# Patient Record
Sex: FEMALE | Race: WHITE
[De-identification: names, ages, dates, MRNs, and addresses within clinical notes are randomized per-mention and may not be internally consistent; named-entity substitution may affect disease eponyms.]

---

## 2017-04-13 ENCOUNTER — OTHER (OUTPATIENT)
Age: 82
End: 2017-04-13

## 2017-04-18 ENCOUNTER — APPOINTMENT (OUTPATIENT)
Dept: HEMATOLOGY ONCOLOGY | Facility: CLINIC | Age: 82
End: 2017-04-18

## 2017-04-18 ENCOUNTER — LABORATORY RESULT (OUTPATIENT)
Age: 82
End: 2017-04-18

## 2017-04-18 VITALS
WEIGHT: 107 LBS | TEMPERATURE: 98 F | HEART RATE: 70 BPM | BODY MASS INDEX: 18.96 KG/M2 | SYSTOLIC BLOOD PRESSURE: 99 MMHG | DIASTOLIC BLOOD PRESSURE: 61 MMHG | HEIGHT: 63 IN

## 2017-04-18 LAB
ERYTHROCYTE [SEDIMENTATION RATE] IN BLOOD BY WESTERGREN METHOD: 26 MM/HR
FERRITIN SERPL-MCNC: 73.2 NG/ML
HCT VFR BLD CALC: 34.7 %
HGB BLD-MCNC: 11.4 G/DL
IRON SATN MFR SERPL: 21 %
IRON SERPL-MCNC: 49 UG/DL
MCHC RBC-ENTMCNC: 32.8 GM/DL
MCHC RBC-ENTMCNC: 32.8 PG
MCV RBC AUTO: 100 FL
PLATELET # BLD AUTO: 210 K/UL
RBC # BLD: 3.47 M/UL
RBC # FLD: 13.3 %
TIBC SERPL-MCNC: 235 UG/DL
UIBC SERPL-MCNC: 186 UG/DL
WBC # FLD AUTO: 5.9 K/UL

## 2017-04-20 ENCOUNTER — TRANSCRIPTION ENCOUNTER (OUTPATIENT)
Age: 82
End: 2017-04-20

## 2017-04-20 LAB
DEPRECATED KAPPA LC FREE/LAMBDA SER: 0.4 RATIO
IGA SER QL IEP: 695 MG/DL
IGG SER QL IEP: 699 MG/DL
IGM SER QL IEP: 10 MG/DL
KAPPA LC CSF-MCNC: 7.3 MG/DL
KAPPA LC SERPL-MCNC: 2.92 MG/DL

## 2017-05-06 ENCOUNTER — INPATIENT (INPATIENT)
Facility: HOSPITAL | Age: 82
LOS: 2 days | Discharge: HOME | End: 2017-05-09
Attending: FAMILY MEDICINE | Admitting: FAMILY MEDICINE
Payer: MEDICARE

## 2017-05-06 VITALS — WEIGHT: 104.94 LBS | HEIGHT: 63 IN

## 2017-05-06 DIAGNOSIS — E03.9 HYPOTHYROIDISM, UNSPECIFIED: ICD-10-CM

## 2017-05-06 DIAGNOSIS — Z95.0 PRESENCE OF CARDIAC PACEMAKER: Chronic | ICD-10-CM

## 2017-05-06 DIAGNOSIS — I10 ESSENTIAL (PRIMARY) HYPERTENSION: ICD-10-CM

## 2017-05-06 DIAGNOSIS — I48.2 CHRONIC ATRIAL FIBRILLATION: ICD-10-CM

## 2017-05-06 DIAGNOSIS — I50.40 UNSPECIFIED COMBINED SYSTOLIC (CONGESTIVE) AND DIASTOLIC (CONGESTIVE) HEART FAILURE: ICD-10-CM

## 2017-05-06 LAB
ALBUMIN SERPL ELPH-MCNC: 3.3 G/DL — SIGNIFICANT CHANGE UP (ref 3.3–5)
ALP SERPL-CCNC: 83 U/L — SIGNIFICANT CHANGE UP (ref 40–120)
ALT FLD-CCNC: 40 U/L — SIGNIFICANT CHANGE UP (ref 12–78)
ANION GAP SERPL CALC-SCNC: 12 MMOL/L — SIGNIFICANT CHANGE UP (ref 5–17)
APTT BLD: 39.8 SEC — HIGH (ref 27.5–37.4)
APTT BLD: 42.4 SEC — HIGH (ref 27.5–37.4)
AST SERPL-CCNC: 34 U/L — SIGNIFICANT CHANGE UP (ref 15–37)
BASOPHILS # BLD AUTO: 0.1 K/UL — SIGNIFICANT CHANGE UP (ref 0–0.2)
BASOPHILS NFR BLD AUTO: 1.4 % — SIGNIFICANT CHANGE UP (ref 0–2)
BILIRUB SERPL-MCNC: 0.6 MG/DL — SIGNIFICANT CHANGE UP (ref 0.2–1.2)
BUN SERPL-MCNC: 12 MG/DL — SIGNIFICANT CHANGE UP (ref 7–23)
CALCIUM SERPL-MCNC: 9.4 MG/DL — SIGNIFICANT CHANGE UP (ref 8.5–10.1)
CHLORIDE SERPL-SCNC: 100 MMOL/L — SIGNIFICANT CHANGE UP (ref 96–108)
CO2 SERPL-SCNC: 26 MMOL/L — SIGNIFICANT CHANGE UP (ref 22–31)
CREAT SERPL-MCNC: 1.14 MG/DL — SIGNIFICANT CHANGE UP (ref 0.5–1.3)
DIGOXIN SERPL-MCNC: 1.65 NG/ML — SIGNIFICANT CHANGE UP (ref 0.8–2)
EOSINOPHIL # BLD AUTO: 0.2 K/UL — SIGNIFICANT CHANGE UP (ref 0–0.5)
EOSINOPHIL NFR BLD AUTO: 1.9 % — SIGNIFICANT CHANGE UP (ref 0–6)
GLUCOSE SERPL-MCNC: 140 MG/DL — HIGH (ref 70–99)
HCT VFR BLD CALC: 37.8 % — SIGNIFICANT CHANGE UP (ref 34.5–45)
HGB BLD-MCNC: 12.2 G/DL — SIGNIFICANT CHANGE UP (ref 11.5–15.5)
INR BLD: 3.31 RATIO — HIGH (ref 0.88–1.16)
INR BLD: 3.32 RATIO — HIGH (ref 0.88–1.16)
LYMPHOCYTES # BLD AUTO: 1.2 K/UL — SIGNIFICANT CHANGE UP (ref 1–3.3)
LYMPHOCYTES # BLD AUTO: 12.1 % — LOW (ref 13–44)
MCHC RBC-ENTMCNC: 31.9 PG — SIGNIFICANT CHANGE UP (ref 27–34)
MCHC RBC-ENTMCNC: 32.4 GM/DL — SIGNIFICANT CHANGE UP (ref 32–36)
MCV RBC AUTO: 98.6 FL — SIGNIFICANT CHANGE UP (ref 80–100)
MONOCYTES # BLD AUTO: 0.7 K/UL — SIGNIFICANT CHANGE UP (ref 0–0.9)
MONOCYTES NFR BLD AUTO: 7.6 % — SIGNIFICANT CHANGE UP (ref 2–14)
NEUTROPHILS # BLD AUTO: 7.4 K/UL — SIGNIFICANT CHANGE UP (ref 1.8–7.4)
NEUTROPHILS NFR BLD AUTO: 77 % — SIGNIFICANT CHANGE UP (ref 43–77)
NT-PROBNP SERPL-SCNC: 6049 PG/ML — HIGH (ref 0–450)
PLATELET # BLD AUTO: 350 K/UL — SIGNIFICANT CHANGE UP (ref 150–400)
POTASSIUM SERPL-MCNC: 4.2 MMOL/L — SIGNIFICANT CHANGE UP (ref 3.5–5.3)
POTASSIUM SERPL-SCNC: 4.2 MMOL/L — SIGNIFICANT CHANGE UP (ref 3.5–5.3)
PROT SERPL-MCNC: 7.7 GM/DL — SIGNIFICANT CHANGE UP (ref 6–8.3)
PROTHROM AB SERPL-ACNC: 36.6 SEC — HIGH (ref 9.8–12.7)
PROTHROM AB SERPL-ACNC: 36.8 SEC — HIGH (ref 9.8–12.7)
RBC # BLD: 3.83 M/UL — SIGNIFICANT CHANGE UP (ref 3.8–5.2)
RBC # FLD: 13.5 % — SIGNIFICANT CHANGE UP (ref 10.3–14.5)
SODIUM SERPL-SCNC: 138 MMOL/L — SIGNIFICANT CHANGE UP (ref 135–145)
TROPONIN I SERPL-MCNC: <0.015 NG/ML — SIGNIFICANT CHANGE UP (ref 0.01–0.04)
TROPONIN I SERPL-MCNC: <0.015 NG/ML — SIGNIFICANT CHANGE UP (ref 0.01–0.04)
WBC # BLD: 9.6 K/UL — SIGNIFICANT CHANGE UP (ref 3.8–10.5)
WBC # FLD AUTO: 9.6 K/UL — SIGNIFICANT CHANGE UP (ref 3.8–10.5)

## 2017-05-06 PROCEDURE — 71020: CPT | Mod: 26

## 2017-05-06 PROCEDURE — 93010 ELECTROCARDIOGRAM REPORT: CPT

## 2017-05-06 PROCEDURE — 93306 TTE W/DOPPLER COMPLETE: CPT | Mod: 26

## 2017-05-06 PROCEDURE — 99285 EMERGENCY DEPT VISIT HI MDM: CPT

## 2017-05-06 RX ORDER — AMIODARONE HYDROCHLORIDE 400 MG/1
200 TABLET ORAL DAILY
Qty: 0 | Refills: 0 | Status: DISCONTINUED | OUTPATIENT
Start: 2017-05-06 | End: 2017-05-07

## 2017-05-06 RX ORDER — LEVOTHYROXINE SODIUM 125 MCG
75 TABLET ORAL DAILY
Qty: 0 | Refills: 0 | Status: DISCONTINUED | OUTPATIENT
Start: 2017-05-06 | End: 2017-05-09

## 2017-05-06 RX ORDER — FUROSEMIDE 40 MG
40 TABLET ORAL ONCE
Qty: 0 | Refills: 0 | Status: COMPLETED | OUTPATIENT
Start: 2017-05-06 | End: 2017-05-06

## 2017-05-06 RX ORDER — CARVEDILOL PHOSPHATE 80 MG/1
6.25 CAPSULE, EXTENDED RELEASE ORAL EVERY 12 HOURS
Qty: 0 | Refills: 0 | Status: DISCONTINUED | OUTPATIENT
Start: 2017-05-06 | End: 2017-05-07

## 2017-05-06 RX ORDER — ASPIRIN/CALCIUM CARB/MAGNESIUM 324 MG
81 TABLET ORAL DAILY
Qty: 0 | Refills: 0 | Status: DISCONTINUED | OUTPATIENT
Start: 2017-05-06 | End: 2017-05-09

## 2017-05-06 RX ORDER — FUROSEMIDE 40 MG
40 TABLET ORAL EVERY 24 HOURS
Qty: 0 | Refills: 0 | Status: DISCONTINUED | OUTPATIENT
Start: 2017-05-06 | End: 2017-05-07

## 2017-05-06 RX ORDER — ASPIRIN/CALCIUM CARB/MAGNESIUM 324 MG
1 TABLET ORAL
Qty: 0 | Refills: 0 | COMMUNITY

## 2017-05-06 RX ORDER — ATORVASTATIN CALCIUM 80 MG/1
10 TABLET, FILM COATED ORAL AT BEDTIME
Qty: 0 | Refills: 0 | Status: DISCONTINUED | OUTPATIENT
Start: 2017-05-06 | End: 2017-05-09

## 2017-05-06 RX ORDER — WARFARIN SODIUM 2.5 MG/1
2 TABLET ORAL DAILY
Qty: 0 | Refills: 0 | Status: DISCONTINUED | OUTPATIENT
Start: 2017-05-06 | End: 2017-05-07

## 2017-05-06 RX ORDER — SODIUM CHLORIDE 9 MG/ML
3 INJECTION INTRAMUSCULAR; INTRAVENOUS; SUBCUTANEOUS ONCE
Qty: 0 | Refills: 0 | Status: COMPLETED | OUTPATIENT
Start: 2017-05-06 | End: 2017-05-06

## 2017-05-06 RX ORDER — DIGOXIN 250 MCG
0.12 TABLET ORAL DAILY
Qty: 0 | Refills: 0 | Status: DISCONTINUED | OUTPATIENT
Start: 2017-05-06 | End: 2017-05-08

## 2017-05-06 RX ORDER — ATORVASTATIN CALCIUM 80 MG/1
1 TABLET, FILM COATED ORAL
Qty: 0 | Refills: 0 | COMMUNITY

## 2017-05-06 RX ADMIN — ATORVASTATIN CALCIUM 10 MILLIGRAM(S): 80 TABLET, FILM COATED ORAL at 21:26

## 2017-05-06 RX ADMIN — Medication 40 MILLIGRAM(S): at 09:33

## 2017-05-06 RX ADMIN — CARVEDILOL PHOSPHATE 6.25 MILLIGRAM(S): 80 CAPSULE, EXTENDED RELEASE ORAL at 18:03

## 2017-05-06 RX ADMIN — Medication 2.5 MILLIGRAM(S): at 16:23

## 2017-05-06 RX ADMIN — AMIODARONE HYDROCHLORIDE 200 MILLIGRAM(S): 400 TABLET ORAL at 14:12

## 2017-05-06 RX ADMIN — Medication 0.12 MILLIGRAM(S): at 14:12

## 2017-05-06 RX ADMIN — SODIUM CHLORIDE 3 MILLILITER(S): 9 INJECTION INTRAMUSCULAR; INTRAVENOUS; SUBCUTANEOUS at 08:12

## 2017-05-06 RX ADMIN — Medication 81 MILLIGRAM(S): at 16:24

## 2017-05-06 RX ADMIN — Medication 1 TABLET(S): at 14:12

## 2017-05-06 RX ADMIN — Medication 40 MILLIGRAM(S): at 12:20

## 2017-05-06 NOTE — ED PROVIDER NOTE - MEDICAL DECISION MAKING DETAILS
pt with archibald and sob, will get labs, ekg, chest xray re eval could be chf vs copd as pt has ho smoking quit for 20 years

## 2017-05-06 NOTE — ED PROVIDER NOTE - PROGRESS NOTE DETAILS
pt with chf, will give lasix, pt aao x 3, hr reg, + murmur, s1 s2, lungs with crackles 1/2 up bilaterally with slight exp wheezing pt with chf, will give lasix, pt aao x 3, hr reg, + murmur, s1 s2, lungs with crackles 1/2 up bilaterally with slight exp wheezing karla do

## 2017-05-06 NOTE — ED PROVIDER NOTE - ATTENDING CONTRIBUTION TO CARE
I, Julia Chaudhry, performed the initial face to face bedside interview with this patient regarding history of present illness, review of symptoms and relevant past medical, social and family history.  I completed an independent physical examination.  I was the initial provider who evaluated this patient. I have signed out the follow up of any pending tests (i.e. labs, radiological studies) to the ACP with instructions to review any pertinent findings to me prior to determining a final disposition.  I have communicated the patient’s plan of care and disposition with the ACP.

## 2017-05-06 NOTE — H&P ADULT - NSHPPHYSICALEXAM_GEN_ALL_CORE
Physical Exam: Vital Signs Last 24 Hrs  T(C): 37.2, Max: 37.2 (05-06 @ 11:03)  T(F): 98.9, Max: 98.9 (05-06 @ 11:03)  HR: 73 (70 - 73)  BP: 132/69 (132/69 - 153/79)  BP(mean): --  RR: 19 (16 - 24)  SpO2: 100% (95% - 100%)        HEENT: PRRL EOMI    MOUTH/TEETH/GUMS: Clear    NECK: no JVD    LUNGS: mild basilar crackles    HEART: S1,S2 RR, s/p AICD R subclavicular fossa    ABDOMEN: soft nontender    EXTREMITIES: edema Yes: No:    MUSCULOSKELETAL:no joint pain or swelling    NEURO: no tremor, no focal signs.    SKIN: no rash

## 2017-05-06 NOTE — ED PROVIDER NOTE - OBJECTIVE STATEMENT
83 yo f pw 2 weeks of sob and dyspnea today worse  from early this morning. No c/o fever, chills, chest pain. pt s/i slight dry non productive cough with archibald.

## 2017-05-06 NOTE — H&P ADULT - NSHPREVIEWOFSYSTEMS_GEN_ALL_CORE
Review of Systems: as in HPI    Eyes: no changes in vision    ENT/Mouth: no changes    Cardiovascular: no chest pain    Respiratory: exertional dysnpnea, no dyspnea at rest on O2    Gastrointestinal: no diarrhea, no nausea, no vomiting    Genitourinary: no dysuria    Breast: no pain    Musculoskeletal: no pain    Integumentary: no itching    Neurological: No Headache, no tremor,    Psychiatric: no suicidal ideations    Endocrine: no excessive thirst, polyuria, hot flashes    Hematologic/Lymphatic: no swollen glands    Allergic/Immunologic: no congestion, rash

## 2017-05-06 NOTE — H&P ADULT - PROBLEM SELECTOR PROBLEM 1
Combined systolic and diastolic congestive heart failure, unspecified congestive heart failure chronicity

## 2017-05-06 NOTE — ED ADULT NURSE NOTE - OBJECTIVE STATEMENT
Pt states she has been SOB in the mornings over 1 week. Last night pt had difficulty breathing and did not sleep. Pt color pale. Pulse ox 92%, O2 applied at 2L/min, pulse Ox increased to 97%.

## 2017-05-06 NOTE — H&P ADULT - HISTORY OF PRESENT ILLNESS
The patient is an 83 yo female with Hx. of CAD, CHF, Atrial fib. S/P AICD,Hypothyroid who developed gradually worsening paroxysmal nocturnal dyspnea and she presented to the ER with worsening SOB. She denies CP. She saw Dr. Marrero in the office 2 weeks ago and she was ok.

## 2017-05-06 NOTE — H&P ADULT - NSHPLABSRESULTS_GEN_ALL_CORE
12.2   9.6   )-----------( 350      ( 06 May 2017 07:48 )             37.8       05-06    138  |  100  |  12  ----------------------------<  140<H>  4.2   |  26  |  1.14    Ca    9.4      06 May 2017 07:48    TPro  7.7  /  Alb  3.3  /  TBili  0.6  /  DBili  x   /  AST  34  /  ALT  40  /  AlkPhos  83  05-06    CXRay  mild PVC

## 2017-05-06 NOTE — H&P ADULT - ASSESSMENT
81 yo female with Hx. of CAD, CHF, Atrial fib. S/P AICD,Hypothyroid, chronic CHF who presents with acute on chronic CHF exacerbation

## 2017-05-07 DIAGNOSIS — Z95.0 PRESENCE OF CARDIAC PACEMAKER: ICD-10-CM

## 2017-05-07 DIAGNOSIS — I50.33 ACUTE ON CHRONIC DIASTOLIC (CONGESTIVE) HEART FAILURE: ICD-10-CM

## 2017-05-07 LAB
ANION GAP SERPL CALC-SCNC: 9 MMOL/L — SIGNIFICANT CHANGE UP (ref 5–17)
BUN SERPL-MCNC: 14 MG/DL — SIGNIFICANT CHANGE UP (ref 7–23)
CALCIUM SERPL-MCNC: 9 MG/DL — SIGNIFICANT CHANGE UP (ref 8.5–10.1)
CHLORIDE SERPL-SCNC: 97 MMOL/L — SIGNIFICANT CHANGE UP (ref 96–108)
CO2 SERPL-SCNC: 32 MMOL/L — HIGH (ref 22–31)
CREAT SERPL-MCNC: 1.08 MG/DL — SIGNIFICANT CHANGE UP (ref 0.5–1.3)
GLUCOSE SERPL-MCNC: 86 MG/DL — SIGNIFICANT CHANGE UP (ref 70–99)
HCT VFR BLD CALC: 39.3 % — SIGNIFICANT CHANGE UP (ref 34.5–45)
HGB BLD-MCNC: 12.4 G/DL — SIGNIFICANT CHANGE UP (ref 11.5–15.5)
INR BLD: 2.82 RATIO — HIGH (ref 0.88–1.16)
MCHC RBC-ENTMCNC: 31.4 PG — SIGNIFICANT CHANGE UP (ref 27–34)
MCHC RBC-ENTMCNC: 31.6 GM/DL — LOW (ref 32–36)
MCV RBC AUTO: 99.5 FL — SIGNIFICANT CHANGE UP (ref 80–100)
NT-PROBNP SERPL-SCNC: 3490 PG/ML — HIGH (ref 0–450)
NT-PROBNP SERPL-SCNC: 4540 PG/ML — HIGH (ref 0–450)
PLATELET # BLD AUTO: 347 K/UL — SIGNIFICANT CHANGE UP (ref 150–400)
POTASSIUM SERPL-MCNC: 4.1 MMOL/L — SIGNIFICANT CHANGE UP (ref 3.5–5.3)
POTASSIUM SERPL-SCNC: 4.1 MMOL/L — SIGNIFICANT CHANGE UP (ref 3.5–5.3)
PROTHROM AB SERPL-ACNC: 31.1 SEC — HIGH (ref 9.8–12.7)
RBC # BLD: 3.95 M/UL — SIGNIFICANT CHANGE UP (ref 3.8–5.2)
RBC # FLD: 13.4 % — SIGNIFICANT CHANGE UP (ref 10.3–14.5)
SODIUM SERPL-SCNC: 138 MMOL/L — SIGNIFICANT CHANGE UP (ref 135–145)
WBC # BLD: 6.1 K/UL — SIGNIFICANT CHANGE UP (ref 3.8–10.5)
WBC # FLD AUTO: 6.1 K/UL — SIGNIFICANT CHANGE UP (ref 3.8–10.5)

## 2017-05-07 PROCEDURE — 93284 PRGRMG EVAL IMPLANTABLE DFB: CPT | Mod: 26

## 2017-05-07 RX ORDER — AMIODARONE HYDROCHLORIDE 400 MG/1
200 TABLET ORAL DAILY
Qty: 0 | Refills: 0 | Status: DISCONTINUED | OUTPATIENT
Start: 2017-05-08 | End: 2017-05-09

## 2017-05-07 RX ORDER — FUROSEMIDE 40 MG
40 TABLET ORAL EVERY 24 HOURS
Qty: 0 | Refills: 0 | Status: CANCELLED | OUTPATIENT
Start: 2018-04-05 | End: 2017-05-09

## 2017-05-07 RX ORDER — WARFARIN SODIUM 2.5 MG/1
1 TABLET ORAL DAILY
Qty: 0 | Refills: 0 | Status: DISCONTINUED | OUTPATIENT
Start: 2017-05-07 | End: 2017-05-09

## 2017-05-07 RX ADMIN — Medication 75 MICROGRAM(S): at 05:51

## 2017-05-07 RX ADMIN — ATORVASTATIN CALCIUM 10 MILLIGRAM(S): 80 TABLET, FILM COATED ORAL at 21:16

## 2017-05-07 RX ADMIN — Medication 2.5 MILLIGRAM(S): at 05:50

## 2017-05-07 RX ADMIN — Medication 1 TABLET(S): at 11:24

## 2017-05-07 RX ADMIN — CARVEDILOL PHOSPHATE 6.25 MILLIGRAM(S): 80 CAPSULE, EXTENDED RELEASE ORAL at 05:50

## 2017-05-07 RX ADMIN — Medication 81 MILLIGRAM(S): at 11:24

## 2017-05-07 RX ADMIN — WARFARIN SODIUM 1 MILLIGRAM(S): 2.5 TABLET ORAL at 21:16

## 2017-05-07 RX ADMIN — AMIODARONE HYDROCHLORIDE 200 MILLIGRAM(S): 400 TABLET ORAL at 05:50

## 2017-05-07 RX ADMIN — Medication 0.12 MILLIGRAM(S): at 05:50

## 2017-05-08 DIAGNOSIS — I50.9 HEART FAILURE, UNSPECIFIED: ICD-10-CM

## 2017-05-08 DIAGNOSIS — Z45.02 ENCOUNTER FOR ADJUSTMENT AND MANAGEMENT OF AUTOMATIC IMPLANTABLE CARDIAC DEFIBRILLATOR: ICD-10-CM

## 2017-05-08 DIAGNOSIS — Z51.81 ENCOUNTER FOR THERAPEUTIC DRUG LEVEL MONITORING: ICD-10-CM

## 2017-05-08 DIAGNOSIS — I48.0 PAROXYSMAL ATRIAL FIBRILLATION: ICD-10-CM

## 2017-05-08 LAB
ANION GAP SERPL CALC-SCNC: 7 MMOL/L — SIGNIFICANT CHANGE UP (ref 5–17)
BUN SERPL-MCNC: 13 MG/DL — SIGNIFICANT CHANGE UP (ref 7–23)
CALCIUM SERPL-MCNC: 8.5 MG/DL — SIGNIFICANT CHANGE UP (ref 8.5–10.1)
CHLORIDE SERPL-SCNC: 100 MMOL/L — SIGNIFICANT CHANGE UP (ref 96–108)
CO2 SERPL-SCNC: 31 MMOL/L — SIGNIFICANT CHANGE UP (ref 22–31)
CREAT SERPL-MCNC: 0.88 MG/DL — SIGNIFICANT CHANGE UP (ref 0.5–1.3)
DIGOXIN SERPL-MCNC: 1.75 NG/ML — SIGNIFICANT CHANGE UP (ref 0.8–2)
GLUCOSE SERPL-MCNC: 85 MG/DL — SIGNIFICANT CHANGE UP (ref 70–99)
HCT VFR BLD CALC: 32.6 % — LOW (ref 34.5–45)
HGB BLD-MCNC: 11 G/DL — LOW (ref 11.5–15.5)
INR BLD: 2.95 RATIO — HIGH (ref 0.88–1.16)
MCHC RBC-ENTMCNC: 33.6 PG — SIGNIFICANT CHANGE UP (ref 27–34)
MCHC RBC-ENTMCNC: 33.7 GM/DL — SIGNIFICANT CHANGE UP (ref 32–36)
MCV RBC AUTO: 99.7 FL — SIGNIFICANT CHANGE UP (ref 80–100)
NT-PROBNP SERPL-SCNC: 2518 PG/ML — HIGH (ref 0–450)
PLATELET # BLD AUTO: 280 K/UL — SIGNIFICANT CHANGE UP (ref 150–400)
POTASSIUM SERPL-MCNC: 3.7 MMOL/L — SIGNIFICANT CHANGE UP (ref 3.5–5.3)
POTASSIUM SERPL-SCNC: 3.7 MMOL/L — SIGNIFICANT CHANGE UP (ref 3.5–5.3)
PROTHROM AB SERPL-ACNC: 32.6 SEC — HIGH (ref 9.8–12.7)
RBC # BLD: 3.27 M/UL — LOW (ref 3.8–5.2)
RBC # FLD: 12.8 % — SIGNIFICANT CHANGE UP (ref 10.3–14.5)
SODIUM SERPL-SCNC: 138 MMOL/L — SIGNIFICANT CHANGE UP (ref 135–145)
TSH SERPL-MCNC: 4.61 UIU/ML — HIGH (ref 0.36–3.74)
WBC # BLD: 6.5 K/UL — SIGNIFICANT CHANGE UP (ref 3.8–10.5)
WBC # FLD AUTO: 6.5 K/UL — SIGNIFICANT CHANGE UP (ref 3.8–10.5)

## 2017-05-08 PROCEDURE — 71010: CPT | Mod: 26

## 2017-05-08 PROCEDURE — 99222 1ST HOSP IP/OBS MODERATE 55: CPT

## 2017-05-08 RX ORDER — DIGOXIN 250 MCG
0.06 TABLET ORAL DAILY
Qty: 0 | Refills: 0 | Status: DISCONTINUED | OUTPATIENT
Start: 2017-05-08 | End: 2017-05-08

## 2017-05-08 RX ORDER — CARVEDILOL PHOSPHATE 80 MG/1
3.12 CAPSULE, EXTENDED RELEASE ORAL
Qty: 0 | Refills: 0 | Status: DISCONTINUED | OUTPATIENT
Start: 2017-05-08 | End: 2017-05-09

## 2017-05-08 RX ORDER — DIGOXIN 250 MCG
0.06 TABLET ORAL DAILY
Qty: 0 | Refills: 0 | Status: DISCONTINUED | OUTPATIENT
Start: 2017-05-09 | End: 2017-05-09

## 2017-05-08 RX ORDER — METOPROLOL TARTRATE 50 MG
25 TABLET ORAL
Qty: 0 | Refills: 0 | Status: DISCONTINUED | OUTPATIENT
Start: 2017-05-08 | End: 2017-05-08

## 2017-05-08 RX ADMIN — Medication 75 MICROGRAM(S): at 05:38

## 2017-05-08 RX ADMIN — ATORVASTATIN CALCIUM 10 MILLIGRAM(S): 80 TABLET, FILM COATED ORAL at 22:25

## 2017-05-08 RX ADMIN — CARVEDILOL PHOSPHATE 3.12 MILLIGRAM(S): 80 CAPSULE, EXTENDED RELEASE ORAL at 17:54

## 2017-05-08 RX ADMIN — Medication 0.12 MILLIGRAM(S): at 05:38

## 2017-05-08 RX ADMIN — AMIODARONE HYDROCHLORIDE 200 MILLIGRAM(S): 400 TABLET ORAL at 05:38

## 2017-05-08 RX ADMIN — Medication 1 TABLET(S): at 12:17

## 2017-05-08 RX ADMIN — Medication 81 MILLIGRAM(S): at 12:17

## 2017-05-08 RX ADMIN — WARFARIN SODIUM 1 MILLIGRAM(S): 2.5 TABLET ORAL at 22:25

## 2017-05-08 NOTE — PROGRESS NOTE ADULT - PROBLEM SELECTOR PLAN 2
Check INR keep between 2-3  INR >2 <3  c/w 1 mg warfarin, daily INR  noted digoxin level 1.6  will check level in am, will d/w cardiology ? lower dose
Check INR keep between 2-3  INR >2 <3  c/w 1 mg warfarin, daily INR  noted digoxin level 1.6--> 1.75  will check level in am, will d/w cardiology ? lower dose or d/c

## 2017-05-08 NOTE — CONSULT NOTE ADULT - PROBLEM SELECTOR PROBLEM 1
Dyspnea due to congestive heart failure
Combined systolic and diastolic congestive heart failure, unspecified congestive heart failure chronicity

## 2017-05-08 NOTE — CONSULT NOTE ADULT - PROBLEM SELECTOR RECOMMENDATION 2
- agree with IV diuresis  - will need po lasix on discharge  - d/c coreg due to hypotension  - start metoprolol 25mg po bid for HF

## 2017-05-08 NOTE — CONSULT NOTE ADULT - PROBLEM SELECTOR RECOMMENDATION 6
- currently in NSR  - no recent AF  - c/w amiodarone (originally started due to VT but will help maintain NSR as well)

## 2017-05-08 NOTE — CONSULT NOTE ADULT - SUBJECTIVE AND OBJECTIVE BOX
HPI:  The patient is an 83 yo female with Hx. of CAD, CHF, Atrial fib. S/P AICD,Hypothyroid who developed gradually worsening paroxysmal nocturnal dyspnea and she presented to the ER with worsening SOB. She denies CP. She saw Dr. Marrero in the office 2 weeks ago and she was ok. CXR showed pulmonary congestion. Patient much improved with diuresis. She is on amiodarone.      PAST MEDICAL & SURGICAL HISTORY:  HTN (hypertension)  Hypothyroid  Chronic atrial fibrillation  Pacemaker  Artificial pacemaker      MEDICATIONS  (STANDING):  aspirin enteric coated 81milliGRAM(s) Oral daily  atorvastatin 10milliGRAM(s) Oral at bedtime  levothyroxine 75MICROGram(s) Oral daily  calcium carbonate  625 mG + Vitamin D (OsCal 250 + D) 1Tablet(s) Oral daily  warfarin 1milliGRAM(s) Oral daily  amiodarone    Tablet 200milliGRAM(s) Oral daily  carvedilol 3.125milliGRAM(s) Oral two times a day  digoxin     Tablet 0.0625milliGRAM(s) Oral daily    MEDICATIONS  (PRN):      Allergies    adhesives (Rash)  Ancef (Unknown)    Intolerances        SOCIAL HISTORY: Denies tobacco, etoh abuse or illicit drug use    FAMILY HISTORY:  No pertinent family history in first degree relatives      Vital Signs Last 24 Hrs  T(C): 36.8, Max: 36.8 (05-08 @ 16:15)  T(F): 98.3, Max: 98.3 (05-08 @ 16:15)  HR: 73 (70 - 76)  BP: 129/66 (102/46 - 137/62)  BP(mean): --  RR: 17 (16 - 20)  SpO2: 98% (96% - 98%)    REVIEW OF SYSTEMS:    CONSTITUTIONAL:  As per HPI.  SKIN: no rashes  HEENT:  Eyes:  No diplopia or blurred vision. ENT:  No earache, sore throat or runny nose.  CARDIOVASCULAR:  No pressure, squeezing, tightness, heaviness or aching about the chest, neck, axilla or epigastrium.  RESPIRATORY:  No cough, shortness of breath, PND or orthopnea.  GASTROINTESTINAL:  No nausea, vomiting or diarrhea.  GENITOURINARY:  No dysuria, frequency or urgency.  MUSCULOSKELETAL:  As per HPI.  SKIN:  No change in skin, hair or nails.  NEUROLOGIC:  No paresthesias, fasciculations, seizures or weakness.  PSYCHIATRIC:  No disorder of thought or mood.  ENDOCRINE:  No heat or cold intolerance, polyuria or polydipsia.  HEMATOLOGICAL:  No easy bruising or bleedings:  .     PHYSICAL EXAMINATION:    GENERAL APPEARANCE:  Pt. is not currently dyspneic, in no distress. Pt. is alert, oriented, and pleasant.  HEENT:  Pupils are normal and react normally. No icterus. Mucous membranes well colored.  NECK:  Supple. No lymphadenopathy. Jugular venous pressure not elevated. Carotids equal.   HEART:   The cardiac impulse has a normal quality. Regular. Normal S1 and S2. 2/6 systolic murmur  CHEST: scattered ronchi  ABDOMEN:  Soft and nontender.   EXTREMITIES:  There is no cyanosis, clubbing or edema.   SKIN:  No rash or significant lesions are noted.  CNS; AAO x 3    LABS:                        11.0   6.5   )-----------( 280      ( 08 May 2017 05:50 )             32.6     05-08    138  |  100  |  13  ----------------------------<  85  3.7   |  31  |  0.88    Ca    8.5      08 May 2017 05:50        PT/INR - ( 08 May 2017 05:50 )   PT: 32.6 sec;   INR: 2.95 ratio         PTT - ( 06 May 2017 19:39 )  PTT:39.8 sec            RADIOLOGY & ADDITIONAL STUDIES:
CARDIOLOGY AND ELECTROPHYSIOLOGY ASSESSMENT    HPI:  The patient is an 83 yo female with Hx. of CAD, CHF, Atrial fib. S/P AICD,Hypothyroid who developed gradually worsening paroxysmal nocturnal dyspnea and she presented to the ER with worsening SOB. She denies CP. She saw Dr. Marrero in the office 2 weeks ago and she was ok. (06 May 2017 11:54)    5/8/17:  Pt. seen and examined.  Reports improved symptoms since admission with IV diuresis.       PAST MEDICAL & SURGICAL HISTORY:  HTN (hypertension)  Hypothyroid  Chronic atrial fibrillation  Pacemaker  Artificial pacemaker      MEDICATIONS  (STANDING):  aspirin enteric coated 81milliGRAM(s) Oral daily  digoxin     Tablet 0.125milliGRAM(s) Oral daily  atorvastatin 10milliGRAM(s) Oral at bedtime  levothyroxine 75MICROGram(s) Oral daily  calcium carbonate  625 mG + Vitamin D (OsCal 250 + D) 1Tablet(s) Oral daily  warfarin 1milliGRAM(s) Oral daily  amiodarone    Tablet 200milliGRAM(s) Oral daily    MEDICATIONS  (PRN):      Allergies    adhesives (Rash)  Ancef (Unknown)    Intolerances        SOCIAL HISTORY: Denies tobacco, etoh abuse or illicit drug use    FAMILY HISTORY:  No pertinent family history in first degree relatives      Vital Signs Last 24 Hrs  T(C): 36.4, Max: 36.8 (05-07 @ 10:00)  T(F): 97.5, Max: 98.2 (05-07 @ 10:00)  HR: 76 (70 - 76)  BP: 114/60 (94/52 - 120/59)  BP(mean): --  RR: 19 (16 - 20)  SpO2: 98% (93% - 100%)    REVIEW OF SYSTEMS:    CONSTITUTIONAL:  As per HPI.  HEENT:  Eyes:  No diplopia or blurred vision. ENT:  No earache, sore throat or runny nose.  CARDIOVASCULAR:  No pressure, squeezing, strangling, tightness, heaviness or aching about the chest, neck, axilla or epigastrium.  RESPIRATORY:  No cough, shortness of breath, PND or orthopnea.  GASTROINTESTINAL:  No nausea, vomiting or diarrhea.  GENITOURINARY:  No dysuria, frequency or urgency.  MUSCULOSKELETAL:  As per HPI.  SKIN:  No change in skin, hair or nails.  NEUROLOGIC:  No paresthesias, fasciculations, seizures or weakness.  PSYCHIATRIC:  No disorder of thought or mood.  ENDOCRINE:  No heat or cold intolerance, polyuria or polydipsia.  HEMATOLOGICAL:  No easy bruising or bleedings:  .     PHYSICAL EXAMINATION:    GENERAL APPEARANCE:  Pt. is not currently dyspneic, in no distress. Pt. is alert, oriented, and pleasant.  HEENT:  Pupils are normal and react normally. No icterus. Mucous membranes well colored.  NECK:  Supple. No lymphadenopathy. Jugular venous pressure not elevated. Carotids equal.   HEART:   The cardiac impulse has a normal quality. There are no murmurs, rubs or gallops noted  CHEST:  Chest is clear to auscultation. Normal respiratory effort.  ABDOMEN:  Soft and nontender.   EXTREMITIES:  There is no edema.   SKIN:  No rash or significant lesions are noted.    I&O's Summary    I & Os for current day (as of 08 May 2017 08:40)  =============================================  IN: 240 ml / OUT: 150 ml / NET: 90 ml      LABS:                        11.0   6.5   )-----------( 280      ( 08 May 2017 05:50 )             32.6   05-08    138  |  100  |  13  ----------------------------<  85  3.7   |  31  |  0.88    Ca    8.5      08 May 2017 05:50      PT/INR - ( 08 May 2017 05:50 )   PT: 32.6 sec;   INR: 2.95 ratio         PTT - ( 06 May 2017 19:39 )  PTT:39.8 secCARDIAC MARKERS ( 06 May 2017 13:32 )  <0.015 ng/mL / x     / x     / x     / x      CARDIAC MARKERS ( 06 May 2017 10:55 )  0.020 ng/mL / x     / x     / x     / x                EKG:    Ventricular Rate 71 BPM    Atrial Rate 71 BPM    P-R Interval 328 ms    QRS Duration 158 ms    Q-T Interval 458 ms    QTC Calculation(Bezet) 497 ms    P Axis 12 degrees    R Axis 211 degrees    T Axis 36 degrees    Diagnosis Line Atrial-sensed ventricular-paced rhythm with prolonged AV conduction  Biventricular pacemaker detected  Abnormal ECG  When compared with ECG of 06-MAY-2017 07:11,  Vent. rate has decreased BY   6 BPM  Confirmed by JASE SAVAGE MD ([id number]) on 5/6/2017 8:24:34 PM      TELEMETRY:    AS-BiV paced rhythm    CARDIAC TESTS:     Summary     The left ventricle cavity is mildly dilated. Basal septal akinesis seen   in   4 chamber view. Endocardium not always well seen. EF still likely 50-55%   assuming no other regional wall motion abnormality.   The left atrium is moderately dilated.   The right atrium appears mildly dilated.   A device wire is seen in the RV and RA.   Normal right ventricle function.   Fibrocalcific changes noted to the aortic valve leaflets with preserved   excursion. No significant gradient demonstrated.   Mild (1+) aortic regurgitation is present.   Fibrocalcific changes noted to the mitral valve leaflets with preserved   leaflet excursion.   Moderate (2+) mitral regurgitation is present.   Mild (1+) tricuspid valve regurgitation is present. Moderate pulmonary   hypertension.   Shortened pulmonary artery acceleration time c/w PHTN   No evidence of pericardial effusion.   No pleural comment made.   IVC appears normal.     Signature     ----------------------------------------------------------------   Electronically signed by Chaitanya Castano MD(Interpreting   physician) on 05/07/2017 09:49 AM   ----------------------------------------------------------------      RADIOLOGY & ADDITIONAL STUDIES:

## 2017-05-08 NOTE — PROGRESS NOTE ADULT - PROBLEM SELECTOR PLAN 4
overcontrolled, c/w Enalapril, amiodarone with parameters  will hold BB for now
overcontrolled, c/w Enalapril, amiodarone with parameters  restart BB

## 2017-05-08 NOTE — CONSULT NOTE ADULT - PROBLEM SELECTOR RECOMMENDATION 9
- Pt. with history of severe MR and severe LV dysfunction prior to Bi-V ICD, now with EF 50-55% and improvement to moderate MR.  This suggests that current decompensation likely secondary to diastolic dysfunction

## 2017-05-08 NOTE — PROGRESS NOTE ADULT - PROBLEM SELECTOR PLAN 1
tele, serial troponins x3 neg, BNP trending down  1500 cc fluids restriction, 2 d echo - EF 55, 2+ MR, ICD interogated - no events  cardiology consult pending  c/w IV diuresis, ASA, hold BB due to low BP, c/w ACEi  daily PT  CXR - mild congestion, repeat CXR in am
tele, serial troponin x3 neg, BNP trending down  1500 cc fluids restriction, 2 d echo - EF 55, 2+ MR, ICD interrogated - no events  cardiology consult pending  c/w IV diuresis, ASA, hold BB due to low BP, now restart metoprolol 25 bid, c/w ACEi  daily PT  CXR - mild congestion, repeat CXR in am

## 2017-05-08 NOTE — CONSULT NOTE ADULT - ASSESSMENT
81 yo female with Hx. of CAD, CHF, Atrial fib. S/P bi-V AICD,Hypothyroid who presents in acute on chronic decompensated diastolic heart failure.
cont O2 prn  diuesis  CXR noted

## 2017-05-08 NOTE — PROGRESS NOTE ADULT - PROBLEM SELECTOR PLAN 5
ICD interrogation -  Bi V pacing-dependent No R wave measurable  No episodes or events detected
ICD interrogation -  Bi V pacing-dependent No R wave measurable  No episodes or events detected

## 2017-05-09 ENCOUNTER — TRANSCRIPTION ENCOUNTER (OUTPATIENT)
Age: 82
End: 2017-05-09

## 2017-05-09 VITALS — WEIGHT: 101.85 LBS

## 2017-05-09 LAB
ANION GAP SERPL CALC-SCNC: 7 MMOL/L — SIGNIFICANT CHANGE UP (ref 5–17)
BUN SERPL-MCNC: 10 MG/DL — SIGNIFICANT CHANGE UP (ref 7–23)
CALCIUM SERPL-MCNC: 8.5 MG/DL — SIGNIFICANT CHANGE UP (ref 8.5–10.1)
CHLORIDE SERPL-SCNC: 102 MMOL/L — SIGNIFICANT CHANGE UP (ref 96–108)
CO2 SERPL-SCNC: 30 MMOL/L — SIGNIFICANT CHANGE UP (ref 22–31)
CREAT SERPL-MCNC: 0.79 MG/DL — SIGNIFICANT CHANGE UP (ref 0.5–1.3)
GLUCOSE SERPL-MCNC: 86 MG/DL — SIGNIFICANT CHANGE UP (ref 70–99)
HCT VFR BLD CALC: 34.7 % — SIGNIFICANT CHANGE UP (ref 34.5–45)
HGB BLD-MCNC: 11.3 G/DL — LOW (ref 11.5–15.5)
INR BLD: 3.23 RATIO — HIGH (ref 0.88–1.16)
MCHC RBC-ENTMCNC: 31.9 PG — SIGNIFICANT CHANGE UP (ref 27–34)
MCHC RBC-ENTMCNC: 32.6 GM/DL — SIGNIFICANT CHANGE UP (ref 32–36)
MCV RBC AUTO: 97.8 FL — SIGNIFICANT CHANGE UP (ref 80–100)
PLATELET # BLD AUTO: 307 K/UL — SIGNIFICANT CHANGE UP (ref 150–400)
POTASSIUM SERPL-MCNC: 3.7 MMOL/L — SIGNIFICANT CHANGE UP (ref 3.5–5.3)
POTASSIUM SERPL-SCNC: 3.7 MMOL/L — SIGNIFICANT CHANGE UP (ref 3.5–5.3)
PROTHROM AB SERPL-ACNC: 35.7 SEC — HIGH (ref 9.8–12.7)
RBC # BLD: 3.55 M/UL — LOW (ref 3.8–5.2)
RBC # FLD: 12.8 % — SIGNIFICANT CHANGE UP (ref 10.3–14.5)
SODIUM SERPL-SCNC: 139 MMOL/L — SIGNIFICANT CHANGE UP (ref 135–145)
WBC # BLD: 6.7 K/UL — SIGNIFICANT CHANGE UP (ref 3.8–10.5)
WBC # FLD AUTO: 6.7 K/UL — SIGNIFICANT CHANGE UP (ref 3.8–10.5)

## 2017-05-09 RX ORDER — CARVEDILOL PHOSPHATE 80 MG/1
1 CAPSULE, EXTENDED RELEASE ORAL
Qty: 60 | Refills: 0 | OUTPATIENT
Start: 2017-05-09 | End: 2017-06-08

## 2017-05-09 RX ORDER — DIGOXIN 250 MCG
1 TABLET ORAL
Qty: 0 | Refills: 0 | COMMUNITY

## 2017-05-09 RX ORDER — WARFARIN SODIUM 2.5 MG/1
1 TABLET ORAL
Qty: 0 | Refills: 0 | COMMUNITY

## 2017-05-09 RX ORDER — DIGOXIN 250 MCG
1 TABLET ORAL
Qty: 30 | Refills: 0 | OUTPATIENT
Start: 2017-05-09 | End: 2017-06-08

## 2017-05-09 RX ORDER — CARVEDILOL PHOSPHATE 80 MG/1
1 CAPSULE, EXTENDED RELEASE ORAL
Qty: 0 | Refills: 0 | COMMUNITY

## 2017-05-09 RX ORDER — WARFARIN SODIUM 2.5 MG/1
1 TABLET ORAL
Qty: 0 | Refills: 0 | DISCHARGE
Start: 2017-05-09

## 2017-05-09 RX ORDER — FUROSEMIDE 40 MG
1 TABLET ORAL
Qty: 30 | Refills: 0
Start: 2017-05-09 | End: 2017-06-08

## 2017-05-09 RX ORDER — WARFARIN SODIUM 2.5 MG/1
1 TABLET ORAL
Qty: 0 | Refills: 0 | COMMUNITY
Start: 2017-05-09

## 2017-05-09 RX ADMIN — Medication 0.06 MILLIGRAM(S): at 06:50

## 2017-05-09 RX ADMIN — CARVEDILOL PHOSPHATE 3.12 MILLIGRAM(S): 80 CAPSULE, EXTENDED RELEASE ORAL at 06:50

## 2017-05-09 RX ADMIN — Medication 2.5 MILLIGRAM(S): at 06:50

## 2017-05-09 RX ADMIN — Medication 81 MILLIGRAM(S): at 11:57

## 2017-05-09 RX ADMIN — Medication 75 MICROGRAM(S): at 06:50

## 2017-05-09 RX ADMIN — Medication 1 TABLET(S): at 11:57

## 2017-05-09 RX ADMIN — AMIODARONE HYDROCHLORIDE 200 MILLIGRAM(S): 400 TABLET ORAL at 06:50

## 2017-05-09 NOTE — DISCHARGE NOTE ADULT - MEDICATION SUMMARY - MEDICATIONS TO CHANGE
I will SWITCH the dose or number of times a day I take the medications listed below when I get home from the hospital:    Coumadin 2 mg oral tablet  -- 1 tab(s) by mouth once a day    carvedilol 6.25 mg oral tablet  -- 1 tab(s) by mouth 2 times a day    digoxin 125 mcg (0.125 mg) oral tablet  -- 1 tab(s) by mouth once a day

## 2017-05-09 NOTE — DISCHARGE NOTE ADULT - PLAN OF CARE
resolve, prevent exacebrations take lasix 20 daily, check weight daily, drink 1500 cc a day, follow up with cardiology within 1 week INR 3.2, hold coumadin/warfarin for 1 day, restart 1 mg tomorrow 5/10, check INR in 2-3 days coreg lowered to 3.25 twice daily due to low BP continue with home medications lowered digoxin to 0.065, repeat level in 1 week  lowered digoxin to 0.065, repeat level in 1 week

## 2017-05-09 NOTE — PROGRESS NOTE ADULT - SUBJECTIVE AND OBJECTIVE BOX
Subjective:  Patient is a 82y old  Female who presents with a chief complaint of SOB, PND     HPI:  The patient is an 81 yo female with Hx. of CAD, CHF, Atrial fib. S/P AICD, Hypothyroid who admitted on 17 with gradually worsening paroxysmal nocturnal dyspnea and worsening SOB. She denies CP. She saw Dr. Marrero in the office 2 weeks ago and she was ok.      tele - paced rhythm, reports mild improvement of dyspnea , denies CP, cough, urinary sx, reports increased urine output   tele - no events, dyspnea improved, denies CP, palpitations    Patient seen and examined at bedside,    Review of system- Rest of the review of system are normal except mentioned in HPI    Vital Signs Last 24 Hrs  T(C): 36.4, Max: 36.7 ( @ 16:45)  T(F): 97.6, Max: 98.1 ( @ 16:45)  HR: 71 (70 - 76)  BP: 137/62 (102/46 - 137/62)  BP(mean): --  RR: 16 (16 - 20)  SpO2: 98% (95% - 98%)    Daily Weight in k.5 (07 May 2017 09:48)    LABS:             ( 08 May 2017 05:50 )               11.0  6.5   )-----------( 280                           32.6   138  |  100  |  13 ----------------------------<  85 3.7   |  31  |  0.88  Ca    8.5      08 May 2017 05:50    Serum Pro-Brain Natriuretic Peptide: 2518 pg/mL         CARDIAC MARKERS ( 06 May 2017 13:32 ) <0.015 ng/mL / x     / x     / x     / x      PT/INR - ( 08 May 2017 05:50 )   PT: 32.6 sec;   INR: 2.95 ratio      PTT - ( 06 May 2017 19:39 )  PTT:39.8 sec  	( 07 May 2017 12:16 )             12.4  6.1   )-----------( 347                    39.3    138  |  97  |  14 ----------------------------<  86 4.1   |  32<H>  |  1.08  Ca    9.0      07 May 2017 12:16     Serum Pro-Brain Natriuretic Peptide: 3490 pg/mL    TPro  7.7  /  Alb  3.3  /  TBili  0.6  /  DBili  x   /  AST  34  /  ALT  40  /  AlkPhos  83    PT/INR - ( 07 May 2017 12:16 )   PT: 31.1 sec;   INR: 2.82 ratio      PTT - ( 06 May 2017 19:39 )  PTT:39.8 sec CARDIAC MARKERS ( 06 May 2017 13:32 ) <0.015 ng/mL / x     / x     / x     / x     CARDIAC MARKERS ( 06 May 2017 10:55 ) 0.020 ng/mL / x     / x     / x     / x     CARDIAC MARKERS ( 06 May 2017 07:48 ) <0.015 ng/mL / x     / x     / x     / x     	            CAPILLARY BLOOD GLUCOSE        RECENT CULTURES:  Culture - Blood (17 @ 07:48)    Specimen Source: .Blood None    Culture Results:   No growth to date.      RADIOLOGY & ADDITIONAL TESTS:    CHEST P.A. LATERAL                        2017  Mild vascular congestion and interstitial edema.           2D ECHOCARDIOGRAM AD  2017    The left ventricle cavity is mildly dilated. Basal septal akinesis seen   in  4 chamber view. Endocardium not always well seen. EF still likely 50-55%   assuming no other regional wall motion abnormality.   The left atrium is moderately dilated.   The right atrium appears mildly dilated.   A device wire is seen in the RV and RA.   Normal right ventricle function.   Fibrocalcific changes noted to the aortic valve leaflets with preserved   excursion. No significant gradient demonstrated.   Mild (1+) aortic regurgitation is present.   Fibrocalcific changes noted to the mitral valve leaflets with preserved   leaflet excursion.   Moderate (2+) mitral regurgitation is present.   Mild (1+) tricuspid valve regurgitation is present. Moderate pulmonary   hypertension.   Shortened pulmonary artery acceleration time c/w PHTN   No evidence of pericardial effusion.   No pleural comment made.   IVC appears normal.    PHYSICAL EXAM:  GENERAL: NAD  NERVOUS SYSTEM:  Alert & Oriented X3, non- focal exam, Motor Strength 5/5 B/L upper and lower extremities; DTRs 2+ intact and symmetric  HEAD:  Atraumatic, Normocephalic  EYES: EOMI, PERRLA, conjunctiva and sclera clear  HEENT: Moist mucous membranes  NECK: Supple, No JVD  CHEST/LUNG: BS decreased at bases, + rales,  no rhonchi, no wheezing, or rubs  HEART: Regular rate and rhythm; No murmurs, rubs, or gallops  ABDOMEN: Soft, Nontender, Nondistended; Bowel sounds present  GENITOURINARY- Voiding, no suprapubic tenderness  EXTREMITIES:  2+ Peripheral Pulses, No clubbing, cyanosis, or edema  MUSCULOSKELETAL:- No muscle tenderness, Muscle tone normal, No joint tenderness, no Joint swelling, Joint range of motion-normal  SKIN-no rash, no lesion      MEDICATIONS  (STANDING):  aspirin enteric coated 81milliGRAM(s) Oral daily  digoxin     Tablet 0.125milliGRAM(s) Oral daily  atorvastatin 10milliGRAM(s) Oral at bedtime  levothyroxine 75MICROGram(s) Oral daily  calcium carbonate  625 mG + Vitamin D (OsCal 250 + D) 1Tablet(s) Oral daily  warfarin 1milliGRAM(s) Oral daily  amiodarone    Tablet 200milliGRAM(s) Oral daily  metoprolol 25milliGRAM(s) Oral two times a day    MEDICATIONS  (PRN):
Cardiology Progress Note:    HPI: The patient is an 81 yo female with Hx. of CAD, CHF, Atrial fib. S/P AICD,Hypothyroid who developed gradually worsening paroxysmal nocturnal dyspnea and she presented to the ER with worsening SOB. She denies CP. She saw Dr. Marrero in the office 2 weeks ago and she was ok. (06 May 2017 11:54)    5/9- Feels better today. SOB improved. No CP. Vpaced on tele.     PAST MEDICAL & SURGICAL HISTORY:  HTN (hypertension)  Hypothyroid  Chronic atrial fibrillation  Pacemaker  Artificial pacemaker    Allergies  adhesives (Rash)  Ancef (Unknown)    SOCIAL HISTORY: Denies tobacco, etoh abuse or illicit drug use    FAMILY HISTORY: No pertinent family history in first degree relatives    MEDICATIONS  (STANDING):  aspirin enteric coated 81milliGRAM(s) Oral daily  atorvastatin 10milliGRAM(s) Oral at bedtime  levothyroxine 75MICROGram(s) Oral daily  calcium carbonate  625 mG + Vitamin D (OsCal 250 + D) 1Tablet(s) Oral daily  warfarin 1milliGRAM(s) Oral daily  amiodarone    Tablet 200milliGRAM(s) Oral daily  carvedilol 3.125milliGRAM(s) Oral two times a day  digoxin     Tablet 0.0625milliGRAM(s) Oral daily    Vital Signs Last 24 Hrs  T(C): 36.4, Max: 37 (05-08 @ 20:43)  T(F): 97.5, Max: 98.6 (05-08 @ 20:43)  HR: 73 (71 - 73)  BP: 136/71 (126/64 - 137/62)  BP(mean): --  RR: 18 (16 - 18)  SpO2: 98% (98% - 98%)    REVIEW OF SYSTEMS:    CONSTITUTIONAL:  As per HPI.  HEENT:  Eyes:  No diplopia or blurred vision. ENT:  No earache, sore throat or runny nose.  CARDIOVASCULAR:  No pressure, squeezing, strangling, tightness, heaviness or aching about the chest, neck, axilla or epigastrium.  RESPIRATORY:  No cough, shortness of breath, PND or orthopnea.  GASTROINTESTINAL:  No nausea, vomiting or diarrhea.  GENITOURINARY:  No dysuria, frequency or urgency.  MUSCULOSKELETAL:  As per HPI.    PHYSICAL EXAMINATION:    GENERAL APPEARANCE:  Pt. is not currently dyspneic, in no distress. Pt. is alert, oriented, and pleasant.  HEENT:  Pupils are normal and react normally. No icterus. Mucous membranes well colored.  NECK:  Supple. No lymphadenopathy. Jugular venous pressure not elevated. Carotids equal.   HEART:   The cardiac impulse has a normal quality. There are no murmurs, rubs or gallops noted  CHEST:  Chest is clear to auscultation. Normal respiratory effort.  ABDOMEN:  Soft and nontender.   EXTREMITIES:  There is no edema.     LABS:                        11.3   6.7   )-----------( 307      ( 09 May 2017 06:37 )             34.7     05-09    139  |  102  |  10  ----------------------------<  86  3.7   |  30  |  0.79    Ca    8.5      09 May 2017 06:37    PT/INR - ( 09 May 2017 06:37 )   PT: 35.7 sec;   INR: 3.23 ratio      EKG:    TELEMETRY: Vpaced in 70s, underlying afib    CARDIAC TESTS: 2Decho- The left ventricle cavity is mildly dilated. Basal septal akinesis seen   in   4 chamber view. Endocardium not always well seen. EF still likely 50-55%   assuming no other regional wall motion abnormality.   The left atrium is moderately dilated.   The right atrium appears mildly dilated.   A device wire is seen in the RV and RA.   Normal right ventricle function.   Fibrocalcific changes noted to the aortic valve leaflets with preserved   excursion. No significant gradient demonstrated.   Mild (1+) aortic regurgitation is present.   Fibrocalcific changes noted to the mitral valve leaflets with preserved   leaflet excursion.   Moderate (2+) mitral regurgitation is present.   Mild (1+) tricuspid valve regurgitation is present. Moderate pulmonary   hypertension.   Shortened pulmonary artery acceleration time c/w PHTN   No evidence of pericardial effusion.   No pleural comment made.   IVC appears normal.    RADIOLOGY & ADDITIONAL STUDIES: CXR- The lungs are clear.  No pleural abnormality is seen.    Right chest wall pacemaker in place.  Cardiomegaly present.    ASSESSMENT & PLAN:
Subjective:  Patient is a 82y old  Female who presents with a chief complaint of SOB, PND     HPI:  The patient is an 83 yo female with Hx. of CAD, CHF, Atrial fib. S/P AICD,Hypothyroid who admitted on 17 with gradually worsening paroxysmal nocturnal dyspnea and worsening SOB. She denies CP. She saw Dr. Marrero in the office 2 weeks ago and she was ok.      tele - paced rhythm, reports mild improvement of dyspnea , denies CP, cough, urinary sx, reports increased urine output    Patient seen and examined at bedside,    Review of system- Rest of the review of system are normal except mentioned in HPI    OBJECTIVE:   T(C): 36.8, Max: 36.8 ( @ 10:00)  HR: 75 (70 - 75)  BP: 94/52 (92/53 - 116/68)  RR: 16 (16 - 18)  SpO2: 100% (93% - 100%)  Wt(kg): --  Daily     Daily Weight in k.5 (07 May 2017 09:48)    LABS:                        12.4   6.1   )-----------( 347      ( 07 May 2017 12:16 )             39.3         138  |  97  |  14  ----------------------------<  86  4.1   |  32<H>  |  1.08    Ca    9.0      07 May 2017 12:16    TPro  7.7  /  Alb  3.3  /  TBili  0.6  /  DBili  x   /  AST  34  /  ALT  40  /  AlkPhos  83      PT/INR - ( 07 May 2017 12:16 )   PT: 31.1 sec;   INR: 2.82 ratio         PTT - ( 06 May 2017 19:39 )  PTT:39.8 sec  CARDIAC MARKERS ( 06 May 2017 13:32 )  <0.015 ng/mL / x     / x     / x     / x      CARDIAC MARKERS ( 06 May 2017 10:55 )  0.020 ng/mL / x     / x     / x     / x      CARDIAC MARKERS ( 06 May 2017 07:48 )  <0.015 ng/mL / x     / x     / x     / x              CAPILLARY BLOOD GLUCOSE        RECENT CULTURES:  Culture - Blood (17 @ 07:48)    Specimen Source: .Blood None    Culture Results:   No growth to date.      RADIOLOGY & ADDITIONAL TESTS:  CHEST P.A. LATERAL                        2017  Mild vascular congestion and interstitial edema.           2D ECHOCARDIOGRAM AD  2017    The left ventricle cavity is mildly dilated. Basal septal akinesis seen   in  4 chamber view. Endocardium not always well seen. EF still likely 50-55%   assuming no other regional wall motion abnormality.   The left atrium is moderately dilated.   The right atrium appears mildly dilated.   A device wire is seen in the RV and RA.   Normal right ventricle function.   Fibrocalcific changes noted to the aortic valve leaflets with preserved   excursion. No significant gradient demonstrated.   Mild (1+) aortic regurgitation is present.   Fibrocalcific changes noted to the mitral valve leaflets with preserved   leaflet excursion.   Moderate (2+) mitral regurgitation is present.   Mild (1+) tricuspid valve regurgitation is present. Moderate pulmonary   hypertension.   Shortened pulmonary artery acceleration time c/w PHTN   No evidence of pericardial effusion.   No pleural comment made.   IVC appears normal.    PHYSICAL EXAM:  GENERAL: NAD  NERVOUS SYSTEM:  Alert & Oriented X3, non- focal exam, Motor Strength 5/5 B/L upper and lower extremities; DTRs 2+ intact and symmetric  HEAD:  Atraumatic, Normocephalic  EYES: EOMI, PERRLA, conjunctiva and sclera clear  HEENT: Moist mucous membranes  NECK: Supple, No JVD  CHEST/LUNG: BS decreased at bases, + rales,  no rhonchi, no wheezing, or rubs  HEART: Regular rate and rhythm; No murmurs, rubs, or gallops  ABDOMEN: Soft, Nontender, Nondistended; Bowel sounds present  GENITOURINARY- Voiding, no suprapubic tenderness  EXTREMITIES:  2+ Peripheral Pulses, No clubbing, cyanosis, or edema  MUSCULOSKELETAL:- No muscle tenderness, Muscle tone normal, No joint tenderness, no Joint swelling, Joint range of motion-normal  SKIN-no rash, no lesion      Current medications:  aspirin enteric coated 81milliGRAM(s) Oral daily  enalapril 2.5milliGRAM(s) Oral daily  amiodarone    Tablet 200milliGRAM(s) Oral daily  digoxin     Tablet 0.125milliGRAM(s) Oral daily  warfarin 2milliGRAM(s) Oral daily  atorvastatin 10milliGRAM(s) Oral at bedtime  carvedilol 6.25milliGRAM(s) Oral every 12 hours  levothyroxine 75MICROGram(s) Oral daily  calcium carbonate  625 mG + Vitamin D (OsCal 250 + D) 1Tablet(s) Oral daily  furosemide   Injectable 40milliGRAM(s) IV Push every 24 hours

## 2017-05-09 NOTE — DISCHARGE NOTE ADULT - PATIENT PORTAL LINK FT
“You can access the [employer] Patient Portal, offered by [facility], by registering with the following website: [email or web address]”

## 2017-05-09 NOTE — DISCHARGE NOTE ADULT - HOSPITAL COURSE
The patient is an 81 yo female with Hx. of CAD, CHF, Atrial fib. S/P AICD, Hypothyroid who admitted on 5/6/17 with gradually worsening paroxysmal nocturnal dyspnea and worsening SOB. She denies CP. She saw Dr. Marrero in the office 2 weeks ago and she was ok.     5/7 tele - paced rhythm, reports mild improvement of dyspnea , denies CP, cough, urinary sx, reports increased urine output  5/8 tele - no events, dyspnea improved, denies CP, palpitations  5/9 - reports no dyspnea , no palpitations, no events on monitor    Vital Signs Last 24 Hrs  T(C): 37, Max: 37 (05-08 @ 20:43)  T(F): 98.6, Max: 98.6 (05-08 @ 20:43)  HR: 71 (71 - 73)  BP: 116/67 (116/67 - 136/71)  BP(mean): --  RR: 19 (17 - 19)  SpO2: 97% (97% - 98%)     CHEST SINGLE VIEW FRONTAL   05/08/2017  The lungs are clear.  No pleural abnormality is seen.    Right chest wall pacemaker in place.  Cardiomegaly present.    PHYSICAL EXAM:  GENERAL: NAD  NERVOUS SYSTEM:  Alert & Oriented X3, non- focal exam, Motor Strength 5/5 B/L upper and lower extremities; DTRs 2+ intact and symmetric  HEAD:  Atraumatic, Normocephalic  EYES: EOMI, PERRLA, conjunctiva and sclera clear  HEENT: Moist mucous membranes  NECK: Supple, No JVD  CHEST/LUNG: BS decreased at bases, + rales,  no rhonchi, no wheezing, or rubs  HEART: Regular rate and rhythm; No murmurs, rubs, or gallops  ABDOMEN: Soft, Nontender, Nondistended; Bowel sounds present  GENITOURINARY- Voiding, no suprapubic tenderness  EXTREMITIES:  2+ Peripheral Pulses, No clubbing, cyanosis, or edema  MUSCULOSKELETAL:- No muscle tenderness, Muscle tone normal, No joint tenderness, no Joint swelling, Joint range of motion-normal  SKIN-no rash, no lesion    81 yo female with Hx. of CAD, CHF, Atrial fib. S/P AICD, Hypothyroid, chronic CHF who presents with acute on chronic CHF exacerbation    Problem/Plan - 1:  ·  Problem: Heart failure, diastolic, acute on chronic.  Plan: tele, serial troponin x3 neg, BNP trending down  1500 cc fluids restriction, 2 d echo - EF 55, 2+ MR, ICD interrogated - no events  cardiology consult input appretiated  c/w IV diuresis, ASA, coreg 3.25 BID, c/w ACEi, started on lasix 20  daily PT  CXR - mild congestion, repeat CXR 5/8 - clear lungs    Problem/Plan - 2:  ·  Problem: Chronic atrial fibrillation.  Plan: Check INR keep between 2-3  INR >2 <3  hold warfarin tonight, restart 1 mg of warfarin tomorow, check INR in 2 days  noted digoxin level 1.6--> 1.75  lowered digoxin to 0.065, repeat level in 1 week    Problem/Plan - 3:  ·  Problem: Hypothyroidism, unspecified type.  Plan: Continue Synthroid and check TSH  4.6.     Problem/Plan - 4:  ·  Problem: Essential hypertension.  Plan: overcontrolled, c/w Enalapril, amiodarone with parameters  restart BB.     Problem/Plan - 5:  ·  Problem: Pacemaker.  Plan: ICD interrogation -  Bi V pacing-dependent No R wave measurable  No episodes or events detected.     Attending Attestation:   Disposition - medically optimized to be discharged home with close follow up with PCP, cardiology within 1 week  return to ED if fever, abdominal pain, nausea, vomiting, chest pain, dyspnea, other concerns go to ED  Discharge plan discussed with patient, RN  Patient advised to follow up with PCP within 3-7 days  time spend 40 min  Discharge note faxed PCP

## 2017-05-09 NOTE — DISCHARGE NOTE ADULT - CARE PLAN
Principal Discharge DX:	Heart failure, diastolic, acute on chronic  Goal:	resolve, prevent exacebrations  Instructions for follow-up, activity and diet:	take lasix 20 daily, check weight daily, drink 1500 cc a day, follow up with cardiology within 1 week  Secondary Diagnosis:	Anticoagulation management encounter  Instructions for follow-up, activity and diet:	INR 3.2, hold coumadin/warfarin for 1 day, restart 1 mg tomorrow 5/10, check INR in 2-3 days  Secondary Diagnosis:	HTN (hypertension)  Instructions for follow-up, activity and diet:	coreg lowered to 3.25 twice daily due to low BP  Secondary Diagnosis:	Hypothyroidism, unspecified type  Instructions for follow-up, activity and diet:	continue with home medications  Secondary Diagnosis:	Chronic atrial fibrillation  Instructions for follow-up, activity and diet:	lowered digoxin to 0.065, repeat level in 1 week  lowered digoxin to 0.065, repeat level in 1 week

## 2017-05-09 NOTE — DISCHARGE NOTE ADULT - OTHER SIGNIFICANT FINDINGS
Complete Blood Count in AM (05.09.17 @ 06:37)    WBC Count: 6.7 K/uL    RBC Count: 3.55 M/uL    Hemoglobin: 11.3 g/dL    Hematocrit: 34.7 %    Mean Cell Volume: 97.8 fl    Mean Cell Hemoglobin: 31.9 pg    Mean Cell Hemoglobin Conc: 32.6 gm/dL    Red Cell Distrib Width: 12.8 %    Platelet Count - Automated: 307 K/uL    Basic Metabolic Panel in AM (05.09.17 @ 06:37)    Sodium, Serum: 139 mmol/L    Potassium, Serum: 3.7 mmol/L    Chloride, Serum: 102 mmol/L    Carbon Dioxide, Serum: 30 mmol/L    Anion Gap, Serum: 7 mmol/L    Blood Urea Nitrogen, Serum: 10 mg/dL    Creatinine, Serum: 0.79 mg/dL    Glucose, Serum: 86 mg/dL    Calcium, Total Serum: 8.5 mg/dL     Prothrombin Time and INR, Plasma (05.09.17 @ 06:37)    Prothrombin Time, Plasma: 35.7: Effective March 21st, the reference range for PT has changed. sec    INR: 3.23 ratio    Digoxin Level, Serum (05.08.17 @ 05:50)    Digoxin Level, Serum: 1.75 ng/mL    Thyroid Stimulating Hormone, Serum (05.08.17 @ 05:50)    Thyroid Stimulating Hormone, Serum: 4.610 uIU/mL    Serum Pro-Brain Natriuretic Peptide (05.08.17 @ 05:50)    Serum Pro-Brain Natriuretic Peptide: 2518 pg/mL    Serum Pro-Brain Natriuretic Peptide (05.06.17 @ 07:48)    Serum Pro-Brain Natriuretic Peptide: 6049 pg/mL    Troponin I, Serum (05.06.17 @ 13:32)    Troponin I, Serum: <0.015: High Sensitivity Troponin and new reference  range effective 7/6/2016 ng/mL    CHEST SINGLE VIEW FRONTAL             05/08/2017  The lungs are clear.  No pleural abnormality is seen.    Right chest wall pacemaker in place.  Cardiomegaly present.    CHEST P.A. LATERAL       5/06/2017    Mild vascular congestion and interstitial edema.         2D ECHOCARDIOGRAM AD  05/06/2017  The left ventricle cavity is mildly dilated. Basal septal akinesis seen   in   4 chamber view. Endocardium not always well seen. EF still likely 50-55%   assuming no other regional wall motion abnormality.   The left atrium is moderately dilated.   The right atrium appears mildly dilated.   A device wire is seen in the RV and RA.   Normal right ventricle function.   Fibrocalcific changes noted to the aortic valve leaflets with preserved   excursion. No significant gradient demonstrated.   Mild (1+) aortic regurgitation is present.   Fibrocalcific changes noted to the mitral valve leaflets with preserved   leaflet excursion.   Moderate (2+) mitral regurgitation is present.   Mild (1+) tricuspid valve regurgitation is present. Moderate pulmonary   hypertension.   Shortened pulmonary artery acceleration time c/w PHTN   No evidence of pericardial effusion.   No pleural comment made.   IVC appears normal.

## 2017-05-09 NOTE — DISCHARGE NOTE ADULT - CARE PROVIDERS DIRECT ADDRESSES
,DirectAddress_Unknown,[email or web address]

## 2017-05-09 NOTE — PROGRESS NOTE ADULT - ASSESSMENT
81 yo female with Hx. of CAD, CHF, Atrial fib. S/P AICD,Hypothyroid, chronic CHF who presents with acute on chronic CHF exacerbation
81 yo female with Hx. of CAD, CHF, Atrial fib. S/P bi-V AICD,Hypothyroid who presents in acute on chronic decompensated diastolic heart failure.     1. Combined systolic and diastolic congestive heart failure- Pt. with history of severe MR and severe LV dysfunction prior to Bi-V ICD, now with EF 50-55% and improvement to moderate MR.  This suggests that current decompensation likely secondary to diastolic dysfunction.    2. Diastolic HF- currently on IV diuresis- can change to PO lasix today. Cont Bbl as BP allows.    3. Problem: Implantable defibrillator reprogramming/check- Normal functioning ICD with appropriate Bi-V pacing.     4. Essential hypertension- BP stable now- cont Bbl.       5. Paroxysmal atrial fibrillation- currently in NSR. No recent AF. C/w amiodarone (originally started due to VT but will help maintain NSR as well).    6. Anticoagulation- continue coumadin for INR 2-3.    7. OOB as tolerated, DVT proph.
81 yo female with Hx. of CAD, CHF, Atrial fib. S/P AICD,Hypothyroid, chronic CHF who presents with acute on chronic CHF exacerbation

## 2017-05-09 NOTE — DISCHARGE NOTE ADULT - MEDICATION SUMMARY - MEDICATIONS TO TAKE
I will START or STAY ON the medications listed below when I get home from the hospital:    Aspirin Enteric Coated 81 mg oral delayed release tablet  -- 1 tab(s) by mouth once a day  -- Indication: For Combined systolic and diastolic congestive heart failure, unspecified congestive heart failure chronicity    enalapril 2.5 mg oral tablet  -- 1 tab(s) by mouth once a day (at bedtime)  -- Indication: For CONGESTIVE HEART FAILURE    digoxin 62.5 mcg (0.0625 mg) oral tablet  -- 1 tab(s) by mouth once a day  -- Indication: For Chronic atrial fibrillation    amiodarone 200 mg oral tablet  -- 1 tab(s) by mouth once a day  -- Indication: For Chronic atrial fibrillation    warfarin 1 mg oral tablet  -- 1 tab(s) by mouth once a day  do not take warfarin tonight, restart tommorow 5/10  -- Indication: For Chronic atrial fibrillation    atorvastatin 10 mg oral tablet  -- 1 tab(s) by mouth once a day (at bedtime)  -- Indication: For Chronic atrial fibrillation    carvedilol 3.125 mg oral tablet  -- 1 tab(s) by mouth 2 times a day  -- Indication: For HTN (hypertension)    furosemide 20 mg oral tablet  -- 1 tab(s) by mouth once a day  -- Indication: For Heart failure, diastolic, acute on chronic    levothyroxine 75 mcg (0.075 mg) oral tablet  -- 1 tab(s) by mouth once a day  -- Indication: For Hypothyroidism, unspecified type    Calcium 600+D  --  by mouth   -- Indication: For supplements

## 2017-05-09 NOTE — DISCHARGE NOTE ADULT - CARE PROVIDER_API CALL
Lenny Dickey), Internal Medicine; Pulmonary Disease  [address] [place] [address]  Phone: [phone]  Fax: [phone]    Maritza Marrero (RILEY), Cardiovascular Disease; Critical Care Medicine; Internal Medicine; Interventional Cardiology  [address] [place] [address]  Phone: [phone]  Fax: [phone]    Zion Garcia), Cardiovascular Disease; Internal Medicine  [address] [place] [address]  Phone: [phone]  Fax: [phone]

## 2017-05-09 NOTE — DISCHARGE NOTE ADULT - SECONDARY DIAGNOSIS.
Anticoagulation management encounter HTN (hypertension) Hypothyroidism, unspecified type Chronic atrial fibrillation

## 2017-05-11 LAB
CULTURE RESULTS: SIGNIFICANT CHANGE UP
SPECIMEN SOURCE: SIGNIFICANT CHANGE UP

## 2017-05-12 DIAGNOSIS — I11.0 HYPERTENSIVE HEART DISEASE WITH HEART FAILURE: ICD-10-CM

## 2017-05-12 DIAGNOSIS — E03.9 HYPOTHYROIDISM, UNSPECIFIED: ICD-10-CM

## 2017-05-12 DIAGNOSIS — Z88.8 ALLERGY STATUS TO OTHER DRUGS, MEDICAMENTS AND BIOLOGICAL SUBSTANCES STATUS: ICD-10-CM

## 2017-05-12 DIAGNOSIS — Z79.899 OTHER LONG TERM (CURRENT) DRUG THERAPY: ICD-10-CM

## 2017-05-12 DIAGNOSIS — Z79.82 LONG TERM (CURRENT) USE OF ASPIRIN: ICD-10-CM

## 2017-05-12 DIAGNOSIS — Z95.810 PRESENCE OF AUTOMATIC (IMPLANTABLE) CARDIAC DEFIBRILLATOR: ICD-10-CM

## 2017-05-12 DIAGNOSIS — I50.33 ACUTE ON CHRONIC DIASTOLIC (CONGESTIVE) HEART FAILURE: ICD-10-CM

## 2017-05-12 DIAGNOSIS — Z79.01 LONG TERM (CURRENT) USE OF ANTICOAGULANTS: ICD-10-CM

## 2017-05-12 DIAGNOSIS — I25.10 ATHEROSCLEROTIC HEART DISEASE OF NATIVE CORONARY ARTERY WITHOUT ANGINA PECTORIS: ICD-10-CM

## 2017-05-12 DIAGNOSIS — I48.0 PAROXYSMAL ATRIAL FIBRILLATION: ICD-10-CM

## 2017-05-12 DIAGNOSIS — R06.02 SHORTNESS OF BREATH: ICD-10-CM

## 2017-05-12 DIAGNOSIS — I50.22 CHRONIC SYSTOLIC (CONGESTIVE) HEART FAILURE: ICD-10-CM

## 2017-05-15 DIAGNOSIS — I47.2 VENTRICULAR TACHYCARDIA: ICD-10-CM

## 2017-05-17 PROBLEM — E03.9 HYPOTHYROIDISM, UNSPECIFIED: Chronic | Status: ACTIVE | Noted: 2017-05-06

## 2017-05-17 PROBLEM — I10 ESSENTIAL (PRIMARY) HYPERTENSION: Chronic | Status: ACTIVE | Noted: 2017-05-06

## 2017-05-17 PROBLEM — I48.2 CHRONIC ATRIAL FIBRILLATION: Chronic | Status: ACTIVE | Noted: 2017-05-06

## 2017-05-19 ENCOUNTER — APPOINTMENT (OUTPATIENT)
Dept: INTERNAL MEDICINE | Facility: CLINIC | Age: 82
End: 2017-05-19

## 2017-05-19 ENCOUNTER — RECORD ABSTRACTING (OUTPATIENT)
Age: 82
End: 2017-05-19

## 2017-05-19 VITALS
DIASTOLIC BLOOD PRESSURE: 62 MMHG | HEIGHT: 63 IN | WEIGHT: 102 LBS | TEMPERATURE: 97.6 F | OXYGEN SATURATION: 98 % | HEART RATE: 82 BPM | RESPIRATION RATE: 16 BRPM | BODY MASS INDEX: 18.07 KG/M2 | SYSTOLIC BLOOD PRESSURE: 110 MMHG

## 2017-05-19 DIAGNOSIS — E55.9 VITAMIN D DEFICIENCY, UNSPECIFIED: ICD-10-CM

## 2017-05-19 DIAGNOSIS — E23.7 DISORDER OF PITUITARY GLAND, UNSPECIFIED: ICD-10-CM

## 2017-05-19 DIAGNOSIS — Z87.891 PERSONAL HISTORY OF NICOTINE DEPENDENCE: ICD-10-CM

## 2017-05-19 DIAGNOSIS — H81.10 BENIGN PAROXYSMAL VERTIGO, UNSPECIFIED EAR: ICD-10-CM

## 2017-05-19 DIAGNOSIS — E06.3 AUTOIMMUNE THYROIDITIS: ICD-10-CM

## 2017-05-19 DIAGNOSIS — M81.0 AGE-RELATED OSTEOPOROSIS W/OUT CURRENT PATHOLOGICAL FRACTURE: ICD-10-CM

## 2017-05-19 DIAGNOSIS — I65.23 OCCLUSION AND STENOSIS OF BILATERAL CAROTID ARTERIES: ICD-10-CM

## 2017-06-05 ENCOUNTER — APPOINTMENT (OUTPATIENT)
Dept: NEUROLOGY | Facility: CLINIC | Age: 82
End: 2017-06-05

## 2017-06-05 RX ORDER — MEXILETINE HYDROCHLORIDE 150 MG/1
150 CAPSULE ORAL
Qty: 180 | Refills: 0 | Status: DISCONTINUED | COMMUNITY
Start: 2016-12-05

## 2017-06-05 RX ORDER — CARVEDILOL 3.12 MG/1
3.12 TABLET, FILM COATED ORAL
Qty: 60 | Refills: 0 | Status: DISCONTINUED | COMMUNITY
Start: 2017-05-09

## 2017-07-06 ENCOUNTER — INPATIENT (INPATIENT)
Facility: HOSPITAL | Age: 82
LOS: 4 days | Discharge: HOME | DRG: 227 | End: 2017-07-11
Attending: HOSPITALIST | Admitting: INTERNAL MEDICINE
Payer: MEDICARE

## 2017-07-06 DIAGNOSIS — I48.91 UNSPECIFIED ATRIAL FIBRILLATION: ICD-10-CM

## 2017-07-06 DIAGNOSIS — Z95.0 PRESENCE OF CARDIAC PACEMAKER: Chronic | ICD-10-CM

## 2017-07-06 PROCEDURE — 99285 EMERGENCY DEPT VISIT HI MDM: CPT

## 2017-07-06 PROCEDURE — 99223 1ST HOSP IP/OBS HIGH 75: CPT | Mod: AI

## 2017-07-06 RX ORDER — ASPIRIN/CALCIUM CARB/MAGNESIUM 324 MG
81 TABLET ORAL DAILY
Qty: 0 | Refills: 0 | Status: DISCONTINUED | OUTPATIENT
Start: 2017-07-06 | End: 2017-07-11

## 2017-07-06 RX ORDER — LEVOTHYROXINE SODIUM 125 MCG
75 TABLET ORAL DAILY
Qty: 0 | Refills: 0 | Status: DISCONTINUED | OUTPATIENT
Start: 2017-07-06 | End: 2017-07-07

## 2017-07-06 RX ORDER — FUROSEMIDE 40 MG
20 TABLET ORAL DAILY
Qty: 0 | Refills: 0 | Status: DISCONTINUED | OUTPATIENT
Start: 2017-07-06 | End: 2017-07-11

## 2017-07-06 RX ORDER — DIGOXIN 250 MCG
0.12 TABLET ORAL DAILY
Qty: 0 | Refills: 0 | Status: DISCONTINUED | OUTPATIENT
Start: 2017-07-06 | End: 2017-07-06

## 2017-07-06 RX ORDER — AMIODARONE HYDROCHLORIDE 400 MG/1
200 TABLET ORAL DAILY
Qty: 0 | Refills: 0 | Status: DISCONTINUED | OUTPATIENT
Start: 2017-07-06 | End: 2017-07-11

## 2017-07-06 RX ORDER — DIGOXIN 250 MCG
0.06 TABLET ORAL DAILY
Qty: 0 | Refills: 0 | Status: DISCONTINUED | OUTPATIENT
Start: 2017-07-06 | End: 2017-07-11

## 2017-07-06 NOTE — ED PROVIDER NOTE - CARE PLAN
Principal Discharge DX:	ICD (implantable cardioverter-defibrillator) lead failure, initial encounter

## 2017-07-06 NOTE — ED PROVIDER NOTE - OBJECTIVE STATEMENT
83 yo female hx of AICD and pacemaker, on coumadin for afib; presents with incidental finding on remote monitoring of AICD of "noise" consistent with lead failure, putting patient at risk for defibrilator firing; was sent in by EP for revision of lead; during interrogation on outpatient office, pacing function still active, but defib function turned off. Patient is otherwise without any symptoms

## 2017-07-07 VITALS
HEART RATE: 73 BPM | OXYGEN SATURATION: 97 % | RESPIRATION RATE: 16 BRPM | TEMPERATURE: 98 F | DIASTOLIC BLOOD PRESSURE: 69 MMHG | SYSTOLIC BLOOD PRESSURE: 133 MMHG

## 2017-07-07 DIAGNOSIS — E03.9 HYPOTHYROIDISM, UNSPECIFIED: ICD-10-CM

## 2017-07-07 DIAGNOSIS — T82.111A BREAKDOWN (MECHANICAL) OF CARDIAC PULSE GENERATOR (BATTERY), INITIAL ENCOUNTER: ICD-10-CM

## 2017-07-07 DIAGNOSIS — I10 ESSENTIAL (PRIMARY) HYPERTENSION: ICD-10-CM

## 2017-07-07 DIAGNOSIS — I48.2 CHRONIC ATRIAL FIBRILLATION: ICD-10-CM

## 2017-07-07 LAB
ANION GAP SERPL CALC-SCNC: 12 MMOL/L — SIGNIFICANT CHANGE UP (ref 5–17)
BUN SERPL-MCNC: 20 MG/DL — SIGNIFICANT CHANGE UP (ref 8–20)
CALCIUM SERPL-MCNC: 9.1 MG/DL — SIGNIFICANT CHANGE UP (ref 8.6–10.2)
CHLORIDE SERPL-SCNC: 99 MMOL/L — SIGNIFICANT CHANGE UP (ref 98–107)
CO2 SERPL-SCNC: 29 MMOL/L — SIGNIFICANT CHANGE UP (ref 22–29)
CREAT SERPL-MCNC: 1.2 MG/DL — SIGNIFICANT CHANGE UP (ref 0.5–1.3)
GLUCOSE SERPL-MCNC: 90 MG/DL — SIGNIFICANT CHANGE UP (ref 70–115)
HCT VFR BLD CALC: 33.4 % — LOW (ref 37–47)
HGB BLD-MCNC: 11.3 G/DL — LOW (ref 12–16)
INR BLD: 2.27 RATIO — HIGH (ref 0.88–1.16)
MCHC RBC-ENTMCNC: 32.6 PG — HIGH (ref 27–31)
MCHC RBC-ENTMCNC: 33.8 G/DL — SIGNIFICANT CHANGE UP (ref 32–36)
MCV RBC AUTO: 96.3 FL — SIGNIFICANT CHANGE UP (ref 81–99)
PLATELET # BLD AUTO: 259 K/UL — SIGNIFICANT CHANGE UP (ref 150–400)
POTASSIUM SERPL-MCNC: 4.2 MMOL/L — SIGNIFICANT CHANGE UP (ref 3.5–5.3)
POTASSIUM SERPL-SCNC: 4.2 MMOL/L — SIGNIFICANT CHANGE UP (ref 3.5–5.3)
PROTHROM AB SERPL-ACNC: 25.4 SEC — HIGH (ref 9.8–12.7)
RBC # BLD: 3.47 M/UL — LOW (ref 4.4–5.2)
RBC # FLD: 15 % — SIGNIFICANT CHANGE UP (ref 11–15.6)
SODIUM SERPL-SCNC: 140 MMOL/L — SIGNIFICANT CHANGE UP (ref 135–145)
WBC # BLD: 4.7 K/UL — LOW (ref 4.8–10.8)
WBC # FLD AUTO: 4.7 K/UL — LOW (ref 4.8–10.8)

## 2017-07-07 PROCEDURE — 99233 SBSQ HOSP IP/OBS HIGH 50: CPT

## 2017-07-07 RX ORDER — SODIUM CHLORIDE 9 MG/ML
1000 INJECTION INTRAMUSCULAR; INTRAVENOUS; SUBCUTANEOUS
Qty: 0 | Refills: 0 | Status: DISCONTINUED | OUTPATIENT
Start: 2017-07-07 | End: 2017-07-11

## 2017-07-07 RX ORDER — LEVOTHYROXINE SODIUM 125 MCG
100 TABLET ORAL DAILY
Qty: 0 | Refills: 0 | Status: DISCONTINUED | OUTPATIENT
Start: 2017-07-07 | End: 2017-07-11

## 2017-07-07 RX ADMIN — Medication 81 MILLIGRAM(S): at 13:21

## 2017-07-07 RX ADMIN — AMIODARONE HYDROCHLORIDE 200 MILLIGRAM(S): 400 TABLET ORAL at 05:14

## 2017-07-07 RX ADMIN — Medication 20 MILLIGRAM(S): at 05:13

## 2017-07-07 RX ADMIN — Medication 75 MICROGRAM(S): at 05:13

## 2017-07-07 RX ADMIN — Medication 0.06 MILLIGRAM(S): at 05:13

## 2017-07-07 RX ADMIN — SODIUM CHLORIDE 50 MILLILITER(S): 9 INJECTION INTRAMUSCULAR; INTRAVENOUS; SUBCUTANEOUS at 13:21

## 2017-07-07 NOTE — PROGRESS NOTE ADULT - SUBJECTIVE AND OBJECTIVE BOX
Subjective:  82y  Female with a [employer] biventricular ICD which was found to have a fractured RV/ICD lead. She had a RUE venogram which showed a patent right cephalic vein with a right sided device, RV/ICD lead, and 2 LV/CS leads.          PAST MEDICAL & SURGICAL HISTORY:  HTN (hypertension)  Hypothyroid  Chronic atrial fibrillation  Pacemaker  Artificial pacemaker    FAMILY HISTORY:  No pertinent family history in first degree relatives    MEDICATIONS  (STANDING):  levothyroxine 75 MICROGram(s) Oral daily  aspirin enteric coated 81 milliGRAM(s) Oral daily  amiodarone    Tablet 200 milliGRAM(s) Oral daily  furosemide    Tablet 20 milliGRAM(s) Oral daily  digoxin     Tablet 0.0625 milliGRAM(s) Oral daily      HPI: 83 y/o F with AF, HTN, Hypothyroidism, HFrEF with recovered EF, h/o Bi-V ICD  RT sided ( RV dual coil Durata lead implanted 2010 LV lead implanted 11/2015) patient  noted to have noise on RV lead via remote ICD monitoring.    General: No fatigue, no fevers/chills  Respiratory: No dyspnea, no cough, no wheeze  CV: No chest pain, no palpitations  Abd: No nausea  Neuro: No headache, no dizziness    Allergies:  ·	adhesives (Rash)  ·	Ancef (Unknown)  ·	lidocaine (Rash; Angioedema)      Objective:  Vital Signs Last 24 Hrs  T(C): 36.6 (07 Jul 2017 11:00), Max: 36.6 (07 Jul 2017 11:00)  T(F): 97.9 (07 Jul 2017 11:00), Max: 97.9 (07 Jul 2017 11:00)  HR: 15 (07 Jul 2017 11:00) (15 - 73)  BP: 141/67 (07 Jul 2017 11:00) (129/67 - 141/67)  RR: 14 (07 Jul 2017 11:00) (14 - 24)  SpO2: 98% (07 Jul 2017 11:00) (97% - 98%)    CM: SR  Neuro: A&OX3, CN 2-12 intact  HEENT: NC, AT  Lungs: CTA B/L  CV: S1, S2, no murmur, RRR  Abd: Soft  Right Groin: Soft, no bleeding, no hematoma  Extremity: + distal pulses                          11.3   4.7   )-----------( 259      ( 07 Jul 2017 06:12 )             33.4     07-07    140  |  99    |  20.0  ----------------------------<  90  4.2   |  29.0  |  1.20    Ca    9.1      07 Jul 2017 06:12    TPro  7.9  /  Alb  4.3  /  TBili  0.4  /  DBili  x   /  AST  36  /  ALT  34  /  AlkPhos  63  07-06    PT/INR - ( 07 Jul 2017 06:12 )   PT: 25.4 sec;   INR: 2.27 ratio         Echo: 5/6/2017  ·	The left ventricle cavity is mildly dilated. Basal septal akinesis seen in  ·	4 chamber view. Endocardium not always well seen. EF still likely 50-55% assuming no other regional wall motion abnormality.  ·	The left atrium is moderately dilated.  ·	The right atrium appears mildly dilated.  ·	A device wire is seen in the RV and RA.  ·	Normal right ventricle function.  ·	Fibrocalcific changes noted to the aortic valve leaflets with preserved excursion. No significant gradient demonstrated.  ·	Mild (1+) aortic regurgitation is present.  ·	Fibrocalcific changes noted to the mitral valve leaflets with preserved leaflet excursion.  ·	Moderate (2+) mitral regurgitation is present.  ·	Mild (1+) tricuspid valve regurgitation is present. Moderate pulmonary hypertension.  ·	Shortened pulmonary artery acceleration time c/w PHTN  ·	No evidence of pericardial effusion.  ·	No pleural comment made.  ·	IVC appears normal.

## 2017-07-07 NOTE — CHART NOTE - NSCHARTNOTEFT_GEN_A_CORE
venogram with patency  [facility]-tachy therapies remain disabled  device reprogrammed to VOO 70bpm per Dr Garcia    plan for RV lead revision 7/11/17  zoll pads to remain on pt until lead revision, staff aware that tachy therapies are disabled.  npo after midnight 7/10/17  am labs 7/11/17    Dw Dr Bain

## 2017-07-07 NOTE — PROGRESS NOTE ADULT - SUBJECTIVE AND OBJECTIVE BOX
NPO>4 hours  ICD right lateral upper axillary area  Right radial pulse +  appropriate pacing noted on tele/ECG  Dr. Bain present for lab review

## 2017-07-07 NOTE — PROGRESS NOTE ADULT - ASSESSMENT
Procedure: RUE venogram    Plan:   1. IV NS at 50ml/hr  2. Return for RV lead revision  3. Continue current medications

## 2017-07-07 NOTE — CONSULT NOTE ADULT - SUBJECTIVE AND OBJECTIVE BOX
Patient is a 82y old  Female who presents with a chief complaint of ICD lead notice recorded on remote monitoring    HPI:  81 y/o F with AF, HTN, Hypothyroidism, HFrEF, h/o Bi-V ICD  RT sided ( RV dual coil Durata lead implanted 2010 LV lead implanted 11/2015) patient  noted to have noise on RV lead via remote ICD monitoring. Patient advised to come ot ER for further eval  ROS: patient denies syncope , orthopnea, PND, LE edema or chest pain.  All systems reviewed otherwise negative.    PAST MEDICAL & SURGICAL HISTORY:  HTN (hypertension)  Hypothyroid  Chronic atrial fibrillation  Pacemaker  Artificial pacemaker      PREVIOUS DIAGNOSTIC TESTING:      ECHO  FINDINGS:    STRESS  FINDINGS:    CATHETERIZATION  FINDINGS:      Allergies    adhesives (Rash)  Ancef (Unknown)    Intolerances        MEDICATIONS  (STANDING):  levothyroxine 75 MICROGram(s) Oral daily  aspirin enteric coated 81 milliGRAM(s) Oral daily  amiodarone    Tablet 200 milliGRAM(s) Oral daily  furosemide    Tablet 20 milliGRAM(s) Oral daily  digoxin     Tablet 0.0625 milliGRAM(s) Oral daily    MEDICATIONS  (PRN):      FAMILY HISTORY:  No pertinent family history in first degree relatives      SOCIAL HISTORY: Lives with family    CIGARETTES:None    ALCOHOL:None    REVIEW OF SYSTEMS:  CONSTITUTIONAL: No fever, weight loss, or fatigue  EYES: No eye pain, visual disturbances, or discharge  ENMT:  No difficulty hearing, tinnitus, vertigo; No sinus or throat pain  NECK: No pain or stiffness  RESPIRATORY: No cough, wheezing, chills or hemoptysis; No Shortness of Breath  CARDIOVASCULAR: No chest pain, palpitations, passing out, dizziness, or leg swelling  GASTROINTESTINAL: No abdominal or epigastric pain. No nausea, vomiting, or hematemesis; No diarrhea or constipation. No melena or hematochezia.  GENITOURINARY: No dysuria, frequency, hematuria, or incontinence  NEUROLOGICAL: No headaches, memory loss, loss of strength, numbness, or tremors  SKIN: No itching, burning, rashes, or lesions   LYMPH Nodes: No enlarged glands  ENDOCRINE: No heat or cold intolerance; No hair loss  MUSCULOSKELETAL: No joint pain or swelling; No muscle, back, or extremity pain  PSYCHIATRIC: No depression, anxiety, mood swings, or difficulty sleeping  HEME/LYMPH: No easy bruising, or bleeding gums  ALLERY AND IMMUNOLOGIC: No hives or eczema	    Vital Signs Last 24 Hrs  T(C): 36.2 (07 Jul 2017 10:40), Max: 36.4 (07 Jul 2017 02:15)  T(F): 97.1 (07 Jul 2017 10:40), Max: 97.6 (07 Jul 2017 02:15)  HR: 73 (07 Jul 2017 10:40) (72 - 73)  BP: 132/94 (07 Jul 2017 10:40) (129/67 - 133/69)  BP(mean): --  RR: 24 (07 Jul 2017 10:40) (16 - 24)  SpO2: 97% (07 Jul 2017 10:40) (97% - 97%)    Daily     Daily     I&O's Detail      PHYSICAL EXAM:  Appearance: Normal, well nourished	  HEENT:   Normal oral mucosa, PERRL, EOMI, sclera non-icteric	  Lymphatic: No cervical lymphadenopathy  Cardiovascular: Normal S1 S2, No JVD, No cardiac murmurs, No carotid bruits, No peripheral edema  Respiratory: Lungs clear to auscultation	  Psychiatry: A & O x 3, Mood & affect appropriate  Gastrointestinal:  Soft, Non-tender, + BS, no bruits	  Skin: No rashes, No ecchymoses, No cyanosis  Neurologic: Grossly non-focal with full strength in all four extremities  Extremities: Normal range of motion, No clubbing, cyanosis or edema  Vascular: Peripheral pulses palpable 2+ bilaterally      INTERPRETATION OF TELEMETRY:    ECG: AF  paced    LABS:                        11.3   4.7   )-----------( 259      ( 07 Jul 2017 06:12 )             33.4     07-07    140  |  99  |  20.0  ----------------------------<  90  4.2   |  29.0  |  1.20    Ca    9.1      07 Jul 2017 06:12    TPro  7.9  /  Alb  4.3  /  TBili  0.4  /  DBili  x   /  AST  36<H>  /  ALT  34<H>  /  AlkPhos  63  07-06    CARDIAC MARKERS ( 06 Jul 2017 16:38 )  x     / <0.01 ng/mL / x     / x     / x          PT/INR - ( 07 Jul 2017 06:12 )   PT: 25.4 sec;   INR: 2.27 ratio             I&O's Summary    BNP  RADIOLOGY & ADDITIONAL STUDIES:

## 2017-07-07 NOTE — PROGRESS NOTE ADULT - PROBLEM SELECTOR PLAN 1
For Pacemaker procedure on Monday as INR is greater than 2. Repeat INR in am and if it is less than 2 then start iv heparin until procedure on Monday. For Pacemaker procedure on Monday as INR is greater than 2. Repeat INR in am and if it is less than 2 then start iv heparin until procedure on Monday.  Echo Pending

## 2017-07-07 NOTE — CONSULT NOTE ADULT - ASSESSMENT
83 y/o F with AF, HTN, Hypothyroidism, HFrEF, h/o Bi-V ICD  RT sided ( RV dual coil Durata lead implanted 2010 LV lead implanted 11/2015) patient  noted to have noise on RV lead via remote ICD monitoring. Patient advised to come ot ER for further eval  ROS: patient denies syncope , orthopnea, PND, LE edema or chest pain.  All systems reviewed otherwise negative.    recc;    NPO past MN on Sunday for Lead revision on Monday  Hold Coumadin  start UFH drip no bolus when INR < 2.0  Zoll Pads on  ICD SHOCK THERAPY currently turned OFF to avoid spurious shocks   Continue standing cardiac medications   Check 2-D echocardiogram

## 2017-07-08 LAB
ANION GAP SERPL CALC-SCNC: 15 MMOL/L — SIGNIFICANT CHANGE UP (ref 5–17)
BUN SERPL-MCNC: 16 MG/DL — SIGNIFICANT CHANGE UP (ref 8–20)
CALCIUM SERPL-MCNC: 9.2 MG/DL — SIGNIFICANT CHANGE UP (ref 8.6–10.2)
CHLORIDE SERPL-SCNC: 97 MMOL/L — LOW (ref 98–107)
CO2 SERPL-SCNC: 27 MMOL/L — SIGNIFICANT CHANGE UP (ref 22–29)
CREAT SERPL-MCNC: 1.01 MG/DL — SIGNIFICANT CHANGE UP (ref 0.5–1.3)
GLUCOSE SERPL-MCNC: 84 MG/DL — SIGNIFICANT CHANGE UP (ref 70–115)
HCT VFR BLD CALC: 37.9 % — SIGNIFICANT CHANGE UP (ref 37–47)
HGB BLD-MCNC: 12.3 G/DL — SIGNIFICANT CHANGE UP (ref 12–16)
INR BLD: 1.72 RATIO — HIGH (ref 0.88–1.16)
MCHC RBC-ENTMCNC: 31.9 PG — HIGH (ref 27–31)
MCHC RBC-ENTMCNC: 32.5 G/DL — SIGNIFICANT CHANGE UP (ref 32–36)
MCV RBC AUTO: 98.4 FL — SIGNIFICANT CHANGE UP (ref 81–99)
PLATELET # BLD AUTO: 248 K/UL — SIGNIFICANT CHANGE UP (ref 150–400)
POTASSIUM SERPL-MCNC: 3.9 MMOL/L — SIGNIFICANT CHANGE UP (ref 3.5–5.3)
POTASSIUM SERPL-SCNC: 3.9 MMOL/L — SIGNIFICANT CHANGE UP (ref 3.5–5.3)
PROTHROM AB SERPL-ACNC: 19.1 SEC — HIGH (ref 9.8–12.7)
RBC # BLD: 3.85 M/UL — LOW (ref 4.4–5.2)
RBC # FLD: 14.9 % — SIGNIFICANT CHANGE UP (ref 11–15.6)
SODIUM SERPL-SCNC: 139 MMOL/L — SIGNIFICANT CHANGE UP (ref 135–145)
WBC # BLD: 6 K/UL — SIGNIFICANT CHANGE UP (ref 4.8–10.8)
WBC # FLD AUTO: 6 K/UL — SIGNIFICANT CHANGE UP (ref 4.8–10.8)

## 2017-07-08 PROCEDURE — 99233 SBSQ HOSP IP/OBS HIGH 50: CPT

## 2017-07-08 RX ADMIN — Medication 20 MILLIGRAM(S): at 05:14

## 2017-07-08 RX ADMIN — Medication 100 MICROGRAM(S): at 05:15

## 2017-07-08 RX ADMIN — AMIODARONE HYDROCHLORIDE 200 MILLIGRAM(S): 400 TABLET ORAL at 05:14

## 2017-07-08 RX ADMIN — Medication 0.06 MILLIGRAM(S): at 05:14

## 2017-07-08 RX ADMIN — Medication 81 MILLIGRAM(S): at 11:05

## 2017-07-08 NOTE — PROGRESS NOTE ADULT - SUBJECTIVE AND OBJECTIVE BOX
ANABEL GOFF Patient is a 82y old  Female who presents with a chief complaint of    HPI:    The patient was seen and evaluated lead fracture and non functioning PM, plan for lead repair on Monday , couldn't be done Friday with INR over 2 , also with ARF on gentle hydration   The patient is in no acute distress.  Denied any fever chest pain, palpitations, shortness of breath, abdominal pain, fever, dysuria, cough, edema       I&O's Summary    07 Jul 2017 07:01  -  08 Jul 2017 07:00  --------------------------------------------------------  IN: 600 mL / OUT: 550 mL / NET: 50 mL      Allergies    adhesives (Rash)  Ancef (Unknown)  lidocaine (Rash; Angioedema)    Intolerances      HEALTH ISSUES - PROBLEM Dx:  Chronic atrial fibrillation: Chronic atrial fibrillation  Acquired hypothyroidism: Acquired hypothyroidism  Essential hypertension: Essential hypertension  Malfunction of cardiac pacemaker, initial encounter: Malfunction of cardiac pacemaker, initial encounter        PAST MEDICAL & SURGICAL HISTORY:  HTN (hypertension)  Hypothyroid  Chronic atrial fibrillation  Pacemaker  Artificial pacemaker          Vital Signs Last 24 Hrs  T(C): 36.7 (08 Jul 2017 10:42), Max: 36.9 (08 Jul 2017 09:37)  T(F): 98 (08 Jul 2017 10:42), Max: 98.5 (08 Jul 2017 09:37)  HR: 70 (08 Jul 2017 10:42) (70 - 80)  BP: 120/72 (08 Jul 2017 10:42) (108/66 - 121/74)  BP(mean): --  RR: 17 (08 Jul 2017 10:42) (16 - 17)  SpO2: 98% (08 Jul 2017 10:42) (94% - 98%)T(C): 36.7 (08 Jul 2017 10:42), Max: 36.9 (08 Jul 2017 09:37)  HR: 70 (08 Jul 2017 10:42) (70 - 80)  BP: 120/72 (08 Jul 2017 10:42) (108/66 - 121/74)  RR: 17 (08 Jul 2017 10:42) (16 - 17)  SpO2: 98% (08 Jul 2017 10:42) (94% - 98%)  Wt(kg): --    PHYSICAL EXAM:    GENERAL: NAD,elderly female   HEAD:  Atraumatic, Normocephalic  EYES: EOMI, PERRL, conjunctiva and sclera clear  ENMT:  Moist mucous membranes,  No lesions  NECK: Supple,   NERVOUS SYSTEM:  Alert & Oriented X3,  Moves upper and lower extremities; CNS-II-XII  CHEST/LUNG: Clear to auscultation bilaterally; No rales, rhonchi, wheezing,   HEART: Regular rate and rhythm; No murmurs,   ABDOMEN: Soft, Nontender, Nondistended; Bowel sounds present  EXTREMITIES:  Peripheral Pulses, No  cyanosis, or edema  SKIN: No rashes or lesions  psychiatry- mood and affect appropriate     aspirin enteric coated 81 milliGRAM(s) Oral daily  amiodarone    Tablet 200 milliGRAM(s) Oral daily  furosemide    Tablet 20 milliGRAM(s) Oral daily  digoxin     Tablet 0.0625 milliGRAM(s) Oral daily  sodium chloride 0.9%. 1000 milliLiter(s) IV Continuous <Continuous>  levothyroxine 100 MICROGram(s) Oral daily      LABS:                          12.3   6.0   )-----------( 248      ( 08 Jul 2017 12:38 )             37.9     07-08    139  |  97<L>  |  16.0  ----------------------------<  84  3.9   |  27.0  |  1.01    Ca    9.2      08 Jul 2017 12:38    TPro  7.9  /  Alb  4.3  /  TBili  0.4  /  DBili  x   /  AST  36<H>  /  ALT  34<H>  /  AlkPhos  63  07-06    LIVER FUNCTIONS - ( 06 Jul 2017 16:38 )  Alb: 4.3 g/dL / Pro: 7.9 g/dL / ALK PHOS: 63 U/L / ALT: 34 U/L / AST: 36 U/L / GGT: x           PT/INR - ( 08 Jul 2017 12:38 )   PT: 19.1 sec;   INR: 1.72 ratio           CARDIAC MARKERS ( 06 Jul 2017 16:38 )  x     / <0.01 ng/mL / x     / x     / x            CAPILLARY BLOOD GLUCOSE          RADIOLOGY & ADDITIONAL TESTS:      Consultant notes reviewed    Case discussed with consultant/provider/ family /patient

## 2017-07-08 NOTE — PROGRESS NOTE ADULT - ASSESSMENT
82 year old Female with lead fracture and non functioning PM, plan for lead repair on Monday , couldn't be done Friday with INR over 2 , also with ARF on gentle hydration   Patient  with a [employer] biventricular ICD which was found to have a fractured RV/ICD lead. She had a RUE venogram which showed a patent right cephalic vein with a right sided device, RV/ICD lead, and 2 LV/CS leads.  7/7/2017 She is scheduled for EPS procedure to correct her pacemaker malfunction. I spoke with Dr Bain.

## 2017-07-08 NOTE — PROGRESS NOTE ADULT - PROBLEM SELECTOR PLAN 1
For Pacemaker procedure on Monday as INR is greater than 2. Repeat INR in am and if it is less than 2 then start iv heparin until procedure on Monday.  Echo Pending

## 2017-07-09 LAB
ANION GAP SERPL CALC-SCNC: 16 MMOL/L — SIGNIFICANT CHANGE UP (ref 5–17)
BUN SERPL-MCNC: 20 MG/DL — SIGNIFICANT CHANGE UP (ref 8–20)
CALCIUM SERPL-MCNC: 9.2 MG/DL — SIGNIFICANT CHANGE UP (ref 8.6–10.2)
CHLORIDE SERPL-SCNC: 96 MMOL/L — LOW (ref 98–107)
CO2 SERPL-SCNC: 27 MMOL/L — SIGNIFICANT CHANGE UP (ref 22–29)
CREAT SERPL-MCNC: 1.08 MG/DL — SIGNIFICANT CHANGE UP (ref 0.5–1.3)
GLUCOSE SERPL-MCNC: 86 MG/DL — SIGNIFICANT CHANGE UP (ref 70–115)
HCT VFR BLD CALC: 36.9 % — LOW (ref 37–47)
HGB BLD-MCNC: 12.2 G/DL — SIGNIFICANT CHANGE UP (ref 12–16)
INR BLD: 1.59 RATIO — HIGH (ref 0.88–1.16)
MCHC RBC-ENTMCNC: 32.7 PG — HIGH (ref 27–31)
MCHC RBC-ENTMCNC: 33.1 G/DL — SIGNIFICANT CHANGE UP (ref 32–36)
MCV RBC AUTO: 98.9 FL — SIGNIFICANT CHANGE UP (ref 81–99)
PLATELET # BLD AUTO: 233 K/UL — SIGNIFICANT CHANGE UP (ref 150–400)
POTASSIUM SERPL-MCNC: 3.8 MMOL/L — SIGNIFICANT CHANGE UP (ref 3.5–5.3)
POTASSIUM SERPL-SCNC: 3.8 MMOL/L — SIGNIFICANT CHANGE UP (ref 3.5–5.3)
PROTHROM AB SERPL-ACNC: 17.6 SEC — HIGH (ref 9.8–12.7)
RBC # BLD: 3.73 M/UL — LOW (ref 4.4–5.2)
RBC # FLD: 14.7 % — SIGNIFICANT CHANGE UP (ref 11–15.6)
SODIUM SERPL-SCNC: 139 MMOL/L — SIGNIFICANT CHANGE UP (ref 135–145)
WBC # BLD: 6.4 K/UL — SIGNIFICANT CHANGE UP (ref 4.8–10.8)
WBC # FLD AUTO: 6.4 K/UL — SIGNIFICANT CHANGE UP (ref 4.8–10.8)

## 2017-07-09 PROCEDURE — 99233 SBSQ HOSP IP/OBS HIGH 50: CPT

## 2017-07-09 RX ORDER — ENOXAPARIN SODIUM 100 MG/ML
50 INJECTION SUBCUTANEOUS EVERY 24 HOURS
Qty: 0 | Refills: 0 | Status: DISCONTINUED | OUTPATIENT
Start: 2017-07-09 | End: 2017-07-10

## 2017-07-09 RX ORDER — ENOXAPARIN SODIUM 100 MG/ML
50 INJECTION SUBCUTANEOUS EVERY 12 HOURS
Qty: 0 | Refills: 0 | Status: DISCONTINUED | OUTPATIENT
Start: 2017-07-09 | End: 2017-07-09

## 2017-07-09 RX ADMIN — Medication 20 MILLIGRAM(S): at 05:13

## 2017-07-09 RX ADMIN — Medication 81 MILLIGRAM(S): at 11:08

## 2017-07-09 RX ADMIN — AMIODARONE HYDROCHLORIDE 200 MILLIGRAM(S): 400 TABLET ORAL at 05:13

## 2017-07-09 RX ADMIN — ENOXAPARIN SODIUM 50 MILLIGRAM(S): 100 INJECTION SUBCUTANEOUS at 14:53

## 2017-07-09 RX ADMIN — Medication 100 MICROGRAM(S): at 05:13

## 2017-07-09 RX ADMIN — Medication 0.06 MILLIGRAM(S): at 05:13

## 2017-07-09 NOTE — PROGRESS NOTE ADULT - SUBJECTIVE AND OBJECTIVE BOX
ANABEL GOFF Patient is a 82y old  Female who presents with a chief complaint of    HPI:    The patient was seen and evaluated   The patient is in no acute distress.  Denied any fever chest pain, palpitations, shortness of breath, abdominal pain, fever, dysuria, cough, edema   Complains of     I&O's Summary    08 Jul 2017 07:01  -  09 Jul 2017 07:00  --------------------------------------------------------  IN: 0 mL / OUT: 200 mL / NET: -200 mL    09 Jul 2017 07:01  -  09 Jul 2017 13:53  --------------------------------------------------------  IN: 200 mL / OUT: 450 mL / NET: -250 mL      Allergies    adhesives (Rash)  Ancef (Unknown)  lidocaine (Rash; Angioedema)    Intolerances      HEALTH ISSUES - PROBLEM Dx:  Chronic atrial fibrillation: Chronic atrial fibrillation  Acquired hypothyroidism: Acquired hypothyroidism  Essential hypertension: Essential hypertension  Malfunction of cardiac pacemaker, initial encounter: Malfunction of cardiac pacemaker, initial encounter        PAST MEDICAL & SURGICAL HISTORY:  HTN (hypertension)  Hypothyroid  Chronic atrial fibrillation  Pacemaker  Artificial pacemaker          Vital Signs Last 24 Hrs  T(C): 36.5 (09 Jul 2017 05:08), Max: 36.5 (09 Jul 2017 05:08)  T(F): 97.7 (09 Jul 2017 05:08), Max: 97.7 (09 Jul 2017 05:08)  HR: 70 (09 Jul 2017 11:07) (70 - 70)  BP: 109/64 (09 Jul 2017 11:07) (109/64 - 127/72)  BP(mean): 74 (09 Jul 2017 11:07) (74 - 82)  RR: 17 (09 Jul 2017 11:07) (16 - 17)  SpO2: 98% (09 Jul 2017 05:08) (98% - 98%)T(C): 36.5 (09 Jul 2017 05:08), Max: 36.5 (09 Jul 2017 05:08)  HR: 70 (09 Jul 2017 11:07) (70 - 70)  BP: 109/64 (09 Jul 2017 11:07) (109/64 - 127/72)  RR: 17 (09 Jul 2017 11:07) (16 - 17)  SpO2: 98% (09 Jul 2017 05:08) (98% - 98%)  Wt(kg): --    PHYSICAL EXAM:    GENERAL: NAD, well-groomed, well-developed  HEAD:  Atraumatic, Normocephalic  EYES: EOMI, PERRL, conjunctiva and sclera clear  ENMT:  Moist mucous membranes,  No lesions  NECK: Supple, No JVD, Normal thyroid  NERVOUS SYSTEM:  Alert & Oriented X3,  Moves upper and lower extremities; CNS-II-XII  CHEST/LUNG: Clear to auscultation bilaterally; No rales, rhonchi, wheezing,   HEART: Regular rate and rhythm; No murmurs,   ABDOMEN: Soft, Nontender, Nondistended; Bowel sounds present  EXTREMITIES:  Peripheral Pulses, No  cyanosis, or edema  SKIN: No rashes or lesions  psychiatry- mood and affect approprite, Insight and judgement intact     aspirin enteric coated 81 milliGRAM(s) Oral daily  amiodarone    Tablet 200 milliGRAM(s) Oral daily  furosemide    Tablet 20 milliGRAM(s) Oral daily  digoxin     Tablet 0.0625 milliGRAM(s) Oral daily  sodium chloride 0.9%. 1000 milliLiter(s) IV Continuous <Continuous>  levothyroxine 100 MICROGram(s) Oral daily      LABS:                          12.2   6.4   )-----------( 233      ( 09 Jul 2017 05:41 )             36.9     07-09    139  |  96<L>  |  20.0  ----------------------------<  86  3.8   |  27.0  |  1.08    Ca    9.2      09 Jul 2017 05:41        PT/INR - ( 09 Jul 2017 05:49 )   PT: 17.6 sec;   INR: 1.59 ratio                 CAPILLARY BLOOD GLUCOSE          RADIOLOGY & ADDITIONAL TESTS:      Consultant notes reviewed    Case discussed with consultant/provider/ family /patient

## 2017-07-10 ENCOUNTER — TRANSCRIPTION ENCOUNTER (OUTPATIENT)
Age: 82
End: 2017-07-10

## 2017-07-10 PROCEDURE — 99233 SBSQ HOSP IP/OBS HIGH 50: CPT

## 2017-07-10 PROCEDURE — 93010 ELECTROCARDIOGRAM REPORT: CPT

## 2017-07-10 PROCEDURE — 71010: CPT | Mod: 26

## 2017-07-10 RX ORDER — CHLOROPROCAINE HCL/PF 20 MG/ML
26 VIAL (ML) INJECTION ONCE
Qty: 0 | Refills: 0 | Status: DISCONTINUED | OUTPATIENT
Start: 2017-07-10 | End: 2017-07-11

## 2017-07-10 RX ORDER — VANCOMYCIN HCL 1 G
1000 VIAL (EA) INTRAVENOUS ONCE
Qty: 0 | Refills: 0 | Status: COMPLETED | OUTPATIENT
Start: 2017-07-11 | End: 2017-07-11

## 2017-07-10 RX ORDER — BACITRACIN ZINC 500 UNIT/G
1 OINTMENT IN PACKET (EA) TOPICAL
Qty: 0 | Refills: 0 | Status: DISCONTINUED | OUTPATIENT
Start: 2017-07-10 | End: 2017-07-11

## 2017-07-10 RX ORDER — CHLOROPROCAINE HCL/PF 20 MG/ML
40 VIAL (ML) INJECTION ONCE
Qty: 0 | Refills: 0 | Status: DISCONTINUED | OUTPATIENT
Start: 2017-07-10 | End: 2017-07-10

## 2017-07-10 RX ADMIN — AMIODARONE HYDROCHLORIDE 200 MILLIGRAM(S): 400 TABLET ORAL at 05:45

## 2017-07-10 RX ADMIN — Medication 81 MILLIGRAM(S): at 11:16

## 2017-07-10 RX ADMIN — Medication 20 MILLIGRAM(S): at 05:45

## 2017-07-10 RX ADMIN — Medication 0.06 MILLIGRAM(S): at 05:45

## 2017-07-10 RX ADMIN — Medication 100 MICROGRAM(S): at 05:45

## 2017-07-10 RX ADMIN — Medication 1 APPLICATION(S): at 15:52

## 2017-07-10 NOTE — DISCHARGE NOTE ADULT - CARE PLAN
Instructions for follow-up, activity and diet:	Keep affected arm below shoulder, with no heavy lifting for 6 weeks.  Keep the site dry (no showers) for 1-2 weeks until follow up visit with the physician.  Leave the steri strips in place.  Please notify the physician if there is any bleeding, drainage or swelling of the site.  Please notify the physician of any fever greater than 100.4. Instructions for follow-up, activity and diet:	Keep affected arm below shoulder, with no heavy lifting for 6 weeks.  Keep the site dry (no showers) for 1-2 weeks until follow up visit with the physician.  Leave the steri strips in place.  Please notify the physician if there is any bleeding, drainage or swelling of the site.  Please notify the physician of any fever greater than 100.4. Keep site dry x 1 week. Instructions for follow-up, activity and diet:	Post Operative Device Instructions  -please call to schedule follow up with Dr Garcia in 1-2 weeks, sooner if needed  - Bruising around the implant site or over the chest, side or arm near the incision is normal, and will take a few weeks to resolve.  -Do not lift the affected arm higher than 90 degrees (shoulder height) in any direction for 6 weeks.   - Do not push, pull or lift anything heavier than 10 lbs (about a gallon of milk) with the affected arm for 4 weeks.     -keep site dry for 24 hours  - Do not touch the incision until it is completely healed.   - There is dermabond on the site (surgical skin glue), please do not pick it off, it will fall of on its own  - Do not apply soaps, creams, lotions, ointments or powders to the incision until it is completely healed.  You should call the doctor if:   - you notice redness, drainage, swelling, increased tenderness, hot sensation around the  incision, bleeding or incision edges pulling apart.  - your temperature is greater than 100 degress F for more than 24 hours.  - you notice swelling or bulging at the incision or around the device that was not there when you left the hospital or is increasing in size.  -you experience increased difficulty breathing.  - you notice new/worsening swelling in your legs and ankles.  - you faint or have dizzy spells.  -you have any questions or concerns regarding your device or the procedure. Principal Discharge DX:	Malfunction of cardiac pacemaker, initial encounter  Goal:	POD#1 s/p SJM RV lead revision and generator change, ECG: AF BIV paced  Instructions for follow-up, activity and diet:	Post Operative Device Instructions  -please call to schedule follow up with Dr Garcia in 1-2 weeks, sooner if needed  - Bruising around the implant site or over the chest, side or arm near the incision is normal, and will take a few weeks to resolve.  -Do not lift the affected arm higher than 90 degrees (shoulder height) in any direction for 6 weeks.   - Do not push, pull or lift anything heavier than 10 lbs (about a gallon of milk) with the affected arm for 4 weeks.     -keep site dry for 24 hours  - Do not touch the incision until it is completely healed.   - There is dermabond on the site (surgical skin glue), please do not pick it off, it will fall of on its own  - Do not apply soaps, creams, lotions, ointments or powders to the incision until it is completely healed.  You should call the doctor if:   - you notice redness, drainage, swelling, increased tenderness, hot sensation around the  incision, bleeding or incision edges pulling apart.  - your temperature is greater than 100 degress F for more than 24 hours.  - you notice swelling or bulging at the incision or around the device that was not there when you left the hospital or is increasing in size.  -you experience increased difficulty breathing.  - you notice new/worsening swelling in your legs and ankles.  - you faint or have dizzy spells.  -you have any questions or concerns regarding your device or the procedure.  Secondary Diagnosis:	ICD (implantable cardioverter-defibrillator) lead failure, initial encounter  Secondary Diagnosis:	Chronic atrial fibrillation  Secondary Diagnosis:	Acquired hypothyroidism  Secondary Diagnosis:	Essential hypertension

## 2017-07-10 NOTE — DISCHARGE NOTE ADULT - MEDICATION SUMMARY - MEDICATIONS TO TAKE
I will START or STAY ON the medications listed below when I get home from the hospital:    aspirin 81 mg oral delayed release tablet  -- 1 tab(s) by mouth once a day  -- Indication: For CAD    digoxin 62.5 mcg (0.0625 mg) oral tablet  -- 1 tab(s) by mouth once a day  -- Indication: For Atrial fibrillation    amiodarone 200 mg oral tablet  -- 1 tab(s) by mouth once a day  -- Indication: For Atrial fibrillation    warfarin 1 mg oral tablet  -- 1 tab(s) by mouth once a day  take warfarin tonight restart   -- Indication: For Atrial fibrillation    atorvastatin 10 mg oral tablet  -- 1 tab(s) by mouth once a day (at bedtime)  -- Indication: For CAD    bacitracin 500 units/g topical ointment  -- 1 application on skin 2 times a day  -- Indication: For post pacemaker     furosemide 20 mg oral tablet  -- 1 tab(s) by mouth once a day  -- Indication: For htn    levothyroxine 100 mcg (0.1 mg) oral tablet  -- 1 tab(s) by mouth once a day  -- Indication: For Acquired hypothyroidism    Calcium 600+D  -- 1 cap(s) by mouth once a day  -- Indication: For supplement

## 2017-07-10 NOTE — DISCHARGE NOTE ADULT - PATIENT PORTAL LINK FT
“You can access the [facility] Patient Portal, offered by [facility], by registering with the following website: [email or web address]”

## 2017-07-10 NOTE — DISCHARGE NOTE ADULT - MEDICATION SUMMARY - MEDICATIONS TO STOP TAKING
I will STOP taking the medications listed below when I get home from the hospital:    carvedilol 3.125 mg oral tablet  -- 1 tab(s) by mouth 2 times a day    enalapril 2.5 mg oral tablet  -- 1 tab(s) by mouth once a day (at bedtime)

## 2017-07-10 NOTE — DISCHARGE NOTE ADULT - PLAN OF CARE
Keep affected arm below shoulder, with no heavy lifting for 6 weeks.  Keep the site dry (no showers) for 1-2 weeks until follow up visit with the physician.  Leave the steri strips in place.  Please notify the physician if there is any bleeding, drainage or swelling of the site.  Please notify the physician of any fever greater than 100.4. Keep affected arm below shoulder, with no heavy lifting for 6 weeks.  Keep the site dry (no showers) for 1-2 weeks until follow up visit with the physician.  Leave the steri strips in place.  Please notify the physician if there is any bleeding, drainage or swelling of the site.  Please notify the physician of any fever greater than 100.4. Keep site dry x 1 week. Post Operative Device Instructions  -please call to schedule follow up with Dr Garcia in 1-2 weeks, sooner if needed  - Bruising around the implant site or over the chest, side or arm near the incision is normal, and will take a few weeks to resolve.  -Do not lift the affected arm higher than 90 degrees (shoulder height) in any direction for 6 weeks.   - Do not push, pull or lift anything heavier than 10 lbs (about a gallon of milk) with the affected arm for 4 weeks.     -keep site dry for 24 hours  - Do not touch the incision until it is completely healed.   - There is dermabond on the site (surgical skin glue), please do not pick it off, it will fall of on its own  - Do not apply soaps, creams, lotions, ointments or powders to the incision until it is completely healed.  You should call the doctor if:   - you notice redness, drainage, swelling, increased tenderness, hot sensation around the  incision, bleeding or incision edges pulling apart.  - your temperature is greater than 100 degress F for more than 24 hours.  - you notice swelling or bulging at the incision or around the device that was not there when you left the hospital or is increasing in size.  -you experience increased difficulty breathing.  - you notice new/worsening swelling in your legs and ankles.  - you faint or have dizzy spells.  -you have any questions or concerns regarding your device or the procedure. POD#1 s/p SJM RV lead revision and generator change, ECG: AF BIV paced

## 2017-07-10 NOTE — DISCHARGE NOTE ADULT - CARE PROVIDER_API CALL
Zion Garcia), Cardiovascular Disease; Internal Medicine  [address] [place] [address]  Phone: [phone]  Fax: [phone]

## 2017-07-10 NOTE — PROGRESS NOTE ADULT - ASSESSMENT
82 year old Female with lead fracture and non functioning PM, plan for lead repair today, couldn't be done Friday with INR over 2 , also with ARF on gentle hydration   Patient  with a [place] biventricular ICD which was found to have a fractured RV/ICD lead. She had a RUE venogram which showed a patent right cephalic vein with a right sided device, RV/ICD lead, and 2 LV/CS leads.  7/7/2017 She is scheduled for EPS procedure to correct her pacemaker malfunction.

## 2017-07-10 NOTE — DISCHARGE NOTE ADULT - HOSPITAL COURSE
POD#1 s/p SJM RV lead revision and generator change, ECG: AF BIV paced 83 y/o F with AF, HTN, Hypothyroidism, HFrEF, h/o Bi-V ICD  RT sided ( RV dual coil Durata lead implanted 2010 LV lead implanted 11/2015) patient  noted to have noise on RV lead via remote ICD monitoring. Patient was advised to come ot ER for further eval, patient denied syncope , orthopnea, PND, LE edema or chest pain.  patient found to be in renal failure and to be bradycardiac ACEI and Coreg held, initially procedure couldn't be done cause INR was over 2.  NOW patient is POD#1 s/p SJM RV lead revision and generator change, ECG: AF BIV paced  patient to follow up with PMD and cardio

## 2017-07-10 NOTE — PROGRESS NOTE ADULT - SUBJECTIVE AND OBJECTIVE BOX
Subjective:  82y Female S/P Bi-V ICD, right chest wall site clean dry intact, open to air. Patient awake and alert without complaints. Denies chest pain, sob, palps.      PAST MEDICAL & SURGICAL HISTORY:  HTN (hypertension)  Hypothyroid  Chronic atrial fibrillation  Pacemaker  Artificial pacemaker    aspirin enteric coated 81 milliGRAM(s) Oral daily  amiodarone    Tablet 200 milliGRAM(s) Oral daily  furosemide    Tablet 20 milliGRAM(s) Oral daily  digoxin     Tablet 0.0625 milliGRAM(s) Oral daily  sodium chloride 0.9%. 1000 milliLiter(s) IV Continuous <Continuous>  levothyroxine 100 MICROGram(s) Oral daily  BACItracin   Ointment 1 Application(s) Topical two times a day  chloroprocaine 3% Injectable 26 milliLiter(s) Local Injection once    adhesives (Rash)  Ancef (Unknown)  lidocaine (Rash; Angioedema)    General: No fatigue, no fevers/chills  Respiratory: No dyspnea, no cough, no wheeze  CV: No chest pain, no palpitations  Abd: No nausea  Neuro: No headache, no dizziness    Objective:  T(C): 36.7 (07-10-17 @ 18:20), Max: 36.7 (07-10-17 @ 13:00)  HR: 71 (07-10-17 @ 21:35) (69 - 71)  BP: 122/59 (07-10-17 @ 21:35) (118/66 - 142/67)  RR: 20 (07-10-17 @ 21:35) (16 - 20)  SpO2: 96% (07-10-17 @ 21:35) (96% - 98%)  Wt(kg): --  CM:   Gen: Awake, alert, note acute distress  Neuro: A&OX3  Chest: CTA, S1, S2, no murmur, RRR, right chest site dry, no edema, mild errythema  Abd: Soft  Extremity: No edema  EKG:   CXR: pending  Labs:                        12.2   6.4   )-----------( 233      ( 09 Jul 2017 05:41 )             36.9     07-09    139  |  96<L>  |  20.0  ----------------------------<  86  3.8   |  27.0  |  1.08    Ca    9.2      09 Jul 2017 05:41      PT/INR - ( 09 Jul 2017 05:49 )   PT: 17.6 sec;   INR: 1.59 ratio             PLAN:  S/P Bi-V ICD  -No lifting arm above left shoulder x 6 weeks  -Signs and symptoms of infection discussed with patient  -Bedrest x 4 hours  -Vancomycin 1gm IVPB x 1 in 12 hours  -No home antibiotics needed  -Keep site dry x 1 week  -Follow up with Dr. Garcia in 2 weeks or sooner if needed  -May discharge from EP standpoint in am  -Would check in am  -EKG/CXR now Subjective:  82y Female S/P Bi-V ICD, right chest wall site clean dry intact, open to air. Patient awake and alert without complaints. Denies chest pain, sob, palps.      PAST MEDICAL & SURGICAL HISTORY:  HTN (hypertension)  Hypothyroid  Chronic atrial fibrillation  Pacemaker  Artificial pacemaker    aspirin enteric coated 81 milliGRAM(s) Oral daily  amiodarone    Tablet 200 milliGRAM(s) Oral daily  furosemide    Tablet 20 milliGRAM(s) Oral daily  digoxin     Tablet 0.0625 milliGRAM(s) Oral daily  sodium chloride 0.9%. 1000 milliLiter(s) IV Continuous <Continuous>  levothyroxine 100 MICROGram(s) Oral daily  BACItracin   Ointment 1 Application(s) Topical two times a day  chloroprocaine 3% Injectable 26 milliLiter(s) Local Injection once    adhesives (Rash)  Ancef (Unknown)  lidocaine (Rash; Angioedema)    General: No fatigue, no fevers/chills  Respiratory: No dyspnea, no cough, no wheeze  CV: No chest pain, no palpitations  Abd: No nausea  Neuro: No headache, no dizziness    Objective:  T(C): 36.7 (07-10-17 @ 18:20), Max: 36.7 (07-10-17 @ 13:00)  HR: 71 (07-10-17 @ 21:35) (69 - 71)  BP: 122/59 (07-10-17 @ 21:35) (118/66 - 142/67)  RR: 20 (07-10-17 @ 21:35) (16 - 20)  SpO2: 96% (07-10-17 @ 21:35) (96% - 98%)  Wt(kg): --  CM:   Gen: Awake, alert, note acute distress  Neuro: A&OX3  Chest: CTA, S1, S2, no murmur, RRR, right chest site dry, no edema, mild errythema  Abd: Soft  Extremity: No edema  EKG: Vpaced @70 bpm   CXR: pending  Labs:                        12.2   6.4   )-----------( 233      ( 09 Jul 2017 05:41 )             36.9     07-09    139  |  96<L>  |  20.0  ----------------------------<  86  3.8   |  27.0  |  1.08    Ca    9.2      09 Jul 2017 05:41      PT/INR - ( 09 Jul 2017 05:49 )   PT: 17.6 sec;   INR: 1.59 ratio             PLAN:  S/P Bi-V ICD  -No lifting arm above left shoulder x 6 weeks  -Signs and symptoms of infection discussed with patient  -Bedrest x 4 hours  -Vancomycin 1gm IVPB x 1 in 12 hours  -No home antibiotics needed  -Keep site dry x 1 week  -Follow up with Dr. Garcia in 2 weeks or sooner if needed  -May discharge from EP standpoint in am  -Would check in am  -EKG/CXR now

## 2017-07-10 NOTE — DISCHARGE NOTE ADULT - SECONDARY DIAGNOSIS.
ICD (implantable cardioverter-defibrillator) lead failure, initial encounter Chronic atrial fibrillation Acquired hypothyroidism Essential hypertension

## 2017-07-10 NOTE — DISCHARGE NOTE ADULT - ADDITIONAL INSTRUCTIONS
Follow up with Dr. Garcia in 1 week for wound check Follow up with Dr. Garcia in 2 week for wound check Follow up with Dr. Garcia in 1-2 week for wound check, sooner if needed  Check your INR next week with Dr Marrero

## 2017-07-10 NOTE — PROGRESS NOTE ADULT - SUBJECTIVE AND OBJECTIVE BOX
ANABEL GOFF Patient is a 82y old  Female who presents with a chief complaint of    HPI:    The patient was seen and evaluated   The patient is in no acute distress.  Denied any fever chest pain, palpitations, shortness of breath, abdominal pain, fever, dysuria, cough, edema       I&O's Summary    09 Jul 2017 07:01  -  10 Jul 2017 07:00  --------------------------------------------------------  IN: 400 mL / OUT: 450 mL / NET: -50 mL      Allergies    adhesives (Rash)  Ancef (Unknown)  lidocaine (Rash; Angioedema)    Intolerances      HEALTH ISSUES - PROBLEM Dx:  Chronic atrial fibrillation: Chronic atrial fibrillation  Acquired hypothyroidism: Acquired hypothyroidism  Essential hypertension: Essential hypertension  Malfunction of cardiac pacemaker, initial encounter: Malfunction of cardiac pacemaker, initial encounter        PAST MEDICAL & SURGICAL HISTORY:  HTN (hypertension)  Hypothyroid  Chronic atrial fibrillation  Pacemaker  Artificial pacemaker          Vital Signs Last 24 Hrs  T(C): 36.4 (10 Jul 2017 08:19), Max: 36.7 (09 Jul 2017 21:10)  T(F): 97.6 (10 Jul 2017 08:19), Max: 98.1 (09 Jul 2017 21:10)  HR: 70 (10 Jul 2017 08:19) (70 - 70)  BP: 123/63 (10 Jul 2017 08:19) (111/63 - 123/63)  BP(mean): --  RR: 16 (10 Jul 2017 08:19) (16 - 16)  SpO2: 97% (10 Jul 2017 05:42) (97% - 97%)T(C): 36.4 (10 Jul 2017 08:19), Max: 36.7 (09 Jul 2017 21:10)  HR: 70 (10 Jul 2017 08:19) (70 - 70)  BP: 123/63 (10 Jul 2017 08:19) (111/63 - 123/63)  RR: 16 (10 Jul 2017 08:19) (16 - 16)  SpO2: 97% (10 Jul 2017 05:42) (97% - 97%)  Wt(kg): --    PHYSICAL EXAM:    GENERAL: NAD, well-groomed, well-developed  HEAD:  Atraumatic, Normocephalic  EYES: EOMI,  conjunctiva and sclera clear  ENMT:  Moist mucous membranes,  No lesions  NECK: Supple,   NERVOUS SYSTEM:  Alert & Oriented X3,  Moves upper and lower extremities; CNS-II-XII  CHEST/LUNG: Clear to auscultation bilaterally; No rales, rhonchi, wheezing,   HEART: Regular rate and rhythm; No murmurs,   ABDOMEN: Soft, Nontender, Nondistended; Bowel sounds present  EXTREMITIES:  Peripheral Pulses, No  cyanosis, or edema  SKIN: No rashes or lesions  psychiatry- mood and affect approprite, Insight and judgement intact     aspirin enteric coated 81 milliGRAM(s) Oral daily  amiodarone    Tablet 200 milliGRAM(s) Oral daily  furosemide    Tablet 20 milliGRAM(s) Oral daily  digoxin     Tablet 0.0625 milliGRAM(s) Oral daily  sodium chloride 0.9%. 1000 milliLiter(s) IV Continuous <Continuous>  levothyroxine 100 MICROGram(s) Oral daily  enoxaparin Injectable 50 milliGRAM(s) SubCutaneous every 24 hours  BACItracin   Ointment 1 Application(s) Topical two times a day      LABS:                          12.2   6.4   )-----------( 233      ( 09 Jul 2017 05:41 )             36.9     07-09    139  |  96<L>  |  20.0  ----------------------------<  86  3.8   |  27.0  |  1.08    Ca    9.2      09 Jul 2017 05:41        PT/INR - ( 09 Jul 2017 05:49 )   PT: 17.6 sec;   INR: 1.59 ratio                 CAPILLARY BLOOD GLUCOSE          RADIOLOGY & ADDITIONAL TESTS:      Consultant notes reviewed    Case discussed with consultant/provider/ family /patient

## 2017-07-11 VITALS
TEMPERATURE: 98 F | OXYGEN SATURATION: 98 % | RESPIRATION RATE: 16 BRPM | DIASTOLIC BLOOD PRESSURE: 56 MMHG | HEART RATE: 70 BPM | SYSTOLIC BLOOD PRESSURE: 104 MMHG

## 2017-07-11 LAB
ANION GAP SERPL CALC-SCNC: 14 MMOL/L — SIGNIFICANT CHANGE UP (ref 5–17)
BASOPHILS # BLD AUTO: 0 K/UL — SIGNIFICANT CHANGE UP (ref 0–0.2)
BASOPHILS NFR BLD AUTO: 0.2 % — SIGNIFICANT CHANGE UP (ref 0–2)
BUN SERPL-MCNC: 22 MG/DL — HIGH (ref 8–20)
CALCIUM SERPL-MCNC: 9 MG/DL — SIGNIFICANT CHANGE UP (ref 8.6–10.2)
CHLORIDE SERPL-SCNC: 96 MMOL/L — LOW (ref 98–107)
CO2 SERPL-SCNC: 27 MMOL/L — SIGNIFICANT CHANGE UP (ref 22–29)
CREAT SERPL-MCNC: 1.18 MG/DL — SIGNIFICANT CHANGE UP (ref 0.5–1.3)
EOSINOPHIL # BLD AUTO: 0 K/UL — SIGNIFICANT CHANGE UP (ref 0–0.5)
EOSINOPHIL NFR BLD AUTO: 0.2 % — SIGNIFICANT CHANGE UP (ref 0–6)
GLUCOSE SERPL-MCNC: 106 MG/DL — SIGNIFICANT CHANGE UP (ref 70–115)
HCT VFR BLD CALC: 35.6 % — LOW (ref 37–47)
HGB BLD-MCNC: 11.6 G/DL — LOW (ref 12–16)
LYMPHOCYTES # BLD AUTO: 1.2 K/UL — SIGNIFICANT CHANGE UP (ref 1–4.8)
LYMPHOCYTES # BLD AUTO: 10.8 % — LOW (ref 20–55)
MCHC RBC-ENTMCNC: 32.6 G/DL — SIGNIFICANT CHANGE UP (ref 32–36)
MCHC RBC-ENTMCNC: 32.6 PG — HIGH (ref 27–31)
MCV RBC AUTO: 100 FL — HIGH (ref 81–99)
MONOCYTES # BLD AUTO: 1.4 K/UL — HIGH (ref 0–0.8)
MONOCYTES NFR BLD AUTO: 12.1 % — HIGH (ref 3–10)
NEUTROPHILS # BLD AUTO: 8.9 K/UL — HIGH (ref 1.8–8)
NEUTROPHILS NFR BLD AUTO: 76.6 % — HIGH (ref 37–73)
PLATELET # BLD AUTO: 239 K/UL — SIGNIFICANT CHANGE UP (ref 150–400)
POTASSIUM SERPL-MCNC: 4 MMOL/L — SIGNIFICANT CHANGE UP (ref 3.5–5.3)
POTASSIUM SERPL-SCNC: 4 MMOL/L — SIGNIFICANT CHANGE UP (ref 3.5–5.3)
RBC # BLD: 3.56 M/UL — LOW (ref 4.4–5.2)
RBC # FLD: 14.7 % — SIGNIFICANT CHANGE UP (ref 11–15.6)
SODIUM SERPL-SCNC: 137 MMOL/L — SIGNIFICANT CHANGE UP (ref 135–145)
WBC # BLD: 11.6 K/UL — HIGH (ref 4.8–10.8)
WBC # FLD AUTO: 11.6 K/UL — HIGH (ref 4.8–10.8)

## 2017-07-11 PROCEDURE — 93010 ELECTROCARDIOGRAM REPORT: CPT

## 2017-07-11 PROCEDURE — 99239 HOSP IP/OBS DSCHRG MGMT >30: CPT

## 2017-07-11 RX ORDER — AMIODARONE HYDROCHLORIDE 400 MG/1
1 TABLET ORAL
Qty: 0 | Refills: 0 | COMMUNITY
Start: 2017-07-11

## 2017-07-11 RX ORDER — BACITRACIN ZINC 500 UNIT/G
1 OINTMENT IN PACKET (EA) TOPICAL
Qty: 1 | Refills: 0 | OUTPATIENT
Start: 2017-07-11 | End: 2017-07-21

## 2017-07-11 RX ORDER — ASPIRIN/CALCIUM CARB/MAGNESIUM 324 MG
1 TABLET ORAL
Qty: 0 | Refills: 0 | COMMUNITY

## 2017-07-11 RX ORDER — DIGOXIN 250 MCG
0 TABLET ORAL
Qty: 0 | Refills: 0 | DISCHARGE
Start: 2017-07-11

## 2017-07-11 RX ORDER — AMIODARONE HYDROCHLORIDE 400 MG/1
1 TABLET ORAL
Qty: 0 | Refills: 0 | COMMUNITY

## 2017-07-11 RX ORDER — DIGOXIN 250 MCG
1 TABLET ORAL
Qty: 0 | Refills: 0 | COMMUNITY
Start: 2017-07-11

## 2017-07-11 RX ORDER — ASPIRIN/CALCIUM CARB/MAGNESIUM 324 MG
1 TABLET ORAL
Qty: 0 | Refills: 0 | DISCHARGE
Start: 2017-07-11

## 2017-07-11 RX ORDER — DIGOXIN 250 MCG
0.5 TABLET ORAL
Qty: 0 | Refills: 0 | DISCHARGE
Start: 2017-07-11

## 2017-07-11 RX ORDER — LEVOTHYROXINE SODIUM 125 MCG
1 TABLET ORAL
Qty: 0 | Refills: 0 | COMMUNITY

## 2017-07-11 RX ORDER — LEVOTHYROXINE SODIUM 125 MCG
1 TABLET ORAL
Qty: 30 | Refills: 0 | OUTPATIENT
Start: 2017-07-11 | End: 2017-08-10

## 2017-07-11 RX ADMIN — AMIODARONE HYDROCHLORIDE 200 MILLIGRAM(S): 400 TABLET ORAL at 05:10

## 2017-07-11 RX ADMIN — Medication 20 MILLIGRAM(S): at 05:11

## 2017-07-11 RX ADMIN — Medication 0.06 MILLIGRAM(S): at 05:10

## 2017-07-11 RX ADMIN — Medication 250 MILLIGRAM(S): at 06:18

## 2017-07-11 RX ADMIN — Medication 100 MICROGRAM(S): at 05:10

## 2017-07-11 RX ADMIN — Medication 81 MILLIGRAM(S): at 12:19

## 2017-07-11 NOTE — PROGRESS NOTE ADULT - PROBLEM SELECTOR PROBLEM 3
Acquired hypothyroidism

## 2017-07-11 NOTE — PROGRESS NOTE ADULT - PROBLEM SELECTOR PROBLEM 4
Chronic atrial fibrillation

## 2017-07-11 NOTE — PROGRESS NOTE ADULT - SUBJECTIVE AND OBJECTIVE BOX
ANABEL GOFF Patient is a 82y old  Female who presents with a chief complaint of    HPI:    The patient was seen and evaluated POD#1 s/p SJM RV lead revision and generator change,ECG: AF BIV paced  TELE: AF Bivpaced  CXR prelim: +RV leads x 2, LV leads x 2, no gross PTX, SJM interrogation: device battery, sensing, impedance, and threshold stable and wnl,  The patient is in no acute distress.  Denied any fever chest pain, palpitations, shortness of breath, abdominal pain, fever, dysuria, cough, edema       I&O's Summary    10 Jul 2017 07:01  -  11 Jul 2017 07:00  --------------------------------------------------------  IN: 15 mL / OUT: 220 mL / NET: -205 mL      Allergies    adhesives (Rash)  Ancef (Unknown)  lidocaine (Rash; Angioedema)    Intolerances      HEALTH ISSUES - PROBLEM Dx:  Chronic atrial fibrillation: Chronic atrial fibrillation  Acquired hypothyroidism: Acquired hypothyroidism  Essential hypertension: Essential hypertension  Malfunction of cardiac pacemaker, initial encounter: Malfunction of cardiac pacemaker, initial encounter        PAST MEDICAL & SURGICAL HISTORY:  HTN (hypertension)  Hypothyroid  Chronic atrial fibrillation  Pacemaker  Artificial pacemaker          Vital Signs Last 24 Hrs  T(C): 36.4 (11 Jul 2017 08:02), Max: 36.7 (10 Jul 2017 13:00)  T(F): 97.5 (11 Jul 2017 08:02), Max: 98.1 (10 Jul 2017 18:20)  HR: 70 (11 Jul 2017 08:02) (68 - 71)  BP: 100/58 (11 Jul 2017 08:02) (100/58 - 142/67)  BP(mean): --  RR: 16 (11 Jul 2017 08:02) (16 - 20)  SpO2: 97% (11 Jul 2017 08:02) (96% - 99%)T(C): 36.4 (11 Jul 2017 08:02), Max: 36.7 (10 Jul 2017 13:00)  HR: 70 (11 Jul 2017 08:02) (68 - 71)  BP: 100/58 (11 Jul 2017 08:02) (100/58 - 142/67)  RR: 16 (11 Jul 2017 08:02) (16 - 20)  SpO2: 97% (11 Jul 2017 08:02) (96% - 99%)  Wt(kg): --    PHYSICAL EXAM:    GENERAL: NAD,elderly female   HEAD:  Atraumatic, Normocephalic  EYES: EOMI, PERRL, conjunctiva and sclera clear  ENMT:  Moist mucous membranes,  No lesions  NECK: Supple,   NERVOUS SYSTEM:  Alert & Oriented X3,  Moves upper and lower extremities; CNS-II-XII  CHEST/LUNG: Clear to auscultation bilaterally; No rales, rhonchi, wheezing, chest wall wound clean dry some scabbing sure site clean no hematoma   HEART: Regular rate and rhythm; No murmurs,   ABDOMEN: Soft, Nontender, Nondistended; Bowel sounds present  EXTREMITIES:  Peripheral Pulses, No  cyanosis, or edema  SKIN: No rashes or lesions  psychiatry- mood and affect appropriate Insight and judgement intact     aspirin enteric coated 81 milliGRAM(s) Oral daily  amiodarone    Tablet 200 milliGRAM(s) Oral daily  furosemide    Tablet 20 milliGRAM(s) Oral daily  digoxin     Tablet 0.0625 milliGRAM(s) Oral daily  sodium chloride 0.9%. 1000 milliLiter(s) IV Continuous <Continuous>  levothyroxine 100 MICROGram(s) Oral daily  BACItracin   Ointment 1 Application(s) Topical two times a day  chloroprocaine 3% Injectable 26 milliLiter(s) Local Injection once      LABS:                          11.6   11.6  )-----------( 239      ( 11 Jul 2017 06:41 )             35.6     07-11    137  |  96<L>  |  22.0<H>  ----------------------------<  106  4.0   |  27.0  |  1.18    Ca    9.0      11 Jul 2017 06:41                CAPILLARY BLOOD GLUCOSE          RADIOLOGY & ADDITIONAL TESTS:      Consultant notes reviewed    Case discussed with consultant/provider/ family /patient

## 2017-07-11 NOTE — PROGRESS NOTE ADULT - ASSESSMENT
POD#1 s/p SJM RV lead revision and generator change,ECG: AF BIV paced  TELE: AF Bivpaced  CXR prelim: +RV leads x 2, LV leads x 2, no gross PTX, SJM interrogation: device battery, sensing, impedance, and threshold stable and wnl,

## 2017-07-11 NOTE — PROGRESS NOTE ADULT - SUBJECTIVE AND OBJECTIVE BOX
Pt seen and examined on 4T, no complaints, no overnight events.  POD#1 s/p SJM RV lead revision and generator change.      ECG: AF BIV paced  TELE: AF Bivpaced  CXR prelim: +RV leads x 2, LV leads x 2, no gross PTX, official read pending, radiology called  SJM interrogation: device battery, sensing, impedance, and threshold stable and wnl, full report in chart. VVIR 70bpm    MEDICATIONS  (STANDING):  aspirin enteric coated 81 milliGRAM(s) Oral daily  amiodarone    Tablet 200 milliGRAM(s) Oral daily  furosemide    Tablet 20 milliGRAM(s) Oral daily  digoxin     Tablet 0.0625 milliGRAM(s) Oral daily  sodium chloride 0.9%. 1000 milliLiter(s) (50 mL/Hr) IV Continuous <Continuous>  levothyroxine 100 MICROGram(s) Oral daily  BACItracin   Ointment 1 Application(s) Topical two times a day  chloroprocaine 3% Injectable 26 milliLiter(s) Local Injection once      Allergies  adhesives (Rash)  Ancef (Unknown)  lidocaine (Rash; Angioedema)    PAST MEDICAL & SURGICAL HISTORY:  HTN (hypertension)  Hypothyroid  Chronic atrial fibrillation  Pacemaker  Artificial pacemaker      Vital Signs Last 24 Hrs  T(C): 36.4 (11 Jul 2017 08:02), Max: 36.7 (10 Jul 2017 13:00)  T(F): 97.5 (11 Jul 2017 08:02), Max: 98.1 (10 Jul 2017 18:20)  HR: 70 (11 Jul 2017 08:02) (68 - 71)  BP: 100/58 (11 Jul 2017 08:02) (100/58 - 142/67)  RR: 16 (11 Jul 2017 08:02) (16 - 20)  SpO2: 97% (11 Jul 2017 08:02) (96% - 99%)    Physical Exam:  Constitutional: NAD, AAOx3  Cardiovascular: +S1S2 RRR  RACW: incision with dermabond, intact without evidence of tension, bleeding, or hematoma over device. there is a small hematoma under right axilla related to where previous generator was sitting. no crepitus, no bleeding, no warmth. +2 radial pulse.  Pulmonary: CTA b/l, unlabored  GI: soft NTND +BS  Extremities: no pedal edema, +distal pulses b/l  Neuro: non focal, GARCAI x4    LABS:                        11.6   11.6  )-----------( 239      ( 11 Jul 2017 06:41 )             35.6     07-11    137  |  96<L>  |  22.0<H>  ----------------------------<  106  4.0   |  27.0  |  1.18    Ca    9.0      11 Jul 2017 06:41      A/P: 83 y/o female with pmhx chronic AF on coumadin, HTN, Hypothyroidism, HFrEF, h/o Bi-V ICD  RT sided ( RV dual coil Durata lead implanted 2010 LV lead implanted 11/2015). Patient admitted 7/6/17 secondary to noise on RV lead via remote ICD monitoring. She is POD#1 s/p RV lead revision and SJM BIVICD generator change (op report still pending), Pt seen and examined on 4T, no complaints, no overnight events.  POD#1 s/p SJM RV lead revision and generator change.      ECG: AF BIV paced  TELE: AF Bivpaced  CXR prelim: +RV leads x 2, LV leads x 2, no gross PTX, official read pending, radiology called  SJM interrogation: device battery, sensing, impedance, and threshold stable and wnl, full report in chart. VVIR 70bpm    MEDICATIONS  (STANDING):  aspirin enteric coated 81 milliGRAM(s) Oral daily  amiodarone    Tablet 200 milliGRAM(s) Oral daily  furosemide    Tablet 20 milliGRAM(s) Oral daily  digoxin     Tablet 0.0625 milliGRAM(s) Oral daily  sodium chloride 0.9%. 1000 milliLiter(s) (50 mL/Hr) IV Continuous <Continuous>  levothyroxine 100 MICROGram(s) Oral daily  BACItracin   Ointment 1 Application(s) Topical two times a day  chloroprocaine 3% Injectable 26 milliLiter(s) Local Injection once      Allergies  adhesives (Rash)  Ancef (Unknown)  lidocaine (Rash; Angioedema)    PAST MEDICAL & SURGICAL HISTORY:  HTN (hypertension)  Hypothyroid  Chronic atrial fibrillation  Pacemaker  Artificial pacemaker      Vital Signs Last 24 Hrs  T(C): 36.4 (11 Jul 2017 08:02), Max: 36.7 (10 Jul 2017 13:00)  T(F): 97.5 (11 Jul 2017 08:02), Max: 98.1 (10 Jul 2017 18:20)  HR: 70 (11 Jul 2017 08:02) (68 - 71)  BP: 100/58 (11 Jul 2017 08:02) (100/58 - 142/67)  RR: 16 (11 Jul 2017 08:02) (16 - 20)  SpO2: 97% (11 Jul 2017 08:02) (96% - 99%)    Physical Exam:  Constitutional: NAD, AAOx3  Cardiovascular: +S1S2 RRR  RACW: incision with dermabond, intact without evidence of tension, bleeding, or hematoma over device. there is a small hematoma under right axilla related to where previous generator was sitting. no crepitus, no bleeding, no warmth. +2 radial pulse.  Pulmonary: CTA b/l, unlabored  GI: soft NTND +BS  Extremities: no pedal edema, +distal pulses b/l  Neuro: non focal, GARCIA x4    LABS:                        11.6   11.6  )-----------( 239      ( 11 Jul 2017 06:41 )             35.6     07-11    137  |  96<L>  |  22.0<H>  ----------------------------<  106  4.0   |  27.0  |  1.18    Ca    9.0      11 Jul 2017 06:41      A/P: 81 y/o female with pmhx chronic AF on coumadin, HTN, Hypothyroidism, HFrEF, h/o Bi-V ICD  RT sided ( RV dual coil Durata lead implanted 2010 LV lead implanted 11/2015). Patient admitted 7/6/17 secondary to noise on RV lead via remote ICD monitoring. She is POD#1 s/p RV lead revision and SJM BIVICD generator change (op report still pending).    -am ECG, tele, labs, interrogation wnl  -small hematoma to right axilla and old generator site, reviewed with Dr Garcia h/h stable, ok to dc, f/u 1-2 weeks, sooner if needed   -no gross ptx on cxr, radiology called for official read  -continue outpt meds, restart coumadin tonight, no bridge, check INR early next week with Dr Marrero office  -outpt meds reviewed, no beta blocker or ACEI, unclear why but BP marginal at 100/58, creat 1.18, pt to follow up with outpt cardiologist per Dr Garcia  cleared by EPS for dc home, plan per primary medical team  DW Dr Garcia, agrees

## 2017-07-11 NOTE — PROGRESS NOTE ADULT - PROBLEM SELECTOR PLAN 4
Allow INR to drift down. Her coumadin is on hold until after the procedure.
Allow INR to drift down. Her coumadin is on hold until after the procedure.
Allow INR to drift down. lovenox
coumadin to resume tonight------ NO bridge
Allow INR to drift down. Her coumadin is on hold until after the procedure.

## 2017-07-11 NOTE — PROGRESS NOTE ADULT - PROBLEM SELECTOR PROBLEM 1
Malfunction of cardiac pacemaker, initial encounter

## 2017-08-11 ENCOUNTER — OTHER (OUTPATIENT)
Age: 82
End: 2017-08-11

## 2017-08-15 ENCOUNTER — CLINICAL ADVICE (OUTPATIENT)
Age: 82
End: 2017-08-15

## 2017-08-18 ENCOUNTER — APPOINTMENT (OUTPATIENT)
Dept: NEUROLOGY | Facility: CLINIC | Age: 82
End: 2017-08-18

## 2017-09-05 ENCOUNTER — APPOINTMENT (OUTPATIENT)
Dept: NEUROLOGY | Facility: CLINIC | Age: 82
End: 2017-09-05
Payer: MEDICARE

## 2017-09-05 VITALS
BODY MASS INDEX: 17.89 KG/M2 | SYSTOLIC BLOOD PRESSURE: 78 MMHG | HEART RATE: 62 BPM | DIASTOLIC BLOOD PRESSURE: 48 MMHG | HEIGHT: 63 IN | WEIGHT: 101 LBS

## 2017-09-05 VITALS — DIASTOLIC BLOOD PRESSURE: 60 MMHG | SYSTOLIC BLOOD PRESSURE: 90 MMHG

## 2017-09-05 DIAGNOSIS — M47.12 OTHER SPONDYLOSIS WITH MYELOPATHY, CERVICAL REGION: ICD-10-CM

## 2017-09-05 DIAGNOSIS — R25.1 TREMOR, UNSPECIFIED: ICD-10-CM

## 2017-09-05 PROCEDURE — 99214 OFFICE O/P EST MOD 30 MIN: CPT

## 2017-09-13 ENCOUNTER — APPOINTMENT (OUTPATIENT)
Dept: NEUROSURGERY | Facility: CLINIC | Age: 82
End: 2017-09-13
Payer: MEDICARE

## 2017-09-13 VITALS
WEIGHT: 102 LBS | DIASTOLIC BLOOD PRESSURE: 70 MMHG | RESPIRATION RATE: 16 BRPM | HEART RATE: 66 BPM | BODY MASS INDEX: 23.61 KG/M2 | SYSTOLIC BLOOD PRESSURE: 106 MMHG | HEIGHT: 55 IN | OXYGEN SATURATION: 95 %

## 2017-09-13 PROCEDURE — 99204 OFFICE O/P NEW MOD 45 MIN: CPT

## 2017-09-15 ENCOUNTER — RECORD ABSTRACTING (OUTPATIENT)
Age: 82
End: 2017-09-15

## 2017-09-15 DIAGNOSIS — I25.2 OLD MYOCARDIAL INFARCTION: ICD-10-CM

## 2017-09-15 DIAGNOSIS — Z86.79 PERSONAL HISTORY OF OTHER DISEASES OF THE CIRCULATORY SYSTEM: ICD-10-CM

## 2017-09-19 ENCOUNTER — APPOINTMENT (OUTPATIENT)
Dept: INTERNAL MEDICINE | Facility: CLINIC | Age: 82
End: 2017-09-19
Payer: MEDICARE

## 2017-09-19 VITALS
TEMPERATURE: 98.3 F | WEIGHT: 95 LBS | BODY MASS INDEX: 16.83 KG/M2 | RESPIRATION RATE: 16 BRPM | SYSTOLIC BLOOD PRESSURE: 110 MMHG | HEIGHT: 63 IN | OXYGEN SATURATION: 96 % | HEART RATE: 84 BPM | DIASTOLIC BLOOD PRESSURE: 74 MMHG

## 2017-09-19 DIAGNOSIS — M50.90 CERVICAL DISC DISORDER, UNSPECIFIED, UNSPECIFIED CERVICAL REGION: ICD-10-CM

## 2017-09-19 PROCEDURE — G0008: CPT

## 2017-09-19 PROCEDURE — 94729 DIFFUSING CAPACITY: CPT

## 2017-09-19 PROCEDURE — 99214 OFFICE O/P EST MOD 30 MIN: CPT | Mod: 25

## 2017-09-19 PROCEDURE — 94060 EVALUATION OF WHEEZING: CPT

## 2017-09-19 PROCEDURE — 90662 IIV NO PRSV INCREASED AG IM: CPT

## 2017-09-22 ENCOUNTER — MED ADMIN CHARGE (OUTPATIENT)
Age: 82
End: 2017-09-22

## 2017-10-11 PROCEDURE — C1892: CPT

## 2017-10-11 PROCEDURE — C1894: CPT

## 2017-10-11 PROCEDURE — C1769: CPT

## 2017-10-11 PROCEDURE — 85610 PROTHROMBIN TIME: CPT

## 2017-10-11 PROCEDURE — C1777: CPT

## 2017-10-11 PROCEDURE — 36415 COLL VENOUS BLD VENIPUNCTURE: CPT

## 2017-10-11 PROCEDURE — 71045 X-RAY EXAM CHEST 1 VIEW: CPT

## 2017-10-11 PROCEDURE — 80053 COMPREHEN METABOLIC PANEL: CPT

## 2017-10-11 PROCEDURE — 84484 ASSAY OF TROPONIN QUANT: CPT

## 2017-10-11 PROCEDURE — 80162 ASSAY OF DIGOXIN TOTAL: CPT

## 2017-10-11 PROCEDURE — 93005 ELECTROCARDIOGRAM TRACING: CPT

## 2017-10-11 PROCEDURE — 85027 COMPLETE CBC AUTOMATED: CPT

## 2017-10-11 PROCEDURE — 33216 INSERT 1 ELECTRODE PM-DEFIB: CPT

## 2017-10-11 PROCEDURE — C8929: CPT

## 2017-10-11 PROCEDURE — C1882: CPT

## 2017-10-11 PROCEDURE — 33264 RMVL & RPLCMT DFB GEN MLT LD: CPT

## 2017-10-11 PROCEDURE — 80048 BASIC METABOLIC PNL TOTAL CA: CPT

## 2017-10-11 PROCEDURE — 99285 EMERGENCY DEPT VISIT HI MDM: CPT | Mod: 25

## 2017-10-23 ENCOUNTER — APPOINTMENT (OUTPATIENT)
Dept: INTERNAL MEDICINE | Facility: CLINIC | Age: 82
End: 2017-10-23
Payer: MEDICARE

## 2017-10-23 ENCOUNTER — OTHER (OUTPATIENT)
Age: 82
End: 2017-10-23

## 2017-10-23 PROCEDURE — 94060 EVALUATION OF WHEEZING: CPT

## 2017-10-23 PROCEDURE — 99214 OFFICE O/P EST MOD 30 MIN: CPT | Mod: 25

## 2017-10-25 ENCOUNTER — INPATIENT (INPATIENT)
Facility: HOSPITAL | Age: 82
LOS: 1 days | Discharge: HOME | End: 2017-10-27
Attending: STUDENT IN AN ORGANIZED HEALTH CARE EDUCATION/TRAINING PROGRAM | Admitting: STUDENT IN AN ORGANIZED HEALTH CARE EDUCATION/TRAINING PROGRAM
Payer: MEDICARE

## 2017-10-25 VITALS — WEIGHT: 100.09 LBS

## 2017-10-25 DIAGNOSIS — Z95.0 PRESENCE OF CARDIAC PACEMAKER: Chronic | ICD-10-CM

## 2017-10-25 LAB
ADD ON TEST-SPECIMEN IN LAB: SIGNIFICANT CHANGE UP
ALBUMIN SERPL ELPH-MCNC: 2.6 G/DL — LOW (ref 3.3–5)
ALP SERPL-CCNC: 74 U/L — SIGNIFICANT CHANGE UP (ref 40–120)
ALT FLD-CCNC: 313 U/L — HIGH (ref 12–78)
ANION GAP SERPL CALC-SCNC: 9 MMOL/L — SIGNIFICANT CHANGE UP (ref 5–17)
ANISOCYTOSIS BLD QL: SLIGHT — SIGNIFICANT CHANGE UP
APPEARANCE UR: CLEAR — SIGNIFICANT CHANGE UP
AST SERPL-CCNC: 313 U/L — HIGH (ref 15–37)
BACTERIA # UR AUTO: (no result)
BASOPHILS # BLD AUTO: 0 K/UL — SIGNIFICANT CHANGE UP (ref 0–0.2)
BILIRUB SERPL-MCNC: 0.7 MG/DL — SIGNIFICANT CHANGE UP (ref 0.2–1.2)
BILIRUB UR-MCNC: NEGATIVE — SIGNIFICANT CHANGE UP
BUN SERPL-MCNC: 23 MG/DL — SIGNIFICANT CHANGE UP (ref 7–23)
CALCIUM SERPL-MCNC: 8.5 MG/DL — SIGNIFICANT CHANGE UP (ref 8.5–10.1)
CHLORIDE SERPL-SCNC: 98 MMOL/L — SIGNIFICANT CHANGE UP (ref 96–108)
CO2 SERPL-SCNC: 30 MMOL/L — SIGNIFICANT CHANGE UP (ref 22–31)
COLOR SPEC: YELLOW — SIGNIFICANT CHANGE UP
CREAT SERPL-MCNC: 1.45 MG/DL — HIGH (ref 0.5–1.3)
DIFF PNL FLD: (no result)
DIGOXIN SERPL-MCNC: 1.19 NG/ML — SIGNIFICANT CHANGE UP (ref 0.8–2)
EOSINOPHIL # BLD AUTO: 0 K/UL — SIGNIFICANT CHANGE UP (ref 0–0.5)
EPI CELLS # UR: SIGNIFICANT CHANGE UP
GLUCOSE SERPL-MCNC: 90 MG/DL — SIGNIFICANT CHANGE UP (ref 70–99)
GLUCOSE UR QL: NEGATIVE MG/DL — SIGNIFICANT CHANGE UP
HCT VFR BLD CALC: 32 % — LOW (ref 34.5–45)
HGB BLD-MCNC: 10.7 G/DL — LOW (ref 11.5–15.5)
HYPOCHROMIA BLD QL: SLIGHT — SIGNIFICANT CHANGE UP
INR BLD: 1.73 RATIO — HIGH (ref 0.88–1.16)
KETONES UR-MCNC: NEGATIVE — SIGNIFICANT CHANGE UP
LEUKOCYTE ESTERASE UR-ACNC: NEGATIVE — SIGNIFICANT CHANGE UP
LYMPHOCYTES # BLD AUTO: 1.1 K/UL — SIGNIFICANT CHANGE UP (ref 1–3.3)
LYMPHOCYTES # BLD AUTO: 9 % — LOW (ref 13–44)
MAGNESIUM SERPL-MCNC: 1.9 MG/DL — SIGNIFICANT CHANGE UP (ref 1.6–2.6)
MANUAL DIF COMMENT BLD-IMP: SIGNIFICANT CHANGE UP
MCHC RBC-ENTMCNC: 32.4 PG — SIGNIFICANT CHANGE UP (ref 27–34)
MCHC RBC-ENTMCNC: 33.6 GM/DL — SIGNIFICANT CHANGE UP (ref 32–36)
MCV RBC AUTO: 96.5 FL — SIGNIFICANT CHANGE UP (ref 80–100)
MONOCYTES # BLD AUTO: 0.9 K/UL — SIGNIFICANT CHANGE UP (ref 0–0.9)
MONOCYTES NFR BLD AUTO: 8 % — SIGNIFICANT CHANGE UP (ref 2–14)
NEUTROPHILS # BLD AUTO: 5.9 K/UL — SIGNIFICANT CHANGE UP (ref 1.8–7.4)
NEUTROPHILS NFR BLD AUTO: 76 % — SIGNIFICANT CHANGE UP (ref 43–77)
NEUTS BAND # BLD: 7 % — SIGNIFICANT CHANGE UP (ref 0–8)
NITRITE UR-MCNC: NEGATIVE — SIGNIFICANT CHANGE UP
NT-PROBNP SERPL-SCNC: 5961 PG/ML — HIGH (ref 0–450)
OVALOCYTES BLD QL SMEAR: SLIGHT — SIGNIFICANT CHANGE UP
PH UR: 7 — SIGNIFICANT CHANGE UP (ref 5–8)
PLAT MORPH BLD: NORMAL — SIGNIFICANT CHANGE UP
PLATELET # BLD AUTO: 179 K/UL — SIGNIFICANT CHANGE UP (ref 150–400)
POIKILOCYTOSIS BLD QL AUTO: SLIGHT — SIGNIFICANT CHANGE UP
POTASSIUM SERPL-MCNC: 3 MMOL/L — LOW (ref 3.5–5.3)
POTASSIUM SERPL-MCNC: 3.3 MMOL/L — LOW (ref 3.5–5.3)
POTASSIUM SERPL-SCNC: 3 MMOL/L — LOW (ref 3.5–5.3)
POTASSIUM SERPL-SCNC: 3.3 MMOL/L — LOW (ref 3.5–5.3)
PROT SERPL-MCNC: 6.5 GM/DL — SIGNIFICANT CHANGE UP (ref 6–8.3)
PROT UR-MCNC: NEGATIVE MG/DL — SIGNIFICANT CHANGE UP
PROTHROM AB SERPL-ACNC: 18.9 SEC — HIGH (ref 9.8–12.7)
RBC # BLD: 3.32 M/UL — LOW (ref 3.8–5.2)
RBC # FLD: 13.2 % — SIGNIFICANT CHANGE UP (ref 10.3–14.5)
RBC BLD AUTO: (no result)
RBC CASTS # UR COMP ASSIST: (no result) /HPF (ref 0–4)
SODIUM SERPL-SCNC: 137 MMOL/L — SIGNIFICANT CHANGE UP (ref 135–145)
SP GR SPEC: 1 — LOW (ref 1.01–1.02)
TROPONIN I SERPL-MCNC: 0.03 NG/ML — SIGNIFICANT CHANGE UP (ref 0.01–0.04)
TROPONIN I SERPL-MCNC: 0.03 NG/ML — SIGNIFICANT CHANGE UP (ref 0.01–0.04)
UROBILINOGEN FLD QL: NEGATIVE MG/DL — SIGNIFICANT CHANGE UP
WBC # BLD: 7.9 K/UL — SIGNIFICANT CHANGE UP (ref 3.8–10.5)
WBC # FLD AUTO: 7.9 K/UL — SIGNIFICANT CHANGE UP (ref 3.8–10.5)
WBC UR QL: SIGNIFICANT CHANGE UP

## 2017-10-25 PROCEDURE — 93010 ELECTROCARDIOGRAM REPORT: CPT

## 2017-10-25 PROCEDURE — 99285 EMERGENCY DEPT VISIT HI MDM: CPT

## 2017-10-25 PROCEDURE — 71010: CPT | Mod: 26

## 2017-10-25 RX ORDER — FUROSEMIDE 40 MG
40 TABLET ORAL ONCE
Qty: 0 | Refills: 0 | Status: COMPLETED | OUTPATIENT
Start: 2017-10-25 | End: 2017-10-25

## 2017-10-25 RX ORDER — ATORVASTATIN CALCIUM 80 MG/1
10 TABLET, FILM COATED ORAL AT BEDTIME
Qty: 0 | Refills: 0 | Status: DISCONTINUED | OUTPATIENT
Start: 2017-10-25 | End: 2017-10-27

## 2017-10-25 RX ORDER — WARFARIN SODIUM 2.5 MG/1
1 TABLET ORAL DAILY
Qty: 0 | Refills: 0 | Status: DISCONTINUED | OUTPATIENT
Start: 2017-10-26 | End: 2017-10-27

## 2017-10-25 RX ORDER — ALBUTEROL 90 UG/1
2.5 AEROSOL, METERED ORAL EVERY 4 HOURS
Qty: 0 | Refills: 0 | Status: DISCONTINUED | OUTPATIENT
Start: 2017-10-25 | End: 2017-10-27

## 2017-10-25 RX ORDER — IPRATROPIUM/ALBUTEROL SULFATE 18-103MCG
3 AEROSOL WITH ADAPTER (GRAM) INHALATION EVERY 4 HOURS
Qty: 0 | Refills: 0 | Status: DISCONTINUED | OUTPATIENT
Start: 2017-10-25 | End: 2017-10-25

## 2017-10-25 RX ORDER — CARVEDILOL PHOSPHATE 80 MG/1
6.25 CAPSULE, EXTENDED RELEASE ORAL EVERY 12 HOURS
Qty: 0 | Refills: 0 | Status: DISCONTINUED | OUTPATIENT
Start: 2017-10-25 | End: 2017-10-27

## 2017-10-25 RX ORDER — IPRATROPIUM/ALBUTEROL SULFATE 18-103MCG
3 AEROSOL WITH ADAPTER (GRAM) INHALATION EVERY 6 HOURS
Qty: 0 | Refills: 0 | Status: DISCONTINUED | OUTPATIENT
Start: 2017-10-25 | End: 2017-10-25

## 2017-10-25 RX ORDER — FUROSEMIDE 40 MG
40 TABLET ORAL EVERY 12 HOURS
Qty: 0 | Refills: 0 | Status: DISCONTINUED | OUTPATIENT
Start: 2017-10-25 | End: 2017-10-27

## 2017-10-25 RX ORDER — ALBUTEROL 90 UG/1
2.5 AEROSOL, METERED ORAL EVERY 4 HOURS
Qty: 0 | Refills: 0 | Status: DISCONTINUED | OUTPATIENT
Start: 2017-10-25 | End: 2017-10-25

## 2017-10-25 RX ORDER — LEVOTHYROXINE SODIUM 125 MCG
88 TABLET ORAL DAILY
Qty: 0 | Refills: 0 | Status: DISCONTINUED | OUTPATIENT
Start: 2017-10-25 | End: 2017-10-27

## 2017-10-25 RX ORDER — AMIODARONE HYDROCHLORIDE 400 MG/1
200 TABLET ORAL DAILY
Qty: 0 | Refills: 0 | Status: DISCONTINUED | OUTPATIENT
Start: 2017-10-25 | End: 2017-10-27

## 2017-10-25 RX ORDER — ALBUTEROL 90 UG/1
2.5 AEROSOL, METERED ORAL EVERY 6 HOURS
Qty: 0 | Refills: 0 | Status: DISCONTINUED | OUTPATIENT
Start: 2017-10-25 | End: 2017-10-27

## 2017-10-25 RX ORDER — POTASSIUM CHLORIDE 20 MEQ
40 PACKET (EA) ORAL ONCE
Qty: 0 | Refills: 0 | Status: COMPLETED | OUTPATIENT
Start: 2017-10-25 | End: 2017-10-25

## 2017-10-25 RX ORDER — ALBUTEROL 90 UG/1
2.5 AEROSOL, METERED ORAL EVERY 6 HOURS
Qty: 0 | Refills: 0 | Status: DISCONTINUED | OUTPATIENT
Start: 2017-10-25 | End: 2017-10-25

## 2017-10-25 RX ORDER — ASPIRIN/CALCIUM CARB/MAGNESIUM 324 MG
81 TABLET ORAL DAILY
Qty: 0 | Refills: 0 | Status: DISCONTINUED | OUTPATIENT
Start: 2017-10-25 | End: 2017-10-27

## 2017-10-25 RX ORDER — WARFARIN SODIUM 2.5 MG/1
2 TABLET ORAL ONCE
Qty: 0 | Refills: 0 | Status: COMPLETED | OUTPATIENT
Start: 2017-10-25 | End: 2017-10-25

## 2017-10-25 RX ORDER — DIGOXIN 250 MCG
0.06 TABLET ORAL DAILY
Qty: 0 | Refills: 0 | Status: DISCONTINUED | OUTPATIENT
Start: 2017-10-25 | End: 2017-10-27

## 2017-10-25 RX ORDER — POTASSIUM CHLORIDE 20 MEQ
20 PACKET (EA) ORAL ONCE
Qty: 0 | Refills: 0 | Status: COMPLETED | OUTPATIENT
Start: 2017-10-25 | End: 2017-10-25

## 2017-10-25 RX ADMIN — CARVEDILOL PHOSPHATE 6.25 MILLIGRAM(S): 80 CAPSULE, EXTENDED RELEASE ORAL at 17:13

## 2017-10-25 RX ADMIN — WARFARIN SODIUM 2 MILLIGRAM(S): 2.5 TABLET ORAL at 22:00

## 2017-10-25 RX ADMIN — Medication 40 MILLIGRAM(S): at 11:15

## 2017-10-25 RX ADMIN — Medication 40 MILLIEQUIVALENT(S): at 13:44

## 2017-10-25 RX ADMIN — Medication 100 MILLIGRAM(S): at 17:13

## 2017-10-25 RX ADMIN — Medication 81 MILLIGRAM(S): at 17:16

## 2017-10-25 RX ADMIN — Medication 20 MILLIEQUIVALENT(S): at 17:32

## 2017-10-25 RX ADMIN — Medication 0.06 MILLIGRAM(S): at 17:12

## 2017-10-25 RX ADMIN — Medication 40 MILLIGRAM(S): at 17:12

## 2017-10-25 RX ADMIN — Medication 1 TABLET(S): at 17:13

## 2017-10-25 RX ADMIN — ATORVASTATIN CALCIUM 10 MILLIGRAM(S): 80 TABLET, FILM COATED ORAL at 21:55

## 2017-10-25 NOTE — H&P ADULT - NSHPPHYSICALEXAM_GEN_ALL_CORE
VITALS:  T(F): 98.6 (10-25-17 @ 15:17), Max: 98.6 (10-25-17 @ 15:17)  HR: 71 (10-25-17 @ 15:17) (70 - 77)  BP: 117/65 (10-25-17 @ 15:17) (100/54 - 126/63)  RR: 16 (10-25-17 @ 15:17) (12 - 98)  SpO2: 98% (10-25-17 @ 15:17) (96% - 98%)      PHYSICAL EXAM:    HEENT:  pupils equal and reactive, EOMI, no oropharyngeal lesions, erythema, exudates, oral thrush    NECK:   supple, no carotid bruits, no palpable lymph nodes, no thyromegaly    CV:  +S1, +S2, regular, no murmurs or rubs    RESP:   lungs with bilateral rales and rhonchi right > left, no wheezing     BREAST:  not examined    GI:  abdomen soft, non-tender, non-distended, normal BS, no bruits, no abdominal masses, no palpable masses    RECTAL:  not examined    :  not examined    MSK:   normal muscle tone, no atrophy, no rigidity, no contractions    EXT:  1-2+ LE edema bilaterally, no calf pain or calf swelling or palpable cords,     VASCULAR:  pulses equal and symmetric in the upper and lower extremities    NEURO:  AAOX3, no focal neurological deficits, follows all commands, able to move extremities spontaneously    SKIN:  no ulcers, lesions or rashes

## 2017-10-25 NOTE — ED PROVIDER NOTE - CARDIAC, MLM
Normal rate, regular rhythm.  Heart sounds S1, S2.  No murmurs, rubs or gallops.  2+ pitting edema biilateral lower extremities.

## 2017-10-25 NOTE — ED ADULT NURSE NOTE - OBJECTIVE STATEMENT
pt c/o sob and coughing  b/l leg pitting edema, increases sob while walking. pt returned from a cruise on Sunday. no c.o fever . pt c/o sob and coughing  b/l leg pitting edema, increases sob while walking. pt returned from a cruise on Sunday. no c.o fever .pt was seen by her primary doctor po abx started.

## 2017-10-25 NOTE — H&P ADULT - HISTORY OF PRESENT ILLNESS
82 F with Hx of chronic diastolic and systolic CHF (EF 30-30%), HTN, CAD, Atrial Fibrillation on A/C with Coumadin who presents to the ED today with increasing shortness of breath.  The patient [place] and returned home on Saturday.  While on the cruise she was complaint with taking her medications but did start to notice that her weight was going up, her legs started to swell and she developed shortness of breath.  She describes shortness of breath at rest and with exertion.  Since she came back home, she has needed to use up to 4 pillows to sleep since she could not breathe laying flat.  Her usual baseline weight as per her is 101 lbs.  She denies any chest pain or pleuritic chest pain.  No fevers or chills.  She did start to notice a productive cough with green phlegm since arrival home.  She was evaluated in Dr. Dickey's office and was prescribed Doxycycline which she has been taking.  She was then seen in Dr. Marrero's office on Tuesday.  She was advised by the PA that she should go to the hospital but she did not want to.  She was told to start taking 2 tabs of Lasix twice a day because of her increased weight gain and leg swelling.  She describes her  has also been sick with a post-nasal drip.  She reports that she got the flu shot this year.    PAST MEDICAL HISTORY:  HTN  Hashimoto's Hypothyroidism  Atrial Fibrillation  Chronic Systolic and Diastolic CHF (EF 30-35% based on ECHO from 7/2017)  Celiac Disease  CAD    PAST SURGICAL HISTORY:  s/p Bi-V PPM / AICD    FAMILY HISTORY:   non-contributory to the patient's current presentation

## 2017-10-25 NOTE — H&P ADULT - NSHPLABSRESULTS_GEN_ALL_CORE
10.7   7.9   )-----------( 179      ( 25 Oct 2017 10:19 )             32.0     10-25    x   |  x   |  x   ----------------------------<  x   3.0<L>   |  x   |  x     Ca    8.5      25 Oct 2017 10:19  Mg     1.9     10-25    TPro  6.5  /  Alb  2.6<L>  /  TBili  0.7  /  DBili  x   /  AST  313<H>  /  ALT  313<H>  /  AlkPhos  74  10-25    Digoxin level 1.19    CARDIAC MARKERS ( 25 Oct 2017 12:42 )  0.031 ng/mL / x     / x     / x     / x      CARDIAC MARKERS ( 25 Oct 2017 10:19 )  0.028 ng/mL / x     / x     / x     / x        LIVER FUNCTIONS - ( 25 Oct 2017 10:19 )  Alb: 2.6 g/dL / Pro: 6.5 gm/dL / ALK PHOS: 74 U/L / ALT: 313 U/L / AST: 313 U/L / GGT: x           PT/INR - ( 25 Oct 2017 10:19 )   PT: 18.9 sec;   INR: 1.73 ratio      EKG - Vpaced, LBBB, no acute ischemic changes    CXR:  + pacemaker, mild PVC, small right pleural effusion, no significant change compared to CXR from 5/2017

## 2017-10-25 NOTE — ED PROVIDER NOTE - OBJECTIVE STATEMENT
82 F hx of CAD, CHF, VF s/p AICD, atrial fibrillation on Coumadin, presents with CC SOB.  C/o chronic SOB, but worse in the last few days.  C/o SOB, cough with phlegm (with some blood), leg swelling.  SOB worse with lying down and exertion.  Just came from cruise Saturday.  Denies fever, CP, or any other symptoms.  States compliants with medications.

## 2017-10-25 NOTE — H&P ADULT - ASSESSMENT
ACUTE ON CHRONIC SYSTOLIC AND DIASTOLIC CHF EXACERBATION    ELEVATED LIVER TRANSAMINASES POSSIBLY DUE TO PASSIVE CONGESTION FROM CHF    UPPER RESPIRATORY TRACT INFECTION, NO EVIDENCE OF PNA ON CXR    CHRONIC ATRIAL FIBRILLATION, SUBTHERAPEUTIC INR    ACUTE KIDNEY INJURY, BASELINE CREATININE FROM JULY 2017 IS 1.18    HYPOTHYROIDISM    HYPOKALEMIA      PLAN:  -  admit to inpatient, tele, hospitalist  -  d/c PO Lasix, start IV Lasix  -  monitor renal function and electrolytes  -  check cardiac enzymes, r/o MI  -  daily weights, strict Is/Os  -  cardio consult - Dr. Marrero  -  pulm consult - Dr. Dickey  -  check O2 sats qshift  -  will give extra dose of 2mg of Coumadin today, resume 1mg daily in am  -  continue Doxycycline  -  check labs in am  -  check sputum culture  -  Duonebs ATC and PRN  -  hold ACE-I with elevated creatinine  -  DVT prophylaxis - venodynes/Coumadin  -  replete potassium  -  Full Code    d/w patient, ED RN, Dr. Dickey, and Dr. Marshall

## 2017-10-25 NOTE — ED PROVIDER NOTE - MEDICAL DECISION MAKING DETAILS
Pt with signs of fluid overload, from CHF exacerbation.  EKG nonischemic.  CXR without signs of PNA.  Admit to medicine service.  IV Lasix given.

## 2017-10-26 DIAGNOSIS — E03.9 HYPOTHYROIDISM, UNSPECIFIED: ICD-10-CM

## 2017-10-26 DIAGNOSIS — I50.9 HEART FAILURE, UNSPECIFIED: ICD-10-CM

## 2017-10-26 DIAGNOSIS — J20.9 ACUTE BRONCHITIS, UNSPECIFIED: ICD-10-CM

## 2017-10-26 DIAGNOSIS — J44.9 CHRONIC OBSTRUCTIVE PULMONARY DISEASE, UNSPECIFIED: ICD-10-CM

## 2017-10-26 DIAGNOSIS — Z95.0 PRESENCE OF CARDIAC PACEMAKER: ICD-10-CM

## 2017-10-26 DIAGNOSIS — I48.2 CHRONIC ATRIAL FIBRILLATION: ICD-10-CM

## 2017-10-26 DIAGNOSIS — M40.209 UNSPECIFIED KYPHOSIS, SITE UNSPECIFIED: ICD-10-CM

## 2017-10-26 DIAGNOSIS — J06.9 ACUTE UPPER RESPIRATORY INFECTION, UNSPECIFIED: ICD-10-CM

## 2017-10-26 DIAGNOSIS — I10 ESSENTIAL (PRIMARY) HYPERTENSION: ICD-10-CM

## 2017-10-26 LAB
ALBUMIN SERPL ELPH-MCNC: 2.4 G/DL — LOW (ref 3.3–5)
ALP SERPL-CCNC: 66 U/L — SIGNIFICANT CHANGE UP (ref 40–120)
ALT FLD-CCNC: 243 U/L — HIGH (ref 12–78)
ANION GAP SERPL CALC-SCNC: 6 MMOL/L — SIGNIFICANT CHANGE UP (ref 5–17)
APTT BLD: 33 SEC — SIGNIFICANT CHANGE UP (ref 27.5–37.4)
AST SERPL-CCNC: 214 U/L — HIGH (ref 15–37)
BILIRUB DIRECT SERPL-MCNC: 0.3 MG/DL — HIGH (ref 0–0.2)
BILIRUB INDIRECT FLD-MCNC: 0.4 MG/DL — SIGNIFICANT CHANGE UP (ref 0.2–1)
BILIRUB SERPL-MCNC: 0.7 MG/DL — SIGNIFICANT CHANGE UP (ref 0.2–1.2)
BUN SERPL-MCNC: 15 MG/DL — SIGNIFICANT CHANGE UP (ref 7–23)
CALCIUM SERPL-MCNC: 8.3 MG/DL — LOW (ref 8.5–10.1)
CHLORIDE SERPL-SCNC: 97 MMOL/L — SIGNIFICANT CHANGE UP (ref 96–108)
CO2 SERPL-SCNC: 35 MMOL/L — HIGH (ref 22–31)
CREAT SERPL-MCNC: 1.07 MG/DL — SIGNIFICANT CHANGE UP (ref 0.5–1.3)
GLUCOSE SERPL-MCNC: 83 MG/DL — SIGNIFICANT CHANGE UP (ref 70–99)
HCT VFR BLD CALC: 33 % — LOW (ref 34.5–45)
HGB BLD-MCNC: 10.9 G/DL — LOW (ref 11.5–15.5)
INR BLD: 1.95 RATIO — HIGH (ref 0.88–1.16)
MAGNESIUM SERPL-MCNC: 1.8 MG/DL — SIGNIFICANT CHANGE UP (ref 1.6–2.6)
MCHC RBC-ENTMCNC: 31.8 PG — SIGNIFICANT CHANGE UP (ref 27–34)
MCHC RBC-ENTMCNC: 33.1 GM/DL — SIGNIFICANT CHANGE UP (ref 32–36)
MCV RBC AUTO: 96 FL — SIGNIFICANT CHANGE UP (ref 80–100)
PLATELET # BLD AUTO: 201 K/UL — SIGNIFICANT CHANGE UP (ref 150–400)
POTASSIUM SERPL-MCNC: 3.5 MMOL/L — SIGNIFICANT CHANGE UP (ref 3.5–5.3)
POTASSIUM SERPL-SCNC: 3.5 MMOL/L — SIGNIFICANT CHANGE UP (ref 3.5–5.3)
PROT SERPL-MCNC: 6.2 GM/DL — SIGNIFICANT CHANGE UP (ref 6–8.3)
PROTHROM AB SERPL-ACNC: 21.4 SEC — HIGH (ref 9.8–12.7)
RBC # BLD: 3.44 M/UL — LOW (ref 3.8–5.2)
RBC # FLD: 12.7 % — SIGNIFICANT CHANGE UP (ref 10.3–14.5)
SODIUM SERPL-SCNC: 138 MMOL/L — SIGNIFICANT CHANGE UP (ref 135–145)
WBC # BLD: 7.6 K/UL — SIGNIFICANT CHANGE UP (ref 3.8–10.5)
WBC # FLD AUTO: 7.6 K/UL — SIGNIFICANT CHANGE UP (ref 3.8–10.5)

## 2017-10-26 PROCEDURE — 99223 1ST HOSP IP/OBS HIGH 75: CPT

## 2017-10-26 RX ORDER — MAGNESIUM OXIDE 400 MG ORAL TABLET 241.3 MG
400 TABLET ORAL ONCE
Qty: 0 | Refills: 0 | Status: COMPLETED | OUTPATIENT
Start: 2017-10-26 | End: 2017-10-26

## 2017-10-26 RX ORDER — POTASSIUM CHLORIDE 20 MEQ
40 PACKET (EA) ORAL EVERY 4 HOURS
Qty: 0 | Refills: 0 | Status: COMPLETED | OUTPATIENT
Start: 2017-10-26 | End: 2017-10-26

## 2017-10-26 RX ADMIN — Medication 81 MILLIGRAM(S): at 12:10

## 2017-10-26 RX ADMIN — Medication 100 MILLIGRAM(S): at 17:48

## 2017-10-26 RX ADMIN — ALBUTEROL 2.5 MILLIGRAM(S): 90 AEROSOL, METERED ORAL at 13:18

## 2017-10-26 RX ADMIN — ALBUTEROL 2.5 MILLIGRAM(S): 90 AEROSOL, METERED ORAL at 02:30

## 2017-10-26 RX ADMIN — CARVEDILOL PHOSPHATE 6.25 MILLIGRAM(S): 80 CAPSULE, EXTENDED RELEASE ORAL at 05:31

## 2017-10-26 RX ADMIN — AMIODARONE HYDROCHLORIDE 200 MILLIGRAM(S): 400 TABLET ORAL at 05:31

## 2017-10-26 RX ADMIN — Medication 88 MICROGRAM(S): at 05:32

## 2017-10-26 RX ADMIN — MAGNESIUM OXIDE 400 MG ORAL TABLET 400 MILLIGRAM(S): 241.3 TABLET ORAL at 23:26

## 2017-10-26 RX ADMIN — Medication 1 TABLET(S): at 17:45

## 2017-10-26 RX ADMIN — WARFARIN SODIUM 1 MILLIGRAM(S): 2.5 TABLET ORAL at 23:26

## 2017-10-26 RX ADMIN — Medication 40 MILLIGRAM(S): at 17:45

## 2017-10-26 RX ADMIN — Medication 40 MILLIEQUIVALENT(S): at 16:18

## 2017-10-26 RX ADMIN — ALBUTEROL 2.5 MILLIGRAM(S): 90 AEROSOL, METERED ORAL at 19:55

## 2017-10-26 RX ADMIN — Medication 1 TABLET(S): at 05:31

## 2017-10-26 RX ADMIN — ATORVASTATIN CALCIUM 10 MILLIGRAM(S): 80 TABLET, FILM COATED ORAL at 23:26

## 2017-10-26 RX ADMIN — Medication 0.06 MILLIGRAM(S): at 05:30

## 2017-10-26 RX ADMIN — Medication 100 MILLIGRAM(S): at 05:30

## 2017-10-26 RX ADMIN — Medication 600 MILLIGRAM(S): at 17:49

## 2017-10-26 RX ADMIN — CARVEDILOL PHOSPHATE 6.25 MILLIGRAM(S): 80 CAPSULE, EXTENDED RELEASE ORAL at 17:45

## 2017-10-26 RX ADMIN — ALBUTEROL 2.5 MILLIGRAM(S): 90 AEROSOL, METERED ORAL at 07:44

## 2017-10-26 RX ADMIN — Medication 40 MILLIGRAM(S): at 05:32

## 2017-10-26 NOTE — PROVIDER CONTACT NOTE (OTHER) - SITUATION
Dyspnea, URI, pulm congestion  Service aware
Called 's office for consult. spoke to Abigail, office aware.
acute on chronic CHF  Service aware

## 2017-10-26 NOTE — CONSULT NOTE ADULT - ASSESSMENT
SOB and pedal edema-  Acute on chronic decompensated HFpEF and HFrEF-   hypervolemic still  Will continue diuresis with lasix iv BID for atleast 2 more doses.  Potassium and magnesium supplementation recommmended.  Diuresis with close monitoring of the renal function and electrolytes.  Goal potassium of 4 and magnesium of 2.   Strict I/O and daily wt checks. Low sodium diet. Nutrition education.     HTN- continue current meds.    Hyperlipidemia- continue statin.  Other medical issues- Management per primary team.   Thank you for allowing me to participate in the care of this patient. Please feel free to contact me with any questions.
Acute/Chronic Sys and Diast CHF exac.  Improving on IV lasix diuresis.  For cardiology consult.  URI/Ac bronchitis. Doxycycline p.o. x 4 more days.  continue nebs, O2.  (no evidence of PNA: pt is afebrile, no elev WBC, and no infiltrate on CXR).  Mild COPD.  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  Chronic AF. Coumadin.  Hypothyroidism.

## 2017-10-26 NOTE — CONSULT NOTE ADULT - SUBJECTIVE AND OBJECTIVE BOX
Patient is a 82y old  Female who presents with a chief complaint of shortness of breath.  HPI:  82 F with Hx of chronic diastolic and systolic CHF (EF 30-30%), HTN, CAD, Atrial Fibrillation on A/C with Coumadin who presents to the ED today with increasing shortness of breath.  The patient [place] and returned home on Saturday.  While on the cruise she was complaint with taking her medications but did start to notice that her weight was going up, her legs started to swell and she developed shortness of breath.  She describes shortness of breath at rest and with exertion.  Since she came back home, she has needed to use up to 4 pillows to sleep since she could not breathe laying flat.  Her usual baseline weight as per her is 101 lbs.  She denies any chest pain or pleuritic chest pain.  No fevers or chills.  She did start to notice a productive cough with green phlegm since arrival home.  She was evaluated in Dr. Dickey's office and was prescribed Doxycycline which she has been taking.  She was then seen in Dr. Marrero's office on Tuesday.  She was advised by the PA that she should go to the hospital but she did not want to.  She was told to start taking 2 tabs of Lasix twice a day because of her increased weight gain and leg swelling.  She describes her  has also been sick with a post-nasal drip.  She reports that she got the flu shot this year.  She notes improvement in her SOB and pedal edema since presentation.   PAST MEDICAL HISTORY:  HTN  Hashimoto's Hypothyroidism  Atrial Fibrillation  Chronic Systolic and Diastolic CHF (EF 30-35% based on ECHO from 2017)  Celiac Disease  CAD    PAST SURGICAL HISTORY:  s/p Bi-V PPM / AICD    FAMILY HISTORY:   non-contributory to the patient's current presentation (25 Oct 2017 16:16)      PAST MEDICAL & SURGICAL HISTORY:  HTN (hypertension)  Hypothyroid  Chronic atrial fibrillation  Pacemaker  Artificial pacemaker      MEDICATIONS  (STANDING):  ALBUTerol    0.083% 2.5 milliGRAM(s) Nebulizer every 6 hours  amiodarone    Tablet 200 milliGRAM(s) Oral daily  aspirin enteric coated 81 milliGRAM(s) Oral daily  atorvastatin 10 milliGRAM(s) Oral at bedtime  calcium carbonate  625 mG + Vitamin D (OsCal 250 + D) 1 Tablet(s) Oral two times a day  carvedilol 6.25 milliGRAM(s) Oral every 12 hours  digoxin     Tablet 0.0625 milliGRAM(s) Oral daily  doxycycline hyclate Capsule 100 milliGRAM(s) Oral every 12 hours  furosemide   Injectable 40 milliGRAM(s) IV Push every 12 hours  levothyroxine 88 MICROGram(s) Oral daily  magnesium oxide 400 milliGRAM(s) Oral once  potassium chloride   Powder 40 milliEquivalent(s) Oral every 4 hours  warfarin 1 milliGRAM(s) Oral daily    MEDICATIONS  (PRN):  ALBUTerol    0.083% 2.5 milliGRAM(s) Nebulizer every 4 hours PRN Respiratory Distress  guaiFENesin  milliGRAM(s) Oral every 12 hours PRN Cough      FAMILY HISTORY:  No pertinent family history in first degree relatives      SOCIAL HISTORY:  non smoker, no alcohol use     REVIEW OF SYSTEMS:  CONSTITUTIONAL:  No night sweats.  No fatigue, malaise, lethargy.  No fever or chills.  HEENT:  Eyes:  No visual changes.  No eye pain.      ENT:  No runny nose.  No epistaxis.  No sinus pain.  No sore throat.  No odynophagia.  No ear pain.  No congestion.  RESPIRATORY:  No cough.  No wheeze.  No hemoptysis.  c/o shortness of breath.  CARDIOVASCULAR:  No chest pains.  No palpitations. c/o shortness of breath, No orthopnea or PND. c/o pedal edema b/l   GASTROINTESTINAL:  No abdominal pain.  No nausea or vomiting.  No diarrhea or constipation.  No hematemesis.  No hematochezia.  No melena.  GENITOURINARY:  No urgency.  No frequency.  No dysuria.  No hematuria.  No obstructive symptoms.  No discharge.  No pain.  No significant abnormal bleeding.  MUSCULOSKELETAL:  No musculoskeletal pain.  No joint swelling.  No arthritis.  NEUROLOGICAL:  No tingling or numbness or weakness.  PSYCHIATRIC:  No confusion  SKIN:  No rashes.  No lesions.  No wounds.  ENDOCRINE:  No unexplained weight loss.  No polydipsia.  No polyuria.  No polyphagia.  HEMATOLOGIC:  No anemia.  No purpura.  No petechiae.  No prolonged or excessive bleeding.   ALLERGIC AND IMMUNOLOGIC:  No pruritus.  No swelling.         Vital Signs Last 24 Hrs  T(C): 36.8 (26 Oct 2017 10:00), Max: 36.9 (25 Oct 2017 17:16)  T(F): 98.2 (26 Oct 2017 10:00), Max: 98.5 (25 Oct 2017 17:16)  HR: 70 (26 Oct 2017 13:18) (70 - 82)  BP: 92/44 (26 Oct 2017 10:00) (92/44 - 118/64)  BP(mean): 54 (26 Oct 2017 10:00) (54 - 54)  RR: 18 (26 Oct 2017 10:00) (16 - 18)  SpO2: 99% (26 Oct 2017 10:00) (98% - 99%)    PHYSICAL EXAM-    Constitutional: The patient appears to be normal, well developed, well nourished and alert and oriented to time, place and person. The patient does not appear acutely ill.     Head: Head is normocephalic and atraumatic.      Neck: The patient's neck is supple without enlargement, has no palpable thyromegaly nor thyroid nodules and has no jugular venous distention. No audible carotid bruits. There are strong carotid pulses bilaterally. No JVD.     Cardiovascular: Regular rate and rhythm without S3, S4. No murmurs or rubs are appreciated.      Respiratory: crackles b/l     Abdomen: Soft, nontender, nondistended with positive bowel sounds.      Extremity: No tenderness. trace  pitting edema b/l,  No skin discoloration No clubbing No cyanosis.     Neurologic: The patient is alert and oriented.      Skin: No rash, no obvious lesions noted.      Psychiatric: The patient appears to be emotionally stable.      INTERPRETATION OF TELEMETRY: V paced rythm    ECG:V paced [facility]    I&O's Detail      LABS:                        10.9   7.6   )-----------( 201      ( 26 Oct 2017 05:53 )             33.0     10-26    138  |  97  |  15  ----------------------------<  83  3.5   |  35<H>  |  1.07    Ca    8.3<L>      26 Oct 2017 05:53  Mg     1.8     10-26    TPro  6.2  /  Alb  2.4<L>  /  TBili  0.7  /  DBili  0.3<H>  /  AST  214<H>  /  ALT  243<H>  /  AlkPhos  66  10-26    CARDIAC MARKERS ( 25 Oct 2017 12:42 )  0.031 ng/mL / x     / x     / x     / x      CARDIAC MARKERS ( 25 Oct 2017 10:19 )  0.028 ng/mL / x     / x     / x     / x          PT/INR - ( 26 Oct 2017 05:53 )   PT: 21.4 sec;   INR: 1.95 ratio         PTT - ( 26 Oct 2017 05:53 )  PTT:33.0 sec  Urinalysis Basic - ( 25 Oct 2017 22:30 )    Color: Yellow / Appearance: Clear / S.005 / pH: x  Gluc: x / Ketone: Negative  / Bili: Negative / Urobili: Negative mg/dL   Blood: x / Protein: Negative mg/dL / Nitrite: Negative   Leuk Esterase: Negative / RBC: 3-5 /HPF / WBC 0-2   Sq Epi: x / Non Sq Epi: Occasional / Bacteria: Occasional      I&O's Summary    BNP  RADIOLOGY & ADDITIONAL STUDIES:  < from: Xray Chest 1 View AP/PA. (10.25. @ 11:18) >    EXAM:  CHEST SINGLE VIEW FRONTAL                            PROCEDURE DATE:  10/25/2017          INTERPRETATION:  Exam Date: 10/25/2017 11:18 AM    Chest radiograph (one view)    CLINICAL INFORMATION: SOB, hx of CHF    TECHNIQUE:  Single frontal view of the chest was obtained.    COMPARISON: May 8, 2017    FINDINGS/  IMPRESSION:      The lungs are clear.  No pleural abnormality is seen.    Right chest wall pacemaker in place.  Cardiomegaly present.                UMER ALTMAN M.D., ATTENDING RADIOLOGIST  This document has been electronically signed. Oct 25 2017  1:39PM        < end of copied text >
HPI:  82 F with Hx of chronic diastolic and systolic CHF (EF 30-30%), HTN, CAD, Atrial Fibrillation on A/C with Coumadin who presents to the ED today with increasing shortness of breath.  The patient [facility] [place]  [facility] and returned home on Saturday.  While on the cruise she was complaint with taking her medications but did start to notice that her weight was going up, her legs started to swell and she developed shortness of breath.  She describes shortness of breath at rest and with exertion.  Since she came back home, she has needed to use up to 4 pillows to sleep since she could not breathe laying flat.  Her usual baseline weight as per her is 101 lbs.  She denies any chest pain or pleuritic chest pain.  No fevers or chills.  She did start to notice a productive cough with green phlegm since arrival home.  She was evaluated in Dr. Dickey's office and was prescribed Doxycycline which she has been taking.  She was then seen in Dr. Marrero's office on Tuesday.  She was advised by the PA that she should go to the hospital but she did not want to.  She was told to start taking 2 tabs of Lasix twice a day because of her increased weight gain and leg swelling.  She describes her  has also been sick with a post-nasal drip.  She reports that she got the flu shot this year.    10/26:  SOB and orthopnea are improving on diuretic rx.  Pt is ex cig smoker x 5 pk-yrs, quit in .  Has kyphosis.  Mild obstructive lung disease and mild restriction on PFT's.  No h.o. asthma.  Not on inhaled meds at home.  Has had cough for 5 days, notes especially at h.s. now, sputum has resolved.  no wheezing, is afebrile.  has been on p.o. doxycycline x 3 days for URI/ac bronchitis.   CXR no infiltrate, cardiomegaly, increased vasculature.     PAST MEDICAL HISTORY:  HTN  Hashimoto's Hypothyroidism  Atrial Fibrillation  Chronic Systolic and Diastolic CHF (EF 30-35% based on ECHO from 2017)  Celiac Disease  CAD    PAST SURGICAL HISTORY:  s/p Bi-V PPM / AICD    FAMILY HISTORY:   non-contributory to the patient's current presentation (25 Oct 2017 16:16)      PAST MEDICAL & SURGICAL HISTORY:  HTN (hypertension)  Hypothyroid  Chronic atrial fibrillation  Pacemaker  Artificial pacemaker      MEDICATIONS  (STANDING):  ALBUTerol    0.083% 2.5 milliGRAM(s) Nebulizer every 6 hours  amiodarone    Tablet 200 milliGRAM(s) Oral daily  aspirin enteric coated 81 milliGRAM(s) Oral daily  atorvastatin 10 milliGRAM(s) Oral at bedtime  calcium carbonate  625 mG + Vitamin D (OsCal 250 + D) 1 Tablet(s) Oral two times a day  carvedilol 6.25 milliGRAM(s) Oral every 12 hours  digoxin     Tablet 0.0625 milliGRAM(s) Oral daily  doxycycline hyclate Capsule 100 milliGRAM(s) Oral every 12 hours  furosemide   Injectable 40 milliGRAM(s) IV Push every 12 hours  levothyroxine 88 MICROGram(s) Oral daily  warfarin 1 milliGRAM(s) Oral daily    MEDICATIONS  (PRN):  ALBUTerol    0.083% 2.5 milliGRAM(s) Nebulizer every 4 hours PRN Respiratory Distress      Allergies    adhesives (Rash)  Ancef (Unknown)  lidocaine (Rash; Angioedema)    Intolerances        SOCIAL HISTORY: Denies tobacco, etoh abuse or illicit drug use    FAMILY HISTORY:  No pertinent family history in first degree relatives      Vital Signs Last 24 Hrs  T(C): 36.8 (26 Oct 2017 05:22), Max: 37 (25 Oct 2017 15:17)  T(F): 98.2 (26 Oct 2017 05:22), Max: 98.6 (25 Oct 2017 15:17)  HR: 71 (26 Oct 2017 07:45) (70 - 80)  BP: 111/61 (26 Oct 2017 05:22) (100/54 - 126/63)  BP(mean): --  RR: 18 (26 Oct 2017 05:22) (12 - 98)  SpO2: 99% (26 Oct 2017 05:22) (96% - 99%)    REVIEW OF SYSTEMS:    CONSTITUTIONAL:  As per HPI.  HEENT:  Eyes:  No diplopia or blurred vision. ENT:  No earache, sore throat or runny nose.  CARDIOVASCULAR:  see above  RESPIRATORY:  see above  GASTROINTESTINAL:  No nausea, vomiting or diarrhea.  GENITOURINARY:  No dysuria, frequency or urgency.  MUSCULOSKELETAL:  As per HPI.  SKIN:  No change in skin, hair or nails.  NEUROLOGIC:  No paresthesias, fasciculations, seizures or weakness.  PSYCHIATRIC:  No disorder of thought or mood.  ENDOCRINE:  No heat or cold intolerance, polyuria or polydipsia.  HEMATOLOGICAL:  No easy bruising or bleedings:  .     PHYSICAL EXAMINATION:    GENERAL APPEARANCE:  Pt. is not currently dyspneic, in no distress. Pt. is alert, oriented, and pleasant.  HEENT:  Pupils are normal and react normally. No icterus. Mucous membranes well colored.  NECK:  Supple. No lymphadenopathy. Jugular venous pressure not elevated. Carotids equal.   HEART:   HR 84.  CHEST:  bilat rhonchi, no wheezes, rare bibasilar crackles  ABDOMEN:  Soft and nontender.   EXTREMITIES:  Trace LE edema  SKIN:  No rash or significant lesions are noted.  Neuro: Alert, awake, and O x 3.      LABS:                        10.9   7.6   )-----------( 201      ( 26 Oct 2017 05:53 )             33.0     10-26    138  |  97  |  15  ----------------------------<  83  3.5   |  35<H>  |  1.07    Ca    8.3<L>      26 Oct 2017 05:53  Mg     1.8     10-26    TPro  6.2  /  Alb  2.4<L>  /  TBili  0.7  /  DBili  0.3<H>  /  AST  214<H>  /  ALT  243<H>  /  AlkPhos  66  10-26    LIVER FUNCTIONS - ( 26 Oct 2017 05:53 )  Alb: 2.4 g/dL / Pro: 6.2 gm/dL / ALK PHOS: 66 U/L / ALT: 243 U/L / AST: 214 U/L / GGT: x           PT/INR - ( 26 Oct 2017 05:53 )   PT: 21.4 sec;   INR: 1.95 ratio         PTT - ( 26 Oct 2017 05:53 )  PTT:33.0 sec  CARDIAC MARKERS ( 25 Oct 2017 12:42 )  0.031 ng/mL / x     / x     / x     / x      CARDIAC MARKERS ( 25 Oct 2017 10:19 )  0.028 ng/mL / x     / x     / x     / x          Urinalysis Basic - ( 25 Oct 2017 22:30 )    Color: Yellow / Appearance: Clear / S.005 / pH: x  Gluc: x / Ketone: Negative  / Bili: Negative / Urobili: Negative mg/dL   Blood: x / Protein: Negative mg/dL / Nitrite: Negative   Leuk Esterase: Negative / RBC: 3-5 /HPF / WBC 0-2   Sq Epi: x / Non Sq Epi: Occasional / Bacteria: Occasional          RADIOLOGY & ADDITIONAL STUDIES:

## 2017-10-27 ENCOUNTER — TRANSCRIPTION ENCOUNTER (OUTPATIENT)
Age: 82
End: 2017-10-27

## 2017-10-27 VITALS — WEIGHT: 258.6 LBS

## 2017-10-27 LAB
ANION GAP SERPL CALC-SCNC: 9 MMOL/L — SIGNIFICANT CHANGE UP (ref 5–17)
BUN SERPL-MCNC: 14 MG/DL — SIGNIFICANT CHANGE UP (ref 7–23)
CALCIUM SERPL-MCNC: 8.5 MG/DL — SIGNIFICANT CHANGE UP (ref 8.5–10.1)
CHLORIDE SERPL-SCNC: 93 MMOL/L — LOW (ref 96–108)
CO2 SERPL-SCNC: 33 MMOL/L — HIGH (ref 22–31)
CREAT SERPL-MCNC: 0.98 MG/DL — SIGNIFICANT CHANGE UP (ref 0.5–1.3)
GLUCOSE SERPL-MCNC: 106 MG/DL — HIGH (ref 70–99)
HCT VFR BLD CALC: 35.4 % — SIGNIFICANT CHANGE UP (ref 34.5–45)
HGB BLD-MCNC: 11.6 G/DL — SIGNIFICANT CHANGE UP (ref 11.5–15.5)
INR BLD: 2.77 RATIO — HIGH (ref 0.88–1.16)
MCHC RBC-ENTMCNC: 31.9 PG — SIGNIFICANT CHANGE UP (ref 27–34)
MCHC RBC-ENTMCNC: 32.7 GM/DL — SIGNIFICANT CHANGE UP (ref 32–36)
MCV RBC AUTO: 97.5 FL — SIGNIFICANT CHANGE UP (ref 80–100)
PLATELET # BLD AUTO: 185 K/UL — SIGNIFICANT CHANGE UP (ref 150–400)
POTASSIUM SERPL-MCNC: 2.9 MMOL/L — CRITICAL LOW (ref 3.5–5.3)
POTASSIUM SERPL-SCNC: 2.9 MMOL/L — CRITICAL LOW (ref 3.5–5.3)
PROTHROM AB SERPL-ACNC: 30.6 SEC — HIGH (ref 9.8–12.7)
RBC # BLD: 3.64 M/UL — LOW (ref 3.8–5.2)
RBC # FLD: 13.4 % — SIGNIFICANT CHANGE UP (ref 10.3–14.5)
SODIUM SERPL-SCNC: 135 MMOL/L — SIGNIFICANT CHANGE UP (ref 135–145)
WBC # BLD: 8.7 K/UL — SIGNIFICANT CHANGE UP (ref 3.8–10.5)
WBC # FLD AUTO: 8.7 K/UL — SIGNIFICANT CHANGE UP (ref 3.8–10.5)

## 2017-10-27 PROCEDURE — 99233 SBSQ HOSP IP/OBS HIGH 50: CPT

## 2017-10-27 RX ORDER — POTASSIUM CHLORIDE 20 MEQ
40 PACKET (EA) ORAL
Qty: 0 | Refills: 0 | Status: COMPLETED | OUTPATIENT
Start: 2017-10-27 | End: 2017-10-27

## 2017-10-27 RX ORDER — POTASSIUM CHLORIDE 20 MEQ
10 PACKET (EA) ORAL
Qty: 0 | Refills: 0 | Status: COMPLETED | OUTPATIENT
Start: 2017-10-27 | End: 2017-10-27

## 2017-10-27 RX ADMIN — ALBUTEROL 2.5 MILLIGRAM(S): 90 AEROSOL, METERED ORAL at 08:53

## 2017-10-27 RX ADMIN — Medication 600 MILLIGRAM(S): at 05:57

## 2017-10-27 RX ADMIN — Medication 100 MILLIGRAM(S): at 05:57

## 2017-10-27 RX ADMIN — Medication 100 MILLIEQUIVALENT(S): at 09:32

## 2017-10-27 RX ADMIN — Medication 40 MILLIGRAM(S): at 05:57

## 2017-10-27 RX ADMIN — Medication 0.06 MILLIGRAM(S): at 05:57

## 2017-10-27 RX ADMIN — Medication 81 MILLIGRAM(S): at 12:02

## 2017-10-27 RX ADMIN — CARVEDILOL PHOSPHATE 6.25 MILLIGRAM(S): 80 CAPSULE, EXTENDED RELEASE ORAL at 05:58

## 2017-10-27 RX ADMIN — Medication 100 MILLIEQUIVALENT(S): at 12:00

## 2017-10-27 RX ADMIN — Medication 40 MILLIEQUIVALENT(S): at 10:42

## 2017-10-27 RX ADMIN — Medication 1 TABLET(S): at 06:05

## 2017-10-27 RX ADMIN — Medication 100 MILLIEQUIVALENT(S): at 11:32

## 2017-10-27 RX ADMIN — AMIODARONE HYDROCHLORIDE 200 MILLIGRAM(S): 400 TABLET ORAL at 05:58

## 2017-10-27 RX ADMIN — Medication 88 MICROGRAM(S): at 05:57

## 2017-10-27 RX ADMIN — ALBUTEROL 2.5 MILLIGRAM(S): 90 AEROSOL, METERED ORAL at 13:43

## 2017-10-27 RX ADMIN — Medication 40 MILLIEQUIVALENT(S): at 07:59

## 2017-10-27 NOTE — PHYSICAL THERAPY INITIAL EVALUATION ADULT - PERTINENT HX OF CURRENT PROBLEM, REHAB EVAL
Pt presented to ED with shortness of breath, Dyspnea on exertion, LE edema, and cough after being on 1 week cruise.

## 2017-10-27 NOTE — DISCHARGE NOTE ADULT - CARE PLAN
Principal Discharge DX:	CHF exacerbation  Goal:	To continue home medication regimen  Instructions for follow-up, activity and diet:	- Please follow up with Dr. Marrero in 1-2 weeks  - Please follow up with Dr. Dickey in 1-2 weeks, please have liver enzyme tests repeated (last AST/ALT = 214/243)  - Please follow a low salt diet at home with strict fluid intake  - Please take Doxycycline tablets as directed  - Please follow up with your INR check. Principal Discharge DX:	CHF exacerbation  Goal:	To continue home medication regimen  Instructions for follow-up, activity and diet:	- Please follow up with Dr. Marrero in 1-2 weeks  - Please follow up with Dr. Dickey in 1-2 weeks, please have liver enzyme tests repeated (last AST/ALT = 214/243)  - Please follow a low salt diet at home with strict fluid intake  - Please take Doxycycline tablets as directed until Thursday 11/2.  - Please follow up with your INR check.

## 2017-10-27 NOTE — DISCHARGE NOTE ADULT - PATIENT PORTAL LINK FT
“You can access the Follow[facility] Patient Portal, offered by [facility], by registering with the following website: [email or web address]”

## 2017-10-27 NOTE — DISCHARGE NOTE ADULT - CARE PROVIDER_API CALL
Maritza Marrero (RILEY), Cardiovascular Disease; Critical Care Medicine; Internal Medicine; Interventional Cardiology  [address] [place] [address]  Phone: [phone]  Fax: [phone]    Lenny Dickey), Internal Medicine; Pulmonary Disease  [address] [place] [address]  Phone: [phone]  Fax: [phone]

## 2017-10-27 NOTE — PHYSICAL THERAPY INITIAL EVALUATION ADULT - GENERAL OBSERVATIONS, REHAB EVAL
Pt received sitting in bedside chair, AAOx4, pleasant, denies pain, states she is less shortness of breath and coughing less.

## 2017-10-27 NOTE — DISCHARGE NOTE ADULT - MEDICATION SUMMARY - MEDICATIONS TO TAKE
I will START or STAY ON the medications listed below when I get home from the hospital:    aspirin 81 mg oral delayed release tablet  -- 1 tab(s) by mouth once a day  -- Indication: For for your heart    Vasotec 2.5 mg oral tablet  -- 1 tab(s) by mouth once a day  -- Indication: For for your heart    digoxin 125 mcg (0.125 mg) oral tablet  -- 0.5 tab(s) by mouth once a day  -- Indication: For for your heart    amiodarone 200 mg oral tablet  -- 1 tab(s) by mouth once a day  -- Indication: For for your heart    warfarin 1 mg oral tablet  -- 1 tab(s) by mouth once a day  -- Indication: For for a-fib    atorvastatin 10 mg oral tablet  -- 1 tab(s) by mouth once a day (at bedtime)  -- Indication: For for your cholesterol    doxycycline hyclate 100 mg oral capsule  -- 1 cap(s) by mouth 2 times a day  -- Indication: For Antibiotic    carvedilol 6.25 mg oral tablet  -- 1 tab(s) by mouth 2 times a day  -- Indication: For for your heart    furosemide 20 mg oral tablet  -- 1 tab(s) by mouth once a day  -- Indication: For diuretic    levothyroxine 88 mcg (0.088 mg) oral tablet  -- 1 tab(s) by mouth once a day  -- Indication: For for your thyroid    Calcium 600+D  -- 1 cap(s) by mouth 2 times a day  -- Indication: For Calcium I will START or STAY ON the medications listed below when I get home from the hospital:    aspirin 81 mg oral delayed release tablet  -- 1 tab(s) by mouth once a day  -- Indication: For for your heart    Vasotec 2.5 mg oral tablet  -- 1 tab(s) by mouth once a day  -- Indication: For for your heart    digoxin 125 mcg (0.125 mg) oral tablet  -- 0.5 tab(s) by mouth once a day  -- Indication: For for your heart    amiodarone 200 mg oral tablet  -- 1 tab(s) by mouth once a day  -- Indication: For for your heart    warfarin 1 mg oral tablet  -- 1 tab(s) by mouth once a day  -- Indication: For for afib    atorvastatin 10 mg oral tablet  -- 1 tab(s) by mouth once a day (at bedtime)  -- Indication: For for your cholesterol    doxycycline hyclate 100 mg oral capsule  -- 1 cap(s) by mouth 2 times a day  -- Indication: For Complete dose as directed - continue intul 11/2    carvedilol 6.25 mg oral tablet  -- 1 tab(s) by mouth 2 times a day  -- Indication: For for your heart    furosemide 20 mg oral tablet  -- 1 tab(s) by mouth once a day  -- Indication: For diuretic    levothyroxine 88 mcg (0.088 mg) oral tablet  -- 1 tab(s) by mouth once a day  -- Indication: For for your thyroid    Calcium 600+D  -- 1 cap(s) by mouth 2 times a day  -- Indication: For Calcium

## 2017-10-27 NOTE — PROGRESS NOTE ADULT - ASSESSMENT
Acute/Chronic Sys and Diast CHF exac. On IV lasix.  For cardiology consult.  URI/Ac bronchitis. Improved.  Doxycycline p.o. x 3 more days.  continue nebs, O2.    Mild COPD.  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  Chronic AF. On coumadin.  Hypothyroidism.

## 2017-10-27 NOTE — PROGRESS NOTE ADULT - SUBJECTIVE AND OBJECTIVE BOX
HPI:  82 F with Hx of chronic diastolic and systolic CHF (EF 30-30%), HTN, CAD, Atrial Fibrillation on A/C with Coumadin who presents to the ED today with increasing shortness of breath.  [place]  [facility] [place] [facility] and returned home on Saturday.  While on the cruise she was complaint with taking her medications but did start to notice that her weight was going up, her legs started to swell and she developed shortness of breath.  She describes shortness of breath at rest and with exertion.  Since she came back home, she has needed to use up to 4 pillows to sleep since she could not breathe laying flat.  Her usual baseline weight as per her is 101 lbs.  She denies any chest pain or pleuritic chest pain.  No fevers or chills.  She did start to notice a productive cough with green phlegm since arrival home.  She was evaluated in Dr. Dickey's office and was prescribed Doxycycline which she has been taking.  She was then seen in Dr. Marrero's office on Tuesday.  She was advised by the PA that she should go to the hospital but she did not want to.  She was told to start taking 2 tabs of Lasix twice a day because of her increased weight gain and leg swelling.  She describes her  has also been sick with a post-nasal drip.  She reports that she got the flu shot this year.    10/26:  SOB and orthopnea are improving on diuretic rx.  Pt is ex cig smoker x 5 pk-yrs, quit in .  Has kyphosis.  Mild obstructive lung disease and mild restriction on PFT's.  No h.o. asthma.  Not on inhaled meds at home.  Has had cough for 5 days, notes especially at h.s. now, sputum has resolved.  no wheezing, is afebrile.  has been on p.o. doxycycline x 3 days for URI/ac bronchitis.   CXR no infiltrate, cardiomegaly, increased vasculature.     10/27: afebrile, comfortable no distress.  cough and sputum resovled.     PAST MEDICAL HISTORY:  HTN  Hashimoto's Hypothyroidism  Atrial Fibrillation  Chronic Systolic and Diastolic CHF (EF 30-35% based on ECHO from 2017)  Celiac Disease  CAD    PAST SURGICAL HISTORY:  s/p Bi-V PPM / AICD    FAMILY HISTORY:   non-contributory to the patient's current presentation (25 Oct 2017 16:16)      PAST MEDICAL & SURGICAL HISTORY:  HTN (hypertension)  Hypothyroid  Chronic atrial fibrillation  Pacemaker  Artificial pacemaker      MEDICATIONS  (STANDING):  ALBUTerol    0.083% 2.5 milliGRAM(s) Nebulizer every 6 hours  amiodarone    Tablet 200 milliGRAM(s) Oral daily  aspirin enteric coated 81 milliGRAM(s) Oral daily  atorvastatin 10 milliGRAM(s) Oral at bedtime  calcium carbonate  625 mG + Vitamin D (OsCal 250 + D) 1 Tablet(s) Oral two times a day  carvedilol 6.25 milliGRAM(s) Oral every 12 hours  digoxin     Tablet 0.0625 milliGRAM(s) Oral daily  doxycycline hyclate Capsule 100 milliGRAM(s) Oral every 12 hours  furosemide   Injectable 40 milliGRAM(s) IV Push every 12 hours  levothyroxine 88 MICROGram(s) Oral daily  warfarin 1 milliGRAM(s) Oral daily    MEDICATIONS  (PRN):  ALBUTerol    0.083% 2.5 milliGRAM(s) Nebulizer every 4 hours PRN Respiratory Distress      Allergies    adhesives (Rash)  Ancef (Unknown)  lidocaine (Rash; Angioedema)    Intolerances        SOCIAL HISTORY: Denies tobacco, etoh abuse or illicit drug use    FAMILY HISTORY:  No pertinent family history in first degree relatives      Vital Signs Last 24 Hrs  T(C): 36.8 (26 Oct 2017 05:22), Max: 37 (25 Oct 2017 15:17)  T(F): 98.2 (26 Oct 2017 05:22), Max: 98.6 (25 Oct 2017 15:17)  HR: 71 (26 Oct 2017 07:45) (70 - 80)  BP: 111/61 (26 Oct 2017 05:22) (100/54 - 126/63)  BP(mean): --  RR: 18 (26 Oct 2017 05:22) (12 - 98)  SpO2: 99% (26 Oct 2017 05:22) (96% - 99%)    REVIEW OF SYSTEMS:    CONSTITUTIONAL:  As per HPI.  HEENT:  Eyes:  No diplopia or blurred vision. ENT:  No earache, sore throat or runny nose.  CARDIOVASCULAR:  see above  RESPIRATORY:  see above  GASTROINTESTINAL:  No nausea, vomiting or diarrhea.  GENITOURINARY:  No dysuria, frequency or urgency.  MUSCULOSKELETAL:  As per HPI.  SKIN:  No change in skin, hair or nails.  NEUROLOGIC:  No paresthesias, fasciculations, seizures or weakness.  PSYCHIATRIC:  No disorder of thought or mood.  ENDOCRINE:  No heat or cold intolerance, polyuria or polydipsia.  HEMATOLOGICAL:  No easy bruising or bleedings:  .     PHYSICAL EXAMINATION:    GENERAL APPEARANCE:  Pt. is not currently dyspneic, in no distress. Pt. is alert, oriented, and pleasant.  HEENT:  Pupils are normal and react normally. No icterus. Mucous membranes well colored.  NECK:  Supple. No lymphadenopathy. Jugular venous pressure not elevated. Carotids equal.   HEART:   HR 76.  CHEST:  bilat crackles about 1/2 up posteriorly.   ABDOMEN:  Soft and nontender.   EXTREMITIES:  0-tr LE edema  SKIN:  No rash or significant lesions are noted.  Neuro: Alert, awake, and O x 3.      LABS:                        10.9   7.6   )-----------( 201      ( 26 Oct 2017 05:53 )             33.0     10-26    138  |  97  |  15  ----------------------------<  83  3.5   |  35<H>  |  1.07    Ca    8.3<L>      26 Oct 2017 05:53  Mg     1.8     10-26    TPro  6.2  /  Alb  2.4<L>  /  TBili  0.7  /  DBili  0.3<H>  /  AST  214<H>  /  ALT  243<H>  /  AlkPhos  66  10    LIVER FUNCTIONS - ( 26 Oct 2017 05:53 )  Alb: 2.4 g/dL / Pro: 6.2 gm/dL / ALK PHOS: 66 U/L / ALT: 243 U/L / AST: 214 U/L / GGT: x           PT/INR - ( 26 Oct 2017 05:53 )   PT: 21.4 sec;   INR: 1.95 ratio         PTT - ( 26 Oct 2017 05:53 )  PTT:33.0 sec  CARDIAC MARKERS ( 25 Oct 2017 12:42 )  0.031 ng/mL / x     / x     / x     / x      CARDIAC MARKERS ( 25 Oct 2017 10:19 )  0.028 ng/mL / x     / x     / x     / x          Urinalysis Basic - ( 25 Oct 2017 22:30 )    Color: Yellow / Appearance: Clear / S.005 / pH: x  Gluc: x / Ketone: Negative  / Bili: Negative / Urobili: Negative mg/dL   Blood: x / Protein: Negative mg/dL / Nitrite: Negative   Leuk Esterase: Negative / RBC: 3-5 /HPF / WBC 0-2   Sq Epi: x / Non Sq Epi: Occasional / Bacteria: Occasional          RADIOLOGY & ADDITIONAL STUDIES:

## 2017-10-27 NOTE — DISCHARGE NOTE ADULT - PLAN OF CARE
To continue home medication regimen - Please follow up with Dr. Marrero in 1-2 weeks  - Please follow up with Dr. Dickey in 1-2 weeks, please have liver enzyme tests repeated (last AST/ALT = 214/243)  - Please follow a low salt diet at home with strict fluid intake  - Please take Doxycycline tablets as directed  - Please follow up with your INR check. - Please follow up with Dr. Marrero in 1-2 weeks  - Please follow up with Dr. Dickey in 1-2 weeks, please have liver enzyme tests repeated (last AST/ALT = 214/243)  - Please follow a low salt diet at home with strict fluid intake  - Please take Doxycycline tablets as directed until Thursday 11/2.  - Please follow up with your INR check.

## 2017-10-27 NOTE — DISCHARGE NOTE ADULT - HOSPITAL COURSE
83 y/o F with Hx of chronic diastolic and systolic CHF (EF 30-30%), HTN, CAD, Atrial Fibrillation on A/C with Coumadin who presents to the ED today with increasing shortness of breath.  The [place] home on Saturday.  While on the cruise she was complaint with taking her medications but did start to notice that her weight was going up, her legs started to swell and she developed shortness of breath.  She describes shortness of breath at rest and with exertion.  Since she came back home, she has needed to use up to 4 pillows to sleep since she could not breathe laying flat.  Her usual baseline weight as per her is 101 lbs.  She denies any chest pain or pleuritic chest pain.  No fevers or chills.  She did start to notice a productive cough with green phlegm since arrival home.  She was evaluated in Dr. Dickey's office and was prescribed Doxycycline which she has been taking.  She was then seen in Dr. Marrero's office on Tuesday.  She was advised by the PA that she should go to the hospital but she did not want to.  She was told to start taking 2 tabs of Lasix twice a day because of her increased weight gain and leg swelling.  She describes her  has also been sick with a post-nasal drip.  She reports that she got the flu shot this year.    Pt. admitted with acute on chronic CHF exac. likely due to dietary indiscretion. CE negative X 3. Daily wts stable. Cardiology consult appreciated - okay to discharge patient on oral Lasix and have her f/u with cardio in 1 week.     Elevated liver transaminases possibly due to passive congestion from CHF, pt. to have follow up blood work with Dr. Dickey.     Hypokalemia - repleted in hospital, will have outpt blood work (2/2 IV Lasix BID)    Vital Signs Last 24 Hrs  T(C): 37 (27 Oct 2017 10:12), Max: 37.4 (26 Oct 2017 20:19)  T(F): 98.6 (27 Oct 2017 10:12), Max: 99.4 (26 Oct 2017 20:19)  HR: 79 (27 Oct 2017 10:12) (70 - 79)  BP: 100/51 (27 Oct 2017 10:12) (100/51 - 115/54)  BP(mean): --  RR: 17 (27 Oct 2017 10:12) (17 - 18)  SpO2: 93% (27 Oct 2017 10:12) (87% - 93%)    PHYSICAL EXAM:    Constitutional: NAD, comfortable  Neck: Soft and supple  Respiratory: Breath sounds are clear bilaterally, No wheezing, rales or rhonchi  Cardiovascular: S1 and S2, regular rate and rhythm  Gastrointestinal: Bowel Sounds present, soft, nontender, nondistended  Extremities: No peripheral edema  Neurological: A/O x 3, no focal deficits  Skin: No rashes 83 y/o F with Hx of chronic diastolic and systolic CHF (EF 30-30%), HTN, CAD, Atrial Fibrillation on A/C with Coumadin who presents to the ED today with increasing shortness of breath.  The patient [place] home on Saturday.  While on the cruise she was complaint with taking her medications but did start to notice that her weight was going up, her legs started to swell and she developed shortness of breath.  She describes shortness of breath at rest and with exertion.  Since she came back home, she has needed to use up to 4 pillows to sleep since she could not breathe laying flat.  Her usual baseline weight as per her is 101 lbs.  She denies any chest pain or pleuritic chest pain.  No fevers or chills.  She did start to notice a productive cough with green phlegm since arrival home.  She was evaluated in Dr. Dickey's office and was prescribed Doxycycline which she has been taking.  She was then seen in Dr. Marrero's office on Tuesday.  She was advised by the PA that she should go to the hospital but she did not want to.  She was told to start taking 2 tabs of Lasix twice a day because of her increased weight gain and leg swelling.  She describes her  has also been sick with a post-nasal drip.  She reports that she got the flu shot this year.    Pt. admitted with acute on chronic CHF exac. likely due to dietary indiscretion. CE negative X 3. Daily wts stable. Cardiology consult appreciated - okay to discharge patient on oral Lasix and have her f/u with cardio in 1 week.     Elevated liver transaminases possibly due to passive congestion from CHF, pt. to have follow up blood work with Dr. Dickey.     Hypokalemia - repleted in hospital, will have outpt blood work (2/2 IV Lasix BID).    Pt. to c/w Doxycycline PO as prescribed as an outpt. As per patient's pharmacy patient picked up RX on 10/23 and was prescribed for 10 days. Pt. to continue until Thursday 11/2.    Vital Signs Last 24 Hrs  T(C): 37 (27 Oct 2017 10:12), Max: 37.4 (26 Oct 2017 20:19)  T(F): 98.6 (27 Oct 2017 10:12), Max: 99.4 (26 Oct 2017 20:19)  HR: 79 (27 Oct 2017 10:12) (70 - 79)  BP: 100/51 (27 Oct 2017 10:12) (100/51 - 115/54)  BP(mean): --  RR: 17 (27 Oct 2017 10:12) (17 - 18)  SpO2: 93% (27 Oct 2017 10:12) (87% - 93%)    PHYSICAL EXAM:    Constitutional: NAD, comfortable  Neck: Soft and supple  Respiratory: Breath sounds are clear bilaterally, No wheezing, rales or rhonchi  Cardiovascular: S1 and S2, regular rate and rhythm  Gastrointestinal: Bowel Sounds present, soft, nontender, nondistended  Extremities: No peripheral edema  Neurological: A/O x 3, no focal deficits  Skin: No rashes

## 2017-11-01 DIAGNOSIS — I11.0 HYPERTENSIVE HEART DISEASE WITH HEART FAILURE: ICD-10-CM

## 2017-11-01 DIAGNOSIS — K90.0 CELIAC DISEASE: ICD-10-CM

## 2017-11-01 DIAGNOSIS — E06.3 AUTOIMMUNE THYROIDITIS: ICD-10-CM

## 2017-11-01 DIAGNOSIS — J44.0 CHRONIC OBSTRUCTIVE PULMONARY DISEASE WITH (ACUTE) LOWER RESPIRATORY INFECTION: ICD-10-CM

## 2017-11-01 DIAGNOSIS — N17.9 ACUTE KIDNEY FAILURE, UNSPECIFIED: ICD-10-CM

## 2017-11-01 DIAGNOSIS — I48.91 UNSPECIFIED ATRIAL FIBRILLATION: ICD-10-CM

## 2017-11-01 DIAGNOSIS — Z95.0 PRESENCE OF CARDIAC PACEMAKER: ICD-10-CM

## 2017-11-01 DIAGNOSIS — Z87.891 PERSONAL HISTORY OF NICOTINE DEPENDENCE: ICD-10-CM

## 2017-11-01 DIAGNOSIS — E87.6 HYPOKALEMIA: ICD-10-CM

## 2017-11-01 DIAGNOSIS — I25.10 ATHEROSCLEROTIC HEART DISEASE OF NATIVE CORONARY ARTERY WITHOUT ANGINA PECTORIS: ICD-10-CM

## 2017-11-01 DIAGNOSIS — J20.9 ACUTE BRONCHITIS, UNSPECIFIED: ICD-10-CM

## 2017-11-01 DIAGNOSIS — M40.209 UNSPECIFIED KYPHOSIS, SITE UNSPECIFIED: ICD-10-CM

## 2017-11-01 DIAGNOSIS — I50.43 ACUTE ON CHRONIC COMBINED SYSTOLIC (CONGESTIVE) AND DIASTOLIC (CONGESTIVE) HEART FAILURE: ICD-10-CM

## 2017-11-02 ENCOUNTER — APPOINTMENT (OUTPATIENT)
Dept: INTERNAL MEDICINE | Facility: CLINIC | Age: 82
End: 2017-11-02
Payer: MEDICARE

## 2017-11-02 VITALS
HEIGHT: 63 IN | RESPIRATION RATE: 16 BRPM | DIASTOLIC BLOOD PRESSURE: 64 MMHG | OXYGEN SATURATION: 96 % | TEMPERATURE: 97.9 F | WEIGHT: 90 LBS | BODY MASS INDEX: 15.95 KG/M2 | HEART RATE: 74 BPM | SYSTOLIC BLOOD PRESSURE: 100 MMHG

## 2017-11-02 PROCEDURE — 99214 OFFICE O/P EST MOD 30 MIN: CPT

## 2017-11-09 ENCOUNTER — EMERGENCY (EMERGENCY)
Facility: HOSPITAL | Age: 82
LOS: 1 days | Discharge: HOME | End: 2017-11-09
Attending: EMERGENCY MEDICINE | Admitting: EMERGENCY MEDICINE
Payer: MEDICARE

## 2017-11-09 VITALS
HEART RATE: 70 BPM | HEIGHT: 63 IN | RESPIRATION RATE: 17 BRPM | SYSTOLIC BLOOD PRESSURE: 127 MMHG | OXYGEN SATURATION: 96 % | WEIGHT: 91.05 LBS | TEMPERATURE: 98 F | DIASTOLIC BLOOD PRESSURE: 70 MMHG

## 2017-11-09 DIAGNOSIS — Z79.01 LONG TERM (CURRENT) USE OF ANTICOAGULANTS: ICD-10-CM

## 2017-11-09 DIAGNOSIS — Z95.0 PRESENCE OF CARDIAC PACEMAKER: Chronic | ICD-10-CM

## 2017-11-09 DIAGNOSIS — I48.2 CHRONIC ATRIAL FIBRILLATION: ICD-10-CM

## 2017-11-09 DIAGNOSIS — R07.9 CHEST PAIN, UNSPECIFIED: ICD-10-CM

## 2017-11-09 DIAGNOSIS — Z79.82 LONG TERM (CURRENT) USE OF ASPIRIN: ICD-10-CM

## 2017-11-09 DIAGNOSIS — Z95.0 PRESENCE OF CARDIAC PACEMAKER: ICD-10-CM

## 2017-11-09 DIAGNOSIS — R94.31 ABNORMAL ELECTROCARDIOGRAM [ECG] [EKG]: ICD-10-CM

## 2017-11-09 DIAGNOSIS — R06.02 SHORTNESS OF BREATH: ICD-10-CM

## 2017-11-09 DIAGNOSIS — E03.9 HYPOTHYROIDISM, UNSPECIFIED: ICD-10-CM

## 2017-11-09 LAB
ALBUMIN SERPL ELPH-MCNC: 2.9 G/DL — LOW (ref 3.3–5)
ALP SERPL-CCNC: 79 U/L — SIGNIFICANT CHANGE UP (ref 40–120)
ALT FLD-CCNC: 50 U/L — SIGNIFICANT CHANGE UP (ref 12–78)
ANION GAP SERPL CALC-SCNC: 8 MMOL/L — SIGNIFICANT CHANGE UP (ref 5–17)
APTT BLD: 47.1 SEC — HIGH (ref 27.5–37.4)
AST SERPL-CCNC: 47 U/L — HIGH (ref 15–37)
BASOPHILS # BLD AUTO: 0.1 K/UL — SIGNIFICANT CHANGE UP (ref 0–0.2)
BASOPHILS NFR BLD AUTO: 0.8 % — SIGNIFICANT CHANGE UP (ref 0–2)
BILIRUB SERPL-MCNC: 0.5 MG/DL — SIGNIFICANT CHANGE UP (ref 0.2–1.2)
BUN SERPL-MCNC: 30 MG/DL — HIGH (ref 7–23)
CALCIUM SERPL-MCNC: 8.8 MG/DL — SIGNIFICANT CHANGE UP (ref 8.5–10.1)
CHLORIDE SERPL-SCNC: 99 MMOL/L — SIGNIFICANT CHANGE UP (ref 96–108)
CO2 SERPL-SCNC: 28 MMOL/L — SIGNIFICANT CHANGE UP (ref 22–31)
CREAT SERPL-MCNC: 1.64 MG/DL — HIGH (ref 0.5–1.3)
EOSINOPHIL # BLD AUTO: 0 K/UL — SIGNIFICANT CHANGE UP (ref 0–0.5)
EOSINOPHIL NFR BLD AUTO: 0.2 % — SIGNIFICANT CHANGE UP (ref 0–6)
GLUCOSE SERPL-MCNC: 116 MG/DL — HIGH (ref 70–99)
HCT VFR BLD CALC: 35.2 % — SIGNIFICANT CHANGE UP (ref 34.5–45)
HGB BLD-MCNC: 11.5 G/DL — SIGNIFICANT CHANGE UP (ref 11.5–15.5)
INR BLD: 3.35 RATIO — HIGH (ref 0.88–1.16)
LYMPHOCYTES # BLD AUTO: 1.5 K/UL — SIGNIFICANT CHANGE UP (ref 1–3.3)
LYMPHOCYTES # BLD AUTO: 22.6 % — SIGNIFICANT CHANGE UP (ref 13–44)
MAGNESIUM SERPL-MCNC: 2 MG/DL — SIGNIFICANT CHANGE UP (ref 1.6–2.6)
MCHC RBC-ENTMCNC: 31.8 PG — SIGNIFICANT CHANGE UP (ref 27–34)
MCHC RBC-ENTMCNC: 32.6 GM/DL — SIGNIFICANT CHANGE UP (ref 32–36)
MCV RBC AUTO: 97.4 FL — SIGNIFICANT CHANGE UP (ref 80–100)
MONOCYTES # BLD AUTO: 0.8 K/UL — SIGNIFICANT CHANGE UP (ref 0–0.9)
MONOCYTES NFR BLD AUTO: 12.2 % — SIGNIFICANT CHANGE UP (ref 2–14)
NEUTROPHILS # BLD AUTO: 4.3 K/UL — SIGNIFICANT CHANGE UP (ref 1.8–7.4)
NEUTROPHILS NFR BLD AUTO: 64.2 % — SIGNIFICANT CHANGE UP (ref 43–77)
NT-PROBNP SERPL-SCNC: 5424 PG/ML — HIGH (ref 0–450)
PLATELET # BLD AUTO: 369 K/UL — SIGNIFICANT CHANGE UP (ref 150–400)
POTASSIUM SERPL-MCNC: 4.4 MMOL/L — SIGNIFICANT CHANGE UP (ref 3.5–5.3)
POTASSIUM SERPL-SCNC: 4.4 MMOL/L — SIGNIFICANT CHANGE UP (ref 3.5–5.3)
PROT SERPL-MCNC: 7.1 GM/DL — SIGNIFICANT CHANGE UP (ref 6–8.3)
PROTHROM AB SERPL-ACNC: 37.1 SEC — HIGH (ref 9.8–12.7)
RBC # BLD: 3.61 M/UL — LOW (ref 3.8–5.2)
RBC # FLD: 14.6 % — HIGH (ref 10.3–14.5)
SODIUM SERPL-SCNC: 135 MMOL/L — SIGNIFICANT CHANGE UP (ref 135–145)
TROPONIN I SERPL-MCNC: 0.03 NG/ML — SIGNIFICANT CHANGE UP (ref 0.01–0.04)
WBC # BLD: 6.7 K/UL — SIGNIFICANT CHANGE UP (ref 3.8–10.5)
WBC # FLD AUTO: 6.7 K/UL — SIGNIFICANT CHANGE UP (ref 3.8–10.5)

## 2017-11-09 PROCEDURE — 72125 CT NECK SPINE W/O DYE: CPT | Mod: 26

## 2017-11-09 PROCEDURE — 71010: CPT | Mod: 26

## 2017-11-09 PROCEDURE — 99284 EMERGENCY DEPT VISIT MOD MDM: CPT | Mod: 25

## 2017-11-09 PROCEDURE — 70450 CT HEAD/BRAIN W/O DYE: CPT | Mod: 26

## 2017-11-09 PROCEDURE — 93010 ELECTROCARDIOGRAM REPORT: CPT

## 2017-11-09 PROCEDURE — 86077 PHYS BLOOD BANK SERV XMATCH: CPT

## 2017-11-09 RX ORDER — TETANUS TOXOID, REDUCED DIPHTHERIA TOXOID AND ACELLULAR PERTUSSIS VACCINE, ADSORBED 5; 2.5; 8; 8; 2.5 [IU]/.5ML; [IU]/.5ML; UG/.5ML; UG/.5ML; UG/.5ML
0.5 SUSPENSION INTRAMUSCULAR ONCE
Qty: 0 | Refills: 0 | Status: COMPLETED | OUTPATIENT
Start: 2017-11-09 | End: 2017-11-09

## 2017-11-09 RX ORDER — ACETAMINOPHEN 500 MG
650 TABLET ORAL ONCE
Qty: 0 | Refills: 0 | Status: COMPLETED | OUTPATIENT
Start: 2017-11-09 | End: 2017-11-09

## 2017-11-09 RX ADMIN — TETANUS TOXOID, REDUCED DIPHTHERIA TOXOID AND ACELLULAR PERTUSSIS VACCINE, ADSORBED 0.5 MILLILITER(S): 5; 2.5; 8; 8; 2.5 SUSPENSION INTRAMUSCULAR at 22:13

## 2017-11-09 NOTE — ED PROVIDER NOTE - OBJECTIVE STATEMENT
83 y/o female with PMHx of AFib, HTN, hypothyroidism s/p pacemaker presents to the ED c/o allergic reaction possibly to avocado. Pt states suddenly her face felt flushed, she felt very warm, her cheeks became red, and felt as though she could not breath for 30 minutes. Pt went to Urgent Care and was given Decadron with relief. No prescription sent. Urgent Care advised pt to visit ED due to her heart hx. No CP, diaphoresis. No abd pain, rash, itch. Daughter denies hoarse voice, pt is at baseline. Pt notes she has not eaten avocados in a long time. Non smoker. No drug or alcohol use. While pt was in intake, walked to bathroom with her daughter. While getting up to wipe, pt tripped over her pants and hit her right side of head on bowl with bleeding. Denies dizziness. PMD Dr. Dickey. Cardio Dr. Marrero.

## 2017-11-09 NOTE — ED ADULT TRIAGE NOTE - CHIEF COMPLAINT QUOTE
Patient states that she ate avocado and thinks she is allergic. Stated she felt SOB and a warm feeling throughout body. Sent in by urgent care

## 2017-11-09 NOTE — ED PROVIDER NOTE - PROGRESS NOTE DETAILS
ED attending Dr. Aleman discussed with pt and family about abrasion to right scalp. Given not actively bleeding, will not place any staples. 2nd troponin stable. pt remains well. dc home. MD Augusto

## 2017-11-09 NOTE — ED STATDOCS - PROGRESS NOTE DETAILS
Wali Dumont (scribe) for Dr. Castañeda:  Pt presents with SOB, after vitals were taken she was assisted to the bathroom where she had a mechanical fall (witnessed by daughter) + head injury against the toilet.  Pt is anticoagulated.  Will triage patient to Main for further evaluation of SOB and fall. Wali Dumont ([facility]) for Dr. Castañeda: EKG with paced rhythm @ 70 bpm Wali Dumont ([facility]) for Dr. Castañeda:  Pt with PMH AFib, PPM, presents with SOB and flushing after eating avocado, thought she was having allergic reaction so she called her daughter to bring her in.  After vitals were taken she was assisted to the bathroom where she had a mechanical fall (witnessed by daughter) + head injury against the toilet.  Pt is anticoagulated on coumadin.  Will triage patient to Main for further evaluation of SOB and fall. Wali Dumont ([facility]) for Dr. Castañeda:  Pt with PMH AFib, PPM, presents with SOB and flushing after eating avocado, thought she was having allergic reaction so she called her daughter to bring her in.  After vitals were taken she was assisted to the bathroom by daughter where she had a mechanical fall (witnessed by daughter) + head injury against handrail next to the toilet.  Pt is anticoagulated on coumadin.  Will triage patient to Main for further evaluation of SOB and fall.

## 2017-11-10 LAB
ALLERGY+IMMUNOLOGY DIAG STUDY NOTE: SIGNIFICANT CHANGE UP
BLD GP AB SCN SERPL QL: (no result)
DIR ANTIGLOB POLYSPECIFIC INTERPRETATION: SIGNIFICANT CHANGE UP
TROPONIN I SERPL-MCNC: 0.02 NG/ML — SIGNIFICANT CHANGE UP (ref 0.01–0.04)
TYPE + AB SCN PNL BLD: SIGNIFICANT CHANGE UP
TYPE + AB SCN PNL BLD: SIGNIFICANT CHANGE UP

## 2017-11-10 NOTE — ED ADULT NURSE REASSESSMENT NOTE - NS ED NURSE REASSESS COMMENT FT1
Pt in intake room awaiting MD rosales, pt daughter walked pt to bathroom. While in bathroom with daughter was standing up from wiping, tripped on pants and hit her head on the pole. Laceration noted to Rt side of head. Pt on coumadin, trauma alert called at 2109. Pt alert and oriented x4. Pt daughter states "I could not grab her in time".  Blane JENKINS made aware, Dr. Castañeda at bedside evaluating patient. Pt denies pain or discomfort.
iv taken out. patient discharged home, in room waiting for daughter to arrive around 630am. written and verbal discharge and followup instructions given to patient , patient verbalized back understanding. denies pain or discomfort. additional verbal instructions given. patient resting in bed comfortably waiting for ride home.
patient discharged home with daughter. left ED at 06:25.
patient discharged home. alert and oriented x 4. ambulatory with steady gait. denies pain or discomfort. written and verbal discharge and followup instructions given to patient, patient verbalized back understanding. discharged from ED at 0511.
patient resting comfortably in bed. no complaints at this time. discharged, awaiting daughter to  patient at around 0500. call bell within reach. will continue to monitor.
patient resting in bed comfortably. alert and oriented x 4, perrl, denies dizziness or nausea. denies pain or discomfort. placed in bed 14 on monitor. iv inserted, labs collected with IV insertion awaiting results. patient refused tylenol. denies headache. currently at CT scan. daughter at bedside. will continue to monitor.
written and verbal discharge instructions given to patient, patient verbalized back understanding. copy of labs given to patient. additional verbal instructions given. patient denies pain or discomfort at time of discharge. iv taken out. patient dressed. awaiting arrival of daughter.

## 2017-12-07 ENCOUNTER — OTHER (OUTPATIENT)
Age: 82
End: 2017-12-07

## 2017-12-12 ENCOUNTER — APPOINTMENT (OUTPATIENT)
Dept: INTERNAL MEDICINE | Facility: CLINIC | Age: 82
End: 2017-12-12
Payer: MEDICARE

## 2017-12-12 VITALS
HEIGHT: 63 IN | TEMPERATURE: 97.6 F | HEART RATE: 70 BPM | OXYGEN SATURATION: 97 % | DIASTOLIC BLOOD PRESSURE: 64 MMHG | BODY MASS INDEX: 16.83 KG/M2 | WEIGHT: 95 LBS | SYSTOLIC BLOOD PRESSURE: 90 MMHG | RESPIRATION RATE: 16 BRPM

## 2017-12-12 DIAGNOSIS — J43.9 EMPHYSEMA, UNSPECIFIED: ICD-10-CM

## 2017-12-12 DIAGNOSIS — B00.9 HERPESVIRAL INFECTION, UNSPECIFIED: ICD-10-CM

## 2017-12-12 DIAGNOSIS — R79.89 OTHER SPECIFIED ABNORMAL FINDINGS OF BLOOD CHEMISTRY: ICD-10-CM

## 2017-12-12 DIAGNOSIS — J45.909 UNSPECIFIED ASTHMA, UNCOMPLICATED: ICD-10-CM

## 2017-12-12 PROCEDURE — 94060 EVALUATION OF WHEEZING: CPT

## 2017-12-12 PROCEDURE — 99214 OFFICE O/P EST MOD 30 MIN: CPT | Mod: 25

## 2017-12-12 PROCEDURE — 94729 DIFFUSING CAPACITY: CPT

## 2017-12-12 RX ORDER — DOXYCYCLINE HYCLATE 100 MG/1
100 CAPSULE ORAL TWICE DAILY
Qty: 20 | Refills: 0 | Status: COMPLETED | COMMUNITY
Start: 2017-10-23 | End: 2017-12-12

## 2017-12-12 RX ORDER — AMIODARONE HYDROCHLORIDE 400 MG/1
TABLET ORAL
Refills: 0 | Status: COMPLETED | COMMUNITY
End: 2017-12-12

## 2017-12-12 RX ORDER — ENALAPRIL MALEATE 2.5 MG/1
2.5 TABLET ORAL
Refills: 0 | Status: COMPLETED | COMMUNITY
End: 2017-12-12

## 2017-12-12 RX ORDER — CHLORHEXIDINE GLUCONATE 4 %
325 (65 FE) LIQUID (ML) TOPICAL DAILY
Refills: 0 | Status: COMPLETED | COMMUNITY
End: 2017-12-12

## 2017-12-12 RX ORDER — FUROSEMIDE 20 MG/1
20 TABLET ORAL
Refills: 0 | Status: COMPLETED | COMMUNITY
End: 2017-12-12

## 2017-12-12 RX ORDER — DOXYCYCLINE HYCLATE 100 MG/1
100 TABLET ORAL
Qty: 14 | Refills: 0 | Status: COMPLETED | COMMUNITY
Start: 2017-09-19 | End: 2017-12-12

## 2017-12-12 RX ORDER — LISINOPRIL 2.5 MG/1
2.5 TABLET ORAL
Qty: 30 | Refills: 0 | Status: COMPLETED | COMMUNITY
Start: 2017-10-30 | End: 2017-12-12

## 2017-12-12 RX ORDER — DOXYCYCLINE HYCLATE 100 MG/1
100 CAPSULE ORAL
Qty: 10 | Refills: 0 | Status: COMPLETED | COMMUNITY
Start: 2017-11-02 | End: 2017-12-12

## 2017-12-12 RX ORDER — BECLOMETHASONE DIPROPIONATE 80 UG/1
80 AEROSOL, METERED RESPIRATORY (INHALATION) TWICE DAILY
Qty: 1 | Refills: 0 | Status: COMPLETED | COMMUNITY
Start: 2017-11-02 | End: 2017-12-12

## 2017-12-12 RX ORDER — SPIRONOLACTONE 25 MG/1
25 TABLET ORAL
Qty: 15 | Refills: 0 | Status: COMPLETED | COMMUNITY
Start: 2017-10-30 | End: 2017-12-12

## 2018-02-26 ENCOUNTER — EMERGENCY (EMERGENCY)
Facility: HOSPITAL | Age: 83
LOS: 0 days | Discharge: HOME | End: 2018-02-26
Attending: EMERGENCY MEDICINE | Admitting: EMERGENCY MEDICINE
Payer: MEDICARE

## 2018-02-26 VITALS
SYSTOLIC BLOOD PRESSURE: 128 MMHG | OXYGEN SATURATION: 100 % | HEART RATE: 70 BPM | DIASTOLIC BLOOD PRESSURE: 78 MMHG | RESPIRATION RATE: 16 BRPM

## 2018-02-26 VITALS — HEIGHT: 64 IN

## 2018-02-26 DIAGNOSIS — Z95.0 PRESENCE OF CARDIAC PACEMAKER: ICD-10-CM

## 2018-02-26 DIAGNOSIS — I50.9 HEART FAILURE, UNSPECIFIED: ICD-10-CM

## 2018-02-26 DIAGNOSIS — Z88.1 ALLERGY STATUS TO OTHER ANTIBIOTIC AGENTS STATUS: ICD-10-CM

## 2018-02-26 DIAGNOSIS — Z95.0 PRESENCE OF CARDIAC PACEMAKER: Chronic | ICD-10-CM

## 2018-02-26 DIAGNOSIS — R06.02 SHORTNESS OF BREATH: ICD-10-CM

## 2018-02-26 DIAGNOSIS — I48.2 CHRONIC ATRIAL FIBRILLATION: ICD-10-CM

## 2018-02-26 DIAGNOSIS — I10 ESSENTIAL (PRIMARY) HYPERTENSION: ICD-10-CM

## 2018-02-26 DIAGNOSIS — E03.9 HYPOTHYROIDISM, UNSPECIFIED: ICD-10-CM

## 2018-02-26 DIAGNOSIS — Z87.891 PERSONAL HISTORY OF NICOTINE DEPENDENCE: ICD-10-CM

## 2018-02-26 LAB
ALBUMIN SERPL ELPH-MCNC: 3 G/DL — LOW (ref 3.3–5)
ALP SERPL-CCNC: 82 U/L — SIGNIFICANT CHANGE UP (ref 40–120)
ALT FLD-CCNC: 38 U/L — SIGNIFICANT CHANGE UP (ref 12–78)
ANION GAP SERPL CALC-SCNC: 9 MMOL/L — SIGNIFICANT CHANGE UP (ref 5–17)
APTT BLD: 38.7 SEC — HIGH (ref 27.5–37.4)
AST SERPL-CCNC: 44 U/L — HIGH (ref 15–37)
BASOPHILS # BLD AUTO: 0.1 K/UL — SIGNIFICANT CHANGE UP (ref 0–0.2)
BASOPHILS NFR BLD AUTO: 1.3 % — SIGNIFICANT CHANGE UP (ref 0–2)
BILIRUB SERPL-MCNC: 0.4 MG/DL — SIGNIFICANT CHANGE UP (ref 0.2–1.2)
BUN SERPL-MCNC: 23 MG/DL — SIGNIFICANT CHANGE UP (ref 7–23)
CALCIUM SERPL-MCNC: 8.5 MG/DL — SIGNIFICANT CHANGE UP (ref 8.5–10.1)
CHLORIDE SERPL-SCNC: 100 MMOL/L — SIGNIFICANT CHANGE UP (ref 96–108)
CO2 SERPL-SCNC: 28 MMOL/L — SIGNIFICANT CHANGE UP (ref 22–31)
CREAT SERPL-MCNC: 1.26 MG/DL — SIGNIFICANT CHANGE UP (ref 0.5–1.3)
EOSINOPHIL # BLD AUTO: 0.1 K/UL — SIGNIFICANT CHANGE UP (ref 0–0.5)
EOSINOPHIL NFR BLD AUTO: 0.9 % — SIGNIFICANT CHANGE UP (ref 0–6)
GLUCOSE SERPL-MCNC: 121 MG/DL — HIGH (ref 70–99)
HCT VFR BLD CALC: 29.3 % — LOW (ref 34.5–45)
HGB BLD-MCNC: 9.5 G/DL — LOW (ref 11.5–15.5)
INR BLD: 2.86 RATIO — HIGH (ref 0.88–1.16)
LYMPHOCYTES # BLD AUTO: 1.1 K/UL — SIGNIFICANT CHANGE UP (ref 1–3.3)
LYMPHOCYTES # BLD AUTO: 20.6 % — SIGNIFICANT CHANGE UP (ref 13–44)
MCHC RBC-ENTMCNC: 30.3 PG — SIGNIFICANT CHANGE UP (ref 27–34)
MCHC RBC-ENTMCNC: 32.3 GM/DL — SIGNIFICANT CHANGE UP (ref 32–36)
MCV RBC AUTO: 93.8 FL — SIGNIFICANT CHANGE UP (ref 80–100)
MONOCYTES # BLD AUTO: 0.6 K/UL — SIGNIFICANT CHANGE UP (ref 0–0.9)
MONOCYTES NFR BLD AUTO: 10.5 % — SIGNIFICANT CHANGE UP (ref 2–14)
NEUTROPHILS # BLD AUTO: 3.6 K/UL — SIGNIFICANT CHANGE UP (ref 1.8–7.4)
NEUTROPHILS NFR BLD AUTO: 66.7 % — SIGNIFICANT CHANGE UP (ref 43–77)
NT-PROBNP SERPL-SCNC: 4091 PG/ML — HIGH (ref 0–450)
PLATELET # BLD AUTO: 246 K/UL — SIGNIFICANT CHANGE UP (ref 150–400)
POTASSIUM SERPL-MCNC: 4 MMOL/L — SIGNIFICANT CHANGE UP (ref 3.5–5.3)
POTASSIUM SERPL-SCNC: 4 MMOL/L — SIGNIFICANT CHANGE UP (ref 3.5–5.3)
PROT SERPL-MCNC: 7 GM/DL — SIGNIFICANT CHANGE UP (ref 6–8.3)
PROTHROM AB SERPL-ACNC: 31.6 SEC — HIGH (ref 9.8–12.7)
RBC # BLD: 3.12 M/UL — LOW (ref 3.8–5.2)
RBC # FLD: 14.1 % — SIGNIFICANT CHANGE UP (ref 10.3–14.5)
SODIUM SERPL-SCNC: 137 MMOL/L — SIGNIFICANT CHANGE UP (ref 135–145)
TROPONIN I SERPL-MCNC: 0.02 NG/ML — SIGNIFICANT CHANGE UP (ref 0.01–0.04)
WBC # BLD: 5.4 K/UL — SIGNIFICANT CHANGE UP (ref 3.8–10.5)
WBC # FLD AUTO: 5.4 K/UL — SIGNIFICANT CHANGE UP (ref 3.8–10.5)

## 2018-02-26 PROCEDURE — 99285 EMERGENCY DEPT VISIT HI MDM: CPT | Mod: 25

## 2018-02-26 PROCEDURE — 93010 ELECTROCARDIOGRAM REPORT: CPT

## 2018-02-26 PROCEDURE — 71046 X-RAY EXAM CHEST 2 VIEWS: CPT | Mod: 26

## 2018-02-26 RX ORDER — FUROSEMIDE 40 MG
40 TABLET ORAL ONCE
Qty: 0 | Refills: 0 | Status: COMPLETED | OUTPATIENT
Start: 2018-02-26 | End: 2018-02-26

## 2018-02-26 RX ADMIN — Medication 40 MILLIGRAM(S): at 20:41

## 2018-02-26 NOTE — ED PROVIDER NOTE - MEDICAL DECISION MAKING DETAILS
sob with decreased exercise tolerane / increased orthopnea, labs, lasix, cxr, reassess, likely admit to tele for further w/u and management sob with decreased exercise tolerane / increased orthopnea, labs, lasix, cxr, reassess,

## 2018-02-26 NOTE — ED PROVIDER NOTE - NS_ ATTENDINGSCRIBEDETAILS _ED_A_ED_FT
I, Manny Cisneros MD,  performed the initial face to face bedside interview with this patient regarding history of present illness, review of symptoms and relevant past medical, social and family history.  I completed an independent physical examination.    The history, relevant review of systems, past medical and surgical history, medical decision making, and physical examination was documented by the scribe in my presence and I attest to the accuracy of the documentation.

## 2018-02-26 NOTE — ED ADULT NURSE NOTE - OBJECTIVE STATEMENT
pt presents c/o SOB x 2 wks. pmh CHF, AICD. reports weight gain and sleeping on extra pillows at night for breathing. pt a&ox3. denies chest pain. no other complaints. no resp distress noted.

## 2018-02-26 NOTE — ED ADULT TRIAGE NOTE - CHIEF COMPLAINT QUOTE
CHF exacerbation x 1 week , pt sleeping with extra pillows at night , reports 0.5 kg weight gain daily , pedal edema , pt is requesting lasix

## 2018-02-26 NOTE — ED PROVIDER NOTE - ATTENDING CONTRIBUTION TO CARE
I, Manny Cisneros MD,  performed the initial face to face bedside interview with this patient regarding history of present illness, review of symptoms and relevant past medical, social and family history.  I completed an independent physical examination.  I was the initial provider who evaluated this patient. I have signed out the follow up of any pending tests (i.e. labs, radiological studies) to the resident.  I have communicated the patient’s plan of care and disposition with the resident.

## 2018-02-26 NOTE — ED PROVIDER NOTE - OBJECTIVE STATEMENT
82 y/o F with PMH of CHF (last EF 30%), HTN, PPM, hypothyroidism p/w SOB x 1.5 weeks. Pt reports worsening SOB, associated with peripheral edema, 4 pillow orthopnea, decreased exercise tolerance to the point she has a hard time getting around the house. Pt denies fevers, chills, chest pain, cough, abd pain, nausea, vomiting.

## 2018-03-01 ENCOUNTER — OTHER (OUTPATIENT)
Age: 83
End: 2018-03-01

## 2018-03-07 PROBLEM — I47.2 VENTRICULAR TACHYCARDIA (PAROXYSMAL): Status: RESOLVED | Noted: 2017-05-19 | Resolved: 2018-03-07

## 2018-03-07 PROBLEM — R09.02 HYPOXEMIA: Status: RESOLVED | Noted: 2017-05-19 | Resolved: 2018-03-07

## 2018-03-07 PROBLEM — R31.29 MICROSCOPIC HEMATURIA: Status: RESOLVED | Noted: 2017-05-19 | Resolved: 2018-03-07

## 2018-03-08 ENCOUNTER — APPOINTMENT (OUTPATIENT)
Dept: HEMATOLOGY ONCOLOGY | Facility: CLINIC | Age: 83
End: 2018-03-08
Payer: MEDICARE

## 2018-03-08 ENCOUNTER — LABORATORY RESULT (OUTPATIENT)
Age: 83
End: 2018-03-08

## 2018-03-08 VITALS — DIASTOLIC BLOOD PRESSURE: 53 MMHG | HEART RATE: 96 BPM | SYSTOLIC BLOOD PRESSURE: 91 MMHG | TEMPERATURE: 98.3 F

## 2018-03-08 LAB
HCT VFR BLD CALC: 34.8 %
HGB BLD-MCNC: 10.7 G/DL
MCHC RBC-ENTMCNC: 29.5 PG
MCHC RBC-ENTMCNC: 30.7 GM/DL
MCV RBC AUTO: 95.9 FL
PLATELET # BLD AUTO: 288 K/UL
RBC # BLD: 3.62 M/UL
RBC # FLD: 14.1 %
WBC # FLD AUTO: 4.7 K/UL

## 2018-03-08 PROCEDURE — 36415 COLL VENOUS BLD VENIPUNCTURE: CPT

## 2018-03-08 PROCEDURE — 85025 COMPLETE CBC W/AUTO DIFF WBC: CPT

## 2018-03-09 LAB
DEPRECATED KAPPA LC FREE/LAMBDA SER: 0.29 RATIO
ERYTHROCYTE [SEDIMENTATION RATE] IN BLOOD BY WESTERGREN METHOD: 67 MM/HR
FERRITIN SERPL-MCNC: 26 NG/ML
FOLATE SERPL-MCNC: 9.9 NG/ML
IRON SATN MFR SERPL: 10 %
IRON SERPL-MCNC: 32 UG/DL
KAPPA LC CSF-MCNC: 10.9 MG/DL
KAPPA LC SERPL-MCNC: 3.14 MG/DL
TIBC SERPL-MCNC: 332 UG/DL
UIBC SERPL-MCNC: 300 UG/DL
VIT B12 SERPL-MCNC: 567 PG/ML

## 2018-03-12 ENCOUNTER — RX RENEWAL (OUTPATIENT)
Age: 83
End: 2018-03-12

## 2018-03-12 LAB
ALBUMIN MFR SERPL ELPH: 50.4 %
ALBUMIN SERPL-MCNC: 3.5 G/DL
ALBUMIN/GLOB SERPL: 1 RATIO
ALPHA1 GLOB MFR SERPL ELPH: 4.8 %
ALPHA1 GLOB SERPL ELPH-MCNC: 0.3 G/DL
ALPHA2 GLOB MFR SERPL ELPH: 12 %
ALPHA2 GLOB SERPL ELPH-MCNC: 0.8 G/DL
B-GLOBULIN MFR SERPL ELPH: 21.4 %
B-GLOBULIN SERPL ELPH-MCNC: 1.5 G/DL
DEPRECATED KAPPA LC FREE/LAMBDA SER: 0.29 RATIO
GAMMA GLOB FLD ELPH-MCNC: 0.8 G/DL
GAMMA GLOB MFR SERPL ELPH: 11.4 %
IGA SER QL IEP: 888 MG/DL
IGG SER QL IEP: 840 MG/DL
IGM SER QL IEP: 15 MG/DL
INTERPRETATION SERPL IEP-IMP: NORMAL
KAPPA LC CSF-MCNC: 10.9 MG/DL
KAPPA LC SERPL-MCNC: 3.14 MG/DL
M PROTEIN MFR SERPL ELPH: 14.9 %
M PROTEIN SPEC IFE-MCNC: NORMAL
MONOCLON BAND OBS SERPL: 1 G/DL
PROT SERPL-MCNC: 6.9 G/DL
PROT SERPL-MCNC: 6.9 G/DL

## 2018-03-13 ENCOUNTER — APPOINTMENT (OUTPATIENT)
Dept: HEMATOLOGY ONCOLOGY | Facility: CLINIC | Age: 83
End: 2018-03-13
Payer: MEDICARE

## 2018-03-13 DIAGNOSIS — K64.9 UNSPECIFIED HEMORRHOIDS: ICD-10-CM

## 2018-03-13 DIAGNOSIS — I47.2 VENTRICULAR TACHYCARDIA: ICD-10-CM

## 2018-03-13 DIAGNOSIS — R09.02 HYPOXEMIA: ICD-10-CM

## 2018-03-13 DIAGNOSIS — R31.29 OTHER MICROSCOPIC HEMATURIA: ICD-10-CM

## 2018-03-19 ENCOUNTER — APPOINTMENT (OUTPATIENT)
Dept: INTERNAL MEDICINE | Facility: CLINIC | Age: 83
End: 2018-03-19

## 2018-03-20 ENCOUNTER — APPOINTMENT (OUTPATIENT)
Dept: INFUSION THERAPY | Facility: CLINIC | Age: 83
End: 2018-03-20

## 2018-03-20 ENCOUNTER — APPOINTMENT (OUTPATIENT)
Dept: HEMATOLOGY ONCOLOGY | Facility: CLINIC | Age: 83
End: 2018-03-20
Payer: MEDICARE

## 2018-03-20 VITALS — BODY MASS INDEX: 17.89 KG/M2 | WEIGHT: 101 LBS | HEIGHT: 63 IN | TEMPERATURE: 97.61 F | HEART RATE: 98 BPM

## 2018-03-20 DIAGNOSIS — Z23 ENCOUNTER FOR IMMUNIZATION: ICD-10-CM

## 2018-03-20 PROCEDURE — 99214 OFFICE O/P EST MOD 30 MIN: CPT | Mod: 25

## 2018-03-20 PROCEDURE — 96365 THER/PROPH/DIAG IV INF INIT: CPT

## 2018-03-20 RX ORDER — AMIODARONE HYDROCHLORIDE 100 MG/1
100 TABLET ORAL
Qty: 90 | Refills: 0 | Status: DISCONTINUED | COMMUNITY
Start: 2017-11-15 | End: 2018-03-20

## 2018-03-20 RX ORDER — MAGNESIUM CHLORIDE 71.5 G/G
TABLET ORAL
Refills: 0 | Status: DISCONTINUED | COMMUNITY
End: 2018-03-20

## 2018-03-20 RX ORDER — VALACYCLOVIR 1 G/1
1 TABLET, FILM COATED ORAL
Qty: 21 | Refills: 0 | Status: DISCONTINUED | COMMUNITY
Start: 2017-10-12 | End: 2018-03-20

## 2018-03-27 ENCOUNTER — APPOINTMENT (OUTPATIENT)
Dept: INFUSION THERAPY | Facility: CLINIC | Age: 83
End: 2018-03-27
Payer: MEDICARE

## 2018-03-27 ENCOUNTER — LABORATORY RESULT (OUTPATIENT)
Age: 83
End: 2018-03-27

## 2018-03-27 VITALS
DIASTOLIC BLOOD PRESSURE: 59 MMHG | HEIGHT: 63 IN | HEART RATE: 102 BPM | SYSTOLIC BLOOD PRESSURE: 93 MMHG | BODY MASS INDEX: 17.89 KG/M2 | TEMPERATURE: 98.2 F | WEIGHT: 101 LBS

## 2018-03-27 LAB
HCT VFR BLD CALC: 29.4 %
HGB BLD-MCNC: 9.29 G/DL
MCHC RBC-ENTMCNC: 30.2 PG
MCHC RBC-ENTMCNC: 31.6 GM/DL
MCV RBC AUTO: 95.4 FL
PLATELET # BLD AUTO: 231 K/UL
RBC # BLD: 3.08 M/UL
RBC # FLD: 16.1 %
WBC # FLD AUTO: 4.1 K/UL

## 2018-03-27 PROCEDURE — 85025 COMPLETE CBC W/AUTO DIFF WBC: CPT

## 2018-03-27 PROCEDURE — 96365 THER/PROPH/DIAG IV INF INIT: CPT

## 2018-03-27 PROCEDURE — 36415 COLL VENOUS BLD VENIPUNCTURE: CPT

## 2018-04-17 ENCOUNTER — LABORATORY RESULT (OUTPATIENT)
Age: 83
End: 2018-04-17

## 2018-04-17 ENCOUNTER — APPOINTMENT (OUTPATIENT)
Dept: HEMATOLOGY ONCOLOGY | Facility: CLINIC | Age: 83
End: 2018-04-17
Payer: MEDICARE

## 2018-04-17 VITALS
HEART RATE: 96 BPM | HEIGHT: 63 IN | DIASTOLIC BLOOD PRESSURE: 63 MMHG | WEIGHT: 101.38 LBS | TEMPERATURE: 98.8 F | BODY MASS INDEX: 17.96 KG/M2 | SYSTOLIC BLOOD PRESSURE: 99 MMHG

## 2018-04-17 LAB
HCT VFR BLD CALC: 35.7 %
HGB BLD-MCNC: 11.3 G/DL
MCHC RBC-ENTMCNC: 31.6 PG
MCHC RBC-ENTMCNC: 31.8 GM/DL
MCV RBC AUTO: 99.4 FL
PLATELET # BLD AUTO: 194 K/UL
RBC # BLD: 3.59 M/UL
RBC # FLD: 18.6 %
WBC # FLD AUTO: 4 K/UL

## 2018-04-17 PROCEDURE — 85025 COMPLETE CBC W/AUTO DIFF WBC: CPT | Mod: Q5

## 2018-04-17 PROCEDURE — 36415 COLL VENOUS BLD VENIPUNCTURE: CPT | Mod: Q5

## 2018-04-18 LAB
ERYTHROCYTE [SEDIMENTATION RATE] IN BLOOD BY WESTERGREN METHOD: 47 MM/HR
FERRITIN SERPL-MCNC: 438 NG/ML
IRON SATN MFR SERPL: 36 %
IRON SERPL-MCNC: 85 UG/DL
TIBC SERPL-MCNC: 237 UG/DL
UIBC SERPL-MCNC: 152 UG/DL

## 2018-04-19 ENCOUNTER — APPOINTMENT (OUTPATIENT)
Dept: INTERNAL MEDICINE | Facility: CLINIC | Age: 83
End: 2018-04-19
Payer: MEDICARE

## 2018-04-19 VITALS
TEMPERATURE: 97.5 F | HEIGHT: 63 IN | DIASTOLIC BLOOD PRESSURE: 56 MMHG | BODY MASS INDEX: 17.54 KG/M2 | OXYGEN SATURATION: 98 % | HEART RATE: 84 BPM | RESPIRATION RATE: 16 BRPM | WEIGHT: 99 LBS | SYSTOLIC BLOOD PRESSURE: 100 MMHG

## 2018-04-19 PROCEDURE — G0444 DEPRESSION SCREEN ANNUAL: CPT

## 2018-04-19 PROCEDURE — G0439: CPT

## 2018-05-14 ENCOUNTER — FORM ENCOUNTER (OUTPATIENT)
Age: 83
End: 2018-05-14

## 2018-05-15 ENCOUNTER — OUTPATIENT (OUTPATIENT)
Dept: OUTPATIENT SERVICES | Facility: HOSPITAL | Age: 83
LOS: 1 days | End: 2018-05-15
Payer: MEDICARE

## 2018-05-15 ENCOUNTER — APPOINTMENT (OUTPATIENT)
Dept: RADIOLOGY | Facility: CLINIC | Age: 83
End: 2018-05-15
Payer: MEDICARE

## 2018-05-15 DIAGNOSIS — Z00.8 ENCOUNTER FOR OTHER GENERAL EXAMINATION: ICD-10-CM

## 2018-05-15 DIAGNOSIS — Z95.0 PRESENCE OF CARDIAC PACEMAKER: Chronic | ICD-10-CM

## 2018-05-15 PROCEDURE — 77080 DXA BONE DENSITY AXIAL: CPT | Mod: 26

## 2018-05-15 PROCEDURE — 77080 DXA BONE DENSITY AXIAL: CPT

## 2018-05-29 ENCOUNTER — LABORATORY RESULT (OUTPATIENT)
Age: 83
End: 2018-05-29

## 2018-05-29 ENCOUNTER — APPOINTMENT (OUTPATIENT)
Dept: HEMATOLOGY ONCOLOGY | Facility: CLINIC | Age: 83
End: 2018-05-29
Payer: MEDICARE

## 2018-05-29 VITALS
WEIGHT: 101 LBS | TEMPERATURE: 98.6 F | DIASTOLIC BLOOD PRESSURE: 53 MMHG | HEIGHT: 63 IN | HEART RATE: 86 BPM | SYSTOLIC BLOOD PRESSURE: 89 MMHG | BODY MASS INDEX: 17.89 KG/M2

## 2018-05-29 LAB
HCT VFR BLD CALC: 35.2 %
HGB BLD-MCNC: 11.9 G/DL
MCHC RBC-ENTMCNC: 33.4 PG
MCHC RBC-ENTMCNC: 34 GM/DL
MCV RBC AUTO: 98.4 FL
PLATELET # BLD AUTO: 189 K/UL
RBC # BLD: 3.57 M/UL
RBC # FLD: 16 %
WBC # FLD AUTO: 4.6 K/UL

## 2018-05-29 PROCEDURE — 36415 COLL VENOUS BLD VENIPUNCTURE: CPT

## 2018-05-29 PROCEDURE — 99215 OFFICE O/P EST HI 40 MIN: CPT | Mod: 25

## 2018-05-29 PROCEDURE — 85025 COMPLETE CBC W/AUTO DIFF WBC: CPT

## 2018-05-29 RX ORDER — FUROSEMIDE 40 MG/1
40 TABLET ORAL
Qty: 30 | Refills: 0 | Status: DISCONTINUED | COMMUNITY
Start: 2018-02-05

## 2018-05-29 RX ORDER — CARVEDILOL 6.25 MG/1
6.25 TABLET, FILM COATED ORAL
Qty: 180 | Refills: 0 | Status: DISCONTINUED | COMMUNITY
Start: 2017-08-15

## 2018-05-31 LAB
ALBUMIN MFR SERPL ELPH: 53 %
ALBUMIN SERPL ELPH-MCNC: 3.9 G/DL
ALBUMIN SERPL-MCNC: 3.6 G/DL
ALBUMIN/GLOB SERPL: 1.2 RATIO
ALP BLD-CCNC: 71 U/L
ALPHA1 GLOB MFR SERPL ELPH: 4.8 %
ALPHA1 GLOB SERPL ELPH-MCNC: 0.3 G/DL
ALPHA2 GLOB MFR SERPL ELPH: 11.9 %
ALPHA2 GLOB SERPL ELPH-MCNC: 0.8 G/DL
ALT SERPL-CCNC: 15 U/L
ANION GAP SERPL CALC-SCNC: 15 MMOL/L
AST SERPL-CCNC: 25 U/L
B-GLOBULIN MFR SERPL ELPH: 19.5 %
B-GLOBULIN SERPL ELPH-MCNC: 1.3 G/DL
B2 MICROGLOB SERPL-MCNC: 6.1 MG/L
BILIRUB SERPL-MCNC: 0.4 MG/DL
BUN SERPL-MCNC: 34 MG/DL
CALCIUM SERPL-MCNC: 9.2 MG/DL
CHLORIDE SERPL-SCNC: 100 MMOL/L
CO2 SERPL-SCNC: 27 MMOL/L
CREAT SERPL-MCNC: 2.04 MG/DL
GAMMA GLOB FLD ELPH-MCNC: 0.7 G/DL
GAMMA GLOB MFR SERPL ELPH: 10.8 %
GLUCOSE SERPL-MCNC: 119 MG/DL
INTERPRETATION SERPL IEP-IMP: NORMAL
M PROTEIN MFR SERPL ELPH: 12.2 %
M PROTEIN SPEC IFE-MCNC: NORMAL
MONOCLON BAND OBS SERPL: 0.8 G/DL
POTASSIUM SERPL-SCNC: 4.7 MMOL/L
PROT SERPL-MCNC: 6.7 G/DL
SODIUM SERPL-SCNC: 142 MMOL/L

## 2018-06-04 LAB
ALBUPE: 27.8 %
ALPHA1UPE: 4.6 %
ALPHA2UPE: 11.6 %
BETAUPE: 20 %
CREAT 24H UR-MCNC: NORMAL G/24 H
CREATININE UR (MAYO): 320 MG/DL
DEPRECATED KAPPA LC FREE/LAMBDA SER: 0.32 RATIO
DEPRECATED KAPPA LC FREE/LAMBDA SER: 0.32 RATIO
GAMMAUPE: 36 %
IGA 24H UR QL IFE: NORMAL
IGA SER QL IEP: 720 MG/DL
IGG SER QL IEP: 758 MG/DL
IGM SER QL IEP: 22 MG/DL
KAPPA LC 24H UR QL: NORMAL
KAPPA LC CSF-MCNC: 8.6 MG/DL
KAPPA LC CSF-MCNC: 8.6 MG/DL
KAPPA LC SERPL-MCNC: 2.77 MG/DL
KAPPA LC SERPL-MCNC: 2.77 MG/DL
PROT PATTERN 24H UR ELPH-IMP: NORMAL
PROT UR-MCNC: 40 MG/DL
PROT UR-MCNC: 40 MG/DL
SPECIMEN VOL 24H UR: NORMAL

## 2018-06-13 ENCOUNTER — RX RENEWAL (OUTPATIENT)
Age: 83
End: 2018-06-13

## 2018-07-09 ENCOUNTER — RX RENEWAL (OUTPATIENT)
Age: 83
End: 2018-07-09

## 2018-09-06 ENCOUNTER — LABORATORY RESULT (OUTPATIENT)
Age: 83
End: 2018-09-06

## 2018-09-06 ENCOUNTER — APPOINTMENT (OUTPATIENT)
Dept: HEMATOLOGY ONCOLOGY | Facility: CLINIC | Age: 83
End: 2018-09-06
Payer: MEDICARE

## 2018-09-06 VITALS
SYSTOLIC BLOOD PRESSURE: 91 MMHG | WEIGHT: 101 LBS | HEIGHT: 63 IN | HEART RATE: 82 BPM | BODY MASS INDEX: 17.89 KG/M2 | TEMPERATURE: 98.2 F | DIASTOLIC BLOOD PRESSURE: 53 MMHG

## 2018-09-06 LAB
HCT VFR BLD CALC: 36.5 %
HGB BLD-MCNC: 12.2 G/DL
MCHC RBC-ENTMCNC: 33.3 GM/DL
MCHC RBC-ENTMCNC: 33.3 PG
MCV RBC AUTO: 100 FL
PLATELET # BLD AUTO: 238 K/UL
RBC # BLD: 3.65 M/UL
RBC # FLD: 12.2 %
WBC # FLD AUTO: 5.6 K/UL

## 2018-09-06 PROCEDURE — 85025 COMPLETE CBC W/AUTO DIFF WBC: CPT

## 2018-09-06 PROCEDURE — 99213 OFFICE O/P EST LOW 20 MIN: CPT | Mod: 25

## 2018-09-06 PROCEDURE — 36415 COLL VENOUS BLD VENIPUNCTURE: CPT

## 2018-09-07 LAB
DEPRECATED KAPPA LC FREE/LAMBDA SER: 0.39 RATIO
ERYTHROCYTE [SEDIMENTATION RATE] IN BLOOD BY WESTERGREN METHOD: 45 MM/HR
FERRITIN SERPL-MCNC: 211 NG/ML
IRON SATN MFR SERPL: 23 %
IRON SERPL-MCNC: 56 UG/DL
KAPPA LC CSF-MCNC: 10.34 MG/DL
KAPPA LC SERPL-MCNC: 4.01 MG/DL
TIBC SERPL-MCNC: 247 UG/DL
UIBC SERPL-MCNC: 191 UG/DL

## 2018-09-10 LAB
ALBUMIN MFR SERPL ELPH: 55.6 %
ALBUMIN SERPL-MCNC: 4.1 G/DL
ALBUMIN/GLOB SERPL: 1.3 RATIO
ALPHA1 GLOB MFR SERPL ELPH: 4.8 %
ALPHA1 GLOB SERPL ELPH-MCNC: 0.4 G/DL
ALPHA2 GLOB MFR SERPL ELPH: 11.2 %
ALPHA2 GLOB SERPL ELPH-MCNC: 0.8 G/DL
B-GLOBULIN MFR SERPL ELPH: 19.3 %
B-GLOBULIN SERPL ELPH-MCNC: 1.4 G/DL
DEPRECATED KAPPA LC FREE/LAMBDA SER: 0.39 RATIO
GAMMA GLOB FLD ELPH-MCNC: 0.7 G/DL
GAMMA GLOB MFR SERPL ELPH: 9.1 %
IGA SER QL IEP: 815 MG/DL
IGG SER QL IEP: 788 MG/DL
IGM SER QL IEP: 20 MG/DL
INTERPRETATION SERPL IEP-IMP: NORMAL
KAPPA LC CSF-MCNC: 10.34 MG/DL
KAPPA LC SERPL-MCNC: 4.01 MG/DL
M PROTEIN MFR SERPL ELPH: 11.4 %
M PROTEIN SPEC IFE-MCNC: NORMAL
MONOCLON BAND OBS SERPL: 0.8 G/DL
PROT SERPL-MCNC: 7.3 G/DL
PROT SERPL-MCNC: 7.3 G/DL

## 2018-09-13 LAB
CHOLEST SERPL-MCNC: 140
CHOLEST SERPL-MCNC: 163
GLUCOSE BS SERPL-MCNC: 99
HDLC SERPL-MCNC: 57
HDLC SERPL-MCNC: 59
HDLC SERPL: 2.8
LDLC SERPL CALC-MCNC: 65
LDLC SERPL CALC-MCNC: 90
TRIGL SERPL-MCNC: 68

## 2018-10-03 ENCOUNTER — RX RENEWAL (OUTPATIENT)
Age: 83
End: 2018-10-03

## 2018-10-16 ENCOUNTER — APPOINTMENT (OUTPATIENT)
Dept: INTERNAL MEDICINE | Facility: CLINIC | Age: 83
End: 2018-10-16
Payer: MEDICARE

## 2018-10-16 VITALS
SYSTOLIC BLOOD PRESSURE: 92 MMHG | BODY MASS INDEX: 17.19 KG/M2 | WEIGHT: 97 LBS | HEIGHT: 63 IN | RESPIRATION RATE: 16 BRPM | TEMPERATURE: 98 F | OXYGEN SATURATION: 99 % | HEART RATE: 70 BPM | DIASTOLIC BLOOD PRESSURE: 58 MMHG

## 2018-10-16 PROCEDURE — 94729 DIFFUSING CAPACITY: CPT

## 2018-10-16 PROCEDURE — ZZZZZ: CPT

## 2018-10-16 PROCEDURE — G0008: CPT

## 2018-10-16 PROCEDURE — 99214 OFFICE O/P EST MOD 30 MIN: CPT | Mod: 25

## 2018-10-16 PROCEDURE — 94060 EVALUATION OF WHEEZING: CPT

## 2018-10-16 PROCEDURE — 90662 IIV NO PRSV INCREASED AG IM: CPT

## 2018-10-16 PROCEDURE — 94727 GAS DIL/WSHOT DETER LNG VOL: CPT

## 2018-10-16 NOTE — DATA REVIEWED
[FreeTextEntry1] : A pulmonary function test is performed. Lung volumes are mildly reduced in a symmetric fashion. Lung mechanics are within normal limits. Slight frontal dilator reactivity is demonstrated. The DLCO and saturation maintained. This represents a mild degree of restrictive disease.

## 2018-10-16 NOTE — PLAN
[FreeTextEntry1] : 1. Continue with medication as outlined above.\par \par 2. Flu shot\par \par 3. The patient refuses a new shingles vaccine\par \par 4. Await the results of the [place]-guard stool analysis.\par \par 5. Follow up with myself in 6 months with a wellness evaluation and we'll yearly routine fasting blood work.

## 2018-10-16 NOTE — PHYSICAL EXAM
[General Appearance - Alert] : alert [General Appearance - In No Acute Distress] : in no acute distress [Sclera] : the sclera and conjunctiva were normal [PERRL With Normal Accommodation] : pupils were equal in size, round, and reactive to light [Extraocular Movements] : extraocular movements were intact [Outer Ear] : the ears and nose were normal in appearance [Oropharynx] : the oropharynx was normal [Neck Appearance] : the appearance of the neck was normal [Neck Cervical Mass (___cm)] : no neck mass was observed [Jugular Venous Distention Increased] : there was no jugular-venous distention [Thyroid Diffuse Enlargement] : the thyroid was not enlarged [Thyroid Nodule] : there were no palpable thyroid nodules [Apical Impulse] : the apical impulse was normal [Heart Rate And Rhythm] : heart rate was normal and rhythm regular [Heart Sounds] : normal S1 and S2 [FreeTextEntry1] : Carotids are 1+ bilaterally. There is trace to 1+-2+  edema bilaterally [Bowel Sounds] : normal bowel sounds [Abdomen Soft] : soft [Abdomen Tenderness] : non-tender [] : no hepato-splenomegaly [Abdomen Mass (___ Cm)] : no abdominal mass palpated [Cervical Lymph Nodes Enlarged Posterior Bilaterally] : posterior cervical [Cervical Lymph Nodes Enlarged Anterior Bilaterally] : anterior cervical [Supraclavicular Lymph Nodes Enlarged Bilaterally] : supraclavicular [Nail Clubbing] : no clubbing  or cyanosis of the fingernails

## 2018-10-16 NOTE — HISTORY OF PRESENT ILLNESS
[de-identified] : The patient comes in today for a followup evaluation and reassessment. There are several issues to discuss.\par \par Overall, at this time, the patient states that she is fairly stable. She continues to be followed by hematology, for her chronic anemia which is felt to be multifactorial in nature. She did see her gastroenterologist, Dr. Ryan, who wanted to perform another colonoscopy. The patient spoke with her cardiologist, Dr. Marrero who did not want her to undergo the prep due to the fragility of her fluid status. She subsequently went to another gastroenterologist, and she has recently performed a new [place]-guard test on 10/15/18. The results are not yet back. She has not received any iron infusions as of late. She does not take iron orally to to lack of absorption.\par \par The patient states that she is otherwise compliant with her medications. She has not had any chest pains. She denies any fevers chills or night sweats. She also takes her Synthroid as prescribed. Her appetite is good. She now comes in for this assessment.

## 2018-12-11 ENCOUNTER — MEDICATION RENEWAL (OUTPATIENT)
Age: 83
End: 2018-12-11

## 2018-12-11 ENCOUNTER — RX RENEWAL (OUTPATIENT)
Age: 83
End: 2018-12-11

## 2019-01-01 NOTE — ED ADULT NURSE NOTE - NEURO WDL
Agueda Gonzalez  (RN)  2019 00:26:21 Alert and oriented to person, place and time, memory intact, behavior appropriate to situation, PERRL.

## 2019-01-02 ENCOUNTER — MEDICATION RENEWAL (OUTPATIENT)
Age: 84
End: 2019-01-02

## 2019-01-09 ENCOUNTER — OUTPATIENT (OUTPATIENT)
Dept: OUTPATIENT SERVICES | Facility: HOSPITAL | Age: 84
LOS: 1 days | Discharge: HOME | End: 2019-01-09
Payer: MEDICARE

## 2019-01-09 VITALS
OXYGEN SATURATION: 100 % | TEMPERATURE: 98 F | SYSTOLIC BLOOD PRESSURE: 98 MMHG | WEIGHT: 100.09 LBS | RESPIRATION RATE: 18 BRPM | HEART RATE: 70 BPM | DIASTOLIC BLOOD PRESSURE: 60 MMHG | HEIGHT: 60.5 IN

## 2019-01-09 DIAGNOSIS — Z98.49 CATARACT EXTRACTION STATUS, UNSPECIFIED EYE: Chronic | ICD-10-CM

## 2019-01-09 DIAGNOSIS — Z95.810 PRESENCE OF AUTOMATIC (IMPLANTABLE) CARDIAC DEFIBRILLATOR: Chronic | ICD-10-CM

## 2019-01-09 DIAGNOSIS — Z98.890 OTHER SPECIFIED POSTPROCEDURAL STATES: Chronic | ICD-10-CM

## 2019-01-09 DIAGNOSIS — D64.9 ANEMIA, UNSPECIFIED: ICD-10-CM

## 2019-01-09 DIAGNOSIS — Z12.11 ENCOUNTER FOR SCREENING FOR MALIGNANT NEOPLASM OF COLON: ICD-10-CM

## 2019-01-09 DIAGNOSIS — Z95.0 PRESENCE OF CARDIAC PACEMAKER: Chronic | ICD-10-CM

## 2019-01-09 LAB
ANION GAP SERPL CALC-SCNC: 9 MMOL/L — SIGNIFICANT CHANGE UP (ref 5–17)
BASOPHILS # BLD AUTO: 0.05 K/UL — SIGNIFICANT CHANGE UP (ref 0–0.2)
BASOPHILS NFR BLD AUTO: 1.1 % — SIGNIFICANT CHANGE UP (ref 0–2)
BUN SERPL-MCNC: 35 MG/DL — HIGH (ref 7–23)
CALCIUM SERPL-MCNC: 8.8 MG/DL — SIGNIFICANT CHANGE UP (ref 8.5–10.1)
CHLORIDE SERPL-SCNC: 101 MMOL/L — SIGNIFICANT CHANGE UP (ref 96–108)
CO2 SERPL-SCNC: 31 MMOL/L — SIGNIFICANT CHANGE UP (ref 22–31)
CREAT SERPL-MCNC: 2.04 MG/DL — HIGH (ref 0.5–1.3)
EOSINOPHIL # BLD AUTO: 0.07 K/UL — SIGNIFICANT CHANGE UP (ref 0–0.5)
EOSINOPHIL NFR BLD AUTO: 1.5 % — SIGNIFICANT CHANGE UP (ref 0–6)
GLUCOSE SERPL-MCNC: 85 MG/DL — SIGNIFICANT CHANGE UP (ref 70–99)
HCT VFR BLD CALC: 32.1 % — LOW (ref 34.5–45)
HGB BLD-MCNC: 10.6 G/DL — LOW (ref 11.5–15.5)
IMM GRANULOCYTES NFR BLD AUTO: 0.2 % — SIGNIFICANT CHANGE UP (ref 0–1.5)
LYMPHOCYTES # BLD AUTO: 1.24 K/UL — SIGNIFICANT CHANGE UP (ref 1–3.3)
LYMPHOCYTES # BLD AUTO: 26.4 % — SIGNIFICANT CHANGE UP (ref 13–44)
MCHC RBC-ENTMCNC: 33 GM/DL — SIGNIFICANT CHANGE UP (ref 32–36)
MCHC RBC-ENTMCNC: 33.3 PG — SIGNIFICANT CHANGE UP (ref 27–34)
MCV RBC AUTO: 100.9 FL — HIGH (ref 80–100)
MONOCYTES # BLD AUTO: 0.6 K/UL — SIGNIFICANT CHANGE UP (ref 0–0.9)
MONOCYTES NFR BLD AUTO: 12.8 % — SIGNIFICANT CHANGE UP (ref 2–14)
NEUTROPHILS # BLD AUTO: 2.72 K/UL — SIGNIFICANT CHANGE UP (ref 1.8–7.4)
NEUTROPHILS NFR BLD AUTO: 58 % — SIGNIFICANT CHANGE UP (ref 43–77)
NRBC # BLD: 0 /100 WBCS — SIGNIFICANT CHANGE UP (ref 0–0)
PLATELET # BLD AUTO: 200 K/UL — SIGNIFICANT CHANGE UP (ref 150–400)
POTASSIUM SERPL-MCNC: 4.1 MMOL/L — SIGNIFICANT CHANGE UP (ref 3.5–5.3)
POTASSIUM SERPL-SCNC: 4.1 MMOL/L — SIGNIFICANT CHANGE UP (ref 3.5–5.3)
RBC # BLD: 3.18 M/UL — LOW (ref 3.8–5.2)
RBC # FLD: 13.2 % — SIGNIFICANT CHANGE UP (ref 10.3–14.5)
SODIUM SERPL-SCNC: 141 MMOL/L — SIGNIFICANT CHANGE UP (ref 135–145)
WBC # BLD: 4.69 K/UL — SIGNIFICANT CHANGE UP (ref 3.8–10.5)
WBC # FLD AUTO: 4.69 K/UL — SIGNIFICANT CHANGE UP (ref 3.8–10.5)

## 2019-01-09 PROCEDURE — 93010 ELECTROCARDIOGRAM REPORT: CPT

## 2019-01-09 RX ORDER — CARVEDILOL PHOSPHATE 80 MG/1
1 CAPSULE, EXTENDED RELEASE ORAL
Qty: 0 | Refills: 0 | COMMUNITY

## 2019-01-09 NOTE — H&P PST ADULT - PSH
AICD (automatic cardioverter/defibrillator) present  with PPM. Replaced x 2.  H/O colonoscopy  last done 2009  H/O right inguinal hernia repair    History of cataract extraction  Bilateral

## 2019-01-09 NOTE — H&P PST ADULT - PMH
AICD (automatic cardioverter/defibrillator) present  x 3 . Replaced x 2. Presently right subclavian area  Arthritis    Celiac disease    Chronic atrial fibrillation    Hashimoto's thyroiditis    History of cataract    History of CHF (congestive heart failure)    History of colon polyps    HTN (hypertension)    Hyperlipidemia, unspecified hyperlipidemia type    Hypothyroid    Iron deficiency anemia due to chronic blood loss    Left inguinal hernia    Mitral valve prolapse    Myocardial infarction  1990  Osteoporosis    Peripheral edema    Varicose veins  BLE

## 2019-01-09 NOTE — H&P PST ADULT - ASSESSMENT
84 years old female present to PST prior to Colonoscopy with Dr. Ryan.    Plan  1. Expect a call from Endoscopy the day before your procedure between 11am and 3pm.  2. Follow the GI doctor's instructions for preparation  and day before procedure activities.  3. Follow the GI doctor's  instructions for medications.

## 2019-01-09 NOTE — H&P PST ADULT - FAMILY HISTORY
Mother  Still living? No  Family history of diabetes mellitus, Age at diagnosis: Age Unknown  Family history of heart disease, Age at diagnosis: Age Unknown     Sibling  Still living? No  Family history of diabetes mellitus, Age at diagnosis: Age Unknown     Father  Still living? No  Family history of dementia, Age at diagnosis: Age Unknown

## 2019-01-09 NOTE — H&P PST ADULT - HISTORY OF PRESENT ILLNESS
84 years old female present to PST prior to colonoscopy. Patient did a Cologard test that was abnormal. She has a history of colon polyps. She also has a history of Anemia.

## 2019-01-10 PROBLEM — I83.90 ASYMPTOMATIC VARICOSE VEINS OF UNSPECIFIED LOWER EXTREMITY: Chronic | Status: ACTIVE | Noted: 2019-01-09

## 2019-01-10 PROBLEM — Z95.0 PRESENCE OF CARDIAC PACEMAKER: Chronic | Status: INACTIVE | Noted: 2017-05-06 | Resolved: 2019-01-09

## 2019-01-17 ENCOUNTER — OUTPATIENT (OUTPATIENT)
Dept: OUTPATIENT SERVICES | Facility: HOSPITAL | Age: 84
LOS: 1 days | Discharge: HOME | End: 2019-01-17

## 2019-01-17 VITALS
SYSTOLIC BLOOD PRESSURE: 138 MMHG | RESPIRATION RATE: 18 BRPM | DIASTOLIC BLOOD PRESSURE: 68 MMHG | WEIGHT: 100.09 LBS | TEMPERATURE: 98 F | HEART RATE: 72 BPM | OXYGEN SATURATION: 100 %

## 2019-01-17 DIAGNOSIS — Z98.890 OTHER SPECIFIED POSTPROCEDURAL STATES: Chronic | ICD-10-CM

## 2019-01-17 DIAGNOSIS — Z98.49 CATARACT EXTRACTION STATUS, UNSPECIFIED EYE: Chronic | ICD-10-CM

## 2019-01-17 DIAGNOSIS — Z95.810 PRESENCE OF AUTOMATIC (IMPLANTABLE) CARDIAC DEFIBRILLATOR: Chronic | ICD-10-CM

## 2019-01-17 LAB
INR BLD: 1.62 RATIO — HIGH (ref 0.88–1.16)
PROTHROM AB SERPL-ACNC: 18.2 SEC — HIGH (ref 10–12.9)

## 2019-01-22 ENCOUNTER — LABORATORY RESULT (OUTPATIENT)
Age: 84
End: 2019-01-22

## 2019-01-22 ENCOUNTER — APPOINTMENT (OUTPATIENT)
Dept: HEMATOLOGY ONCOLOGY | Facility: CLINIC | Age: 84
End: 2019-01-22
Payer: MEDICARE

## 2019-01-22 VITALS
TEMPERATURE: 98.6 F | BODY MASS INDEX: 17.45 KG/M2 | SYSTOLIC BLOOD PRESSURE: 95 MMHG | WEIGHT: 98.5 LBS | HEART RATE: 93 BPM | HEIGHT: 63 IN | DIASTOLIC BLOOD PRESSURE: 59 MMHG

## 2019-01-22 LAB
HCT VFR BLD CALC: 31.7 %
HGB BLD-MCNC: 10.3 G/DL
MCHC RBC-ENTMCNC: 32.5 GM/DL
MCHC RBC-ENTMCNC: 33.6 PG
MCV RBC AUTO: 103 FL
PLATELET # BLD AUTO: 178 K/UL
RBC # BLD: 3.07 M/UL
RBC # FLD: 12.5 %
WBC # FLD AUTO: 4.3 K/UL

## 2019-01-22 PROCEDURE — 85025 COMPLETE CBC W/AUTO DIFF WBC: CPT

## 2019-01-22 PROCEDURE — 99213 OFFICE O/P EST LOW 20 MIN: CPT | Mod: 25

## 2019-01-22 PROCEDURE — 36415 COLL VENOUS BLD VENIPUNCTURE: CPT

## 2019-01-23 DIAGNOSIS — Z87.891 PERSONAL HISTORY OF NICOTINE DEPENDENCE: ICD-10-CM

## 2019-01-23 DIAGNOSIS — Z95.810 PRESENCE OF AUTOMATIC (IMPLANTABLE) CARDIAC DEFIBRILLATOR: ICD-10-CM

## 2019-01-23 DIAGNOSIS — I83.93 ASYMPTOMATIC VARICOSE VEINS OF BILATERAL LOWER EXTREMITIES: ICD-10-CM

## 2019-01-23 DIAGNOSIS — Z87.19 PERSONAL HISTORY OF OTHER DISEASES OF THE DIGESTIVE SYSTEM: ICD-10-CM

## 2019-01-23 DIAGNOSIS — I11.0 HYPERTENSIVE HEART DISEASE WITH HEART FAILURE: ICD-10-CM

## 2019-01-23 DIAGNOSIS — Z83.3 FAMILY HISTORY OF DIABETES MELLITUS: ICD-10-CM

## 2019-01-23 DIAGNOSIS — I25.2 OLD MYOCARDIAL INFARCTION: ICD-10-CM

## 2019-01-23 DIAGNOSIS — Z91.040 LATEX ALLERGY STATUS: ICD-10-CM

## 2019-01-23 DIAGNOSIS — Z86.010 PERSONAL HISTORY OF COLONIC POLYPS: ICD-10-CM

## 2019-01-23 DIAGNOSIS — Z82.49 FAMILY HISTORY OF ISCHEMIC HEART DISEASE AND OTHER DISEASES OF THE CIRCULATORY SYSTEM: ICD-10-CM

## 2019-01-23 DIAGNOSIS — I34.1 NONRHEUMATIC MITRAL (VALVE) PROLAPSE: ICD-10-CM

## 2019-01-23 DIAGNOSIS — E06.3 AUTOIMMUNE THYROIDITIS: ICD-10-CM

## 2019-01-23 DIAGNOSIS — E78.5 HYPERLIPIDEMIA, UNSPECIFIED: ICD-10-CM

## 2019-01-23 DIAGNOSIS — M81.0 AGE-RELATED OSTEOPOROSIS WITHOUT CURRENT PATHOLOGICAL FRACTURE: ICD-10-CM

## 2019-01-23 DIAGNOSIS — Z88.1 ALLERGY STATUS TO OTHER ANTIBIOTIC AGENTS STATUS: ICD-10-CM

## 2019-01-23 DIAGNOSIS — I50.9 HEART FAILURE, UNSPECIFIED: ICD-10-CM

## 2019-01-23 DIAGNOSIS — I48.2 CHRONIC ATRIAL FIBRILLATION: ICD-10-CM

## 2019-01-23 DIAGNOSIS — I25.10 ATHEROSCLEROTIC HEART DISEASE OF NATIVE CORONARY ARTERY WITHOUT ANGINA PECTORIS: ICD-10-CM

## 2019-01-23 DIAGNOSIS — Z98.41 CATARACT EXTRACTION STATUS, RIGHT EYE: ICD-10-CM

## 2019-01-23 DIAGNOSIS — Z79.82 LONG TERM (CURRENT) USE OF ASPIRIN: ICD-10-CM

## 2019-01-23 DIAGNOSIS — M19.90 UNSPECIFIED OSTEOARTHRITIS, UNSPECIFIED SITE: ICD-10-CM

## 2019-01-23 DIAGNOSIS — D50.9 IRON DEFICIENCY ANEMIA, UNSPECIFIED: ICD-10-CM

## 2019-01-23 DIAGNOSIS — Z98.42 CATARACT EXTRACTION STATUS, LEFT EYE: ICD-10-CM

## 2019-01-24 LAB
ALBUMIN MFR SERPL ELPH: 54.7 %
ALBUMIN SERPL ELPH-MCNC: 3.6 G/DL
ALBUMIN SERPL-MCNC: 3.4 G/DL
ALBUMIN/GLOB SERPL: 1.2 RATIO
ALP BLD-CCNC: 63 U/L
ALPHA1 GLOB MFR SERPL ELPH: 4.8 %
ALPHA1 GLOB SERPL ELPH-MCNC: 0.3 G/DL
ALPHA2 GLOB MFR SERPL ELPH: 11.9 %
ALPHA2 GLOB SERPL ELPH-MCNC: 0.7 G/DL
ALT SERPL-CCNC: 20 U/L
ANION GAP SERPL CALC-SCNC: 7 MMOL/L
AST SERPL-CCNC: 27 U/L
B-GLOBULIN MFR SERPL ELPH: 18.9 %
B-GLOBULIN SERPL ELPH-MCNC: 1.2 G/DL
BILIRUB SERPL-MCNC: 0.3 MG/DL
BUN SERPL-MCNC: 22 MG/DL
CALCIUM SERPL-MCNC: 9 MG/DL
CHLORIDE SERPL-SCNC: 101 MMOL/L
CO2 SERPL-SCNC: 32 MMOL/L
CREAT SERPL-MCNC: 1.88 MG/DL
DEPRECATED KAPPA LC FREE/LAMBDA SER: 0.33 RATIO
DEPRECATED KAPPA LC FREE/LAMBDA SER: 0.33 RATIO
ERYTHROCYTE [SEDIMENTATION RATE] IN BLOOD BY WESTERGREN METHOD: 27 MM/HR
FERRITIN SERPL-MCNC: 132 NG/ML
FOLATE SERPL-MCNC: 12.3 NG/ML
GAMMA GLOB FLD ELPH-MCNC: 0.6 G/DL
GAMMA GLOB MFR SERPL ELPH: 9.7 %
GLUCOSE SERPL-MCNC: 100 MG/DL
IGA SER QL IEP: 794 MG/DL
IGG SER QL IEP: 703 MG/DL
IGM SER QL IEP: 17 MG/DL
INTERPRETATION SERPL IEP-IMP: NORMAL
KAPPA LC CSF-MCNC: 8.53 MG/DL
KAPPA LC CSF-MCNC: 8.53 MG/DL
KAPPA LC SERPL-MCNC: 2.8 MG/DL
KAPPA LC SERPL-MCNC: 2.8 MG/DL
M PROTEIN MFR SERPL ELPH: 13.7 %
M PROTEIN SPEC IFE-MCNC: NORMAL
MONOCLON BAND OBS SERPL: 0.9 G/DL
POTASSIUM SERPL-SCNC: 4.3 MMOL/L
PROT SERPL-MCNC: 6.3 G/DL
SODIUM SERPL-SCNC: 140 MMOL/L
VIT B12 SERPL-MCNC: 607 PG/ML

## 2019-01-28 PROBLEM — Z86.69 PERSONAL HISTORY OF OTHER DISEASES OF THE NERVOUS SYSTEM AND SENSE ORGANS: Chronic | Status: ACTIVE | Noted: 2019-01-09

## 2019-01-28 PROBLEM — K90.0 CELIAC DISEASE: Chronic | Status: ACTIVE | Noted: 2019-01-09

## 2019-01-28 PROBLEM — Z86.79 PERSONAL HISTORY OF OTHER DISEASES OF THE CIRCULATORY SYSTEM: Chronic | Status: ACTIVE | Noted: 2019-01-09

## 2019-01-28 PROBLEM — Z86.010 PERSONAL HISTORY OF COLONIC POLYPS: Chronic | Status: ACTIVE | Noted: 2019-01-09

## 2019-01-28 PROBLEM — Z95.810 PRESENCE OF AUTOMATIC (IMPLANTABLE) CARDIAC DEFIBRILLATOR: Chronic | Status: ACTIVE | Noted: 2019-01-09

## 2019-01-28 PROBLEM — I21.9 ACUTE MYOCARDIAL INFARCTION, UNSPECIFIED: Chronic | Status: ACTIVE | Noted: 2019-01-09

## 2019-01-28 PROBLEM — E06.3 AUTOIMMUNE THYROIDITIS: Chronic | Status: ACTIVE | Noted: 2019-01-09

## 2019-01-28 PROBLEM — M19.90 UNSPECIFIED OSTEOARTHRITIS, UNSPECIFIED SITE: Chronic | Status: ACTIVE | Noted: 2019-01-09

## 2019-01-28 PROBLEM — I34.1 NONRHEUMATIC MITRAL (VALVE) PROLAPSE: Chronic | Status: ACTIVE | Noted: 2019-01-09

## 2019-01-28 PROBLEM — R60.9 EDEMA, UNSPECIFIED: Chronic | Status: ACTIVE | Noted: 2019-01-09

## 2019-01-28 PROBLEM — E78.5 HYPERLIPIDEMIA, UNSPECIFIED: Chronic | Status: ACTIVE | Noted: 2019-01-09

## 2019-01-28 PROBLEM — D50.0 IRON DEFICIENCY ANEMIA SECONDARY TO BLOOD LOSS (CHRONIC): Chronic | Status: ACTIVE | Noted: 2019-01-09

## 2019-01-28 PROBLEM — M81.0 AGE-RELATED OSTEOPOROSIS WITHOUT CURRENT PATHOLOGICAL FRACTURE: Chronic | Status: ACTIVE | Noted: 2019-01-09

## 2019-01-28 LAB
IRON SATN MFR SERPL: 24 %
IRON SERPL-MCNC: 59 UG/DL
TIBC SERPL-MCNC: 241 UG/DL
UIBC SERPL-MCNC: 182 UG/DL

## 2019-03-20 NOTE — ED ADULT NURSE REASSESSMENT NOTE - NS ED NURSE REASSESS COMMENT FT1
To  - last Rx sent for 3 months in 7/2018;  pls review pended Rx if this is maintenance med pt voided multiple times using bedpan . pt has celiac apple souse was given

## 2019-04-02 NOTE — PHYSICAL EXAM
[Thin] : thin [Normal] : full range of motion and no deformities appreciated [de-identified] : anicteric [de-identified] : no thrush or mucositis [de-identified] : no lymphadenopathy [de-identified] : S1S2 RRR, no obvious murmurs [de-identified] : no c/c/e

## 2019-04-02 NOTE — RESULTS/DATA
[FreeTextEntry1] : WBC: 4.3  Hgb: 10.3   Hct: 31.7   MCV: 103   Plts: 178\par Ferritin, TSAT, ESR, SPEP, SIFE, QIG's, B12, Folate: pending

## 2019-04-02 NOTE — REVIEW OF SYSTEMS
[Patient Intake Form Reviewed] : Patient intake form was reviewed [Negative] : Allergic/Immunologic [Easy Bruising] : a tendency for easy bruising

## 2019-04-02 NOTE — ASSESSMENT
[FreeTextEntry1] : The patient is an 84 y.o. with a hx of IBS/celiac sprue, COPD, CAD and arrhythmias, who we are following for  a multifactorial anemia and MGUS.  \par 1. Anemia - Her anemia is multifactorial with a component of iron deficiency likely due to occult blood loss  (IBS, ASA/Coumadin), CRI  (creat clearance = 41), and malabsorption (IBS/celiac).  In addition she has an elevated ESR and inflammation blunts the erythropoietic response.    \par Her Hgb improved on PO iron and she stopped at the end of August 2016.  Her iron deficiency recurred  3/2018  - s/p Feraheme x 2. \par Colonoscopy 1/17/19 was unremarkable per patient.  \par Hgb variable but generally in the 11's.  Today 10.3.  MCV higher than usual at 103.  Patient denies drinking.  Will recheck B12 and folate levels.  Given higher MCV doubt irons low but await results.  \par Can consider Procrit should Hgb be consistently <10gm/dl.  \par May need to consider an early myelodysplastic syndrome as well  - often associated with increased MCV's, however this is more unlikely as WBC and platelets are normal and there are other explanations for her anemia. \par PE/labs (CBC, iron studies) in 4 months. \par 2. MGUS - Labs have been stable.  Can recheck every other visit. \par \par Lenny Dickey - PMD\par Jose Marrero (Cardiology)\par Pam (Neuorology)\par Jose (electrophysiology)\par Augustine Ryan (GI)

## 2019-04-02 NOTE — HISTORY OF PRESENT ILLNESS
[de-identified] : ANABEL GOFF is an 84 y.o. with a hx of IBS/celiac sprue, COPD, CAD and arrhythmias, who we are following for a multifactorial anemia and an IgA MGUS.  \par 9/3/15 - Labs with Hgb 10, Hct 31.2, MCV 95, TSAT 15%.  She also had an IgA of 1029. \par 11/2015 - seen in our office.  Labs showed a M-spike of 0.4 and a monoclonal IgA.  IgA level down to 778.  Ferritin was 12.8 and her TSAT was 9%.   She was started on PO iron and folic acid.   \par 10/2016 - On follow-up in our office her Ferritin was up to 103 with a TSAT of 31%.   She stated she stopped PO iron in August on advice from her cardiologist (??).  \par She was reluctant to get a colonoscopy, but she did say she would reconsider if the irons did not hold.   \par 4/18/17 - Hgb was 11.4, Ferritin slightly lower but OK at 73, TSAT 21%.  \par 3/8/18 - Hgb 10.7, Ferritin was now 26, TSAT 10% with ESR of 67.  Patient had Feraheme x 2 and because of need for iron again need for GI evaluation was more firmly pushed. Her anemia is felt to be multifactorial with a component of CRI (creat cl ~ 41), iron deficiency due to occult blood loss (ASA/Coumadin), and malabsorption (celiac disease).   [de-identified] : Patient ultimately agreed to GI eval, however there were issues with cardiology giving her clearance.   \par On 10/15/18 she had a Colgard and this was positive.  Went back to Dr. Ryan and had a colonoscopy done 1/17/19 - there were no polyps and it was unremarkable per patient.  \par She denies any URI's - no recent steroid use. \par She otherwise denies any other changes in her family, medical, or social history since her last visit of 9/6/18.

## 2019-04-11 ENCOUNTER — MEDICATION RENEWAL (OUTPATIENT)
Age: 84
End: 2019-04-11

## 2019-04-13 ENCOUNTER — TRANSCRIPTION ENCOUNTER (OUTPATIENT)
Age: 84
End: 2019-04-13

## 2019-04-16 ENCOUNTER — APPOINTMENT (OUTPATIENT)
Dept: INTERNAL MEDICINE | Facility: CLINIC | Age: 84
End: 2019-04-16
Payer: MEDICARE

## 2019-04-16 VITALS
RESPIRATION RATE: 16 BRPM | OXYGEN SATURATION: 99 % | HEIGHT: 63 IN | HEART RATE: 70 BPM | SYSTOLIC BLOOD PRESSURE: 100 MMHG | TEMPERATURE: 97.9 F | BODY MASS INDEX: 18.25 KG/M2 | WEIGHT: 103 LBS | DIASTOLIC BLOOD PRESSURE: 56 MMHG

## 2019-04-16 DIAGNOSIS — R31.29 OTHER MICROSCOPIC HEMATURIA: ICD-10-CM

## 2019-04-16 PROCEDURE — 90715 TDAP VACCINE 7 YRS/> IM: CPT | Mod: GY

## 2019-04-16 PROCEDURE — 90471 IMMUNIZATION ADMIN: CPT | Mod: GY

## 2019-04-16 PROCEDURE — G0438: CPT

## 2019-04-16 RX ORDER — ATORVASTATIN CALCIUM 10 MG/1
10 TABLET, FILM COATED ORAL
Qty: 90 | Refills: 1 | Status: DISCONTINUED | COMMUNITY
Start: 2018-03-12 | End: 2019-04-16

## 2019-04-16 NOTE — PHYSICAL EXAM
[Coordination Grossly Intact] : coordination grossly intact [Normal Insight/Judgement] : insight and judgment were intact [General Appearance - Alert] : alert [General Appearance - In No Acute Distress] : in no acute distress [Sclera] : the sclera and conjunctiva were normal [Extraocular Movements] : extraocular movements were intact [PERRL With Normal Accommodation] : pupils were equal in size, round, and reactive to light [Outer Ear] : the ears and nose were normal in appearance [Oropharynx] : the oropharynx was normal [Neck Appearance] : the appearance of the neck was normal [Neck Cervical Mass (___cm)] : no neck mass was observed [Jugular Venous Distention Increased] : there was no jugular-venous distention [Thyroid Diffuse Enlargement] : the thyroid was not enlarged [Thyroid Nodule] : there were no palpable thyroid nodules [Apical Impulse] : the apical impulse was normal [Heart Rate And Rhythm] : heart rate was normal and rhythm regular [Heart Sounds] : normal S1 and S2 [FreeTextEntry1] : Carotids are 1+ bilaterally. There is trace to 1+-2+  edema bilaterally [Bowel Sounds] : normal bowel sounds [Abdomen Soft] : soft [Abdomen Tenderness] : non-tender [] : no hepato-splenomegaly [Cervical Lymph Nodes Enlarged Posterior Bilaterally] : posterior cervical [Abdomen Mass (___ Cm)] : no abdominal mass palpated [Cervical Lymph Nodes Enlarged Anterior Bilaterally] : anterior cervical [Supraclavicular Lymph Nodes Enlarged Bilaterally] : supraclavicular [Nail Clubbing] : no clubbing  or cyanosis of the fingernails [No Acute Distress] : no acute distress [Well Developed] : well developed [Well Nourished] : well nourished [Ill-Appearing] : ill-appearing [Normal Sclera/Conjunctiva] : normal sclera/conjunctiva [PERRL] : pupils equal round and reactive to light [EOMI] : extraocular movements intact [Normal Outer Ear/Nose] : the outer ears and nose were normal in appearance [Normal Oropharynx] : the oropharynx was normal [Normal TMs] : both tympanic membranes were normal [No JVD] : no jugular venous distention [Supple] : supple [No Lymphadenopathy] : no lymphadenopathy [Thyroid Normal, No Nodules] : the thyroid was normal and there were no nodules present [No Respiratory Distress] : no respiratory distress  [No Accessory Muscle Use] : no accessory muscle use [Normal Rate] : normal rate  [Normal S1, S2] : normal S1 and S2 [No Carotid Bruits] : no carotid bruits [Pedal Pulses Present] : the pedal pulses are present [No Edema] : there was no peripheral edema [No Extremity Clubbing/Cyanosis] : no extremity clubbing/cyanosis [Soft] : abdomen soft [Normal Supraclavicular Nodes] : no supraclavicular lymphadenopathy [Non Tender] : non-tender [Normal Anterior Cervical Nodes] : no anterior cervical lymphadenopathy [No CVA Tenderness] : no CVA  tenderness [No Spinal Tenderness] : no spinal tenderness [No Joint Swelling] : no joint swelling [Kyphosis] : kyphosis [Grossly Normal Strength/Tone] : grossly normal strength/tone [No Rash] : no rash [Normal Gait] : normal gait [No Focal Deficits] : no focal deficits [Speech Grossly Normal] : speech grossly normal [Memory Grossly Normal] : memory grossly normal [Normal Affect] : the affect was normal [Alert and Oriented x3] : oriented to person, place, and time [Normal Mood] : the mood was normal [de-identified] : thin [de-identified] : bilateral IOL [de-identified] : diminished at bases bilat. [de-identified] : occasional premature heart sounds. 1-2/6 KATERYNA

## 2019-04-16 NOTE — HEALTH RISK ASSESSMENT
[Smoke Detector] : smoke detector [Carbon Monoxide Detector] : carbon monoxide detector [Seat Belt] :  uses seat belt [Patient reported colonoscopy was normal] : Patient reported colonoscopy was normal [] : No [de-identified] : formal smoker  1 pack a day [de-identified] : occasional  [Guns at Home] : no guns at home [Sunscreen] : does not use sunscreen [ColonoscopyDate] : 02/2019

## 2019-04-16 NOTE — HEALTH RISK ASSESSMENT
[Smoke Detector] : smoke detector [Carbon Monoxide Detector] : carbon monoxide detector [Seat Belt] :  uses seat belt [Patient reported colonoscopy was normal] : Patient reported colonoscopy was normal [] : No [de-identified] : formal smoker  1 pack a day [de-identified] : occasional  [Guns at Home] : no guns at home [Sunscreen] : does not use sunscreen [ColonoscopyDate] : 02/2019

## 2019-04-16 NOTE — PLAN
[FreeTextEntry1] : 1.Continue medications as outlined above. \par \par 2. urinalysis today \par \par 3. The patient refuses the shingles vaccine\par \par 4. TDaP\par \par 5 followup in 6 months with pulmonary function test and flu shot

## 2019-04-16 NOTE — PHYSICAL EXAM
[Coordination Grossly Intact] : coordination grossly intact [Normal Insight/Judgement] : insight and judgment were intact [General Appearance - Alert] : alert [General Appearance - In No Acute Distress] : in no acute distress [Sclera] : the sclera and conjunctiva were normal [Extraocular Movements] : extraocular movements were intact [PERRL With Normal Accommodation] : pupils were equal in size, round, and reactive to light [Outer Ear] : the ears and nose were normal in appearance [Oropharynx] : the oropharynx was normal [Neck Appearance] : the appearance of the neck was normal [Neck Cervical Mass (___cm)] : no neck mass was observed [Jugular Venous Distention Increased] : there was no jugular-venous distention [Thyroid Diffuse Enlargement] : the thyroid was not enlarged [Thyroid Nodule] : there were no palpable thyroid nodules [Apical Impulse] : the apical impulse was normal [Heart Rate And Rhythm] : heart rate was normal and rhythm regular [Heart Sounds] : normal S1 and S2 [FreeTextEntry1] : Carotids are 1+ bilaterally. There is trace to 1+-2+  edema bilaterally [Bowel Sounds] : normal bowel sounds [Abdomen Soft] : soft [Abdomen Tenderness] : non-tender [] : no hepato-splenomegaly [Cervical Lymph Nodes Enlarged Posterior Bilaterally] : posterior cervical [Abdomen Mass (___ Cm)] : no abdominal mass palpated [Cervical Lymph Nodes Enlarged Anterior Bilaterally] : anterior cervical [Supraclavicular Lymph Nodes Enlarged Bilaterally] : supraclavicular [Nail Clubbing] : no clubbing  or cyanosis of the fingernails [No Acute Distress] : no acute distress [Well Developed] : well developed [Well Nourished] : well nourished [Ill-Appearing] : ill-appearing [Normal Sclera/Conjunctiva] : normal sclera/conjunctiva [PERRL] : pupils equal round and reactive to light [EOMI] : extraocular movements intact [Normal Outer Ear/Nose] : the outer ears and nose were normal in appearance [Normal Oropharynx] : the oropharynx was normal [Normal TMs] : both tympanic membranes were normal [No JVD] : no jugular venous distention [Supple] : supple [Thyroid Normal, No Nodules] : the thyroid was normal and there were no nodules present [No Lymphadenopathy] : no lymphadenopathy [No Respiratory Distress] : no respiratory distress  [No Accessory Muscle Use] : no accessory muscle use [Normal Rate] : normal rate  [Normal S1, S2] : normal S1 and S2 [No Carotid Bruits] : no carotid bruits [Pedal Pulses Present] : the pedal pulses are present [No Edema] : there was no peripheral edema [No Extremity Clubbing/Cyanosis] : no extremity clubbing/cyanosis [Soft] : abdomen soft [Normal Supraclavicular Nodes] : no supraclavicular lymphadenopathy [Non Tender] : non-tender [Normal Anterior Cervical Nodes] : no anterior cervical lymphadenopathy [No CVA Tenderness] : no CVA  tenderness [No Spinal Tenderness] : no spinal tenderness [Kyphosis] : kyphosis [No Joint Swelling] : no joint swelling [Grossly Normal Strength/Tone] : grossly normal strength/tone [No Rash] : no rash [Normal Gait] : normal gait [No Focal Deficits] : no focal deficits [Speech Grossly Normal] : speech grossly normal [Memory Grossly Normal] : memory grossly normal [Normal Affect] : the affect was normal [Alert and Oriented x3] : oriented to person, place, and time [Normal Mood] : the mood was normal [de-identified] : thin [de-identified] : bilateral IOL [de-identified] : diminished at bases bilat. [de-identified] : occasional premature heart sounds. 1-2/6 KATERYNA

## 2019-04-16 NOTE — HISTORY OF PRESENT ILLNESS
[de-identified] : Patient comes in today for a followup for a wellness evaluation.\par \par Overall, she states that she is doing relatively well. She denied having any swelling her legs. She is watching her salt intake. Her appetite is good. She did undergo a stress test in November which was unremarkable. She is compliant with her medical regimen as outlined. There is no change in bowel habits. She remains active.\par \par The patient denies any constitutional symptoms at this time such as fevers chills night sweats cough or sputum production. She now comes in for this assessment

## 2019-04-16 NOTE — HISTORY OF PRESENT ILLNESS
[de-identified] : Patient comes in today for a followup for a wellness evaluation.\par \par Overall, she states that she is doing relatively well. She denied having any swelling her legs. She is watching her salt intake. Her appetite is good. She did undergo a stress test in November which was unremarkable. She is compliant with her medical regimen as outlined. There is no change in bowel habits. She remains active.\par \par The patient denies any constitutional symptoms at this time such as fevers chills night sweats cough or sputum production. She now comes in for this assessment

## 2019-04-18 LAB
APPEARANCE: ABNORMAL
BACTERIA: NEGATIVE
BILIRUBIN URINE: NEGATIVE
BLOOD URINE: ABNORMAL
COLOR: YELLOW
GLUCOSE QUALITATIVE U: NEGATIVE
HYALINE CASTS: 0 /LPF
KETONES URINE: NEGATIVE
LEUKOCYTE ESTERASE URINE: ABNORMAL
MICROSCOPIC-UA: NORMAL
NITRITE URINE: NEGATIVE
PH URINE: 5.5
PROTEIN URINE: NORMAL
RED BLOOD CELLS URINE: 9 /HPF
SPECIFIC GRAVITY URINE: 1.02
SQUAMOUS EPITHELIAL CELLS: 1 /HPF
UROBILINOGEN URINE: NORMAL
WHITE BLOOD CELLS URINE: 42 /HPF

## 2019-06-04 ENCOUNTER — APPOINTMENT (OUTPATIENT)
Age: 84
End: 2019-06-04
Payer: MEDICARE

## 2019-06-04 VITALS
HEART RATE: 90 BPM | RESPIRATION RATE: 16 BRPM | DIASTOLIC BLOOD PRESSURE: 57 MMHG | SYSTOLIC BLOOD PRESSURE: 91 MMHG | HEIGHT: 63 IN | TEMPERATURE: 98 F | BODY MASS INDEX: 18.07 KG/M2 | WEIGHT: 102 LBS

## 2019-06-04 PROCEDURE — 99213 OFFICE O/P EST LOW 20 MIN: CPT

## 2019-06-04 NOTE — RESULTS/DATA
[FreeTextEntry1] : April 10, 2019:\par WBC 4.6 K, hemoglobin 11.4 g/dL, hematocrit 34.6%, platelet 202,000\par BUN 31, creatinine 1.57, GFR 30.\par

## 2019-06-04 NOTE — ASSESSMENT
[FreeTextEntry1] : Ms. GOFF 's questions were answered to her satisfaction. She expressed her understanding and willingness to comply with the above recommendations, and  will return to the office to review the results of the blood tests in days / weeks.\par

## 2019-06-04 NOTE — HISTORY OF PRESENT ILLNESS
[de-identified] : 84 F with PMHx IBS/celiac sprue, COPD, CAD, followed up hematologically for  IgA MGUS, and multifactorial anemia.\par \par CASE SYNOPSIS:\par 9/3/15 – found with anemia ( hemoglobin 10 g/dL) and elevated IgA (1,029).\par \par 11/2015 – M-spike 0.4, monoclonal IgA; multifactorial anemia ( iron deficiency/ ACD); receives oral iron and folic acid.\par \par 3/20, 2018 – receives Feraheme in office x 2 doses \par \par 1/17/19- colonoscopy ( Dr. Ryan) – unremarkable study.\par  [FreeTextEntry1] : expectant surveillance\par \par  [de-identified] : Returning for annual evaluation. Since last visit, she followed up with GI ( Dr. Ryan), and had a (negative) colonoscopy in January 2019. Remains mildly anemic, with hemoglobin around 11.4 g/dL. Did not require transfusions. Recently evaluated for UTI by Dr. Dickey. Appetite remains unchanged.Denies falls or syncope.Lives with her .

## 2019-09-06 ENCOUNTER — LABORATORY RESULT (OUTPATIENT)
Age: 84
End: 2019-09-06

## 2019-09-06 ENCOUNTER — MEDICATION RENEWAL (OUTPATIENT)
Age: 84
End: 2019-09-06

## 2019-09-13 ENCOUNTER — APPOINTMENT (OUTPATIENT)
Dept: OTOLARYNGOLOGY | Facility: CLINIC | Age: 84
End: 2019-09-13

## 2019-09-27 NOTE — PROGRESS NOTE ADULT - ASSESSMENT
82 year old Female with a [place] biventricular ICD which was found to have a fractured RV/ICD lead. She had a RUE venogram which showed a patent right cephalic vein with a right sided device, RV/ICD lead, and 2 LV/CS leads.  7/7/2017 She is scheduled for EPS procedure to correct her pacemaker malfunction. I spoke with Dr Bain. negative...

## 2019-10-01 ENCOUNTER — APPOINTMENT (OUTPATIENT)
Age: 84
End: 2019-10-01

## 2019-10-03 ENCOUNTER — APPOINTMENT (OUTPATIENT)
Dept: OTOLARYNGOLOGY | Facility: CLINIC | Age: 84
End: 2019-10-03

## 2019-10-07 ENCOUNTER — EMERGENCY (EMERGENCY)
Facility: HOSPITAL | Age: 84
LOS: 0 days | Discharge: HOME | End: 2019-10-07
Attending: EMERGENCY MEDICINE
Payer: MEDICARE

## 2019-10-07 VITALS
WEIGHT: 98.11 LBS | RESPIRATION RATE: 18 BRPM | HEIGHT: 63 IN | TEMPERATURE: 98 F | OXYGEN SATURATION: 96 % | HEART RATE: 80 BPM | SYSTOLIC BLOOD PRESSURE: 147 MMHG | DIASTOLIC BLOOD PRESSURE: 66 MMHG

## 2019-10-07 DIAGNOSIS — R07.9 CHEST PAIN, UNSPECIFIED: ICD-10-CM

## 2019-10-07 DIAGNOSIS — Z98.890 OTHER SPECIFIED POSTPROCEDURAL STATES: Chronic | ICD-10-CM

## 2019-10-07 DIAGNOSIS — Z79.82 LONG TERM (CURRENT) USE OF ASPIRIN: ICD-10-CM

## 2019-10-07 DIAGNOSIS — Z98.49 CATARACT EXTRACTION STATUS, UNSPECIFIED EYE: Chronic | ICD-10-CM

## 2019-10-07 DIAGNOSIS — I34.1 NONRHEUMATIC MITRAL (VALVE) PROLAPSE: ICD-10-CM

## 2019-10-07 DIAGNOSIS — Z95.810 PRESENCE OF AUTOMATIC (IMPLANTABLE) CARDIAC DEFIBRILLATOR: Chronic | ICD-10-CM

## 2019-10-07 DIAGNOSIS — I48.91 UNSPECIFIED ATRIAL FIBRILLATION: ICD-10-CM

## 2019-10-07 DIAGNOSIS — E78.5 HYPERLIPIDEMIA, UNSPECIFIED: ICD-10-CM

## 2019-10-07 DIAGNOSIS — Z95.0 PRESENCE OF CARDIAC PACEMAKER: ICD-10-CM

## 2019-10-07 DIAGNOSIS — Z79.01 LONG TERM (CURRENT) USE OF ANTICOAGULANTS: ICD-10-CM

## 2019-10-07 DIAGNOSIS — I11.0 HYPERTENSIVE HEART DISEASE WITH HEART FAILURE: ICD-10-CM

## 2019-10-07 DIAGNOSIS — I50.9 HEART FAILURE, UNSPECIFIED: ICD-10-CM

## 2019-10-07 DIAGNOSIS — I25.2 OLD MYOCARDIAL INFARCTION: ICD-10-CM

## 2019-10-07 DIAGNOSIS — Z95.0 PRESENCE OF CARDIAC PACEMAKER: Chronic | ICD-10-CM

## 2019-10-07 LAB
ALBUMIN SERPL ELPH-MCNC: 3.6 G/DL — SIGNIFICANT CHANGE UP (ref 3.3–5)
ALP SERPL-CCNC: 63 U/L — SIGNIFICANT CHANGE UP (ref 40–120)
ALT FLD-CCNC: 22 U/L — SIGNIFICANT CHANGE UP (ref 12–78)
ANION GAP SERPL CALC-SCNC: 6 MMOL/L — SIGNIFICANT CHANGE UP (ref 5–17)
APTT BLD: 41.3 SEC — HIGH (ref 27.5–36.3)
AST SERPL-CCNC: 23 U/L — SIGNIFICANT CHANGE UP (ref 15–37)
BILIRUB SERPL-MCNC: 0.4 MG/DL — SIGNIFICANT CHANGE UP (ref 0.2–1.2)
BUN SERPL-MCNC: 27 MG/DL — HIGH (ref 7–23)
CALCIUM SERPL-MCNC: 9.1 MG/DL — SIGNIFICANT CHANGE UP (ref 8.5–10.1)
CHLORIDE SERPL-SCNC: 102 MMOL/L — SIGNIFICANT CHANGE UP (ref 96–108)
CO2 SERPL-SCNC: 30 MMOL/L — SIGNIFICANT CHANGE UP (ref 22–31)
CREAT SERPL-MCNC: 1.89 MG/DL — HIGH (ref 0.5–1.3)
GLUCOSE SERPL-MCNC: 126 MG/DL — HIGH (ref 70–99)
HCT VFR BLD CALC: 32.6 % — LOW (ref 34.5–45)
HGB BLD-MCNC: 10.6 G/DL — LOW (ref 11.5–15.5)
INR BLD: 3.16 RATIO — HIGH (ref 0.88–1.16)
MAGNESIUM SERPL-MCNC: 2.3 MG/DL — SIGNIFICANT CHANGE UP (ref 1.6–2.6)
MCHC RBC-ENTMCNC: 32.5 GM/DL — SIGNIFICANT CHANGE UP (ref 32–36)
MCHC RBC-ENTMCNC: 33.3 PG — SIGNIFICANT CHANGE UP (ref 27–34)
MCV RBC AUTO: 102.5 FL — HIGH (ref 80–100)
NT-PROBNP SERPL-SCNC: 3979 PG/ML — HIGH (ref 0–450)
PLATELET # BLD AUTO: 180 K/UL — SIGNIFICANT CHANGE UP (ref 150–400)
POTASSIUM SERPL-MCNC: 4.2 MMOL/L — SIGNIFICANT CHANGE UP (ref 3.5–5.3)
POTASSIUM SERPL-SCNC: 4.2 MMOL/L — SIGNIFICANT CHANGE UP (ref 3.5–5.3)
PROT SERPL-MCNC: 6.9 GM/DL — SIGNIFICANT CHANGE UP (ref 6–8.3)
PROTHROM AB SERPL-ACNC: 36.3 SEC — HIGH (ref 10–12.9)
RBC # BLD: 3.18 M/UL — LOW (ref 3.8–5.2)
RBC # FLD: 13.9 % — SIGNIFICANT CHANGE UP (ref 10.3–14.5)
SODIUM SERPL-SCNC: 138 MMOL/L — SIGNIFICANT CHANGE UP (ref 135–145)
TROPONIN I SERPL-MCNC: <0.015 NG/ML — SIGNIFICANT CHANGE UP (ref 0.01–0.04)
WBC # BLD: 4.05 K/UL — SIGNIFICANT CHANGE UP (ref 3.8–10.5)
WBC # FLD AUTO: 4.05 K/UL — SIGNIFICANT CHANGE UP (ref 3.8–10.5)

## 2019-10-07 PROCEDURE — 85730 THROMBOPLASTIN TIME PARTIAL: CPT

## 2019-10-07 PROCEDURE — 85027 COMPLETE CBC AUTOMATED: CPT

## 2019-10-07 PROCEDURE — 84484 ASSAY OF TROPONIN QUANT: CPT

## 2019-10-07 PROCEDURE — 99284 EMERGENCY DEPT VISIT MOD MDM: CPT | Mod: 25

## 2019-10-07 PROCEDURE — 83735 ASSAY OF MAGNESIUM: CPT

## 2019-10-07 PROCEDURE — 85610 PROTHROMBIN TIME: CPT

## 2019-10-07 PROCEDURE — 93005 ELECTROCARDIOGRAM TRACING: CPT

## 2019-10-07 PROCEDURE — 99285 EMERGENCY DEPT VISIT HI MDM: CPT

## 2019-10-07 PROCEDURE — 83880 ASSAY OF NATRIURETIC PEPTIDE: CPT

## 2019-10-07 PROCEDURE — 71045 X-RAY EXAM CHEST 1 VIEW: CPT

## 2019-10-07 PROCEDURE — 80053 COMPREHEN METABOLIC PANEL: CPT

## 2019-10-07 PROCEDURE — 71045 X-RAY EXAM CHEST 1 VIEW: CPT | Mod: 26

## 2019-10-07 PROCEDURE — 93010 ELECTROCARDIOGRAM REPORT: CPT

## 2019-10-07 PROCEDURE — 36415 COLL VENOUS BLD VENIPUNCTURE: CPT

## 2019-10-07 RX ORDER — ASPIRIN/CALCIUM CARB/MAGNESIUM 324 MG
324 TABLET ORAL ONCE
Refills: 0 | Status: COMPLETED | OUTPATIENT
Start: 2019-10-07 | End: 2019-10-07

## 2019-10-07 RX ADMIN — Medication 324 MILLIGRAM(S): at 21:49

## 2019-10-07 NOTE — ED STATDOCS - PATIENT PORTAL LINK FT
You can access the FollowMyHealth Patient Portal offered by [facility] by registering at the following website: [email or web address]. By joining [facility]’s FollowMyHealth portal, you will also be able to view your health information using other applications (apps) compatible with our system.

## 2019-10-07 NOTE — ED STATDOCS - OBJECTIVE STATEMENT
85 y/o female with PMHx of MI, mitral valve prolapse, left inguinal hernia, AICD, iron deficiency anemia, osteoporosis, Hashimoto's thyroiditis, Celiac disease, arthritis, peripheral edema, CHF, HLD, HTN, hypothyroid, Afib s/p pacemaker presents to the ED c/o intermittent left sided chest pain x3 days. Three days ago, pt felt the chest pain which subsided on its own. Tonight, pt was watching TV when she felt the chest pain again and also felt her face get flushed. States she has been under a lot of stress lately because her  has been ill. Denies abdominal pain.

## 2019-10-07 NOTE — ED ADULT TRIAGE NOTE - CHIEF COMPLAINT QUOTE
Patient states that she has been under a lot of stress and has head intermittent chest pain x few days, got worse tonight. She states she also felt that she felt flushed in the face and she might have felt left arm pain.

## 2019-10-07 NOTE — ED STATDOCS - CLINICAL SUMMARY MEDICAL DECISION MAKING FREE TEXT BOX
Pt presents with episode of chest pain this evening. Plan: chest pain x24 hours, will do 1 set of cardiac enzymes, if normal DC home with family member.

## 2019-10-07 NOTE — ED STATDOCS - PSH
AICD (automatic cardioverter/defibrillator) present  with PPM. Replaced x 2.  Artificial pacemaker    H/O colonoscopy  last done 2009  H/O right inguinal hernia repair    History of cataract extraction  Bilateral

## 2019-10-07 NOTE — ED ADULT NURSE NOTE - NS ED NURSE LEVEL OF CONSCIOUSNESS SPEECH
Patient: Ethan Watt    Procedure(s):  LAPAROSCOPIC CHOLECYSTECTOMY    Diagnosis: Cholelithiasis  Diagnosis Additional Information: No value filed.    Anesthesia Type:   General, ETT, RSI     Note:  Airway :Nasal Cannula  Patient transferred to:PACU  Handoff Report: Identifed the Patient, Identified the Reponsible Provider, Reviewed the pertinent medical history, Discussed the surgical course, Reviewed Intra-OP anesthesia mangement and issues during anesthesia, Set expectations for post-procedure period and Allowed opportunity for questions and acknowledgement of understanding      Vitals: (Last set prior to Anesthesia Care Transfer)    CRNA VITALS  1/16/2019 1149 - 1/16/2019 1220      1/16/2019             SpO2:  99 %                Electronically Signed By: VALERIA Doyle CRNA  January 16, 2019  12:20 PM   Speaking Coherently

## 2019-10-07 NOTE — ED STATDOCS - PROGRESS NOTE DETAILS
83 y/o F presents with CP for the past 3 days. Pt reports intermittent chest pain for the past few days, pain started again this evening while watching tv, felt her face go flush. Denies fever.chils, n/v/d, SOB, abd pain, or other complaints at this time. -Phillip Burgess PA-C Results reviewed and discussed with pt. Discussed importance of close FU with PMD. Pt asked to return to ED immediately for any new or concerning sx or worsening. Pt acknowledges and understands plan -Phillip Burgess PA-C

## 2019-10-07 NOTE — ED ADULT NURSE NOTE - OBJECTIVE STATEMENT
Pt experiencing intermittent chest pain x 1 week. Worsening tonight and pt felt flushed. Pt has agreed to 1 troponin and if negative discharge.

## 2019-10-07 NOTE — ED STATDOCS - ATTENDING CONTRIBUTION TO CARE
Attending Contribution to Care: I, Julia Navarro, performed the initial face to face bedside interview with this patient regarding history of present illness, review of symptoms and relevant past medical, social and family history.  I completed an independent physical examination.  I was the initial provider who evaluated this patient and the history, physical, and MDM reflect this intial assessment. I have signed out the follow up of any pending tests after the original (i.e. labs, radiological studies) to the ACP with instructions to review any with instructions to review any concerning findings to me prior to discharge.  I have communicated the patient’s plan of care and disposition with the ACP.

## 2019-10-07 NOTE — ED STATDOCS - NSFOLLOWUPINSTRUCTIONS_ED_ALL_ED_FT
normal... Well appearing, well nourished, awake, alert, oriented to person, place, time/situation and in no apparent distress. Well appearing, well nourished, awake, alert, oriented to person, place, time/situation and in no apparent distress. Nontoxic appearing. Please Follow up with Primary MD in 1-2 days and your cardiologist as soon as possible. Return to ED immediately for any new or concerning symptoms or worsening symptoms.   Chest Pain    WHAT YOU NEED TO KNOW:    Chest pain can be caused by a range of conditions, from not serious to life-threatening. Chest pain can be a symptom of a digestive problem, such as acid reflux or a stomach ulcer. An anxiety attack or a strong emotion, such as anger, can also cause chest pain. Infection, inflammation, or a fracture in the bones or cartilage in your chest can cause pain or discomfort. Sometimes chest pain or pressure is caused by poor blood flow to your heart (angina). Chest pain may also be caused by life-threatening conditions such as a heart attack or blood clot in your lungs.     DISCHARGE INSTRUCTIONS:    Call 911 if:     You have any of the following signs of a heart attack:   Squeezing, pressure, or pain in your chest       and any of the following:   Discomfort or pain in your back, neck, jaw, stomach, or arm       Shortness of breath      Nausea or vomiting      Lightheadedness or a sudden cold sweat        Return to the emergency department if:     You have chest discomfort that gets worse, even with medicine.      You cough or vomit blood.       Your bowel movements are black or bloody.       You cannot stop vomiting, or it hurts to swallow.     Contact your healthcare provider if:     You have questions or concerns about your condition or care.        Medicines:     Medicines may be given to treat the cause of your chest pain. Examples include pain medicine, anxiety medicine, or medicines to increase blood flow to your heart.       Do not take certain medicines without asking your healthcare provider first. These include NSAIDs, herbal or vitamin supplements, or hormones (estrogen or progestin).       Take your medicine as directed. Contact your healthcare provider if you think your medicine is not helping or if you have side effects. Tell him or her if you are allergic to any medicine. Keep a list of the medicines, vitamins, and herbs you take. Include the amounts, and when and why you take them. Bring the list or the pill bottles to follow-up visits. Carry your medicine list with you in case of an emergency.    Follow up with your healthcare provider within 72 hours, or as directed: You may need to return for more tests to find the cause of your chest pain. You may be referred to a specialist, such as a cardiologist or gastroenterologist. Write down your questions so you remember to ask them during your visits.     Healthy living tips: The following are general healthy guidelines. If your chest pain is caused by a heart problem, your healthcare provider will give you specific guidelines to follow.    Do not smoke. Nicotine and other chemicals in cigarettes and cigars can cause lung and heart damage. Ask your healthcare provider for information if you currently smoke and need help to quit. E-cigarettes or smokeless tobacco still contain nicotine. Talk to your healthcare provider before you use these products.       Eat a variety of healthy, low-fat, low-salt foods. Healthy foods include fruits, vegetables, whole-grain breads, low-fat dairy products, beans, lean meats, and fish. Ask for more information about a heart healthy diet.      Drink plenty of water every day. Your body is made of mostly water. Water helps your body to control your temperature and blood pressure. Ask your healthcare provider how much water you should drink every day.      Ask about activity. Your healthcare provider will tell you which activities to limit or avoid. Ask when you can drive, return to work, and have sex. Ask about the best exercise plan for you.      Maintain a healthy weight. Ask your healthcare provider how much you should weigh. Ask him or her to help you create a weight loss plan if you are overweight.       Get the flu and pneumonia vaccines. All adults should get the influenza (flu) vaccine. Get it every year as soon as it becomes available. The pneumococcal vaccine is given to adults aged 65 years or older. The vaccine is given every 5 years to prevent pneumococcal disease, such as pneumonia.    If you have a stent:     Carry your stent card with you at all times.       Let all healthcare providers know that you have a stent.

## 2019-10-14 ENCOUNTER — MEDICATION RENEWAL (OUTPATIENT)
Age: 84
End: 2019-10-14

## 2019-11-19 ENCOUNTER — APPOINTMENT (OUTPATIENT)
Dept: INTERNAL MEDICINE | Facility: CLINIC | Age: 84
End: 2019-11-19
Payer: MEDICARE

## 2019-11-19 ENCOUNTER — MED ADMIN CHARGE (OUTPATIENT)
Age: 84
End: 2019-11-19

## 2019-11-19 PROCEDURE — 90662 IIV NO PRSV INCREASED AG IM: CPT

## 2019-11-19 PROCEDURE — G0008: CPT

## 2019-11-21 ENCOUNTER — APPOINTMENT (OUTPATIENT)
Age: 84
End: 2019-11-21
Payer: MEDICARE

## 2019-11-21 VITALS
HEART RATE: 85 BPM | HEIGHT: 60 IN | WEIGHT: 102 LBS | DIASTOLIC BLOOD PRESSURE: 65 MMHG | RESPIRATION RATE: 16 BRPM | BODY MASS INDEX: 20.03 KG/M2 | SYSTOLIC BLOOD PRESSURE: 108 MMHG | TEMPERATURE: 98.2 F

## 2019-11-21 PROCEDURE — 99213 OFFICE O/P EST LOW 20 MIN: CPT

## 2019-11-21 NOTE — RESULTS/DATA
[FreeTextEntry1] : I reviewed recent blood work results with patient.\par \par \par April 10, 2019:\par WBC 4.6 K, hemoglobin 11.4 g/dL, hematocrit 34.6%, platelet 202,000\par BUN 31, creatinine 1.57, GFR 30.\par

## 2019-11-21 NOTE — REASON FOR VISIT
[Follow-Up Visit] : a follow-up visit for [Family Member] : family member [FreeTextEntry2] : Anemia; IgA MGUS

## 2019-11-21 NOTE — PHYSICAL EXAM
[Normal] : normal appearance, no rash, nodules, vesicles, ulcers, erythema [Ambulatory and capable of all self care but unable to carry out any work activities] : Status 2- Ambulatory and capable of all self care but unable to carry out any work activities. Up and about more than 50% of waking hours

## 2019-11-21 NOTE — HISTORY OF PRESENT ILLNESS
[de-identified] : 84 F with PMHx IBS/celiac sprue, COPD, CAD, followed up hematologically for  IgA MGUS, and multifactorial anemia.\par \par CASE SYNOPSIS:\par 9/3/15 – found with anemia ( hemoglobin 10 g/dL) and elevated IgA (1,029).\par \par 11/2015 – M-spike 0.4, monoclonal IgA; multifactorial anemia ( iron deficiency/ ACD); receives oral iron and folic acid.\par \par 3/20, 2018 – receives Feraheme in office x 2 doses \par \par 1/17/19- colonoscopy ( Dr. Ryan) – unremarkable study.\par  [FreeTextEntry1] : expectant surveillance\par \par  [de-identified] : Short term follow up visit to assess response to and need for more parenteral iron. Last colonoscopy done in January 2019. Patient has received Feraheme in March 2018. Most recent hemoglobin (1/22/19) was 10.3 g/dL, and ferritin 132 ng/mL. Renal insufficiency unchanged; GFR remains at 22-30. Reviewed medication list: using Furosemide intermittently, and discontinued Amiodarone. No recent hospitalizations or need for transfusions. Appetite and weight stable. Accompanied by her daughter.

## 2020-01-01 NOTE — ED ADULT TRIAGE NOTE - WEIGHT IN LBS
Interventional Cardiology  Progress Note    Reason for Visit: Venous Insufficiency CEAP 3  Last visit: 11/4/2019  Referring physician: William S Hulesch, MD  Primary Cardiologist: Larry Tim MD  Previously saw Nazario Hill MD    The patient was seen and examined and the chart was reviewed this date.  Thank you for your referral.  My impressions and recommendations are as follows:    IMPRESSIONS:  1. Venous Insufficiency CEAP 2  - no prior DVT/Ulcers  - 7/16/2018 s/p Left Short saphenous vein Endovenous Laser ablation (EVLA) and Ultrasound-guided foam sclerotherapy (UGFS)  - 8/14/2018 s/p Left leg Ultrasound-guided foam sclerotherapy (UGFS)  - 11/26/2019 vein mapping with severe reflux of the Bilateral Great Saphenous Vein and right Short saphenous vein   * no issues at this time  2. Hypertension on Losartan  3. Dyslipidemia on Zocor  4. Nonsmoker  5. Seasonal allergies      RECOMMENDATIONS:  1. Regarding his CV status  - Venous mapping, to assess degree of reflux annually if needed  - Over the counter or custom compression knee high stocking 20-30 mmHg daily or ACE wrap bandages during the day as tolerated     - Venous insufficiency and valvular reflux causing a functional deficit/impairment is evident, affecting activities of daily living despite medical/conservative therapy. Patient has worn compression stockings for a minimum of 3 months with some relief, but no correction of impairment.   - Duplex ultrasound with color flow doppler demonstrated severe reflux    - Risks/benefits/alternatives discussed in detail with the patient including potential complications such as failure for inability to successfully ablate veins if unable to gain access or deliver catheter, infection, bleeding, DVT/PE, paraesthesias and recurrence, burn injury or possible multiple staging procedures as well as stab phlebectomies and/or sessions with sclerotherapy with continued compression. All questions answered to their  satisfaction in patient centered, shared decision making.. Will apply for predetermination  - Ultimately there is expected benefit with relief of discomfort from venous reflux if successful. Patient education brochure provided with vein mapping findings.    Awaiting approval for VenaSeal and possible foam sclerotherapy   - Right Great Saphenous Vein (lower leg)  - Left Great Saphenous Vein (lower and upper leg)    * 1/3/2020 however would like hold off for now given paucity of symptoms  * continue/resume compression therapy    If syncope, chest pain with exertion, or worsening symptoms occur advised to notify office or go to nearest ER  2.  Medications and labs reviewed  - Continue all other medications   - Antiplatelets/anticoagulation; NA  - Antihypertensives; Losartan 25 mg  - Dyslipidemia; Simvastatin 10 mg  - Renew Fasting lipid panel q6 months or as scheduled  - Last known vaccines; Influenza 2018 and Pneumovax 2018 Vaccines  3.   Exercise at least 150 minutes aerobic activity per week as tolerated.   Emphasized the need for low salt, low fat, low cholesterol diet.   4. Return to clinic in 12 months [facility] site upon completion of aforementioned studies or sooner for concerns.  All questions were answered to the patient's satisfaction and to the best of my abilities.      SUBJECTIVE:   Ray Billings is a 67 year old male who presents on 1/3/2020 for a revisit.  He was originally seen 11/4/2019 in clinic at [facility] for evaluation and consultation of Venous Insufficiency at the request William S Hulesch, MD.  Previously seen by Nazario Hill MD.  He has a history of Venous Insufficiency CEAP 2 with prior left Short saphenous vein intervention 2018, Hypertension, Dyslipidemia, nonsmoker.    He has been doing well since his previous vein ablation and prior ultrasound guided foam sclerotherapy.  He has had no wounds, ulcers, claudication.  He remains active using a  treadmill 2030 minutes 3 days a week as well as weight lifting without any limitations.   No recent issues of DVT, thrombophlebitis, wounds, ulcers, edema.    1/3/2020 we discussed his vein mapping and he does have areas of the bilateral greater saphenous veins which would be amenable to possible venous intervention.  He denies any overt symptoms at this time and would like to think about and possibly hold off on further invasive therapies.  He does report occasional pruritus but no wounds or ulcers.  He will resume compression therapy at this time.  If his symptoms do progress however he would be an amenable candidate for bilateral greater saphenous vein intervention as outlined.  He also would be a candidate for ultrasound-guided foam sclerotherapy.      Denies any chest pain or shortness of breath at rest or with ambulation. Denies any dizziness or lightheadedness. Denies any palpitations, skipped beats or fluttering sensation. Denies any PND or orthopnea. Denies being awoken in the middle of the night with chest pain, shortness of breath or chest pain. Appetite is good.  Energy level good.      Risk Factors for Venous Insufficiency:   Prior DVT no  Standing Occupation yes  Trauma no  Leg surgery yes  Ulcerations no  Hypercoagulable state no  Pregnancy NA  Obesity no  Smoking no  Diabetes no  Hyperlipidemia yes, Hypertension yes  CKD no  History of PVD with intervention or intermittent claudication no    Review of symptoms:  Pain in the legs: no  Legs ache: no  Legs feel heavy: no  Tiredness/fatigue: no  Itching/burning: no  Swollen ankles: no  Leg cramps: no  Restless legs: no  Throbbing: no  Other symptoms: no    Veins have become worse in the past 3 months: no  Patient elevates legs to relieve discomfort: no  Patient uses compression hose: yes   Patient takes pain meds for symptoms: no  History of SVT, phlebitis or varicose vein blood clot: no  History of DVT: no    Prior treatments for veins: no  Compression:  yes  Injections: yes  Surgery: yes    Medications: no  Pregnancy affecting veins: no    Body mass index is 28.09 kg/m².    Past Medical History:   Diagnosis Date   • Essential (primary) hypertension    • Hyperlipidemia    • Nonsmoker    • Venous insufficiency     7/16/2018 s/p Left Short saphenous vein Endovenous Laser ablation (EVLA) and Ultrasound-guided foam sclerotherapy (UGFS)          Past Surgical History:   Procedure Laterality Date   • Anes endovenous laser, 1st vein Left 07/16/2018 7/16/2018 s/p Left Short saphenous vein Endovenous Laser ablation (EVLA) and Ultrasound-guided foam sclerotherapy (UGFS)   • Colonoscopy  2014   • Us guidance sclerotherapy multiple veins Left 08/14/2018 8/14/2018 s/p Left leg Ultrasound-guided foam sclerotherapy (UGFS)       Allergies:   ALLERGIES:   Allergen Reactions   • Lisinopril Cough     oral       Medications:   Current Outpatient Medications   Medication Sig Dispense Refill   • simvastatin (ZOCOR) 10 MG tablet Take 1 tablet daily 90 tablet 2   • losartan (COZAAR) 25 MG tablet Take 1 tablet by mouth daily. 1 TABLET DAILY 90 tablet 2   • diclofenac (VOLTAREN) 50 MG EC tablet Take 1 tablet by mouth as needed (joint pain). 30 tablet 3   • ALPRAZolam (XANAX) 0.25 MG tablet Take 1 tablet by mouth 3 times daily as needed (anxiety). 30 tablet 2     No current facility-administered medications for this visit.        Social History:   Social History     Socioeconomic History   • Marital status: /Civil Union     Spouse name: Not on file   • Number of children: 4   • Years of education: Not on file   • Highest education level: Not on file   Occupational History   • Occupation: retired [employer]    Social Needs   • Financial resource strain: Not on file   • Food insecurity:     Worry: Not on file     Inability: Not on file   • Transportation needs:     Medical: Not on file     Non-medical: Not on file   Tobacco Use   • Smoking status: Never Smoker   •  Smokeless tobacco: Never Used   Substance and Sexual Activity   • Alcohol use: Yes   • Drug use: Not on file   • Sexual activity: Not on file   Lifestyle   • Physical activity:     Days per week: 3 days     Minutes per session: 60 min   • Stress: Not on file   Relationships   • Social connections:     Talks on phone: Not on file     Gets together: Not on file     Attends Rastafarian service: Not on file     Active member of club or organization: Not on file     Attends meetings of clubs or organizations: Not on file     Relationship status: Not on file   • Intimate partner violence:     Fear of current or ex partner: Not on file     Emotionally abused: Not on file     Physically abused: Not on file     Forced sexual activity: Not on file   Other Topics Concern   • Not on file   Social History Narrative   • Not on file       Family History:   Family History   Problem Relation Age of Onset   • Other Mother         vein stripping   • Stroke/TIA Mother         Dec 79, cardiopulmonary arrest/CAD, CVA, Parkinson's hypothryoid   • Pneumonia Father         Dec 80       ROS  Review of Systems   Constitutional: Negative.    HENT: Negative.    Eyes: Negative.    Respiratory: Negative.    Cardiovascular: Negative.    Gastrointestinal: Negative.    Endocrine: Negative.    Genitourinary: Negative.    Musculoskeletal: Negative.    Skin: Negative.    Allergic/Immunologic: Negative.    Neurological: Negative.    Hematological: Negative.    Psychiatric/Behavioral: Negative.        OBJECTIVE:  Visit Vitals  /88 (BP Location: LUE - Left upper extremity, Patient Position: Sitting, Cuff Size: Regular)   Pulse 59   Ht 5' 10\" (1.778 m)   Wt 88.8 kg (195 lb 12.8 oz)   SpO2 99%   BMI 28.09 kg/m²       Physical Exam  Physical Exam  Vitals signs and nursing note reviewed.   Constitutional:       Appearance: He is well-developed.   HENT:      Head: Normocephalic and atraumatic.   Eyes:      Conjunctiva/sclera: Conjunctivae normal.       Pupils: Pupils are equal, round, and reactive to light.   Neck:      Musculoskeletal: Normal range of motion and neck supple.   Cardiovascular:      Rate and Rhythm: Normal rate and regular rhythm.      Pulses: Normal pulses.           Carotid pulses are 2+ on the right side and 2+ on the left side.       Radial pulses are 2+ on the right side and 2+ on the left side.        Dorsalis pedis pulses are 2+ on the right side and 2+ on the left side.        Posterior tibial pulses are 2+ on the right side and 2+ on the left side.      Heart sounds: Normal heart sounds.      Comments: Radial Jean-Pierre's test  Venous Insufficiency CEAP 2 left leg  No wounds, ulcers noted varicosities posterior left popliteal fossa  Varicosities over the left lower shin.  Noted few reticular veins right lower leg  Pulmonary:      Effort: Pulmonary effort is normal.      Breath sounds: Normal breath sounds.   Abdominal:      General: Bowel sounds are normal.      Palpations: Abdomen is soft.   Musculoskeletal: Normal range of motion.   Skin:     General: Skin is warm and dry.   Neurological:      Mental Status: He is alert and oriented to person, place, and time.         Lab / Testing:  The following labs and diagnostic studies and EKG's were reviewed by me independently interpreted and discussed with the patient. Refer to individual report(s) for complete details.    Orders Only on 05/31/2019   Component Date Value Ref Range Status   • CHOLESTEROL, TOTAL 05/31/2019 181  <200 mg/dL Final   • HDL CHOLESTEROL 05/31/2019 67  >40 mg/dL Final   • TRIGLYCERIDES 05/31/2019 67  <150 mg/dL Final   • LDL-CHOLESTEROL 05/31/2019 99  mg/dL (calc) Final    Comment: Reference range: <100     Desirable range <100 mg/dL for primary prevention;    <70 mg/dL for patients with CHD or diabetic patients   with > or = 2 CHD risk factors.     LDL-C is now calculated using the Earnest-Ramos   calculation, which is a validated novel method providing   better accuracy  than the Friedewald equation in the   estimation of LDL-C.   Earnest SS et al. RAMIN. 2013;310(10): 8053-4124   (http://[employer]/faq/BFB479)     • CHOL/HDLC RATIO 2019 2.7  <5.0 (calc) Final   • NON HDL CHOLESTEROL 2019 114  <130 mg/dL (calc) Final    Comment: For patients with diabetes plus 1 major ASCVD risk   factor, treating to a non-HDL-C goal of <100 mg/dL   (LDL-C of <70 mg/dL) is considered a therapeutic   option.     • AST 2019 17  10 - 35 U/L Final   • ALT 2019 17  9 - 46 U/L Final    Comment:            Your request to have a duplicate copy faxed has been acknowledged.                              Queued to:  [id number]          Exercise Nuclear Stress test 2015 at [facility]  Total Test Time: 12:33        Reason For Stopping Test: General Fatigue  Exercise Capacity: High       METS: 11.0         Peak BP: 188/80  Target HR: 133                Peak HR: 169       % Of Maximal HR: 107  Additional Medications Administered: None     Stress EC.5mm ST Depression, inferolaterally, more evident at 11:50 during exercise  Symptoms: No Chest Pain  Significant Arrhythmias: Isolated PVCs  Blood Pressure Response: Normal    Venous mapping with reflux 2019 (independently interpreted and reviewed personally)  1. There is no evidence of acute deep or superficial venous thrombosis     noted in the right lower extremity.  2. There is no evidence of acute deep or superficial venous thrombosis     noted in the left lower extremity.  3. Severe reflux bilateral deep venous system, GSV, associated tributaries     and right SSV.  4. Left SSV closed proximal to mid segment, no reflux distally    Venous mapping with reflux 10/16/2018 (independently interpreted and reviewed personally)  1.  There is no evidence of deep system thrombosis or reflux in the left leg.  2.  The left great saphenous vein has normal antegrade flow.  3.  The proximal half of the left small saphenous  vein shows closure from recent vein treatment, at the distal half has normal antegrade flow.    Venous mapping with reflux 8/14/2018 (independently interpreted and reviewed personally)  1.  There is no evidence of deep system thrombosis or reflux in the left leg.  2.  The left great saphenous vein has normal antegrade flow but it does have refluxing tributary branches below the knee.  3.  The proximal half of the small saphenous vein shows closure from recent vein treatment but the distal half has antegrade flow which empties into a mid calf  vein.    Venous mapping with reflux 7/23/2018 (independently interpreted and reviewed personally)  1.  There is no evidence of deep system thrombosis or reflux in the left leg.  2.  The left great saphenous vein has normal antegrade flow.  3.  The proximal half of the left small saphenous vein now shows closure from recent vein treatment, but the distal half has reflux which spreads into several tributary branches.    Venous mapping with reflux 9/12/2017 (independently interpreted and reviewed personally)  1.   There is no evidence of deep system thrombosis or reflux in the left leg.  2.   The left great saphenous vein has normal antegrade flow through its length, yet it has refluxing tributary branches below the knee.  3.   The small saphenous vein has reflux in its initial portion, sending reflux into several large tributary varicosities, yet the distal half of the small saphenous vein has antegrade flow.     Venous mapping with reflux 6/21/2016  1. No bilateral deep vein thrombosis or deep venous reflux.  2. Incompetent left saphenopopliteal junction with severe reflux in the small saphenous vein and branch tributaries.   3. Abnormal left great saphenous vein with reflux beginning in mid thigh to the ankle and into tributaries.        Electronically Signed by:    Albino Leong,  1/3/2020       98.1

## 2020-02-11 ENCOUNTER — LABORATORY RESULT (OUTPATIENT)
Age: 85
End: 2020-02-11

## 2020-02-11 ENCOUNTER — APPOINTMENT (OUTPATIENT)
Dept: INTERNAL MEDICINE | Facility: CLINIC | Age: 85
End: 2020-02-11
Payer: MEDICARE

## 2020-02-11 VITALS
SYSTOLIC BLOOD PRESSURE: 104 MMHG | DIASTOLIC BLOOD PRESSURE: 62 MMHG | HEIGHT: 60 IN | WEIGHT: 97 LBS | RESPIRATION RATE: 18 BRPM | HEART RATE: 70 BPM | BODY MASS INDEX: 19.04 KG/M2 | TEMPERATURE: 98.1 F | OXYGEN SATURATION: 97 %

## 2020-02-11 DIAGNOSIS — E78.00 PURE HYPERCHOLESTEROLEMIA, UNSPECIFIED: ICD-10-CM

## 2020-02-11 PROCEDURE — 99214 OFFICE O/P EST MOD 30 MIN: CPT

## 2020-02-11 NOTE — PLAN
[FreeTextEntry1] : 1. Continued medication as outlined above.\par \par 2. Of note is the fact that the patient is still on amiodarone. She has refused a pulmonary function study today. I explained to her the importance of obtaining a diffusion capacity due to the fact that she is on this medication.\par \par 3. The patient will undergo blood work today to include a CBC and basic metabolic panel\par \par 4. Followup in 6 months with a wellness evaluation and routine fasting blood work.

## 2020-02-11 NOTE — HISTORY OF PRESENT ILLNESS
[de-identified] : The patient comes in today for a followup evaluation. She has not been seen by myself, since April of 2019.\par \par At this time, the patient states she is doing relatively well. She was seen by a hematologist. She is noted to have anemia of chronic disease. She also has an MGUS which has been stable.\par \par The patient notes that her appetite has been good. She denies any constitutional symptoms such as fevers, chills, night sweats, or chest pains. There has been no change in bowel habits. She does not perform any formal exercise, but she does remain active. She now comes in for this assessment.

## 2020-02-11 NOTE — PHYSICAL EXAM
[General Appearance - Alert] : alert [General Appearance - In No Acute Distress] : in no acute distress [Sclera] : the sclera and conjunctiva were normal [PERRL With Normal Accommodation] : pupils were equal in size, round, and reactive to light [Extraocular Movements] : extraocular movements were intact [Outer Ear] : the ears and nose were normal in appearance [Oropharynx] : the oropharynx was normal [Neck Appearance] : the appearance of the neck was normal [Neck Cervical Mass (___cm)] : no neck mass was observed [Jugular Venous Distention Increased] : there was no jugular-venous distention [Thyroid Diffuse Enlargement] : the thyroid was not enlarged [Thyroid Nodule] : there were no palpable thyroid nodules [Auscultation Breath Sounds / Voice Sounds] : lungs were clear to auscultation bilaterally [Apical Impulse] : the apical impulse was normal [Heart Rate And Rhythm] : heart rate was normal and rhythm regular [Heart Sounds] : normal S1 and S2 [FreeTextEntry1] : Carotids are 1+ bilaterally. There is trace to 1+-2+  edema bilaterally [Bowel Sounds] : normal bowel sounds [Abdomen Soft] : soft [Abdomen Tenderness] : non-tender [] : no hepato-splenomegaly [Abdomen Mass (___ Cm)] : no abdominal mass palpated [Cervical Lymph Nodes Enlarged Posterior Bilaterally] : posterior cervical [Cervical Lymph Nodes Enlarged Anterior Bilaterally] : anterior cervical [Supraclavicular Lymph Nodes Enlarged Bilaterally] : supraclavicular [Nail Clubbing] : no clubbing  or cyanosis of the fingernails

## 2020-02-12 LAB
ANION GAP SERPL CALC-SCNC: 11 MMOL/L
BASOPHILS # BLD AUTO: 0.08 K/UL
BASOPHILS NFR BLD AUTO: 1.7 %
BUN SERPL-MCNC: 21 MG/DL
CALCIUM SERPL-MCNC: 9.2 MG/DL
CHLORIDE SERPL-SCNC: 102 MMOL/L
CO2 SERPL-SCNC: 25 MMOL/L
CREAT SERPL-MCNC: 1.64 MG/DL
EOSINOPHIL # BLD AUTO: 0.09 K/UL
EOSINOPHIL NFR BLD AUTO: 2 %
GLUCOSE SERPL-MCNC: 96 MG/DL
HCT VFR BLD CALC: 34.7 %
HGB BLD-MCNC: 10.4 G/DL
IMM GRANULOCYTES NFR BLD AUTO: 0.2 %
LYMPHOCYTES # BLD AUTO: 0.95 K/UL
LYMPHOCYTES NFR BLD AUTO: 20.7 %
MAN DIFF?: NORMAL
MCHC RBC-ENTMCNC: 30 GM/DL
MCHC RBC-ENTMCNC: 31.7 PG
MCV RBC AUTO: 105.8 FL
MONOCYTES # BLD AUTO: 0.67 K/UL
MONOCYTES NFR BLD AUTO: 14.6 %
NEUTROPHILS # BLD AUTO: 2.79 K/UL
NEUTROPHILS NFR BLD AUTO: 60.8 %
PLATELET # BLD AUTO: 227 K/UL
POTASSIUM SERPL-SCNC: 5.1 MMOL/L
RBC # BLD: 3.28 M/UL
RBC # FLD: 14.5 %
SODIUM SERPL-SCNC: 138 MMOL/L
WBC # FLD AUTO: 4.59 K/UL

## 2020-02-17 LAB
25(OH)D3 SERPL-MCNC: 51.5 NG/ML
ALBUMIN SERPL ELPH-MCNC: 3.9 G/DL
ALP BLD-CCNC: 72 U/L
ALT SERPL-CCNC: 15 U/L
ANION GAP SERPL CALC-SCNC: 13 MMOL/L
AST SERPL-CCNC: 23 U/L
BILIRUB SERPL-MCNC: 0.3 MG/DL
BUN SERPL-MCNC: 21 MG/DL
CALCIUM SERPL-MCNC: 9.1 MG/DL
CHLORIDE SERPL-SCNC: 103 MMOL/L
CHOLEST SERPL-MCNC: 117 MG/DL
CHOLEST/HDLC SERPL: 2 RATIO
CO2 SERPL-SCNC: 24 MMOL/L
CREAT SERPL-MCNC: 1.64 MG/DL
FERRITIN SERPL-MCNC: 37 NG/ML
GLUCOSE SERPL-MCNC: 91 MG/DL
HDLC SERPL-MCNC: 59 MG/DL
IRON SATN MFR SERPL: 13 %
IRON SERPL-MCNC: 36 UG/DL
LDLC SERPL CALC-MCNC: 47 MG/DL
POTASSIUM SERPL-SCNC: 5.2 MMOL/L
PROT SERPL-MCNC: 6.7 G/DL
SODIUM SERPL-SCNC: 140 MMOL/L
T3FREE SERPL-MCNC: 1.27 PG/ML
T3RU NFR SERPL: 0.9 TBI
T4 FREE SERPL-MCNC: 1.9 NG/DL
T4 SERPL-MCNC: 10.7 UG/DL
TIBC SERPL-MCNC: 279 UG/DL
TRANSFERRIN SERPL-MCNC: 231 MG/DL
TRIGL SERPL-MCNC: 56 MG/DL
TSH SERPL-ACNC: 3.05 UIU/ML
UIBC SERPL-MCNC: 244 UG/DL

## 2020-05-14 ENCOUNTER — APPOINTMENT (OUTPATIENT)
Dept: OTOLARYNGOLOGY | Facility: CLINIC | Age: 85
End: 2020-05-14

## 2020-05-14 ENCOUNTER — APPOINTMENT (OUTPATIENT)
Dept: OTOLARYNGOLOGY | Facility: CLINIC | Age: 85
End: 2020-05-14
Payer: MEDICARE

## 2020-05-14 VITALS
HEIGHT: 63 IN | WEIGHT: 95 LBS | TEMPERATURE: 98 F | SYSTOLIC BLOOD PRESSURE: 99 MMHG | BODY MASS INDEX: 16.83 KG/M2 | HEART RATE: 74 BPM | DIASTOLIC BLOOD PRESSURE: 63 MMHG

## 2020-05-14 PROCEDURE — 99213 OFFICE O/P EST LOW 20 MIN: CPT

## 2020-05-14 NOTE — ASSESSMENT
[FreeTextEntry1] : Large amount cerumen cleared each ear canal.\par Ear canals and tympanic membranes  unremarkable.\par symptoms relieved. F/u\par \par 1 y

## 2020-05-14 NOTE — REASON FOR VISIT
[Initial Consultation] : an initial consultation for [Hearing Loss] : hearing loss [FreeTextEntry2] : left ear

## 2020-05-14 NOTE — PHYSICAL EXAM
[de-identified] : large amount cerumen cleared au [Midline] : trachea located in midline position [Normal] : no rashes

## 2020-05-14 NOTE — HISTORY OF PRESENT ILLNESS
[de-identified] : co loss hearing as x 6 weeks \par no vertigo\par hx vertigo 2 y ago and hx au moderate sn loss

## 2020-07-20 ENCOUNTER — APPOINTMENT (OUTPATIENT)
Dept: DISASTER EMERGENCY | Facility: CLINIC | Age: 85
End: 2020-07-20

## 2020-07-21 LAB — SARS-COV-2 N GENE NPH QL NAA+PROBE: NOT DETECTED

## 2020-07-23 ENCOUNTER — OUTPATIENT (OUTPATIENT)
Dept: OUTPATIENT SERVICES | Facility: HOSPITAL | Age: 85
LOS: 1 days | Discharge: HOME | End: 2020-07-23
Payer: MEDICARE

## 2020-07-23 VITALS
HEART RATE: 71 BPM | OXYGEN SATURATION: 100 % | TEMPERATURE: 98 F | DIASTOLIC BLOOD PRESSURE: 64 MMHG | HEIGHT: 63 IN | SYSTOLIC BLOOD PRESSURE: 128 MMHG | RESPIRATION RATE: 20 BRPM | WEIGHT: 97 LBS

## 2020-07-23 VITALS
OXYGEN SATURATION: 100 % | DIASTOLIC BLOOD PRESSURE: 57 MMHG | HEART RATE: 70 BPM | RESPIRATION RATE: 16 BRPM | SYSTOLIC BLOOD PRESSURE: 106 MMHG

## 2020-07-23 DIAGNOSIS — Z95.810 PRESENCE OF AUTOMATIC (IMPLANTABLE) CARDIAC DEFIBRILLATOR: Chronic | ICD-10-CM

## 2020-07-23 DIAGNOSIS — Z95.0 PRESENCE OF CARDIAC PACEMAKER: Chronic | ICD-10-CM

## 2020-07-23 DIAGNOSIS — I48.91 UNSPECIFIED ATRIAL FIBRILLATION: ICD-10-CM

## 2020-07-23 DIAGNOSIS — Z98.49 CATARACT EXTRACTION STATUS, UNSPECIFIED EYE: Chronic | ICD-10-CM

## 2020-07-23 DIAGNOSIS — Z98.890 OTHER SPECIFIED POSTPROCEDURAL STATES: Chronic | ICD-10-CM

## 2020-07-23 PROCEDURE — 93312 ECHO TRANSESOPHAGEAL: CPT

## 2020-07-23 PROCEDURE — 93325 DOPPLER ECHO COLOR FLOW MAPG: CPT

## 2020-07-23 PROCEDURE — 93320 DOPPLER ECHO COMPLETE: CPT

## 2020-07-23 NOTE — PROCEDURAL SAFETY CHECKLIST WITH OR WITHOUT SEDATION - NSPOSTCOMMENTFT_GEN_ALL_CORE
ROSIE started at 07:41 with probe passed by Dr. Villa ROSIE started at 07:41 with probe passed by Dr. Villa; probe removed at 0804; pt tolerated procedure well.

## 2020-07-23 NOTE — PACU DISCHARGE NOTE - COMMENTS
Verbal instructions given to pt with follow up appointment given to pt. Pt escorted to exit via wheelchair by transporter.
Statement Selected

## 2020-07-28 DIAGNOSIS — R06.09 OTHER FORMS OF DYSPNEA: ICD-10-CM

## 2020-07-28 DIAGNOSIS — Z95.810 PRESENCE OF AUTOMATIC (IMPLANTABLE) CARDIAC DEFIBRILLATOR: ICD-10-CM

## 2020-07-28 DIAGNOSIS — I25.5 ISCHEMIC CARDIOMYOPATHY: ICD-10-CM

## 2020-07-28 DIAGNOSIS — I08.0 RHEUMATIC DISORDERS OF BOTH MITRAL AND AORTIC VALVES: ICD-10-CM

## 2020-07-28 DIAGNOSIS — Z87.891 PERSONAL HISTORY OF NICOTINE DEPENDENCE: ICD-10-CM

## 2020-07-28 DIAGNOSIS — I48.0 PAROXYSMAL ATRIAL FIBRILLATION: ICD-10-CM

## 2020-07-28 DIAGNOSIS — I70.0 ATHEROSCLEROSIS OF AORTA: ICD-10-CM

## 2020-07-28 DIAGNOSIS — E78.00 PURE HYPERCHOLESTEROLEMIA, UNSPECIFIED: ICD-10-CM

## 2020-07-28 DIAGNOSIS — Z79.82 LONG TERM (CURRENT) USE OF ASPIRIN: ICD-10-CM

## 2020-07-28 DIAGNOSIS — I11.0 HYPERTENSIVE HEART DISEASE WITH HEART FAILURE: ICD-10-CM

## 2020-07-28 DIAGNOSIS — I50.22 CHRONIC SYSTOLIC (CONGESTIVE) HEART FAILURE: ICD-10-CM

## 2020-07-28 DIAGNOSIS — Z79.01 LONG TERM (CURRENT) USE OF ANTICOAGULANTS: ICD-10-CM

## 2020-08-25 ENCOUNTER — APPOINTMENT (OUTPATIENT)
Dept: CARDIOTHORACIC SURGERY | Facility: CLINIC | Age: 85
End: 2020-08-25
Payer: MEDICARE

## 2020-08-25 VITALS
DIASTOLIC BLOOD PRESSURE: 69 MMHG | HEART RATE: 78 BPM | BODY MASS INDEX: 16.83 KG/M2 | HEIGHT: 63 IN | RESPIRATION RATE: 16 BRPM | OXYGEN SATURATION: 98 % | SYSTOLIC BLOOD PRESSURE: 112 MMHG | WEIGHT: 95 LBS

## 2020-08-25 DIAGNOSIS — I05.9 RHEUMATIC MITRAL VALVE DISEASE, UNSPECIFIED: ICD-10-CM

## 2020-08-25 DIAGNOSIS — I34.0 NONRHEUMATIC MITRAL (VALVE) INSUFFICIENCY: ICD-10-CM

## 2020-08-25 PROCEDURE — 99205 OFFICE O/P NEW HI 60 MIN: CPT

## 2020-08-26 PROBLEM — I34.0 MITRAL REGURGITATION: Status: ACTIVE | Noted: 2017-11-02

## 2020-08-26 PROBLEM — I05.9 MITRAL VALVE DISORDER: Status: ACTIVE | Noted: 2018-04-19

## 2020-08-26 NOTE — PHYSICAL EXAM
[General Appearance - Well Developed] : well developed [General Appearance - In No Acute Distress] : in no acute distress [Sclera] : the sclera and conjunctiva were normal [Outer Ear] : the ears and nose were normal in appearance [Neck Appearance] : the appearance of the neck was normal [Jugular Venous Distention Increased] : there was no jugular-venous distention [Respiration, Rhythm And Depth] : normal respiratory rhythm and effort [Auscultation Breath Sounds / Voice Sounds] : lungs were clear to auscultation bilaterally [Examination Of The Chest] : the chest was normal in appearance [Heart Rate And Rhythm] : heart rate was normal and rhythm regular [Chest Visual Inspection Thoracic Asymmetry] : no chest asymmetry [2+] : right 2+ [Nail Clubbing] : no clubbing  or cyanosis of the fingernails [Motor Tone] : muscle strength and tone were normal [Skin Color & Pigmentation] : normal skin color and pigmentation [Sensation] : the sensory exam was normal to light touch and pinprick [Skin Lesions] : no skin lesions [Oriented To Time, Place, And Person] : oriented to person, place, and time [Impaired Insight] : insight and judgment were intact [Motor Exam] : the motor exam was normal [Affect] : the affect was normal [Fingers] :  capillary refill of the fingers was normal [FreeTextEntry1] : unsteady gait, has life alert button

## 2020-08-26 NOTE — ASSESSMENT
[FreeTextEntry1] : Ms Miranda presents for evaluation of candidacy for mitral clip. She is currently reporting shortness of breath on Entresto and furosemide for HFrEF cardiomyopathy. She appears severe shortness of breath with AICD, lower extremity edema on furosemide. Prior to mitral intervention she will require Cardiac Catheterization prior to her mitral clip.  The plan was discussed with Dr Shepherd as well as Dr Marrero. Ms Miranda is agreeable to the plan and would like to proceed.\par All risks, benefits, and alternatives discussed at length with patient.  All questions addressed.  Patient would like to proceed with surgical intervention as discussed.\par \par PLAN:\par - Stop Coumadin 3 days\par - Remain inpatient and have mitral clip the following day after cardiac cath\par - Admit Tuesday for Cardiac Catheterization (Dr Shepherd) remain in house for Wednesday Mitral Clip (as per Dr Marrero)\par \par \par Written by Aubrey Cheung NP acting as a scribe for Dr. Johnson. \par “The documentation recorded by the scribe accurately reflects the service I personally performed and the decisions made by me.” \par Cam Johnson MD. \par \par

## 2020-08-26 NOTE — REVIEW OF SYSTEMS
[Feeling Tired] : feeling tired [Feeling Poorly] : feeling poorly [Palpitations] : palpitations [Shortness Of Breath] : shortness of breath [SOB on Exertion] : shortness of breath during exertion [Negative] : Psychiatric [Chest Pain] : no chest pain [Lower Ext Edema] : no lower extremity edema [FreeTextEntry9] : unsteady gait

## 2020-08-26 NOTE — CONSULT LETTER
[Dear  ___] : Dear  [unfilled], [Consult Letter:] : I had the pleasure of evaluating your patient, [unfilled]. [Consult Closing:] : Thank you very much for allowing me to participate in the care of this patient.  If you have any questions, please do not hesitate to contact me. [Sincerely,] : Sincerely, [Please see my note below.] : Please see my note below. [FreeTextEntry2] : Maritza Marrero MD [FreeTextEntry3] : Cam Johnson MD\par  of Cardiothoracic Surgery\par [facility]\par [address] [place] [address] \[place] [address]\par [phone]\par

## 2020-08-26 NOTE — HISTORY OF PRESENT ILLNESS
[FreeTextEntry1] : Ms. GOFF is a 85 year old female referred by Dr. Marrero who presents for consultation. Her past medical history includes atrial fibrillation (Warfarin), anemia, celiac disease, cardiomyopathy (PPM and Entresto), emphysema, CAD, Hashimoto Thyroiditis, HTN, HLD, mitral valve regurgitation, ventricular tachycardia, MI and pericarditis. \par \par \par

## 2020-08-26 NOTE — DATA REVIEWED
[FreeTextEntry1] : Transesophageal Echocardiogram from 07/23/20 at [facility]\par - LVEF 40-45%\par - There appears to be mostly functional central mitral regurgitation present with only slight posterior leaflet prolapse\par - There is at least moderate central mitral insufficiency present with a calculate regurgitant fraction of 30%\par - There is systolic blunting of pulmonary vein on PW doppler

## 2020-09-08 ENCOUNTER — APPOINTMENT (OUTPATIENT)
Dept: INTERNAL MEDICINE | Facility: CLINIC | Age: 85
End: 2020-09-08

## 2020-09-17 ENCOUNTER — APPOINTMENT (OUTPATIENT)
Dept: INTERNAL MEDICINE | Facility: CLINIC | Age: 85
End: 2020-09-17

## 2020-09-30 ENCOUNTER — APPOINTMENT (OUTPATIENT)
Dept: INTERNAL MEDICINE | Facility: CLINIC | Age: 85
End: 2020-09-30
Payer: MEDICARE

## 2020-09-30 DIAGNOSIS — Z95.810 PRESENCE OF AUTOMATIC (IMPLANTABLE) CARDIAC DEFIBRILLATOR: ICD-10-CM

## 2020-09-30 PROCEDURE — 90662 IIV NO PRSV INCREASED AG IM: CPT

## 2020-09-30 PROCEDURE — G0008: CPT

## 2020-10-06 ENCOUNTER — OUTPATIENT (OUTPATIENT)
Dept: OUTPATIENT SERVICES | Facility: HOSPITAL | Age: 85
LOS: 1 days | End: 2020-10-06
Payer: MEDICARE

## 2020-10-06 ENCOUNTER — RESULT REVIEW (OUTPATIENT)
Age: 85
End: 2020-10-06

## 2020-10-06 VITALS
RESPIRATION RATE: 18 BRPM | DIASTOLIC BLOOD PRESSURE: 66 MMHG | HEART RATE: 68 BPM | WEIGHT: 100.31 LBS | SYSTOLIC BLOOD PRESSURE: 138 MMHG | TEMPERATURE: 98 F | HEIGHT: 61 IN

## 2020-10-06 DIAGNOSIS — Z98.890 OTHER SPECIFIED POSTPROCEDURAL STATES: Chronic | ICD-10-CM

## 2020-10-06 DIAGNOSIS — I34.0 NONRHEUMATIC MITRAL (VALVE) INSUFFICIENCY: ICD-10-CM

## 2020-10-06 DIAGNOSIS — Z95.0 PRESENCE OF CARDIAC PACEMAKER: Chronic | ICD-10-CM

## 2020-10-06 DIAGNOSIS — Z98.49 CATARACT EXTRACTION STATUS, UNSPECIFIED EYE: Chronic | ICD-10-CM

## 2020-10-06 DIAGNOSIS — I48.91 UNSPECIFIED ATRIAL FIBRILLATION: ICD-10-CM

## 2020-10-06 DIAGNOSIS — Z95.810 PRESENCE OF AUTOMATIC (IMPLANTABLE) CARDIAC DEFIBRILLATOR: Chronic | ICD-10-CM

## 2020-10-06 DIAGNOSIS — Z29.9 ENCOUNTER FOR PROPHYLACTIC MEASURES, UNSPECIFIED: ICD-10-CM

## 2020-10-06 DIAGNOSIS — Z01.818 ENCOUNTER FOR OTHER PREPROCEDURAL EXAMINATION: ICD-10-CM

## 2020-10-06 LAB
A1C WITH ESTIMATED AVERAGE GLUCOSE RESULT: 5.9 % — HIGH (ref 4–5.6)
ALBUMIN SERPL ELPH-MCNC: 4 G/DL — SIGNIFICANT CHANGE UP (ref 3.3–5.2)
ALLERGY+IMMUNOLOGY DIAG STUDY NOTE: SIGNIFICANT CHANGE UP
ALP SERPL-CCNC: 65 U/L — SIGNIFICANT CHANGE UP (ref 40–120)
ALT FLD-CCNC: 13 U/L — SIGNIFICANT CHANGE UP
ANION GAP SERPL CALC-SCNC: 12 MMOL/L — SIGNIFICANT CHANGE UP (ref 5–17)
APPEARANCE UR: CLEAR — SIGNIFICANT CHANGE UP
APTT BLD: 40.2 SEC — HIGH (ref 27.5–35.5)
AST SERPL-CCNC: 23 U/L — SIGNIFICANT CHANGE UP
BACTERIA # UR AUTO: ABNORMAL
BASOPHILS # BLD AUTO: 0.07 K/UL — SIGNIFICANT CHANGE UP (ref 0–0.2)
BASOPHILS NFR BLD AUTO: 1.8 % — SIGNIFICANT CHANGE UP (ref 0–2)
BILIRUB DIRECT SERPL-MCNC: 0.2 MG/DL — SIGNIFICANT CHANGE UP (ref 0–0.3)
BILIRUB INDIRECT FLD-MCNC: 0.4 MG/DL — SIGNIFICANT CHANGE UP (ref 0.2–1)
BILIRUB SERPL-MCNC: 0.6 MG/DL — SIGNIFICANT CHANGE UP (ref 0.4–2)
BILIRUB UR-MCNC: NEGATIVE — SIGNIFICANT CHANGE UP
BLD GP AB SCN SERPL QL: SIGNIFICANT CHANGE UP
BUN SERPL-MCNC: 25 MG/DL — HIGH (ref 8–20)
CALCIUM SERPL-MCNC: 9.3 MG/DL — SIGNIFICANT CHANGE UP (ref 8.6–10.2)
CHLORIDE SERPL-SCNC: 101 MMOL/L — SIGNIFICANT CHANGE UP (ref 98–107)
CO2 SERPL-SCNC: 27 MMOL/L — SIGNIFICANT CHANGE UP (ref 22–29)
COLOR SPEC: YELLOW — SIGNIFICANT CHANGE UP
CREAT SERPL-MCNC: 1.27 MG/DL — SIGNIFICANT CHANGE UP (ref 0.5–1.3)
DIFF PNL FLD: ABNORMAL
DIR ANTIGLOB POLYSPECIFIC INTERPRETATION: SIGNIFICANT CHANGE UP
EOSINOPHIL # BLD AUTO: 0.03 K/UL — SIGNIFICANT CHANGE UP (ref 0–0.5)
EOSINOPHIL NFR BLD AUTO: 0.8 % — SIGNIFICANT CHANGE UP (ref 0–6)
EPI CELLS # UR: SIGNIFICANT CHANGE UP
ESTIMATED AVERAGE GLUCOSE: 123 MG/DL — HIGH (ref 68–114)
GLUCOSE SERPL-MCNC: 90 MG/DL — SIGNIFICANT CHANGE UP (ref 70–99)
GLUCOSE UR QL: NEGATIVE MG/DL — SIGNIFICANT CHANGE UP
HCT VFR BLD CALC: 33.7 % — LOW (ref 34.5–45)
HGB BLD-MCNC: 10.5 G/DL — LOW (ref 11.5–15.5)
IMM GRANULOCYTES NFR BLD AUTO: 0.3 % — SIGNIFICANT CHANGE UP (ref 0–1.5)
INR BLD: 1.91 RATIO — HIGH (ref 0.88–1.16)
KETONES UR-MCNC: NEGATIVE — SIGNIFICANT CHANGE UP
LEUKOCYTE ESTERASE UR-ACNC: ABNORMAL
LYMPHOCYTES # BLD AUTO: 1.06 K/UL — SIGNIFICANT CHANGE UP (ref 1–3.3)
LYMPHOCYTES # BLD AUTO: 27.8 % — SIGNIFICANT CHANGE UP (ref 13–44)
MAGNESIUM SERPL-MCNC: 2.2 MG/DL — SIGNIFICANT CHANGE UP (ref 1.6–2.6)
MCHC RBC-ENTMCNC: 31.2 GM/DL — LOW (ref 32–36)
MCHC RBC-ENTMCNC: 32.3 PG — SIGNIFICANT CHANGE UP (ref 27–34)
MCV RBC AUTO: 103.7 FL — HIGH (ref 80–100)
MONOCYTES # BLD AUTO: 0.41 K/UL — SIGNIFICANT CHANGE UP (ref 0–0.9)
MONOCYTES NFR BLD AUTO: 10.8 % — SIGNIFICANT CHANGE UP (ref 2–14)
MRSA PCR RESULT.: SIGNIFICANT CHANGE UP
NEUTROPHILS # BLD AUTO: 2.23 K/UL — SIGNIFICANT CHANGE UP (ref 1.8–7.4)
NEUTROPHILS NFR BLD AUTO: 58.5 % — SIGNIFICANT CHANGE UP (ref 43–77)
NITRITE UR-MCNC: NEGATIVE — SIGNIFICANT CHANGE UP
NT-PROBNP SERPL-SCNC: 2072 PG/ML — HIGH (ref 0–300)
PH UR: 6 — SIGNIFICANT CHANGE UP (ref 5–8)
PHOSPHATE SERPL-MCNC: 3.2 MG/DL — SIGNIFICANT CHANGE UP (ref 2.4–4.7)
PLATELET # BLD AUTO: 219 K/UL — SIGNIFICANT CHANGE UP (ref 150–400)
POTASSIUM SERPL-MCNC: 4.5 MMOL/L — SIGNIFICANT CHANGE UP (ref 3.5–5.3)
POTASSIUM SERPL-SCNC: 4.5 MMOL/L — SIGNIFICANT CHANGE UP (ref 3.5–5.3)
PREALB SERPL-MCNC: 18 MG/DL — SIGNIFICANT CHANGE UP (ref 18–38)
PROT SERPL-MCNC: 7.3 G/DL — SIGNIFICANT CHANGE UP (ref 6.6–8.7)
PROT UR-MCNC: 30 MG/DL
PROTHROM AB SERPL-ACNC: 21.5 SEC — HIGH (ref 10.6–13.6)
RBC # BLD: 3.25 M/UL — LOW (ref 3.8–5.2)
RBC # FLD: 14.6 % — HIGH (ref 10.3–14.5)
RBC CASTS # UR COMP ASSIST: SIGNIFICANT CHANGE UP /HPF (ref 0–4)
S AUREUS DNA NOSE QL NAA+PROBE: SIGNIFICANT CHANGE UP
SODIUM SERPL-SCNC: 140 MMOL/L — SIGNIFICANT CHANGE UP (ref 135–145)
SP GR SPEC: 1.01 — SIGNIFICANT CHANGE UP (ref 1.01–1.02)
T3 SERPL-MCNC: 44 NG/DL — LOW (ref 80–200)
T4 AB SER-ACNC: 12 UG/DL — SIGNIFICANT CHANGE UP (ref 4.5–12)
TSH SERPL-MCNC: 0.72 UIU/ML — SIGNIFICANT CHANGE UP (ref 0.27–4.2)
UROBILINOGEN FLD QL: NEGATIVE MG/DL — SIGNIFICANT CHANGE UP
WBC # BLD: 3.81 K/UL — SIGNIFICANT CHANGE UP (ref 3.8–10.5)
WBC # FLD AUTO: 3.81 K/UL — SIGNIFICANT CHANGE UP (ref 3.8–10.5)
WBC UR QL: SIGNIFICANT CHANGE UP

## 2020-10-06 PROCEDURE — 93010 ELECTROCARDIOGRAM REPORT: CPT

## 2020-10-06 PROCEDURE — 71046 X-RAY EXAM CHEST 2 VIEWS: CPT

## 2020-10-06 PROCEDURE — G0463: CPT

## 2020-10-06 PROCEDURE — 86077 PHYS BLOOD BANK SERV XMATCH: CPT

## 2020-10-06 PROCEDURE — 93005 ELECTROCARDIOGRAM TRACING: CPT

## 2020-10-06 PROCEDURE — 71046 X-RAY EXAM CHEST 2 VIEWS: CPT | Mod: 26

## 2020-10-06 NOTE — H&P PST ADULT - NSICDXPROBLEM_GEN_ALL_CORE_FT
PROBLEM DIAGNOSES  Problem: MR (mitral regurgitation)  Assessment and Plan: Mitral clip    Problem: Afib  Assessment and Plan: routine labs and ekg  continue medication as directed  hold coumadin as directed by cardiology    Problem: Need for prophylactic measure  Assessment and Plan: high risk, the surgical team will order appropriate VTE prophylaxis

## 2020-10-06 NOTE — H&P PST ADULT - HEMATOLOGY/LYMPHATICS
History  Chief Complaint   Patient presents with    Fever - 9 weeks to 76 years     Mother reports fever of 103 today, vomited once, no other symptoms  Mother diagnosed with flu recently  Patient had flu shot  Fever - 9 weeks to 74 years   Max temp prior to arrival:  103  Temp source:  Oral  Onset quality:  Sudden  Timing:  Intermittent  Progression:  Resolved  Chronicity:  New  Relieved by:  Ibuprofen  Worsened by:  Nothing  Associated symptoms: vomiting    Associated symptoms: no congestion, no cough, no diarrhea, no fussiness, no rash, no rhinorrhea and no tugging at ears    Behavior:     Behavior:  Normal    Intake amount:  Eating less than usual    Urine output:  Normal  Risk factors: sick contacts        Prior to Admission Medications   Prescriptions Last Dose Informant Patient Reported? Taking? Pediatric Multiple Vit-C-FA (PEDIATRIC MULTIVITAMIN) chewable tablet   Yes No   Sig: Chew 1 tablet daily      Facility-Administered Medications: None       Past Medical History:   Diagnosis Date    Premature baby        History reviewed  No pertinent surgical history  Family History   Problem Relation Age of Onset    Mental illness Mother         Copied from mother's history at birth     I have reviewed and agree with the history as documented  Social History     Tobacco Use    Smoking status: Never Smoker    Smokeless tobacco: Never Used    Tobacco comment: no exposure reported   Substance Use Topics    Alcohol use: Not on file    Drug use: Not on file        Review of Systems   Constitutional: Positive for fever  Negative for crying  HENT: Negative for congestion and rhinorrhea  Respiratory: Negative for cough  Gastrointestinal: Positive for vomiting  Negative for diarrhea  Skin: Negative for rash  Physical Exam  Physical Exam   Constitutional: She appears well-developed  She is active  HENT:   Head: Atraumatic     Right Ear: Tympanic membrane normal    Left Ear: Tympanic membrane normal    Nose: Nose normal    Mouth/Throat: Mucous membranes are moist  Dentition is normal  Oropharynx is clear  Eyes: Pupils are equal, round, and reactive to light  Conjunctivae and EOM are normal    Neck: Normal range of motion  Neck supple  Cardiovascular: Normal rate, regular rhythm, S1 normal and S2 normal    Pulmonary/Chest: Effort normal and breath sounds normal  Tachypnea noted  Abdominal: Full and soft  Bowel sounds are normal    Musculoskeletal: Normal range of motion  Neurological: She is alert  She has normal strength  Skin: Skin is warm and dry  Capillary refill takes less than 2 seconds  Nursing note and vitals reviewed  Vital Signs  ED Triage Vitals [03/13/19 1848]   Temperature Pulse Resp BP SpO2   98 1 °F (36 7 °C) 124 -- -- 98 %      Temp src Heart Rate Source Patient Position - Orthostatic VS BP Location FiO2 (%)   Temporal Monitor -- -- --      Pain Score       --           Vitals:    03/13/19 1848   Pulse: 124           Visual Acuity      ED Medications  Medications - No data to display    Diagnostic Studies  Results Reviewed     None                 No orders to display              Procedures  Procedures       Phone Contacts  ED Phone Contact    ED Course  ED Course as of Mar 13 1946   Wed Mar 13, 2019   1945 Child afebrile, no distress, completely normal physical exam                                   MDM    Disposition  Final diagnoses:   Viral illness     Time reflects when diagnosis was documented in both MDM as applicable and the Disposition within this note     Time User Action Codes Description Comment    3/13/2019  7:44 PM Maco Haines [B34 9] Viral illness       ED Disposition     ED Disposition Condition Date/Time Comment    Discharge Stable Wed Mar 13, 2019  7:44 PM Zo Sierra discharge to home/self care              Follow-up Information     Follow up With Specialties Details Why Contact Info    Shobha Be MD Pediatrics Call Call tomorrow if other symptoms occur  [facility]  [phone]            Patient's Medications   Discharge Prescriptions    No medications on file     No discharge procedures on file      ED Provider  Electronically Signed by           Tyrone Wilson PA-C  03/13/19 6659 details…

## 2020-10-06 NOTE — PATIENT PROFILE ADULT - NSPROGENOTHERPROVIDER_GEN_A_NUR
durable medical equipment provider/home care/community agency/outpatient care/rehabilitation therapy/complementary therapies/medical specialist/nutritionist/

## 2020-10-06 NOTE — PATIENT PROFILE ADULT - NSPROEDALEARNPREFOTH_GEN_A_NUR
verbal instruction/video/skill demonstration/audio/individual instruction/written material/pictorial

## 2020-10-06 NOTE — PATIENT PROFILE ADULT - NSPROEDALEARNPREF_GEN_A_NUR
video/pictorial/individual instruction/written material/audio/verbal instruction/skill demonstration

## 2020-10-06 NOTE — H&P PST ADULT - HISTORY OF PRESENT ILLNESS
85 year old female present for PST for Mitral clip.  Patient state her cardiologist (Dr. Marrero) has been monitoring her heart valves closely.  She reports on recent test and imagining it showed a worsening of mitral valve disease. Patient endorse she has had some DUMONT denies chest pain or palpitations. She is schedule for Cardiac catheterization prior to mitral clip. She is schedule  for mitral clip on 10/14/20 with Dr. Johnson       Echo 7/4/20 Summary   The left atrium is mildly dilated.   Estimated left ventricular ejection fraction is 40-45% %.   Mildly reduced left ventricular systolic function.   There appears to be mostly functional central mitral regurgitation present   with only slight posterior leaflet prolapse.   There is mild thickening to both leaflets   There is at least moderate central mitral insufficiency present with a   calculate regurgitant fraction of 30%   There is systolic blunting of pulmonary vein on PW doppler   Mild (1+) aortic regurgitation is present.   Mild aortic sclerosis is present with normal valvular opening.

## 2020-10-06 NOTE — H&P PST ADULT - ASSESSMENT
CAPRINI SCORE [CLOT]    AGE RELATED RISK FACTORS                                                       MOBILITY RELATED FACTORS  [ ] Age 41-60 years                                            (1 Point)                  [ ] Bed rest                                                        (1 Point)  [ ] Age: 61-74 years                                           (2 Points)                 [ ] Plaster cast                                                   (2 Points)  [x ] Age= 75 years                                              (3 Points)                 [ ] Bed bound for more than 72 hours                 (2 Points)    DISEASE RELATED RISK FACTORS                                               GENDER SPECIFIC FACTORS  [ x] Edema in the lower extremities                       (1 Point)                  [ ] Pregnancy                                                     (1 Point)  [ x] Varicose veins                                               (1 Point)                  [ ] Post-partum < 6 weeks                                   (1 Point)             [ ] BMI > 25 Kg/m2                                            (1 Point)                  [ ] Hormonal therapy  or oral contraception          (1 Point)                 [ ] Sepsis (in the previous month)                        (1 Point)                  [ ] History of pregnancy complications                 (1 point)  [ ] Pneumonia or serious lung disease                                               [ ] Unexplained or recurrent                     (1 Point)           (in the previous month)                               (1 Point)  [ ] Abnormal pulmonary function test                     (1 Point)                 SURGERY RELATED RISK FACTORS  [ ] Acute myocardial infarction                              (1 Point)                 [ ]  Section                                             (1 Point)  [ ] Congestive heart failure (in the previous month)  (1 Point)               [ ] Minor surgery                                                  (1 Point)   [ ] Inflammatory bowel disease                             (1 Point)                 [ ] Arthroscopic surgery                                        (2 Points)  [ ] Central venous access                                      (2 Points)                [x ] General surgery lasting more than 45 minutes   (2 Points)       [ ] Stroke (in the previous month)                          (5 Points)               [ ] Elective arthroplasty                                         (5 Points)                                                                                                                                               HEMATOLOGY RELATED FACTORS                                                 TRAUMA RELATED RISK FACTORS  [ ] Prior episodes of VTE                                     (3 Points)                [ ] Fracture of the hip, pelvis, or leg                       (5 Points)  [ ] Positive family history for VTE                         (3 Points)                 [ ] Acute spinal cord injury (in the previous month)  (5 Points)  [ ] Prothrombin 31869 A                                     (3 Points)                 [ ] Paralysis  (less than 1 month)                             (5 Points)  [ ] Factor V Leiden                                             (3 Points)                  [ ] Multiple Trauma within 1 month                        (5 Points)  [ ] Lupus anticoagulants                                     (3 Points)                                                           [ ] Anticardiolipin antibodies                               (3 Points)                                                       [ ] High homocysteine in the blood                      (3 Points)                                             [ ] Other congenital or acquired thrombophilia      (3 Points)                                                [ ] Heparin induced thrombocytopenia                  (3 Points)                                          Total Score [    7    ]    Caprini Score 0 - 2:  Low Risk, No VTE Prophylaxis required for most patients, encourage ambulation  Caprini Score 3 - 6:  At Risk, pharmacologic VTE prophylaxis is indicated for most patients (in the absence of a contraindication)  Caprini Score Greater than or = 7:  High Risk, pharmacologic VTE prophylaxis is indicated for most patients (in the absence of a contraindication)        85 year old female with ICD, Chronic atrial fibrillation, Hashimoto's thyroiditis chf, HTN, HLD, anemia, MI, present with Mitral valve prolapse now schedule for Mitral Clip  Patient educated on pre op written and verbal instructions.

## 2020-10-06 NOTE — H&P PST ADULT - NSICDXPASTMEDICALHX_GEN_ALL_CORE_FT
PAST MEDICAL HISTORY:  AICD (automatic cardioverter/defibrillator) present x 3 . Replaced x 2. Presently right subclavian area    Arthritis     Celiac disease     Chronic atrial fibrillation     Hashimoto's thyroiditis     History of cataract     History of CHF (congestive heart failure)     History of colon polyps     HTN (hypertension)     Hyperlipidemia, unspecified hyperlipidemia type     Hypothyroid     Iron deficiency anemia due to chronic blood loss     Left inguinal hernia     Mitral valve prolapse     Myocardial infarction 1990    Osteoporosis     Peripheral edema     Varicose veins BLE

## 2020-10-06 NOTE — H&P PST ADULT - NSICDXFAMILYHX_GEN_ALL_CORE_FT
FAMILY HISTORY:  Father  Still living? No  Family history of dementia, Age at diagnosis: Age Unknown    Mother  Still living? No  Family history of diabetes mellitus, Age at diagnosis: Age Unknown  Family history of heart disease, Age at diagnosis: Age Unknown    Sibling  Still living? No  Family history of diabetes mellitus, Age at diagnosis: Age Unknown

## 2020-10-08 LAB
CULTURE RESULTS: SIGNIFICANT CHANGE UP
SPECIMEN SOURCE: SIGNIFICANT CHANGE UP

## 2020-10-09 NOTE — CHART NOTE - NSCHARTNOTEFT_GEN_A_CORE
Antibody Interpretation: Anti-D    Patient is an 85 year old female presents for PST for Mitral clip.  Blood sample received on 10/06/20 demonstrates that the patient is blood group A Rh(D) negative. The antibody screen is positive and anti-D is identified in patient's plasma. Anti-D is a clinically significant antibody that can cause acute and delayed hemolytic transfusion reactions. Crossmatch compatible red blood cells through the AHG phase of testing, negative for the D antigen, must be selected for transfusion. Please allow sufficient time for pre-transfusion blood bank workup.

## 2020-10-12 VITALS — HEIGHT: 63 IN | WEIGHT: 95.02 LBS

## 2020-10-12 NOTE — H&P PST ADULT - HISTORY OF PRESENT ILLNESS
Narrative: 85 year old female PMH:  Atrial fibrillation ( Warfarin ), anemia, CKD, celiac disease, cardiomyopathy HFrEF  with ICD placement, HTN HL  Hashimoto's thyroiditis, former smoker with emphysema with significant  Mitral  valve disease. She has been followed by her cardiologist Dr Marrero   with  mitral valve surveillance. She has a had progressive  SOB with associated bilateral leg edema despite Entresto and diuretics. She was referred to Dr Bey and is scheduled for a Mitral - clip on 10/14/20. Prior to her surgery she is scheduled for Cardiac catheterization  with Dr Shepherd on 10 /13 and will be admitted to hospital  for her Mitral- Clip  with Dr Johnson         Symptoms: SOB   Heart Failure: HFrEF   Echo 7/4/20 Summary   The left atrium is mildly dilated.   Estimated left ventricular ejection fraction is 40-45% %.   Mildly reduced left ventricular systolic function.   There appears to be mostly functional central mitral regurgitation present with only slight posterior leaflet prolapse.   There is mild thickening to both leaflets   There is at least moderate central mitral insufficiency present with a calculate regurgitant fraction of 30%   There is systolic blunting of pulmonary vein on PW doppler   Mild (1+) aortic regurgitation is present.   Mild aortic sclerosis is present with normal valvular opening.         Noninvasive Testing:   Stress Test: Date:        Protocol:        Duration of Exercise:        Symptoms:        EKG Changes:        DTS:        Myocardial Imaging:        Risk Assessment (Low, Medium, High):     Echo (Date, Findings):     Antianginal Therapies:        Beta Blockers:         Calcium Channel Blockers:        Long Acting Nitrates:        Ranexa:     Associated Risk Factors:        Cerebrovascular Disease: N/A       Chronic Lung Disease: N/A       Peripheral Arterial Disease: N/A       Chronic Kidney Disease (if yes, what is GFR): N/A       Uncontrolled Diabetes (if yes, what is HgbA1C or FBS): N/A       Poorly Controlled Hypertension (if yes, what is SBP): N/A       Morbid Obesity (if yes, what is BMI): N/A       History of Recent Ventricular Arrhythmia: N/A       Inability to Ambulate Safely: N/A       Need for Therapeutic Anticoagulation: N/A       Antiplatelet or Contrast Allergy: N/A Narrative: 84 y/o F, former smoker, with PMHx afib (on coumadin), iron deficiency anemia (s/p iron infusions; Feraheme), CKD, celiac disease, cardiomyopathy HFrEF subsequent BiV implant ([employer]), VT, MI, pericarditis, HTN HLD  Hashimoto's thyroiditis, emphysema with significant  Mitral  valve disease. She has been followed by her cardiologist Dr Marrero with mitral valve surveillance. She now has a had progressive  SOB with associated bilateral leg edema despite Entresto and diuretics.   She presents today in anticipation of a left heart cardiac catheterization with Dr. Shepherd prior to her MitralClip with Dr. Johnson.    Device Info:  [employer]  ICD model # XQ8749-65C Serial # [id number]   LV model #1258T/92 serial # [id number]  RVA model # 7122Q/58 serial # [id number]      ROSIE 7/23/20: mildly dilated LA, EF 40-45%, functional central MR with slight prolapse, mild thickening to both leaflets moderate central mitral insufficiency EF 30%, systolic blunting of PV on PW doppler mild AR, mild AS normal   Mitral Valve  Mean Gradient: 1 mmHg  MR Velocity: 374 cm/s  Alias Velocity: 61.6 cm/s                  MR VTI: 176 cm    Carotid US 1/14/20 mild atherosclerotic disease in right bulb <50% stenosis, moderate atherosclerosis in left prox to mid ICA <50%, all velocities are normal, flow in bilat. vertebral arteries are antegrade    ASA 3  MALL 2  BRA 13.2%   Narrative: 84 y/o F, former smoker (1PPD x5 years; quit 1900), with PMHx afib (on coumadin), iron deficiency anemia (s/p iron infusions; Feraheme), CKD, celiac disease, cardiomyopathy HFrEF subsequent BiV implant ([employer]), VT, MI (1990; no stents placed), pericarditis, HTN HLD  Hashimoto's thyroiditis, emphysema with significant  Mitral valve disease. She has been followed by her Cardiologist Dr Marrero with mitral valve surveillance. She now has a had progressive  SOB, increasing fatigue with associated bilateral leg edema despite Entresto and diuretics.   She presents today in anticipation of a left heart cardiac catheterization with Dr. Shepherd prior to her MitralClip with Dr. Johnson.    Device Info:  [employer]  ICD model # UD3544-01H Serial # [id number]   LV model #1258T/92 serial # [id number]  RVA model # 7122Q/58 serial # [id number]      ROSIE 7/23/20: mildly dilated LA, EF 40-45%, functional central MR with slight prolapse, mild thickening to both leaflets moderate central mitral insufficiency EF 30%, systolic blunting of PV on PW doppler mild AR, mild AS normal   Mitral Valve  Mean Gradient: 1 mmHg  MR Velocity: 374 cm/s  Alias Velocity: 61.6 cm/s                  MR VTI: 176 cm    Carotid US 1/14/20 mild atherosclerotic disease in right bulb <50% stenosis, moderate atherosclerosis in left prox to mid ICA <50%, all velocities are normal, flow in bilat. vertebral arteries are antegrade    ASA 3  MALL 2  BRA 13.2%

## 2020-10-12 NOTE — H&P PST ADULT - OTHER CARE PROVIDERS
Dr. Janes Shepherd Cardiology, Dr Johnson- cardiac surgery Dr. Marrero (Cardiologist), Dr. Shepherd (Interventional Cardiologist),  Dr. Garcia (Electrophysiologist), Dr. Ryan (Gastroenterologist)

## 2020-10-12 NOTE — H&P PST ADULT - NSICDXPASTSURGICALHX_GEN_ALL_CORE_FT
PAST SURGICAL HISTORY:  AICD (automatic cardioverter/defibrillator) present with PPM. Replaced x 2.    Artificial pacemaker     H/O colonoscopy last done 2009    H/O right inguinal hernia repair     History of cataract extraction Bilateral

## 2020-10-12 NOTE — H&P PST ADULT - NEUROLOGICAL DETAILS
responds to verbal commands/alert and oriented x 3/responds to pain/no spontaneous movement/deep reflexes intact/cranial nerves intact/normal strength

## 2020-10-12 NOTE — H&P PST ADULT - NSICDXPROBLEM_GEN_ALL_CORE_FT
PROBLEM DIAGNOSES  Problem: Mitral regurgitation  Assessment and Plan: -consent to be obtained  -Procedure d/w patient, risks and benefits explained, questions answered  -admit patient to Dr. Johnson post procedure; pt to remain in pt for MitralClip Surgery  -labs, EKG, office notes, and surveillance echo reviewed

## 2020-10-12 NOTE — H&P PST ADULT - ASSESSMENT
86 y/o F with progressive MR now here for a left heart cardiac with Dr. Cora rojo prior to MitralClip Surgery with Dr. Johnson

## 2020-10-13 ENCOUNTER — TRANSCRIPTION ENCOUNTER (OUTPATIENT)
Age: 85
End: 2020-10-13

## 2020-10-13 ENCOUNTER — INPATIENT (INPATIENT)
Facility: HOSPITAL | Age: 85
LOS: 1 days | Discharge: HOME | DRG: 266 | End: 2020-10-15
Attending: THORACIC SURGERY (CARDIOTHORACIC VASCULAR SURGERY) | Admitting: INTERNAL MEDICINE
Payer: MEDICARE

## 2020-10-13 DIAGNOSIS — I34.0 NONRHEUMATIC MITRAL (VALVE) INSUFFICIENCY: ICD-10-CM

## 2020-10-13 DIAGNOSIS — Z98.49 CATARACT EXTRACTION STATUS, UNSPECIFIED EYE: Chronic | ICD-10-CM

## 2020-10-13 DIAGNOSIS — Z98.890 OTHER SPECIFIED POSTPROCEDURAL STATES: Chronic | ICD-10-CM

## 2020-10-13 DIAGNOSIS — Z95.0 PRESENCE OF CARDIAC PACEMAKER: Chronic | ICD-10-CM

## 2020-10-13 DIAGNOSIS — Z95.810 PRESENCE OF AUTOMATIC (IMPLANTABLE) CARDIAC DEFIBRILLATOR: Chronic | ICD-10-CM

## 2020-10-13 LAB
APTT BLD: 42 SEC — HIGH (ref 27.5–35.5)
INR BLD: 1.51 RATIO — HIGH (ref 0.88–1.16)
PROTHROM AB SERPL-ACNC: 17.2 SEC — HIGH (ref 10.6–13.6)
SARS-COV-2 RNA SPEC QL NAA+PROBE: SIGNIFICANT CHANGE UP

## 2020-10-13 PROCEDURE — 93010 ELECTROCARDIOGRAM REPORT: CPT

## 2020-10-13 PROCEDURE — 99221 1ST HOSP IP/OBS SF/LOW 40: CPT | Mod: 57

## 2020-10-13 PROCEDURE — 93880 EXTRACRANIAL BILAT STUDY: CPT | Mod: 26

## 2020-10-13 RX ORDER — ASPIRIN/CALCIUM CARB/MAGNESIUM 324 MG
81 TABLET ORAL DAILY
Refills: 0 | Status: DISCONTINUED | OUTPATIENT
Start: 2020-10-13 | End: 2020-10-14

## 2020-10-13 RX ORDER — LEVOTHYROXINE SODIUM 125 MCG
88 TABLET ORAL DAILY
Refills: 0 | Status: DISCONTINUED | OUTPATIENT
Start: 2020-10-13 | End: 2020-10-14

## 2020-10-13 RX ORDER — CHLORHEXIDINE GLUCONATE 213 G/1000ML
1 SOLUTION TOPICAL ONCE
Refills: 0 | Status: COMPLETED | OUTPATIENT
Start: 2020-10-13 | End: 2020-10-13

## 2020-10-13 RX ORDER — CHLORHEXIDINE GLUCONATE 213 G/1000ML
5 SOLUTION TOPICAL ONCE
Refills: 0 | Status: DISCONTINUED | OUTPATIENT
Start: 2020-10-13 | End: 2020-10-14

## 2020-10-13 RX ORDER — VANCOMYCIN HCL 1 G
1000 VIAL (EA) INTRAVENOUS ONCE
Refills: 0 | Status: DISCONTINUED | OUTPATIENT
Start: 2020-10-13 | End: 2020-10-14

## 2020-10-13 RX ORDER — ATORVASTATIN CALCIUM 80 MG/1
10 TABLET, FILM COATED ORAL AT BEDTIME
Refills: 0 | Status: DISCONTINUED | OUTPATIENT
Start: 2020-10-13 | End: 2020-10-14

## 2020-10-13 RX ORDER — SACUBITRIL AND VALSARTAN 24; 26 MG/1; MG/1
1 TABLET, FILM COATED ORAL
Refills: 0 | Status: DISCONTINUED | OUTPATIENT
Start: 2020-10-13 | End: 2020-10-14

## 2020-10-13 RX ORDER — SODIUM CHLORIDE 9 MG/ML
3 INJECTION INTRAMUSCULAR; INTRAVENOUS; SUBCUTANEOUS EVERY 8 HOURS
Refills: 0 | Status: DISCONTINUED | OUTPATIENT
Start: 2020-10-13 | End: 2020-10-14

## 2020-10-13 RX ORDER — DIGOXIN 250 MCG
0.12 TABLET ORAL DAILY
Refills: 0 | Status: DISCONTINUED | OUTPATIENT
Start: 2020-10-13 | End: 2020-10-14

## 2020-10-13 RX ORDER — HEPARIN SODIUM 5000 [USP'U]/ML
INJECTION INTRAVENOUS; SUBCUTANEOUS
Qty: 25000 | Refills: 0 | Status: DISCONTINUED | OUTPATIENT
Start: 2020-10-13 | End: 2020-10-13

## 2020-10-13 RX ORDER — AMIODARONE HYDROCHLORIDE 400 MG/1
100 TABLET ORAL DAILY
Refills: 0 | Status: DISCONTINUED | OUTPATIENT
Start: 2020-10-13 | End: 2020-10-14

## 2020-10-13 RX ADMIN — SACUBITRIL AND VALSARTAN 1 TABLET(S): 24; 26 TABLET, FILM COATED ORAL at 18:19

## 2020-10-13 RX ADMIN — SODIUM CHLORIDE 3 MILLILITER(S): 9 INJECTION INTRAMUSCULAR; INTRAVENOUS; SUBCUTANEOUS at 22:53

## 2020-10-13 RX ADMIN — ATORVASTATIN CALCIUM 10 MILLIGRAM(S): 80 TABLET, FILM COATED ORAL at 23:02

## 2020-10-13 RX ADMIN — CHLORHEXIDINE GLUCONATE 1 APPLICATION(S): 213 SOLUTION TOPICAL at 22:38

## 2020-10-13 NOTE — DISCHARGE NOTE PROVIDER - HOSPITAL COURSE
86 y/o F, former smoker (1PPD x5 years; quit 1900), with PMHx afib (on coumadin), iron deficiency anemia (s/p iron infusions; Feraheme), CKD, celiac disease, cardiomyopathy HFrEF subsequent BiV implant ([employer]), [place], MI (1990; no stents placed), pericarditis, HTN HLD  Hashimoto's thyroiditis, emphysema with significant  Mitral valve disease. She has been followed by her Cardiologist Dr Marrero with mitral valve surveillance. She now has a had progressive  SOB, increasing fatigue with associated bilateral leg edema despite Entresto and diuretics.   She presented on 10/13/20 electively for left heart cardiac catheterization with Dr. Shepherd prior to her MitralClip with Dr. Johnson. LHC revealed mild nonobstructive coronary artery disease to mid RCA. Patient underwent MV clip x 2 on 10/14/20 with mild post-deployment residual MR and mG 3 mmHg. Patient was extubated in OR and did well postoperatively without any clinical issues. Cleared for discharge to home on 10/15/20.    Vital Signs Last 24 Hrs  T(C): 37.3 (15 Oct 2020 05:00), Max: 37.7 (15 Oct 2020 00:00)  T(F): 99.1 (15 Oct 2020 05:00), Max: 99.9 (15 Oct 2020 00:00)  HR: 76 (15 Oct 2020 08:00) (69 - 82)  BP: 118/57 (15 Oct 2020 08:00) (91/50 - 140/60)  BP(mean): 80 (15 Oct 2020 08:00) (67 - 91)  RR: 15 (15 Oct 2020 07:00) (9 - 35)  SpO2: 96% (15 Oct 2020 08:00) (93% - 99%)    General: WN/WD NAD  Neurology: A&Ox3, nonfocal, GARCIA x 4  Respiratory: CTA B/L  CV: RRR, S1S2, no murmurs, rubs or gallops  Abdominal: Soft, NT, ND +BS, Last BM  Extremities: No edema, + peripheral pulses                          10.2   6.55  )-----------( 162      ( 15 Oct 2020 03:10 )             31.2       10-15    135  |  98  |  24.0<H>  ----------------------------<  94  3.9   |  24.0  |  0.90    Ca    8.7      15 Oct 2020 03:10  Mg     2.2     10-15    TPro  6.4<L>  /  Alb  3.4  /  TBili  0.6  /  DBili  x   /  AST  25  /  ALT  14  /  AlkPhos  69  10-14   86 y/o F, former smoker (1PPD x5 years; quit 1900), with PMHx afib (on coumadin), iron deficiency anemia (s/p iron infusions; Feraheme), CKD, celiac disease, cardiomyopathy HFrEF subsequent BiV implant ([employer]), [place], MI (1990; no stents placed), pericarditis, HTN HLD  Hashimoto's thyroiditis, emphysema with significant  Mitral valve disease. She has been followed by her Cardiologist Dr Marrero with mitral valve surveillance. She now has a had progressive  SOB, increasing fatigue with associated bilateral leg edema despite Entresto and diuretics.   She presented on 10/13/20 electively for left heart cardiac catheterization with Dr. Shepherd prior to her MitralClip with Dr. Johnson. LHC revealed mild nonobstructive coronary artery disease to mid RCA. Patient underwent MV clip x 2 on 10/14/20 with mild post-deployment residual MR and mG 3 mmHg. Patient was extubated in OR and did well postoperatively without any clinical issues. Cleared for discharge to home on 10/15/20.    Vital Signs Last 24 Hrs  T(C): 37.3 (15 Oct 2020 05:00), Max: 37.7 (15 Oct 2020 00:00)  T(F): 99.1 (15 Oct 2020 05:00), Max: 99.9 (15 Oct 2020 00:00)  HR: 76 (15 Oct 2020 08:00) (69 - 82)  BP: 118/57 (15 Oct 2020 08:00) (91/50 - 140/60)  BP(mean): 80 (15 Oct 2020 08:00) (67 - 91)  RR: 15 (15 Oct 2020 07:00) (9 - 35)  SpO2: 96% (15 Oct 2020 08:00) (93% - 99%)    General: WN/WD NAD  Neurology: A&Ox3, nonfocal, GARCIA x 4  Respiratory: CTA B/L  CV: RRR, S1S2,   Abdominal: Soft, NT, ND +BS,   Extremities: No edema, + peripheral pulses                          10.2   6.55  )-----------( 162      ( 15 Oct 2020 03:10 )             31.2       10-15    135  |  98  |  24.0<H>  ----------------------------<  94  3.9   |  24.0  |  0.90    Ca    8.7      15 Oct 2020 03:10  Mg     2.2     10-15    TPro  6.4<L>  /  Alb  3.4  /  TBili  0.6  /  DBili  x   /  AST  25  /  ALT  14  /  AlkPhos  69  10-14

## 2020-10-13 NOTE — PROGRESS NOTE ADULT - SUBJECTIVE AND OBJECTIVE BOX
Department of Cardiology                                                                  [facility]/[place] [address] [address] [facility]                                                            Telephone: [phone]. Fax:[phone]                                                    Post- Procedure Note: Left Heart Cardiac Catheterization       Narrative:  85y  Female is now s/p left heart catheterization via right groin approach (RFA #4Fr, no site complications) with Dr. Shepherd  Right femoral artery access precautions  No cardiac intervention  Mild disease to RCA; non obsturctive   EDP 12  Contrast used: 28ml  Heparin used: none  IVF used: NS 50ml  Post Op Dx: Mitral Regurgitation; proceed with MitraClip         PAST MEDICAL & SURGICAL HISTORY:  Mitral valve prolapse    Myocardial infarction  1990    Left inguinal hernia    AICD (automatic cardioverter/defibrillator) present  x 3 . Replaced x 2. Presently right subclavian area    Iron deficiency anemia due to chronic blood loss    Osteoporosis    Hashimoto&#x27;s thyroiditis    Celiac disease    Arthritis    Varicose veins  BLE    History of colon polyps    Peripheral edema    History of CHF (congestive heart failure)    Hyperlipidemia, unspecified hyperlipidemia type    History of cataract    HTN (hypertension)    Hypothyroid    Chronic atrial fibrillation    Artificial pacemaker    History of cataract extraction  Bilateral    H/O colonoscopy  last done 2009    H/O right inguinal hernia repair    AICD (automatic cardioverter/defibrillator) present  with PPM. Replaced x 2.      FAMILY HISTORY:  Family history of dementia (Father)    Family history of heart disease (Mother)    Family history of diabetes mellitus (Mother, Sibling)      Home Medications:  amiodarone 100 mg oral tablet: 1 tab(s) orally once a day (13 Oct 2020 07:55)  aspirin 81 mg oral delayed release tablet: 1 tab(s) orally once a day (13 Oct 2020 07:55)  atorvastatin 10 mg oral tablet: 1 tab(s) orally once a day (at bedtime) (13 Oct 2020 07:55)  Calcium 500+D oral tablet, chewable: 1 tab(s) orally 2 times a day (13 Oct 2020 07:55)  digoxin 125 mcg (0.125 mg) oral tablet: tab(s) orally once a day (13 Oct 2020 07:55)  Entresto 24 mg-26 mg oral tablet: 1 tab(s) orally 2 times a day (13 Oct 2020 07:55)  levothyroxine 88 mcg (0.088 mg) oral tablet: 1 tab(s) orally once a day (13 Oct 2020 07:55)  Vitamin D3:  (13 Oct 2020 07:55)  warfarin 1 mg oral tablet: 1 tab(s) orally once a day (13 Oct 2020 07:55)    Patient is a 85y old  Female who presents with a chief complaint of Cardiac Catheterization prior to MitralClip due to progressive, symptomatic MR (13 Oct 2020 10:00)    HEALTH ISSUES - PROBLEM Dx:  Mitral regurgitation          HPI:  Narrative: 84 y/o F, former smoker (1PPD x5 years; quit 1900), with PMHx afib (on coumadin), iron deficiency anemia (s/p iron infusions; Feraheme), CKD, celiac disease, cardiomyopathy HFrEF subsequent BiV implant ([employer] [employer]), [place], MI (1990; no stents placed), pericarditis, HTN HLD  Hashimoto's thyroiditis, emphysema with significant  Mitral valve disease. She has been followed by her Cardiologist Dr Marrero with mitral valve surveillance. She now has a had progressive  SOB, increasing fatigue with associated bilateral leg edema despite Entresto and diuretics.   She presents today in anticipation of a left heart cardiac catheterization with Dr. Shepherd prior to her MitralClip with Dr. Johnson.    Device Info:  [employer]  ICD model # KX1496-14I Serial # [id number]   LV model #1258T/92 serial # [id number]  RVA model # 7122Q/58 serial # [id number]      ROSIE 7/23/20: mildly dilated LA, EF 40-45%, functional central MR with slight prolapse, mild thickening to both leaflets moderate central mitral insufficiency EF 30%, systolic blunting of PV on PW doppler mild AR, mild AS normal   Mitral Valve  Mean Gradient: 1 mmHg  MR Velocity: 374 cm/s  Alias Velocity: 61.6 cm/s                  MR VTI: 176 cm    Carotid US 1/14/20 mild atherosclerotic disease in right bulb <50% stenosis, moderate atherosclerosis in left prox to mid ICA <50%, all velocities are normal, flow in bilat. vertebral arteries are antegrade    ASA 3  MALL 2  BRA 13.2%   (12 Oct 2020 14:59)    General: No fatigue, no fevers/chills  Respiratory: No dyspnea, no cough, no wheeze  CV: No chest pain, no palpitations  Abd: No nausea  Neuro: No headache, no dizziness  adhesives (Rash)  Ancef (Unknown)  lidocaine (Rash; Angioedema)      Objective:  Vital Signs Last 24 Hrs  T(C): 36.7 (13 Oct 2020 07:56), Max: 36.7 (13 Oct 2020 07:56)  T(F): 98.1 (13 Oct 2020 07:56), Max: 98.1 (13 Oct 2020 07:56)  HR: 70 (13 Oct 2020 10:30) (66 - 70)  BP: 124/62 (13 Oct 2020 10:15) (113/64 - 133/66)  BP(mean): --  RR: 16 (13 Oct 2020 10:30) (14 - 16)  SpO2: 98% (13 Oct 2020 10:30) (97% - 98%)      Neuro: A&OX3, CN 2-12 intact  HEENT: NC, AT  Lungs: Clear anteriorly; bilateral rhonchi posteriorly mid and bases  CV: v-paced rhythm  Abd: Soft  Right Groin: Soft, no bleeding, no hematoma  Extremity: + distal pulses  RACW- BiV Device      PT/INR - ( 13 Oct 2020 07:50 )   PT: 17.2 sec;   INR: 1.51 ratio    PTT - ( 13 Oct 2020 07:50 )  PTT:42.0 sec      85y  Female is now s/p left heart catheterization via right groin approach (RFA #4Fr, no site complications) with Dr. Shepherd    -ADMIT due to: pre-op MitraClip with Dr. Johnson  -post cardiac cath orders  -radial or groin precautions  -remove right groin femoral sheath in holding room; no heparin used  -bedrest x 2 hours post sheath removal  -labs and EKG in am  -Single anti platelet therapy with aspirin  -digoxin 0.125mg po daily  -amiodarone 100mg po daily  -entresto low dose 24mg/36mg po daily with parameters  -statin therapy; atorvastatin 10mg daily  -resume furosemide 10mg daily PRN; pt EDP 12 during cardiac cath; would hold for now and implement when needed  -NPO after midnight  -follow up outpt in 2 weeks with Cardiologist Dr. Marrero post procedure  -Management per CTS     Department of Cardiology                                                                  [facility]/[place] [address] [address] [facility]                                                            Telephone: [phone]. Fax:[phone]                                                    Post- Procedure Note: Left Heart Cardiac Catheterization       Narrative:  85y  Female is now s/p left heart catheterization via right groin approach (RFA #4Fr, no site complications) with Dr. Shepherd  Right femoral artery access precautions  No cardiac intervention  Mild disease to RCA; non obstructive   EDP 12  Contrast used: 28ml  Heparin used: none  IVF used: NS 50ml  Post Op Dx: Mitral Regurgitation; proceed with MitraClip         PAST MEDICAL & SURGICAL HISTORY:  Mitral valve prolapse    Myocardial infarction  1990    Left inguinal hernia    AICD (automatic cardioverter/defibrillator) present  x 3 . Replaced x 2. Presently right subclavian area    Iron deficiency anemia due to chronic blood loss    Osteoporosis    Hashimoto&#x27;s thyroiditis    Celiac disease    Arthritis    Varicose veins  BLE    History of colon polyps    Peripheral edema    History of CHF (congestive heart failure)    Hyperlipidemia, unspecified hyperlipidemia type    History of cataract    HTN (hypertension)    Hypothyroid    Chronic atrial fibrillation    Artificial pacemaker    History of cataract extraction  Bilateral    H/O colonoscopy  last done 2009    H/O right inguinal hernia repair    AICD (automatic cardioverter/defibrillator) present  with PPM. Replaced x 2.      FAMILY HISTORY:  Family history of dementia (Father)    Family history of heart disease (Mother)    Family history of diabetes mellitus (Mother, Sibling)      Home Medications:  amiodarone 100 mg oral tablet: 1 tab(s) orally once a day (13 Oct 2020 07:55)  aspirin 81 mg oral delayed release tablet: 1 tab(s) orally once a day (13 Oct 2020 07:55)  atorvastatin 10 mg oral tablet: 1 tab(s) orally once a day (at bedtime) (13 Oct 2020 07:55)  Calcium 500+D oral tablet, chewable: 1 tab(s) orally 2 times a day (13 Oct 2020 07:55)  digoxin 125 mcg (0.125 mg) oral tablet: tab(s) orally once a day (13 Oct 2020 07:55)  Entresto 24 mg-26 mg oral tablet: 1 tab(s) orally 2 times a day (13 Oct 2020 07:55)  levothyroxine 88 mcg (0.088 mg) oral tablet: 1 tab(s) orally once a day (13 Oct 2020 07:55)  Vitamin D3:  (13 Oct 2020 07:55)  warfarin 1 mg oral tablet: 1 tab(s) orally once a day (13 Oct 2020 07:55)    Patient is a 85y old  Female who presents with a chief complaint of Cardiac Catheterization prior to MitralClip due to progressive, symptomatic MR (13 Oct 2020 10:00)    HEALTH ISSUES - PROBLEM Dx:  Mitral regurgitation          HPI:  Narrative: 86 y/o F, former smoker (1PPD x5 years; quit 1900), with PMHx afib (on coumadin), iron deficiency anemia (s/p iron infusions; Feraheme), CKD, celiac disease, cardiomyopathy HFrEF subsequent BiV implant ([employer]), [place], MI (1990; no stents placed), pericarditis, HTN HLD  Hashimoto's thyroiditis, emphysema with significant  Mitral valve disease. She has been followed by her Cardiologist Dr Marrero with mitral valve surveillance. She now has a had progressive  SOB, increasing fatigue with associated bilateral leg edema despite Entresto and diuretics.   She presents today in anticipation of a left heart cardiac catheterization with Dr. Shepherd prior to her MitralClip with Dr. Johnson.    Device Info:  [employer]  ICD model # JD1907-05O Serial # [id number]   LV model #1258T/92 serial # [id number]  RVA model # 7122Q/58 serial # [id number]      ROSIE 7/23/20: mildly dilated LA, EF 40-45%, functional central MR with slight prolapse, mild thickening to both leaflets moderate central mitral insufficiency EF 30%, systolic blunting of PV on PW doppler mild AR, mild AS normal   Mitral Valve  Mean Gradient: 1 mmHg  MR Velocity: 374 cm/s  Alias Velocity: 61.6 cm/s                  MR VTI: 176 cm    Carotid US 1/14/20 mild atherosclerotic disease in right bulb <50% stenosis, moderate atherosclerosis in left prox to mid ICA <50%, all velocities are normal, flow in bilat. vertebral arteries are antegrade    ASA 3  MALL 2  BRA 13.2%   (12 Oct 2020 14:59)    General: No fatigue, no fevers/chills  Respiratory: No dyspnea, no cough, no wheeze  CV: No chest pain, no palpitations  Abd: No nausea  Neuro: No headache, no dizziness  adhesives (Rash)  Ancef (Unknown)  lidocaine (Rash; Angioedema)      Objective:  Vital Signs Last 24 Hrs  T(C): 36.7 (13 Oct 2020 07:56), Max: 36.7 (13 Oct 2020 07:56)  T(F): 98.1 (13 Oct 2020 07:56), Max: 98.1 (13 Oct 2020 07:56)  HR: 70 (13 Oct 2020 10:30) (66 - 70)  BP: 124/62 (13 Oct 2020 10:15) (113/64 - 133/66)  BP(mean): --  RR: 16 (13 Oct 2020 10:30) (14 - 16)  SpO2: 98% (13 Oct 2020 10:30) (97% - 98%)      Neuro: A&OX3, CN 2-12 intact  HEENT: NC, AT  Lungs: Clear anteriorly; bilateral rhonchi posteriorly mid and bases  CV: v-paced rhythm  Abd: Soft  Right Groin: Soft, no bleeding, no hematoma  Extremity: + distal pulses  RACW- BiV Device      PT/INR - ( 13 Oct 2020 07:50 )   PT: 17.2 sec;   INR: 1.51 ratio    PTT - ( 13 Oct 2020 07:50 )  PTT:42.0 sec      85y  Female is now s/p left heart catheterization via right groin approach (RFA #4Fr, no site complications) with Dr. Shepherd    -ADMIT due to: pre-op MitraClip with Dr. Johnson  -post cardiac cath orders  -radial or groin precautions  -remove right groin femoral sheath in holding room; no heparin used  -bedrest x 2 hours post sheath removal  -labs and EKG in am  -Single anti platelet therapy with aspirin  -digoxin 0.125mg po daily  -amiodarone 100mg po daily  -entresto low dose 24mg/36mg po daily with parameters  -statin therapy; atorvastatin 10mg daily  -resume furosemide 10mg daily PRN; pt EDP 12 during cardiac cath; would hold for now and implement when needed  -resume home dose coumadin when able post op  -NPO after midnight  -follow up outpt in 2 weeks with Cardiologist Dr. Marrero post procedure  -Management per CTS     Department of Cardiology                                                                  [facility]/[place] [address] [address] [facility]                                                            Telephone: [phone]. Fax:[phone]                                                    Post- Procedure Note: Left Heart Cardiac Catheterization       Narrative:  85y  Female is now s/p left heart catheterization via right groin approach (RFA #4Fr, no site complications) with Dr. Shepherd  Right femoral artery access precautions  No cardiac intervention  Mild disease to RCA; non obstructive   EDP 12  Contrast used: 28ml  Heparin used: none  IVF used: NS 50ml  Post Op Dx: Mitral Regurgitation; proceed with MitraClip         PAST MEDICAL & SURGICAL HISTORY:  Mitral valve prolapse    Myocardial infarction  1990    Left inguinal hernia    AICD (automatic cardioverter/defibrillator) present  x 3 . Replaced x 2. Presently right subclavian area    Iron deficiency anemia due to chronic blood loss    Osteoporosis    Hashimoto&#x27;s thyroiditis    Celiac disease    Arthritis    Varicose veins  BLE    History of colon polyps    Peripheral edema    History of CHF (congestive heart failure)    Hyperlipidemia, unspecified hyperlipidemia type    History of cataract    HTN (hypertension)    Hypothyroid    Chronic atrial fibrillation    Artificial pacemaker    History of cataract extraction  Bilateral    H/O colonoscopy  last done 2009    H/O right inguinal hernia repair    AICD (automatic cardioverter/defibrillator) present  with PPM. Replaced x 2.      FAMILY HISTORY:  Family history of dementia (Father)    Family history of heart disease (Mother)    Family history of diabetes mellitus (Mother, Sibling)      Home Medications:  amiodarone 100 mg oral tablet: 1 tab(s) orally once a day (13 Oct 2020 07:55)  aspirin 81 mg oral delayed release tablet: 1 tab(s) orally once a day (13 Oct 2020 07:55)  atorvastatin 10 mg oral tablet: 1 tab(s) orally once a day (at bedtime) (13 Oct 2020 07:55)  Calcium 500+D oral tablet, chewable: 1 tab(s) orally 2 times a day (13 Oct 2020 07:55)  digoxin 125 mcg (0.125 mg) oral tablet: tab(s) orally once a day (13 Oct 2020 07:55)  Entresto 24 mg-26 mg oral tablet: 1 tab(s) orally 2 times a day (13 Oct 2020 07:55)  levothyroxine 88 mcg (0.088 mg) oral tablet: 1 tab(s) orally once a day (13 Oct 2020 07:55)  Vitamin D3:  (13 Oct 2020 07:55)  warfarin 1 mg oral tablet: 1 tab(s) orally once a day (13 Oct 2020 07:55)    Patient is a 85y old  Female who presents with a chief complaint of Cardiac Catheterization prior to MitralClip due to progressive, symptomatic MR (13 Oct 2020 10:00)    HEALTH ISSUES - PROBLEM Dx:  Mitral regurgitation          HPI:  Narrative: 84 y/o F, former smoker (1PPD x5 years; quit 1900), with PMHx afib (on coumadin), iron deficiency anemia (s/p iron infusions; Feraheme), CKD, celiac disease, cardiomyopathy HFrEF subsequent BiV implant ([employer]), [place], MI (1990; no stents placed), pericarditis, HTN HLD  Hashimoto's thyroiditis, emphysema with significant  Mitral valve disease. She has been followed by her Cardiologist Dr Marrero with mitral valve surveillance. She now has a had progressive  SOB, increasing fatigue with associated bilateral leg edema despite Entresto and diuretics.   She presents today in anticipation of a left heart cardiac catheterization with Dr. Shepherd prior to her MitralClip with Dr. Johnson.    Device Info:  [employer]  ICD model # [id number] Serial # [id number]   LV model #1258T/92 serial # [id number]  RVA model # 7122Q/58 serial # [id number]      ROSIE 7/23/20: mildly dilated LA, EF 40-45%, functional central MR with slight prolapse, mild thickening to both leaflets moderate central mitral insufficiency EF 30%, systolic blunting of PV on PW doppler mild AR, mild AS normal   Mitral Valve  Mean Gradient: 1 mmHg  MR Velocity: 374 cm/s  Alias Velocity: 61.6 cm/s                  MR VTI: 176 cm    Carotid US 1/14/20 mild atherosclerotic disease in right bulb <50% stenosis, moderate atherosclerosis in left prox to mid ICA <50%, all velocities are normal, flow in bilat. vertebral arteries are antegrade    ASA 3  MALL 2  BRA 13.2%   (12 Oct 2020 14:59)    General: No fatigue, no fevers/chills  Respiratory: No dyspnea, no cough, no wheeze  CV: No chest pain, no palpitations  Abd: No nausea  Neuro: No headache, no dizziness  adhesives (Rash)  Ancef (Unknown)  lidocaine (Rash; Angioedema)      Objective:  Vital Signs Last 24 Hrs  T(C): 36.7 (13 Oct 2020 07:56), Max: 36.7 (13 Oct 2020 07:56)  T(F): 98.1 (13 Oct 2020 07:56), Max: 98.1 (13 Oct 2020 07:56)  HR: 70 (13 Oct 2020 10:30) (66 - 70)  BP: 124/62 (13 Oct 2020 10:15) (113/64 - 133/66)  BP(mean): --  RR: 16 (13 Oct 2020 10:30) (14 - 16)  SpO2: 98% (13 Oct 2020 10:30) (97% - 98%)      Neuro: A&OX3, CN 2-12 intact  HEENT: NC, AT  Lungs: Clear anteriorly; bilateral rhonchi posteriorly mid and bases  CV: v-paced rhythm  Abd: Soft  Right Groin: Soft, no bleeding, no hematoma  Extremity: + distal pulses  RACW- BiV Device      PT/INR - ( 13 Oct 2020 07:50 )   PT: 17.2 sec;   INR: 1.51 ratio    PTT - ( 13 Oct 2020 07:50 )  PTT:42.0 sec      85y  Female is now s/p left heart catheterization via right groin approach (RFA #4Fr, no site complications) with Dr. Shepherd    -ADMIT due to: pre-op MitraClip with Dr. Johnson  -post cardiac cath orders  -right groin precautions  -remove right groin femoral sheath in holding room; no heparin used  -bedrest x 2 hours post sheath removal  -labs and EKG in am  -Single anti platelet therapy with aspirin  -digoxin 0.125mg po daily  -amiodarone 100mg po daily  -entresto low dose 24mg/36mg po daily with parameters  -statin therapy; atorvastatin 10mg daily  -resume furosemide 10mg daily PRN; pt EDP 12 during cardiac cath; would hold for now and implement when needed  -resume home dose coumadin when able post op  -NPO after midnight  -follow up outpt in 2 weeks with Cardiologist Dr. Marrero post procedure  -Management per CTS

## 2020-10-13 NOTE — CONSULT NOTE ADULT - ASSESSMENT
Surgeon: Dr. Johnson     Consult requesting by: Dr. Shepherd     HISTORY OF PRESENT ILLNESS:  84 y/o F, former smoker (1PPD x5 years; quit 1900), with PMHx afib (on coumadin), iron deficiency anemia (s/p iron infusions; Feraheme), CKD, celiac disease, cardiomyopathy HFrEF subsequent BiV implant ([facility]), VT, MI (1990; no stents placed), pericarditis, HTN HLD  Hashimoto's thyroiditis, emphysema with significant  Mitral valve disease. She has been followed by her Cardiologist Dr Marrero with mitral valve surveillance. She now has a had progressive  SOB, increasing fatigue with associated bilateral leg edema despite Entresto and diuretics.  She presents today in anticipation of a left heart cardiac catheterization with Dr. Shepherd prior to her MitralClip with Dr. Johnson.    PAST MEDICAL & SURGICAL HISTORY:  Mitral valve prolapse    Myocardial infarction  1990    Left inguinal hernia    AICD (automatic cardioverter/defibrillator) present  x 3 . Replaced x 2. Presently right subclavian area    Iron deficiency anemia due to chronic blood loss    Osteoporosis    Hashimoto&#x27;s thyroiditis    Celiac disease    Arthritis    Varicose veins  BLE    History of colon polyps    Peripheral edema    History of CHF (congestive heart failure)    Hyperlipidemia, unspecified hyperlipidemia type    History of cataract    HTN (hypertension)    Hypothyroid    Chronic atrial fibrillation    Artificial pacemaker    History of cataract extraction  Bilateral    H/O colonoscopy  last done 2009    H/O right inguinal hernia repair    AICD (automatic cardioverter/defibrillator) present  with PPM. Replaced x 2.        MEDICATIONS  (STANDING):  aMIOdarone    Tablet 100 milliGRAM(s) Oral daily  aspirin  chewable 81 milliGRAM(s) Oral daily  atorvastatin 10 milliGRAM(s) Oral at bedtime  digoxin     Tablet 0.125 milliGRAM(s) Oral daily  heparin  Infusion.  Unit(s)/Hr (5 mL/Hr) IV Continuous <Continuous>  levothyroxine 88 MICROGram(s) Oral daily  sacubitril 24 mG/valsartan 26 mG 1 Tablet(s) Oral two times a day  sodium chloride 0.9% lock flush 3 milliLiter(s) IV Push every 8 hours      adhesives (Rash)  Ancef (Unknown)  lidocaine (Rash; Angioedema)    Intolerances    Gluten (Diarrhea)    Device Info:  [employer]  ICD model # MT0518-13T Serial # [id number]   LV model #1258T/92 serial # [id number]  RVA model # 7122Q/58 serial # [id number]      ROSIE 7/23/20: mildly dilated LA, EF 40-45%, functional central MR with slight prolapse, mild thickening to both leaflets moderate central mitral insufficiency EF 30%, systolic blunting of PV on PW doppler mild AR, mild AS normal   Mitral Valve  Mean Gradient: 1 mmHg  MR Velocity: 374 cm/s  Alias Velocity: 61.6 cm/s                  MR VTI: 176 cm    Carotid US 1/14/20 mild atherosclerotic disease in right bulb <50% stenosis, moderate atherosclerosis in left prox to mid ICA <50%, all velocities are normal, flow in bilat. vertebral arteries are antegrade    ASA 3  MALL 2  BRA 13.2%        FAMILY HISTORY:  Family history of dementia (Father)    Family history of heart disease (Mother)    Family history of diabetes mellitus (Mother, Sibling)        Review of Systems  Denies any complaints   PHYSICAL EXAM  Vital Signs Last 24 Hrs  T(C): 36.7 (13 Oct 2020 07:56), Max: 36.7 (13 Oct 2020 07:56)  T(F): 98.1 (13 Oct 2020 07:56), Max: 98.1 (13 Oct 2020 07:56)  HR: 69 (13 Oct 2020 17:41) (66 - 76)  BP: 133/65 (13 Oct 2020 17:41) (102/95 - 133/66)  BP(mean): --  RR: 15 (13 Oct 2020 17:41) (14 - 16)  SpO2: 98% (13 Oct 2020 17:41) (95% - 98%)      Constitutional: NAD, well developed, well nourished  Neuro: A+O x 3, non-focal, speech clear and intact  HEENT: NC/AT, PERRL, EOMI, anicteric sclerae, oral mucosa pink and moist  Neck: supple, no JVD  CV: regular rate, regular rhythm, +S1S2, no murmurs or rub  Pulm/chest: lung sounds CTA and equal bilaterally, no accessory muscle use noted  Abd: soft, NT, ND, +BS  Ext: GARCIA x 4, no C/C/E, right groin which was accessed is soft   Skin: warm, well perfused  Psych: calm, appropriate affect                                                           LABS:          PT/INR - ( 13 Oct 2020 07:50 )   PT: 17.2 sec;   INR: 1.51 ratio         PTT - ( 13 Oct 2020 07:50 )  PTT:42.0 sec                            
No

## 2020-10-13 NOTE — DISCHARGE NOTE PROVIDER - PROVIDER TOKENS
PROVIDER:[TOKEN:[[id number]]],PROVIDER:[TOKEN:[[id number]]] PROVIDER:[TOKEN:[[id number]]],PROVIDER:[TOKEN:[[id number]],SCHEDULEDAPPT:[10/27/2020],SCHEDULEDAPPTTIME:[02:00 PM]]

## 2020-10-13 NOTE — DISCHARGE NOTE PROVIDER - NSDCFUADDAPPT_GEN_ALL_CORE_FT
Follow up with Dr. Marrero after your MitraClip  Follow up with Dr. Johnson Follow up with Dr. Marrero   Follow up with Dr. Johnson

## 2020-10-13 NOTE — DISCHARGE NOTE PROVIDER - CARE PROVIDERS DIRECT ADDRESSES
[email or web address]

## 2020-10-13 NOTE — CONSULT NOTE ADULT - CONSULT REASON
What Type Of Note Output Would You Prefer (Optional)?: Standard Output How Severe Are Your Spot(S)?: mild Have Your Spot(S) Been Treated In The Past?: has not been treated Mitral Clip Hpi Title: Evaluation of Skin Lesions Additional History: N/A

## 2020-10-13 NOTE — DISCHARGE NOTE PROVIDER - CARE PROVIDER_API CALL
Maritza Marrero  CARDIOVASCULAR DISEASE  [address] [place] [address]  Phone: [phone]  Fax: [phone]  Follow Up Time:     Cam Johnson  SURGERY  [address] [place] [address]  Phone: [phone]  Fax: [phone]  Follow Up Time:    Maritza Marrero  CARDIOVASCULAR DISEASE  [address] [place] [address]  Phone: [phone]  Fax: [phone]  Follow Up Time:     Cam Johnson  SURGERY  [address] [place] [address]  Phone: [phone]  Fax: [phone]  Scheduled Appointment: 10/27/2020 02:00 PM

## 2020-10-13 NOTE — DISCHARGE NOTE PROVIDER - NSDCFUSCHEDAPPT_GEN_ALL_CORE_FT
ANABEL GOFF ; 10/13/2020 ; [facility]  ANABEL GOFF ; 10/14/2020 ; [facility] S[facility] [address]

## 2020-10-13 NOTE — DISCHARGE NOTE PROVIDER - NSDCCPCAREPLAN_GEN_ALL_CORE_FT
PRINCIPAL DISCHARGE DIAGNOSIS  Diagnosis: Mitral valve regurgitation  Assessment and Plan of Treatment:        PRINCIPAL DISCHARGE DIAGNOSIS  Diagnosis: Mitral valve regurgitation  Assessment and Plan of Treatment: - Keep the groin site clean and dry.  You may shower and pat the site dry.  - Watch the site for signs of redness or drainage, these should be reported to your doctor immediately.  - You will be given an appointment to follow up with your doctor in approximately 4 weeks.  It is important to keep this appointment so that your new valve can be assessed.  - You may resume all your normal activities.        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 To prevent exacerbation of congestive heart failure (CHF) it is important to follow a heart-healthy, LOW SODIUM diet. You should read all food labels and keep your sodium intake less than 1500 mg per day. Examples of high sodium foods include fast food or processed food (ie frozen TV dinners), chinese food, soup and regular cold cuts. You should also restrict your fluid intake to less than 1-1.5 liters per day. Please weigh yourself every day at the same time in the morning and document your weight in a weight log. Please call your doctor right away if you gain over 2-3 pounds in one day, or you notice an increase in leg swelling, abdominal swelling or shortness of breath. Making sure you take all of your medications as prescribed and maintaining a normal blood pressure are also important for preventing a CHF exacerbation.    Diagnosis: Atrial fibrillation  Assessment and Plan of Treatment: Take all medications as prescribed. Follow up with your primary care doctor within two weeks.

## 2020-10-13 NOTE — DISCHARGE NOTE PROVIDER - NSDCCPTREATMENT_GEN_ALL_CORE_FT
PRINCIPAL PROCEDURE  Procedure: Left heart cardiac catheterization  Findings and Treatment: No heavy lifting, driving, sex, tub baths, swimming, or any activity that submerges the lower half of the body in water for 48 hours.  Limited walking and stairs for 48 hours.    Change the bandaid after 24 hours and every 24 hours after that.  Keep the puncture site dry and covered with a bandaid until a scab forms.    Observe the site frequently.  If bleeding or a large lump (the size of a golf ball or bigger) occurs lie flat, apply continuous direct pressure just above the puncture site for at least 10 minutes, and notify your physician immediately.  If the bleeding cannot be controlled, call 911 immediately for assistance.  Notify your physician of pain, swelling or any drainage.    Notify your physician immediately if coldness, numbness, discoloration or pain in your foot occurs.        SECONDARY PROCEDURE  Procedure: Repair of mitral valve with MitraClip  Findings and Treatment:      PRINCIPAL PROCEDURE  Procedure: Left heart cardiac catheterization  Findings and Treatment: No heavy lifting, driving, sex, tub baths, swimming, or any activity that submerges the lower half of the body in water for 48 hours.  Limited walking and stairs for 48 hours.    Change the bandage after 24 hours and every 24 hours after that.  Keep the puncture site dry and covered with a bandaid until a scab forms.    Observe the site frequently.  If bleeding or a large lump (the size of a golf ball or bigger) occurs lie flat, apply continuous direct pressure just above the puncture site for at least 10 minutes, and notify your physician immediately.  If the bleeding cannot be controlled, call 911 immediately for assistance.  Notify your physician of pain, swelling or any drainage.    Notify your physician immediately if coldness, numbness, discoloration or pain in your foot occurs.        SECONDARY PROCEDURE  Procedure: Repair of mitral valve with MitraClip  Findings and Treatment:

## 2020-10-13 NOTE — DISCHARGE NOTE PROVIDER - NSDCMRMEDTOKEN_GEN_ALL_CORE_FT
amiodarone 100 mg oral tablet: 1 tab(s) orally once a day  aspirin 81 mg oral delayed release tablet: 1 tab(s) orally once a day  atorvastatin 10 mg oral tablet: 1 tab(s) orally once a day (at bedtime)  Calcium 500+D oral tablet, chewable: 1 tab(s) orally 2 times a day  digoxin 125 mcg (0.125 mg) oral tablet: tab(s) orally once a day  Entresto 24 mg-26 mg oral tablet: 1 tab(s) orally 2 times a day  furosemide 20 mg oral tablet: 1 tab(s) orally once a day, As Needed  levothyroxine 88 mcg (0.088 mg) oral tablet: 1 tab(s) orally once a day  Vitamin D3:   warfarin 1 mg oral tablet: 1 tab(s) orally once a day   acetaminophen 325 mg oral tablet: 2 tab(s) orally every 4 hours, As Needed mild pain  amiodarone 100 mg oral tablet: 1 tab(s) orally once a day  aspirin 81 mg oral delayed release tablet: 1 tab(s) orally once a day  atorvastatin 10 mg oral tablet: 1 tab(s) orally once a day (at bedtime)  Calcium 500+D oral tablet, chewable: 1 tab(s) orally 2 times a day  digoxin 125 mcg (0.125 mg) oral tablet: tab(s) orally once a day  Entresto 24 mg-26 mg oral tablet: 1 tab(s) orally 2 times a day  furosemide 20 mg oral tablet: 1 tab(s) orally once a day, As Needed  levothyroxine 88 mcg (0.088 mg) oral tablet: 1 tab(s) orally once a day  Vitamin D3:   warfarin 1 mg oral tablet: 1 tab(s) orally once a day

## 2020-10-13 NOTE — DISCHARGE NOTE PROVIDER - NSDCFUADDINST_GEN_ALL_CORE_FT
Please call the Cardiothoracic Surgery office at [phone] if you are experiencing any shortness of breath, chest pain, fevers or chills, drainage from the incisions, persistent nausea, vomiting or if you have any questions about your medications. If the symptoms are severe, call 911 and go to the nearest hospital. You can also call [phone] for an emergency [facility] ambulance, which will take you to the closest [facility].    If you need any assistance for making any appointments for a new consult or referral in any specialty, please call our [facility] Clinical Coordination Center at [phone].

## 2020-10-13 NOTE — DISCHARGE NOTE PROVIDER - NSDCACTIVITY_GEN_ALL_CORE
Choose lean meats and poultry without skin and prepare them without added saturated and trans fat.  Eat fish at least twice a week. Recent research shows that eating oily fish containing omega-3 fatty acids (for example, salmon, trout and herring) may help lower your risk of death from coronary artery disease.  Select fat-free, 1 percent fat and low-fat dairy products.  Cut back on foods containing partially hydrogenated vegetable oils to reduce trans fat in your diet.   To lower cholesterol, reduce saturated fat to no more than 5 to 6 percent of total calories. For someone eating 2,000 calories a day, that’s about 13 grams of saturated fat.  Cut back on beverages and foods with added sugars.  Choose and prepare foods with little or no salt. To lower blood pressure, aim to eat no more than 2,400 milligrams of sodium per day. Reducing daily intake to 1,500 mg is desirable because it can lower blood pressure even further.  If you drink alcohol, drink in moderation. That means one drink per day if you’re a woman and two drinks  per day if you’re a man.  Follow the American Heart Association recommendations when you eat out, and keep an eye on your portion sizes. Showering allowed/Choose lean meats and poultry without skin and prepare them without added saturated and trans fat.  Eat fish at least twice a week. Recent research shows that eating oily fish containing omega-3 fatty acids (for example, salmon, trout and herring) may help lower your risk of death from coronary artery disease.  Select fat-free, 1 percent fat and low-fat dairy products.  Cut back on foods containing partially hydrogenated vegetable oils to reduce trans fat in your diet.   To lower cholesterol, reduce saturated fat to no more than 5 to 6 percent of total calories. For someone eating 2,000 calories a day, that’s about 13 grams of saturated fat.  Cut back on beverages and foods with added sugars.  Choose and prepare foods with little or no salt. To lower blood pressure, aim to eat no more than 2,400 milligrams of sodium per day. Reducing daily intake to 1,500 mg is desirable because it can lower blood pressure even further.  If you drink alcohol, drink in moderation. That means one drink per day if you’re a woman and two drinks  per day if you’re a man.  Follow the American Heart Association recommendations when you eat out, and keep an eye on your portion sizes./Walking - Indoors allowed/No heavy lifting/straining/Stairs allowed/Walking - Outdoors allowed/Do not drive or operate machinery

## 2020-10-14 ENCOUNTER — APPOINTMENT (OUTPATIENT)
Dept: CARDIOTHORACIC SURGERY | Facility: HOSPITAL | Age: 85
End: 2020-10-14

## 2020-10-14 LAB
ALBUMIN SERPL ELPH-MCNC: 3.4 G/DL — SIGNIFICANT CHANGE UP (ref 3.3–5.2)
ALP SERPL-CCNC: 69 U/L — SIGNIFICANT CHANGE UP (ref 40–120)
ALT FLD-CCNC: 14 U/L — SIGNIFICANT CHANGE UP
ANION GAP SERPL CALC-SCNC: 10 MMOL/L — SIGNIFICANT CHANGE UP (ref 5–17)
ANION GAP SERPL CALC-SCNC: 11 MMOL/L — SIGNIFICANT CHANGE UP (ref 5–17)
APTT BLD: 30.9 SEC — SIGNIFICANT CHANGE UP (ref 27.5–35.5)
APTT BLD: 34.1 SEC — SIGNIFICANT CHANGE UP (ref 27.5–35.5)
AST SERPL-CCNC: 25 U/L — SIGNIFICANT CHANGE UP
BASOPHILS # BLD AUTO: 0.04 K/UL — SIGNIFICANT CHANGE UP (ref 0–0.2)
BASOPHILS NFR BLD AUTO: 0.5 % — SIGNIFICANT CHANGE UP (ref 0–2)
BILIRUB SERPL-MCNC: 0.6 MG/DL — SIGNIFICANT CHANGE UP (ref 0.4–2)
BUN SERPL-MCNC: 25 MG/DL — HIGH (ref 8–20)
BUN SERPL-MCNC: 29 MG/DL — HIGH (ref 8–20)
CALCIUM SERPL-MCNC: 8.7 MG/DL — SIGNIFICANT CHANGE UP (ref 8.6–10.2)
CALCIUM SERPL-MCNC: 8.7 MG/DL — SIGNIFICANT CHANGE UP (ref 8.6–10.2)
CHLORIDE SERPL-SCNC: 102 MMOL/L — SIGNIFICANT CHANGE UP (ref 98–107)
CHLORIDE SERPL-SCNC: 103 MMOL/L — SIGNIFICANT CHANGE UP (ref 98–107)
CO2 SERPL-SCNC: 24 MMOL/L — SIGNIFICANT CHANGE UP (ref 22–29)
CO2 SERPL-SCNC: 27 MMOL/L — SIGNIFICANT CHANGE UP (ref 22–29)
CREAT SERPL-MCNC: 0.96 MG/DL — SIGNIFICANT CHANGE UP (ref 0.5–1.3)
CREAT SERPL-MCNC: 1.19 MG/DL — SIGNIFICANT CHANGE UP (ref 0.5–1.3)
EOSINOPHIL # BLD AUTO: 0.08 K/UL — SIGNIFICANT CHANGE UP (ref 0–0.5)
EOSINOPHIL NFR BLD AUTO: 1 % — SIGNIFICANT CHANGE UP (ref 0–6)
GAS PNL BLDA: SIGNIFICANT CHANGE UP
GLUCOSE BLDC GLUCOMTR-MCNC: 93 MG/DL — SIGNIFICANT CHANGE UP (ref 70–99)
GLUCOSE SERPL-MCNC: 148 MG/DL — HIGH (ref 70–99)
GLUCOSE SERPL-MCNC: 78 MG/DL — SIGNIFICANT CHANGE UP (ref 70–99)
HCT VFR BLD CALC: 31.4 % — LOW (ref 34.5–45)
HCT VFR BLD CALC: 31.7 % — LOW (ref 34.5–45)
HGB BLD-MCNC: 10.3 G/DL — LOW (ref 11.5–15.5)
HGB BLD-MCNC: 10.3 G/DL — LOW (ref 11.5–15.5)
IMM GRANULOCYTES NFR BLD AUTO: 0.6 % — SIGNIFICANT CHANGE UP (ref 0–1.5)
INR BLD: 1.41 RATIO — HIGH (ref 0.88–1.16)
INR BLD: 1.44 RATIO — HIGH (ref 0.88–1.16)
LYMPHOCYTES # BLD AUTO: 0.87 K/UL — LOW (ref 1–3.3)
LYMPHOCYTES # BLD AUTO: 10.8 % — LOW (ref 13–44)
MAGNESIUM SERPL-MCNC: 1.8 MG/DL — SIGNIFICANT CHANGE UP (ref 1.6–2.6)
MAGNESIUM SERPL-MCNC: 2.1 MG/DL — SIGNIFICANT CHANGE UP (ref 1.6–2.6)
MCHC RBC-ENTMCNC: 32.5 GM/DL — SIGNIFICANT CHANGE UP (ref 32–36)
MCHC RBC-ENTMCNC: 32.8 GM/DL — SIGNIFICANT CHANGE UP (ref 32–36)
MCHC RBC-ENTMCNC: 32.9 PG — SIGNIFICANT CHANGE UP (ref 27–34)
MCHC RBC-ENTMCNC: 33.6 PG — SIGNIFICANT CHANGE UP (ref 27–34)
MCV RBC AUTO: 101.3 FL — HIGH (ref 80–100)
MCV RBC AUTO: 102.3 FL — HIGH (ref 80–100)
MONOCYTES # BLD AUTO: 0.4 K/UL — SIGNIFICANT CHANGE UP (ref 0–0.9)
MONOCYTES NFR BLD AUTO: 5 % — SIGNIFICANT CHANGE UP (ref 2–14)
NEUTROPHILS # BLD AUTO: 6.63 K/UL — SIGNIFICANT CHANGE UP (ref 1.8–7.4)
NEUTROPHILS NFR BLD AUTO: 82.1 % — HIGH (ref 43–77)
PLATELET # BLD AUTO: 166 K/UL — SIGNIFICANT CHANGE UP (ref 150–400)
PLATELET # BLD AUTO: 185 K/UL — SIGNIFICANT CHANGE UP (ref 150–400)
POTASSIUM SERPL-MCNC: 4.1 MMOL/L — SIGNIFICANT CHANGE UP (ref 3.5–5.3)
POTASSIUM SERPL-MCNC: 4.3 MMOL/L — SIGNIFICANT CHANGE UP (ref 3.5–5.3)
POTASSIUM SERPL-SCNC: 4.1 MMOL/L — SIGNIFICANT CHANGE UP (ref 3.5–5.3)
POTASSIUM SERPL-SCNC: 4.3 MMOL/L — SIGNIFICANT CHANGE UP (ref 3.5–5.3)
PROT SERPL-MCNC: 6.4 G/DL — LOW (ref 6.6–8.7)
PROTHROM AB SERPL-ACNC: 16.1 SEC — HIGH (ref 10.6–13.6)
PROTHROM AB SERPL-ACNC: 16.4 SEC — HIGH (ref 10.6–13.6)
RBC # BLD: 3.07 M/UL — LOW (ref 3.8–5.2)
RBC # BLD: 3.13 M/UL — LOW (ref 3.8–5.2)
RBC # FLD: 14.2 % — SIGNIFICANT CHANGE UP (ref 10.3–14.5)
RBC # FLD: 14.4 % — SIGNIFICANT CHANGE UP (ref 10.3–14.5)
SODIUM SERPL-SCNC: 137 MMOL/L — SIGNIFICANT CHANGE UP (ref 135–145)
SODIUM SERPL-SCNC: 140 MMOL/L — SIGNIFICANT CHANGE UP (ref 135–145)
WBC # BLD: 4.79 K/UL — SIGNIFICANT CHANGE UP (ref 3.8–10.5)
WBC # BLD: 8.07 K/UL — SIGNIFICANT CHANGE UP (ref 3.8–10.5)
WBC # FLD AUTO: 4.79 K/UL — SIGNIFICANT CHANGE UP (ref 3.8–10.5)
WBC # FLD AUTO: 8.07 K/UL — SIGNIFICANT CHANGE UP (ref 3.8–10.5)

## 2020-10-14 PROCEDURE — 33418 REPAIR TCAT MITRAL VALVE: CPT | Mod: 62,Q0

## 2020-10-14 PROCEDURE — 93010 ELECTROCARDIOGRAM REPORT: CPT

## 2020-10-14 PROCEDURE — 33419 REPAIR TCAT MITRAL VALVE: CPT | Mod: 62,Q0

## 2020-10-14 PROCEDURE — 93306 TTE W/DOPPLER COMPLETE: CPT | Mod: 26

## 2020-10-14 PROCEDURE — 93355 ECHO TRANSESOPHAGEAL (TEE): CPT

## 2020-10-14 PROCEDURE — 71045 X-RAY EXAM CHEST 1 VIEW: CPT | Mod: 26

## 2020-10-14 RX ORDER — INSULIN LISPRO 100/ML
VIAL (ML) SUBCUTANEOUS EVERY 6 HOURS
Refills: 0 | Status: DISCONTINUED | OUTPATIENT
Start: 2020-10-14 | End: 2020-10-14

## 2020-10-14 RX ORDER — CHLORHEXIDINE GLUCONATE 213 G/1000ML
5 SOLUTION TOPICAL
Refills: 0 | Status: DISCONTINUED | OUTPATIENT
Start: 2020-10-14 | End: 2020-10-14

## 2020-10-14 RX ORDER — DEXTROSE 50 % IN WATER 50 %
15 SYRINGE (ML) INTRAVENOUS ONCE
Refills: 0 | Status: DISCONTINUED | OUTPATIENT
Start: 2020-10-14 | End: 2020-10-14

## 2020-10-14 RX ORDER — AMIODARONE HYDROCHLORIDE 400 MG/1
200 TABLET ORAL DAILY
Refills: 0 | Status: DISCONTINUED | OUTPATIENT
Start: 2020-10-14 | End: 2020-10-15

## 2020-10-14 RX ORDER — CEFUROXIME AXETIL 250 MG
1500 TABLET ORAL EVERY 8 HOURS
Refills: 0 | Status: COMPLETED | OUTPATIENT
Start: 2020-10-14 | End: 2020-10-15

## 2020-10-14 RX ORDER — PANTOPRAZOLE SODIUM 20 MG/1
40 TABLET, DELAYED RELEASE ORAL DAILY
Refills: 0 | Status: DISCONTINUED | OUTPATIENT
Start: 2020-10-15 | End: 2020-10-15

## 2020-10-14 RX ORDER — DEXTROSE 50 % IN WATER 50 %
12.5 SYRINGE (ML) INTRAVENOUS ONCE
Refills: 0 | Status: DISCONTINUED | OUTPATIENT
Start: 2020-10-14 | End: 2020-10-14

## 2020-10-14 RX ORDER — SODIUM CHLORIDE 9 MG/ML
1000 INJECTION, SOLUTION INTRAVENOUS
Refills: 0 | Status: DISCONTINUED | OUTPATIENT
Start: 2020-10-14 | End: 2020-10-14

## 2020-10-14 RX ORDER — MEPERIDINE HYDROCHLORIDE 50 MG/ML
25 INJECTION INTRAMUSCULAR; INTRAVENOUS; SUBCUTANEOUS ONCE
Refills: 0 | Status: DISCONTINUED | OUTPATIENT
Start: 2020-10-14 | End: 2020-10-14

## 2020-10-14 RX ORDER — PANTOPRAZOLE SODIUM 20 MG/1
40 TABLET, DELAYED RELEASE ORAL ONCE
Refills: 0 | Status: COMPLETED | OUTPATIENT
Start: 2020-10-14 | End: 2020-10-14

## 2020-10-14 RX ORDER — SODIUM CHLORIDE 9 MG/ML
1000 INJECTION INTRAMUSCULAR; INTRAVENOUS; SUBCUTANEOUS
Refills: 0 | Status: DISCONTINUED | OUTPATIENT
Start: 2020-10-14 | End: 2020-10-14

## 2020-10-14 RX ORDER — CHOLECALCIFEROL (VITAMIN D3) 125 MCG
400 CAPSULE ORAL DAILY
Refills: 0 | Status: DISCONTINUED | OUTPATIENT
Start: 2020-10-15 | End: 2020-10-15

## 2020-10-14 RX ORDER — MAGNESIUM SULFATE 500 MG/ML
1 VIAL (ML) INJECTION ONCE
Refills: 0 | Status: COMPLETED | OUTPATIENT
Start: 2020-10-14 | End: 2020-10-14

## 2020-10-14 RX ORDER — DEXTROSE 50 % IN WATER 50 %
25 SYRINGE (ML) INTRAVENOUS ONCE
Refills: 0 | Status: DISCONTINUED | OUTPATIENT
Start: 2020-10-14 | End: 2020-10-14

## 2020-10-14 RX ORDER — GLUCAGON INJECTION, SOLUTION 0.5 MG/.1ML
1 INJECTION, SOLUTION SUBCUTANEOUS ONCE
Refills: 0 | Status: DISCONTINUED | OUTPATIENT
Start: 2020-10-14 | End: 2020-10-14

## 2020-10-14 RX ORDER — ASPIRIN/CALCIUM CARB/MAGNESIUM 324 MG
81 TABLET ORAL DAILY
Refills: 0 | Status: DISCONTINUED | OUTPATIENT
Start: 2020-10-15 | End: 2020-10-15

## 2020-10-14 RX ORDER — LEVOTHYROXINE SODIUM 125 MCG
88 TABLET ORAL DAILY
Refills: 0 | Status: DISCONTINUED | OUTPATIENT
Start: 2020-10-14 | End: 2020-10-15

## 2020-10-14 RX ORDER — FUROSEMIDE 40 MG
20 TABLET ORAL DAILY
Refills: 0 | Status: DISCONTINUED | OUTPATIENT
Start: 2020-10-14 | End: 2020-10-15

## 2020-10-14 RX ORDER — ATORVASTATIN CALCIUM 80 MG/1
10 TABLET, FILM COATED ORAL AT BEDTIME
Refills: 0 | Status: DISCONTINUED | OUTPATIENT
Start: 2020-10-14 | End: 2020-10-15

## 2020-10-14 RX ORDER — WARFARIN SODIUM 2.5 MG/1
1 TABLET ORAL ONCE
Refills: 0 | Status: DISCONTINUED | OUTPATIENT
Start: 2020-10-15 | End: 2020-10-15

## 2020-10-14 RX ORDER — DIGOXIN 250 MCG
0.12 TABLET ORAL DAILY
Refills: 0 | Status: DISCONTINUED | OUTPATIENT
Start: 2020-10-14 | End: 2020-10-15

## 2020-10-14 RX ORDER — SACUBITRIL AND VALSARTAN 24; 26 MG/1; MG/1
1 TABLET, FILM COATED ORAL
Refills: 0 | Status: DISCONTINUED | OUTPATIENT
Start: 2020-10-15 | End: 2020-10-15

## 2020-10-14 RX ADMIN — AMIODARONE HYDROCHLORIDE 100 MILLIGRAM(S): 400 TABLET ORAL at 05:47

## 2020-10-14 RX ADMIN — Medication 100 MILLIGRAM(S): at 16:10

## 2020-10-14 RX ADMIN — ATORVASTATIN CALCIUM 10 MILLIGRAM(S): 80 TABLET, FILM COATED ORAL at 21:46

## 2020-10-14 RX ADMIN — SACUBITRIL AND VALSARTAN 1 TABLET(S): 24; 26 TABLET, FILM COATED ORAL at 05:47

## 2020-10-14 RX ADMIN — Medication 100 GRAM(S): at 12:14

## 2020-10-14 RX ADMIN — Medication 88 MICROGRAM(S): at 05:47

## 2020-10-14 RX ADMIN — Medication 100 MILLIGRAM(S): at 23:31

## 2020-10-14 RX ADMIN — Medication 0.12 MILLIGRAM(S): at 05:47

## 2020-10-14 RX ADMIN — SODIUM CHLORIDE 3 MILLILITER(S): 9 INJECTION INTRAMUSCULAR; INTRAVENOUS; SUBCUTANEOUS at 05:47

## 2020-10-14 RX ADMIN — Medication 20 MILLIGRAM(S): at 21:46

## 2020-10-14 RX ADMIN — PANTOPRAZOLE SODIUM 40 MILLIGRAM(S): 20 TABLET, DELAYED RELEASE ORAL at 12:15

## 2020-10-14 NOTE — BRIEF OPERATIVE NOTE - OPERATION/FINDINGS
Severe MR, Post Deployment ? Residual MR with a mG ?mmHg @ HR ? & SBP ?, Extubated in the OR? Severe MR, Post Deployment Mild Residual MR with a mG 3mmHg @ HR 88 & , Extubated in the OR

## 2020-10-14 NOTE — PROGRESS NOTE ADULT - SUBJECTIVE AND OBJECTIVE BOX
Significant recent/past 24 hr events:  Patient has no issues overnight     Subjective:    Review of Systems  No issues overnight     Patient is a 85y old  Female who presents with a chief complaint of [facility] pre MitraClip (13 Oct 2020 10:43)    HPI:  Narrative: 86 y/o F, former smoker (1PPD x5 years; quit 1900), with PMHx afib (on coumadin), iron deficiency anemia (s/p iron infusions; Feraheme), CKD, celiac disease, cardiomyopathy HFrEF subsequent BiV implant ([employer] [employer] [facility]), VT, MI (1990; no stents placed), pericarditis, HTN HLD  Hashimoto's thyroiditis, emphysema with significant  Mitral valve disease. She has been followed by her Cardiologist Dr Marrero with mitral valve surveillance. She now has a had progressive  SOB, increasing fatigue with associated bilateral leg edema despite Entresto and diuretics.   She presents today in anticipation of a left heart cardiac catheterization with Dr. Shepherd prior to her MitralClip with Dr. Johnson.    Device Info:  [employer]  ICD model # EL0647-00J Serial # [id number]   LV model #1258T/92 serial # [id number]  RVA model # 7122Q/58 serial # [id number]      ROSIE 7/23/20: mildly dilated LA, EF 40-45%, functional central MR with slight prolapse, mild thickening to both leaflets moderate central mitral insufficiency EF 30%, systolic blunting of PV on PW doppler mild AR, mild AS normal   Mitral Valve  Mean Gradient: 1 mmHg  MR Velocity: 374 cm/s  Alias Velocity: 61.6 cm/s                  MR VTI: 176 cm    Carotid US 1/14/20 mild atherosclerotic disease in right bulb <50% stenosis, moderate atherosclerosis in left prox to mid ICA <50%, all velocities are normal, flow in bilat. vertebral arteries are antegrade    ASA 3  MALL 2  BRA 13.2%   (12 Oct 2020 14:59)    PAST MEDICAL & SURGICAL HISTORY:  Mitral valve prolapse    Myocardial infarction  1990    Left inguinal hernia    AICD (automatic cardioverter/defibrillator) present  x 3 . Replaced x 2. Presently right subclavian area    Iron deficiency anemia due to chronic blood loss    Osteoporosis    Hashimoto&#x27;s thyroiditis    Celiac disease    Arthritis    Varicose veins  BLE    History of colon polyps    Peripheral edema    History of CHF (congestive heart failure)    Hyperlipidemia, unspecified hyperlipidemia type    History of cataract    HTN (hypertension)    Hypothyroid    Chronic atrial fibrillation    Artificial pacemaker    History of cataract extraction  Bilateral    H/O colonoscopy  last done 2009    H/O right inguinal hernia repair    AICD (automatic cardioverter/defibrillator) present  with PPM. Replaced x 2.      FAMILY HISTORY:  Family history of dementia (Father)    Family history of heart disease (Mother)    Family history of diabetes mellitus (Mother, Sibling)        Vitals   ICU Vital Signs Last 24 Hrs  T(C): 36.7 (14 Oct 2020 00:00), Max: 36.7 (13 Oct 2020 07:56)  T(F): 98 (14 Oct 2020 00:00), Max: 98.1 (13 Oct 2020 07:56)  HR: 76 (14 Oct 2020 00:00) (66 - 77)  BP: 118/60 (14 Oct 2020 00:00) (102/95 - 137/71)  BP(mean): 83 (14 Oct 2020 00:00) (83 - 98)  ABP: --  ABP(mean): --  RR: 16 (14 Oct 2020 00:00) (14 - 22)  SpO2: 97% (14 Oct 2020 00:00) (95% - 98%)      VENT SETTINGS         I&O's Detail    13 Oct 2020 07:01  -  14 Oct 2020 01:33  --------------------------------------------------------  IN:    Oral Fluid: 120 mL  Total IN: 120 mL    OUT:  Total OUT: 0 mL    Total NET: 120 mL          LABS          PT/INR - ( 13 Oct 2020 07:50 )   PT: 17.2 sec;   INR: 1.51 ratio         PTT - ( 13 Oct 2020 07:50 )  PTT:42.0 sec                MEDICATIONS  (STANDING):  aMIOdarone    Tablet 100 milliGRAM(s) Oral daily  aspirin  chewable 81 milliGRAM(s) Oral daily  atorvastatin 10 milliGRAM(s) Oral at bedtime  chlorhexidine 0.12% Liquid 5 milliLiter(s) Swish and Spit once  digoxin     Tablet 0.125 milliGRAM(s) Oral daily  levothyroxine 88 MICROGram(s) Oral daily  sacubitril 24 mG/valsartan 26 mG 1 Tablet(s) Oral two times a day  sodium chloride 0.9% lock flush 3 milliLiter(s) IV Push every 8 hours  vancomycin  IVPB 1000 milliGRAM(s) IV Intermittent once    MEDICATIONS  (PRN):    Allergies:  adhesives (Rash)  Ancef (Unknown)  Gluten (Diarrhea)  lidocaine (Rash; Angioedema)      Physical Exam:   Constitutional: NAD, well-groomed, well-developed  HEENT: PERRLA, EOMI, no drainage or redness  Neck: supple,  No JVD  Respiratory: Breath Sounds equal & clear bilaterally to auscultation, no rales/rhonchi/wheezing, no accessory muscle use noted  Cardiovascular: Regular rate, regular rhythm, normal S1, S2; no murmurs or rub  Gastrointestinal: Soft, non-tender, non distended, + bowel sounds  Extremities: GARCIA x 4, no peripheral edema, no cyanosis, no clubbing, right groin is soft   Neurological: A+O x 3; speech clear and intact; no sensory, motor  deficits, normal reflexes  Skin: warm, dry, well perfused

## 2020-10-14 NOTE — CHART NOTE - NSCHARTNOTEFT_GEN_A_CORE
[employer] Percutaneous Mitral Valve Repair Utilizing the Mitral Clip via Right Common Femoral Vein.  NCT# [id number], STS/ACC TVT Registry Patient ID# [id number].

## 2020-10-14 NOTE — BRIEF OPERATIVE NOTE - NSICDXBRIEFPROCEDURE_GEN_ALL_CORE_FT
PROCEDURES:  Percutaneous transcatheter repair of mitral valve using multiple leaflet clips 14-Oct-2020 09:53:11 Percutaneous Mitral Valve Repair Utilizing the MitraClip via Right Common Femoral Vein (2 clips deployed) (NCT# [id number]) (STS/ACC TVT Registry Patient ID# [id number]) Gagan Cordero

## 2020-10-14 NOTE — BRIEF OPERATIVE NOTE - NSICDXBRIEFPREOP_GEN_ALL_CORE_FT
PRE-OP DIAGNOSIS:  Chronic systolic CHF (congestive heart failure), NYHA class 3 14-Oct-2020 08:53:40  Gagan Cordero  Severe mitral regurgitation 14-Oct-2020 08:53:20  Gagan Cordero

## 2020-10-14 NOTE — BRIEF OPERATIVE NOTE - NSICDXBRIEFPOSTOP_GEN_ALL_CORE_FT
POST-OP DIAGNOSIS:  Chronic systolic CHF (congestive heart failure), NYHA class 3 14-Oct-2020 08:54:10  Gagan Cordero  Severe mitral regurgitation 14-Oct-2020 08:53:52  Gagan Cordero

## 2020-10-14 NOTE — PROGRESS NOTE ADULT - ASSESSMENT
Surgeon: Dr. Johnson     Consult requesting by: Dr. Shepherd     HISTORY OF PRESENT ILLNESS:  84 y/o F, former smoker (1PPD x5 years; quit 1900), with PMHx afib (on coumadin), iron deficiency anemia (s/p iron infusions; Feraheme), CKD, celiac disease, cardiomyopathy HFrEF subsequent BiV implant ([employer]), VT, MI (1990; no stents placed), pericarditis, HTN HLD  Hashimoto's thyroiditis, emphysema with significant  Mitral valve disease. She has been followed by her Cardiologist Dr Marrero with mitral valve surveillance. She now has a had progressive  SOB, increasing fatigue with associated bilateral leg edema despite Entresto and diuretics.  She presents today in anticipation of a left heart cardiac catheterization with Dr. Shepherd prior to her MitralClip with Dr. Johnson.    PAST MEDICAL & SURGICAL HISTORY:  Mitral valve prolapse    Myocardial infarction  1990    Left inguinal hernia    AICD (automatic cardioverter/defibrillator) present  x 3 . Replaced x 2. Presently right subclavian area    Iron deficiency anemia due to chronic blood loss    Osteoporosis    Hashimoto&#x27;s thyroiditis    Celiac disease    Arthritis    Varicose veins  BLE    History of colon polyps    Peripheral edema    History of CHF (congestive heart failure)    Hyperlipidemia, unspecified hyperlipidemia type    History of cataract    HTN (hypertension)    Hypothyroid    Chronic atrial fibrillation    Artificial pacemaker    History of cataract extraction  Bilateral    H/O colonoscopy  last done 2009    H/O right inguinal hernia repair    AICD (automatic cardioverter/defibrillator) present  with PPM. Replaced x 2.        MEDICATIONS  (STANDING):  aMIOdarone    Tablet 100 milliGRAM(s) Oral daily  aspirin  chewable 81 milliGRAM(s) Oral daily  atorvastatin 10 milliGRAM(s) Oral at bedtime  digoxin     Tablet 0.125 milliGRAM(s) Oral daily  heparin  Infusion.  Unit(s)/Hr (5 mL/Hr) IV Continuous <Continuous>  levothyroxine 88 MICROGram(s) Oral daily  sacubitril 24 mG/valsartan 26 mG 1 Tablet(s) Oral two times a day  sodium chloride 0.9% lock flush 3 milliLiter(s) IV Push every 8 hours      adhesives (Rash)  Ancef (Unknown)  lidocaine (Rash; Angioedema)    Intolerances    Gluten (Diarrhea)    Device Info:  [employer]  ICD model # ZH3504-99N Serial # [id number]   LV model #1258T/92 serial # [id number]  RVA model # 7122Q/58 serial # [id number]      ROSIE 7/23/20: mildly dilated LA, EF 40-45%, functional central MR with slight prolapse, mild thickening to both leaflets moderate central mitral insufficiency EF 30%, systolic blunting of PV on PW doppler mild AR, mild AS normal   Mitral Valve  Mean Gradient: 1 mmHg  MR Velocity: 374 cm/s  Alias Velocity: 61.6 cm/s                  MR VTI: 176 cm    Carotid US 1/14/20 mild atherosclerotic disease in right bulb <50% stenosis, moderate atherosclerosis in left prox to mid ICA <50%, all velocities are normal, flow in bilat. vertebral arteries are antegrade    ASA 3  MALL 2  BRA 13.2%        FAMILY HISTORY:  Family history of dementia (Father)    Family history of heart disease (Mother)    Family history of diabetes mellitus (Mother, Sibling)        Review of Systems  Denies any complaints   PHYSICAL EXAM  Vital Signs Last 24 Hrs  T(C): 36.7 (13 Oct 2020 07:56), Max: 36.7 (13 Oct 2020 07:56)  T(F): 98.1 (13 Oct 2020 07:56), Max: 98.1 (13 Oct 2020 07:56)  HR: 69 (13 Oct 2020 17:41) (66 - 76)  BP: 133/65 (13 Oct 2020 17:41) (102/95 - 133/66)  BP(mean): --  RR: 15 (13 Oct 2020 17:41) (14 - 16)  SpO2: 98% (13 Oct 2020 17:41) (95% - 98%)      Constitutional: NAD, well developed, well nourished  Neuro: A+O x 3, non-focal, speech clear and intact  HEENT: NC/AT, PERRL, EOMI, anicteric sclerae, oral mucosa pink and moist  Neck: supple, no JVD  CV: regular rate, regular rhythm, +S1S2, no murmurs or rub  Pulm/chest: lung sounds CTA and equal bilaterally, no accessory muscle use noted  Abd: soft, NT, ND, +BS  Ext: GARCIA x 4, no C/C/E, right groin which was accessed is soft   Skin: warm, well perfused  Psych: calm, appropriate affect                                                           LABS:          PT/INR - ( 13 Oct 2020 07:50 )   PT: 17.2 sec;   INR: 1.51 ratio         PTT - ( 13 Oct 2020 07:50 )  PTT:42.0 sec

## 2020-10-14 NOTE — BRIEF OPERATIVE NOTE - COMMENTS
[employer] Representatives: Nicolás Shipman & Rachell Stone (clinical support & teaching valve prep)  Invasive Lines: left Radial Arterial Line  IV Medication Infusions: ? [employer] Representatives: Nicolás Shipman & Rachell Stone (clinical support & teaching valve prep)  Invasive Lines: left Radial Arterial Line  IV Medication Infusions: None [employer] Representatives: Nicolás Shipman & Rachell Stone (clinical support)  Invasive Lines: left Radial Arterial Line  IV Medication Infusions: None

## 2020-10-15 ENCOUNTER — TRANSCRIPTION ENCOUNTER (OUTPATIENT)
Age: 85
End: 2020-10-15

## 2020-10-15 VITALS
OXYGEN SATURATION: 96 % | DIASTOLIC BLOOD PRESSURE: 59 MMHG | HEART RATE: 77 BPM | RESPIRATION RATE: 20 BRPM | SYSTOLIC BLOOD PRESSURE: 109 MMHG

## 2020-10-15 DIAGNOSIS — I50.42 CHRONIC COMBINED SYSTOLIC (CONGESTIVE) AND DIASTOLIC (CONGESTIVE) HEART FAILURE: ICD-10-CM

## 2020-10-15 LAB
ANION GAP SERPL CALC-SCNC: 13 MMOL/L — SIGNIFICANT CHANGE UP (ref 5–17)
BUN SERPL-MCNC: 24 MG/DL — HIGH (ref 8–20)
CALCIUM SERPL-MCNC: 8.7 MG/DL — SIGNIFICANT CHANGE UP (ref 8.6–10.2)
CHLORIDE SERPL-SCNC: 98 MMOL/L — SIGNIFICANT CHANGE UP (ref 98–107)
CO2 SERPL-SCNC: 24 MMOL/L — SIGNIFICANT CHANGE UP (ref 22–29)
CREAT SERPL-MCNC: 0.9 MG/DL — SIGNIFICANT CHANGE UP (ref 0.5–1.3)
GLUCOSE SERPL-MCNC: 94 MG/DL — SIGNIFICANT CHANGE UP (ref 70–99)
HCT VFR BLD CALC: 31.2 % — LOW (ref 34.5–45)
HGB BLD-MCNC: 10.2 G/DL — LOW (ref 11.5–15.5)
INR BLD: 1.26 RATIO — HIGH (ref 0.88–1.16)
MAGNESIUM SERPL-MCNC: 2.2 MG/DL — SIGNIFICANT CHANGE UP (ref 1.6–2.6)
MCHC RBC-ENTMCNC: 32.7 GM/DL — SIGNIFICANT CHANGE UP (ref 32–36)
MCHC RBC-ENTMCNC: 32.7 PG — SIGNIFICANT CHANGE UP (ref 27–34)
MCV RBC AUTO: 100 FL — SIGNIFICANT CHANGE UP (ref 80–100)
PLATELET # BLD AUTO: 162 K/UL — SIGNIFICANT CHANGE UP (ref 150–400)
POTASSIUM SERPL-MCNC: 3.9 MMOL/L — SIGNIFICANT CHANGE UP (ref 3.5–5.3)
POTASSIUM SERPL-SCNC: 3.9 MMOL/L — SIGNIFICANT CHANGE UP (ref 3.5–5.3)
PROTHROM AB SERPL-ACNC: 14.5 SEC — HIGH (ref 10.6–13.6)
RBC # BLD: 3.12 M/UL — LOW (ref 3.8–5.2)
RBC # FLD: 14.5 % — SIGNIFICANT CHANGE UP (ref 10.3–14.5)
SODIUM SERPL-SCNC: 135 MMOL/L — SIGNIFICANT CHANGE UP (ref 135–145)
WBC # BLD: 6.55 K/UL — SIGNIFICANT CHANGE UP (ref 3.8–10.5)
WBC # FLD AUTO: 6.55 K/UL — SIGNIFICANT CHANGE UP (ref 3.8–10.5)

## 2020-10-15 PROCEDURE — 71045 X-RAY EXAM CHEST 1 VIEW: CPT | Mod: 26

## 2020-10-15 PROCEDURE — 93010 ELECTROCARDIOGRAM REPORT: CPT

## 2020-10-15 RX ORDER — POTASSIUM CHLORIDE 20 MEQ
40 PACKET (EA) ORAL ONCE
Refills: 0 | Status: COMPLETED | OUTPATIENT
Start: 2020-10-15 | End: 2020-10-15

## 2020-10-15 RX ADMIN — AMIODARONE HYDROCHLORIDE 200 MILLIGRAM(S): 400 TABLET ORAL at 05:24

## 2020-10-15 RX ADMIN — Medication 88 MICROGRAM(S): at 05:24

## 2020-10-15 RX ADMIN — Medication 0.12 MILLIGRAM(S): at 05:24

## 2020-10-15 RX ADMIN — SACUBITRIL AND VALSARTAN 1 TABLET(S): 24; 26 TABLET, FILM COATED ORAL at 05:24

## 2020-10-15 RX ADMIN — Medication 20 MILLIGRAM(S): at 05:27

## 2020-10-15 RX ADMIN — Medication 100 MILLIGRAM(S): at 06:58

## 2020-10-15 RX ADMIN — Medication 40 MILLIEQUIVALENT(S): at 05:25

## 2020-10-15 NOTE — DIETITIAN INITIAL EVALUATION ADULT. - ETIOLOGY
related to inability to consume sufficient protein energy intake with consumption of small portions in setting of advanced age and recent SOB/fatigue with MVR regurgitation- s/p repair

## 2020-10-15 NOTE — PHYSICAL THERAPY INITIAL EVALUATION ADULT - ADDITIONAL COMMENTS
per patient she lives with her , does not have step to enter however has a full flight to the bedroom/second floor. pt has a chair lift that she uses on occasions, however takes pride in being independent. At baseline she can negotiate stairs if she wants to, and ambulates without an AD. Pt reports that she does however own a RW if needed, plans on having her daughter stay with her and has aides for her  and for herself. Her  is in his 90s and requires assistance with ADLs.

## 2020-10-15 NOTE — DIETITIAN INITIAL EVALUATION ADULT. - OTHER INFO
Pt with h/o former smoker (1PPD x5 years; quit 1900), with PMHx afib (on coumadin), iron deficiency anemia (s/p iron infusions; Feraheme), CKD, celiac disease, cardiomyopathy HFrEF subsequent BiV implant ([employer]), VT, MI (1990; no stents placed), pericarditis, HTN, HLD  Hashimoto's thyroiditis, emphysema with significant  Mitral valve disease- s/p repair.

## 2020-10-15 NOTE — DIETITIAN INITIAL EVALUATION ADULT. - PERTINENT LABORATORY DATA
10-15 Na135 mmol/L Glu 94 mg/dL K+ 3.9 mmol/L Cr  0.90 mg/dL BUN 24.0 mg/dL<H> Phos n/a   Alb n/a   PAB n/a

## 2020-10-15 NOTE — PHYSICAL THERAPY INITIAL EVALUATION ADULT - GENERAL OBSERVATIONS, REHAB EVAL
Pt rec'd in-room upright in the reclining chair at bedside, mobile tele box in place. Pt breathing RA in NAD.

## 2020-10-15 NOTE — DISCHARGE NOTE NURSING/CASE MANAGEMENT/SOCIAL WORK - PATIENT PORTAL LINK FT
You can access the FollowMyHealth Patient Portal offered by [facility] by registering at the following website: [email or web address]. By joining [facility]’s FollowMyHealth portal, you will also be able to view your health information using other applications (apps) compatible with our system.

## 2020-10-15 NOTE — CHART NOTE - TREATMENT: THE FOLLOWING DIET HAS BEEN RECOMMENDED
Diet, Regular:   Consistent Carbohydrate {Evening Snack} (CSTCHOSN)  DASH/TLC {Sodium & Cholesterol Restricted} (DASH) (10-15-20 @ 08:27) [Active]    RX: Ensure BID

## 2020-10-15 NOTE — DIETITIAN INITIAL EVALUATION ADULT. - SIGNS/SYMPTOMS
as evidenced by pt with severe muscle wasting/fat loss, likely meeting <75% est energy intake > 1 mo

## 2020-10-15 NOTE — DIETITIAN INITIAL EVALUATION ADULT. - ORAL INTAKE PTA/DIET HISTORY
Pt reports tolerating clears at this time- diet advanced this morning and pt awaiting breakfast.  Pt reports good po intake prior to admission- normally consumes small portions at meals and denies unintentional wt loss.

## 2020-10-21 NOTE — CDI QUERY NOTE - NSCDIOTHERTXTBX_GEN_ALL_CORE_HH
The patient received a nutrition assessment by a Registered Dietician on 10/15/20.  The documentation of this assessment states: Severe protein-calorie malnutrition     Please specify the clinical significance of these findings based on your clinical judgement such as:    -	Severe protein-calorie malnutrition  -	Other (please specify)  -	Clinically unable to be determined      Please document your response in your progress notes and/or discharge summary as appropriate.        Chart Documentation:      10/15 RD Chart Note:  · Note Type  Malnutrition Notification  · Upon Nutritional Assessment by the Registered Dietitian your patient was determined to meet criteria/has evidence of the following diagnosis/diagnoses:  Severe protein-calorie malnutrition  · Findings as based on:  Comprehensive nutrition assessment and consultation  · Treatment: The following diet has been recommended  Diet, Regular:   Consistent Carbohydrate {Evening Snack} (CSTCHOSN)  DASH/TLC {Sodium & Cholesterol Restricted} (DASH) (10-15-20 @ 08:27) [Active]    RX: Ensure BID        10/15 Dietician Initial Evaluation:  Nutrition Diagnostic Terminology #1 Malnutrition...     Malnutrition severe (chronic).     Etiology related to inability to consume sufficient protein energy intake with consumption of small portions in setting of advanced age and recent SOB/fatigue with MVR regurgitation- s/p repair.   Signs/Symptoms as evidenced by pt with severe muscle wasting/fat loss, likely meeting <75% est energy intake > 1 mo.   Goal/Expected Outcome Pt will meet >75% estimated nutrition needs at meals.

## 2020-10-27 ENCOUNTER — APPOINTMENT (OUTPATIENT)
Dept: CARDIOTHORACIC SURGERY | Facility: CLINIC | Age: 85
End: 2020-10-27

## 2020-11-09 NOTE — H&P PST ADULT - WEIGHT IN KG
[phone] 45.5

## 2021-01-07 ENCOUNTER — OUTPATIENT (OUTPATIENT)
Dept: OUTPATIENT SERVICES | Facility: HOSPITAL | Age: 86
LOS: 1 days | End: 2021-01-07
Payer: MEDICARE

## 2021-01-07 DIAGNOSIS — Z98.890 OTHER SPECIFIED POSTPROCEDURAL STATES: Chronic | ICD-10-CM

## 2021-01-07 DIAGNOSIS — K40.90 UNILATERAL INGUINAL HERNIA, WITHOUT OBSTRUCTION OR GANGRENE, NOT SPECIFIED AS RECURRENT: ICD-10-CM

## 2021-01-07 DIAGNOSIS — Z98.49 CATARACT EXTRACTION STATUS, UNSPECIFIED EYE: Chronic | ICD-10-CM

## 2021-01-07 DIAGNOSIS — Z95.810 PRESENCE OF AUTOMATIC (IMPLANTABLE) CARDIAC DEFIBRILLATOR: Chronic | ICD-10-CM

## 2021-01-07 DIAGNOSIS — Z01.818 ENCOUNTER FOR OTHER PREPROCEDURAL EXAMINATION: ICD-10-CM

## 2021-01-07 DIAGNOSIS — Z95.0 PRESENCE OF CARDIAC PACEMAKER: Chronic | ICD-10-CM

## 2021-01-07 LAB
ANION GAP SERPL CALC-SCNC: 3 MMOL/L — LOW (ref 5–17)
BASOPHILS # BLD AUTO: 0.06 K/UL — SIGNIFICANT CHANGE UP (ref 0–0.2)
BASOPHILS NFR BLD AUTO: 1.2 % — SIGNIFICANT CHANGE UP (ref 0–2)
BUN SERPL-MCNC: 19 MG/DL — SIGNIFICANT CHANGE UP (ref 7–23)
CALCIUM SERPL-MCNC: 8.4 MG/DL — LOW (ref 8.5–10.1)
CHLORIDE SERPL-SCNC: 108 MMOL/L — SIGNIFICANT CHANGE UP (ref 96–108)
CO2 SERPL-SCNC: 29 MMOL/L — SIGNIFICANT CHANGE UP (ref 22–31)
CREAT SERPL-MCNC: 1.53 MG/DL — HIGH (ref 0.5–1.3)
EOSINOPHIL # BLD AUTO: 0.04 K/UL — SIGNIFICANT CHANGE UP (ref 0–0.5)
EOSINOPHIL NFR BLD AUTO: 0.8 % — SIGNIFICANT CHANGE UP (ref 0–6)
GLUCOSE SERPL-MCNC: 93 MG/DL — SIGNIFICANT CHANGE UP (ref 70–99)
HCT VFR BLD CALC: 30.1 % — LOW (ref 34.5–45)
HGB BLD-MCNC: 9.6 G/DL — LOW (ref 11.5–15.5)
IMM GRANULOCYTES NFR BLD AUTO: 0.4 % — SIGNIFICANT CHANGE UP (ref 0–1.5)
LYMPHOCYTES # BLD AUTO: 0.98 K/UL — LOW (ref 1–3.3)
LYMPHOCYTES # BLD AUTO: 20.2 % — SIGNIFICANT CHANGE UP (ref 13–44)
MCHC RBC-ENTMCNC: 31.9 GM/DL — LOW (ref 32–36)
MCHC RBC-ENTMCNC: 32.7 PG — SIGNIFICANT CHANGE UP (ref 27–34)
MCV RBC AUTO: 102.4 FL — HIGH (ref 80–100)
MONOCYTES # BLD AUTO: 0.51 K/UL — SIGNIFICANT CHANGE UP (ref 0–0.9)
MONOCYTES NFR BLD AUTO: 10.5 % — SIGNIFICANT CHANGE UP (ref 2–14)
NEUTROPHILS # BLD AUTO: 3.24 K/UL — SIGNIFICANT CHANGE UP (ref 1.8–7.4)
NEUTROPHILS NFR BLD AUTO: 66.9 % — SIGNIFICANT CHANGE UP (ref 43–77)
PLATELET # BLD AUTO: 230 K/UL — SIGNIFICANT CHANGE UP (ref 150–400)
POTASSIUM SERPL-MCNC: 4.3 MMOL/L — SIGNIFICANT CHANGE UP (ref 3.5–5.3)
POTASSIUM SERPL-SCNC: 4.3 MMOL/L — SIGNIFICANT CHANGE UP (ref 3.5–5.3)
RBC # BLD: 2.94 M/UL — LOW (ref 3.8–5.2)
RBC # FLD: 14.6 % — HIGH (ref 10.3–14.5)
SODIUM SERPL-SCNC: 140 MMOL/L — SIGNIFICANT CHANGE UP (ref 135–145)
WBC # BLD: 4.85 K/UL — SIGNIFICANT CHANGE UP (ref 3.8–10.5)
WBC # FLD AUTO: 4.85 K/UL — SIGNIFICANT CHANGE UP (ref 3.8–10.5)

## 2021-01-07 PROCEDURE — 80048 BASIC METABOLIC PNL TOTAL CA: CPT

## 2021-01-07 PROCEDURE — 93010 ELECTROCARDIOGRAM REPORT: CPT

## 2021-01-07 PROCEDURE — 85025 COMPLETE CBC W/AUTO DIFF WBC: CPT

## 2021-01-07 PROCEDURE — 36415 COLL VENOUS BLD VENIPUNCTURE: CPT

## 2021-01-07 PROCEDURE — 93005 ELECTROCARDIOGRAM TRACING: CPT

## 2021-01-07 RX ORDER — ACETAMINOPHEN 500 MG
2 TABLET ORAL
Qty: 0 | Refills: 0 | DISCHARGE

## 2021-01-07 RX ORDER — CHOLECALCIFEROL (VITAMIN D3) 125 MCG
0 CAPSULE ORAL
Qty: 0 | Refills: 0 | DISCHARGE

## 2021-01-07 NOTE — CHART NOTE - NSCHARTNOTEFT_GEN_A_CORE
Vital signs  Pulse 74  Temperature 98.4  Respirations 16  SpO2 100%  B/P 101/60      Plan   1. NPO after midnight  2. Take the following medications with sips of water on the day of procedure: Digoxin, Levothyroxine, Amiodarone, Entresto  3. Use E-Z sponge as directed  4. Use Mupirocin as directed.  5. Drink a quart of extra  fluids the day before your surgery.  6 Medical and Cardiac optimization for surgery with Dr. Jose Marrero  7. CBC, BMP sent to lab  8. EKG done  9. PT / INR on the day of surgery  10. Covid swab 1/11 followed by self quarantine      Patient with a history of CHF, Atrial fibrillation , MVP and presently with PPM/Defibrillator. Mitral valve repair 9/2020. Presently on Coumadin. Followed by Dr. Marrero. Coumadin to be placed on hold as per Dr. Marrero. To see Dr. Marrero for follow up and INR.

## 2021-01-07 NOTE — ASU PATIENT PROFILE, ADULT - PMH
AICD (automatic cardioverter/defibrillator) present  x 3 . Replaced x 2. Presently right subclavian area  Arthritis    Celiac disease    Chronic atrial fibrillation    Hashimoto's thyroiditis    Hearing loss    History of cataract    History of CHF (congestive heart failure)    History of colon polyps    HTN (hypertension)    Hyperlipidemia, unspecified hyperlipidemia type    Hypothyroid    Iron deficiency anemia due to chronic blood loss    Left inguinal hernia    Mitral valve prolapse    Myocardial infarction  1990  Osteoporosis    Peripheral edema    Varicose veins  BLE

## 2021-01-07 NOTE — ASU PATIENT PROFILE, ADULT - PSH
AICD (automatic cardioverter/defibrillator) present  with PPM. Replaced x 2.  Artificial pacemaker    H/O colonoscopy  last done 2009  H/O right inguinal hernia repair    History of cataract extraction  Bilateral  History of heart surgery  Mitral valve surgery for leaky valve 9/2020

## 2021-01-08 DIAGNOSIS — Z01.818 ENCOUNTER FOR OTHER PREPROCEDURAL EXAMINATION: ICD-10-CM

## 2021-01-08 DIAGNOSIS — K40.90 UNILATERAL INGUINAL HERNIA, WITHOUT OBSTRUCTION OR GANGRENE, NOT SPECIFIED AS RECURRENT: ICD-10-CM

## 2021-01-11 ENCOUNTER — APPOINTMENT (OUTPATIENT)
Dept: DISASTER EMERGENCY | Facility: CLINIC | Age: 86
End: 2021-01-11

## 2021-01-12 LAB — SARS-COV-2 N GENE NPH QL NAA+PROBE: NOT DETECTED

## 2021-01-13 RX ORDER — OXYCODONE HYDROCHLORIDE 5 MG/1
5 TABLET ORAL ONCE
Refills: 0 | Status: DISCONTINUED | OUTPATIENT
Start: 2021-01-14 | End: 2021-01-14

## 2021-01-13 RX ORDER — FENTANYL CITRATE 50 UG/ML
25 INJECTION INTRAVENOUS
Refills: 0 | Status: DISCONTINUED | OUTPATIENT
Start: 2021-01-14 | End: 2021-01-14

## 2021-01-13 RX ORDER — SODIUM CHLORIDE 9 MG/ML
3 INJECTION INTRAMUSCULAR; INTRAVENOUS; SUBCUTANEOUS EVERY 8 HOURS
Refills: 0 | Status: DISCONTINUED | OUTPATIENT
Start: 2021-01-14 | End: 2021-01-14

## 2021-01-13 RX ORDER — MEPERIDINE HYDROCHLORIDE 50 MG/ML
12.5 INJECTION INTRAMUSCULAR; INTRAVENOUS; SUBCUTANEOUS
Refills: 0 | Status: DISCONTINUED | OUTPATIENT
Start: 2021-01-14 | End: 2021-01-14

## 2021-01-13 RX ORDER — SODIUM CHLORIDE 9 MG/ML
1000 INJECTION, SOLUTION INTRAVENOUS
Refills: 0 | Status: DISCONTINUED | OUTPATIENT
Start: 2021-01-14 | End: 2021-01-14

## 2021-01-13 RX ORDER — FENTANYL CITRATE 50 UG/ML
50 INJECTION INTRAVENOUS
Refills: 0 | Status: DISCONTINUED | OUTPATIENT
Start: 2021-01-14 | End: 2021-01-14

## 2021-01-13 RX ORDER — ONDANSETRON 8 MG/1
4 TABLET, FILM COATED ORAL ONCE
Refills: 0 | Status: DISCONTINUED | OUTPATIENT
Start: 2021-01-14 | End: 2021-01-14

## 2021-01-14 ENCOUNTER — OUTPATIENT (OUTPATIENT)
Dept: INPATIENT UNIT | Facility: HOSPITAL | Age: 86
LOS: 1 days | Discharge: HOME | End: 2021-01-14
Payer: MEDICARE

## 2021-01-14 VITALS
HEART RATE: 97 BPM | RESPIRATION RATE: 15 BRPM | OXYGEN SATURATION: 95 % | SYSTOLIC BLOOD PRESSURE: 130 MMHG | TEMPERATURE: 97 F | DIASTOLIC BLOOD PRESSURE: 79 MMHG

## 2021-01-14 VITALS
RESPIRATION RATE: 16 BRPM | TEMPERATURE: 97 F | DIASTOLIC BLOOD PRESSURE: 71 MMHG | SYSTOLIC BLOOD PRESSURE: 134 MMHG | OXYGEN SATURATION: 100 % | WEIGHT: 96.78 LBS | HEIGHT: 63 IN | HEART RATE: 69 BPM

## 2021-01-14 DIAGNOSIS — Z95.0 PRESENCE OF CARDIAC PACEMAKER: Chronic | ICD-10-CM

## 2021-01-14 DIAGNOSIS — K40.90 UNILATERAL INGUINAL HERNIA, WITHOUT OBSTRUCTION OR GANGRENE, NOT SPECIFIED AS RECURRENT: ICD-10-CM

## 2021-01-14 DIAGNOSIS — Z95.810 PRESENCE OF AUTOMATIC (IMPLANTABLE) CARDIAC DEFIBRILLATOR: Chronic | ICD-10-CM

## 2021-01-14 DIAGNOSIS — Z98.49 CATARACT EXTRACTION STATUS, UNSPECIFIED EYE: Chronic | ICD-10-CM

## 2021-01-14 DIAGNOSIS — Z98.890 OTHER SPECIFIED POSTPROCEDURAL STATES: Chronic | ICD-10-CM

## 2021-01-14 LAB
APTT BLD: 35.4 SEC — SIGNIFICANT CHANGE UP (ref 27.5–35.5)
INR BLD: 1.34 RATIO — HIGH (ref 0.88–1.16)
PROTHROM AB SERPL-ACNC: 15.3 SEC — HIGH (ref 10.6–13.6)

## 2021-01-14 PROCEDURE — 85730 THROMBOPLASTIN TIME PARTIAL: CPT

## 2021-01-14 PROCEDURE — 85610 PROTHROMBIN TIME: CPT

## 2021-01-14 PROCEDURE — 36415 COLL VENOUS BLD VENIPUNCTURE: CPT

## 2021-01-14 RX ORDER — OXYCODONE HYDROCHLORIDE 5 MG/1
5 TABLET ORAL EVERY 6 HOURS
Refills: 0 | Status: DISCONTINUED | OUTPATIENT
Start: 2021-01-14 | End: 2021-01-14

## 2021-01-14 RX ORDER — ACETAMINOPHEN 500 MG
975 TABLET ORAL ONCE
Refills: 0 | Status: COMPLETED | OUTPATIENT
Start: 2021-01-14 | End: 2021-01-14

## 2021-01-14 RX ORDER — ENOXAPARIN SODIUM 100 MG/ML
0 INJECTION SUBCUTANEOUS
Qty: 0 | Refills: 0 | DISCHARGE

## 2021-01-14 RX ORDER — ACETAMINOPHEN 500 MG
650 TABLET ORAL EVERY 6 HOURS
Refills: 0 | Status: DISCONTINUED | OUTPATIENT
Start: 2021-01-14 | End: 2021-01-14

## 2021-01-14 RX ORDER — FAMOTIDINE 10 MG/ML
20 INJECTION INTRAVENOUS ONCE
Refills: 0 | Status: COMPLETED | OUTPATIENT
Start: 2021-01-14 | End: 2021-01-14

## 2021-01-14 RX ADMIN — Medication 975 MILLIGRAM(S): at 08:04

## 2021-01-14 RX ADMIN — FAMOTIDINE 20 MILLIGRAM(S): 10 INJECTION INTRAVENOUS at 08:04

## 2021-01-14 NOTE — ASU DISCHARGE PLAN (ADULT/PEDIATRIC) - ASU DC SPECIAL INSTRUCTIONSFT
PAIN: You may continue to take Acetaminophen (Tylenol) and Ibuprofen (Advil, Motrin) over the counter for pain.   WOUND CARE:  Removed tegarderm and gauze on POD 2 and keep stripstrips in place. You should allow warm soapy water to run down the wound in the shower. You do not need to scrub the area. You do not have any stitches that need to be removed.  BATHING: Please do not soak or submerge the wound in water (bath, swimming) for 14 days after your surgery.  ACTIVITY: No heavy lifting, straining, or vigorous activity until your follow-up appointment in 1-2 weeks.   FOLLOW-UP: Please call the office and make an appointment to follow up with Dr. Mancia in 1-2 weeks.      Restart lovenox tomorrow am.

## 2021-01-14 NOTE — BRIEF OPERATIVE NOTE - NSICDXBRIEFPROCEDURE_GEN_ALL_CORE_FT
PROCEDURES:  Open repair of recurrent inguinal hernia using suture 14-Jan-2021 11:00:59  VILLA LAZAR

## 2021-01-14 NOTE — ASU DISCHARGE PLAN (ADULT/PEDIATRIC) - CARE PROVIDER_API CALL
Cam Mancia)  Surgery; Surgical Critical Care  [address] [place] [address]  Phone: [phone]  Fax: [phone]  Follow Up Time:    15

## 2021-01-21 DIAGNOSIS — E03.9 HYPOTHYROIDISM, UNSPECIFIED: ICD-10-CM

## 2021-01-21 DIAGNOSIS — Z79.01 LONG TERM (CURRENT) USE OF ANTICOAGULANTS: ICD-10-CM

## 2021-01-21 DIAGNOSIS — Z79.82 LONG TERM (CURRENT) USE OF ASPIRIN: ICD-10-CM

## 2021-01-21 DIAGNOSIS — E78.00 PURE HYPERCHOLESTEROLEMIA, UNSPECIFIED: ICD-10-CM

## 2021-01-21 DIAGNOSIS — K40.90 UNILATERAL INGUINAL HERNIA, WITHOUT OBSTRUCTION OR GANGRENE, NOT SPECIFIED AS RECURRENT: ICD-10-CM

## 2021-01-21 DIAGNOSIS — I48.0 PAROXYSMAL ATRIAL FIBRILLATION: ICD-10-CM

## 2021-01-21 DIAGNOSIS — I11.0 HYPERTENSIVE HEART DISEASE WITH HEART FAILURE: ICD-10-CM

## 2021-01-21 DIAGNOSIS — Z88.1 ALLERGY STATUS TO OTHER ANTIBIOTIC AGENTS STATUS: ICD-10-CM

## 2021-01-21 DIAGNOSIS — I50.20 UNSPECIFIED SYSTOLIC (CONGESTIVE) HEART FAILURE: ICD-10-CM

## 2021-01-21 DIAGNOSIS — I05.9 RHEUMATIC MITRAL VALVE DISEASE, UNSPECIFIED: ICD-10-CM

## 2021-01-21 DIAGNOSIS — Z95.810 PRESENCE OF AUTOMATIC (IMPLANTABLE) CARDIAC DEFIBRILLATOR: ICD-10-CM

## 2021-01-21 DIAGNOSIS — I42.9 CARDIOMYOPATHY, UNSPECIFIED: ICD-10-CM

## 2021-02-17 NOTE — ED ADULT NURSE NOTE - AGENT'S NAME
DISPLAY PLAN FREE TEXT DISPLAY PLAN FREE TEXT DISPLAY PLAN FREE TEXT DISPLAY PLAN FREE TEXT DISPLAY PLAN FREE TEXT DISPLAY PLAN FREE TEXT Gary Perez

## 2021-03-03 PROBLEM — K40.90 UNILATERAL INGUINAL HERNIA, WITHOUT OBSTRUCTION OR GANGRENE, NOT SPECIFIED AS RECURRENT: Chronic | Status: ACTIVE | Noted: 2021-01-07

## 2021-03-03 PROBLEM — H91.90 UNSPECIFIED HEARING LOSS, UNSPECIFIED EAR: Chronic | Status: ACTIVE | Noted: 2021-01-07

## 2021-03-12 ENCOUNTER — APPOINTMENT (OUTPATIENT)
Dept: OTOLARYNGOLOGY | Facility: CLINIC | Age: 86
End: 2021-03-12
Payer: MEDICARE

## 2021-03-12 VITALS
DIASTOLIC BLOOD PRESSURE: 69 MMHG | BODY MASS INDEX: 16.83 KG/M2 | HEIGHT: 63 IN | WEIGHT: 95 LBS | HEART RATE: 78 BPM | TEMPERATURE: 98 F | SYSTOLIC BLOOD PRESSURE: 112 MMHG

## 2021-03-12 DIAGNOSIS — J98.4 OTHER DISORDERS OF LUNG: ICD-10-CM

## 2021-03-12 PROCEDURE — 69210 REMOVE IMPACTED EAR WAX UNI: CPT | Mod: LT

## 2021-03-12 PROCEDURE — 99213 OFFICE O/P EST LOW 20 MIN: CPT | Mod: 25

## 2021-03-12 NOTE — PROCEDURE
[Cerumen Impaction] : Cerumen Impaction [FreeTextEntry6] : Large amount dried heme cleared left ear instrumentation and suction.\par Ear canals and tympanic membranes  unremarkable.\par

## 2021-03-12 NOTE — PHYSICAL EXAM
[de-identified] : large amount dried heme plugging as canal cleared [Midline] : trachea located in midline position [Normal] : no rashes

## 2021-03-16 PROCEDURE — 83735 ASSAY OF MAGNESIUM: CPT

## 2021-03-16 PROCEDURE — 82947 ASSAY GLUCOSE BLOOD QUANT: CPT

## 2021-03-16 PROCEDURE — 80048 BASIC METABOLIC PNL TOTAL CA: CPT

## 2021-03-16 PROCEDURE — 84132 ASSAY OF SERUM POTASSIUM: CPT

## 2021-03-16 PROCEDURE — 82330 ASSAY OF CALCIUM: CPT

## 2021-03-16 PROCEDURE — 93320 DOPPLER ECHO COMPLETE: CPT

## 2021-03-16 PROCEDURE — 83605 ASSAY OF LACTIC ACID: CPT

## 2021-03-16 PROCEDURE — 99152 MOD SED SAME PHYS/QHP 5/>YRS: CPT

## 2021-03-16 PROCEDURE — 82435 ASSAY OF BLOOD CHLORIDE: CPT

## 2021-03-16 PROCEDURE — 85730 THROMBOPLASTIN TIME PARTIAL: CPT

## 2021-03-16 PROCEDURE — 93458 L HRT ARTERY/VENTRICLE ANGIO: CPT

## 2021-03-16 PROCEDURE — 93880 EXTRACRANIAL BILAT STUDY: CPT

## 2021-03-16 PROCEDURE — C1894: CPT

## 2021-03-16 PROCEDURE — 85027 COMPLETE CBC AUTOMATED: CPT

## 2021-03-16 PROCEDURE — 82962 GLUCOSE BLOOD TEST: CPT

## 2021-03-16 PROCEDURE — 71045 X-RAY EXAM CHEST 1 VIEW: CPT

## 2021-03-16 PROCEDURE — 93005 ELECTROCARDIOGRAM TRACING: CPT

## 2021-03-16 PROCEDURE — C1887: CPT

## 2021-03-16 PROCEDURE — 84295 ASSAY OF SERUM SODIUM: CPT

## 2021-03-16 PROCEDURE — 85014 HEMATOCRIT: CPT

## 2021-03-16 PROCEDURE — C1893: CPT

## 2021-03-16 PROCEDURE — C1889: CPT

## 2021-03-16 PROCEDURE — 80053 COMPREHEN METABOLIC PANEL: CPT

## 2021-03-16 PROCEDURE — 93306 TTE W/DOPPLER COMPLETE: CPT

## 2021-03-16 PROCEDURE — 82803 BLOOD GASES ANY COMBINATION: CPT

## 2021-03-16 PROCEDURE — 93325 DOPPLER ECHO COLOR FLOW MAPG: CPT

## 2021-03-16 PROCEDURE — 87635 SARS-COV-2 COVID-19 AMP PRB: CPT

## 2021-03-16 PROCEDURE — 93312 ECHO TRANSESOPHAGEAL: CPT

## 2021-03-16 PROCEDURE — 36415 COLL VENOUS BLD VENIPUNCTURE: CPT

## 2021-03-16 PROCEDURE — 76000 FLUOROSCOPY <1 HR PHYS/QHP: CPT

## 2021-03-16 PROCEDURE — 85025 COMPLETE CBC W/AUTO DIFF WBC: CPT

## 2021-03-16 PROCEDURE — 85018 HEMOGLOBIN: CPT

## 2021-03-16 PROCEDURE — 85610 PROTHROMBIN TIME: CPT

## 2021-04-21 ENCOUNTER — APPOINTMENT (OUTPATIENT)
Dept: INTERNAL MEDICINE | Facility: CLINIC | Age: 86
End: 2021-04-21
Payer: MEDICARE

## 2021-04-21 VITALS
DIASTOLIC BLOOD PRESSURE: 64 MMHG | BODY MASS INDEX: 17.01 KG/M2 | HEART RATE: 82 BPM | OXYGEN SATURATION: 98 % | WEIGHT: 96 LBS | TEMPERATURE: 97.2 F | HEIGHT: 63 IN | SYSTOLIC BLOOD PRESSURE: 110 MMHG

## 2021-04-21 PROCEDURE — G0439: CPT

## 2021-04-21 NOTE — PHYSICAL EXAM
[No Acute Distress] : no acute distress [Well Nourished] : well nourished [Well Developed] : well developed [Ill-Appearing] : ill-appearing [Normal Sclera/Conjunctiva] : normal sclera/conjunctiva [PERRL] : pupils equal round and reactive to light [EOMI] : extraocular movements intact [Normal Outer Ear/Nose] : the outer ears and nose were normal in appearance [Normal Oropharynx] : the oropharynx was normal [Normal TMs] : both tympanic membranes were normal [No JVD] : no jugular venous distention [Supple] : supple [No Lymphadenopathy] : no lymphadenopathy [Thyroid Normal, No Nodules] : the thyroid was normal and there were no nodules present [No Respiratory Distress] : no respiratory distress  [Clear to Auscultation] : lungs were clear to auscultation bilaterally [No Accessory Muscle Use] : no accessory muscle use [Normal Rate] : normal rate  [Normal S1, S2] : normal S1 and S2 [No Carotid Bruits] : no carotid bruits [Pedal Pulses Present] : the pedal pulses are present [No Extremity Clubbing/Cyanosis] : no extremity clubbing/cyanosis [Soft] : abdomen soft [Non Tender] : non-tender [Normal Supraclavicular Nodes] : no supraclavicular lymphadenopathy [Normal Anterior Cervical Nodes] : no anterior cervical lymphadenopathy [No CVA Tenderness] : no CVA  tenderness [Kyphosis] : kyphosis [No Joint Swelling] : no joint swelling [No Rash] : no rash [Normal Gait] : normal gait [No Focal Deficits] : no focal deficits [Speech Grossly Normal] : speech grossly normal [Memory Grossly Normal] : memory grossly normal [Normal Affect] : the affect was normal [Alert and Oriented x3] : oriented to person, place, and time [Normal Mood] : the mood was normal [de-identified] : thin [de-identified] : bilateral IOL [de-identified] : there is a 1/6 KATERYNA  [de-identified] : there is trace edema bilaterally.

## 2021-04-21 NOTE — PLAN
[FreeTextEntry1] : 1. Continued medications as outlined above.\par \par 2. Routine cardiology followup, with Dr. Marrero. \par \par 3. The patient will undergo yearly routine fasting blood work with a home draw in the next several weeks\par \par 4. Follow up with myself in 6 months with full pulmonary function testing. Of note is the fact that the patient is on amiodarone.

## 2021-04-21 NOTE — HISTORY OF PRESENT ILLNESS
[FreeTextEntry1] : The patient comes in for a yearly wellness evaluation\par  [de-identified] : The patient states, that this time, she is doing relatively well. Unfortunately, she recently lost her  approximately one month ago. She is attempting to stay active to keep her spirits up from a psychological standpoint. She is doing fairly well.\par \par The patient did undergo a clipping of her mitral valve back in October. She was noted to have a significant leak. The workup included an echocardiogram, a ROSIE, and the cardiac catheterization. These were all performed prior to the mitral clipping which was done in October. Since then, she has remained stable. She continues to follow carefully with cardiology.\par \par She remains on anticoagulation as well as amiodarone for treatment of her atrial fibrillation. She is on medication for heart failure with reduced ejection fraction. She takes Lasix on an as-needed basis. She does have edema of the lower extremities.\par \par The patient's appetite is good. She denies change in bowel habits. She has received the corona virus vaccination. She has no acute constitutional symptoms at this time. She now comes in for this assessment.

## 2021-04-21 NOTE — HEALTH RISK ASSESSMENT
[Never (0 pts)] : Never (0 points) [No] : In the past 12 months have you used drugs other than those required for medical reasons? No [0] : 2) Feeling down, depressed, or hopeless: Not at all (0) [] : No

## 2021-05-06 ENCOUNTER — NON-APPOINTMENT (OUTPATIENT)
Age: 86
End: 2021-05-06

## 2021-05-06 DIAGNOSIS — G47.00 INSOMNIA, UNSPECIFIED: ICD-10-CM

## 2021-05-14 DIAGNOSIS — Z01.818 ENCOUNTER FOR OTHER PREPROCEDURAL EXAMINATION: ICD-10-CM

## 2021-05-26 ENCOUNTER — RX RENEWAL (OUTPATIENT)
Age: 86
End: 2021-05-26

## 2021-06-14 ENCOUNTER — OUTPATIENT (OUTPATIENT)
Dept: OUTPATIENT SERVICES | Facility: HOSPITAL | Age: 86
LOS: 1 days | Discharge: HOME | End: 2021-06-14

## 2021-06-14 DIAGNOSIS — Z98.890 OTHER SPECIFIED POSTPROCEDURAL STATES: Chronic | ICD-10-CM

## 2021-06-14 DIAGNOSIS — D47.2 MONOCLONAL GAMMOPATHY: ICD-10-CM

## 2021-06-14 DIAGNOSIS — Z95.810 PRESENCE OF AUTOMATIC (IMPLANTABLE) CARDIAC DEFIBRILLATOR: Chronic | ICD-10-CM

## 2021-06-14 DIAGNOSIS — Z95.0 PRESENCE OF CARDIAC PACEMAKER: Chronic | ICD-10-CM

## 2021-06-14 DIAGNOSIS — Z98.49 CATARACT EXTRACTION STATUS, UNSPECIFIED EYE: Chronic | ICD-10-CM

## 2021-06-15 ENCOUNTER — RESULT REVIEW (OUTPATIENT)
Age: 86
End: 2021-06-15

## 2021-06-15 ENCOUNTER — APPOINTMENT (OUTPATIENT)
Dept: HEMATOLOGY ONCOLOGY | Facility: CLINIC | Age: 86
End: 2021-06-15

## 2021-06-15 LAB
ALBUMIN SERPL ELPH-MCNC: 4.1 G/DL — SIGNIFICANT CHANGE UP (ref 3.3–5)
ALP SERPL-CCNC: 82 U/L — SIGNIFICANT CHANGE UP (ref 40–120)
ALT FLD-CCNC: 18 U/L — SIGNIFICANT CHANGE UP (ref 10–45)
ANION GAP SERPL CALC-SCNC: 11 MMOL/L — SIGNIFICANT CHANGE UP (ref 5–17)
AST SERPL-CCNC: 24 U/L — SIGNIFICANT CHANGE UP (ref 10–40)
BASOPHILS # BLD AUTO: 0.08 K/UL — SIGNIFICANT CHANGE UP (ref 0–0.2)
BASOPHILS NFR BLD AUTO: 1.5 % — SIGNIFICANT CHANGE UP (ref 0–2)
BILIRUB SERPL-MCNC: 0.4 MG/DL — SIGNIFICANT CHANGE UP (ref 0.2–1.2)
BUN SERPL-MCNC: 24 MG/DL — HIGH (ref 7–23)
CALCIUM SERPL-MCNC: 9.3 MG/DL — SIGNIFICANT CHANGE UP (ref 8.4–10.5)
CHLORIDE SERPL-SCNC: 100 MMOL/L — SIGNIFICANT CHANGE UP (ref 96–108)
CO2 SERPL-SCNC: 26 MMOL/L — SIGNIFICANT CHANGE UP (ref 22–31)
CREAT SERPL-MCNC: 1.51 MG/DL — HIGH (ref 0.5–1.3)
EOSINOPHIL # BLD AUTO: 0.1 K/UL — SIGNIFICANT CHANGE UP (ref 0–0.5)
EOSINOPHIL NFR BLD AUTO: 1.9 % — SIGNIFICANT CHANGE UP (ref 0–6)
FERRITIN SERPL-MCNC: 18 NG/ML — SIGNIFICANT CHANGE UP (ref 15–150)
FOLATE SERPL-MCNC: 11.1 NG/ML — SIGNIFICANT CHANGE UP
GLUCOSE SERPL-MCNC: 69 MG/DL — LOW (ref 70–99)
HCT VFR BLD CALC: 32 % — LOW (ref 34.5–45)
HGB BLD-MCNC: 10.1 G/DL — LOW (ref 11.5–15.5)
IMM GRANULOCYTES NFR BLD AUTO: 0.2 % — SIGNIFICANT CHANGE UP (ref 0–1.5)
IRON SATN MFR SERPL: 10 % — LOW (ref 14–50)
IRON SATN MFR SERPL: 36 UG/DL — SIGNIFICANT CHANGE UP (ref 30–160)
LYMPHOCYTES # BLD AUTO: 1.23 K/UL — SIGNIFICANT CHANGE UP (ref 1–3.3)
LYMPHOCYTES # BLD AUTO: 22.9 % — SIGNIFICANT CHANGE UP (ref 13–44)
MCHC RBC-ENTMCNC: 31.4 PG — SIGNIFICANT CHANGE UP (ref 27–34)
MCHC RBC-ENTMCNC: 31.6 GM/DL — LOW (ref 32–36)
MCV RBC AUTO: 99.4 FL — SIGNIFICANT CHANGE UP (ref 80–100)
MONOCYTES # BLD AUTO: 0.73 K/UL — SIGNIFICANT CHANGE UP (ref 0–0.9)
MONOCYTES NFR BLD AUTO: 13.6 % — SIGNIFICANT CHANGE UP (ref 2–14)
NEUTROPHILS # BLD AUTO: 3.21 K/UL — SIGNIFICANT CHANGE UP (ref 1.8–7.4)
NEUTROPHILS NFR BLD AUTO: 59.9 % — SIGNIFICANT CHANGE UP (ref 43–77)
NRBC # BLD: 0 /100 WBCS — SIGNIFICANT CHANGE UP (ref 0–0)
PLATELET # BLD AUTO: 279 K/UL — SIGNIFICANT CHANGE UP (ref 150–400)
POTASSIUM SERPL-MCNC: 5.2 MMOL/L — SIGNIFICANT CHANGE UP (ref 3.5–5.3)
POTASSIUM SERPL-SCNC: 5.2 MMOL/L — SIGNIFICANT CHANGE UP (ref 3.5–5.3)
PROT SERPL-MCNC: 7.2 G/DL — SIGNIFICANT CHANGE UP (ref 6–8.3)
RBC # BLD: 3.22 M/UL — LOW (ref 3.8–5.2)
RBC # FLD: 15.6 % — HIGH (ref 10.3–14.5)
SODIUM SERPL-SCNC: 137 MMOL/L — SIGNIFICANT CHANGE UP (ref 135–145)
TIBC SERPL-MCNC: 346 UG/DL — SIGNIFICANT CHANGE UP (ref 220–430)
UIBC SERPL-MCNC: 310 UG/DL — SIGNIFICANT CHANGE UP (ref 110–370)
VIT B12 SERPL-MCNC: 545 PG/ML — SIGNIFICANT CHANGE UP (ref 232–1245)
WBC # BLD: 5.36 K/UL — SIGNIFICANT CHANGE UP (ref 3.8–10.5)
WBC # FLD AUTO: 5.36 K/UL — SIGNIFICANT CHANGE UP (ref 3.8–10.5)

## 2021-06-16 LAB
ERYTHROCYTE [SEDIMENTATION RATE] IN BLOOD: 80 MM/HR — HIGH (ref 0–20)
IGA FLD-MCNC: 759 MG/DL — HIGH (ref 84–499)
IGG FLD-MCNC: 1339 MG/DL — SIGNIFICANT CHANGE UP (ref 610–1660)
IGM SERPL-MCNC: 13 MG/DL — LOW (ref 35–242)
KAPPA LC SER QL IFE: 4.9 MG/DL — HIGH (ref 0.33–1.94)
KAPPA LC SER QL IFE: 4.9 MG/DL — HIGH (ref 0.33–1.94)
KAPPA/LAMBDA FREE LIGHT CHAIN RATIO, SERUM: 0.7 RATIO — SIGNIFICANT CHANGE UP (ref 0.26–1.65)
KAPPA/LAMBDA FREE LIGHT CHAIN RATIO, SERUM: 0.7 RATIO — SIGNIFICANT CHANGE UP (ref 0.26–1.65)
LAMBDA LC SER QL IFE: 6.98 MG/DL — HIGH (ref 0.57–2.63)
LAMBDA LC SER QL IFE: 6.98 MG/DL — HIGH (ref 0.57–2.63)

## 2021-06-20 DIAGNOSIS — Z23 ENCOUNTER FOR IMMUNIZATION: ICD-10-CM

## 2021-06-22 ENCOUNTER — APPOINTMENT (OUTPATIENT)
Dept: HEMATOLOGY ONCOLOGY | Facility: CLINIC | Age: 86
End: 2021-06-22
Payer: MEDICARE

## 2021-06-22 ENCOUNTER — APPOINTMENT (OUTPATIENT)
Dept: HEMATOLOGY ONCOLOGY | Facility: CLINIC | Age: 86
End: 2021-06-22

## 2021-06-22 ENCOUNTER — RESULT REVIEW (OUTPATIENT)
Age: 86
End: 2021-06-22

## 2021-06-22 VITALS
TEMPERATURE: 97.2 F | SYSTOLIC BLOOD PRESSURE: 111 MMHG | WEIGHT: 100 LBS | DIASTOLIC BLOOD PRESSURE: 66 MMHG | HEART RATE: 88 BPM | BODY MASS INDEX: 17.71 KG/M2

## 2021-06-22 DIAGNOSIS — K90.0 CELIAC DISEASE: ICD-10-CM

## 2021-06-22 LAB
% ALBUMIN: 49.3 % — SIGNIFICANT CHANGE UP
% ALPHA 1: 4.8 % — SIGNIFICANT CHANGE UP
% ALPHA 2: 11.5 % — SIGNIFICANT CHANGE UP
% BETA: 18.8 % — SIGNIFICANT CHANGE UP
% GAMMA: 15.6 % — SIGNIFICANT CHANGE UP
% M SPIKE: 5.7 % — SIGNIFICANT CHANGE UP
ALBUMIN SERPL ELPH-MCNC: 3.5 G/DL — LOW (ref 3.6–5.5)
ALBUMIN/GLOB SERPL ELPH: 0.9 RATIO — SIGNIFICANT CHANGE UP
ALPHA1 GLOB SERPL ELPH-MCNC: 0.3 G/DL — SIGNIFICANT CHANGE UP (ref 0.1–0.4)
ALPHA2 GLOB SERPL ELPH-MCNC: 0.8 G/DL — SIGNIFICANT CHANGE UP (ref 0.5–1)
B-GLOBULIN SERPL ELPH-MCNC: 1.4 G/DL — HIGH (ref 0.5–1)
GAMMA GLOBULIN: 1.1 G/DL — SIGNIFICANT CHANGE UP (ref 0.6–1.6)
INTERPRETATION SERPL IFE-IMP: SIGNIFICANT CHANGE UP
M-SPIKE: 0.4 G/DL — HIGH (ref 0–0)
PROT PATTERN SERPL ELPH-IMP: SIGNIFICANT CHANGE UP

## 2021-06-22 PROCEDURE — 99213 OFFICE O/P EST LOW 20 MIN: CPT

## 2021-06-22 NOTE — ASSESSMENT
[FreeTextEntry1] : 86-year-old woman with adult celiac disease, now demonstrating recurrent iron deficiency anemia.\par Symptomatic with increasing fatigue and sleeping more often at home.

## 2021-06-22 NOTE — HISTORY OF PRESENT ILLNESS
[de-identified] : This 86-year-old woman has been seen by hematology in the past for iron deficiency anemia requiring intravenous Feraheme back in 2018 and again in March 2020.  She has a history of adult celiac disease.  In addition she has been followed for an IgA monoclonal gammopathy of undetermined significance.\par Recently tired and sleeping more often, with a hemoglobin of 10.1, hematocrit 32, MCV 99.  White count 5.3 and platelets 279,000.\par Serum ferritin is now down to 18.\par IgA 759, with a normal kappa lambda free light chain ratio.

## 2021-06-22 NOTE — REVIEW OF SYSTEMS
[Fatigue] : fatigue [SOB on Exertion] : shortness of breath during exertion [Negative] : Gastrointestinal

## 2021-06-22 NOTE — PHYSICAL EXAM
[Thin] : thin [Normal] : normoactive bowel sounds, soft and nontender, no hepatosplenomegaly or masses appreciated [de-identified] : Elderly, slight pallor

## 2021-06-23 DIAGNOSIS — D50.9 IRON DEFICIENCY ANEMIA, UNSPECIFIED: ICD-10-CM

## 2021-06-23 DIAGNOSIS — K90.0 CELIAC DISEASE: ICD-10-CM

## 2021-06-25 DIAGNOSIS — J06.9 ACUTE UPPER RESPIRATORY INFECTION, UNSPECIFIED: ICD-10-CM

## 2021-06-25 DIAGNOSIS — H61.22 IMPACTED CERUMEN, LEFT EAR: ICD-10-CM

## 2021-07-01 ENCOUNTER — APPOINTMENT (OUTPATIENT)
Dept: INFUSION THERAPY | Facility: CLINIC | Age: 86
End: 2021-07-01

## 2021-07-01 ENCOUNTER — RX RENEWAL (OUTPATIENT)
Age: 86
End: 2021-07-01

## 2021-07-01 VITALS
TEMPERATURE: 97.6 F | BODY MASS INDEX: 17.71 KG/M2 | SYSTOLIC BLOOD PRESSURE: 111 MMHG | DIASTOLIC BLOOD PRESSURE: 67 MMHG | WEIGHT: 100 LBS | HEART RATE: 99 BPM

## 2021-07-08 ENCOUNTER — APPOINTMENT (OUTPATIENT)
Dept: INFUSION THERAPY | Facility: CLINIC | Age: 86
End: 2021-07-08

## 2021-07-08 VITALS
WEIGHT: 100 LBS | TEMPERATURE: 97.5 F | HEART RATE: 105 BPM | DIASTOLIC BLOOD PRESSURE: 66 MMHG | SYSTOLIC BLOOD PRESSURE: 107 MMHG | BODY MASS INDEX: 17.71 KG/M2

## 2021-08-17 ENCOUNTER — OUTPATIENT (OUTPATIENT)
Dept: OUTPATIENT SERVICES | Facility: HOSPITAL | Age: 86
LOS: 1 days | Discharge: HOME | End: 2021-08-17

## 2021-08-17 DIAGNOSIS — Z95.810 PRESENCE OF AUTOMATIC (IMPLANTABLE) CARDIAC DEFIBRILLATOR: Chronic | ICD-10-CM

## 2021-08-17 DIAGNOSIS — Z95.0 PRESENCE OF CARDIAC PACEMAKER: Chronic | ICD-10-CM

## 2021-08-17 DIAGNOSIS — Z98.890 OTHER SPECIFIED POSTPROCEDURAL STATES: Chronic | ICD-10-CM

## 2021-08-17 DIAGNOSIS — D47.2 MONOCLONAL GAMMOPATHY: ICD-10-CM

## 2021-08-17 DIAGNOSIS — Z98.49 CATARACT EXTRACTION STATUS, UNSPECIFIED EYE: Chronic | ICD-10-CM

## 2021-08-18 ENCOUNTER — APPOINTMENT (OUTPATIENT)
Dept: HEMATOLOGY ONCOLOGY | Facility: CLINIC | Age: 86
End: 2021-08-18

## 2021-08-26 ENCOUNTER — RESULT REVIEW (OUTPATIENT)
Age: 86
End: 2021-08-26

## 2021-08-26 ENCOUNTER — APPOINTMENT (OUTPATIENT)
Dept: HEMATOLOGY ONCOLOGY | Facility: CLINIC | Age: 86
End: 2021-08-26

## 2021-08-26 VITALS
TEMPERATURE: 97.3 F | WEIGHT: 100 LBS | DIASTOLIC BLOOD PRESSURE: 70 MMHG | SYSTOLIC BLOOD PRESSURE: 107 MMHG | HEART RATE: 88 BPM | BODY MASS INDEX: 17.71 KG/M2

## 2021-08-26 LAB
BASOPHILS # BLD AUTO: 0.08 K/UL — SIGNIFICANT CHANGE UP (ref 0–0.2)
BASOPHILS NFR BLD AUTO: 1.4 % — SIGNIFICANT CHANGE UP (ref 0–2)
EOSINOPHIL # BLD AUTO: 0.16 K/UL — SIGNIFICANT CHANGE UP (ref 0–0.5)
EOSINOPHIL NFR BLD AUTO: 2.8 % — SIGNIFICANT CHANGE UP (ref 0–6)
HCT VFR BLD CALC: 34.6 % — SIGNIFICANT CHANGE UP (ref 34.5–45)
HGB BLD-MCNC: 11.4 G/DL — LOW (ref 11.5–15.5)
IMM GRANULOCYTES NFR BLD AUTO: 0.2 % — SIGNIFICANT CHANGE UP (ref 0–1.5)
LYMPHOCYTES # BLD AUTO: 1.01 K/UL — SIGNIFICANT CHANGE UP (ref 1–3.3)
LYMPHOCYTES # BLD AUTO: 17.8 % — SIGNIFICANT CHANGE UP (ref 13–44)
MCHC RBC-ENTMCNC: 32.9 GM/DL — SIGNIFICANT CHANGE UP (ref 32–36)
MCHC RBC-ENTMCNC: 33.9 PG — SIGNIFICANT CHANGE UP (ref 27–34)
MCV RBC AUTO: 103 FL — HIGH (ref 80–100)
MONOCYTES # BLD AUTO: 0.68 K/UL — SIGNIFICANT CHANGE UP (ref 0–0.9)
MONOCYTES NFR BLD AUTO: 12 % — SIGNIFICANT CHANGE UP (ref 2–14)
NEUTROPHILS # BLD AUTO: 3.75 K/UL — SIGNIFICANT CHANGE UP (ref 1.8–7.4)
NEUTROPHILS NFR BLD AUTO: 65.8 % — SIGNIFICANT CHANGE UP (ref 43–77)
NRBC # BLD: 0 /100 WBCS — SIGNIFICANT CHANGE UP (ref 0–0)
PLATELET # BLD AUTO: 217 K/UL — SIGNIFICANT CHANGE UP (ref 150–400)
RBC # BLD: 3.36 M/UL — LOW (ref 3.8–5.2)
RBC # FLD: 16.3 % — HIGH (ref 10.3–14.5)
WBC # BLD: 5.69 K/UL — SIGNIFICANT CHANGE UP (ref 3.8–10.5)
WBC # FLD AUTO: 5.69 K/UL — SIGNIFICANT CHANGE UP (ref 3.8–10.5)

## 2021-08-27 LAB
ERYTHROCYTE [SEDIMENTATION RATE] IN BLOOD: 70 MM/HR — HIGH (ref 0–20)
FERRITIN SERPL-MCNC: 281 NG/ML — HIGH (ref 15–150)
IRON SATN MFR SERPL: 23 % — SIGNIFICANT CHANGE UP (ref 14–50)
IRON SATN MFR SERPL: 56 UG/DL — SIGNIFICANT CHANGE UP (ref 30–160)
TIBC SERPL-MCNC: 240 UG/DL — SIGNIFICANT CHANGE UP (ref 220–430)
UIBC SERPL-MCNC: 184 UG/DL — SIGNIFICANT CHANGE UP (ref 110–370)

## 2021-09-01 NOTE — ED PROVIDER NOTE - ALLERGIC/IMMUNOLOGIC NEGATIVE STATEMENT, MLM
[Obese] : obese [Normal S1, S2] : normal S1, S2 [No Cyanosis] : no cyanosis [No Clubbing] : no clubbing [Normal DP B/L] : normal DP B/L [Edema ___] : edema [unfilled] [Venous varicosities] : venous varicosities [Normal] : alert and oriented, normal memory [de-identified] : clouded cornea on right, lense on left [de-identified] : No JVD or bruits  [de-identified] : 1/6 SPENCER [de-identified] : loves to talk no dermatitis, no environmental allergies, no food allergies, no immunosuppressive disorder, and no pruritus.

## 2021-10-05 ENCOUNTER — RX RENEWAL (OUTPATIENT)
Age: 86
End: 2021-10-05

## 2021-10-29 ENCOUNTER — APPOINTMENT (OUTPATIENT)
Dept: INTERNAL MEDICINE | Facility: CLINIC | Age: 86
End: 2021-10-29
Payer: MEDICARE

## 2021-10-29 VITALS
HEART RATE: 72 BPM | BODY MASS INDEX: 17.89 KG/M2 | SYSTOLIC BLOOD PRESSURE: 100 MMHG | HEIGHT: 63 IN | OXYGEN SATURATION: 97 % | WEIGHT: 101 LBS | DIASTOLIC BLOOD PRESSURE: 50 MMHG | RESPIRATION RATE: 16 BRPM | TEMPERATURE: 97.8 F

## 2021-10-29 DIAGNOSIS — N18.9 CHRONIC KIDNEY DISEASE, UNSPECIFIED: ICD-10-CM

## 2021-10-29 DIAGNOSIS — Z79.899 OTHER LONG TERM (CURRENT) DRUG THERAPY: ICD-10-CM

## 2021-10-29 DIAGNOSIS — D63.1 CHRONIC KIDNEY DISEASE, UNSPECIFIED: ICD-10-CM

## 2021-10-29 DIAGNOSIS — I10 ESSENTIAL (PRIMARY) HYPERTENSION: ICD-10-CM

## 2021-10-29 PROCEDURE — 99214 OFFICE O/P EST MOD 30 MIN: CPT

## 2021-10-29 RX ORDER — POTASSIUM GLUCONATE 595(99)MG
TABLET ORAL
Refills: 0 | Status: DISCONTINUED | COMMUNITY
End: 2021-10-29

## 2021-10-29 RX ORDER — ZOLPIDEM TARTRATE 5 MG/1
5 TABLET ORAL
Qty: 30 | Refills: 5 | Status: DISCONTINUED | COMMUNITY
Start: 2021-05-06 | End: 2021-10-29

## 2021-10-29 NOTE — PHYSICAL EXAM
[General Appearance - Alert] : alert [General Appearance - In No Acute Distress] : in no acute distress [Sclera] : the sclera and conjunctiva were normal [PERRL With Normal Accommodation] : pupils were equal in size, round, and reactive to light [Extraocular Movements] : extraocular movements were intact [Outer Ear] : the ears and nose were normal in appearance [Oropharynx] : the oropharynx was normal [Neck Appearance] : the appearance of the neck was normal [Neck Cervical Mass (___cm)] : no neck mass was observed [Jugular Venous Distention Increased] : there was no jugular-venous distention [Thyroid Diffuse Enlargement] : the thyroid was not enlarged [Thyroid Nodule] : there were no palpable thyroid nodules [Auscultation Breath Sounds / Voice Sounds] : lungs were clear to auscultation bilaterally [Apical Impulse] : the apical impulse was normal [Heart Rate And Rhythm] : heart rate was normal and rhythm regular [Heart Sounds] : normal S1 and S2 [FreeTextEntry1] : Carotids are 1+ bilaterally. There is trace edema bilaterally [Bowel Sounds] : normal bowel sounds [Abdomen Soft] : soft [Abdomen Tenderness] : non-tender [] : no hepato-splenomegaly [Abdomen Mass (___ Cm)] : no abdominal mass palpated [Cervical Lymph Nodes Enlarged Posterior Bilaterally] : posterior cervical [Cervical Lymph Nodes Enlarged Anterior Bilaterally] : anterior cervical [Supraclavicular Lymph Nodes Enlarged Bilaterally] : supraclavicular [Nail Clubbing] : no clubbing  or cyanosis of the fingernails

## 2021-10-29 NOTE — PLAN
[FreeTextEntry1] : 1. Continue with medication as outlined above.\par \par 2. Continue with close cardiology followup.\par \par 3. The patient will return within the next several weeks for full pulmonary function testing. Of note is the fact that the patient is on amiodarone, so we will need to monitor for toxicity.\par \par 4. I have offered the patient a flu shot today, but she is refusing. She would like to wait one to 2 more weeks since she recently obtained of the vaccination for the corona virus.\par \par 5. Follow up with myself in 6 months with a wellness evaluation. She'll undergo yearly routine blood work via a home draw, prior to that visit.

## 2021-10-29 NOTE — HISTORY OF PRESENT ILLNESS
[FreeTextEntry1] : The patient comes in today for a routine followup evaluation.\par  [de-identified] : The patient states, that she has been doing well at this time. She notes that her breathing has been stable she denies shortness of breath with exertion. She does not have any edema. She was recently seen by her cardiologist, Dr. Marrero, and he told her that everything was "fine."\par \par The patient has gotten the booster for the corona virus. She takes Lasix only on an as-needed basis. There has not been any lower extremity edema recently. She denies any chest pains. No cough or sputum production. There has been no issues with her bowels. She now comes in for this assessment.

## 2021-11-06 ENCOUNTER — TRANSCRIPTION ENCOUNTER (OUTPATIENT)
Age: 86
End: 2021-11-06

## 2021-11-10 ENCOUNTER — APPOINTMENT (OUTPATIENT)
Dept: INTERNAL MEDICINE | Facility: CLINIC | Age: 86
End: 2021-11-10
Payer: MEDICARE

## 2021-11-10 VITALS
OXYGEN SATURATION: 97 % | HEIGHT: 60 IN | TEMPERATURE: 98.8 F | WEIGHT: 100 LBS | DIASTOLIC BLOOD PRESSURE: 60 MMHG | RESPIRATION RATE: 18 BRPM | SYSTOLIC BLOOD PRESSURE: 118 MMHG | HEART RATE: 99 BPM | BODY MASS INDEX: 19.63 KG/M2

## 2021-11-10 PROCEDURE — 94010 BREATHING CAPACITY TEST: CPT

## 2021-11-10 PROCEDURE — 94729 DIFFUSING CAPACITY: CPT

## 2021-11-10 PROCEDURE — ZZZZZ: CPT

## 2021-11-10 PROCEDURE — 94727 GAS DIL/WSHOT DETER LNG VOL: CPT

## 2021-11-12 ENCOUNTER — NON-APPOINTMENT (OUTPATIENT)
Age: 86
End: 2021-11-12

## 2021-12-03 NOTE — PROGRESS NOTE ADULT - PROBLEM/PLAN-4
[facility] H&P/Consult:  Chief Complaint: Back pain  Referring Physician: Dr. Caraballo  PCP: Alfonso Quintero MD    IMPRESSION:  -- C3-T5 fusion (Rashmi 12/3/2021)  -- Acute post op pain   -- Laryngeal carcinoma    -- Diffuse metastatic disease with pulmonary nodules, L3 lytic lesion, T1 met, and left psoas muscle met  -- Palliative radiation and chemo  -- S/p total laryngectomy (05/2020)  -- Hypothyroidism   -- COPD      ASSESSMENT/PLAN BY SYSTEM:  Neuro: S/p C3-T5 fusion due to T1 pathologic fx with concern for cord compression. Neurovascular intact post procedure. Acute post op pain.   -- Cervical collar at all times  -- Neuro vascular checks  -- SBP <160  -- Decadron 4mg Q6H  -- Pain management   -- Norco, morphine   -- PT/OT in AM  -- Palliative care following, appreciate input      Pulmonary: Hx of total laryngectomy.   -- Extubated upon arrival.   -- No acute issues.    CV/Renal: SR, normotensive. BMP WNL.   -- SBP <160  -- IVF post op  -- Gates     GI: No acute issues. Last BM 12/2  -- AAT    Endocrine: WNL    Heme: CBC WNL.  -- CBC daily  -- Transfuse to keep hgb > 7  -- Oncology and radiation oncology following. Undergoing palliative radiation     ID: No concern for infection.     Disposition: Maintain in ICU    BEST PRACTICES:  - VTE prophylaxis: SCDs  - SUP: protonix  - Glycemic control: NA  - LDA: PIV , folety   - Nutrition: AAT  - Therapy/mobilization: PT/OT/Speech    ================================================================  HPI: Kurt Valdes is a 69 year old male with metastatic SCC s/p total laryngectomy who presented to [facility] on 11/29 with arm weakness and severe back pain that started 3 days prior. CT c-spine showed T1 destructive changes with cord compression. The pt was transferred to [facility] for evaluation. Pt underwent C3-T5 fusion for stabilization.          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     • COPD (chronic obstructive pulmonary disease) (CMS/HCC)    • Dysphagia    • Hypothyroid    • Larynx cancer (CMS/HCC)    • Mixed hyperlipidemia    • Nonverbal    • Stroke (CMS/HCC)    • Tobacco abuse    • Tracheostomy present (CMS/HCC)    • Unable to use verbal communication     pt has a tracheostomy   • Urinary retention       Past Surgical History:   Procedure Laterality Date   • Cystoscopy  12/07/2016   • Dental surgery  06/04/2020    FULL EDENTULATION WITH AVEOLOPLASTY AND ANDREI REDUCTION   • Egd  06/05/2018   • Egd  05/26/2016    EGD and COLONOSCOPY   • Modified radical neck dissection Right 05/29/2020   • Thyroidectomy  05/29/2020    thyroidectomy with central neck dissection   • Total laryngectomy  05/29/2020   • Tracheostomy tube placement  05/20/2020     Medications Prior to Admission   Medication Sig Dispense Refill   • HYDROcodone-acetaminophen (NORCO) 5-325 MG per tablet Take 1 tablet by mouth every 6 hours as needed for Pain. 56 tablet 0   • diclofenac (VOLTAREN) 50 MG EC tablet Take 1 tablet by mouth 2 times daily. 60 tablet 0   • levothyroxine 175 MCG tablet Take 1 tablet by mouth daily (before breakfast). Do not start before November 13, 2021. 30 tablet 0   • lidocaine (LIDOCARE) 4 % patch Place 1 patch onto the skin daily. 14 patch 1   • acetaminophen (TYLENOL) 325 MG tablet Take 2 tablets by mouth 3 times daily as needed for Pain. 60 tablet 0   • albuterol 108 (90 Base) MCG/ACT inhaler Inhale 2 puffs into the lungs every 4 hours as needed for Shortness of Breath or Wheezing. Administer through stoma 18 g 0   • aspirin 81 MG EC tablet Take 1 tablet by mouth daily. 90 tablet 0   • pantoprazole (PROTONIX) 40 MG tablet TAKE 1 TABLET BY MOUTH DAILY 90 tablet 0     ALLERGIES:  No Known Allergies   Social History     Tobacco Use   • Smoking status: Former Smoker     Packs/day: 0.25   •  Smokeless tobacco: Never Used   • Tobacco comment: Patient quit smoking 5/28/2020   Substance Use Topics   • Alcohol use: Yes     Comment: 3 drinks every day       Family History   Problem Relation Age of Onset   • Patient is unaware of any medical problems Mother    • Patient is unaware of any medical problems Father         ROS: 12 point ROS was completed and is negative except for those noted in the HPI.  ================================================================    I/O last 3 completed shifts:  In: -   Out: 300 [Urine:300]  I/O this shift:  In: 4000 [I.V.:4000]  Out: 250 [Urine:250]    Vital Last Value 24 Hour Range   Temperature 98.1 °F (36.7 °C) (12/03/21 0443) Temp  Min: 98.1 °F (36.7 °C)  Max: 98.4 °F (36.9 °C)   Pulse 76 (12/03/21 0830) Pulse  Min: 71  Max: 78   Respiratory 16 (12/03/21 0830) Resp  Min: 16  Max: 18   Non-Invasive  Blood Pressure (!) 144/72 (12/03/21 0443) BP  Min: 144/72  Max: 148/78   Pulse Oximetry 96 % (12/03/21 0443) SpO2  Min: 96 %  Max: 100 %   Arterial   Blood Pressure   No data recorded      Telemetry:SR    Physical Exam:  General: cooperative, no distress, appears stated age   Neuro: Awake, alert and oriented x3- responds with nodding. Usually communicates with writing. CROWDER strong and equal. No facial droop. Pupils equal, reactive.   Head: Normocephalic, without obvious abnormality, atraumatic   ENT: Trachea midline. Mucous membranes dry.   Lungs: Clear to auscultation bilaterally, unlabored. Total laryngectomy- stoma  Chest wall: No tenderness or deformity   Heart: Regular rate and rhythm, S1 S2 normal, no MRG  Abdomen: Soft, non-tender, bowel sounds hypoactive  Extremities: normal, atraumatic, no cyanosis or edema    Pulses: 2+ and symmetric all extremities    Skin: color, texture, turgor normal, no rashes or lesions. Did not visualize back dressing, Reji x 2 in place.       Pertinent Reviewed: Allergies, Medications, Labs, Imaging and Physician and Nursing Notes    Critical  care first hour, 40min    [facility]   DISPLAY PLAN FREE TEXT

## 2021-12-15 ENCOUNTER — APPOINTMENT (OUTPATIENT)
Dept: INTERNAL MEDICINE | Facility: CLINIC | Age: 86
End: 2021-12-15
Payer: MEDICARE

## 2021-12-15 VITALS
OXYGEN SATURATION: 99 % | HEIGHT: 60 IN | BODY MASS INDEX: 20.03 KG/M2 | HEART RATE: 80 BPM | SYSTOLIC BLOOD PRESSURE: 120 MMHG | TEMPERATURE: 96.7 F | WEIGHT: 102 LBS | RESPIRATION RATE: 16 BRPM | DIASTOLIC BLOOD PRESSURE: 62 MMHG

## 2021-12-15 DIAGNOSIS — R22.2 LOCALIZED SWELLING, MASS AND LUMP, TRUNK: ICD-10-CM

## 2021-12-15 PROCEDURE — 99213 OFFICE O/P EST LOW 20 MIN: CPT

## 2021-12-15 NOTE — PHYSICAL EXAM
[Normal] : no acute distress, well nourished, well developed and well-appearing [Clear to Auscultation] : lungs were clear to auscultation bilaterally [Normal Rate] : normal rate  [Normal S1, S2] : normal S1 and S2 [Soft] : abdomen soft [Non Tender] : non-tender [Normal Bowel Sounds] : normal bowel sounds [de-identified] : pea sized mobile non tender nodule above navel.

## 2021-12-15 NOTE — HISTORY OF PRESENT ILLNESS
[FreeTextEntry1] : bump on abdomen [de-identified] : NOticed small lump near naval few weeks ago.  Was applying cream to area and felt it.  No pain or tenderness.   none

## 2022-01-05 ENCOUNTER — APPOINTMENT (OUTPATIENT)
Dept: HEMATOLOGY ONCOLOGY | Facility: CLINIC | Age: 87
End: 2022-01-05

## 2022-01-07 LAB
BASOPHILS # BLD AUTO: 0.09 K/UL
BASOPHILS NFR BLD AUTO: 1.4 %
EOSINOPHIL # BLD AUTO: 0.08 K/UL
EOSINOPHIL NFR BLD AUTO: 1.2 %
ERYTHROCYTE [SEDIMENTATION RATE] IN BLOOD BY WESTERGREN METHOD: 80 MM/HR
FERRITIN SERPL-MCNC: 181 NG/ML
HCT VFR BLD CALC: 38.6 %
HGB BLD-MCNC: 12.1 G/DL
IMM GRANULOCYTES NFR BLD AUTO: 0.3 %
IRON SATN MFR SERPL: 18 %
IRON SERPL-MCNC: 46 UG/DL
LYMPHOCYTES # BLD AUTO: 1.46 K/UL
LYMPHOCYTES NFR BLD AUTO: 22.6 %
MAN DIFF?: NORMAL
MCHC RBC-ENTMCNC: 31.3 GM/DL
MCHC RBC-ENTMCNC: 34.4 PG
MCV RBC AUTO: 109.7 FL
MONOCYTES # BLD AUTO: 0.78 K/UL
MONOCYTES NFR BLD AUTO: 12.1 %
NEUTROPHILS # BLD AUTO: 4.02 K/UL
NEUTROPHILS NFR BLD AUTO: 62.4 %
PLATELET # BLD AUTO: 231 K/UL
RBC # BLD: 3.52 M/UL
RBC # FLD: 13.9 %
TIBC SERPL-MCNC: 256 UG/DL
UIBC SERPL-MCNC: 210 UG/DL
WBC # FLD AUTO: 6.45 K/UL

## 2022-01-10 ENCOUNTER — RX RENEWAL (OUTPATIENT)
Age: 87
End: 2022-01-10

## 2022-03-04 ENCOUNTER — RX RENEWAL (OUTPATIENT)
Age: 87
End: 2022-03-04

## 2022-05-05 ENCOUNTER — APPOINTMENT (OUTPATIENT)
Dept: INTERNAL MEDICINE | Facility: CLINIC | Age: 87
End: 2022-05-05
Payer: MEDICARE

## 2022-05-05 VITALS
WEIGHT: 94.3 LBS | TEMPERATURE: 97.8 F | SYSTOLIC BLOOD PRESSURE: 90 MMHG | RESPIRATION RATE: 16 BRPM | DIASTOLIC BLOOD PRESSURE: 52 MMHG | HEIGHT: 60 IN | BODY MASS INDEX: 18.51 KG/M2 | OXYGEN SATURATION: 97 % | HEART RATE: 74 BPM

## 2022-05-05 PROCEDURE — G0439: CPT

## 2022-05-05 RX ORDER — FUROSEMIDE 20 MG/1
20 TABLET ORAL
Refills: 0 | Status: DISCONTINUED | COMMUNITY
Start: 2017-10-30 | End: 2022-05-05

## 2022-05-05 NOTE — REVIEW OF SYSTEMS
Called patient and scheduled consult with Dr Whit Canales. Patient states she was recently admitted for Pneumonia and she seen Jaime Reynolds on Friday and he told her everything was ok. So she was under impression that she did need to see him. Advised her I spoke with Jaime Reynolds and he still would like for patient to have consult with Derick Garcia.  Patient voiced understanding and states it will have to be middle of May so scheduled for May 22nd at 3:30 pm
[Negative] : Heme/Lymph

## 2022-05-05 NOTE — PLAN
[FreeTextEntry1] : 1.  Continue with medical regimen as outlined above.\par \par 2.  I will speak with her cardiologist, Dr. Marrero regarding the recent increase in the dosage of the Entresto which is now at 49/51 mg twice daily.  Of note is her relative hypotension.  She is asymptomatic however.\par \par 3.  Will maintain the same dosage of levothyroxine at present.\par \par 4.  Follow-up with myself in 6 months with full pulmonary function testing.\par \par Addendum: I have discussed the above noted issue with Dr. Marrero.  He states that he will contact the patient and make a decision as to whether to maintain the same dose of Entresto at 49/51 mg twice daily, or to cut back to the previous dose of 24/26 mg twice daily.

## 2022-05-05 NOTE — HISTORY OF PRESENT ILLNESS
[FreeTextEntry1] : The patient comes in for a yearly wellness evaluation\par  [de-identified] : The patient states, that she is doing well at this time.  She remains compliant with her medical regimen as prescribed.  Her appetite has been good and her weight has been stable.  She denies having any shortness of breath or significant shortness of breath with exertion.\par \par The patient was recently seen by her cardiologist earlier this week.  Her Entresto was increased from 24-26 mg twice daily, up to 49/51 mg twice daily.  She states that she is not having any issues with the increased dosage of medication although it only has been 2 to 3 days.  She denies any lightheadedness or dizziness.  She no longer takes furosemide.\par \par The patient remains fairly active.  She still lives independently.  There are no acute constitutional symptoms at present.  She now comes in for this assessment.

## 2022-05-05 NOTE — PHYSICAL EXAM
[No Acute Distress] : no acute distress [Well Nourished] : well nourished [Well Developed] : well developed [Ill-Appearing] : ill-appearing [Normal Sclera/Conjunctiva] : normal sclera/conjunctiva [PERRL] : pupils equal round and reactive to light [EOMI] : extraocular movements intact [Normal Outer Ear/Nose] : the outer ears and nose were normal in appearance [Normal Oropharynx] : the oropharynx was normal [Normal TMs] : both tympanic membranes were normal [No JVD] : no jugular venous distention [Supple] : supple [No Lymphadenopathy] : no lymphadenopathy [Thyroid Normal, No Nodules] : the thyroid was normal and there were no nodules present [No Respiratory Distress] : no respiratory distress  [Clear to Auscultation] : lungs were clear to auscultation bilaterally [No Accessory Muscle Use] : no accessory muscle use [Normal Rate] : normal rate  [Normal S1, S2] : normal S1 and S2 [No Carotid Bruits] : no carotid bruits [Pedal Pulses Present] : the pedal pulses are present [No Edema] : there was no peripheral edema [No Extremity Clubbing/Cyanosis] : no extremity clubbing/cyanosis [Soft] : abdomen soft [Non Tender] : non-tender [Normal Supraclavicular Nodes] : no supraclavicular lymphadenopathy [Normal Anterior Cervical Nodes] : no anterior cervical lymphadenopathy [No CVA Tenderness] : no CVA  tenderness [Kyphosis] : kyphosis [No Joint Swelling] : no joint swelling [No Rash] : no rash [Normal Gait] : normal gait [No Focal Deficits] : no focal deficits [Speech Grossly Normal] : speech grossly normal [Memory Grossly Normal] : memory grossly normal [Normal Affect] : the affect was normal [Alert and Oriented x3] : oriented to person, place, and time [Normal Mood] : the mood was normal [de-identified] : thin [de-identified] : bilateral IOL [de-identified] : there is a 1/6 KATERYNA

## 2022-05-06 ENCOUNTER — OUTPATIENT (OUTPATIENT)
Dept: OUTPATIENT SERVICES | Facility: HOSPITAL | Age: 87
LOS: 1 days | Discharge: HOME | End: 2022-05-06

## 2022-05-06 DIAGNOSIS — D47.2 MONOCLONAL GAMMOPATHY: ICD-10-CM

## 2022-05-06 DIAGNOSIS — Z98.890 OTHER SPECIFIED POSTPROCEDURAL STATES: Chronic | ICD-10-CM

## 2022-05-06 DIAGNOSIS — Z98.49 CATARACT EXTRACTION STATUS, UNSPECIFIED EYE: Chronic | ICD-10-CM

## 2022-05-06 DIAGNOSIS — Z95.810 PRESENCE OF AUTOMATIC (IMPLANTABLE) CARDIAC DEFIBRILLATOR: Chronic | ICD-10-CM

## 2022-05-06 DIAGNOSIS — Z95.0 PRESENCE OF CARDIAC PACEMAKER: Chronic | ICD-10-CM

## 2022-05-06 LAB
ALBUMIN SERPL ELPH-MCNC: 4 G/DL
ALP BLD-CCNC: 61 U/L
ALT SERPL-CCNC: 11 U/L
ANION GAP SERPL CALC-SCNC: 14 MMOL/L
AST SERPL-CCNC: 22 U/L
BILIRUB SERPL-MCNC: 0.4 MG/DL
BUN SERPL-MCNC: 30 MG/DL
CALCIUM SERPL-MCNC: 9.4 MG/DL
CHLORIDE SERPL-SCNC: 100 MMOL/L
CO2 SERPL-SCNC: 24 MMOL/L
CREAT SERPL-MCNC: 2.12 MG/DL
EGFR: 22 ML/MIN/1.73M2
GLUCOSE SERPL-MCNC: 110 MG/DL
POTASSIUM SERPL-SCNC: 4 MMOL/L
PROT SERPL-MCNC: 7.4 G/DL
SODIUM SERPL-SCNC: 138 MMOL/L

## 2022-05-09 ENCOUNTER — APPOINTMENT (OUTPATIENT)
Dept: HEMATOLOGY ONCOLOGY | Facility: CLINIC | Age: 87
End: 2022-05-09

## 2022-05-09 RX ORDER — SACUBITRIL AND VALSARTAN 24; 26 MG/1; MG/1
24-26 TABLET, FILM COATED ORAL TWICE DAILY
Qty: 180 | Refills: 0 | Status: ACTIVE | COMMUNITY

## 2022-05-13 LAB
PROTHROMBIN TIME AND INR, PLASMA: 3.44
PT BLD: 40.4

## 2022-06-08 NOTE — PROGRESS NOTE ADULT - PROBLEM/PLAN-1
Dose of sertraline increased on 6/1/22 from 25mg to 50mg. Pharmacy did not receive the 50mg increase. Resent to pharmacy.   
Patient called and is wondering why her medication was refused and would and like a call back. Thanks  
DISPLAY PLAN FREE TEXT
DISPLAY PLAN FREE TEXT

## 2022-06-13 NOTE — H&P ADULT - NO PERTINENT FAMILY HISTORY
Last Script 05/14/2022  #30, NR    Next Appt  With Family Medicine (Nicolette Campoverde MD)  08/05/2022 at 3:00 PM    Last OV 05/02/2022  
<<----- Click to add NO pertinent Family History

## 2022-06-14 ENCOUNTER — OUTPATIENT (OUTPATIENT)
Dept: OUTPATIENT SERVICES | Facility: HOSPITAL | Age: 87
LOS: 1 days | Discharge: HOME | End: 2022-06-14

## 2022-06-14 DIAGNOSIS — Z95.0 PRESENCE OF CARDIAC PACEMAKER: Chronic | ICD-10-CM

## 2022-06-14 DIAGNOSIS — D47.2 MONOCLONAL GAMMOPATHY: ICD-10-CM

## 2022-06-14 DIAGNOSIS — Z98.890 OTHER SPECIFIED POSTPROCEDURAL STATES: Chronic | ICD-10-CM

## 2022-06-14 DIAGNOSIS — Z98.49 CATARACT EXTRACTION STATUS, UNSPECIFIED EYE: Chronic | ICD-10-CM

## 2022-06-14 DIAGNOSIS — Z95.810 PRESENCE OF AUTOMATIC (IMPLANTABLE) CARDIAC DEFIBRILLATOR: Chronic | ICD-10-CM

## 2022-06-15 ENCOUNTER — APPOINTMENT (OUTPATIENT)
Dept: HEMATOLOGY ONCOLOGY | Facility: CLINIC | Age: 87
End: 2022-06-15

## 2022-06-15 ENCOUNTER — RESULT REVIEW (OUTPATIENT)
Age: 87
End: 2022-06-15

## 2022-06-15 LAB
BASOPHILS # BLD AUTO: 0.09 K/UL — SIGNIFICANT CHANGE UP (ref 0–0.2)
BASOPHILS NFR BLD AUTO: 1.9 % — SIGNIFICANT CHANGE UP (ref 0–2)
EOSINOPHIL # BLD AUTO: 0.12 K/UL — SIGNIFICANT CHANGE UP (ref 0–0.5)
EOSINOPHIL NFR BLD AUTO: 2.6 % — SIGNIFICANT CHANGE UP (ref 0–6)
FERRITIN SERPL-MCNC: 151 NG/ML — HIGH (ref 15–150)
HCT VFR BLD CALC: 37.7 % — SIGNIFICANT CHANGE UP (ref 34.5–45)
HGB BLD-MCNC: 12.2 G/DL — SIGNIFICANT CHANGE UP (ref 11.5–15.5)
IMM GRANULOCYTES NFR BLD AUTO: 0.2 % — SIGNIFICANT CHANGE UP (ref 0–1.5)
IRON SATN MFR SERPL: 19 % — SIGNIFICANT CHANGE UP (ref 14–50)
IRON SATN MFR SERPL: 48 UG/DL — SIGNIFICANT CHANGE UP (ref 30–160)
LYMPHOCYTES # BLD AUTO: 0.9 K/UL — LOW (ref 1–3.3)
LYMPHOCYTES # BLD AUTO: 19.2 % — SIGNIFICANT CHANGE UP (ref 13–44)
MCHC RBC-ENTMCNC: 32.4 GM/DL — SIGNIFICANT CHANGE UP (ref 32–36)
MCHC RBC-ENTMCNC: 33.5 PG — SIGNIFICANT CHANGE UP (ref 27–34)
MCV RBC AUTO: 103.6 FL — HIGH (ref 80–100)
MONOCYTES # BLD AUTO: 0.58 K/UL — SIGNIFICANT CHANGE UP (ref 0–0.9)
MONOCYTES NFR BLD AUTO: 12.4 % — SIGNIFICANT CHANGE UP (ref 2–14)
NEUTROPHILS # BLD AUTO: 2.99 K/UL — SIGNIFICANT CHANGE UP (ref 1.8–7.4)
NEUTROPHILS NFR BLD AUTO: 63.7 % — SIGNIFICANT CHANGE UP (ref 43–77)
NRBC # BLD: 0 /100 WBCS — SIGNIFICANT CHANGE UP (ref 0–0)
PLATELET # BLD AUTO: 216 K/UL — SIGNIFICANT CHANGE UP (ref 150–400)
PROT SERPL-MCNC: 7.3 G/DL — SIGNIFICANT CHANGE UP (ref 6–8.3)
PROT SERPL-MCNC: 7.3 G/DL — SIGNIFICANT CHANGE UP (ref 6–8.3)
RBC # BLD: 3.64 M/UL — LOW (ref 3.8–5.2)
RBC # FLD: 14.2 % — SIGNIFICANT CHANGE UP (ref 10.3–14.5)
TIBC SERPL-MCNC: 257 UG/DL — SIGNIFICANT CHANGE UP (ref 220–430)
UIBC SERPL-MCNC: 209 UG/DL — SIGNIFICANT CHANGE UP (ref 110–370)
WBC # BLD: 4.69 K/UL — SIGNIFICANT CHANGE UP (ref 3.8–10.5)
WBC # FLD AUTO: 4.69 K/UL — SIGNIFICANT CHANGE UP (ref 3.8–10.5)

## 2022-06-16 LAB
ERYTHROCYTE [SEDIMENTATION RATE] IN BLOOD: 93 MM/HR — HIGH (ref 0–20)
IGA FLD-MCNC: 683 MG/DL — HIGH (ref 84–499)
IGG FLD-MCNC: 1528 MG/DL — SIGNIFICANT CHANGE UP (ref 610–1660)
IGM SERPL-MCNC: 14 MG/DL — LOW (ref 35–242)
KAPPA LC SER QL IFE: 5.49 MG/DL — HIGH (ref 0.33–1.94)
KAPPA LC SER QL IFE: 5.49 MG/DL — HIGH (ref 0.33–1.94)
KAPPA/LAMBDA FREE LIGHT CHAIN RATIO, SERUM: 0.82 RATIO — SIGNIFICANT CHANGE UP (ref 0.26–1.65)
KAPPA/LAMBDA FREE LIGHT CHAIN RATIO, SERUM: 0.82 RATIO — SIGNIFICANT CHANGE UP (ref 0.26–1.65)
LAMBDA LC SER QL IFE: 6.69 MG/DL — HIGH (ref 0.57–2.63)
LAMBDA LC SER QL IFE: 6.69 MG/DL — HIGH (ref 0.57–2.63)

## 2022-06-17 LAB
% ALBUMIN: 46.8 % — SIGNIFICANT CHANGE UP
% ALPHA 1: 5.2 % — SIGNIFICANT CHANGE UP
% ALPHA 2: 11.3 % — SIGNIFICANT CHANGE UP
% BETA: 17.4 % — SIGNIFICANT CHANGE UP
% GAMMA: 19.3 % — SIGNIFICANT CHANGE UP
% M SPIKE: 6.2 % — SIGNIFICANT CHANGE UP
ALBUMIN SERPL ELPH-MCNC: 3.4 G/DL — LOW (ref 3.6–5.5)
ALBUMIN/GLOB SERPL ELPH: 0.9 RATIO — SIGNIFICANT CHANGE UP
ALPHA1 GLOB SERPL ELPH-MCNC: 0.4 G/DL — SIGNIFICANT CHANGE UP (ref 0.1–0.4)
ALPHA2 GLOB SERPL ELPH-MCNC: 0.8 G/DL — SIGNIFICANT CHANGE UP (ref 0.5–1)
B-GLOBULIN SERPL ELPH-MCNC: 1.3 G/DL — HIGH (ref 0.5–1)
GAMMA GLOBULIN: 1.4 G/DL — SIGNIFICANT CHANGE UP (ref 0.6–1.6)
INTERPRETATION SERPL IFE-IMP: SIGNIFICANT CHANGE UP
M-SPIKE: 0.5 G/DL — HIGH (ref 0–0)
PROT PATTERN SERPL ELPH-IMP: SIGNIFICANT CHANGE UP

## 2022-06-22 ENCOUNTER — APPOINTMENT (OUTPATIENT)
Dept: HEMATOLOGY ONCOLOGY | Facility: CLINIC | Age: 87
End: 2022-06-22
Payer: MEDICARE

## 2022-06-22 VITALS
DIASTOLIC BLOOD PRESSURE: 67 MMHG | TEMPERATURE: 98.9 F | RESPIRATION RATE: 17 BRPM | HEART RATE: 88 BPM | OXYGEN SATURATION: 99 % | WEIGHT: 95 LBS | BODY MASS INDEX: 18.55 KG/M2 | SYSTOLIC BLOOD PRESSURE: 107 MMHG

## 2022-06-22 DIAGNOSIS — Z79.01 LONG TERM (CURRENT) USE OF ANTICOAGULANTS: ICD-10-CM

## 2022-06-22 DIAGNOSIS — Z92.29 PERSONAL HISTORY OF OTHER DRUG THERAPY: ICD-10-CM

## 2022-06-22 DIAGNOSIS — D64.89 OTHER SPECIFIED ANEMIAS: ICD-10-CM

## 2022-06-22 DIAGNOSIS — D47.2 MONOCLONAL GAMMOPATHY: ICD-10-CM

## 2022-06-22 DIAGNOSIS — D50.9 IRON DEFICIENCY ANEMIA, UNSPECIFIED: ICD-10-CM

## 2022-06-22 PROCEDURE — 99213 OFFICE O/P EST LOW 20 MIN: CPT

## 2022-06-24 DIAGNOSIS — D50.9 IRON DEFICIENCY ANEMIA, UNSPECIFIED: ICD-10-CM

## 2022-06-24 DIAGNOSIS — N18.30 CHRONIC KIDNEY DISEASE, STAGE 3 UNSPECIFIED: ICD-10-CM

## 2022-06-24 DIAGNOSIS — Z79.01 LONG TERM (CURRENT) USE OF ANTICOAGULANTS: ICD-10-CM

## 2022-06-24 DIAGNOSIS — D64.89 OTHER SPECIFIED ANEMIAS: ICD-10-CM

## 2022-06-24 PROBLEM — Z92.29 COVID-19 VACCINE SERIES COMPLETED: Status: RESOLVED | Noted: 2022-06-24 | Resolved: 2022-06-24

## 2022-06-24 NOTE — REVIEW OF SYSTEMS
[Patient Intake Form Reviewed] : Patient intake form was reviewed [Easy Bruising] : a tendency for easy bruising [Negative] : Allergic/Immunologic [Loss of Hearing] : loss of hearing [FreeTextEntry5] : occasional LE edema [FreeTextEntry6] : occasional SOB on exertion [FreeTextEntry7] : o

## 2022-06-24 NOTE — PHYSICAL EXAM
[Thin] : thin [Normal] : affect appropriate [de-identified] : anicteric [de-identified] : no thrush or mucositis [de-identified] : no lymphadenopathy [de-identified] : S1S2 RRR, no obvious murmurs [de-identified] : no rashes

## 2022-06-24 NOTE — ASSESSMENT
[FreeTextEntry1] : Patient is an 87 y.o. with a hx of IBS/celiac sprue, COPD, CAD and arrhythmias, who we are following for  a multifactorial anemia and MGUS.  \par 1. Anemia - Her anemia is multifactorial with a component of iron deficiency likely due to occult blood loss  (IBS, ASA/Coumadin), CRI, and malabsorption (IBS/celiac).  In addition she has an elevated ESR and inflammation blunts the erythropoietic response.    \par Colonoscopy 1/17/19 was unremarkable per patient.  \par Seems to need IV iron ~ Q3 years.  \par Will monitor. \par  \par 2. MGUS - Labs have been stable.  Abnormalities now of no clinical significance. Can recheck ~ yearly.  \par \par Lenny Dickey - PMD\par Jose Marrero (Cardiology)\par Pam (Neuorology)\par Jose (electrophysiology)\par Augustine Ryan (GI)

## 2022-06-24 NOTE — RESULTS/DATA
[FreeTextEntry1] : Labs 5/15/22:\par WBC: 4.69   Hgb: 12.2,  Hct: 37.7,  MCV: 103.6,  Plts: 216\par Ferritin: 151,  TSAT: 19%,  ESR: 93\par SPEP: M-spike 0.5,  IgA: 683, Ig,  IgM: 14,  K/L FLC Ratio: Normal\par SIFE: IgA Lambda band detected\par \par \par 5/10/22:\par Creat: 1.82

## 2022-06-24 NOTE — HISTORY OF PRESENT ILLNESS
[de-identified] : ANABEL GOFF is an 89 y.o. F with a PMH significant for IBS/celiac sprue, COPD, CAD and arrhythmias, who we are following for a multifactorial anemia and an IgA MGUS.  \par \par 9/3/15 - Labs with Hgb 10, Hct 31.2, MCV 95, TSAT 15%.  She also had an IgA of 1029. \par 11/2015 - seen in our office.  Labs showed a M-spike of 0.4 and a monoclonal IgA.  IgA level down to 778.  Ferritin was 12.8 and her TSAT was 9%.   She was started on PO iron and folic acid.   \par 10/2016 - On follow-up in our office her Ferritin was up to 103 with a TSAT of 31%.   She stated she stopped PO iron in August on advice from her cardiologist (??).  \par She was reluctant to get a colonoscopy, but she did say she would reconsider if the irons did not hold.   \par 4/18/17 - Hgb was 11.4, Ferritin slightly lower but OK at 73, TSAT 21%.  \par 3/8/18 - Hgb 10.7, Ferritin was now 26, TSAT 10% with ESR of 67.  \par Feraheme x 2 - and because of need for iron again need for GI evaluation was more firmly pushed. Her anemia is felt to be multifactorial with a component of CRI (creat cl ~ 41), iron deficiency due to occult blood loss (ASA/Coumadin), and malabsorption (celiac disease).\par Patient ultimately agreed to GI eval, however there were issues with cardiology giving her clearance.   \par On 10/15/18 she had a Cologuard and this was positive.  \par 1/17/19 - Colonoscopy (Dr. Ryan) - there were no polyps and it was unremarkable per patient.    \par \par 7/2021 - Feraheme x 2 [de-identified] : \par She denies any URI's - no recent steroid use. \par She otherwise denies any other changes in her family, medical, or social history since her last visit of 6/22/21.

## 2022-07-05 NOTE — PATIENT PROFILE ADULT. - FUNCTIONAL SCREEN CURRENT LEVEL: DRESSING, MLM
Regarding: wi - fever = 101.7, taken rectally. no other symptoms  ----- Message from Elizabeth Cohen sent at 7/5/2022  5:25 PM CDT -----  Patient Name: Alexei Terrazas    Full Name of Provider seen for current symptoms:vee ly    Symptoms: wi - fever = 101.7, taken rectally. no other symptoms    Pregnant (If Yes, how long?):n/a    Call Back #:[phone]    Call Center Account # for provider seen for current symptoms:[id number]    Which State are you currently located in? (enter State name in Summary field): [place]    Patients needing callback from the RN are informed of the following:   Please be aware the return phone call may come from an unidentified phone number and also keep in mind that call back times vary based on call volumes.  If your condition becomes life threatening while you wait for a callback, you should seek immediate medical assistance by calling 911 or going to the Emergency Department for evaluation.       (2) assistive person

## 2022-07-07 NOTE — ED ADULT NURSE NOTE - EENT ASSESSMENT, MLM
You are seen today for knee pain due to osteoarthritis  We injected both knees with cortisone, hopefully it helps  Will recommend physical therapy  For the left trigger finger, always come back for a cortisone injection if it is not improved
WDL

## 2022-09-03 ENCOUNTER — INPATIENT (INPATIENT)
Facility: HOSPITAL | Age: 87
LOS: 2 days | Discharge: HOME HEALTH | DRG: 177 | End: 2022-09-06
Attending: HOSPITALIST | Admitting: FAMILY MEDICINE
Payer: MEDICARE

## 2022-09-03 VITALS
HEIGHT: 63 IN | TEMPERATURE: 99 F | DIASTOLIC BLOOD PRESSURE: 45 MMHG | OXYGEN SATURATION: 95 % | HEART RATE: 76 BPM | SYSTOLIC BLOOD PRESSURE: 109 MMHG | RESPIRATION RATE: 18 BRPM | WEIGHT: 97 LBS

## 2022-09-03 DIAGNOSIS — Z98.890 OTHER SPECIFIED POSTPROCEDURAL STATES: Chronic | ICD-10-CM

## 2022-09-03 DIAGNOSIS — Z98.49 CATARACT EXTRACTION STATUS, UNSPECIFIED EYE: Chronic | ICD-10-CM

## 2022-09-03 DIAGNOSIS — Z95.0 PRESENCE OF CARDIAC PACEMAKER: Chronic | ICD-10-CM

## 2022-09-03 DIAGNOSIS — Z95.810 PRESENCE OF AUTOMATIC (IMPLANTABLE) CARDIAC DEFIBRILLATOR: Chronic | ICD-10-CM

## 2022-09-03 DIAGNOSIS — J18.9 PNEUMONIA, UNSPECIFIED ORGANISM: ICD-10-CM

## 2022-09-03 LAB
ALBUMIN SERPL ELPH-MCNC: 3 G/DL — LOW (ref 3.3–5)
ALP SERPL-CCNC: 68 U/L — SIGNIFICANT CHANGE UP (ref 40–120)
ALT FLD-CCNC: 21 U/L — SIGNIFICANT CHANGE UP (ref 12–78)
ANION GAP SERPL CALC-SCNC: 8 MMOL/L — SIGNIFICANT CHANGE UP (ref 5–17)
APPEARANCE UR: CLEAR — SIGNIFICANT CHANGE UP
APTT BLD: 34.2 SEC — SIGNIFICANT CHANGE UP (ref 27.5–35.5)
AST SERPL-CCNC: 31 U/L — SIGNIFICANT CHANGE UP (ref 15–37)
BASOPHILS # BLD AUTO: 0 K/UL — SIGNIFICANT CHANGE UP (ref 0–0.2)
BASOPHILS NFR BLD AUTO: 0 % — SIGNIFICANT CHANGE UP (ref 0–2)
BILIRUB SERPL-MCNC: 0.4 MG/DL — SIGNIFICANT CHANGE UP (ref 0.2–1.2)
BILIRUB UR-MCNC: NEGATIVE — SIGNIFICANT CHANGE UP
BUN SERPL-MCNC: 30 MG/DL — HIGH (ref 7–23)
CALCIUM SERPL-MCNC: 8.7 MG/DL — SIGNIFICANT CHANGE UP (ref 8.5–10.1)
CHLORIDE SERPL-SCNC: 102 MMOL/L — SIGNIFICANT CHANGE UP (ref 96–108)
CO2 SERPL-SCNC: 27 MMOL/L — SIGNIFICANT CHANGE UP (ref 22–31)
COLOR SPEC: YELLOW — SIGNIFICANT CHANGE UP
CREAT SERPL-MCNC: 1.72 MG/DL — HIGH (ref 0.5–1.3)
DIFF PNL FLD: ABNORMAL
EGFR: 28 ML/MIN/1.73M2 — LOW
EOSINOPHIL # BLD AUTO: 0 K/UL — SIGNIFICANT CHANGE UP (ref 0–0.5)
EOSINOPHIL NFR BLD AUTO: 0 % — SIGNIFICANT CHANGE UP (ref 0–6)
GLUCOSE SERPL-MCNC: 202 MG/DL — HIGH (ref 70–99)
GLUCOSE UR QL: 1000 MG/DL
HCT VFR BLD CALC: 33 % — LOW (ref 34.5–45)
HGB BLD-MCNC: 11.1 G/DL — LOW (ref 11.5–15.5)
INR BLD: 1.98 RATIO — HIGH (ref 0.88–1.16)
KETONES UR-MCNC: NEGATIVE — SIGNIFICANT CHANGE UP
LACTATE SERPL-SCNC: 2.4 MMOL/L — HIGH (ref 0.7–2)
LEUKOCYTE ESTERASE UR-ACNC: NEGATIVE — SIGNIFICANT CHANGE UP
LYMPHOCYTES # BLD AUTO: 0.37 K/UL — LOW (ref 1–3.3)
LYMPHOCYTES # BLD AUTO: 6 % — LOW (ref 13–44)
MCHC RBC-ENTMCNC: 33.6 GM/DL — SIGNIFICANT CHANGE UP (ref 32–36)
MCHC RBC-ENTMCNC: 34.4 PG — HIGH (ref 27–34)
MCV RBC AUTO: 102.2 FL — HIGH (ref 80–100)
MONOCYTES # BLD AUTO: 0.68 K/UL — SIGNIFICANT CHANGE UP (ref 0–0.9)
MONOCYTES NFR BLD AUTO: 11 % — SIGNIFICANT CHANGE UP (ref 2–14)
NEUTROPHILS # BLD AUTO: 5.08 K/UL — SIGNIFICANT CHANGE UP (ref 1.8–7.4)
NEUTROPHILS NFR BLD AUTO: 81 % — HIGH (ref 43–77)
NITRITE UR-MCNC: NEGATIVE — SIGNIFICANT CHANGE UP
NRBC # BLD: SIGNIFICANT CHANGE UP /100 WBCS (ref 0–0)
PH UR: 5 — SIGNIFICANT CHANGE UP (ref 5–8)
PLATELET # BLD AUTO: 162 K/UL — SIGNIFICANT CHANGE UP (ref 150–400)
POTASSIUM SERPL-MCNC: 3.6 MMOL/L — SIGNIFICANT CHANGE UP (ref 3.5–5.3)
POTASSIUM SERPL-SCNC: 3.6 MMOL/L — SIGNIFICANT CHANGE UP (ref 3.5–5.3)
PROT SERPL-MCNC: 7.6 GM/DL — SIGNIFICANT CHANGE UP (ref 6–8.3)
PROT UR-MCNC: 30 MG/DL
PROTHROM AB SERPL-ACNC: 23.1 SEC — HIGH (ref 10.5–13.4)
RAPID RVP RESULT: DETECTED
RBC # BLD: 3.23 M/UL — LOW (ref 3.8–5.2)
RBC # FLD: 14.1 % — SIGNIFICANT CHANGE UP (ref 10.3–14.5)
SARS-COV-2 RNA SPEC QL NAA+PROBE: DETECTED
SODIUM SERPL-SCNC: 137 MMOL/L — SIGNIFICANT CHANGE UP (ref 135–145)
SP GR SPEC: 1.02 — SIGNIFICANT CHANGE UP (ref 1.01–1.02)
TROPONIN I, HIGH SENSITIVITY RESULT: 57.81 NG/L — HIGH
TROPONIN I, HIGH SENSITIVITY RESULT: 83.52 NG/L — HIGH
UROBILINOGEN FLD QL: 1
WBC # BLD: 6.19 K/UL — SIGNIFICANT CHANGE UP (ref 3.8–10.5)
WBC # FLD AUTO: 6.19 K/UL — SIGNIFICANT CHANGE UP (ref 3.8–10.5)

## 2022-09-03 PROCEDURE — 83880 ASSAY OF NATRIURETIC PEPTIDE: CPT

## 2022-09-03 PROCEDURE — 83735 ASSAY OF MAGNESIUM: CPT

## 2022-09-03 PROCEDURE — 85730 THROMBOPLASTIN TIME PARTIAL: CPT

## 2022-09-03 PROCEDURE — 93010 ELECTROCARDIOGRAM REPORT: CPT

## 2022-09-03 PROCEDURE — 94760 N-INVAS EAR/PLS OXIMETRY 1: CPT

## 2022-09-03 PROCEDURE — 84100 ASSAY OF PHOSPHORUS: CPT

## 2022-09-03 PROCEDURE — 85025 COMPLETE CBC W/AUTO DIFF WBC: CPT

## 2022-09-03 PROCEDURE — 84484 ASSAY OF TROPONIN QUANT: CPT

## 2022-09-03 PROCEDURE — 85027 COMPLETE CBC AUTOMATED: CPT

## 2022-09-03 PROCEDURE — 70450 CT HEAD/BRAIN W/O DYE: CPT | Mod: 26,MA

## 2022-09-03 PROCEDURE — 82550 ASSAY OF CK (CPK): CPT

## 2022-09-03 PROCEDURE — 83605 ASSAY OF LACTIC ACID: CPT

## 2022-09-03 PROCEDURE — 97116 GAIT TRAINING THERAPY: CPT | Mod: GP

## 2022-09-03 PROCEDURE — 99285 EMERGENCY DEPT VISIT HI MDM: CPT

## 2022-09-03 PROCEDURE — 99223 1ST HOSP IP/OBS HIGH 75: CPT

## 2022-09-03 PROCEDURE — 97162 PT EVAL MOD COMPLEX 30 MIN: CPT | Mod: GP

## 2022-09-03 PROCEDURE — 71045 X-RAY EXAM CHEST 1 VIEW: CPT | Mod: 26

## 2022-09-03 PROCEDURE — 85610 PROTHROMBIN TIME: CPT

## 2022-09-03 PROCEDURE — 36415 COLL VENOUS BLD VENIPUNCTURE: CPT

## 2022-09-03 PROCEDURE — 80048 BASIC METABOLIC PNL TOTAL CA: CPT

## 2022-09-03 RX ORDER — ACETAMINOPHEN 500 MG
650 TABLET ORAL EVERY 6 HOURS
Refills: 0 | Status: DISCONTINUED | OUTPATIENT
Start: 2022-09-03 | End: 2022-09-06

## 2022-09-03 RX ORDER — ACETAMINOPHEN 500 MG
650 TABLET ORAL ONCE
Refills: 0 | Status: DISCONTINUED | OUTPATIENT
Start: 2022-09-03 | End: 2022-09-03

## 2022-09-03 RX ORDER — SACUBITRIL AND VALSARTAN 24; 26 MG/1; MG/1
1 TABLET, FILM COATED ORAL
Refills: 0 | Status: DISCONTINUED | OUTPATIENT
Start: 2022-09-03 | End: 2022-09-06

## 2022-09-03 RX ORDER — ONDANSETRON 8 MG/1
4 TABLET, FILM COATED ORAL EVERY 8 HOURS
Refills: 0 | Status: DISCONTINUED | OUTPATIENT
Start: 2022-09-03 | End: 2022-09-06

## 2022-09-03 RX ORDER — ASPIRIN/CALCIUM CARB/MAGNESIUM 324 MG
81 TABLET ORAL DAILY
Refills: 0 | Status: DISCONTINUED | OUTPATIENT
Start: 2022-09-03 | End: 2022-09-06

## 2022-09-03 RX ORDER — ASPIRIN/CALCIUM CARB/MAGNESIUM 324 MG
81 TABLET ORAL DAILY
Refills: 0 | Status: DISCONTINUED | OUTPATIENT
Start: 2022-09-03 | End: 2022-09-05

## 2022-09-03 RX ORDER — CEFTRIAXONE 500 MG/1
1000 INJECTION, POWDER, FOR SOLUTION INTRAMUSCULAR; INTRAVENOUS EVERY 24 HOURS
Refills: 0 | Status: DISCONTINUED | OUTPATIENT
Start: 2022-09-03 | End: 2022-09-06

## 2022-09-03 RX ORDER — ACETAMINOPHEN 500 MG
1000 TABLET ORAL ONCE
Refills: 0 | Status: COMPLETED | OUTPATIENT
Start: 2022-09-03 | End: 2022-09-03

## 2022-09-03 RX ORDER — AZITHROMYCIN 500 MG/1
500 TABLET, FILM COATED ORAL ONCE
Refills: 0 | Status: COMPLETED | OUTPATIENT
Start: 2022-09-03 | End: 2022-09-03

## 2022-09-03 RX ORDER — CEFTRIAXONE 500 MG/1
1000 INJECTION, POWDER, FOR SOLUTION INTRAMUSCULAR; INTRAVENOUS ONCE
Refills: 0 | Status: COMPLETED | OUTPATIENT
Start: 2022-09-03 | End: 2022-09-03

## 2022-09-03 RX ORDER — CHOLECALCIFEROL (VITAMIN D3) 125 MCG
1000 CAPSULE ORAL DAILY
Refills: 0 | Status: DISCONTINUED | OUTPATIENT
Start: 2022-09-03 | End: 2022-09-06

## 2022-09-03 RX ORDER — HEPARIN SODIUM 5000 [USP'U]/ML
5000 INJECTION INTRAVENOUS; SUBCUTANEOUS EVERY 8 HOURS
Refills: 0 | Status: DISCONTINUED | OUTPATIENT
Start: 2022-09-03 | End: 2022-09-03

## 2022-09-03 RX ORDER — WARFARIN SODIUM 2.5 MG/1
1 TABLET ORAL AT BEDTIME
Refills: 0 | Status: DISCONTINUED | OUTPATIENT
Start: 2022-09-03 | End: 2022-09-05

## 2022-09-03 RX ORDER — DIGOXIN 250 MCG
125 TABLET ORAL DAILY
Refills: 0 | Status: DISCONTINUED | OUTPATIENT
Start: 2022-09-03 | End: 2022-09-06

## 2022-09-03 RX ORDER — SODIUM CHLORIDE 9 MG/ML
1000 INJECTION INTRAMUSCULAR; INTRAVENOUS; SUBCUTANEOUS ONCE
Refills: 0 | Status: COMPLETED | OUTPATIENT
Start: 2022-09-03 | End: 2022-09-03

## 2022-09-03 RX ORDER — AMIODARONE HYDROCHLORIDE 400 MG/1
100 TABLET ORAL DAILY
Refills: 0 | Status: DISCONTINUED | OUTPATIENT
Start: 2022-09-03 | End: 2022-09-06

## 2022-09-03 RX ORDER — LANOLIN ALCOHOL/MO/W.PET/CERES
3 CREAM (GRAM) TOPICAL AT BEDTIME
Refills: 0 | Status: DISCONTINUED | OUTPATIENT
Start: 2022-09-03 | End: 2022-09-06

## 2022-09-03 RX ORDER — AZITHROMYCIN 500 MG/1
500 TABLET, FILM COATED ORAL EVERY 24 HOURS
Refills: 0 | Status: DISCONTINUED | OUTPATIENT
Start: 2022-09-03 | End: 2022-09-04

## 2022-09-03 RX ORDER — FUROSEMIDE 40 MG
20 TABLET ORAL DAILY
Refills: 0 | Status: DISCONTINUED | OUTPATIENT
Start: 2022-09-03 | End: 2022-09-03

## 2022-09-03 RX ORDER — LEVOTHYROXINE SODIUM 125 MCG
88 TABLET ORAL DAILY
Refills: 0 | Status: DISCONTINUED | OUTPATIENT
Start: 2022-09-03 | End: 2022-09-06

## 2022-09-03 RX ORDER — ATORVASTATIN CALCIUM 80 MG/1
10 TABLET, FILM COATED ORAL AT BEDTIME
Refills: 0 | Status: DISCONTINUED | OUTPATIENT
Start: 2022-09-03 | End: 2022-09-06

## 2022-09-03 RX ORDER — WARFARIN SODIUM 2.5 MG/1
1 TABLET ORAL AT BEDTIME
Refills: 0 | Status: DISCONTINUED | OUTPATIENT
Start: 2022-09-03 | End: 2022-09-03

## 2022-09-03 RX ADMIN — CEFTRIAXONE 100 MILLIGRAM(S): 500 INJECTION, POWDER, FOR SOLUTION INTRAMUSCULAR; INTRAVENOUS at 18:31

## 2022-09-03 RX ADMIN — SODIUM CHLORIDE 1000 MILLILITER(S): 9 INJECTION INTRAMUSCULAR; INTRAVENOUS; SUBCUTANEOUS at 17:56

## 2022-09-03 RX ADMIN — Medication 1000 MILLIGRAM(S): at 17:57

## 2022-09-03 RX ADMIN — SODIUM CHLORIDE 1000 MILLILITER(S): 9 INJECTION INTRAMUSCULAR; INTRAVENOUS; SUBCUTANEOUS at 19:20

## 2022-09-03 RX ADMIN — SODIUM CHLORIDE 1000 MILLILITER(S): 9 INJECTION INTRAMUSCULAR; INTRAVENOUS; SUBCUTANEOUS at 18:32

## 2022-09-03 RX ADMIN — AZITHROMYCIN 255 MILLIGRAM(S): 500 TABLET, FILM COATED ORAL at 19:18

## 2022-09-03 NOTE — ED ADULT TRIAGE NOTE - CHIEF COMPLAINT QUOTE
Pt BIBEMS from home, c/o falling from the chair after trying to  a crumble on the floor. +head strike. On Warfarin and aspirin. Denies LOC. A&OX4. Denies any other complaints. Neuro alert called. Brought directly to CT scan.

## 2022-09-03 NOTE — ED PROVIDER NOTE - NS ED ROS FT
Constitutional: No fever or chills  Eyes: No visual changes  HEENT: No throat pain  CV: No chest pain  Resp: +Cough  GI: No abd pain, nausea or vomiting  : No dysuria  MSK: No musculoskeletal pain, +shaking  Skin: No rash  Neuro: No headache

## 2022-09-03 NOTE — H&P ADULT - NSHPREVIEWOFSYSTEMS_GEN_ALL_CORE
ROS:   Eyes: no changes in vision  ENT/Mouth: no changes    Cardiovascular: no chest pain    Respiratory: Mild dry cough, congestion    Gastrointestinal: no diarrhea, no nausea, no vomiting    Genitourinary: no dysuria    Breast: no pain    Musculoskeletal:Generalized weakness    Integumentary: no itching    Neurological: No Headache, no tremor,    Endocrine: no excessive thirst,     Allergic/Immunologic: no itching

## 2022-09-03 NOTE — ED PROVIDER NOTE - PROGRESS NOTE DETAILS
family requesting MAB, however pt with sxs for more than 5 days. MD KEVIN family requesting MAB, however pt with sxs for 5-7 days. d/w hospitalist dr tellez. recommend ID consult in AM regarding paxlovid vs MAB. Also, suspect superimposed bacterial pneumonia. Pt not hypoxic, not a candidate for decadron or remdesevir. MD KEVIN

## 2022-09-03 NOTE — ED PROVIDER NOTE - NSICDXPASTSURGICALHX_GEN_ALL_CORE_FT
PAST SURGICAL HISTORY:  AICD (automatic cardioverter/defibrillator) present with PPM. Replaced x 2.    Artificial pacemaker     H/O colonoscopy last done 2009    H/O right inguinal hernia repair     History of cataract extraction Bilateral    History of heart surgery Mitral valve surgery for leaky valve 9/2020

## 2022-09-03 NOTE — PATIENT PROFILE ADULT - NS PRO AD PATIENT TYPE
Alert to self and birthday. VSS. Slept a few hours. Up and about when awake. PRN Zyprexa given x 1. Barrier cream applied to rashes on buttocks.    Health Care Proxy (HCP)

## 2022-09-03 NOTE — ED PROVIDER NOTE - OBJECTIVE STATEMENT
86 y/o female with PMHx of MI, mitral valve prolapse, left inguinal hernia, AICD, iron deficiency anemia, osteoporosis, Hashimoto's thyroiditis, Celiac disease, arthritis, peripheral edema, CHF, HLD, HTN, hypothyroid, Afib s/p pacemaker presents to the ED BIBEMS from home, c/o falling from a chair after trying to  a crumb on the floor. Per daughter, pt has a lifeline but daughter did not get any alerts from it. Pt reports pushing the button. Her daughter found her after calling the house w/o response for 30 minutes. Pt reports +head strike. Denies LOC, pain from falling, HA, neck pain, or fever. Pt reports that she was shaking a lot today ever since waking up this morning and still feels shaky at present. She did eat breakfast this morning. She was coughing last night. She reports feeling fine yesterday. Pt is non smoker since 1990.

## 2022-09-03 NOTE — H&P ADULT - NSHPPHYSICALEXAM_GEN_ALL_CORE
Physical Exam: Vital Signs Last 24 Hrs  T(C): 36.2 (03 Sep 2022 22:29), Max: 38.4 (03 Sep 2022 17:35)  T(F): 97.2 (03 Sep 2022 22:29), Max: 101.1 (03 Sep 2022 17:35)  HR: 76 (03 Sep 2022 22:29) (73 - 76)  BP: 106/57 (03 Sep 2022 22:29) (106/57 - 109/60)  BP(mean): --  RR: 16 (03 Sep 2022 22:29) (16 - 18)  SpO2: 96% (03 Sep 2022 22:29) (95% - 96%)    Parameters below as of 03 Sep 2022 20:02  Patient On (Oxygen Delivery Method): room air            HEENT: PRRL EOMI    MOUTH/TEETH/GUMS: Clear    NECK: no JVD    LUNGS: Upper airway congestion, decreased breath sounds at right base, no wheezing    HEART: S1,S2 RR    ABDOMEN: soft nontender    EXTREMITIES: Trace  pedal edema    MUSCULOSKELETAL: no joint swelling     NEURO: no tremor, no focal signs.    SKIN: no rash    : CVA negative,

## 2022-09-03 NOTE — H&P ADULT - NSHPLABSRESULTS_GEN_ALL_CORE
11.1   6.19  )-----------( 162      ( 03 Sep 2022 17:38 )             33.0       09-03    137  |  102  |  30<H>  ----------------------------<  202<H>  3.6   |  27  |  1.72<H>    Ca    8.7      03 Sep 2022 17:38    TPro  7.6  /  Alb  3.0<L>  /  TBili  0.4  /  DBili  x   /  AST  31  /  ALT  21  /  AlkPhos  68  09-03    Troponin 57, 83.5  INR 1.9    UA WBC 0-2, RBC 11-25 glucose 1000, RVP positive SARS Covid 2 positive, INR 1.98  1. Technically Limited by helical acquisition technique and beam   hardening artifact.  2. Age-appropriate involutional changes. Stable nonacute bilateral   ischemic changes, as described in detail above. No acute intracranial   hemorrhage. If patient demonstrates persistent headaches or altered   Chest x-ray questionable right lower lobe infiltrate  mental status, consider further evaluation via MR imaging to include DWI   and ADC mapping techniques, along with gradient echo acquisition   technique, provided there are no contraindications.  3. Mild paranasal sinus inflammatory changes.

## 2022-09-03 NOTE — ED PROVIDER NOTE - NSICDXPASTMEDICALHX_GEN_ALL_CORE_FT
PAST MEDICAL HISTORY:  AICD (automatic cardioverter/defibrillator) present x 3 . Replaced x 2. Presently right subclavian area    Arthritis     Celiac disease     Chronic atrial fibrillation     Hashimoto's thyroiditis     Hearing loss     History of cataract     History of CHF (congestive heart failure)     History of colon polyps     HTN (hypertension)     Hyperlipidemia, unspecified hyperlipidemia type     Hypothyroid     Iron deficiency anemia due to chronic blood loss     Left inguinal hernia     Mitral valve prolapse     Myocardial infarction 1990    Osteoporosis     Peripheral edema     Varicose veins BLE

## 2022-09-03 NOTE — H&P ADULT - HISTORY OF PRESENT ILLNESS
HPI: The patient is a 87-year-old female with medical history of CHF, atrial fibrillation, on warfarin, s/p AICD, CAD, hypothyroidism, celiac disease who presented in the emergency room via EMS after she fell at home and hit her head.  The patient lives alone and she had a Lifeline alert which activated over her daughter did not hear it.  She was found by the daughter.  She also stated that she was coughing last night.  Patient tested positive for Covid 19 in the ER    PMHx: CHF, atrial fibrillation, on warfarin, s/p AICD, CAD, hypothyroidism, celiac disease , DJD, hearing loss, anemia, osteoporosis, varicose veins.    PSHx: AICD,Cataract surgery, mitral valve repair, inguinal hernia repair    Family Hx: Father had dementia, mother had coronary artery disease.    Social Hx.: Quit smoking In 1990, no alcohol use             HPI: The patient is a 87-year-old female with medical history of CHF, atrial fibrillation, on warfarin, s/p AICD, CAD, hypothyroidism, celiac disease who presented in the emergency room via EMS after she fell at home and hit her head.  The patient lives alone and she had a Lifeline alert which activated over her daughter did not hear it.  She was found by the daughter.  She also stated that she was coughing last night.  Patient tested positive for Covid 19 in the ER    PMHx: CHF, atrial fibrillation, on warfarin, s/p AICD, CAD, hypothyroidism, celiac disease , DJD, hearing loss, anemia, osteoporosis, varicose veins.    PSHx: Mitral clip on 10/14/20 with Dr. Johnson[facility]   [facility], [facility], inguinal hernia repair     Family Hx: Father had dementia, mother had coronary artery disease.    Social Hx.: Quit smoking In 1990, no alcohol use

## 2022-09-03 NOTE — H&P ADULT - ASSESSMENT
87-year-old female with medical history of CHF, atrial fibrillation, on warfarin, s/p AICD, CAD, hypothyroidism, celiac disease who presented in the emergency room via EMS after she fell at home and hit her head.  The patient lives alone and she had a Lifeline alert which she  activated however her daughter did not hear it.  She was found by the daughter later.  She also stated that she was coughing last night.  Patient tested positive for Covid 19 in the ER  assessment Dx:                       1. COVID 19 Infection                       2. Pneumonia                       3. Elevated troponin                       4. CAD                       5.  CHF                       6. Atrial fibrillation On warfarin    Plan:    admit to Medicine               medications: Continue current medication as per med rec.  Started on IV Zithromax and ceftriaxone in the emergency room will continue.               VTEP: Warfarin               Labs: CBC, BMP, INR               Radiology:Chest x-ray               Cardiac diagnostics: EKG               Consults: Cardiology, infectious disease.                Advance Directive: full code,

## 2022-09-03 NOTE — ED PROVIDER NOTE - PHYSICAL EXAMINATION
Constitutional: NAD AOx3. Thin.   Eyes: PERRL EOMI  Head: Normocephalic atraumatic  Mouth: MMM  Cardiac: regular rate and rhythm  Resp: Lungs CTAB  GI: Abd s/nd/nt  Neuro: CN2-12 grossly intact, GARCIA x 4  Skin: +mild erythema and edema on the right maxillary area of face

## 2022-09-03 NOTE — PATIENT PROFILE ADULT - FUNCTIONAL ASSESSMENT - BASIC MOBILITY 5.
[facility]IAGE [facility]    Name: Donald Masters  MRN: [id number]  : 1995  Age: 24 y.o.  Date of assessment: 10/11/2019  PCP: No primary care provider on file.  Persons in attendance: Patient    CHIEF COMPLAINT/PRESENTING ISSUE (as stated by ): Came into the ED with a complaint of anxiety and panic. States he is calm now and is ok to go.  When asked where he would go he stated: 'I know where there are fruit trees\".  States he is used to walking long distances. States he has two fiancee's one in [facility] and one in [place]. State this is normal in his culture which is a mixture of  and [place].  Denies S/I of H/I  Chief Complaint   Patient presents with   • Anxiety     Pt seen at [facility] [place] last night for anxiety also. Requesting  RX        CURRENT LIVING SITUATION/SOCIAL SUPPORT: states he thinks he may be homeless but is not sure.    BEHAVIORAL HEALTH TREATMENT HISTORY  Does patient/parent report a history of prior behavioral health treatment for patient?   states he sees a psychiatrist in [facility]    SAFETY ASSESSMENT - SELF  Does patient acknowledge current or past symptoms of dangerousness to self? no  Does parent/significant other report patient has current or past symptoms of dangerousness to self? N\A  Does presenting problem suggest symptoms of dangerousness to self? No    SAFETY ASSESSMENT - OTHERS  Does patient acknowledge current or past symptoms of aggressive behavior or risk to others? no  Does parent/significant other report patient has current or past symptoms of aggressive behavior or risk to others?  no  Does presenting problem suggest symptoms of dangerousness to others? No    Crisis Safety Plan completed and copy given to patient? pending    ABUSE/NEGLECT SCREENING  Does patient report feeling “unsafe” in his/her home, or afraid of anyone?  no  Does patient report any history of physical, sexual, or emotional abuse?  no  Does parent or significant " "other report any of the above? no  Is there evidence of neglect by self?  no  Is there evidence of neglect by a caregiver? no  Does the patient/parent report any history of CPS/APS/police involvement related to suspected abuse/neglect or domestic violence? no  Based on the information provided during the current assessment, is a mandated report of suspected abuse/neglect being made?  No    SUBSTANCE USE SCREENING  Yes:  [place] all substances used in the past 30 days:      Last Use Amount   []   Alcohol     [x]   Marijuana 6 months unknwn   []   Heroin     []   Prescription Opioids  (used without prescription, for    recreation, or in excess of prescribed amount)     []   Other Prescription  (used without prescription, for    recreation, or in excess of prescribed amount)     []   Cocaine      [x]   Methamphetamine 2 yrs \" \"   []   \"\" drugs (ectasy, MDMA)     []   Other substances        UDS results: positive for benzodiazepines  Breathalyzer results: 0.00    What consequences does the patient associate with any of the above substance use and or addictive behaviors? None    Risk factors for detox (check all that apply):  []  Seizures   []  Diaphoretic (sweating)   []  Tremors   []  Hallucinations   []  Increased blood pressure   []  Decreased blood pressure   []  Other   []  None      [] Patient education on risk factors for detoxification and instructed to return to ER as needed.      MENTAL STATUS   Participation: Limited verbal participation  Grooming: Casual  Orientation: Disoriented to: day of the week and date. knows its october.  Behavior: Calm  Eye contact: Good  Mood: Anxious  Affect: Expansive  Thought process: Tangential and Flight of ideas  Thought content: Evidence of delusion states he is \"hated\" because he can make so much money.    Speech: Volume within normal limits and Soft  Perception: Illusions  Memory:  Recent:  Limited  Insight: Limited  Judgment:  Limited  Other:    Collateral " information:   Source:  [] Significant other present in person:   [] Significant other by telephone  [] [facility]   [] [facility] Nursing Staff  [x] [facility] Medical Record  [] Other:     [] Unable to complete full assessment due to:  [] Acute intoxication  [] Patient declined to participate/engage  [] Patient verbally unresponsive  [] Significant cognitive deficits  [] Significant perceptual distortions or behavioral disorganization  [] Other:      CLINICAL IMPRESSIONS:  Primary:  Anxiety disorder, panic attack, psychosis  Secondary:  Status post TBI       IDENTIFIED NEEDS/PLAN:  [Trigger DISPOSITION list for any items marked]    []  Imminent safety risk - self [] Imminent safety risk - others   []  Acute substance withdrawal [x]  Psychosis/Impaired reality testing   [x]  Mood/anxiety []  Substance use/Addictive behavior   []  Maladaptive behaviro []  Parent/child conflict   []  Family/Couples conflict []  Biomedical   []  Housing []  Financial   []   Legal  Occupational/Educational   []  Domestic violence []  Other:     Disposition: Refer to Legal Hold    Does patient express agreement with the above plan? no    Referral appointment(s) scheduled? N\A    Alert team only:   I have discussed findings and recommendations with Dr. merrill who is in agreement with these recommendations.     Referral information sent to the following community providers : Psychiatric team to assess.    If applicable : Referred  to : [facility] for legal hold follow up at (time): 10:00      Megan Maki R.N.  10/11/2019               2 = A lot of assistance

## 2022-09-04 LAB
ADD ON TEST-SPECIMEN IN LAB: SIGNIFICANT CHANGE UP
ANION GAP SERPL CALC-SCNC: 6 MMOL/L — SIGNIFICANT CHANGE UP (ref 5–17)
APTT BLD: 42.7 SEC — HIGH (ref 27.5–35.5)
BUN SERPL-MCNC: 22 MG/DL — SIGNIFICANT CHANGE UP (ref 7–23)
CALCIUM SERPL-MCNC: 8.1 MG/DL — LOW (ref 8.5–10.1)
CHLORIDE SERPL-SCNC: 108 MMOL/L — SIGNIFICANT CHANGE UP (ref 96–108)
CO2 SERPL-SCNC: 26 MMOL/L — SIGNIFICANT CHANGE UP (ref 22–31)
CREAT SERPL-MCNC: 1.08 MG/DL — SIGNIFICANT CHANGE UP (ref 0.5–1.3)
CULTURE RESULTS: SIGNIFICANT CHANGE UP
EGFR: 50 ML/MIN/1.73M2 — LOW
GLUCOSE SERPL-MCNC: 86 MG/DL — SIGNIFICANT CHANGE UP (ref 70–99)
HCT VFR BLD CALC: 31.4 % — LOW (ref 34.5–45)
HGB BLD-MCNC: 10.4 G/DL — LOW (ref 11.5–15.5)
INR BLD: 1.92 RATIO — HIGH (ref 0.88–1.16)
MCHC RBC-ENTMCNC: 33.1 GM/DL — SIGNIFICANT CHANGE UP (ref 32–36)
MCHC RBC-ENTMCNC: 34.3 PG — HIGH (ref 27–34)
MCV RBC AUTO: 103.6 FL — HIGH (ref 80–100)
PLATELET # BLD AUTO: 144 K/UL — LOW (ref 150–400)
POTASSIUM SERPL-MCNC: 3.6 MMOL/L — SIGNIFICANT CHANGE UP (ref 3.5–5.3)
POTASSIUM SERPL-SCNC: 3.6 MMOL/L — SIGNIFICANT CHANGE UP (ref 3.5–5.3)
PROTHROM AB SERPL-ACNC: 22.4 SEC — HIGH (ref 10.5–13.4)
RBC # BLD: 3.03 M/UL — LOW (ref 3.8–5.2)
RBC # FLD: 14.4 % — SIGNIFICANT CHANGE UP (ref 10.3–14.5)
SODIUM SERPL-SCNC: 140 MMOL/L — SIGNIFICANT CHANGE UP (ref 135–145)
SPECIMEN SOURCE: SIGNIFICANT CHANGE UP
WBC # BLD: 5.28 K/UL — SIGNIFICANT CHANGE UP (ref 3.8–10.5)
WBC # FLD AUTO: 5.28 K/UL — SIGNIFICANT CHANGE UP (ref 3.8–10.5)

## 2022-09-04 PROCEDURE — 99232 SBSQ HOSP IP/OBS MODERATE 35: CPT

## 2022-09-04 RX ORDER — ZINC OXIDE 200 MG/G
1 OINTMENT TOPICAL
Refills: 0 | Status: DISCONTINUED | OUTPATIENT
Start: 2022-09-04 | End: 2022-09-06

## 2022-09-04 RX ORDER — WARFARIN SODIUM 2.5 MG/1
1 TABLET ORAL ONCE
Refills: 0 | Status: COMPLETED | OUTPATIENT
Start: 2022-09-04 | End: 2022-09-04

## 2022-09-04 RX ADMIN — Medication 3 MILLIGRAM(S): at 23:26

## 2022-09-04 RX ADMIN — Medication 1000 UNIT(S): at 10:02

## 2022-09-04 RX ADMIN — ZINC OXIDE 1 APPLICATION(S): 200 OINTMENT TOPICAL at 17:07

## 2022-09-04 RX ADMIN — SACUBITRIL AND VALSARTAN 1 TABLET(S): 24; 26 TABLET, FILM COATED ORAL at 00:35

## 2022-09-04 RX ADMIN — WARFARIN SODIUM 1 MILLIGRAM(S): 2.5 TABLET ORAL at 21:56

## 2022-09-04 RX ADMIN — ATORVASTATIN CALCIUM 10 MILLIGRAM(S): 80 TABLET, FILM COATED ORAL at 00:35

## 2022-09-04 RX ADMIN — Medication 1 TABLET(S): at 10:02

## 2022-09-04 RX ADMIN — CEFTRIAXONE 100 MILLIGRAM(S): 500 INJECTION, POWDER, FOR SOLUTION INTRAMUSCULAR; INTRAVENOUS at 17:08

## 2022-09-04 RX ADMIN — Medication 88 MICROGRAM(S): at 05:54

## 2022-09-04 RX ADMIN — Medication 125 MICROGRAM(S): at 10:02

## 2022-09-04 RX ADMIN — AMIODARONE HYDROCHLORIDE 100 MILLIGRAM(S): 400 TABLET ORAL at 10:01

## 2022-09-04 RX ADMIN — ATORVASTATIN CALCIUM 10 MILLIGRAM(S): 80 TABLET, FILM COATED ORAL at 21:56

## 2022-09-04 RX ADMIN — Medication 81 MILLIGRAM(S): at 10:02

## 2022-09-04 RX ADMIN — Medication 81 MILLIGRAM(S): at 10:00

## 2022-09-04 RX ADMIN — SACUBITRIL AND VALSARTAN 1 TABLET(S): 24; 26 TABLET, FILM COATED ORAL at 10:01

## 2022-09-04 RX ADMIN — SACUBITRIL AND VALSARTAN 1 TABLET(S): 24; 26 TABLET, FILM COATED ORAL at 21:56

## 2022-09-04 NOTE — PROGRESS NOTE ADULT - SUBJECTIVE AND OBJECTIVE BOX
CHIEF COMPLAINT: s/p mechanical fall; Cough/Sinus congestion    SUBJECTIVE: Generalized weakness. Intermittent SOB unchanged. Reports being close to baseline.    SIGNIFICANT INTERVAL EVENTS/OVERNIGHT EVENTS: None    Review of Systems: 14 Point review of systems reviewed and reported as negative unless otherwise stated in HPI    FROM H&P:  "PI: The patient is a 87-year-old female with medical history of CHF, atrial fibrillation, on warfarin, s/p AICD, CAD, hypothyroidism, celiac disease who presented in the emergency room via EMS after she fell at home and hit her head.  The patient lives alone and she had a Lifeline alert which activated over her daughter did not hear it.  She was found by the daughter.  She also stated that she was coughing last night.  Patient tested positive for Covid 19 in the ER"    PHYSICAL EXAM:    T(C): 37.1 (09-04-22 @ 10:05), Max: 38.4 (09-03-22 @ 17:35)  HR: 75 (09-04-22 @ 07:50) (73 - 76)  BP: 114/53 (09-04-22 @ 07:50) (106/57 - 114/53)  RR: 16 (09-04-22 @ 07:50) (16 - 18)  SpO2: 95% (09-04-22 @ 07:50) (95% - 96%)    General: AAOx3; NAD; Frail  Head: AT/NC  ENT: Moist Mucous Membranes; No Injury  Eyes: EOMI; PERRL  Neck: Non-tender; No JVD  CVS: RRR, S1&S2, Murmur +, No edema  Respiratory: Lungs CTA B/L; Normal Respiratory Effort; Normal respiratory effort and saturaiton on RA  Abdomen/GI: Soft, non-tender, non-distended, no guarding, no rebound, normal bowel sounds  : No bladder distention, No Gonzalez  Extremities: No cyanosis, No clubbing, No edema  MSK: No CVA tenderness, Normal ROM, No injury; Muscle atrophy  Neuro: AAOx3, CNII-XII grossly intact, non-focal  Psych: Appropriate, Cooperative,  Skin: Clean, Dry and Intact      LABS:                          10.4   5.28  )-----------( 144      ( 04 Sep 2022 07:18 )             31.4     09-04    140  |  108  |  22  ----------------------------<  86  3.6   |  26  |  1.08    Ca    8.1<L>      04 Sep 2022 07:18  Phos  2.2     09-04  Mg     2.0     09-04    TPro  7.6  /  Alb  3.0<L>  /  TBili  0.4  /  DBili  x   /  AST  31  /  ALT  21  /  AlkPhos  68  09-03    SARS-CoV-2: Detected (03 Sep 2022 17:38)    CAPILLARY BLOOD GLUCOSE      Creatine Kinase, Serum: 631 U/L (09.04.22 @ 07:18)Troponin I, High Sensitivity Result: 73.86: Troponin I, High Sensitivity Result: 83.52Lactate, Blood: 2.2 mmol/L (09.03.22 @ 21:08)   Lactate, Blood: 2.4 mmol/L (09.03.22 @ 17:38)         RADIOLOGY:  < from: Xray Chest 1 View- PORTABLE-Urgent (Xray Chest 1 View- PORTABLE-Urgent .) (09.03.22 @ 17:02) >    Right ICD. Mitral clips. Stable cardiomegaly. Mild vascular congestion.   Small tomoderate right pleural effusion with underlying   consolidation/atelectasis partially obscured by overlying pacer device.   Correlate clinically for infection    < end of copied text >      EKG:    < from: 12 Lead ECG (09.03.22 @ 16:48) >  Diagnosis Line Ventricular-paced rhythm  Biventricular pacemaker detected  Abnormal ECG  When compared with ECG of 07-JAN-2021 11:22,  No significant change was found    < end of copied text >    ECHO:  < from: TTE Echo Complete w/o Contrast w/ Doppler (10.14.20 @ 16:39) >  Summary:   1. Left ventricular ejection fraction, by visual estimation, is 30 to 35%.   2. Severely decreased global left ventricular systolic function.   3. Multiple left ventricular regional wall motion abnormalities exist. See wall motion findings.   4. Severely enlarged left atrium.   5. Severely enlarged right atrium.   6. The mitral in-flow pattern reveals no discernable A-wave, therefore no comment on diastolic function can be made.   7. Mildly enlarged right ventricle.   8. Moderately reduced RV systolic function.   9. S/p Mitral Clip.  10. Mild to moderate mitral valve regurgitation.  11. Moderate tricuspid regurgitation.  12. Moderate aortic regurgitation.  13. Sclerotic aortic valve with decreased opening.  14. There is no evidence of pericardial effusion.  15. Color Doppler demonstrates the presence of left to right shunting, consistent with large iatrogenic ASD.  16. Mitral valve mean gradient is 3.3 mmHg consistent with mild mitral stenosis.  17. Peak transaortic gradient equals 7.6 mmHg, mean transaortic gradient equals 3.7 mmHg, the calculated aortic valve area equals 1.87 cm² by the continuity equation consistent with mild aortic stenosis.  18. Endocardial visualization was enhanced with intravenous echo contrast.  19. Compared to TTE done on 7/10/2017, patient is s/p Mitral Clip.    < end of copied text >            I personally reviewed labs, imaging, ekg, orders and vitals.    Discussed case with:  [X]RN  [X]CORIE/TIGIST  [X]Patient  [X]Family Son Dr. Kimo Miranda ([phone]) and Daughter.   [X]Specialist: ID; Cardiology        MEDICATIONS  (STANDING):  aMIOdarone    Tablet 100 milliGRAM(s) Oral daily  aspirin enteric coated 81 milliGRAM(s) Oral daily  aspirin enteric coated 81 milliGRAM(s) Oral daily  atorvastatin 10 milliGRAM(s) Oral at bedtime  calcium carbonate 1250 mG  + Vitamin D (OsCal 500 + D) 1 Tablet(s) Oral daily  cefTRIAXone   IVPB 1000 milliGRAM(s) IV Intermittent every 24 hours  cholecalciferol 1000 Unit(s) Oral daily  digoxin     Tablet 125 MICROGram(s) Oral daily  levothyroxine 88 MICROGram(s) Oral daily  sacubitril 24 mG/valsartan 26 mG 1 Tablet(s) Oral two times a day  warfarin 1 milliGRAM(s) Oral at bedtime  zinc oxide 40% Paste 1 Application(s) Topical two times a day    MEDICATIONS  (PRN):  acetaminophen     Tablet .. 650 milliGRAM(s) Oral every 6 hours PRN Temp greater or equal to 38C (100.4F), Mild Pain (1 - 3)  aluminum hydroxide/magnesium hydroxide/simethicone Suspension 30 milliLiter(s) Oral every 4 hours PRN Dyspepsia  melatonin 3 milliGRAM(s) Oral at bedtime PRN Insomnia  ondansetron Injectable 4 milliGRAM(s) IV Push every 8 hours PRN Nausea and/or Vomiting

## 2022-09-04 NOTE — PROGRESS NOTE ADULT - ASSESSMENT
87-year-old female with medical history of CHF, atrial fibrillation, on warfarin, s/p AICD, CAD, hypothyroidism, celiac disease who presented in the emergency room via EMS after she fell at home and hit her head.  The patient lives alone and she had a Lifeline alert which she  activated however her daughter did not hear it.  She was found by the daughter later.  She also stated that she was coughing last night.  Patient tested positive for Covid 19 in the ER    #s/p mechanical fall  #MAURICE likely pre-renal POA. Now resolved  #possible Rhabdomylosis but unable to cliniclly determine without a cpk level on admission  -IVF as needed for fluid balance  -Continue to monitor Cr/Electrolhytes  -CT head negative.     #COVID-19 Infection  #Possible Community Acquire Pneumonia. Unable to clinically determine  #No Sepsis POA.  -Admitted to medicine  -Started on empiric abx.   -CXRAY with pleural effusion vs atelectasis vs consolidation.   -CXRAY similar to prios.   -No hypoxia->not a candidate for remdesevir  -On amiodarone->not a condidate for Paxlovid  -Continue to monitor respiratory status.     #HFpEF  #Aortic Stenosis  #CAD  #Chronic Afib  -Continue coumadin and INR monitoring  -COntinue Amiodaone/Dig/Entresto/ASA    #Hypothyroidism  -Continue levothyroxine    DVT Prophylaxis: ON coumadin

## 2022-09-05 DIAGNOSIS — R05.9 COUGH, UNSPECIFIED: ICD-10-CM

## 2022-09-05 DIAGNOSIS — I48.91 UNSPECIFIED ATRIAL FIBRILLATION: ICD-10-CM
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
## 2022-09-05 DIAGNOSIS — J90 PLEURAL EFFUSION, NOT ELSEWHERE CLASSIFIED: ICD-10-CM

## 2022-09-05 DIAGNOSIS — I34.0 NONRHEUMATIC MITRAL (VALVE) INSUFFICIENCY: ICD-10-CM

## 2022-09-05 DIAGNOSIS — I50.9 HEART FAILURE, UNSPECIFIED: ICD-10-CM

## 2022-09-05 DIAGNOSIS — U07.1 COVID-19: ICD-10-CM

## 2022-09-05 LAB
ANION GAP SERPL CALC-SCNC: 7 MMOL/L — SIGNIFICANT CHANGE UP (ref 5–17)
BASOPHILS # BLD AUTO: 0.02 K/UL — SIGNIFICANT CHANGE UP (ref 0–0.2)
BASOPHILS NFR BLD AUTO: 0.3 % — SIGNIFICANT CHANGE UP (ref 0–2)
BUN SERPL-MCNC: 22 MG/DL — SIGNIFICANT CHANGE UP (ref 7–23)
CALCIUM SERPL-MCNC: 8.3 MG/DL — LOW (ref 8.5–10.1)
CHLORIDE SERPL-SCNC: 105 MMOL/L — SIGNIFICANT CHANGE UP (ref 96–108)
CO2 SERPL-SCNC: 26 MMOL/L — SIGNIFICANT CHANGE UP (ref 22–31)
CREAT SERPL-MCNC: 1.21 MG/DL — SIGNIFICANT CHANGE UP (ref 0.5–1.3)
EGFR: 43 ML/MIN/1.73M2 — LOW
EOSINOPHIL # BLD AUTO: 0 K/UL — SIGNIFICANT CHANGE UP (ref 0–0.5)
EOSINOPHIL NFR BLD AUTO: 0 % — SIGNIFICANT CHANGE UP (ref 0–6)
GLUCOSE SERPL-MCNC: 125 MG/DL — HIGH (ref 70–99)
HCT VFR BLD CALC: 36.6 % — SIGNIFICANT CHANGE UP (ref 34.5–45)
HGB BLD-MCNC: 11.9 G/DL — SIGNIFICANT CHANGE UP (ref 11.5–15.5)
IMM GRANULOCYTES NFR BLD AUTO: 0.3 % — SIGNIFICANT CHANGE UP (ref 0–1.5)
INR BLD: 1.55 RATIO — HIGH (ref 0.88–1.16)
LYMPHOCYTES # BLD AUTO: 0.55 K/UL — LOW (ref 1–3.3)
LYMPHOCYTES # BLD AUTO: 7.3 % — LOW (ref 13–44)
MCHC RBC-ENTMCNC: 32.5 GM/DL — SIGNIFICANT CHANGE UP (ref 32–36)
MCHC RBC-ENTMCNC: 34.1 PG — HIGH (ref 27–34)
MCV RBC AUTO: 104.9 FL — HIGH (ref 80–100)
MONOCYTES # BLD AUTO: 0.58 K/UL — SIGNIFICANT CHANGE UP (ref 0–0.9)
MONOCYTES NFR BLD AUTO: 7.7 % — SIGNIFICANT CHANGE UP (ref 2–14)
NEUTROPHILS # BLD AUTO: 6.38 K/UL — SIGNIFICANT CHANGE UP (ref 1.8–7.4)
NEUTROPHILS NFR BLD AUTO: 84.4 % — HIGH (ref 43–77)
NT-PROBNP SERPL-SCNC: HIGH PG/ML (ref 0–450)
PLATELET # BLD AUTO: 138 K/UL — LOW (ref 150–400)
POTASSIUM SERPL-MCNC: 3.5 MMOL/L — SIGNIFICANT CHANGE UP (ref 3.5–5.3)
POTASSIUM SERPL-SCNC: 3.5 MMOL/L — SIGNIFICANT CHANGE UP (ref 3.5–5.3)
PROTHROM AB SERPL-ACNC: 18.1 SEC — HIGH (ref 10.5–13.4)
RBC # BLD: 3.49 M/UL — LOW (ref 3.8–5.2)
RBC # FLD: 14.5 % — SIGNIFICANT CHANGE UP (ref 10.3–14.5)
SODIUM SERPL-SCNC: 138 MMOL/L — SIGNIFICANT CHANGE UP (ref 135–145)
WBC # BLD: 7.55 K/UL — SIGNIFICANT CHANGE UP (ref 3.8–10.5)
WBC # FLD AUTO: 7.55 K/UL — SIGNIFICANT CHANGE UP (ref 3.8–10.5)

## 2022-09-05 PROCEDURE — 99223 1ST HOSP IP/OBS HIGH 75: CPT

## 2022-09-05 PROCEDURE — 99232 SBSQ HOSP IP/OBS MODERATE 35: CPT

## 2022-09-05 RX ORDER — WARFARIN SODIUM 2.5 MG/1
2 TABLET ORAL ONCE
Refills: 0 | Status: COMPLETED | OUTPATIENT
Start: 2022-09-05 | End: 2022-09-05

## 2022-09-05 RX ORDER — POTASSIUM CHLORIDE 20 MEQ
40 PACKET (EA) ORAL ONCE
Refills: 0 | Status: COMPLETED | OUTPATIENT
Start: 2022-09-05 | End: 2022-09-05

## 2022-09-05 RX ORDER — FUROSEMIDE 40 MG
20 TABLET ORAL ONCE
Refills: 0 | Status: COMPLETED | OUTPATIENT
Start: 2022-09-05 | End: 2022-09-05

## 2022-09-05 RX ORDER — POTASSIUM CHLORIDE 20 MEQ
40 PACKET (EA) ORAL ONCE
Refills: 0 | Status: DISCONTINUED | OUTPATIENT
Start: 2022-09-05 | End: 2022-09-05

## 2022-09-05 RX ADMIN — Medication 1 TABLET(S): at 10:04

## 2022-09-05 RX ADMIN — SACUBITRIL AND VALSARTAN 1 TABLET(S): 24; 26 TABLET, FILM COATED ORAL at 22:52

## 2022-09-05 RX ADMIN — ZINC OXIDE 1 APPLICATION(S): 200 OINTMENT TOPICAL at 17:31

## 2022-09-05 RX ADMIN — Medication 88 MICROGRAM(S): at 05:02

## 2022-09-05 RX ADMIN — Medication 20 MILLIGRAM(S): at 13:29

## 2022-09-05 RX ADMIN — Medication 125 MICROGRAM(S): at 10:05

## 2022-09-05 RX ADMIN — Medication 3 MILLIGRAM(S): at 22:52

## 2022-09-05 RX ADMIN — WARFARIN SODIUM 2 MILLIGRAM(S): 2.5 TABLET ORAL at 22:52

## 2022-09-05 RX ADMIN — Medication 1000 UNIT(S): at 10:04

## 2022-09-05 RX ADMIN — CEFTRIAXONE 100 MILLIGRAM(S): 500 INJECTION, POWDER, FOR SOLUTION INTRAMUSCULAR; INTRAVENOUS at 16:14

## 2022-09-05 RX ADMIN — Medication 40 MILLIEQUIVALENT(S): at 12:19

## 2022-09-05 RX ADMIN — AMIODARONE HYDROCHLORIDE 100 MILLIGRAM(S): 400 TABLET ORAL at 10:04

## 2022-09-05 RX ADMIN — SACUBITRIL AND VALSARTAN 1 TABLET(S): 24; 26 TABLET, FILM COATED ORAL at 10:04

## 2022-09-05 RX ADMIN — ZINC OXIDE 1 APPLICATION(S): 200 OINTMENT TOPICAL at 06:29

## 2022-09-05 RX ADMIN — Medication 81 MILLIGRAM(S): at 10:05

## 2022-09-05 RX ADMIN — ATORVASTATIN CALCIUM 10 MILLIGRAM(S): 80 TABLET, FILM COATED ORAL at 22:52

## 2022-09-05 NOTE — PROGRESS NOTE ADULT - SUBJECTIVE AND OBJECTIVE BOX
CHIEF COMPLAINT: s/p mechanical fall; Cough/Sinus congestion    SUBJECTIVE: Generalized weakness. Intermittent SOB unchanged. Reports being close to baseline.    SIGNIFICANT INTERVAL EVENTS/OVERNIGHT EVENTS:   9/5: Overnight SpO2 88%->placed on NC. Unsure if measured when patient was sleeping. During examination in am. placed pulse ox on patient without oxygen and patient maintained SpO2 95-97% on RA throughout conversation Unable to reproduce the 88% on RA observed overnight.     Review of Systems: 14 Point review of systems reviewed and reported as negative unless otherwise stated in HPI    FROM H&P:  "PI: The patient is a 87-year-old female with medical history of CHF, atrial fibrillation, on warfarin, s/p AICD, CAD, hypothyroidism, celiac disease who presented in the emergency room via EMS after she fell at home and hit her head.  The patient lives alone and she had a [employer] alert which activated over her daughter did not hear it.  She was found by the daughter.  She also stated that she was coughing last night.  Patient tested positive for Covid 19 in the ER"    PHYSICAL EXAM:    T(C): 36.8 (09-05-22 @ 07:42), Max: 37 (09-04-22 @ 16:13)  HR: 77 (09-05-22 @ 07:42) (76 - 79)  BP: 123/54 (09-05-22 @ 07:42) (123/54 - 139/66)  RR: 18 (09-05-22 @ 09:57) (17 - 19)  SpO2: 95% (09-05-22 @ 09:57) (88% - 97%)  General: AAOx3; NAD; Frail  Head: AT/NC  ENT: Moist Mucous Membranes; No Injury  Eyes: EOMI; PERRL  Neck: Non-tender; No JVD  CVS: RRR, S1&S2, Murmur +, No edema  Respiratory: Lungs CTA B/L; Normal Respiratory Effort; Normal respiratory effort and saturaiton on RA  Abdomen/GI: Soft, non-tender, non-distended, no guarding, no rebound, normal bowel sounds  : No bladder distention, No Gonzalez  Extremities: No cyanosis, No clubbing, No edema  MSK: No CVA tenderness, Normal ROM, No injury; Muscle atrophy  Neuro: AAOx3, CNII-XII grossly intact, non-focal  Psych: Appropriate, Cooperative,  Skin: Clean, Dry and Intact      LABS:                          11.9   7.55  )-----------( 138      ( 05 Sep 2022 08:39 )             36.6     09-05    138  |  105  |  22  ----------------------------<  125<H>  3.5   |  26  |  1.21    Ca    8.3<L>      05 Sep 2022 08:39  Phos  2.2     09-04  Mg     2.0     09-04    TPro  7.6  /  Alb  3.0<L>  /  TBili  0.4  /  DBili  x   /  AST  31  /  ALT  21  /  AlkPhos  68  09-03    SARS-CoV-2: Detected (03 Sep 2022 17:38)    CAPILLARY BLOOD GLUCOSE      Serum Pro-Brain Natriuretic Peptide: 16671 pg/mL (09.05.22 @ 08:39)     Culture - Urine (collected 03 Sep 2022 17:41)  Source: Clean Catch Clean Catch (Midstream)  Final Report (04 Sep 2022 20:10):    <10,000 CFU/mL Normal Urogenital Nancy    Culture - Blood (collected 03 Sep 2022 17:38)  Source: .Blood None  Preliminary Report (05 Sep 2022 02:01):    No growth to date.    Culture - Blood (collected 03 Sep 2022 17:38)  Source: .Blood None  Preliminary Report (05 Sep 2022 02:01):    No growth to date.                            10.4   5.28  )-----------( 144      ( 04 Sep 2022 07:18 )             31.4     09-04    140  |  108  |  22  ----------------------------<  86  3.6   |  26  |  1.08    Ca    8.1<L>      04 Sep 2022 07:18  Phos  2.2     09-04  Mg     2.0     09-04    TPro  7.6  /  Alb  3.0<L>  /  TBili  0.4  /  DBili  x   /  AST  31  /  ALT  21  /  AlkPhos  68  09-03    SARS-CoV-2: Detected (03 Sep 2022 17:38)    CAPILLARY BLOOD GLUCOSE      Creatine Kinase, Serum: 631 U/L (09.04.22 @ 07:18)Troponin I, High Sensitivity Result: 73.86: Troponin I, High Sensitivity Result: 83.52Lactate, Blood: 2.2 mmol/L (09.03.22 @ 21:08)   Lactate, Blood: 2.4 mmol/L (09.03.22 @ 17:38)         RADIOLOGY:  < from: Xray Chest 1 View- PORTABLE-Urgent (Xray Chest 1 View- PORTABLE-Urgent .) (09.03.22 @ 17:02) >    Right ICD. Mitral clips. Stable cardiomegaly. Mild vascular congestion.   Small tomoderate right pleural effusion with underlying   consolidation/atelectasis partially obscured by overlying pacer device.   Correlate clinically for infection    < end of copied text >      EKG:    < from: 12 Lead ECG (09.03.22 @ 16:48) >  Diagnosis Line Ventricular-paced rhythm  Biventricular pacemaker detected  Abnormal ECG  When compared with ECG of 07-JAN-2021 11:22,  No significant change was found    < end of copied text >    ECHO:  < from: TTE Echo Complete w/o Contrast w/ Doppler (10.14.20 @ 16:39) >  Summary:   1. Left ventricular ejection fraction, by visual estimation, is 30 to 35%.   2. Severely decreased global left ventricular systolic function.   3. Multiple left ventricular regional wall motion abnormalities exist. See wall motion findings.   4. Severely enlarged left atrium.   5. Severely enlarged right atrium.   6. The mitral in-flow pattern reveals no discernable A-wave, therefore no comment on diastolic function can be made.   7. Mildly enlarged right ventricle.   8. Moderately reduced RV systolic function.   9. S/p Mitral Clip.  10. Mild to moderate mitral valve regurgitation.  11. Moderate tricuspid regurgitation.  12. Moderate aortic regurgitation.  13. Sclerotic aortic valve with decreased opening.  14. There is no evidence of pericardial effusion.  15. Color Doppler demonstrates the presence of left to right shunting, consistent with large iatrogenic ASD.  16. Mitral valve mean gradient is 3.3 mmHg consistent with mild mitral stenosis.  17. Peak transaortic gradient equals 7.6 mmHg, mean transaortic gradient equals 3.7 mmHg, the calculated aortic valve area equals 1.87 cm² by the continuity equation consistent with mild aortic stenosis.  18. Endocardial visualization was enhanced with intravenous echo contrast.  19. Compared to TTE done on 7/10/2017, patient is s/p Mitral Clip.    < end of copied text >            I personally reviewed labs, imaging, ekg, orders and vitals.    Discussed case with:  [X]RN  [X]CM/TIGIST  [X]Patient  [X]Family Son Dr. Kimo Miranda ([phone]) and Daughter.   [X]Specialist: ID        MEDICATIONS  (STANDING):  aMIOdarone    Tablet 100 milliGRAM(s) Oral daily  aspirin enteric coated 81 milliGRAM(s) Oral daily  aspirin enteric coated 81 milliGRAM(s) Oral daily  atorvastatin 10 milliGRAM(s) Oral at bedtime  calcium carbonate 1250 mG  + Vitamin D (OsCal 500 + D) 1 Tablet(s) Oral daily  cefTRIAXone   IVPB 1000 milliGRAM(s) IV Intermittent every 24 hours  cholecalciferol 1000 Unit(s) Oral daily  digoxin     Tablet 125 MICROGram(s) Oral daily  levothyroxine 88 MICROGram(s) Oral daily  sacubitril 24 mG/valsartan 26 mG 1 Tablet(s) Oral two times a day  warfarin 1 milliGRAM(s) Oral at bedtime  zinc oxide 40% Paste 1 Application(s) Topical two times a day    MEDICATIONS  (PRN):  acetaminophen     Tablet .. 650 milliGRAM(s) Oral every 6 hours PRN Temp greater or equal to 38C (100.4F), Mild Pain (1 - 3)  aluminum hydroxide/magnesium hydroxide/simethicone Suspension 30 milliLiter(s) Oral every 4 hours PRN Dyspepsia  melatonin 3 milliGRAM(s) Oral at bedtime PRN Insomnia  ondansetron Injectable 4 milliGRAM(s) IV Push every 8 hours PRN Nausea and/or Vomiting       CHIEF COMPLAINT: s/p mechanical fall; Cough/Sinus congestion    SUBJECTIVE: Generalized weakness. Intermittent SOB unchanged. Reports being close to baseline.    SIGNIFICANT INTERVAL EVENTS/OVERNIGHT EVENTS:   9/5: Overnight SpO2 88%->placed on NC. Unsure if measured when patient was sleeping. During examination in am. placed pulse ox on patient without oxygen and patient maintained SpO2 95-97% on RA throughout conversation Unable to reproduce the 88% on RA observed overnight.     Review of Systems: 14 Point review of systems reviewed and reported as negative unless otherwise stated in HPI    FROM H&P:  "PI: The patient is a 87-year-old female with medical history of CHF, atrial fibrillation, on warfarin, s/p AICD, CAD, hypothyroidism, celiac disease who presented in the emergency room via EMS after she fell at home and hit her head.  The patient lives alone and she had a [employer] alert which activated over her daughter did not hear it.  She was found by the daughter.  She also stated that she was coughing last night.  Patient tested positive for Covid 19 in the ER"    PHYSICAL EXAM:    T(C): 36.8 (09-05-22 @ 07:42), Max: 37 (09-04-22 @ 16:13)  HR: 77 (09-05-22 @ 07:42) (76 - 79)  BP: 123/54 (09-05-22 @ 07:42) (123/54 - 139/66)  RR: 18 (09-05-22 @ 09:57) (17 - 19)  SpO2: 95% (09-05-22 @ 09:57) (88% - 97%)  General: AAOx3; NAD; Frail  Head: AT/NC  ENT: Moist Mucous Membranes; No Injury  Eyes: EOMI; PERRL  Neck: Non-tender; No JVD  CVS: RRR, S1&S2, Murmur +, No edema  Respiratory: Decreased basilar breath sounds unchanged; Normal Respiratory Effort; Normal respiratory effort and saturation on RA  Abdomen/GI: Soft, non-tender, non-distended, no guarding, no rebound, normal bowel sounds  : No bladder distention, No Gonzalez  Extremities: No cyanosis, No clubbing, No edema  MSK: No CVA tenderness, Normal ROM, No injury; Muscle atrophy  Neuro: AAOx3, CNII-XII grossly intact, non-focal  Psych: Appropriate, Cooperative,  Skin: Clean, Dry and Intact      LABS:                          11.9   7.55  )-----------( 138      ( 05 Sep 2022 08:39 )             36.6     09-05    138  |  105  |  22  ----------------------------<  125<H>  3.5   |  26  |  1.21    Ca    8.3<L>      05 Sep 2022 08:39  Phos  2.2     09-04  Mg     2.0     09-04    TPro  7.6  /  Alb  3.0<L>  /  TBili  0.4  /  DBili  x   /  AST  31  /  ALT  21  /  AlkPhos  68  09-03    SARS-CoV-2: Detected (03 Sep 2022 17:38)    CAPILLARY BLOOD GLUCOSE      Serum Pro-Brain Natriuretic Peptide: 41344 pg/mL (09.05.22 @ 08:39)     Culture - Urine (collected 03 Sep 2022 17:41)  Source: Clean Catch Clean Catch (Midstream)  Final Report (04 Sep 2022 20:10):    <10,000 CFU/mL Normal Urogenital Nancy    Culture - Blood (collected 03 Sep 2022 17:38)  Source: .Blood None  Preliminary Report (05 Sep 2022 02:01):    No growth to date.    Culture - Blood (collected 03 Sep 2022 17:38)  Source: .Blood None  Preliminary Report (05 Sep 2022 02:01):    No growth to date.                            10.4   5.28  )-----------( 144      ( 04 Sep 2022 07:18 )             31.4     09-04    140  |  108  |  22  ----------------------------<  86  3.6   |  26  |  1.08    Ca    8.1<L>      04 Sep 2022 07:18  Phos  2.2     09-04  Mg     2.0     09-04    TPro  7.6  /  Alb  3.0<L>  /  TBili  0.4  /  DBili  x   /  AST  31  /  ALT  21  /  AlkPhos  68  09-03    SARS-CoV-2: Detected (03 Sep 2022 17:38)    CAPILLARY BLOOD GLUCOSE      Creatine Kinase, Serum: 631 U/L (09.04.22 @ 07:18)Troponin I, High Sensitivity Result: 73.86: Troponin I, High Sensitivity Result: 83.52Lactate, Blood: 2.2 mmol/L (09.03.22 @ 21:08)   Lactate, Blood: 2.4 mmol/L (09.03.22 @ 17:38)         RADIOLOGY:  < from: Xray Chest 1 View- PORTABLE-Urgent (Xray Chest 1 View- PORTABLE-Urgent .) (09.03.22 @ 17:02) >    Right ICD. Mitral clips. Stable cardiomegaly. Mild vascular congestion.   Small tomoderate right pleural effusion with underlying   consolidation/atelectasis partially obscured by overlying pacer device.   Correlate clinically for infection    < end of copied text >      EKG:    < from: 12 Lead ECG (09.03.22 @ 16:48) >  Diagnosis Line Ventricular-paced rhythm  Biventricular pacemaker detected  Abnormal ECG  When compared with ECG of 07-JAN-2021 11:22,  No significant change was found    < end of copied text >    ECHO:  < from: TTE Echo Complete w/o Contrast w/ Doppler (10.14.20 @ 16:39) >  Summary:   1. Left ventricular ejection fraction, by visual estimation, is 30 to 35%.   2. Severely decreased global left ventricular systolic function.   3. Multiple left ventricular regional wall motion abnormalities exist. See wall motion findings.   4. Severely enlarged left atrium.   5. Severely enlarged right atrium.   6. The mitral in-flow pattern reveals no discernable A-wave, therefore no comment on diastolic function can be made.   7. Mildly enlarged right ventricle.   8. Moderately reduced RV systolic function.   9. S/p Mitral Clip.  10. Mild to moderate mitral valve regurgitation.  11. Moderate tricuspid regurgitation.  12. Moderate aortic regurgitation.  13. Sclerotic aortic valve with decreased opening.  14. There is no evidence of pericardial effusion.  15. Color Doppler demonstrates the presence of left to right shunting, consistent with large iatrogenic ASD.  16. Mitral valve mean gradient is 3.3 mmHg consistent with mild mitral stenosis.  17. Peak transaortic gradient equals 7.6 mmHg, mean transaortic gradient equals 3.7 mmHg, the calculated aortic valve area equals 1.87 cm² by the continuity equation consistent with mild aortic stenosis.  18. Endocardial visualization was enhanced with intravenous echo contrast.  19. Compared to TTE done on 7/10/2017, patient is s/p Mitral Clip.    < end of copied text >            I personally reviewed labs, imaging, ekg, orders and vitals.    Discussed case with:  [X]RN  [X]CM/TIGIST  [X]Patient  [X]Family Son Dr. Kimo Miranda ([phone]) and Daughter.   [X]Specialist: ID        MEDICATIONS  (STANDING):  aMIOdarone    Tablet 100 milliGRAM(s) Oral daily  aspirin enteric coated 81 milliGRAM(s) Oral daily  aspirin enteric coated 81 milliGRAM(s) Oral daily  atorvastatin 10 milliGRAM(s) Oral at bedtime  calcium carbonate 1250 mG  + Vitamin D (OsCal 500 + D) 1 Tablet(s) Oral daily  cefTRIAXone   IVPB 1000 milliGRAM(s) IV Intermittent every 24 hours  cholecalciferol 1000 Unit(s) Oral daily  digoxin     Tablet 125 MICROGram(s) Oral daily  levothyroxine 88 MICROGram(s) Oral daily  sacubitril 24 mG/valsartan 26 mG 1 Tablet(s) Oral two times a day  warfarin 1 milliGRAM(s) Oral at bedtime  zinc oxide 40% Paste 1 Application(s) Topical two times a day    MEDICATIONS  (PRN):  acetaminophen     Tablet .. 650 milliGRAM(s) Oral every 6 hours PRN Temp greater or equal to 38C (100.4F), Mild Pain (1 - 3)  aluminum hydroxide/magnesium hydroxide/simethicone Suspension 30 milliLiter(s) Oral every 4 hours PRN Dyspepsia  melatonin 3 milliGRAM(s) Oral at bedtime PRN Insomnia  ondansetron Injectable 4 milliGRAM(s) IV Push every 8 hours PRN Nausea and/or Vomiting

## 2022-09-05 NOTE — CONSULT NOTE ADULT - ASSESSMENT
The patient is a 87-year-old female with medical history of CHF, atrial fibrillation, on warfarin, s/p AICD, CAD, hypothyroidism, celiac disease who presented in the emergency room via EMS after she fell at home and hit her head. She was bending down to pick something up , no LOC , NO syncope.  The patient lives alone and she had a [employer] alert which activated over her daughter did not hear it.  She was found by the daughter.  She also stated that she was coughing last night.  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 Has a BIVICD with underlying AFIB last interorragtion in [facility]   trop borderline pstive but no chest pain dout active ischemia   CXR c/w pleural effusion and mild PVC   1) would give lasix IV today and tomorrow then switch to PO if clinically stable   2) cont entresto   3) cont warfarin for afib seems to be chrinically in AFIB , would have EP interrogate ICD if AFIB is chronic would DC AMIO since it's not working to maintain NSR   4) mod severe MR despite emilia clip maybe be a candidate for another clip as oupt   5) covid pos treatment as per primary team   will sign off , she appears stable No CP or SOB not using any supplemtal O2 . Call us back for further questions
The patient is a 87-year-old female with medical history of CHF, atrial fibrillation, on warfarin, s/p AICD, CAD, hypothyroidism, celiac disease DJD, [place], osteoporosis admitted on 9/3 for evaluation after falling at home while picking up a crumb off the floor. She supposedly hit her head; she did not want to come to the hospital but her daughter found her on the floor and brought her to the hospital where upon admission she was found to be COVID-19 positive. She was vaccinated and boosted. She thinks a visiting nurse gave her COVID-19 and she has mild cough but is not requiring supplemental oxygen.     1. Patient admitted s/p mechanical fall, found to have COVID-19 infection, but not requiring supplemental oxygen; noted with possible early pneumonia versus pulmonary edema  - follow up cultures   - serial cbc and monitor temperature   - iv hydration and supportive care   - reviewed prior medical records to evaluate for resistant or atypical pathogens   - will not give MAB given duration of symptoms, cannot give paxlovid as patient is on amiodarone  - will stop zithromax given patient is on amiodarone  - will continue ceftriaxone most likely another 24 hours  - discussed with patient son Dr. Judson Miranda, [phone], all questions answered  2. other issues; per medicine
R pleural effusion likely secondary to CHF.  IV lasix x 2 days, then po if stable as per cardiology.  hold on thoracentesis at this time.  repeat CXR in another 2-3 days.     covid infection. ID following.  not requiring O2.   likely ceftriaxone for another day.  has R effusion on CXR felt to be secondary to CHF, normal WBC count, afebrile today.  agree with short course of antibiotics.

## 2022-09-05 NOTE — CONSULT NOTE ADULT - SUBJECTIVE AND OBJECTIVE BOX
Patient is a 87y old  Female who presents with a chief complaint of Fall/Cough (04 Sep 2022 15:05)    HPI:  HPI: The patient is a 87-year-old female with medical history of CHF, atrial fibrillation, on warfarin, s/p AICD, CAD, hypothyroidism, celiac disease DJD, [place], osteoporosis admitted on 9/3 for evaluation after falling at home while picking up a crumb off the floor. She supposedly hit her head; she did not want to come to the hospital but her daughter found her on the floor and brought her to the hospital where upon admission she was found to be COVID-19 positive. She was vaccinated and boosted. She thinks a visiting nurse gave her COVID-19 and she has mild cough but is not requiring supplemental oxygen.         PMH: as above  PSH: as above  Meds: per reconciliation sheet, noted below  MEDICATIONS  (STANDING):  aMIOdarone    Tablet 100 milliGRAM(s) Oral daily  aspirin enteric coated 81 milliGRAM(s) Oral daily  aspirin enteric coated 81 milliGRAM(s) Oral daily  atorvastatin 10 milliGRAM(s) Oral at bedtime  calcium carbonate 1250 mG  + Vitamin D (OsCal 500 + D) 1 Tablet(s) Oral daily  cefTRIAXone   IVPB 1000 milliGRAM(s) IV Intermittent every 24 hours  cholecalciferol 1000 Unit(s) Oral daily  digoxin     Tablet 125 MICROGram(s) Oral daily  levothyroxine 88 MICROGram(s) Oral daily  sacubitril 24 mG/valsartan 26 mG 1 Tablet(s) Oral two times a day  warfarin 1 milliGRAM(s) Oral at bedtime  zinc oxide 40% Paste 1 Application(s) Topical two times a day    MEDICATIONS  (PRN):  acetaminophen     Tablet .. 650 milliGRAM(s) Oral every 6 hours PRN Temp greater or equal to 38C (100.4F), Mild Pain (1 - 3)  aluminum hydroxide/magnesium hydroxide/simethicone Suspension 30 milliLiter(s) Oral every 4 hours PRN Dyspepsia  melatonin 3 milliGRAM(s) Oral at bedtime PRN Insomnia  ondansetron Injectable 4 milliGRAM(s) IV Push every 8 hours PRN Nausea and/or Vomiting    Allergies    adhesives (Rash)  Ancef (Unknown)  lidocaine (Rash; Angioedema)    Intolerances    Gluten (Diarrhea)    Social: no smoking, no alcohol, no illegal drugs; no recent travel, no exposure to TB  FAMILY HISTORY:  Family history of diabetes mellitus (Mother, Sibling)    Family history of heart disease (Mother)    Family history of dementia (Father)         ROS: the patient has no chills, no HA, no dizziness, no sore throat, no blurry vision, no CP, no palpitations, no SOB,  no abdominal pain, no diarrhea, no N/V, no dysuria, no leg pain, no claudication, no rash, no joint aches, no rectal pain or bleeding, no night sweats  All other systems reviewed and are negative    Vital Signs Last 24 Hrs  T(C): 37.1 (04 Sep 2022 10:05), Max: 38.4 (03 Sep 2022 17:35)  T(F): 98.7 (04 Sep 2022 10:05), Max: 101.1 (03 Sep 2022 17:35)  HR: 75 (04 Sep 2022 07:50) (73 - 76)  BP: 114/53 (04 Sep 2022 07:50) (106/57 - 114/53)  BP(mean): --  RR: 16 (04 Sep 2022 07:50) (16 - 18)  SpO2: 95% (04 Sep 2022 07:50) (95% - 96%)    Parameters below as of 04 Sep 2022 07:50  Patient On (Oxygen Delivery Method): room air      Daily Height in cm: 160.02 (03 Sep 2022 16:07)    Daily     PE:    Constitutional: frail looking  HEENT: NC/AT, EOMI, PERRLA, conjunctivae clear; ears and nose atraumatic; pharynx clear  Neck: supple; thyroid not palpable  Back: no tenderness  Respiratory: respiratory effort normal; clear to auscultation  Cardiovascular: S1S2 regular, no murmurs  Abdomen: soft, not tender, not distended, positive BS; no liver or spleen organomegaly  Genitourinary: no suprapubic tenderness  Musculoskeletal: no muscle tenderness, no joint swelling or tenderness  Neurological/ Psychiatric: AxOx3, judgement and insight normal;  moving all extremities  Skin: no rashes; no palpable lesions    Labs: all available labs reviewed                        10.4   5.28  )-----------( 144      ( 04 Sep 2022 07:18 )             31.4     09-04    140  |  108  |  22  ----------------------------<  86  3.6   |  26  |  1.08    Ca    8.1<L>      04 Sep 2022 07:18  Phos  2.2     -  Mg     2.0     -    TPro  7.6  /  Alb  3.0<L>  /  TBili  0.4  /  DBili  x   /  AST  31  /  ALT  21  /  AlkPhos  68       LIVER FUNCTIONS - ( 03 Sep 2022 17:38 )  Alb: 3.0 g/dL / Pro: 7.6 gm/dL / ALK PHOS: 68 U/L / ALT: 21 U/L / AST: 31 U/L / GGT: x           Urinalysis Basic - ( 03 Sep 2022 17:41 )    Color: Yellow / Appearance: Clear / S.020 / pH: x  Gluc: x / Ketone: Negative  / Bili: Negative / Urobili: 1   Blood: x / Protein: 30 mg/dL / Nitrite: Negative   Leuk Esterase: Negative / RBC: 11-25 /HPF / WBC 0-2   Sq Epi: x / Non Sq Epi: Few / Bacteria: Few    Respiratory Viral Panel with COVID-19 by JAMEE (22 @ 17:38)    Rapid RVP Result: Detected    SARS-CoV-2: Detected: This Respiratory Panel uses polymerase chain reaction (PCR) to detect for  adenovirus; coronavirus (HKU1, NL63, 229E, OC43); human metapneumovirus  (hMPV); human enterovirus/rhinovirus (Entero/RV); influenza A; influenza  A/H1; influenza A/H3; influenza A/H1-2009; influenza B; parainfluenza  viruses 1, 2, 3, 4; respiratory syncytial virus; Mycoplasma pneumoniae;  Chlamydophila pneumoniae; and SARS-CoV-2.            < from: Xray Chest 1 View- PORTABLE-Urgent (Xray Chest 1 View- PORTABLE-Urgent .) (22 @ 17:02) >  ACC: [id number] EXAM:  XR CHEST PORTABLE URGENT 1V                          PROCEDURE DATE:  2022          INTERPRETATION:  Cough.    AP chest.    IMPRESSION:    Right ICD. Mitral clips. Stable cardiomegaly. Mild vascular congestion.   Small tomoderate right pleural effusion with underlying   consolidation/atelectasis partially obscured by overlying pacer device.   Correlate clinically for infection    < end of copied text >          Radiology: all available radiological tests reviewed    Advanced directives addressed: full resuscitation
Patient is a 87y old  Female who presents with a chief complaint of Fall/Cough (05 Sep 2022 10:12)      HPI:  HPI: The patient is a 87-year-old female with medical history of CHF, atrial fibrillation, on warfarin, s/p AICD, CAD, hypothyroidism, celiac disease who presented in the emergency room via EMS after she fell at home and hit her head. She was bending down to pick something up , no LOC , NO syncope.  The patient lives alone and she had a Lifeline alert which activated over her daughter did not hear it.  She was found by the daughter.  She also stated that she was coughing last night.  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 Has a BIVICD with underlying AFIB last interorragtion in [place]   PMHx: CHF, atrial fibrillation, on warfarin, s/p AICD, CAD, hypothyroidism, celiac disease , DJD, hearing loss, anemia, osteoporosis, varicose veins.    PSHx: Mitral clip on 10/14/20 with Dr. Johnson,   JV,Cataract surgery, mitral valve repair, inguinal hernia repair     Family Hx: Father had dementia, mother had coronary artery disease.    Social Hx.: Quit smoking In , no alcohol use             (03 Sep 2022 23:02)      PAST MEDICAL & SURGICAL HISTORY:  Chronic atrial fibrillation      Hypothyroid      HTN (hypertension)      History of cataract      Hyperlipidemia, unspecified hyperlipidemia type      History of CHF (congestive heart failure)      Peripheral edema      History of colon polyps      Varicose veins  BLE      Arthritis      Celiac disease      Hashimoto&#x27;s thyroiditis      Osteoporosis      Iron deficiency anemia due to chronic blood loss      AICD (automatic cardioverter/defibrillator) present  x 3 . Replaced x 2. Presently right subclavian area      Myocardial infarction        Mitral valve prolapse      Left inguinal hernia      Hearing loss      AICD (automatic cardioverter/defibrillator) present  with PPM. Replaced x 2.      H/O right inguinal hernia repair      H/O colonoscopy  last done       History of cataract extraction  Bilateral      Artificial pacemaker      History of heart surgery  Mitral valve surgery for leaky valve 2020                                        MEDICATIONS  (STANDING):  aMIOdarone    Tablet 100 milliGRAM(s) Oral daily  aspirin enteric coated 81 milliGRAM(s) Oral daily  atorvastatin 10 milliGRAM(s) Oral at bedtime  calcium carbonate 1250 mG  + Vitamin D (OsCal 500 + D) 1 Tablet(s) Oral daily  cefTRIAXone   IVPB 1000 milliGRAM(s) IV Intermittent every 24 hours  cholecalciferol 1000 Unit(s) Oral daily  digoxin     Tablet 125 MICROGram(s) Oral daily  furosemide   Injectable 20 milliGRAM(s) IV Push once  levothyroxine 88 MICROGram(s) Oral daily  potassium chloride   Powder 40 milliEquivalent(s) Oral once  sacubitril 24 mG/valsartan 26 mG 1 Tablet(s) Oral two times a day  warfarin 1 milliGRAM(s) Oral at bedtime  zinc oxide 40% Paste 1 Application(s) Topical two times a day    MEDICATIONS  (PRN):  acetaminophen     Tablet .. 650 milliGRAM(s) Oral every 6 hours PRN Temp greater or equal to 38C (100.4F), Mild Pain (1 - 3)  aluminum hydroxide/magnesium hydroxide/simethicone Suspension 30 milliLiter(s) Oral every 4 hours PRN Dyspepsia  melatonin 3 milliGRAM(s) Oral at bedtime PRN Insomnia  ondansetron Injectable 4 milliGRAM(s) IV Push every 8 hours PRN Nausea and/or Vomiting      FAMILY HISTORY:  Family history of diabetes mellitus (Mother, Sibling)    Family history of heart disease (Mother)    Family history of dementia (Father)        SOCIAL HISTORY:    CIGARETTES:        Vital Signs Last 24 Hrs  T(C): 36.8 (05 Sep 2022 07:42), Max: 37 (04 Sep 2022 16:13)  T(F): 98.2 (05 Sep 2022 07:42), Max: 98.6 (04 Sep 2022 16:13)  HR: 77 (05 Sep 2022 07:42) (76 - 79)  BP: 123/54 (05 Sep 2022 07:42) (123/54 - 139/66)  BP(mean): --  RR: 18 (05 Sep 2022 09:57) (17 - 19)  SpO2: 95% (05 Sep 2022 09:57) (88% - 97%)    Parameters below as of 05 Sep 2022 09:57  Patient On (Oxygen Delivery Method): room air                INTERPRETATION OF TELEMETRY:    ECG:    I&O's Detail    04 Sep 2022 07:01  -  05 Sep 2022 07:00  --------------------------------------------------------  IN:    IV PiggyBack: 50 mL  Total IN: 50 mL    OUT:  Total OUT: 0 mL    Total NET: 50 mL          LABS:                        11.9   7.55  )-----------( 138      ( 05 Sep 2022 08:39 )             36.6         138  |  105  |  22  ----------------------------<  125<H>  3.5   |  26  |  1.21    Ca    8.3<L>      05 Sep 2022 08:39  Phos  2.2       Mg     2.0         TPro  7.6  /  Alb  3.0<L>  /  TBili  0.4  /  DBili  x   /  AST  31  /  ALT  21  /  AlkPhos  68  09-03    CARDIAC MARKERS ( 04 Sep 2022 07:18 )  x     / x     / 631 U/L / x     / x          PT/INR - ( 05 Sep 2022 08:39 )   PT: 18.1 sec;   INR: 1.55 ratio         PTT - ( 04 Sep 2022 07:18 )  PTT:42.7 sec  Urinalysis Basic - ( 03 Sep 2022 17:41 )    Color: Yellow / Appearance: Clear / S.020 / pH: x  Gluc: x / Ketone: Negative  / Bili: Negative / Urobili: 1   Blood: x / Protein: 30 mg/dL / Nitrite: Negative   Leuk Esterase: Negative / RBC: 11-25 /HPF / WBC 0-2   Sq Epi: x / Non Sq Epi: Few / Bacteria: Few      I&O's Summary    04 Sep 2022 07:01  -  05 Sep 2022 07:00  --------------------------------------------------------  IN: 50 mL / OUT: 0 mL / NET: 50 mL      BNPSerum Pro-Brain Natriuretic Peptide: 19809 pg/mL ( @ 08:39)    RADIOLOGY & ADDITIONAL STUDIES:
  HPI:  HPI: The patient is a 87-year-old female with medical history of CHF, atrial fibrillation, on warfarin, s/p AICD, CAD, hypothyroidism, celiac disease who presented in the emergency room via EMS after she fell at home and hit her head.  The patient lives alone and she had a Lif[employer] alert which activated over her daughter did not hear it.  She was found by the daughter.  She also stated that she was coughing last night.  Patient tested positive for Covid 19 in the ER    PMHx: CHF, atrial fibrillation, on warfarin, s/p AICD, CAD, hypothyroidism, celiac disease , DJD, hearing loss, anemia, osteoporosis, varicose veins.    PSHx: Mitral clip on 10/14/20 with Dr. Johnson,   AICD,Cataract surgery, mitral valve repair, inguinal hernia repair    Patient noted to have R effusion, congestive changes on CXR.  Elevated BNP level.  Is receiving IV lasix today.  Is positive for covid infection.  ? concomitant bronchtis or ? PNA.  Is on short course of IV ceftriaxone.     22:  in bed, no distress, on RA.  Has cough, some sputum.      Family Hx: Father had dementia, mother had coronary artery disease.    Social Hx.: Quit smoking In , no alcohol use             (03 Sep 2022 23:02)      PAST MEDICAL & SURGICAL HISTORY:  Chronic atrial fibrillation      Hypothyroid      HTN (hypertension)      History of cataract      Hyperlipidemia, unspecified hyperlipidemia type      History of CHF (congestive heart failure)      Peripheral edema      History of colon polyps      Varicose veins  BLE      Arthritis      Celiac disease      Hashimoto&#x27;s thyroiditis      Osteoporosis      Iron deficiency anemia due to chronic blood loss      AICD (automatic cardioverter/defibrillator) present  x 3 . Replaced x 2. Presently right subclavian area      Myocardial infarction        Mitral valve prolapse      Left inguinal hernia      Hearing loss      AICD (automatic cardioverter/defibrillator) present  with PPM. Replaced x 2.      H/O right inguinal hernia repair      H/O colonoscopy  last done       History of cataract extraction  Bilateral      Artificial pacemaker      History of heart surgery  Mitral valve surgery for leaky valve 2020          MEDICATIONS  (STANDING):  aMIOdarone    Tablet 100 milliGRAM(s) Oral daily  aspirin enteric coated 81 milliGRAM(s) Oral daily  atorvastatin 10 milliGRAM(s) Oral at bedtime  calcium carbonate 1250 mG  + Vitamin D (OsCal 500 + D) 1 Tablet(s) Oral daily  cefTRIAXone   IVPB 1000 milliGRAM(s) IV Intermittent every 24 hours  cholecalciferol 1000 Unit(s) Oral daily  digoxin     Tablet 125 MICROGram(s) Oral daily  furosemide   Injectable 20 milliGRAM(s) IV Push once  levothyroxine 88 MICROGram(s) Oral daily  sacubitril 24 mG/valsartan 26 mG 1 Tablet(s) Oral two times a day  warfarin 1 milliGRAM(s) Oral at bedtime  zinc oxide 40% Paste 1 Application(s) Topical two times a day    MEDICATIONS  (PRN):  acetaminophen     Tablet .. 650 milliGRAM(s) Oral every 6 hours PRN Temp greater or equal to 38C (100.4F), Mild Pain (1 - 3)  aluminum hydroxide/magnesium hydroxide/simethicone Suspension 30 milliLiter(s) Oral every 4 hours PRN Dyspepsia  melatonin 3 milliGRAM(s) Oral at bedtime PRN Insomnia  ondansetron Injectable 4 milliGRAM(s) IV Push every 8 hours PRN Nausea and/or Vomiting      Allergies    adhesives (Rash)  Ancef (Unknown)  lidocaine (Rash; Angioedema)    Intolerances    Gluten (Diarrhea)      SOCIAL HISTORY: Denies tobacco, etoh abuse or illicit drug use    FAMILY HISTORY:  Family history of diabetes mellitus (Mother, Sibling)    Family history of heart disease (Mother)    Family history of dementia (Father)        Vital Signs Last 24 Hrs  T(C): 36.8 (05 Sep 2022 07:42), Max: 37 (04 Sep 2022 16:13)  T(F): 98.2 (05 Sep 2022 07:42), Max: 98.6 (04 Sep 2022 16:13)  HR: 77 (05 Sep 2022 07:42) (76 - 79)  BP: 123/54 (05 Sep 2022 07:42) (123/54 - 139/66)  BP(mean): --  RR: 18 (05 Sep 2022 09:57) (17 - 19)  SpO2: 95% (05 Sep 2022 09:57) (88% - 97%)    Parameters below as of 05 Sep 2022 09:57  Patient On (Oxygen Delivery Method): room air        REVIEW OF SYSTEMS:    CONSTITUTIONAL:  As per HPI.  HEENT:  Eyes:  No diplopia or blurred vision. ENT:  No earache, sore throat or runny nose.  CARDIOVASCULAR:  No pressure, squeezing, tightness, heaviness or aching about the chest, neck, axilla or epigastrium.  RESPIRATORY:  No cough, shortness of breath, PND or orthopnea.  GASTROINTESTINAL:  No nausea, vomiting or diarrhea.  GENITOURINARY:  No dysuria, frequency or urgency.  MUSCULOSKELETAL:  As per HPI.  SKIN:  No change in skin, hair or nails.  NEUROLOGIC:  No paresthesias, fasciculations, seizures or weakness.  PSYCHIATRIC:  No disorder of thought or mood.  ENDOCRINE:  No heat or cold intolerance, polyuria or polydipsia.  HEMATOLOGICAL:  No easy bruising or bleedings:  .     PHYSICAL EXAMINATION:    GENERAL APPEARANCE:  Pt. is not currently dyspneic, in no distress. Pt. is alert, oriented, and pleasant.  HEENT:  Pupils are normal and react normally. No icterus. Mucous membranes well colored.  NECK:  Supple. No lymphadenopathy. Jugular venous pressure not elevated. Carotids equal.   HEART:   The cardiac impulse has a normal quality. Regular. Normal S1 and S2. There are no murmurs, rubs or gallops noted  CHEST:  Chest is clear to auscultation. Decreased BS's R base.   ABDOMEN:  Soft and nontender.   EXTREMITIES:  There is no cyanosis, clubbing.  Mild LE edema.   SKIN:  No rash or significant lesions are noted.  Neuro: Awake.      LABS:                        11.9   7.55  )-----------( 138      ( 05 Sep 2022 08:39 )             36.6     09-    138  |  105  |  22  ----------------------------<  125<H>  3.5   |  26  |  1.21    Ca    8.3<L>      05 Sep 2022 08:39  Phos  2.2     09-  Mg     2.0     -04    TPro  7.6  /  Alb  3.0<L>  /  TBili  0.4  /  DBili  x   /  AST  31  /  ALT  21  /  AlkPhos  68  09-03    LIVER FUNCTIONS - ( 03 Sep 2022 17:38 )  Alb: 3.0 g/dL / Pro: 7.6 gm/dL / ALK PHOS: 68 U/L / ALT: 21 U/L / AST: 31 U/L / GGT: x           PT/INR - ( 05 Sep 2022 08:39 )   PT: 18.1 sec;   INR: 1.55 ratio         PTT - ( 04 Sep 2022 07:18 )  PTT:42.7 sec  CARDIAC MARKERS ( 04 Sep 2022 07:18 )  x     / x     / 631 U/L / x     / x          Urinalysis Basic - ( 03 Sep 2022 17:41 )    Color: Yellow / Appearance: Clear / S.020 / pH: x  Gluc: x / Ketone: Negative  / Bili: Negative / Urobili: 1   Blood: x / Protein: 30 mg/dL / Nitrite: Negative   Leuk Esterase: Negative / RBC: 11-25 /HPF / WBC 0-2   Sq Epi: x / Non Sq Epi: Few / Bacteria: Few          RADIOLOGY & ADDITIONAL STUDIES:

## 2022-09-05 NOTE — PROGRESS NOTE ADULT - ASSESSMENT
87-year-old female with medical history of CHF, atrial fibrillation, on warfarin, s/p AICD, CAD, hypothyroidism, celiac disease who presented in the emergency room via EMS after she fell at home and hit her head.  The patient lives alone and she had a Lifeline alert which she  activated however her daughter did not hear it.  She was found by the daughter later.  She also stated that she was coughing last night.  Patient tested positive for Covid 19 in the ER    #s/p mechanical fall  #MAURICE likely pre-renal POA. Now resolved  #possible Rhabdomylosis but unable to cliniclly determine without a cpk level on admission  -IVF as needed for fluid balance  -Continue to monitor Cr/Electrolhytes  -CT head negative.     #COVID-19 Infection  #Possible Community Acquire Pneumonia. Unable to clinically determine  #No Sepsis POA.  -Admitted to medicine  -Started on empiric abx.   -CXRAY with pleural effusion vs atelectasis vs consolidation.   -CXRAY similar to prios.   -No hypoxia->not a candidate for remdesevir  -On amiodarone->not a condidate for Paxlovid  -Continue to monitor respiratory status.     #HFpEF  #Aortic Stenosis  #CAD  #Chronic Afib  -Continue coumadin and INR monitoring  -COntinue Amiodaone/Dig/Entresto/ASA    #Hypothyroidism  -Continue levothyroxine    DVT Prophylaxis: ON coumadin                     87-year-old female with medical history of CHF, atrial fibrillation, on warfarin, s/p AICD, CAD, hypothyroidism, celiac disease who presented in the emergency room via EMS after she fell at home and hit her head.  The patient lives alone and she had a Lifeline alert which she  activated however her daughter did not hear it.  She was found by the daughter later.  She also stated that she was coughing last night.  Patient tested positive for Covid 19 in the ER    #s/p mechanical fall  #MAURICE likely pre-renal POA. Now resolved  #possible Rhabdomylosis but unable to cliniclly determine without a cpk level on admission  -IVF as needed for fluid balance  -Continue to monitor Cr/Electrolhytes  -CT head negative.     #COVID-19 Infection  #Possible Community Acquire Pneumonia. Unable to clinically determine  #No Sepsis POA.  -Admitted to medicine  -Started on empiric abx.   -CXRAY with pleural effusion vs atelectasis vs consolidation.   -CXRAY similar to prior with slightly more vascular congestion.   -No hypoxia->not a candidate for remdesevir  -On amiodarone->not a condidate for Paxlovid  -Continue to monitor respiratory status.     #Acute on Chronic Diastolic Heart Failure  #Aortic Stenosis  #CAD  #Chronic Afib  -Continue coumadin and INR monitoring  -Continue Amiodaone/Dig/Entresto/ASA  -ProBNP (9/5); 17K. CXRAY with vascular congestion. IV Lasix 20mg x 1 ordered (9/5)  -Cardiology consulted    #Hypothyroidism  -Continue levothyroxine    DVT Prophylaxis: ON coumadin    Disposition: IV abx as per ID. Likely with some mild fluid overload/vascular congestion. NO hypoxia. IV lasix x1 ordered. Reassess in am. Card/ID recs. PT evaluation.

## 2022-09-06 ENCOUNTER — TRANSCRIPTION ENCOUNTER (OUTPATIENT)
Age: 87
End: 2022-09-06

## 2022-09-06 VITALS — WEIGHT: 98.99 LBS

## 2022-09-06 LAB
ANION GAP SERPL CALC-SCNC: 7 MMOL/L — SIGNIFICANT CHANGE UP (ref 5–17)
BUN SERPL-MCNC: 22 MG/DL — SIGNIFICANT CHANGE UP (ref 7–23)
CALCIUM SERPL-MCNC: 8.2 MG/DL — LOW (ref 8.5–10.1)
CHLORIDE SERPL-SCNC: 108 MMOL/L — SIGNIFICANT CHANGE UP (ref 96–108)
CO2 SERPL-SCNC: 24 MMOL/L — SIGNIFICANT CHANGE UP (ref 22–31)
CREAT SERPL-MCNC: 0.98 MG/DL — SIGNIFICANT CHANGE UP (ref 0.5–1.3)
EGFR: 56 ML/MIN/1.73M2 — LOW
GLUCOSE SERPL-MCNC: 93 MG/DL — SIGNIFICANT CHANGE UP (ref 70–99)
INR BLD: 1.76 RATIO — HIGH (ref 0.88–1.16)
MAGNESIUM SERPL-MCNC: 2.2 MG/DL — SIGNIFICANT CHANGE UP (ref 1.6–2.6)
PHOSPHATE SERPL-MCNC: 2.4 MG/DL — LOW (ref 2.5–4.5)
POTASSIUM SERPL-MCNC: 4 MMOL/L — SIGNIFICANT CHANGE UP (ref 3.5–5.3)
POTASSIUM SERPL-SCNC: 4 MMOL/L — SIGNIFICANT CHANGE UP (ref 3.5–5.3)
PROTHROM AB SERPL-ACNC: 20.5 SEC — HIGH (ref 10.5–13.4)
SODIUM SERPL-SCNC: 139 MMOL/L — SIGNIFICANT CHANGE UP (ref 135–145)

## 2022-09-06 PROCEDURE — 99233 SBSQ HOSP IP/OBS HIGH 50: CPT

## 2022-09-06 PROCEDURE — 99239 HOSP IP/OBS DSCHRG MGMT >30: CPT

## 2022-09-06 PROCEDURE — 93283 PRGRMG EVAL IMPLANTABLE DFB: CPT | Mod: 26

## 2022-09-06 RX ORDER — WARFARIN SODIUM 2.5 MG/1
1 TABLET ORAL
Qty: 30 | Refills: 0
Start: 2022-09-06

## 2022-09-06 RX ORDER — WARFARIN SODIUM 2.5 MG/1
0 TABLET ORAL
Qty: 0 | Refills: 0 | DISCHARGE

## 2022-09-06 RX ORDER — WARFARIN SODIUM 2.5 MG/1
2 TABLET ORAL ONCE
Refills: 0 | Status: DISCONTINUED | OUTPATIENT
Start: 2022-09-06 | End: 2022-09-06

## 2022-09-06 RX ORDER — WARFARIN SODIUM 2.5 MG/1
1 TABLET ORAL
Qty: 13 | Refills: 0
Start: 2022-09-06 | End: 2022-10-05

## 2022-09-06 RX ORDER — SODIUM,POTASSIUM PHOSPHATES 278-250MG
1 POWDER IN PACKET (EA) ORAL
Refills: 0 | Status: DISCONTINUED | OUTPATIENT
Start: 2022-09-06 | End: 2022-09-06

## 2022-09-06 RX ORDER — FUROSEMIDE 40 MG
20 TABLET ORAL DAILY
Refills: 0 | Status: DISCONTINUED | OUTPATIENT
Start: 2022-09-06 | End: 2022-09-06

## 2022-09-06 RX ORDER — SODIUM,POTASSIUM PHOSPHATES 278-250MG
1 POWDER IN PACKET (EA) ORAL
Qty: 6 | Refills: 0
Start: 2022-09-06 | End: 2022-09-07

## 2022-09-06 RX ORDER — FUROSEMIDE 40 MG
1 TABLET ORAL
Qty: 30 | Refills: 0 | DISCHARGE
Start: 2022-09-06 | End: 2022-10-05

## 2022-09-06 RX ORDER — FUROSEMIDE 40 MG
1 TABLET ORAL
Qty: 30 | Refills: 0
Start: 2022-09-06 | End: 2022-10-05

## 2022-09-06 RX ADMIN — Medication 81 MILLIGRAM(S): at 09:38

## 2022-09-06 RX ADMIN — Medication 125 MICROGRAM(S): at 09:39

## 2022-09-06 RX ADMIN — Medication 1 PACKET(S): at 13:45

## 2022-09-06 RX ADMIN — Medication 1000 UNIT(S): at 09:38

## 2022-09-06 RX ADMIN — ZINC OXIDE 1 APPLICATION(S): 200 OINTMENT TOPICAL at 05:24

## 2022-09-06 RX ADMIN — SACUBITRIL AND VALSARTAN 1 TABLET(S): 24; 26 TABLET, FILM COATED ORAL at 09:38

## 2022-09-06 RX ADMIN — AMIODARONE HYDROCHLORIDE 100 MILLIGRAM(S): 400 TABLET ORAL at 09:38

## 2022-09-06 RX ADMIN — Medication 1 TABLET(S): at 09:37

## 2022-09-06 RX ADMIN — Medication 88 MICROGRAM(S): at 05:24

## 2022-09-06 RX ADMIN — Medication 20 MILLIGRAM(S): at 09:37

## 2022-09-06 NOTE — PHYSICAL THERAPY INITIAL EVALUATION ADULT - PRECAUTIONS/LIMITATIONS, REHAB EVAL
Airborne/Contact Precautions for Covid + 9/3; +BiV ICD/CRT-D/cardiac precautions/fall precautions/hearing precautions/isolation precautions

## 2022-09-06 NOTE — PHYSICAL THERAPY INITIAL EVALUATION ADULT - GENERAL OBSERVATIONS, REHAB EVAL
supine in bed , ate minimal (banana) for lunch and states she does not want any dinner, c/o no appetite since becoming ill; eager to mobilize , RUE with ++edema about R elbow (Infiltated antecubital old  IV site) posture is hunched with kyphotic upper back, pt appears frail/underweight

## 2022-09-06 NOTE — PROCEDURE NOTE - NSPERFORMEDBY_GEN_A_CORE
Alert and oriented to person, place and time, memory intact, behavior appropriate to situation, PERRL. Myself

## 2022-09-06 NOTE — PROGRESS NOTE ADULT - SUBJECTIVE AND OBJECTIVE BOX
Date of service: 22 @ 15:16      Patient sitting in chair; expected discharge today; afebrile    ROS unable to obtain secondary to patient medical condition     MEDICATIONS  (STANDING):  aMIOdarone    Tablet 100 milliGRAM(s) Oral daily  aspirin enteric coated 81 milliGRAM(s) Oral daily  atorvastatin 10 milliGRAM(s) Oral at bedtime  calcium carbonate 1250 mG  + Vitamin D (OsCal 500 + D) 1 Tablet(s) Oral daily  cefTRIAXone   IVPB 1000 milliGRAM(s) IV Intermittent every 24 hours  cholecalciferol 1000 Unit(s) Oral daily  digoxin     Tablet 125 MICROGram(s) Oral daily  furosemide    Tablet 20 milliGRAM(s) Oral daily  levothyroxine 88 MICROGram(s) Oral daily  potassium phosphate / sodium phosphate Powder (PHOS-NaK) 1 Packet(s) Oral three times a day with meals  sacubitril 24 mG/valsartan 26 mG 1 Tablet(s) Oral two times a day  warfarin 2 milliGRAM(s) Oral once  zinc oxide 40% Paste 1 Application(s) Topical two times a day    MEDICATIONS  (PRN):  acetaminophen     Tablet .. 650 milliGRAM(s) Oral every 6 hours PRN Temp greater or equal to 38C (100.4F), Mild Pain (1 - 3)  aluminum hydroxide/magnesium hydroxide/simethicone Suspension 30 milliLiter(s) Oral every 4 hours PRN Dyspepsia  melatonin 3 milliGRAM(s) Oral at bedtime PRN Insomnia  ondansetron Injectable 4 milliGRAM(s) IV Push every 8 hours PRN Nausea and/or Vomiting      Vital Signs Last 24 Hrs  T(C): 36.9 (06 Sep 2022 08:52), Max: 36.9 (06 Sep 2022 08:52)  T(F): 98.4 (06 Sep 2022 08:52), Max: 98.4 (06 Sep 2022 08:52)  HR: 74 (06 Sep 2022 08:52) (74 - 76)  BP: 112/67 (06 Sep 2022 08:52) (112/67 - 117/65)  BP(mean): --  RR: 18 (06 Sep 2022 08:52) (18 - 18)  SpO2: 94% (06 Sep 2022 08:52) (94% - 95%)    Parameters below as of 05 Sep 2022 22:30  Patient On (Oxygen Delivery Method): room air            Physical Exam:            Constitutional: frail looking  HEENT: NC/AT, EOMI, PERRLA, conjunctivae clear; ears and nose atraumatic; pharynx clear  Neck: supple; thyroid not palpable  Back: no tenderness  Respiratory: respiratory effort normal; clear to auscultation  Cardiovascular: S1S2 regular, no murmurs  Abdomen: soft, not tender, not distended, positive BS; no liver or spleen organomegaly  Genitourinary: no suprapubic tenderness  Musculoskeletal: no muscle tenderness, no joint swelling or tenderness  Neurological/ Psychiatric: AxOx3, judgement and insight normal;  moving all extremities  Skin: no rashes; no palpable lesions    Labs: all available labs reviewed                        10.   5  )-----------( 144      ( 04 Sep 2022 07:18 )             31.4     09-04    140  |  108  |  22  ----------------------------<  86  3.6   |  26  |  1.08    Ca    8.1<L>      04 Sep 2022 07:18  Phos  2.2     -  Mg     2.0     -    TPro  7.6  /  Alb  3.0<L>  /  TBili  0.4  /  DBili  x   /  AST  31  /  ALT  21  /  AlkPhos  68  09-03     LIVER FUNCTIONS - ( 03 Sep 2022 17:38 )  Alb: 3.0 g/dL / Pro: 7.6 gm/dL / ALK PHOS: 68 U/L / ALT: 21 U/L / AST: 31 U/L / GGT: x           Urinalysis Basic - ( 03 Sep 2022 17:41 )    Color: Yellow / Appearance: Clear / S.020 / pH: x  Gluc: x / Ketone: Negative  / Bili: Negative / Urobili: 1   Blood: x / Protein: 30 mg/dL / Nitrite: Negative   Leuk Esterase: Negative / RBC: 11-25 /HPF / WBC 0-2   Sq Epi: x / Non Sq Epi: Few / Bacteria: Few    Respiratory Viral Panel with COVID-19 by JAMEE (22 @ 17:38)    Rapid RVP Result: Detected    SARS-CoV-2: Detected: This Respiratory Panel uses polymerase chain reaction (PCR) to detect for  adenovirus; coronavirus (HKU1, NL63, 229E, OC43); human metapneumovirus  (hMPV); human enterovirus/rhinovirus (Entero/RV); influenza A; influenza  A/H1; influenza A/H3; influenza A/H1-2009; influenza B; parainfluenza  viruses 1, 2, 3, 4; respiratory syncytial virus; Mycoplasma pneumoniae;  Chlamydophila pneumoniae; and SARS-CoV-2.            < from: Xray Chest 1 View- PORTABLE-Urgent (Xray Chest 1 View- PORTABLE-Urgent .) (22 @ 17:02) >  ACC: [id number] EXAM:  XR CHEST PORTABLE URGENT 1V                          PROCEDURE DATE:  2022          INTERPRETATION:  Cough.    AP chest.    IMPRESSION:    Right ICD. Mitral clips. Stable cardiomegaly. Mild vascular congestion.   Small tomoderate right pleural effusion with underlying   consolidation/atelectasis partially obscured by overlying pacer device.   Correlate clinically for infection    < end of copied text >          Radiology: all available radiological tests reviewed    Advanced directives addressed: full resuscitation

## 2022-09-06 NOTE — PHYSICAL THERAPY INITIAL EVALUATION ADULT - LEVEL OF INDEPENDENCE: SIT/STAND, REHAB EVAL
CCGx1, slight posterior sway on arising improved with standing in place over time,expresses fear of falling ,denies dizziness/contact guard

## 2022-09-06 NOTE — DISCHARGE NOTE NURSING/CASE MANAGEMENT/SOCIAL WORK - NSDCVIVACCINE_GEN_ALL_CORE_FT
Tdap; 09-Nov-2017 22:13; Edward Grajeda); [employer]; [id number]; IntraMuscular; Deltoid Right.; 0.5 milliLiter(s); VIS (VIS Published: 09-May-2013, VIS Presented: 09-Nov-2017);

## 2022-09-06 NOTE — PHYSICAL THERAPY INITIAL EVALUATION ADULT - NSACTIVITYREC_GEN_A_PT
Pt should be out of bed /chair daily all meals if possible ; may amb with RW and 1PA nursing staff as well as PT pending DC home with HC; d/w CORIE STRAUSS on 5S

## 2022-09-06 NOTE — DISCHARGE NOTE PROVIDER - NSDCCPCAREPLAN_GEN_ALL_CORE_FT
PRINCIPAL DISCHARGE DIAGNOSIS  Diagnosis: Acute on chronic combined systolic and diastolic congestive heart failure  Assessment and Plan of Treatment: INcreased fluid retention as result of weak heart. Improved with diuretics (furosemide). Continue oral diurectice (furosemide) as prescribed and follow up with your heart doctor in 1-2 weeks.  For your heart failure:  Weigh yourself daily with the same scale. Any weight gain/loss greater than 2-3 pounds over 2 days or 5 pounds in a week, notify your cardiologist and primary medical doctor as it may indicate that your are retaining too much water or losing too much water. In which case, it may require dose adjustments to your diuretic(s).  Maintain low salt diet.  -Less than 2 Grams (2 Grams = 2000 milligrams) of daily salt intake. Too much salt intake can potentiate fluid retention.  Get repeat blood work to check your kidney function and electrolytes in 1 week with your cardiologist or primary medical doctor.      SECONDARY DISCHARGE DIAGNOSES  Diagnosis: Fall  Assessment and Plan of Treatment: Cuation with ambulation. Continue physical therapy. Outpatient follow up with your primary medical doctor.    Diagnosis: COVID-19  Assessment and Plan of Treatment: You tested positive for COVID-19. Although you may have had the infection, your oxygen saturation was not compromised as result of the infection. Follow up outpatient with your primary medical doctor.     PRINCIPAL DISCHARGE DIAGNOSIS  Diagnosis: Acute on chronic combined systolic and diastolic congestive heart failure  Assessment and Plan of Treatment: Increased fluid retention as result of weak heart. Improved with diuretics (furosemide). Continue oral diurectice (furosemide) as prescribed. Your AICD was interrogated and is stable. Battery life reported as 2.2 years. Follow up with your heart doctor in 1-2 weeks.   For your heart failure:  Weigh yourself daily with the same scale. Any weight gain/loss greater than 2-3 pounds over 2 days or 5 pounds in a week, notify your cardiologist and primary medical doctor as it may indicate that your are retaining too much water or losing too much water. In which case, it may require dose adjustments to your diuretic(s).  Maintain low salt diet.  -Less than 2 Grams (2 Grams = 2000 milligrams) of daily salt intake. Too much salt intake can potentiate fluid retention.  Get repeat blood work to check your kidney function and electrolytes in 1 week with your cardiologist or primary medical doctor.      SECONDARY DISCHARGE DIAGNOSES  Diagnosis: Fall  Assessment and Plan of Treatment: Cuation with ambulation. Continue physical therapy. Outpatient follow up with your primary medical doctor.    Diagnosis: COVID-19  Assessment and Plan of Treatment: You tested positive for COVID-19. Although you may have had the infection, your oxygen saturation was not compromised as result of the infection. Follow up outpatient with your primary medical doctor.    Diagnosis: Chronic atrial fibrillation  Assessment and Plan of Treatment: Your coumadin dose was re-adjusted becase of low INR readings while in the hospital. Take 2mg daily on Tuesday, Thursday and Sunday and 1mg daily on Monday, Wednesday, Friday Saturday. Repeat INR blood work in the next week with your primary medical doctor or heart docotor.

## 2022-09-06 NOTE — DIETITIAN INITIAL EVALUATION ADULT - PERTINENT MEDS FT
MEDICATIONS  (STANDING):  aMIOdarone    Tablet 100 milliGRAM(s) Oral daily  aspirin enteric coated 81 milliGRAM(s) Oral daily  atorvastatin 10 milliGRAM(s) Oral at bedtime  calcium carbonate 1250 mG  + Vitamin D (OsCal 500 + D) 1 Tablet(s) Oral daily  cefTRIAXone   IVPB 1000 milliGRAM(s) IV Intermittent every 24 hours  cholecalciferol 1000 Unit(s) Oral daily  digoxin     Tablet 125 MICROGram(s) Oral daily  furosemide    Tablet 20 milliGRAM(s) Oral daily  levothyroxine 88 MICROGram(s) Oral daily  potassium phosphate / sodium phosphate Powder (PHOS-NaK) 1 Packet(s) Oral three times a day with meals  sacubitril 24 mG/valsartan 26 mG 1 Tablet(s) Oral two times a day  warfarin 2 milliGRAM(s) Oral once  zinc oxide 40% Paste 1 Application(s) Topical two times a day    MEDICATIONS  (PRN):  acetaminophen     Tablet .. 650 milliGRAM(s) Oral every 6 hours PRN Temp greater or equal to 38C (100.4F), Mild Pain (1 - 3)  aluminum hydroxide/magnesium hydroxide/simethicone Suspension 30 milliLiter(s) Oral every 4 hours PRN Dyspepsia  melatonin 3 milliGRAM(s) Oral at bedtime PRN Insomnia  ondansetron Injectable 4 milliGRAM(s) IV Push every 8 hours PRN Nausea and/or Vomiting

## 2022-09-06 NOTE — PROGRESS NOTE ADULT - SUBJECTIVE AND OBJECTIVE BOX
Subjective:    Awake, alert. Less dyspnea. Events noted.    MEDICATIONS  (STANDING):  aMIOdarone    Tablet 100 milliGRAM(s) Oral daily  aspirin enteric coated 81 milliGRAM(s) Oral daily  atorvastatin 10 milliGRAM(s) Oral at bedtime  calcium carbonate 1250 mG  + Vitamin D (OsCal 500 + D) 1 Tablet(s) Oral daily  cefTRIAXone   IVPB 1000 milliGRAM(s) IV Intermittent every 24 hours  cholecalciferol 1000 Unit(s) Oral daily  digoxin     Tablet 125 MICROGram(s) Oral daily  furosemide    Tablet 20 milliGRAM(s) Oral daily  levothyroxine 88 MICROGram(s) Oral daily  potassium phosphate / sodium phosphate Powder (PHOS-NaK) 1 Packet(s) Oral three times a day with meals  sacubitril 24 mG/valsartan 26 mG 1 Tablet(s) Oral two times a day  warfarin 2 milliGRAM(s) Oral once  zinc oxide 40% Paste 1 Application(s) Topical two times a day    MEDICATIONS  (PRN):  acetaminophen     Tablet .. 650 milliGRAM(s) Oral every 6 hours PRN Temp greater or equal to 38C (100.4F), Mild Pain (1 - 3)  aluminum hydroxide/magnesium hydroxide/simethicone Suspension 30 milliLiter(s) Oral every 4 hours PRN Dyspepsia  melatonin 3 milliGRAM(s) Oral at bedtime PRN Insomnia  ondansetron Injectable 4 milliGRAM(s) IV Push every 8 hours PRN Nausea and/or Vomiting      Allergies    adhesives (Rash)  Ancef (Unknown)  lidocaine (Rash; Angioedema)    Intolerances    Gluten (Diarrhea)      Vital Signs Last 24 Hrs  T(C): 36.9 (06 Sep 2022 08:52), Max: 36.9 (06 Sep 2022 08:52)  T(F): 98.4 (06 Sep 2022 08:52), Max: 98.4 (06 Sep 2022 08:52)  HR: 74 (06 Sep 2022 08:52) (74 - 76)  BP: 112/67 (06 Sep 2022 08:52) (112/67 - 117/65)  BP(mean): --  RR: 18 (06 Sep 2022 08:52) (18 - 18)  SpO2: 94% (06 Sep 2022 08:52) (94% - 95%)    Parameters below as of 05 Sep 2022 22:30  Patient On (Oxygen Delivery Method): room air        PHYSICAL EXAMINATION:    NECK:  Supple. No lymphadenopathy. Jugular venous pressure not elevated.  HEART:   The cardiac impulse has a normal quality. Reg., Nl S1 and S2.    CHEST:  Chest with decreased BS at right base, few crackles ot left base. Normal respiratory effort.  ABDOMEN:  Soft and nontender.   EXTREMITIES:  There is no edema.       LABS:                        11.9   7.55  )-----------( 138      ( 05 Sep 2022 08:39 )             36.6     09-06    139  |  108  |  22  ----------------------------<  93  4.0   |  24  |  0.98    Ca    8.2<L>      06 Sep 2022 07:51  Phos  2.4     09-06  Mg     2.2     09-06      PT/INR - ( 06 Sep 2022 07:51 )   PT: 20.5 sec;   INR: 1.76 ratio               RADIOLOGY & ADDITIONAL TESTS:< from: Xray Chest 1 View- PORTABLE-Urgent (Xray Chest 1 View- PORTABLE-Urgent .) (09.03.22 @ 17:02) >  ACC: [id number] EXAM:  XR CHEST PORTABLE URGENT 1V                          PROCEDURE DATE:  09/03/2022          INTERPRETATION:  Cough.    AP chest.    IMPRESSION:    Right ICD. Mitral clips. Stable cardiomegaly. Mild vascular congestion.   Small tomoderate right pleural effusion with underlying   consolidation/atelectasis partially obscured by overlying pacer device.   Correlate clinically for infection      ORI JACOB MD    Assessment and Recommendation:   · Assessment	  R pleural effusion likely secondary to CHF.  IV lasix x 2 days, then po if stable as per cardiology.  hold on thoracentesis at this time.  repeat CXR in another 2-3 days.     covid infection. ID following.  not requiring O2.   likely ceftriaxone for another day, then consider switch to oral Abx.  has R effusion on CXR felt to be secondary to CHF, normal WBC count, afebrile today.      - Monitor O2 Sats  - Cardiology F/U     Problem/Recommendation - 1:  ·  COVID-19.   Problem/Recommendation - 2:  ·  Congestive heart failure (CHF).   Problem/Recommendation - 3:  ·  Pleural effusion, right.   Problem/Recommendation - 4:  ·  Cough.   Problem/Recommendation - 5:  ·  Fall.   Problem/Recommendation - 6:  ·  Atrial fibrillation.   Problem/Recommendation - 7:  ·  Mitral regurgitation.

## 2022-09-06 NOTE — DIETITIAN INITIAL EVALUATION ADULT - PERTINENT LABORATORY DATA
09-06    139  |  108  |  22  ----------------------------<  93  4.0   |  24  |  0.98    Ca    8.2<L>      06 Sep 2022 07:51  Phos  2.4     09-06  Mg     2.2     09-06

## 2022-09-06 NOTE — DISCHARGE NOTE PROVIDER - DETAILS OF MALNUTRITION DIAGNOSIS/DIAGNOSES
This patient has been assessed with a concern for Malnutrition and was treated during this hospitalization for the following Nutrition diagnosis/diagnoses:     -  09/06/2022: Severe protein-calorie malnutrition   -  09/06/2022: Underweight (BMI < 19)

## 2022-09-06 NOTE — DISCHARGE NOTE PROVIDER - NSDCMRMEDTOKEN_GEN_ALL_CORE_FT
amiodarone 100 mg oral tablet: 1 tab(s) orally once a day  aspirin 81 mg oral delayed release tablet: 1 tab(s) orally once a day  atorvastatin 10 mg oral tablet: 1 tab(s) orally once a day (at bedtime)  Calcium 500+D oral tablet, chewable: 1 tab(s) orally 2 times a day  digoxin 125 mcg (0.125 mg) oral tablet: 0.5 tab(s) orally once a day  Entresto 24 mg-26 mg oral tablet: 1 tab(s) orally 2 times a day  furosemide 20 mg oral tablet: 1 tab(s) orally once a day  levothyroxine 88 mcg (0.088 mg) oral tablet: 1 tab(s) orally once a day  sodium/potassium/phosphorus 160 mg-280 mg-250 mg oral powder for reconstitution: 1 packet(s) orally 3 times a day (with meals)  Vitamin D3 1000 intl units (25 mcg) oral tablet: 1 tab(s) orally once a day  warfarin 1 mg oral tablet: 1 tab(s) orally Monday, Wednesday, and Friday and Saturday;   warfarin 2 mg oral tablet: 1 tab(s) orally Tuesday, Thursday, Sunday

## 2022-09-06 NOTE — DISCHARGE NOTE PROVIDER - HOSPITAL COURSE
FROM H&P:    "The patient is a 87-year-old female with medical history of CHF, atrial fibrillation, on warfarin, s/p AICD, CAD, hypothyroidism, celiac disease who presented in the emergency room via EMS after she fell at home and hit her head.  The patient lives alone and she had a Lifeline alert which activated over her daughter did not hear it.  She was found by the daughter.  She also stated that she was coughing last night.  Patient tested positive for Covid 19 in the ER."    #s/p mechanical fall  #MAURICE likely pre-renal POA. Now resolved  #possible Rhabdomylosis but unable to cliniclly determine without a cpk level on admission  -IVF as needed for fluid balance  -Continue to monitor Cr/Electrolhytes  -CT head negative.     #COVID-19 Infection  #Possible Community Acquire Pneumonia. Unable to clinically determine  #No Sepsis POA.  -Admitted to medicine  -Started on empiric abx.   -CXRAY with pleural effusion vs atelectasis vs consolidation.   -CXRAY similar to prior with slightly more vascular congestion.   -No hypoxia->not a candidate for remdesevir  -On amiodarone->not a condidate for Paxlovid  -Continue to monitor respiratory status.     #Acute on Chronic Combined Systolic and Diastolic Heart Failure (EF 40%)  #Aortic Stenosis  #CAD  #Chronic Afib  -Continue coumadin and INR monitoring  -Continue Amiodaone/Dig/Entresto/ASA  -ProBNP (9/5); 17K. CXRAY with vascular congestion. IV Lasix 20mg x 1 ordered (9/5). PO lasix 20mg qd (9/6)  -Cardiology consulted->cleared for outpatient follow up.     #Hypothyroidism  -Continue levothyroxine    Marked improvement in symptoms with diuresis. No further antibiotics. Cleared by cardiology. Continue lasix at discharge with plans for outpatient re-evaluation with cardiology in 1 week.   T(C): 36.9 (09-06-22 @ 08:52), Max: 36.9 (09-06-22 @ 08:52)  HR: 74 (09-06-22 @ 08:52) (74 - 76)  BP: 112/67 (09-06-22 @ 08:52) (112/67 - 117/65)  RR: 18 (09-06-22 @ 08:52) (18 - 18)  SpO2: 94% (09-06-22 @ 08:52) (94% - 95%)  General: AAOx3; NAD; Frail  Head: AT/NC  ENT: Moist Mucous Membranes; No Injury  Neck: Non-tender; No JVD  CVS: RRR, S1&S2, Murmur +, No edema  Respiratory: Decreased basilar breath sounds unchanged; Normal Respiratory Effort; Normal respiratory effort and saturation on RA  Abdomen/GI: Soft, non-tender, non-distended, no guarding, no rebound, normal bowel sounds  MSK: No CVA tenderness, Normal ROM, No injury; Muscle atrophy  Neuro: AAOx3, CNII-XII grossly intact, non-focal  Skin: Clean, Dry and Intact  Discharge Management: 39 minutes   Date of Discharge/Service: 9/6/2022

## 2022-09-06 NOTE — DISCHARGE NOTE PROVIDER - CARE PROVIDERS DIRECT ADDRESSES
[email or web address]

## 2022-09-06 NOTE — DIETITIAN INITIAL EVALUATION ADULT - OTHER INFO
The patient is a 87-year-old female with medical history of CHF, atrial fibrillation, on warfarin, s/p AICD, CAD, hypothyroidism, celiac disease who presented in the emergency room via EMS after she fell at home and hit her head.  The patient lives alone and she had a Lifeline alert which activated over her daughter did not hear it, found by her daughter. Patient tested positive for Covid 19 in the ER. Admit for covid positive infection & PNA. R pleural effusion likely secondary to CHF.    Pt on IV lasix x 3 days, then po if stable as per cardiology. Pt reports UBW of 97# x 1-2 yrs; bed scale wt of 99# taken by RD on 09/6/22 - edema could be masking further losses at this time 2/2 CHF. Pt reports "good" appetite & po intake, breakfast tray observed at bedside - pt consumed <50% of meal. NFPE reveals severe muscle/ fat wasting - pt appears thin, frail, & amarjit. Pt meets criteria for severe PCM. Liberalize diet to regular to maximize caloric and nutrient intake, c/w gluten/gliadin restriction 2/2 pt has celiac disease. Will trial gelatein TID - pt is receptive. See below for other recommendations.

## 2022-09-06 NOTE — PHYSICAL THERAPY INITIAL EVALUATION ADULT - ACTIVE RANGE OF MOTION EXAMINATION, REHAB EVAL
B shoulder elevation mildly limited due to postural changes/kyphotic or hunched upper back/bilateral upper extremity Active ROM was WFL (within functional limits)/bilateral  lower extremity Active ROM was WFL (within functional limits)

## 2022-09-06 NOTE — PHYSICAL THERAPY INITIAL EVALUATION ADULT - PLANNED THERAPY INTERVENTIONS, PT EVAL
functional endurance/bed mobility training/gait training/postural re-education/ROM/strengthening/transfer training

## 2022-09-06 NOTE — PHYSICAL THERAPY INITIAL EVALUATION ADULT - PERTINENT HX OF CURRENT PROBLEM, REHAB EVAL
mechanical fall striking head in home , found to be Covid+ in ED 9/3/22 pt bent over to  something ,lost balance ,mechanical fall striking face  in home , found to be Covid+ in ED 9/3/22

## 2022-09-06 NOTE — PHYSICAL THERAPY INITIAL EVALUATION ADULT - SKIN COLOR/CHARACTERISTICS
old IV site R antecubital with small area of surrounding ecchymosis , subcutaneous edema R mid UE due to apparent infiltration/pale

## 2022-09-06 NOTE — DISCHARGE NOTE NURSING/CASE MANAGEMENT/SOCIAL WORK - NSDCPEFALRISK_GEN_ALL_CORE
For information on Fall & Injury Prevention, visit: [email or web address] OR  [email or web address] OR  https://www.cdc.gov/steadi/patient.html

## 2022-09-06 NOTE — DISCHARGE NOTE NURSING/CASE MANAGEMENT/SOCIAL WORK - PATIENT PORTAL LINK FT
You can access the FollowMyHealth Patient Portal offered by [facility] by registering at the following website: [email or web address]. By joining [employer]’s FollowMyHealth portal, you will also be able to view your health information using other applications (apps) compatible with our system.

## 2022-09-06 NOTE — DISCHARGE NOTE PROVIDER - PROVIDER TOKENS
PROVIDER:[TOKEN:[[id number]],FOLLOWUP:[1 week],ESTABLISHEDPATIENT:[T]],PROVIDER:[TOKEN:[[id number]],FOLLOWUP:[1 week],ESTABLISHEDPATIENT:[T]]

## 2022-09-06 NOTE — DIETITIAN INITIAL EVALUATION ADULT - ADD RECOMMEND
1) Liberalize diet to regular, glutein-gliadin restricted to maximize caloric and nutrient intake.  2) Add gelatein TID   3) MVI w/ minerals daily to ensure 100% RDA met  4) Consider adding thiamine 100 mg daily 2/2 poor PO intake/ malnutrition  5) Encourage protein-rich foods, maximize food preferences  6) Obtain vitamin D 25OH level to assess nutriture  7) Monitor bowel movements, if no BM for >3 days, consider implementing bowel regimen.  8) Confirm goals of care regarding nutrition support  RD will continue to monitor PO intake, labs, hydration, and wt prn.

## 2022-09-06 NOTE — DIETITIAN INITIAL EVALUATION ADULT - NSFNSGIIOFT_GEN_A_CORE
I&O's Detail    05 Sep 2022 07:01  -  06 Sep 2022 07:00  --------------------------------------------------------  IN:    IV PiggyBack: 50 mL    Oral Fluid: 120 mL  Total IN: 170 mL    OUT:    Voided (mL): 300 mL  Total OUT: 300 mL    Total NET: -130 mL

## 2022-09-06 NOTE — PHYSICAL THERAPY INITIAL EVALUATION ADULT - PHYSICAL ASSIST/NONPHYSICAL ASSIST: STAND/SIT, REHAB EVAL
safety cues to approximate chair fully before abandoning RW/supervision/verbal cues/nonverbal cues (demo/gestures)/1 person assist

## 2022-09-06 NOTE — DISCHARGE NOTE NURSING/CASE MANAGEMENT/SOCIAL WORK - NSDCPEPTCAREGIVEDUMATLIST _GEN_ALL_CORE
Heart Failure/Influenza Vaccination/Coronavirus/COVID19 Heart Failure/Warfarin/Coumadin/Influenza Vaccination/Coronavirus/COVID19

## 2022-09-06 NOTE — DIETITIAN INITIAL EVALUATION ADULT - ORAL INTAKE PTA/DIET HISTORY
Pt lives at home alone & daughter does the grocery shopping, but pt cooks for herself. Pt reports good appetite & po intake, follows a gluten free diet 2/2 dx'd w/ celiac disease. 24-hr diet recall reveals that she meets <50% of ENN; typically consumes 2 meals/day - (light breakfast - GF muffins, GF bagel & pork chop/chicken for lunch) & does not usually eat dinner.

## 2022-09-06 NOTE — PHYSICAL THERAPY INITIAL EVALUATION ADULT - PATIENT PROFILE REVIEW, REHAB EVAL
pt with h/o systolic HFrEF ,h/o severe mitral regurg s/p mitral valve repair with 2 leaflet clips via R femoral vein approach (10/14/20 @ [facility]) Pt with biventricular ICD/CRt device ,EF=30-35%/yes

## 2022-09-06 NOTE — DISCHARGE NOTE PROVIDER - NSDCCAREPROVSEEN_GEN_ALL_CORE_FT
Efrain, Marcin Dickey, Lenny Stubbs, Edward Newby, Augustine Villa, Gilson Sarmiento, Nicole MCDONALD

## 2022-09-06 NOTE — PHYSICAL THERAPY INITIAL EVALUATION ADULT - GAIT PATTERN USED, PT EVAL
pursed lip breathing with exertion and c/o shortness of breath ,O2 SAT=92% on RA after amb moderate distance/2-point gait

## 2022-09-06 NOTE — DIETITIAN NUTRITION RISK NOTIFICATION - TREATMENT: THE FOLLOWING DIET HAS BEEN RECOMMENDED
Diet, DASH/TLC:   Sodium & Cholesterol Restricted  Gluten-Gliadin Restricted (09-04-22 @ 06:04) [Active]

## 2022-09-06 NOTE — PROCEDURE NOTE - INTERROGATION NOTE: COMMENTS
Normal functioning Bi-V ICD with battery longevity 2.2years. chronic AF with overall rate histogram acceptable. No stored arrhythmia to review. thoracic impedance trending down which c/w fluids accumulation.

## 2022-09-06 NOTE — PHYSICAL THERAPY INITIAL EVALUATION ADULT - DIAGNOSIS, PT EVAL
mechanical fall with head strike, +Covid 19 viral illness with ?CAP vs bronchitis ; pleural effusion ,HFrEF(=30-35%)

## 2022-09-06 NOTE — DISCHARGE NOTE PROVIDER - NSDCFUSCHEDAPPT_GEN_ALL_CORE_FT
[facility]  [facility] [address]  Scheduled Appointment: 12/01/2022    Lenny Dickey  [facility]  [facility] [address]  Scheduled Appointment: 12/01/2022

## 2022-09-06 NOTE — PROGRESS NOTE ADULT - ASSESSMENT
The patient is a 87-year-old female with medical history of CHF, atrial fibrillation, on warfarin, s/p AICD, CAD, hypothyroidism, celiac disease DJD, [place], osteoporosis admitted on 9/3 for evaluation after falling at home while picking up a crumb off the floor. She supposedly hit her head; she did not want to come to the hospital but her daughter found her on the floor and brought her to the hospital where upon admission she was found to be COVID-19 positive. She was vaccinated and boosted. She thinks a visiting nurse gave her COVID-19 and she has mild cough but is not requiring supplemental oxygen.     1. Patient admitted s/p mechanical fall, found to have COVID-19 infection, but not requiring supplemental oxygen; noted with possible early pneumonia versus pulmonary edema  - follow up cultures   - serial cbc and monitor temperature   - iv hydration and supportive care   - reviewed prior medical records to evaluate for resistant or atypical pathogens   - will not give MAB given duration of symptoms, cannot give paxlovid as patient is on amiodarone  - will stop zithromax given patient is on amiodarone  - okay from id standpoint to discharge home on no antibiotics  - discussed with patient son Dr. Judson Miranda, [phone], all questions answered when patient was admitted  2. other issues; per medicine

## 2022-09-06 NOTE — PHYSICAL THERAPY INITIAL EVALUATION ADULT - MARITAL STATUS
pt has dtr Aubrey Yun and a son Dr Judson Miranda (anesthesiologist in [facility])  ; pt resided with  Dalton in [facility] in [place] , has chair lift up 7+7 steps to bedroom/  Dalton  in past 2 years (was older in his 90s) pt has 2  dtrs, Aubrey Yun in [place] (works @ [employer] in [place]) and a another dtr in [place] who doesn't work ;  and a son Dr Judson Miranda (anesthesiologist in [facility])  ; pt resided with  Dalton in [facility] in [place] , has chair lift up 7+7 steps to bedroom/

## 2022-09-06 NOTE — DISCHARGE NOTE PROVIDER - CARE PROVIDER_API CALL
Maritza Marrero)  Cardiovascular Disease; Critical Care Medicine; Internal Medicine; Interventional Cardiology  [address] [place] [address]  Phone: [phone]  Fax: [phone]  Established Patient  Follow Up Time: 1 week    Lenny Dickey)  Internal Medicine; Pulmonary Disease  [address] [facility], [address]  [place] [address]  Phone: [phone]  Fax: [phone]  Established Patient  Follow Up Time: 1 week

## 2022-09-07 ENCOUNTER — NON-APPOINTMENT (OUTPATIENT)
Age: 87
End: 2022-09-07

## 2022-09-09 DIAGNOSIS — M81.0 AGE-RELATED OSTEOPOROSIS WITHOUT CURRENT PATHOLOGICAL FRACTURE: ICD-10-CM

## 2022-09-09 DIAGNOSIS — I48.20 CHRONIC ATRIAL FIBRILLATION, UNSPECIFIED: ICD-10-CM

## 2022-09-09 DIAGNOSIS — E03.9 HYPOTHYROIDISM, UNSPECIFIED: ICD-10-CM

## 2022-09-09 DIAGNOSIS — I25.10 ATHEROSCLEROTIC HEART DISEASE OF NATIVE CORONARY ARTERY WITHOUT ANGINA PECTORIS: ICD-10-CM

## 2022-09-09 DIAGNOSIS — E78.5 HYPERLIPIDEMIA, UNSPECIFIED: ICD-10-CM

## 2022-09-09 DIAGNOSIS — J15.9 UNSPECIFIED BACTERIAL PNEUMONIA: ICD-10-CM

## 2022-09-09 DIAGNOSIS — Z79.82 LONG TERM (CURRENT) USE OF ASPIRIN: ICD-10-CM

## 2022-09-09 DIAGNOSIS — N17.9 ACUTE KIDNEY FAILURE, UNSPECIFIED: ICD-10-CM

## 2022-09-09 DIAGNOSIS — I11.0 HYPERTENSIVE HEART DISEASE WITH HEART FAILURE: ICD-10-CM

## 2022-09-09 DIAGNOSIS — D50.9 IRON DEFICIENCY ANEMIA, UNSPECIFIED: ICD-10-CM

## 2022-09-09 DIAGNOSIS — H91.90 UNSPECIFIED HEARING LOSS, UNSPECIFIED EAR: ICD-10-CM

## 2022-09-09 DIAGNOSIS — Z95.810 PRESENCE OF AUTOMATIC (IMPLANTABLE) CARDIAC DEFIBRILLATOR: ICD-10-CM

## 2022-09-09 DIAGNOSIS — Z79.01 LONG TERM (CURRENT) USE OF ANTICOAGULANTS: ICD-10-CM

## 2022-09-09 DIAGNOSIS — U07.1 COVID-19: ICD-10-CM

## 2022-09-09 DIAGNOSIS — Z60.2 PROBLEMS RELATED TO LIVING ALONE: ICD-10-CM

## 2022-09-09 DIAGNOSIS — K90.0 CELIAC DISEASE: ICD-10-CM

## 2022-09-09 DIAGNOSIS — M19.90 UNSPECIFIED OSTEOARTHRITIS, UNSPECIFIED SITE: ICD-10-CM

## 2022-09-09 DIAGNOSIS — Z87.891 PERSONAL HISTORY OF NICOTINE DEPENDENCE: ICD-10-CM

## 2022-09-09 DIAGNOSIS — I25.2 OLD MYOCARDIAL INFARCTION: ICD-10-CM

## 2022-09-09 DIAGNOSIS — I08.0 RHEUMATIC DISORDERS OF BOTH MITRAL AND AORTIC VALVES: ICD-10-CM

## 2022-09-09 DIAGNOSIS — I50.43 ACUTE ON CHRONIC COMBINED SYSTOLIC (CONGESTIVE) AND DIASTOLIC (CONGESTIVE) HEART FAILURE: ICD-10-CM

## 2022-09-09 DIAGNOSIS — J12.82 PNEUMONIA DUE TO CORONAVIRUS DISEASE 2019: ICD-10-CM

## 2022-09-09 DIAGNOSIS — E43 UNSPECIFIED SEVERE PROTEIN-CALORIE MALNUTRITION: ICD-10-CM

## 2022-09-09 LAB
CULTURE RESULTS: SIGNIFICANT CHANGE UP
CULTURE RESULTS: SIGNIFICANT CHANGE UP
SPECIMEN SOURCE: SIGNIFICANT CHANGE UP
SPECIMEN SOURCE: SIGNIFICANT CHANGE UP

## 2022-09-09 SDOH — SOCIAL STABILITY - SOCIAL INSECURITY: PROBLEMS RELATED TO LIVING ALONE: Z60.2

## 2022-09-14 ENCOUNTER — NON-APPOINTMENT (OUTPATIENT)
Age: 87
End: 2022-09-14

## 2022-09-15 ENCOUNTER — APPOINTMENT (OUTPATIENT)
Dept: INTERNAL MEDICINE | Facility: CLINIC | Age: 87
End: 2022-09-15

## 2022-09-15 ENCOUNTER — LABORATORY RESULT (OUTPATIENT)
Age: 87
End: 2022-09-15

## 2022-09-15 VITALS
TEMPERATURE: 97.5 F | OXYGEN SATURATION: 97 % | BODY MASS INDEX: 57.52 KG/M2 | HEIGHT: 60 IN | DIASTOLIC BLOOD PRESSURE: 66 MMHG | WEIGHT: 293 LBS | SYSTOLIC BLOOD PRESSURE: 98 MMHG | HEART RATE: 72 BPM

## 2022-09-15 DIAGNOSIS — N18.30 CHRONIC KIDNEY DISEASE, STAGE 3 UNSPECIFIED: ICD-10-CM

## 2022-09-15 PROCEDURE — 99495 TRANSJ CARE MGMT MOD F2F 14D: CPT | Mod: 25

## 2022-09-15 PROCEDURE — 99214 OFFICE O/P EST MOD 30 MIN: CPT

## 2022-09-15 NOTE — PHYSICAL EXAM
[No Acute Distress] : no acute distress [No Respiratory Distress] : no respiratory distress  [No Accessory Muscle Use] : no accessory muscle use [Normal Rate] : normal rate  [Regular Rhythm] : with a regular rhythm [Normal S1, S2] : normal S1 and S2 [No Edema] : there was no peripheral edema [Soft] : abdomen soft [Non Tender] : non-tender [Non-distended] : non-distended [Normal Bowel Sounds] : normal bowel sounds [Normal Gait] : normal gait [Normal Affect] : the affect was normal [Alert and Oriented x3] : oriented to person, place, and time [de-identified] : Crackles ausculated B/L R>L  [de-identified] : Trace edema B/L R>L

## 2022-09-15 NOTE — HISTORY OF PRESENT ILLNESS
[Post-hospitalization from ___ Hospital] : Post-hospitalization from [unfilled] Hospital [Admitted on: ___] : The patient was admitted on [unfilled] [Discharged on ___] : discharged on [unfilled] [Discharge Summary] : discharge summary [Pertinent Labs] : pertinent labs [Discharge Med List] : discharge medication list [Patient Contacted By: ____] : and contacted by [unfilled] [FreeTextEntry2] : \par Patient: ANABEL GOFF\par Facility: [facility]\par Admit date/time: 09/03/2022 18:12:00\par Discharge date/time: 09/06/2022 16:41:00\par Admitting MD: MAISHA JHA\par Attending MD: HALINA WILLARD\par MG MRN: [id number]\par Registration System MRN: [id number]\par Hospital Visit# [id number]\par Hospital Service: MED\par LACE Score: 11\par STAR Enrolled: Yes \par BILLIE CASTRO - 07 Sep 2022 7:33 AM \par   TASK EDITED\par Hospital Course: \par Discharge Date	06-Sep-2022 \par Admission Date	03-Sep-2022 18:12 \par Reason for Admission	mechanical fall/COVID-19 positive \par Hospital Course	 \par FROM H&P: \par \par "The patient is a 87-year-old female with medical history of CHF, atrial \par fibrillation, on warfarin, s/p AICD, CAD, hypothyroidism, celiac disease who \par presented in the emergency room via EMS after she fell at home and hit her \par head.  The patient lives alone and she had a Lif[employer] alert which activated \par over her daughter did not hear it.  She was found by the daughter.  She also \par stated that she was coughing last night.  Patient tested positive for Covid 19 \par in the ER." \par \par #s/p mechanical fall \par #MAURICE likely pre-renal POA. Now resolved \par #possible Rhabdomylosis but unable to cliniclly determine without a cpk level \par on admission \par -IVF as needed for fluid balance \par -Continue to monitor Cr/Electrolhytes \par -CT head negative. \par \par #COVID-19 Infection \par #Possible Community Acquire Pneumonia. Unable to clinically determine \par #No Sepsis POA. \par -Admitted to medicine \par -Started on empiric abx. \par -CXRAY with pleural effusion vs atelectasis vs consolidation. \par -CXRAY similar to prior with slightly more vascular congestion. \par -No hypoxia->not a candidate for remdesevir \par -On amiodarone->not a condidate for Paxlovid \par -Continue to monitor respiratory status. 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 CXRAY with vascular congestion. IV Lasix 20mg x 1 ordered \par (9/5). PO lasix 20mg qd (9/6) \par -Cardiology consulted->cleared for outpatient follow up. \par \par #Hypothyroidism \par -Continue levothyroxine \par \par Marked improvement in symptoms with diuresis. No further antibiotics. Cleared \par by cardiology. Continue lasix at discharge with plans for outpatient \par re-evaluation with cardiology in 1 week. \par \par \par 9/15/22- Patient presents for HFU today with daughter. Patient states she has been very fatigued and c/o not having energy. She has also reported feeling very anxious, especially at night. She feels she cannot breathe. Still with wet productive cough. Clear sputum. Denies fever, CP. Has not followed up with cardiology yet. She has been taking lasix QD. Visiting nurse called yesterday stating patient hypotensive and symptomatic. Daughter requesting disability paperwork to be filled out now.

## 2022-09-15 NOTE — PLAN
[FreeTextEntry1] : 1. Labs drawn at visit including CBC CMP PT/INR to assess renal fx and INR. In office INR 3.8. Advised to hold Coumadin today and notify cardiology immediately of result. \par \par 2. Patient will follow up with cardiology immediately\par \par 3. Patient advised to obtain daily weights. If more than 3 pound weight gain or 5lbs in 1 week then she must take furosemide. Maintain weight  lbs\par \par 4. Patient will start zoloft 50mg daily for anxiety/ depression. Xanax as needed for anxiety \par \par 5.  F/u 12/2022 with Dr Dickey

## 2022-09-16 ENCOUNTER — NON-APPOINTMENT (OUTPATIENT)
Age: 87
End: 2022-09-16

## 2022-09-16 DIAGNOSIS — E87.6 HYPOKALEMIA: ICD-10-CM

## 2022-09-16 LAB
ALBUMIN SERPL ELPH-MCNC: 3.2 G/DL
ALP BLD-CCNC: 80 U/L
ALT SERPL-CCNC: 18 U/L
ANION GAP SERPL CALC-SCNC: 16 MMOL/L
AST SERPL-CCNC: 55 U/L
BASOPHILS # BLD AUTO: 0.03 K/UL
BASOPHILS NFR BLD AUTO: 0.6 %
BILIRUB SERPL-MCNC: 0.5 MG/DL
BUN SERPL-MCNC: 23 MG/DL
CALCIUM SERPL-MCNC: 9.1 MG/DL
CHLORIDE SERPL-SCNC: 90 MMOL/L
CO2 SERPL-SCNC: 28 MMOL/L
CREAT SERPL-MCNC: 1.62 MG/DL
EGFR: 31 ML/MIN/1.73M2
EOSINOPHIL # BLD AUTO: 0.03 K/UL
EOSINOPHIL NFR BLD AUTO: 0.6 %
GLUCOSE SERPL-MCNC: 170 MG/DL
HCT VFR BLD CALC: 40.5 %
HGB BLD-MCNC: 13 G/DL
IMM GRANULOCYTES NFR BLD AUTO: 0.2 %
INR PPP: 4.82 RATIO
LYMPHOCYTES # BLD AUTO: 0.48 K/UL
LYMPHOCYTES NFR BLD AUTO: 9.7 %
MAN DIFF?: NORMAL
MCHC RBC-ENTMCNC: 32.1 GM/DL
MCHC RBC-ENTMCNC: 32.9 PG
MCV RBC AUTO: 102.5 FL
MONOCYTES # BLD AUTO: 0.3 K/UL
MONOCYTES NFR BLD AUTO: 6 %
NEUTROPHILS # BLD AUTO: 4.12 K/UL
NEUTROPHILS NFR BLD AUTO: 82.9 %
PLATELET # BLD AUTO: 309 K/UL
POTASSIUM SERPL-SCNC: 3.3 MMOL/L
PROT SERPL-MCNC: 7.2 G/DL
PT BLD: 57.8 SEC
RBC # BLD: 3.95 M/UL
RBC # FLD: 13.5 %
SODIUM SERPL-SCNC: 134 MMOL/L
WBC # FLD AUTO: 4.97 K/UL

## 2022-10-11 RX ORDER — ATORVASTATIN CALCIUM 10 MG/1
10 TABLET, FILM COATED ORAL
Qty: 90 | Refills: 1 | Status: ACTIVE | COMMUNITY
Start: 2018-12-11 | End: 1900-01-01

## 2022-10-12 ENCOUNTER — NON-APPOINTMENT (OUTPATIENT)
Age: 87
End: 2022-10-12

## 2022-10-14 ENCOUNTER — RX RENEWAL (OUTPATIENT)
Age: 87
End: 2022-10-14

## 2022-11-01 NOTE — PATIENT PROFILE ADULT. - FUNCTIONAL SCREEN CURRENT LEVEL: TOILETING, MLM
[address]: Ms Amy Roche was seen today at 37w0d for NST (found under the pregnancy episode) which I reviewed the RN assessment and agree, and MADISON (see ultrasound report under OB procedures tab)  See ultrasound report under "OB Procedures" tab  Reports good fetal movement  Sugars are in target ranges on diet  Per last growth she is due for growth scan which is scheduled for next week  Please don't hesitate to contact our office with any concerns or questions   -Blanca Lynn      I spent 12 minutes devoted to patient care (3 min chart preparation, 5 minutes face to face and 4  minutes documenting)  (2) assistive person

## 2023-01-27 ENCOUNTER — APPOINTMENT (OUTPATIENT)
Dept: INTERNAL MEDICINE | Facility: CLINIC | Age: 88
End: 2023-01-27
Payer: MEDICARE

## 2023-01-27 ENCOUNTER — APPOINTMENT (OUTPATIENT)
Dept: INTERNAL MEDICINE | Facility: CLINIC | Age: 88
End: 2023-01-27

## 2023-01-27 VITALS
SYSTOLIC BLOOD PRESSURE: 100 MMHG | HEART RATE: 75 BPM | TEMPERATURE: 97.4 F | HEIGHT: 60 IN | OXYGEN SATURATION: 96 % | WEIGHT: 98 LBS | RESPIRATION RATE: 17 BRPM | DIASTOLIC BLOOD PRESSURE: 70 MMHG | BODY MASS INDEX: 19.24 KG/M2

## 2023-01-27 DIAGNOSIS — I42.9 CARDIOMYOPATHY, UNSPECIFIED: ICD-10-CM

## 2023-01-27 DIAGNOSIS — E03.9 HYPOTHYROIDISM, UNSPECIFIED: ICD-10-CM

## 2023-01-27 DIAGNOSIS — R06.09 OTHER FORMS OF DYSPNEA: ICD-10-CM

## 2023-01-27 DIAGNOSIS — Z00.00 ENCOUNTER FOR GENERAL ADULT MEDICAL EXAMINATION W/OUT ABNORMAL FINDINGS: ICD-10-CM

## 2023-01-27 PROCEDURE — 99214 OFFICE O/P EST MOD 30 MIN: CPT

## 2023-01-27 RX ORDER — LEVOTHYROXINE SODIUM 0.09 MG/1
88 TABLET ORAL DAILY
Qty: 90 | Refills: 1 | Status: DISCONTINUED | COMMUNITY
Start: 2017-08-15 | End: 2023-01-27

## 2023-01-27 NOTE — PLAN
[FreeTextEntry1] : 1.  Continue with medical regimen as outlined above.\par \par 2.  We will attempt to maintain her weight between 96 and 100 pounds if possible.\par \par 3.  Continue with close cardiology follow-up.\par \par 4.  Continue with ambulation with the assistance of a walker\par \par 5.  Follow-up with myself in 6 months with a wellness evaluation.  She will undergo yearly routine blood work via a home blood draw, prior to the visit.

## 2023-01-27 NOTE — PHYSICAL EXAM
[No Acute Distress] : no acute distress [Well Nourished] : well nourished [Well Developed] : well developed [Ill-Appearing] : ill-appearing [Normal Outer Ear/Nose] : the outer ears and nose were normal in appearance [Normal Oropharynx] : the oropharynx was normal [Normal TMs] : both tympanic membranes were normal [No JVD] : no jugular venous distention [Supple] : supple [No Lymphadenopathy] : no lymphadenopathy [Thyroid Normal, No Nodules] : the thyroid was normal and there were no nodules present [No Respiratory Distress] : no respiratory distress  [Clear to Auscultation] : lungs were clear to auscultation bilaterally [No Accessory Muscle Use] : no accessory muscle use [Normal Rate] : normal rate  [Normal S1, S2] : normal S1 and S2 [No Edema] : there was no peripheral edema [No Extremity Clubbing/Cyanosis] : no extremity clubbing/cyanosis [Soft] : abdomen soft [Non Tender] : non-tender [Normal Supraclavicular Nodes] : no supraclavicular lymphadenopathy [Normal Anterior Cervical Nodes] : no anterior cervical lymphadenopathy [No CVA Tenderness] : no CVA  tenderness [Kyphosis] : kyphosis [No Rash] : no rash [Normal Gait] : normal gait [No Focal Deficits] : no focal deficits [Memory Grossly Normal] : memory grossly normal [Normal Affect] : the affect was normal [Alert and Oriented x3] : oriented to person, place, and time [Normal Insight/Judgement] : insight and judgment were intact [de-identified] : thin [de-identified] : Breath sounds at the right base are slightly diminished [de-identified] : there is a 1/6 KATERYNA

## 2023-01-27 NOTE — HISTORY OF PRESENT ILLNESS
[FreeTextEntry1] : The patient comes in today for a routine followup evaluation.\par  [de-identified] : The patient, is relatively stable at this time.  Since her fall in September 2022, she is now living with a full-time aide at home.  She is basically housebound.  She is able to ambulate with the assistance of a walker.  Her appetite has been good.\par \par The patient is being followed by her cardiologist very carefully.  She has been seeing in each of the last 2 weeks.  Her weight has been maintained between 96 and 100 pounds.  She is taking Lasix every other day.  She did undergo a balloon dilatation of her right subclavian vein, which was occluded by her pacemaker wires.\par \par The patient's levothyroxine was also increased up to 100 mcg daily.  She is also taking 25 mcg once a week.  Her recent thyroid functions were performed by her cardiology team.\par \par The patient denies any cough or wheeze at this time.  Her appetite has been good.  Denies purulent sputum production.  There has been no chest pain.  She now comes in for this assessment.

## 2023-02-03 NOTE — DISCHARGE NOTE NURSING/CASE MANAGEMENT/SOCIAL WORK - NSSCNAMETXT_GEN_ALL_CORE
With Provider: Catalina Berry MD [[facility] Internal Medicine]      Preferred Date Range: 2/6/2023 - 3/3/2023      Preferred Times: Any Time      Reason for visit: Annual Check-up      Comments:   Want to get my blood test run to compare to last year's. My overall health has been great! If a virtual is preferable, i can accomodate.      [facility]

## 2023-02-22 DIAGNOSIS — Z74.09 OTHER REDUCED MOBILITY: ICD-10-CM

## 2023-05-03 NOTE — ED ADULT NURSE REASSESSMENT NOTE - TEMPLATE LIST FOR HEAD TO TOE ASSESSMENT
General Second attempt        Telephone call to patient to evaluate pain response to Right Trochanteric Bursa INJ w/US performed by JEISON Leiva on 4/12/2023. Left voicemail for patient to call office back @ [phone].        Pre-procedure pain: 6

## 2023-05-09 NOTE — ED ADULT TRIAGE NOTE - ARRIVAL FROM
Dr. Brenda Workman  [facility] [place], [place]  [address] [id number]  Get directions  Office: [phone]    May 30th 9am     Home

## 2023-05-16 RX ORDER — ASPIRIN ENTERIC COATED TABLETS 81 MG 81 MG/1
81 TABLET, DELAYED RELEASE ORAL
Qty: 90 | Refills: 3 | Status: ACTIVE | COMMUNITY
Start: 2022-10-11 | End: 1900-01-01

## 2023-05-31 ENCOUNTER — OUTPATIENT (OUTPATIENT)
Dept: OUTPATIENT SERVICES | Facility: HOSPITAL | Age: 88
LOS: 1 days | Discharge: HOME | End: 2023-05-31

## 2023-05-31 DIAGNOSIS — Z98.890 OTHER SPECIFIED POSTPROCEDURAL STATES: Chronic | ICD-10-CM

## 2023-05-31 DIAGNOSIS — Z98.49 CATARACT EXTRACTION STATUS, UNSPECIFIED EYE: Chronic | ICD-10-CM

## 2023-05-31 DIAGNOSIS — D47.2 MONOCLONAL GAMMOPATHY: ICD-10-CM

## 2023-05-31 DIAGNOSIS — Z95.0 PRESENCE OF CARDIAC PACEMAKER: Chronic | ICD-10-CM

## 2023-05-31 DIAGNOSIS — Z95.810 PRESENCE OF AUTOMATIC (IMPLANTABLE) CARDIAC DEFIBRILLATOR: Chronic | ICD-10-CM

## 2023-06-14 ENCOUNTER — APPOINTMENT (OUTPATIENT)
Dept: HEMATOLOGY ONCOLOGY | Facility: CLINIC | Age: 88
End: 2023-06-14

## 2023-06-14 DIAGNOSIS — Z01.812 ENCOUNTER FOR PREPROCEDURAL LABORATORY EXAMINATION: ICD-10-CM

## 2023-06-14 DIAGNOSIS — Z92.89 PERSONAL HISTORY OF OTHER MEDICAL TREATMENT: ICD-10-CM

## 2023-06-14 DIAGNOSIS — Z20.822 ENCOUNTER FOR PREPROCEDURAL LABORATORY EXAMINATION: ICD-10-CM

## 2023-06-16 NOTE — HISTORY OF PRESENT ILLNESS
[de-identified] : ANABEL GOFF is an 88 y.o. F with a PMH significant for IBS/celiac sprue, COPD, CAD and arrhythmias, who we are following for a multifactorial anemia and an IgA MGUS.  \par \par 9/3/15 - Labs with Hgb 10, Hct 31.2, MCV 95, TSAT 15%.  She also had an IgA of 1029. \par 11/2015 - seen in our office.  Labs showed a M-spike of 0.4 and a monoclonal IgA.  IgA level down to 778.  Ferritin was 12.8 and her TSAT was 9%.   She was started on PO iron and folic acid.   \par 10/2016 - On follow-up in our office her Ferritin was up to 103 with a TSAT of 31%.   She stated she stopped PO iron in August on advice from her cardiologist (??).  \par She was reluctant to get a colonoscopy, but she did say she would reconsider if the irons did not hold.   \par 4/18/17 - Hgb was 11.4, Ferritin slightly lower but OK at 73, TSAT 21%.  \par 3/8/18 - Hgb 10.7, Ferritin was now 26, TSAT 10% with ESR of 67.  \par Feraheme x 2 - and because of need for iron again need for GI evaluation was more firmly pushed. Her anemia is felt to be multifactorial with a component of CRI (creat cl ~ 41), iron deficiency due to occult blood loss (ASA/Coumadin), and malabsorption (celiac disease).\par Patient ultimately agreed to GI eval, however there were issues with cardiology giving her clearance.   \par On 10/15/18 she had a Cologuard and this was positive.  \par 1/17/19 - Colonoscopy (Dr. Ryan) - there were no polyps and it was unremarkable per patient.    \par \par 7/2021 - Feraheme x 2 [de-identified] : \par She denies any URI's - no recent steroid use. \par She otherwise denies any other changes in her family, medical, or social history since her last visit of 6/22/21.

## 2023-06-16 NOTE — ASSESSMENT
[FreeTextEntry1] : Patient is an 88 y.o. with a hx of IBS/celiac sprue, COPD, CAD and arrhythmias, who we are following for  a multifactorial anemia and MGUS.  \par 1. Anemia - Her anemia is multifactorial with a component of iron deficiency likely due to occult blood loss  (IBS, ASA/Coumadin), CRI, and malabsorption (IBS/celiac).  In addition she has an elevated ESR and inflammation blunts the erythropoietic response.    \par Colonoscopy 1/17/19 was unremarkable per patient.  \par Seems to need IV iron ~ Q3 years.  \par Will monitor. \par  \par 2. MGUS - Labs have been stable.  Abnormalities now of no clinical significance. Can recheck ~ yearly.  \par \par Lenny Dickey - PMD\par Jose Marrero (Cardiology)\par Pam (Neuorology)\par Joes (electrophysiology)\par Augustine Ryan (GI)

## 2023-06-16 NOTE — PHYSICAL EXAM
[Thin] : thin [Normal] : affect appropriate [de-identified] : anicteric [de-identified] : no thrush or mucositis [de-identified] : no lymphadenopathy [de-identified] : S1S2 RRR, no obvious murmurs [de-identified] : no rashes

## 2023-06-16 NOTE — REVIEW OF SYSTEMS
[Patient Intake Form Reviewed] : Patient intake form was reviewed [Loss of Hearing] : loss of hearing [Easy Bruising] : a tendency for easy bruising [Negative] : Allergic/Immunologic [FreeTextEntry5] : occasional LE edema [FreeTextEntry6] : occasional SOB on exertion

## 2023-06-21 ENCOUNTER — LABORATORY RESULT (OUTPATIENT)
Age: 88
End: 2023-06-21

## 2023-06-21 ENCOUNTER — APPOINTMENT (OUTPATIENT)
Dept: HEMATOLOGY ONCOLOGY | Facility: CLINIC | Age: 88
End: 2023-06-21

## 2023-06-22 ENCOUNTER — NON-APPOINTMENT (OUTPATIENT)
Age: 88
End: 2023-06-22

## 2023-06-22 ENCOUNTER — LABORATORY RESULT (OUTPATIENT)
Age: 88
End: 2023-06-22

## 2023-06-23 ENCOUNTER — LABORATORY RESULT (OUTPATIENT)
Age: 88
End: 2023-06-23

## 2023-06-29 ENCOUNTER — NON-APPOINTMENT (OUTPATIENT)
Age: 88
End: 2023-06-29

## 2023-07-11 ENCOUNTER — APPOINTMENT (OUTPATIENT)
Dept: DERMATOLOGY | Facility: CLINIC | Age: 88
End: 2023-07-11
Payer: MEDICARE

## 2023-07-11 ENCOUNTER — NON-APPOINTMENT (OUTPATIENT)
Age: 88
End: 2023-07-11

## 2023-07-11 DIAGNOSIS — D18.01 HEMANGIOMA OF SKIN AND SUBCUTANEOUS TISSUE: ICD-10-CM

## 2023-07-11 DIAGNOSIS — D22.9 MELANOCYTIC NEVI, UNSPECIFIED: ICD-10-CM

## 2023-07-11 DIAGNOSIS — L57.0 ACTINIC KERATOSIS: ICD-10-CM

## 2023-07-11 DIAGNOSIS — L82.1 OTHER SEBORRHEIC KERATOSIS: ICD-10-CM

## 2023-07-11 PROCEDURE — 17000 DESTRUCT PREMALG LESION: CPT

## 2023-07-11 PROCEDURE — 99203 OFFICE O/P NEW LOW 30 MIN: CPT | Mod: 25

## 2023-07-11 PROCEDURE — 17003 DESTRUCT PREMALG LES 2-14: CPT

## 2023-07-11 NOTE — PHYSICAL EXAM
[Alert] : alert [Oriented x 3] : ~L oriented x 3 [Well Nourished] : well nourished [Declined] : declined [FreeTextEntry3] : keratotic papules, right cheek and right temple.\par \par Greasy brown plaque, to the right and superior to the umbilicus, and patch of the right shin.\par Hyperpigmented homogeneous 2 cm patch, round, on the left anterior thigh.\par Cherry angiomas of the left inguinal region.

## 2023-07-11 NOTE — ASSESSMENT
[FreeTextEntry1] : SK's, nevus, and cherry angioma\par Benign.\par Education.\par Encourage complete skin exams.

## 2023-07-11 NOTE — HISTORY OF PRESENT ILLNESS
[FreeTextEntry1] : Patient c/o right cheek lesion. [de-identified] : Growing over months.  No bleeding or itching or tenderness.

## 2023-07-19 ENCOUNTER — APPOINTMENT (OUTPATIENT)
Dept: OTOLARYNGOLOGY | Facility: CLINIC | Age: 88
End: 2023-07-19
Payer: MEDICARE

## 2023-07-19 VITALS — WEIGHT: 97 LBS | HEIGHT: 60 IN | BODY MASS INDEX: 19.04 KG/M2

## 2023-07-19 DIAGNOSIS — H61.23 IMPACTED CERUMEN, BILATERAL: ICD-10-CM

## 2023-07-19 DIAGNOSIS — H69.83 OTHER SPECIFIED DISORDERS OF EUSTACHIAN TUBE, BILATERAL: ICD-10-CM

## 2023-07-19 DIAGNOSIS — H90.3 SENSORINEURAL HEARING LOSS, BILATERAL: ICD-10-CM

## 2023-07-19 DIAGNOSIS — S00.411S ABRASION OF RIGHT EAR, SEQUELA: ICD-10-CM

## 2023-07-19 PROCEDURE — 99213 OFFICE O/P EST LOW 20 MIN: CPT | Mod: 25

## 2023-07-19 PROCEDURE — 69210 REMOVE IMPACTED EAR WAX UNI: CPT

## 2023-07-19 NOTE — PROCEDURE
[Cerumen Impaction] : Cerumen Impaction [FreeTextEntry6] : Indication: ear plugging and discomfort\par Large amount cerumen cleared left and right ear instrumentation with curettes, forceps and suction.\par Ear canals and tympanic membranes  unremarkable.\par abrasion eac-cautery

## 2023-07-19 NOTE — ASSESSMENT
[FreeTextEntry1] : cerumen cleared\par cautery ad canal\par sn loss\par clear for au aids\par  20 minutes spent with patient with exam and counseling excluding time for any procedure.\par

## 2023-08-10 ENCOUNTER — APPOINTMENT (OUTPATIENT)
Dept: INTERNAL MEDICINE | Facility: CLINIC | Age: 88
End: 2023-08-10
Payer: MEDICARE

## 2023-08-10 VITALS
DIASTOLIC BLOOD PRESSURE: 52 MMHG | TEMPERATURE: 97.8 F | HEIGHT: 60 IN | HEART RATE: 74 BPM | OXYGEN SATURATION: 95 % | SYSTOLIC BLOOD PRESSURE: 96 MMHG | BODY MASS INDEX: 17.87 KG/M2 | WEIGHT: 91 LBS

## 2023-08-10 DIAGNOSIS — Z00.00 ENCOUNTER FOR GENERAL ADULT MEDICAL EXAMINATION W/OUT ABNORMAL FINDINGS: ICD-10-CM

## 2023-08-10 DIAGNOSIS — Z87.891 PERSONAL HISTORY OF NICOTINE DEPENDENCE: ICD-10-CM

## 2023-08-10 PROCEDURE — G0439: CPT

## 2023-08-10 PROCEDURE — 99214 OFFICE O/P EST MOD 30 MIN: CPT | Mod: 25

## 2023-08-10 NOTE — PHYSICAL EXAM
[No Acute Distress] : no acute distress [Well Nourished] : well nourished [Well Developed] : well developed [Ill-Appearing] : ill-appearing [Normal Sclera/Conjunctiva] : normal sclera/conjunctiva [PERRL] : pupils equal round and reactive to light [EOMI] : extraocular movements intact [Normal Outer Ear/Nose] : the outer ears and nose were normal in appearance [Normal Oropharynx] : the oropharynx was normal [Normal TMs] : both tympanic membranes were normal [No JVD] : no jugular venous distention [Supple] : supple [No Lymphadenopathy] : no lymphadenopathy [Thyroid Normal, No Nodules] : the thyroid was normal and there were no nodules present [No Respiratory Distress] : no respiratory distress  [Clear to Auscultation] : lungs were clear to auscultation bilaterally [No Accessory Muscle Use] : no accessory muscle use [Normal Rate] : normal rate  [Normal S1, S2] : normal S1 and S2 [No Carotid Bruits] : no carotid bruits [Pedal Pulses Present] : the pedal pulses are present [No Edema] : there was no peripheral edema [No Extremity Clubbing/Cyanosis] : no extremity clubbing/cyanosis [Soft] : abdomen soft [Non Tender] : non-tender [Normal Supraclavicular Nodes] : no supraclavicular lymphadenopathy [Normal Anterior Cervical Nodes] : no anterior cervical lymphadenopathy [No CVA Tenderness] : no CVA  tenderness [Kyphosis] : kyphosis [No Joint Swelling] : no joint swelling [No Rash] : no rash [Normal Gait] : normal gait [No Focal Deficits] : no focal deficits [Speech Grossly Normal] : speech grossly normal [Memory Grossly Normal] : memory grossly normal [Normal Affect] : the affect was normal [Alert and Oriented x3] : oriented to person, place, and time [Normal Mood] : the mood was normal [de-identified] : thin [de-identified] : bilateral IOL [de-identified] : there is a 1/6 KATERYNA.  Regularly irregular rhythm

## 2023-08-10 NOTE — PLAN
[FreeTextEntry1] : 1. Continue current medications as outlined above.  2. Follow up in 6 months with pft for continued monitoring on Amiodarone.   3. COVID, flu, and RSK vaccinations are recommended in the fall.  4.  I have instructed the patient's daughter, that she should obtain boost so that the patient can consume between 2 to 3 cans/day to increase her caloric intake due to her recent weight decompensation.

## 2023-08-10 NOTE — HISTORY OF PRESENT ILLNESS
[FreeTextEntry1] : The patient comes in for a yearly wellness evaluation. [de-identified] : The patient is feeling reasonably well. She states that yesterday, for the first time ever, she had difficulty swallowing her potassium tablet. She has a history of cardiomyopathy for which she is taking Lasix and Entresto. She also has an ongoing history of chronic atrial fibrillation for which she continues on Coumadin, Amiodarone, and Digox. She continues to follow with cardiologist, Dr. Marrero. There has been no chest pain, shortness of breath, palpitations, or PND.   Since the last visit, she has seen Dr. Rai's NP because she has multifactorial anemia. She has iron deficiency, renal insufficiency, and malabsorption. She saw dermatologist, Dr. Dominguez, last month and had a keratosis removed from her face. She also saw ENT, Dr. Garrison on 07/19/23. Her appetite has been good with no bowel issues. She has had weight loss and needs support to walk. She continues to use a walker. There has been no cough, fevers, chills, or night sweats. She has received the COVID vaccines.  There has been no other acute constitutional symptoms. She comes in for this assessment.

## 2023-11-01 ENCOUNTER — NON-APPOINTMENT (OUTPATIENT)
Age: 88
End: 2023-11-01

## 2023-11-01 RX ORDER — SERTRALINE HYDROCHLORIDE 50 MG/1
50 TABLET, FILM COATED ORAL
Qty: 90 | Refills: 1 | Status: DISCONTINUED | COMMUNITY
Start: 2022-09-15 | End: 2023-11-01

## 2023-11-01 RX ORDER — ALPRAZOLAM 0.25 MG/1
0.25 TABLET ORAL
Qty: 60 | Refills: 0 | Status: ACTIVE | COMMUNITY
Start: 2022-09-15 | End: 1900-01-01

## 2023-11-07 RX ORDER — CITALOPRAM HYDROBROMIDE 10 MG/1
10 TABLET, FILM COATED ORAL DAILY
Qty: 30 | Refills: 11 | Status: ACTIVE | COMMUNITY
Start: 2023-11-07 | End: 1900-01-01

## 2023-12-04 ENCOUNTER — INPATIENT (INPATIENT)
Facility: HOSPITAL | Age: 88
LOS: 8 days | Discharge: HOME | DRG: 193 | End: 2023-12-13
Attending: STUDENT IN AN ORGANIZED HEALTH CARE EDUCATION/TRAINING PROGRAM | Admitting: STUDENT IN AN ORGANIZED HEALTH CARE EDUCATION/TRAINING PROGRAM
Payer: MEDICARE

## 2023-12-04 VITALS
WEIGHT: 125 LBS | DIASTOLIC BLOOD PRESSURE: 72 MMHG | HEART RATE: 80 BPM | OXYGEN SATURATION: 93 % | SYSTOLIC BLOOD PRESSURE: 151 MMHG | TEMPERATURE: 98 F | RESPIRATION RATE: 17 BRPM | HEIGHT: 67 IN

## 2023-12-04 DIAGNOSIS — Z98.890 OTHER SPECIFIED POSTPROCEDURAL STATES: Chronic | ICD-10-CM

## 2023-12-04 DIAGNOSIS — Z98.49 CATARACT EXTRACTION STATUS, UNSPECIFIED EYE: Chronic | ICD-10-CM

## 2023-12-04 DIAGNOSIS — Z95.0 PRESENCE OF CARDIAC PACEMAKER: Chronic | ICD-10-CM

## 2023-12-04 DIAGNOSIS — Z95.810 PRESENCE OF AUTOMATIC (IMPLANTABLE) CARDIAC DEFIBRILLATOR: Chronic | ICD-10-CM

## 2023-12-04 PROCEDURE — 99285 EMERGENCY DEPT VISIT HI MDM: CPT

## 2023-12-04 NOTE — ED ADULT TRIAGE NOTE - CHIEF COMPLAINT QUOTE
Pt BIBEMS from home c/o weakness starting this afternoon. Per EMS pt's aide called as she noticed increasing weakness and a "foul odor from her urine." BEFAST -. Allergies to lidocaine.

## 2023-12-05 LAB
ANION GAP SERPL CALC-SCNC: 8 MMOL/L — SIGNIFICANT CHANGE UP (ref 5–17)
ANION GAP SERPL CALC-SCNC: 8 MMOL/L — SIGNIFICANT CHANGE UP (ref 5–17)
APTT BLD: 54.8 SEC — HIGH (ref 24.5–35.6)
APTT BLD: 54.8 SEC — HIGH (ref 24.5–35.6)
BASOPHILS # BLD AUTO: 0.02 K/UL — SIGNIFICANT CHANGE UP (ref 0–0.2)
BASOPHILS # BLD AUTO: 0.02 K/UL — SIGNIFICANT CHANGE UP (ref 0–0.2)
BASOPHILS NFR BLD AUTO: 0.2 % — SIGNIFICANT CHANGE UP (ref 0–2)
BASOPHILS NFR BLD AUTO: 0.2 % — SIGNIFICANT CHANGE UP (ref 0–2)
BUN SERPL-MCNC: 19 MG/DL — SIGNIFICANT CHANGE UP (ref 7–23)
BUN SERPL-MCNC: 19 MG/DL — SIGNIFICANT CHANGE UP (ref 7–23)
CALCIUM SERPL-MCNC: 8.8 MG/DL — SIGNIFICANT CHANGE UP (ref 8.5–10.1)
CALCIUM SERPL-MCNC: 8.8 MG/DL — SIGNIFICANT CHANGE UP (ref 8.5–10.1)
CHLORIDE SERPL-SCNC: 93 MMOL/L — LOW (ref 96–108)
CHLORIDE SERPL-SCNC: 93 MMOL/L — LOW (ref 96–108)
CK SERPL-CCNC: 73 U/L — SIGNIFICANT CHANGE UP (ref 26–192)
CK SERPL-CCNC: 73 U/L — SIGNIFICANT CHANGE UP (ref 26–192)
CO2 SERPL-SCNC: 27 MMOL/L — SIGNIFICANT CHANGE UP (ref 22–31)
CO2 SERPL-SCNC: 27 MMOL/L — SIGNIFICANT CHANGE UP (ref 22–31)
CREAT SERPL-MCNC: 0.98 MG/DL — SIGNIFICANT CHANGE UP (ref 0.5–1.3)
CREAT SERPL-MCNC: 0.98 MG/DL — SIGNIFICANT CHANGE UP (ref 0.5–1.3)
EGFR: 56 ML/MIN/1.73M2 — LOW
EGFR: 56 ML/MIN/1.73M2 — LOW
EOSINOPHIL # BLD AUTO: 0 K/UL — SIGNIFICANT CHANGE UP (ref 0–0.5)
EOSINOPHIL # BLD AUTO: 0 K/UL — SIGNIFICANT CHANGE UP (ref 0–0.5)
EOSINOPHIL NFR BLD AUTO: 0 % — SIGNIFICANT CHANGE UP (ref 0–6)
EOSINOPHIL NFR BLD AUTO: 0 % — SIGNIFICANT CHANGE UP (ref 0–6)
GLUCOSE SERPL-MCNC: 147 MG/DL — HIGH (ref 70–99)
GLUCOSE SERPL-MCNC: 147 MG/DL — HIGH (ref 70–99)
HCT VFR BLD CALC: 32.5 % — LOW (ref 34.5–45)
HCT VFR BLD CALC: 32.5 % — LOW (ref 34.5–45)
HGB BLD-MCNC: 11 G/DL — LOW (ref 11.5–15.5)
HGB BLD-MCNC: 11 G/DL — LOW (ref 11.5–15.5)
IMM GRANULOCYTES NFR BLD AUTO: 0.2 % — SIGNIFICANT CHANGE UP (ref 0–0.9)
IMM GRANULOCYTES NFR BLD AUTO: 0.2 % — SIGNIFICANT CHANGE UP (ref 0–0.9)
INR BLD: 3.4 RATIO — HIGH (ref 0.85–1.18)
INR BLD: 3.4 RATIO — HIGH (ref 0.85–1.18)
LYMPHOCYTES # BLD AUTO: 0.82 K/UL — LOW (ref 1–3.3)
LYMPHOCYTES # BLD AUTO: 0.82 K/UL — LOW (ref 1–3.3)
LYMPHOCYTES # BLD AUTO: 8.4 % — LOW (ref 13–44)
LYMPHOCYTES # BLD AUTO: 8.4 % — LOW (ref 13–44)
MCHC RBC-ENTMCNC: 33.2 PG — SIGNIFICANT CHANGE UP (ref 27–34)
MCHC RBC-ENTMCNC: 33.2 PG — SIGNIFICANT CHANGE UP (ref 27–34)
MCHC RBC-ENTMCNC: 33.8 GM/DL — SIGNIFICANT CHANGE UP (ref 32–36)
MCHC RBC-ENTMCNC: 33.8 GM/DL — SIGNIFICANT CHANGE UP (ref 32–36)
MCV RBC AUTO: 98.2 FL — SIGNIFICANT CHANGE UP (ref 80–100)
MCV RBC AUTO: 98.2 FL — SIGNIFICANT CHANGE UP (ref 80–100)
MONOCYTES # BLD AUTO: 0.9 K/UL — SIGNIFICANT CHANGE UP (ref 0–0.9)
MONOCYTES # BLD AUTO: 0.9 K/UL — SIGNIFICANT CHANGE UP (ref 0–0.9)
MONOCYTES NFR BLD AUTO: 9.2 % — SIGNIFICANT CHANGE UP (ref 2–14)
MONOCYTES NFR BLD AUTO: 9.2 % — SIGNIFICANT CHANGE UP (ref 2–14)
NEUTROPHILS # BLD AUTO: 7.99 K/UL — HIGH (ref 1.8–7.4)
NEUTROPHILS # BLD AUTO: 7.99 K/UL — HIGH (ref 1.8–7.4)
NEUTROPHILS NFR BLD AUTO: 82 % — HIGH (ref 43–77)
NEUTROPHILS NFR BLD AUTO: 82 % — HIGH (ref 43–77)
NT-PROBNP SERPL-SCNC: HIGH PG/ML (ref 0–450)
NT-PROBNP SERPL-SCNC: HIGH PG/ML (ref 0–450)
PLATELET # BLD AUTO: 307 K/UL — SIGNIFICANT CHANGE UP (ref 150–400)
PLATELET # BLD AUTO: 307 K/UL — SIGNIFICANT CHANGE UP (ref 150–400)
POTASSIUM SERPL-MCNC: 4.3 MMOL/L — SIGNIFICANT CHANGE UP (ref 3.5–5.3)
POTASSIUM SERPL-MCNC: 4.3 MMOL/L — SIGNIFICANT CHANGE UP (ref 3.5–5.3)
POTASSIUM SERPL-SCNC: 4.3 MMOL/L — SIGNIFICANT CHANGE UP (ref 3.5–5.3)
POTASSIUM SERPL-SCNC: 4.3 MMOL/L — SIGNIFICANT CHANGE UP (ref 3.5–5.3)
PROTHROM AB SERPL-ACNC: 37.1 SEC — HIGH (ref 9.5–13)
PROTHROM AB SERPL-ACNC: 37.1 SEC — HIGH (ref 9.5–13)
RAPID RVP RESULT: SIGNIFICANT CHANGE UP
RAPID RVP RESULT: SIGNIFICANT CHANGE UP
RBC # BLD: 3.31 M/UL — LOW (ref 3.8–5.2)
RBC # BLD: 3.31 M/UL — LOW (ref 3.8–5.2)
RBC # FLD: 14.8 % — HIGH (ref 10.3–14.5)
RBC # FLD: 14.8 % — HIGH (ref 10.3–14.5)
SARS-COV-2 RNA SPEC QL NAA+PROBE: SIGNIFICANT CHANGE UP
SARS-COV-2 RNA SPEC QL NAA+PROBE: SIGNIFICANT CHANGE UP
SODIUM SERPL-SCNC: 128 MMOL/L — LOW (ref 135–145)
SODIUM SERPL-SCNC: 128 MMOL/L — LOW (ref 135–145)
TROPONIN I, HIGH SENSITIVITY RESULT: 30.6 NG/L — SIGNIFICANT CHANGE UP
TROPONIN I, HIGH SENSITIVITY RESULT: 30.6 NG/L — SIGNIFICANT CHANGE UP
WBC # BLD: 9.75 K/UL — SIGNIFICANT CHANGE UP (ref 3.8–10.5)
WBC # BLD: 9.75 K/UL — SIGNIFICANT CHANGE UP (ref 3.8–10.5)
WBC # FLD AUTO: 9.75 K/UL — SIGNIFICANT CHANGE UP (ref 3.8–10.5)
WBC # FLD AUTO: 9.75 K/UL — SIGNIFICANT CHANGE UP (ref 3.8–10.5)

## 2023-12-05 PROCEDURE — 71045 X-RAY EXAM CHEST 1 VIEW: CPT | Mod: 26

## 2023-12-05 RX ORDER — CITALOPRAM 10 MG/1
10 TABLET, FILM COATED ORAL DAILY
Refills: 0 | Status: DISCONTINUED | OUTPATIENT
Start: 2023-12-05 | End: 2023-12-13

## 2023-12-05 RX ORDER — ATORVASTATIN CALCIUM 80 MG/1
10 TABLET, FILM COATED ORAL AT BEDTIME
Refills: 0 | Status: DISCONTINUED | OUTPATIENT
Start: 2023-12-05 | End: 2023-12-13

## 2023-12-05 RX ORDER — ASPIRIN/CALCIUM CARB/MAGNESIUM 324 MG
81 TABLET ORAL DAILY
Refills: 0 | Status: DISCONTINUED | OUTPATIENT
Start: 2023-12-05 | End: 2023-12-13

## 2023-12-05 RX ORDER — CHOLECALCIFEROL (VITAMIN D3) 125 MCG
1 CAPSULE ORAL
Qty: 0 | Refills: 0 | DISCHARGE

## 2023-12-05 RX ORDER — SACUBITRIL AND VALSARTAN 24; 26 MG/1; MG/1
1 TABLET, FILM COATED ORAL
Refills: 0 | Status: DISCONTINUED | OUTPATIENT
Start: 2023-12-05 | End: 2023-12-10

## 2023-12-05 RX ORDER — FUROSEMIDE 40 MG
40 TABLET ORAL ONCE
Refills: 0 | Status: COMPLETED | OUTPATIENT
Start: 2023-12-05 | End: 2023-12-05

## 2023-12-05 RX ORDER — AMIODARONE HYDROCHLORIDE 400 MG/1
100 TABLET ORAL DAILY
Refills: 0 | Status: DISCONTINUED | OUTPATIENT
Start: 2023-12-05 | End: 2023-12-07

## 2023-12-05 RX ORDER — LEVOTHYROXINE SODIUM 125 MCG
25 TABLET ORAL DAILY
Refills: 0 | Status: DISCONTINUED | OUTPATIENT
Start: 2023-12-05 | End: 2023-12-13

## 2023-12-05 RX ORDER — LEVOTHYROXINE SODIUM 125 MCG
1 TABLET ORAL
Qty: 0 | Refills: 0 | DISCHARGE

## 2023-12-05 RX ORDER — SODIUM CHLORIDE 9 MG/ML
1000 INJECTION, SOLUTION INTRAVENOUS ONCE
Refills: 0 | Status: DISCONTINUED | OUTPATIENT
Start: 2023-12-05 | End: 2023-12-05

## 2023-12-05 RX ORDER — FUROSEMIDE 40 MG
40 TABLET ORAL DAILY
Refills: 0 | Status: DISCONTINUED | OUTPATIENT
Start: 2023-12-06 | End: 2023-12-07

## 2023-12-05 RX ORDER — ACETAMINOPHEN 500 MG
650 TABLET ORAL ONCE
Refills: 0 | Status: DISCONTINUED | OUTPATIENT
Start: 2023-12-05 | End: 2023-12-13

## 2023-12-05 RX ORDER — LEVOTHYROXINE SODIUM 125 MCG
100 TABLET ORAL DAILY
Refills: 0 | Status: DISCONTINUED | OUTPATIENT
Start: 2023-12-05 | End: 2023-12-13

## 2023-12-05 RX ORDER — DIGOXIN 250 MCG
62.5 TABLET ORAL DAILY
Refills: 0 | Status: DISCONTINUED | OUTPATIENT
Start: 2023-12-05 | End: 2023-12-13

## 2023-12-05 RX ADMIN — CITALOPRAM 10 MILLIGRAM(S): 10 TABLET, FILM COATED ORAL at 14:40

## 2023-12-05 RX ADMIN — SACUBITRIL AND VALSARTAN 1 TABLET(S): 24; 26 TABLET, FILM COATED ORAL at 22:30

## 2023-12-05 RX ADMIN — Medication 40 MILLIGRAM(S): at 08:40

## 2023-12-05 RX ADMIN — SACUBITRIL AND VALSARTAN 1 TABLET(S): 24; 26 TABLET, FILM COATED ORAL at 14:40

## 2023-12-05 RX ADMIN — ATORVASTATIN CALCIUM 10 MILLIGRAM(S): 80 TABLET, FILM COATED ORAL at 22:30

## 2023-12-05 RX ADMIN — Medication 81 MILLIGRAM(S): at 14:40

## 2023-12-05 NOTE — PATIENT PROFILE ADULT - FALL HARM RISK - HARM RISK INTERVENTIONS
Assistance with ambulation/Assistance OOB with selected safe patient handling equipment/Communicate Risk of Fall with Harm to all staff/Reinforce activity limits and safety measures with patient and family/Tailored Fall Risk Interventions/Visual Cue: Yellow wristband and red socks/Bed in lowest position, wheels locked, appropriate side rails in place/Call bell, personal items and telephone in reach/Instruct patient to call for assistance before getting out of bed or chair/Non-slip footwear when patient is out of bed/[place] to call system/Physically safe environment - no spills, clutter or unnecessary equipment/Purposeful Proactive Rounding/Room/bathroom lighting operational, light cord in reach Assistance with ambulation/Assistance OOB with selected safe patient handling equipment/Communicate Risk of Fall with Harm to all staff/Reinforce activity limits and safety measures with patient and family/Tailored Fall Risk Interventions/Visual Cue: Yellow wristband and red socks/Bed in lowest position, wheels locked, appropriate side rails in place/Call bell, personal items and telephone in reach/Instruct patient to call for assistance before getting out of bed or chair/Non-slip footwear when patient is out of bed/[place] to call system/Physically safe environment - no spills, clutter or unnecessary equipment/Purposeful Proactive Rounding/Room/bathroom lighting operational, light cord in reach

## 2023-12-05 NOTE — H&P ADULT - NSHPPHYSICALEXAM_GEN_ALL_CORE
ICU Vital Signs Last 24 Hrs  T(C): 36.8 (05 Dec 2023 08:35), Max: 36.8 (04 Dec 2023 23:50)  T(F): 98.3 (05 Dec 2023 08:35), Max: 98.3 (04 Dec 2023 23:50)  HR: 70 (05 Dec 2023 09:43) (70 - 80)  BP: 138/68 (05 Dec 2023 09:43) (137/53 - 162/68)  BP(mean): 82 (05 Dec 2023 09:43) (82 - 90)  ABP: --  ABP(mean): --  RR: 18 (05 Dec 2023 09:43) (17 - 18)  SpO2: 97% (05 Dec 2023 09:43) (88% - 97%)    O2 Parameters below as of 05 Dec 2023 09:43  Patient On (Oxygen Delivery Method): nasal cannula  O2 Flow (L/min): 2          PHYSICAL EXAM:    Constitutional: NAD, awake , frail  HEENT: PERR, EOMI, Normal Hearing, MMM  Neck: Soft and supple, No LAD, No JVD  Respiratory:rales at bases  Cardiovascular: S1 and S2, regular rate and rhythm, no Murmurs, gallops or rubs  Gastrointestinal: Bowel Sounds present, soft, nontender, nondistended, no guarding, no rebound  Extremities: No peripheral edema  Vascular: 2+ peripheral pulses  Neurological: A/O x 2, no focal deficits  Musculoskeletal: 5/5 strength b/l upper and lower extremities  Skin: No rashes

## 2023-12-05 NOTE — H&P ADULT - NSHPLABSRESULTS_GEN_ALL_CORE
CBC:            11.0   9.75  )-----------( 307      ( 12-05-23 @ 08:41 )             32.5         Chem:         ( 12-05-23 @ 08:41 )    128<L>  |  93<L>  |  19  ----------------------------<  147<H>  4.3   |  27  |  0.98        Liver Functions:     Type & Screen:     < from: Xray Chest 1 View AP/PA. (12.05.23 @ 01:59) >    < from: TTE Echo Complete w/o Contrast w/ Doppler (10.14.20 @ 16:39) >     1. Left ventricular ejection fraction, by visual estimation, is 30 to 35%.   2. Severely decreased global left ventricular systolic function.   3. Multiple left ventricular regional wall motion abnormalities exist. See wall motion findings.   4. Severely enlarged left atrium.   5. Severely enlarged right atrium.   6. The mitral in-flow pattern reveals no discernable A-wave, therefore no comment on diastolic function can be made.   7. Mildly enlarged right ventricle.   8. Moderately reduced RV systolic function.   9. S/p Mitral Clip.  10. Mild to moderate mitral valve regurgitation.  11. Moderate tricuspid regurgitation.  12. Moderate aortic regurgitation.  13. Sclerotic aortic valve with decreased opening.  14. There is no evidence of pericardial effusion.  15. Color Doppler demonstrates the presence of left to right shunting, consistent with large iatrogenic ASD.  16. Mitral valve mean gradient is 3.3 mmHg consistent with mild mitral stenosis.  17. Peak transaortic gradient equals 7.6 mmHg, mean transaortic gradient equals 3.7 mmHg, the calculated aortic valve area equals 1.87 cm² by the continuity equation consistent with mild aortic stenosis.  18. Endocardial visualization was enhanced with intravenous echo contrast.  19. Compared to TTE done on 7/10/2017, patient is s/p Mitral Clip.    < end of copied text >                IMPRESSION: Increased perihilar interstitial markings and airspace   densities. One should consider congestive changes and some underlying   inflammatory infectious process. Right greater than left effusion with   compressive atelectatic change. Cardiomegaly. Pacer as before. Findings   appear progressive since previous exam regional osseous structures   appropriate for age.    --- End of Report ---    < end of copied text >

## 2023-12-05 NOTE — ED ADULT NURSE REASSESSMENT NOTE - NS ED NURSE REASSESS COMMENT FT1
Pt resting comfortably in stretcher with no complaints. Pt denies wanting anything to eat or drink. Pt states she is waiting for her daughters to come and bring her dentures. Pt calm and cooperative.

## 2023-12-05 NOTE — H&P ADULT - ASSESSMENT
#Acute decompensated HFrEF/Aortic stenosis  #B/L pleural effusion R>L  #r/o pna  #Hyponatremia  #chronic afib  #Supratherapeutic inr   #Metabolic encephalopathy  - Admit to tele or other monitored bed  - Continue iv diuresis  - Na: 128, suspect from fluid overloaded state, bmp in am  - Obtain ct chest non contrast - r/o underlying pna  - 2D echo  - Strict I/Os  - Daily weights  - PPM interrogation  - Cardiology consult  - Awaiting ua, r/o other etiologies for 2 week onset of confusion and agitation  - Suspect ams is related to recent xanax use which coincides with onset of her weakness and behavioral disturbances  - Hold coumadin, daily INR  - No overt heavy bleeding, monitor h/h      Dispo: iv diureses. PT consult    Pita Shortr is not sure who patient may have appointed as her HCP. She believes mother may also have Living Will the which she will look for. For now she remains Full code.      #Acute decompensated HFrEF/Aortic stenosis  #B/L pleural effusion R>L  #Hyponatremia  #chronic afib  #Supratherapeutic inr   #Metabolic encephalopathy  - Admit to tele or other monitored bed  - Continue iv diuresis  - Na: 128, suspect from fluid overloaded state, bmp in am  - no leukocytosis, may need ct chest if condition fails to improve with diuresis  - 2D echo  - Strict I/Os  - Daily weights  - Cardiology consult  - Awaiting ua, r/o other etiologies for 2 week onset of confusion and agitation  - Suspect ams is related to recent xanax use which coincides with onset of her weakness and behavioral disturbances  - Hold coumadin, daily INR  - No overt heavy bleeding, monitor h/h      Dispo: iv diureses. PT consult    iPta Shortr is not sure who patient may have appointed as her HCP. She believes mother may also have Living Will the which she will look for. For now she remains Full code.      #Acute decompensated HFrEF/Aortic stenosis  #B/L pleural effusion R>L  #Hyponatremia  #chronic afib  #Supratherapeutic inr   #Metabolic encephalopathy  - Admit to tele or other monitored bed  - Continue iv diuresis  - Na: 128, suspect from fluid overloaded state, bmp in am  - no leukocytosis, may need ct chest if condition fails to improve with diuresis  - 2D echo  - Strict I/Os  - Daily weights  - Cardiology consult  - Awaiting ua, r/o other etiologies for 2 week onset of confusion and agitation  - Suspect ams is related to recent xanax use which coincides with onset of her weakness and behavioral disturbances  - Hold coumadin, daily INR  - No overt heavy bleeding, monitor h/h      Dispo: iv diureses. PT consult    Pita Shortr is not sure who patient may have appointed as her HCP. She believes mother may also have Living Will the which she will look for. For now she remains Full code.

## 2023-12-05 NOTE — ED ADULT NURSE NOTE - ED STAT RN HANDOFF WHERE
"Rose Hui (73 y.o. Female)    Referred by Dr. William  PCP Dr Carmella Barboza  DX end stage liver disease    Date of Birth   1950    Social Security Number       Address   [address] [place] [address]    Home Phone   [phone]    MRN   [id number]       [employer]   Non-Protestant    Marital Status   Single                            Admission Date   10/3/23    Admission Type   Emergency    Admitting Provider   Jolanta Poon MD    Attending Provider   Jolanta Poon MD    Department, Room/Bed   [address], S511/1       Discharge Date       Discharge Disposition       Discharge Destination                                 Attending Provider: Jolanta Poon MD    Allergies: Sulfa Antibiotics    Isolation: None   Infection: None   Code Status: No CPR    Ht: 165.1 cm (65\")   Wt: 80.4 kg (177 lb 3.2 oz)    Admission Cmt: None   Principal Problem: Acute respiratory failure with hypoxia [J96.01]                   Active Insurance as of 10/3/2023       Primary Coverage       Payor Plan Insurance Group Employer/Plan Group    MEDICARE MEDICARE A & B        Payor Plan Address Payor Plan Phone Number Payor Plan Fax Number Effective Dates    [address] [phone]  1/1/2015 - None Entered    [place] [address]         Subscriber Name Subscriber Birth Date Member ID       ROSE HIU 1950 [id number]                     Emergency Contacts        (Rel.) Home Phone Work Phone Mobile Phone    Mary Rodríguez (Daughter) -- -- [phone]              Emergency Contact Information       Name Relation Home Work Mobile    Mary Rodríguez Daughter   [phone]          Insurance Information                  MEDICARE/MEDICARE A & B Phone: [phone]    Subscriber: Rose Hui Subscriber#: [id number]    Group#: -- Precert#: --             History & Physical        Suzan Rice MD at 10/03/23 2236              [facility] " "Services  HISTORY AND PHYSICAL    Patient Name: Laura Hui  : 1950  MRN: [id number]  Primary Care Physician: Carmella Barboza DO  Date of admission: 10/3/2023    Subjective   Subjective     Chief Complaint:  SOB    HPI:  Laura Hui is a 73 y.o. female with a past medical history significant for hypertension, HLD, HFpEF, PVD, COPD, chronic back pain, GERD, anemia, and anxiety/depression. She presents today with complaints of generalized weakness and progressively worsening SOB going on for the past 2-3 days. Dyspnea worse with exertion. She has some bilateral lower extremity edema that has improved from a month ago, but notes increasing abdominal distension. States she feels like her abdomen is \"sitting on her chest\". Today she was largely immobile due to weakness. States her legs keep giving out on her and she required assistance getting off the commode today. Patient and family were concerned and called EMS.   Records reviewed indicate that patient was admitted to [facility] on 9/10/2023 and discharged on 2023 for acute decompensated congestive heart failure. She was evaluated by cardiology in the hospital and had echo performed which showed EF of 70 to 75% with normal wall motion and grade 1 diastolic dysfunction. Patient had significant diuretic response with bump of creatinine to 1.3. She was also found to have a type II non-STEMI, and troponins were trended to improvement. Hospital course was complicated by right lower extremity cellulitis for which she has completed a course of Cephalexin as of yesterday. Reports significant improvement. Patient volunteers that she was ultimately discharged on a \"stronger water pill\". However feels like this is no longer working. Will obtain medical records.  Currently there are no complaints of fever, cough, congestion, SOB, or chest pain. No abdominal pain or N/V/D. No dysuria or flank. No headache or focal weakness/parathesias. Will " admit to inpatient for further evaluation and treatment.      Review of Systems   Constitutional:  Negative for chills, fatigue and fever.   HENT:  Negative for congestion and trouble swallowing.    Eyes:  Negative for photophobia and visual disturbance.   Respiratory:  Positive for shortness of breath. Negative for cough.    Cardiovascular:  Positive for leg swelling. Negative for chest pain.   Gastrointestinal:  Positive for abdominal distention. Negative for abdominal pain, diarrhea, nausea and vomiting.   Endocrine: Negative for cold intolerance and heat intolerance.   Genitourinary:  Negative for dysuria and flank pain.   Musculoskeletal:  Positive for gait problem. Negative for back pain.   Skin:  Positive for color change. Negative for wound.   Allergic/Immunologic: Positive for immunocompromised state.   Neurological:  Positive for weakness. Negative for dizziness and headaches.   Hematological:  Negative for adenopathy.   Psychiatric/Behavioral:  Negative for agitation and confusion.               Personal History     Past Medical History:   Diagnosis Date    Acid reflux     Anemia     Due to chronic blood loss - Chronic blood loss anemia    Apnea     Arthritis     Backache     CHF (congestive heart failure)     COPD (chronic obstructive pulmonary disease)     Severe    Degeneration of lumbar intervertebral disc     Drug therapy     Finding    Emphysema, unspecified     Esophageal reflux     Family history of malignant neoplasm of breast in first degree relative     Gall stones     GERD (gastroesophageal reflux disease)     H/O spinal fusion     H/O: drug dependency     Hearing loss     Heart disease     Hypertrophic cardiomyopathy     Insomnia     Mixed hyperlipidemia     Neurotic Depression     Recurrent major depressive episodes, moderate     Severe obesity     Tobacco use     History of             Past Surgical History:   Procedure Laterality Date    APPENDECTOMY      BACK SURGERY      BACK SURGERY       CARDIAC ABLATION      CARDIAC SURGERY      Cardiac Stent Catherization    CHOLECYSTECTOMY      Gall Bladder Surgery    HERNIA REPAIR      HIP SURGERY Left     LAPAROSCOPIC TUBAL LIGATION      NECK SURGERY      NECK SURGERY      ORTHOPEDIC SURGERY         Family History: family history includes Arthritis in her maternal grandmother; Breast cancer in her mother; Cancer in an other family member; Heart attack in her maternal grandmother; Multiple sclerosis in her mother; Thyroid disease in her mother.     Social History:  reports that she quit smoking about 15 years ago. Her smoking use included cigarettes. She has never used smokeless tobacco. She reports that she does not drink alcohol and does not use drugs.  Social History     Social History Narrative    Not on file       Medications:  Fluticasone-Umeclidin-Vilant, albuterol, albuterol sulfate HFA, aspirin, atorvastatin, bumetanide, citalopram, fluticasone, metoprolol tartrate, multivitamin with fluoride/iron, pantoprazole, potassium chloride, roflumilast, and traZODone    Allergies   Allergen Reactions    Sulfa Antibiotics Rash       Objective   Objective     Vital Signs:   Temp:  [97.8 øF (36.6 øC)-97.9 øF (36.6 øC)] 97.8 øF (36.6 øC)  Heart Rate:  [76-86] 82  Resp:  [14-19] 19  BP: ()/() 97/51  Flow (L/min):  [2-3] 3    Physical Exam   Constitutional: Awake, alert  Eyes: PERRLA, sclerae anicteric, no conjunctival injection  HENT: NCAT, mucous membranes moist  Neck: Supple, no thyromegaly, no lymphadenopathy, trachea midline  Respiratory: Clear to auscultation bilaterally, nonlabored respirations   Cardiovascular: RRR, no murmurs, rubs, or gallops, palpable pedal pulses bilaterally  Gastrointestinal: Positive bowel sounds, soft, nontender, distended, ascites  Musculoskeletal: trace, pitting, BLE edema no clubbing or cyanosis to extremities  Erythema to anterior aspect of right leg, non tender. improving  Psychiatric: Appropriate affect,  cooperative  Neurologic: Oriented x 3, strength symmetric in all extremities, Cranial Nerves grossly intact to confrontation, speech clear  Skin: No rashes      Result Review:  I have personally reviewed the results from the time of this admission to 10/3/2023 22:37 EDT and agree with these findings:  [x]  Laboratory list / accordion  []  Microbiology  []  Radiology  []  EKG/Telemetry   []  Cardiology/Vascular   []  Pathology  [x]  Old records  []  Other:  Most notable findings include: hypotensive 97/51. Hypoxic to 88% on RA. Sodium 128. CO 19. Creatinine 1.41. alk phos 262. AST 71. Procal 0.97. WBC 10.93. CT chest bilateral effusions.    LAB RESULTS:      Lab 10/03/23  1804 10/03/23  1720   WBC  --  10.93*   HEMOGLOBIN  --  14.6   HEMATOCRIT  --  42.2   PLATELETS  --  213   NEUTROS ABS  --  9.28*   IMMATURE GRANS (ABS)  --  0.05   LYMPHS ABS  --  0.77   MONOS ABS  --  0.80   EOS ABS  --  0.02   MCV  --  94.4   PROCALCITONIN  --  0.97*   LACTATE 1.9  --    PROTIME  --  15.2*   D DIMER QUANT  --  2.84*         Lab 10/03/23  1720   SODIUM 128*   POTASSIUM 5.0   CHLORIDE 96*   CO2 19.0*   ANION GAP 13.0   BUN 21   CREATININE 1.41*   EGFR 39.5*   GLUCOSE 91   CALCIUM 9.1   MAGNESIUM 2.0   TSH 4.920*         Lab 10/03/23  1720   TOTAL PROTEIN 5.5*   ALBUMIN 2.3*   GLOBULIN 3.2   ALT (SGPT) 17   AST (SGOT) 71*   BILIRUBIN 1.1   ALK PHOS 262*         Lab 10/03/23  1950/23  1720   PROBNP  --  10,567.0*   HSTROP T 74* 80*   PROTIME  --  15.2*   INR  --  1.19*                 Brief Urine Lab Results  (Last result in the past 365 days)        Color   Clarity   Blood   Leuk Est   Nitrite   Protein   CREAT   Urine HCG        10/03/23 1839 Dark Yellow   Clear   Negative   Negative   Negative   Negative                 Microbiology Results (last 10 days)       Procedure Component Value - Date/Time    Respiratory Panel PCR w/COVID-19(SARS-CoV-2) JEN/BETSY/ELIEZER/PAD/COR/MAD/URSULA In-House, NP Swab in UTM/VTM, 3-4 HR TAT - Swab,  Nasopharynx [[id number]]  (Normal) Collected: 10/03/23 1726    Lab Status: Final result Specimen: Swab from Nasopharynx Updated: 10/03/23 1825     ADENOVIRUS, PCR Not Detected     Coronavirus 229E Not Detected     Coronavirus HKU1 Not Detected     Coronavirus NL63 Not Detected     Coronavirus OC43 Not Detected     COVID19 Not Detected     Human Metapneumovirus Not Detected     Human Rhinovirus/Enterovirus Not Detected     Influenza A PCR Not Detected     Influenza B PCR Not Detected     Parainfluenza Virus 1 Not Detected     Parainfluenza Virus 2 Not Detected     Parainfluenza Virus 3 Not Detected     Parainfluenza Virus 4 Not Detected     RSV, PCR Not Detected     Bordetella pertussis pcr Not Detected     Bordetella parapertussis PCR Not Detected     Chlamydophila pneumoniae PCR Not Detected     Mycoplasma pneumo by PCR Not Detected    Narrative:      In the setting of a positive respiratory panel with a viral infection PLUS a negative procalcitonin without other underlying concern for bacterial infection, consider observing off antibiotics or discontinuation of antibiotics and continue supportive care. If the respiratory panel is positive for atypical bacterial infection (Bordetella pertussis, Chlamydophila pneumoniae, or Mycoplasma pneumoniae), consider antibiotic de-escalation to target atypical bacterial infection.            CT Head Without Contrast    Result Date: 10/3/2023  CT HEAD WO CONTRAST Date of Exam: 10/3/2023 7:07 PM EDT Indication: gen weakness. Comparison: None available. Technique: Axial CT images were obtained of the head without contrast administration.  Automated exposure control and iterative construction methods were used. FINDINGS: There is no evidence for acute intracranial hemorrhage. No definitive acute focal ischemia is identified. Nonspecific white matter changes are noted likely related to chronic small vessel ischemic changes and age-related changes. Associated diffuse volume loss is  observed. There is no evidence for abnormal cerebral edema. There is no mass effect or midline shift. The ventricular system is nondilated. The basal cisterns are patent. The skull is intact. The paranasal sinuses and mastoid air cells are  clear.     Impression: 1.No evidence for acute intracranial abnormality. 2.Nonspecific white matter changes are noted with associated diffuse volume loss. These findings are likely related to chronic small vessel ischemic changes and/or age-related changes. Electronically Signed: Peter Don MD  10/3/2023 7:13 PM EDT  Workstation ID: [id number]    XR Chest 1 View    Result Date: 10/3/2023  XR CHEST 1 VW Date of Exam: 10/3/2023 4:12 PM CDT Indication: gen weakness Comparison: 7/19/2023 Findings: Cardiomediastinal silhouette is unremarkable. There is left mid to lower lung airspace disease suggestive of pneumonia. Small to moderate left and small right effusions. No acute osseous abnormality identified.     Impression: Impression: 1.Left mid to lower lung airspace disease with small to moderate effusion suggestive of pneumonia. Correlate with symptoms. 2.Persistent small right effusion. Electronically Signed: Isaca Smalls MD  10/3/2023 4:28 PM CDT  Workstation ID: [id number]    CT Angiogram Chest    Result Date: 10/3/2023  CT ANGIOGRAM CHEST Date of Exam: 10/3/2023 7:07 PM EDT Indication: SOB, hypoxic, elevated dimer. Comparison: None available. Technique: CTA of the chest was performed after the uneventful intravenous administration of 85 mL Isovue 370. Reconstructed coronal and sagittal images were also obtained. In addition, a 3-D volume rendered image was created for interpretation. Automated exposure control and iterative reconstruction methods were used. Findings: There is generally good contrast opacification of the pulmonary arteries and no evidence of embolic disease. No evidence of thoracic aortic aneurysm or dissection is seen. There is probably high-grade origin  stenosis of the left subclavian artery, with very dense calcification of the lumen on image 26 series 4. No pericardial effusion or mediastinal adenopathy is seen. There is diffuse coronary artery calcium. Multiple macrocalcifications are seen in the normal sized thyroid There is a large free-flowing left pleural effusion and moderate free-flowing right pleural effusion. There is nearly complete atelectasis of the left lower lobe and milder right lower lobe atelectasis. Mild micro bullous change is seen of the upper lobes. No other evidence of active pulmonary parenchymal disease is appreciated. There is a calcified right middle lobe granuloma. The airways appear to be normally patent. Included images of the upper abdomen show extensive ascites. Liver morphology suggests cirrhosis, with marked enlargement of the left liver lobe and small right liver lobe. There appear to be clips in the gallbladder fossa. Spleen is normal in size. Bony structures appear to be intact.     Impression: Impression: 1. No evidence of pulmonary embolic disease. 2. Large free-flowing left pleural effusion and moderate right effusion. 3. Extensive atelectasis of the left lower lobe due to effusion and mild right lower lobe atelectasis. 4. Liver morphology consistent with cirrhosis. Upper abdominal ascites, on these limited images, appears to be extensive. 5. Incidentally noted heavy calcification of the left subclavian artery origin, suggesting high-grade stenosis, or possibly occlusion. Electronically Signed: Brian Sutherland MD  10/3/2023 7:29 PM EDT  Workstation ID: [id number]         Assessment & Plan   Assessment & Plan       Acute respiratory failure with hypoxia    Chronic obstructive pulmonary disease    CHF (congestive heart failure)    HEIDY (acute kidney injury)    Hyponatremia    Cirrhosis of liver    Cellulitis of right lower extremity    Atherosclerosis of native arteries of extremities with intermittent claudication, other extremity     Hypertension    Generalized weakness    GERD (gastroesophageal reflux disease)    Hyperlipidemia    Anemia    Anxiety and depression    Chronic back pain    73 to female here with dyspnea x 3 days and weakness. Probable new diagnosis of cirrhosis with ascites and pleural effusions.      Left > right pleural effusion  Cirrhosis/ascites (CTA chest) - new finding   Hypoalbuminemia , 2.3   - 88% on RA  - CTA chest demonstrates mod left effusion and smaller R effusion.    - Also shows apparent cirrhotic liver with large ascites  - pt reports history of fatty liver. Denies alcoholism.  -  check PT/INR  - obtain US liver  - consult GI input  - consider diagnostic paracentesis    Acute onset weakness  - until a couple weeks ago she could walk without a cane or walker  - now using a wheelchair  - this may be muscle wasting + effect of water weight and ascites + hypotension   - PT OT   -  on high protein diet     Lightheadedness  Borderline hypotn  - check orthostatics in AM   - consider midodrine, or adjust diuretics,     Dyspnea and hypoxia   - she was recently admitted at [facility] and diagnosed w CHF, but had echo showing EF 75% and had water pill increased  - likely the cause is pleural effusions from liver dx/low albumin.    - will obtain blood cultures, and empirically started rocephin and doxycyline   - however there is little evidence for infxn; consider rapid wean.       HEIDY  - Cr baseline 0.9, now 1.4  - ? Cardiorenal vs effect of overdiuresis (diuretic recently increased) or ATN   - avoid nephrotoxins  - obtain UA  - bladder scan and straight cath as needed    -   Possible HFpEF (vs ascites and hypoalbuminemia causing pl effusions)   HTN HLD  - recent admission with echo at [facility]:  records from OSH pending  - holding on additional Bumex due to hypotension  - will administer trail of Albumin X1, serum albumin low at 2.3  - continue statin  - plan to resume Bumex 1 mg BID and Lopressor in am pending pressure  -  daily weights  - strict I&O    COPD, emphysema   - no on supplemental oxygen at home  - former smoker, now vapes. Encouraged cessation  - continue with inhalers     Hyponatremia hypervolemic   - new finding.   - check urine sodium, urine osmolality, serum osmolality  - AM cortisol  - monitor chemistry closely    PVD  - incidental finding of atherosclerosis of left subclavian artery, suggesting high-grade stenosis, or possibly occlusion.   - consider vascular input vs outpatient w.u    Generalized weakness  - PT/OT    Anemia  - H/H stable and at baseline    GERD  - PPI    Anxiety/depression  - Celexa, Trazodone, consider holding pending progression of hyponatremia    DVT prophylaxis: MARIE    CODE STATUS:  full code  Level Of Support Discussed With: Patient  Code Status (Patient has no pulse and is not breathing): CPR (Attempt to Resuscitate)  Medical Interventions (Patient has pulse or is breathing): Full Support      Expected Discharge  TBD      This note has been completed as part of a split-shared workflow.     Signature: Electronically signed by Diaz Michelle PA-C, 10/03/23, 10:55 PM EDT    Total time spent: 90 minutes  Time spent includes time reviewing chart, face-to-face time, counseling patient/family/caregiver, ordering medications/tests/procedures, communicating with other health care professionals, documenting clinical information in the electronic health record, and coordination of care.      Attending   Admission Attestation       I have performed an independent face-to-face diagnostic evaluation including performing an independent physical examination.  The documented plan of care above was reviewed and developed with the advanced practice clinician (APC).  I have updated the HPI as appropriate.    Brief HPI    74 yo with PMH  of  HTN HL HFpEF, PVD, COPD, chronic back pain, GERD, anemia, and anxiety/depression.  A month ago she was at [facility] [place], was diagnosed with CHF and diuresed.   However, recently she has developed dyspnea and abdominal swelling and lightheadedness with standing.  She used to walk without a walker or cane - now she is too weak to rise to her feet.  Abdomen is swelling.  Her legs have been giving out .  She called EMS to bring her to ED.          Attending Physical Exam:    Gen: woman lying in bed, NAD, looks chronically ill but a good historian, very pleasant   Neuro: alert and oriented, clear speech, follows commands, grossly nonfocal, remembers dates and details of recent medical care   HEENT:  NC/AT PER  Neck:  Supple, no LAD  Heart RRR  Abd:  Mod distended, soft, not tympanic and not tender  Extrem:  No c/c 1+ edema            Assessment and Plan:    See assessment and plan documented by APC above and updated/edited by me as appropriate.    Suzan Rice MD  10/03/23                        Electronically signed by Suzan Rice MD at 10/04/23 0036       Current Facility-Administered Medications   Medication Dose Route Frequency Provider Last Rate Last Admin    acetaminophen (TYLENOL) tablet 650 mg  650 mg Oral Q4H PRN Diaz Michelle PA-C        aspirin chewable tablet 81 mg  81 mg Oral Daily Diaz Michelle PA-C   81 mg at 10/12/23 0917    atorvastatin (LIPITOR) tablet 40 mg  40 mg Oral Nightly Daiz Michelle PA-C   40 mg at 10/11/23 2040    sennosides-docusate (PERICOLACE) 8.6-50 MG per tablet 2 tablet  2 tablet Oral BID Diaz Michelle PA-C   2 tablet at 10/12/23 0917    And    polyethylene glycol (MIRALAX) packet 17 g  17 g Oral Daily PRN Diaz Michelle PA-C        And    bisacodyl (DULCOLAX) EC tablet 5 mg  5 mg Oral Daily PRN Diaz Michelle PA-C        And    bisacodyl (DULCOLAX) suppository 10 mg  10 mg Rectal Daily PRN Diaz Michelle PA-C        budesonide-formoterol (SYMBICORT) 160-4.5 MCG/ACT inhaler 2 puff  2 puff Inhalation BID - RT Diaz Michelle PA-C   2 puff at 10/09/23 0821    And    tiotropium (SPIRIVA RESPIMAT) 2.5 mcg/act  aerosol solution inhaler  2 puff Inhalation Daily - RT Diaz Michelle PA-C   2 puff at 10/09/23 0821    Calcium Replacement - Follow Nurse / BPA Driven Protocol   Does not apply PRN Diaz Michelle PA-C        citalopram (CeleXA) tablet 20 mg  20 mg Oral Daily Diaz Michelle PA-C   20 mg at 10/12/23 0916    First Mouthwash (Magic Mouthwash) 5 mL  5 mL Swish & Spit Q6H Priti Red APRN   5 mL at 10/12/23 1135    glycopyrrolate (ROBINUL) injection 0.4 mg  0.4 mg Intravenous Q4H PRN Chandni Sanders APRN        heparin (porcine) 5000 UNIT/ML injection 5,000 Units  5,000 Units Subcutaneous Q12H Diaz Michelle PA-C   5,000 Units at 10/12/23 0916    HYDROmorphone (DILAUDID) injection 0.25 mg  0.25 mg Intravenous Q2H PRN Lily Patel MD   0.25 mg at 10/11/23 0502    hydrOXYzine (ATARAX) tablet 25 mg  25 mg Oral TID PRN Anna William DO   25 mg at 10/10/23 1151    Magnesium Low Dose Replacement - Follow Nurse / BPA Driven Protocol   Does not apply PRN Lily Patel MD        melatonin tablet 5 mg  5 mg Oral Nightly PRN Diaz Michelle PA-C   5 mg at 10/04/23 2031    metoprolol tartrate (LOPRESSOR) half tablet 12.5 mg  12.5 mg Oral BID With Meals Anna William DO   12.5 mg at 10/12/23 0916    midodrine (PROAMATINE) tablet 10 mg  10 mg Oral TID AC Anna William DO   10 mg at 10/12/23 1135    ondansetron (ZOFRAN) injection 4 mg  4 mg Intravenous Q6H PRN Chandni Sanders APRN   4 mg at 10/10/23 1233    oxyCODONE (ROXICODONE) immediate release tablet 5 mg  5 mg Oral Q4H PRN Lily Patel MD   5 mg at 10/10/23 1833    oxyCODONE (ROXICODONE) immediate release tablet 5 mg  5 mg Oral Q8H Chandni Sanders APRN   5 mg at 10/12/23 0917    pantoprazole (PROTONIX) EC tablet 40 mg  40 mg Oral Daily Diaz Michelle PA-C   40 mg at 10/12/23 0916    Phosphorus Replacement - Follow Nurse / BPA Driven Protocol   Does not apply PRN Diaz Michelle PA-C        Potassium Replacement - Follow  Nurse / BPA Driven Protocol   Does not apply PRN Miguel Angel, Bennicia L, PA-C        riFAXIMin (XIFAXAN) tablet 550 mg  550 mg Oral Q12H Brunner, Mark I, MD   550 mg at 10/12/23 0952    roflumilast (DALIRESP) tablet 250 mcg  250 mcg Oral Daily Miguel Angel, Bennicia L, PA-C   250 mcg at 10/12/23 0952    saccharomyces boulardii (FLORASTOR) capsule 250 mg  250 mg Oral BID Miguel Angel, Bennicia L, PA-C   250 mg at 10/12/23 0916    sodium chloride 0.9 % flush 10 mL  10 mL Intravenous Q12H Miguel Angel, Bennicia L, PA-C   10 mL at 10/12/23 0919    sodium chloride 0.9 % flush 10 mL  10 mL Intravenous PRN Miguel Angel, Bennicia L, PA-C        sodium chloride 0.9 % infusion 40 mL  40 mL Intravenous PRN Miguel Angel, Bennicia L, PA-C        traZODone (DESYREL) tablet 150 mg  150 mg Oral Nightly Miguel Angel, Bennicia L, PA-C   150 mg at 10/11/23 2040        Physician Progress Notes (last 72 hours)        Priti Red APRN at 10/12/23 1140              [facility] Medicine Services  PROGRESS NOTE    Patient Name: Laura Hui  : 1950  MRN: [id number]    Date of Admission: 10/3/2023  Primary Care Physician: Carmella Barboza DO    Subjective   Subjective     CC:  F/U cirrhosis    HPI:  Patient in bed. RN at bedside this morning. No overnight issues. Not eating much, but took few bites of dinner. Pain is controlled. No dyspnea- mouth pain and sore throat      Objective   Objective     Vital Signs:   Temp:  [97.8 øF (36.6 øC)-98 øF (36.7 øC)] 97.8 øF (36.6 øC)  Heart Rate:  [81-91] 91  Resp:  [16] 16  BP: (125-133)/(68-74) 133/68  Flow (L/min):  [2] 2     Physical Exam:  Gen-no acute distress, appears tired, chronically ill appearing   HENT-NCAT, mucous membranes dry  CV-RRR,   Resp-CTAB, unlabored, diminished at bases  Abd-soft, NT, no significant distention  Ext-no LE edema  Neuro-answers ROS and orientation questions. Seems to have some confused conversation situationally   Skin-no rashes  Psych-appropriate mood      Results  Reviewed:  LAB RESULTS:      Lab 10/06/23  0346   WBC 9.40   HEMOGLOBIN 10.6*   HEMATOCRIT 31.3*   PLATELETS 140   MCV 94.3         Lab 10/06/23  0346   SODIUM 129*   POTASSIUM 4.5   CHLORIDE 95*   CO2 15.0*   ANION GAP 19.0*   BUN 30*   CREATININE 2.39*   EGFR 21.0*   GLUCOSE 74   CALCIUM 10.1   PHOSPHORUS 3.7         Lab 10/06/23  0346   TOTAL PROTEIN 5.5*   ALBUMIN 3.6   GLOBULIN 1.9   ALT (SGPT) 15   AST (SGOT) 40*   BILIRUBIN 0.9   ALK PHOS 164*                           Brief Urine Lab Results  (Last result in the past 365 days)        Color   Clarity   Blood   Leuk Est   Nitrite   Protein   CREAT   Urine HCG        10/05/23 1322 Yellow   Clear   Negative   Negative   Negative   Negative                   Microbiology Results Abnormal       Procedure Component Value - Date/Time    Blood Culture - Blood, Hand, Left [[id number]]  (Normal) Collected: 10/03/23 1804    Lab Status: Final result Specimen: Blood from Hand, Left Updated: 10/08/23 1815     Blood Culture No growth at 5 days    Blood Culture - Blood, Arm, Right [[id number]]  (Normal) Collected: 10/03/23 1720    Lab Status: Final result Specimen: Blood from Arm, Right Updated: 10/08/23 1815     Blood Culture No growth at 5 days    MRSA Screen, PCR (Inpatient) - Swab, Nares [[id number]]  (Normal) Collected: 10/03/23 2327    Lab Status: Final result Specimen: Swab from Nares Updated: 10/04/23 0811     MRSA PCR Negative    Narrative:      The negative predictive value of this diagnostic test is high and should only be used to consider de-escalating anti-MRSA therapy. A positive result may indicate colonization with MRSA and must be correlated clinically.  MRSA Negative    Respiratory Panel PCR w/COVID-19(SARS-CoV-2) JEN/BETSY/ELIEZER/PAD/COR/MAD/URSULA In-House, NP Swab in UTM/VTM, 3-4 HR TAT - Swab, Nasopharynx [[id number]]  (Normal) Collected: 10/03/23 1726    Lab Status: Final result Specimen: Swab from Nasopharynx Updated: 10/03/23 1825     ADENOVIRUS, PCR Not  Detected     Coronavirus 229E Not Detected     Coronavirus HKU1 Not Detected     Coronavirus NL63 Not Detected     Coronavirus OC43 Not Detected     COVID19 Not Detected     Human Metapneumovirus Not Detected     Human Rhinovirus/Enterovirus Not Detected     Influenza A PCR Not Detected     Influenza B PCR Not Detected     Parainfluenza Virus 1 Not Detected     Parainfluenza Virus 2 Not Detected     Parainfluenza Virus 3 Not Detected     Parainfluenza Virus 4 Not Detected     RSV, PCR Not Detected     Bordetella pertussis pcr Not Detected     Bordetella parapertussis PCR Not Detected     Chlamydophila pneumoniae PCR Not Detected     Mycoplasma pneumo by PCR Not Detected    Narrative:      In the setting of a positive respiratory panel with a viral infection PLUS a negative procalcitonin without other underlying concern for bacterial infection, consider observing off antibiotics or discontinuation of antibiotics and continue supportive care. If the respiratory panel is positive for atypical bacterial infection (Bordetella pertussis, Chlamydophila pneumoniae, or Mycoplasma pneumoniae), consider antibiotic de-escalation to target atypical bacterial infection.            No radiology results from the last 24 hrs        Current medications:  Scheduled Meds:aspirin, 81 mg, Oral, Daily  atorvastatin, 40 mg, Oral, Nightly  budesonide-formoterol, 2 puff, Inhalation, BID - RT   And  tiotropium bromide monohydrate, 2 puff, Inhalation, Daily - RT  citalopram, 20 mg, Oral, Daily  First Mouthwash (Magic Mouthwash), 5 mL, Swish & Spit, Q6H  heparin (porcine), 5,000 Units, Subcutaneous, Q12H  metoprolol tartrate, 12.5 mg, Oral, BID With Meals  midodrine, 10 mg, Oral, TID AC  oxyCODONE, 5 mg, Oral, Q8H  pantoprazole, 40 mg, Oral, Daily  rifAXIMin, 550 mg, Oral, Q12H  roflumilast, 250 mcg, Oral, Daily  saccharomyces boulardii, 250 mg, Oral, BID  senna-docusate sodium, 2 tablet, Oral, BID  sodium chloride, 10 mL, Intravenous,  Q12H  traZODone, 150 mg, Oral, Nightly      Continuous Infusions:   PRN Meds:.  acetaminophen    senna-docusate sodium **AND** polyethylene glycol **AND** bisacodyl **AND** bisacodyl    Calcium Replacement - Follow Nurse / BPA Driven Protocol    glycopyrrolate    HYDROmorphone    hydrOXYzine    Magnesium Low Dose Replacement - Follow Nurse / BPA Driven Protocol    melatonin    ondansetron    oxyCODONE    Phosphorus Replacement - Follow Nurse / BPA Driven Protocol    Potassium Replacement - Follow Nurse / BPA Driven Protocol    sodium chloride    sodium chloride    Assessment & Plan   Assessment & Plan     Active Hospital Problems    Diagnosis  POA    **Acute respiratory failure with hypoxia [J96.01]  Yes    Severe malnutrition [E43]  Yes    CHF (congestive heart failure) [I50.9]  Yes    Anemia [D64.9]  Yes    Anxiety and depression [F41.9, F32.A]  Yes    Chronic back pain [M54.9, G89.29]  Yes    HEIDY (acute kidney injury) [N17.9]  Yes    Hyponatremia [E87.1]  Yes    Cirrhosis of liver [K74.60]  Yes    Generalized weakness [R53.1]  Yes    Cellulitis of right lower extremity [L03.115]  Yes    GERD (gastroesophageal reflux disease) [K21.9]  Yes    Hypertension [I10]  Yes    Atherosclerosis of native arteries of extremities with intermittent claudication, other extremity [I70.218]  Yes    Chronic obstructive pulmonary disease [J44.9]  Yes    Hyperlipidemia [E78.5]  Yes      Resolved Hospital Problems   No resolved problems to display.        Brief Hospital Course to date:  Laura Hui is a 73 y.o. female with hx of HTN, HLD, HFpEF, PVD, COPD, chronic back pain, GERD, anemia, and anxiety/depression who presents due to dyspnea x 3 days and weakness. CTA chest demonstrated moderate left effusion and smaller right effusion; cirrhotic liver with large ascites noted. Liver US with cirrhosis, ascites and right pleural effusion. Diagnostic and therapeutic paracentesis was performed on 10/4/23 with 3.3 L removed. SAAG consistent  with portal HTN. No evidence of SBP. Creatinine elevated on presentation, noted to be 1.41. Urine sodium less than 20. UA negative. She received albumin and midodrine without improvement. After further discussion with patient/family, decision made to proceed with comfort measures. Palliative Care and Hospice following.     This patient's problems and plans were partially entered by my partner and updated as appropriate by me 10/12/23.   Goals of care  -Hospice following, currently on a comfort focused plan of care  -discussed with daughter today and MSWGenie. Looking for outpatient hospice services. Information given in regards to caregiver agencies at home and SNF options.  -No further lab checks or fingersticks, no aggressive measures  -Continue pain control with scheduled and PRN Oxycodone and IV Dilauidid PRN (has not required IV)  -Per GI, may benefit from paracentesis before discharge, could consider peritoneal drain placement- monitor for need  -add magic mouth wash for mouth pain/ sore throat     Left > right pleural effusion  Cirrhosis/ascites - new finding   Hypoalbuminemia  - GI followed during admission  - s/p paracentesis 10/4/23 with 3.3 L removed  - Thoracentesis deferred as SOA improved following paracentesis  - ATBx discontinued (had been started on empirically for PNA, but low suspicion for active infection)  - Now comfort measures    HEIDY, hepatorenal syndrome  - Likely hepatorenal. Urine Na < 20. UA negative. S/p albumin and midodrine with no improvement.   - Nephrology followed during admission  - Cr trending up, no further checks given GO  - Vance for comfort     Dyspnea and hypoxia   - She was recently admitted at [facility] [place] and diagnosed with CHF, but had Echo showing EF 75%; diuretic was increased   - CTA chest with no PE, large left pleural effusion and moderate right pleural effusion, atelectasis  - Improvement in respiratory status following paracentesis.   - Initially on  Doxycycline/Rocephin, discontinued due to low suspicion for pneumonia     Possible HFpEF  HTN   HLD  - [place] [facility] [place]  - Currently holding Bumex due to HEIDY, decreased intake/ euvolemia  - metoprolol 12.5mg bid, hold parameters  - Continue statin     Acute onset weakness  - Until a couple weeks ago she could walk without a cane or walker  - Now using a wheelchair     Lightheadedness  Borderline hypotension  - Holding Bumex  - s/p albumin and midodrine without improvement, remains on midodrine currently TID  - metoprolol restarted- BP stable     COPD, emphysema   - Not on supplemental oxygen at home  - Former smoker, now vapes. Encouraged cessation.  - Continue with inhalers      Hyponatremia, hypervolemic   - Cortisol appropriate, TSH mildly elevated at 4.9, T4 normal  - Stopped lab draws     PVD  - Incidental finding of atherosclerosis of left subclavian artery, suggesting high-grade stenosis, or possibly occlusion  - Defer Vascular Surgery consult given GOC     Generalized weakness  - PT/OT attempted, but patient not participating at this time     Anemia  - No further lab draws     GERD  - PPI     Anxiety/depression  - Celexa, Trazodone       Expected Discharge Location and Transportation: home with hospice vs inpatient hospice  Expected Discharge   Expected Discharge Date: 10/13/2023; Expected Discharge Time:      DVT prophylaxis:  Medical DVT prophylaxis orders are present.     AM-PAC 6 Clicks Score (PT): 11 (10/11/23 0800)    CODE STATUS:   Code Status and Medical Interventions:   Ordered at: 10/09/23 1506     Level Of Support Discussed With:    Patient    Next of Kin (If No Surrogate)     Code Status (Patient has no pulse and is not breathing):    No CPR (Do Not Attempt to Resuscitate)     Medical Interventions (Patient has pulse or is breathing):    Comfort Measures       Priti Red, APRN  10/12/23  More than 50% of time spent counseling on current illness and plan of care. Case  "discussed with: RN, MSW, patient and daughter  Total time spent face to face with the patient was 30 minutes.  Total time of the encounter was 60 minutes.        Electronically signed by Priti Red APRN at 10/12/23 1143       Chandni Sanders APRN at 10/11/23 1335          Palliative Care Daily Progress Note     C/C: Patient reporting dyspnea.     S: Medical record reviewed. Follow up visit for GOC in the context of complex medical decision making. Events noted. Patient reports pain currently controlled. She reports dyspnea with any exertion. Patient requiring Hydromorphone 0.25mg IV x2 doses, and Oxycodone 5mg PO x 3 doses for total 32.5 MME's in the last 24 hours. Decreased intake.     ROS: +pain, abdominal, constant ache, reports pain controlled with recent Oxycodone. +nausea/decreased appetite, patient reports smell of food makes her sick. ROS limited by drowsiness.     O: Code Status:   Code Status and Medical Interventions:   Ordered at: 10/09/23 1506     Level Of Support Discussed With:    Patient    Next of Kin (If No Surrogate)     Code Status (Patient has no pulse and is not breathing):    No CPR (Do Not Attempt to Resuscitate)     Medical Interventions (Patient has pulse or is breathing):    Comfort Measures      Advanced Directives: Advance Directive Status: Patient does not have advance directive   Goals of Care: Ongoing.   Palliative Performance Scale Score: 30%     /58 (BP Location: Left arm, Patient Position: Lying)   Pulse 118   Temp 98.2 øF (36.8 øC) (Oral)   Resp 14   Ht 165.1 cm (65\")   Wt 80.4 kg (177 lb 3.2 oz)   SpO2 91%   BMI 29.49 kg/mý     Intake/Output Summary (Last 24 hours) at 10/11/2023 1335  Last data filed at 10/11/2023 1200  Gross per 24 hour   Intake 480 ml   Output --   Net 480 ml       PE:  General Appearance:    Patient laying in bed, drowsy, awake, A/C ill appearing, cooperative, NAD   HEENT:    NC/AT, EOMI, anicteric, MMM, face relaxed, NC in place   Neck:  "  supple, trachea midline, no JVD   Lungs:     CTA bilat, diminished in bases; respirations regular, even and unlabored; RR 16-18 on exam, 2LNC    Heart:    RRR, normal S1 and S2, no M/R/G,  on monitor   Abdomen:     Normal bowel sounds, soft, tender, distended   G/U:   Deferred   MSK/Extremities:  Bruising to upper extremities, no edema   Pulses:   Pulses palpable and equal bilaterally   Skin:   Warm, dry   Neurologic:   Drowsy, Ox3, cooperative, YOUNGER   Psych:   Calm, appropriate     Meds: Reviewed and changes noted    Labs:   Results from last 7 days   Lab Units 10/06/23  0346   WBC 10*3/mm3 9.40   HEMOGLOBIN g/dL 10.6*   HEMATOCRIT % 31.3*   PLATELETS 10*3/mm3 140     Results from last 7 days   Lab Units 10/06/23  0346   SODIUM mmol/L 129*   POTASSIUM mmol/L 4.5   CHLORIDE mmol/L 95*   CO2 mmol/L 15.0*   BUN mg/dL 30*   CREATININE mg/dL 2.39*   GLUCOSE mg/dL 74   CALCIUM mg/dL 10.1     Results from last 7 days   Lab Units 10/06/23  0346   SODIUM mmol/L 129*   POTASSIUM mmol/L 4.5   CHLORIDE mmol/L 95*   CO2 mmol/L 15.0*   BUN mg/dL 30*   CREATININE mg/dL 2.39*   CALCIUM mg/dL 10.1   BILIRUBIN mg/dL 0.9   ALK PHOS U/L 164*   ALT (SGPT) U/L 15   AST (SGOT) U/L 40*   GLUCOSE mg/dL 74     Imaging Results (Last 72 Hours)       ** No results found for the last 72 hours. **                  Diagnostics: Reviewed    A:   Acute respiratory failure with hypoxia    Atherosclerosis of native arteries of extremities with intermittent claudication, other extremity    GERD (gastroesophageal reflux disease)    Hyperlipidemia    Hypertension    Chronic obstructive pulmonary disease    CHF (congestive heart failure)    Anemia    Anxiety and depression    Chronic back pain    HEIDY (acute kidney injury)    Hyponatremia    Cirrhosis of liver    Generalized weakness    Cellulitis of right lower extremity    Severe malnutrition     73 y.o. female with SIMS cirrhosis with ascites, HEIDY, malnutrition.   S/Sx:  1. Pain -secondary to  ascites  -scheduled Oxycodone 5mg PO q 8 hours and continue q4 hours prn pain  -continue Hydromorphone 0.25mg IV q 2 hours prn pain    2. Dyspnea -currently on 2LNC  -may give Oxycodone/Hydromorphone prn dyspnea    3. Nausea -started Zofran 4mg IV q 6 hours prn N/V    4. Debility -PT/OT following    5. GOC -DNR/Comfort Measures -per discussion with patient and daughter at bedside  -reviewed comfort measures including discontinuation of labs, IV fluids; focus on symptom management   -hospice following    P: Follow up visit for GOC and symptom management. Patient awake but drowsy, taking sips of water.  If patient is declining, may require adjustment to only IV medications for symptom management if patient unable to swallow pills, so will continue to follow closely.   Palliative Care Team will continue to follow patient. Please do not hesitate to contact us regarding further sx mgmt or GOC needs.  SHMUEL Muniz  10/11/2023  Time spent: 25 minutes      Electronically signed by Chandni Sanders APRN at 10/11/23 1341       Priti Red APRN at 10/11/23 1215              [facility] Medicine Services  PROGRESS NOTE    Patient Name: Laura Hui  : 1950  MRN: [id number]    Date of Admission: 10/3/2023  Primary Care Physician: Carmella Barboza DO    Subjective   Subjective     CC:  F/U cirrhosis    HPI:  Up in bed. Tired. Denies soa or pain. States stomach does not feel distended or uncomfortable.       Objective   Objective     Vital Signs:   Temp:  [97.8 øF (36.6 øC)-98.2 øF (36.8 øC)] 98.2 øF (36.8 øC)  Heart Rate:  [] 118  Resp:  [14-16] 14  BP: (109-122)/(58-65) 109/58  Flow (L/min):  [2] 2     Physical Exam:  Gen-no acute distress, appears tired, chronically ill appearing   HENT-NCAT, mucous membranes dry  CV-RRR,   Resp-CTAB, unlabored, diminished at bases  Abd-soft, NT, no significant distention  Ext-no LE edema  Neuro-answers ROS. Seems to have some confused  conversation situationally   Skin-no rashes  Psych-appropriate mood      Results Reviewed:  LAB RESULTS:      Lab 10/06/23  0346 10/05/23  0339   WBC 9.40 9.13   HEMOGLOBIN 10.6* 11.2*   HEMATOCRIT 31.3* 32.7*   PLATELETS 140 127*   MCV 94.3 95.3         Lab 10/06/23  0346 10/05/23  0339   SODIUM 129* 127*   POTASSIUM 4.5 4.6   CHLORIDE 95* 95*   CO2 15.0* 17.0*   ANION GAP 19.0* 15.0   BUN 30* 24*   CREATININE 2.39* 2.03*   EGFR 21.0* 25.5*   GLUCOSE 74 77   CALCIUM 10.1 9.1   PHOSPHORUS 3.7  --          Lab 10/06/23  0346 10/05/23  0339   TOTAL PROTEIN 5.5* 4.6*   ALBUMIN 3.6 2.7*   GLOBULIN 1.9 1.9   ALT (SGPT) 15 18   AST (SGOT) 40* 51*   BILIRUBIN 0.9 0.8   ALK PHOS 164* 180*                           Brief Urine Lab Results  (Last result in the past 365 days)        Color   Clarity   Blood   Leuk Est   Nitrite   Protein   CREAT   Urine HCG        10/05/23 1322 Yellow   Clear   Negative   Negative   Negative   Negative                   Microbiology Results Abnormal       Procedure Component Value - Date/Time    Blood Culture - Blood, Hand, Left [[id number]]  (Normal) Collected: 10/03/23 1804    Lab Status: Final result Specimen: Blood from Hand, Left Updated: 10/08/23 1815     Blood Culture No growth at 5 days    Blood Culture - Blood, Arm, Right [[id number]]  (Normal) Collected: 10/03/23 1720    Lab Status: Final result Specimen: Blood from Arm, Right Updated: 10/08/23 1815     Blood Culture No growth at 5 days    MRSA Screen, PCR (Inpatient) - Swab, Nares [[id number]]  (Normal) Collected: 10/03/23 2327    Lab Status: Final result Specimen: Swab from Nares Updated: 10/04/23 0811     MRSA PCR Negative    Narrative:      The negative predictive value of this diagnostic test is high and should only be used to consider de-escalating anti-MRSA therapy. A positive result may indicate colonization with MRSA and must be correlated clinically.  MRSA Negative    Respiratory Panel PCR w/COVID-19(SARS-CoV-2)  JEN/BETSY/ELIEZER/PAD/COR/MAD/URSULA In-House, NP Swab in UTM/VTM, 3-4 HR TAT - Swab, Nasopharynx [[id number]]  (Normal) Collected: 10/03/23 1726    Lab Status: Final result Specimen: Swab from Nasopharynx Updated: 10/03/23 4891     ADENOVIRUS, PCR Not Detected     Coronavirus 229E Not Detected     Coronavirus HKU1 Not Detected     Coronavirus NL63 Not Detected     Coronavirus OC43 Not Detected     COVID19 Not Detected     Human Metapneumovirus Not Detected     Human Rhinovirus/Enterovirus Not Detected     Influenza A PCR Not Detected     Influenza B PCR Not Detected     Parainfluenza Virus 1 Not Detected     Parainfluenza Virus 2 Not Detected     Parainfluenza Virus 3 Not Detected     Parainfluenza Virus 4 Not Detected     RSV, PCR Not Detected     Bordetella pertussis pcr Not Detected     Bordetella parapertussis PCR Not Detected     Chlamydophila pneumoniae PCR Not Detected     Mycoplasma pneumo by PCR Not Detected    Narrative:      In the setting of a positive respiratory panel with a viral infection PLUS a negative procalcitonin without other underlying concern for bacterial infection, consider observing off antibiotics or discontinuation of antibiotics and continue supportive care. If the respiratory panel is positive for atypical bacterial infection (Bordetella pertussis, Chlamydophila pneumoniae, or Mycoplasma pneumoniae), consider antibiotic de-escalation to target atypical bacterial infection.            No radiology results from the last 24 hrs        Current medications:  Scheduled Meds:aspirin, 81 mg, Oral, Daily  atorvastatin, 40 mg, Oral, Nightly  budesonide-formoterol, 2 puff, Inhalation, BID - RT   And  tiotropium bromide monohydrate, 2 puff, Inhalation, Daily - RT  citalopram, 20 mg, Oral, Daily  heparin (porcine), 5,000 Units, Subcutaneous, Q12H  metoprolol tartrate, 12.5 mg, Oral, BID With Meals  midodrine, 10 mg, Oral, TID AC  oxyCODONE, 5 mg, Oral, Q8H  pantoprazole, 40 mg, Oral, Daily  rifAXIMin, 550  mg, Oral, Q12H  roflumilast, 250 mcg, Oral, Daily  saccharomyces boulardii, 250 mg, Oral, BID  senna-docusate sodium, 2 tablet, Oral, BID  sodium chloride, 10 mL, Intravenous, Q12H  traZODone, 150 mg, Oral, Nightly      Continuous Infusions:   PRN Meds:.  acetaminophen    senna-docusate sodium **AND** polyethylene glycol **AND** bisacodyl **AND** bisacodyl    Calcium Replacement - Follow Nurse / BPA Driven Protocol    glycopyrrolate    HYDROmorphone    hydrOXYzine    Magnesium Low Dose Replacement - Follow Nurse / BPA Driven Protocol    melatonin    ondansetron    oxyCODONE    Phosphorus Replacement - Follow Nurse / BPA Driven Protocol    Potassium Replacement - Follow Nurse / BPA Driven Protocol    sodium chloride    sodium chloride    Assessment & Plan   Assessment & Plan     Active Hospital Problems    Diagnosis  POA    **Acute respiratory failure with hypoxia [J96.01]  Yes    Severe malnutrition [E43]  Yes    CHF (congestive heart failure) [I50.9]  Yes    Anemia [D64.9]  Yes    Anxiety and depression [F41.9, F32.A]  Yes    Chronic back pain [M54.9, G89.29]  Yes    HEIDY (acute kidney injury) [N17.9]  Yes    Hyponatremia [E87.1]  Yes    Cirrhosis of liver [K74.60]  Yes    Generalized weakness [R53.1]  Yes    Cellulitis of right lower extremity [L03.115]  Yes    GERD (gastroesophageal reflux disease) [K21.9]  Yes    Hypertension [I10]  Yes    Atherosclerosis of native arteries of extremities with intermittent claudication, other extremity [I70.218]  Yes    Chronic obstructive pulmonary disease [J44.9]  Yes    Hyperlipidemia [E78.5]  Yes      Resolved Hospital Problems   No resolved problems to display.        Brief Hospital Course to date:  Laura Hui is a 73 y.o. female with hx of HTN, HLD, HFpEF, PVD, COPD, chronic back pain, GERD, anemia, and anxiety/depression who presents due to dyspnea x 3 days and weakness. CTA chest demonstrated moderate left effusion and smaller right effusion; cirrhotic liver with large  ascites noted. Liver US with cirrhosis, ascites and right pleural effusion. Diagnostic and therapeutic paracentesis was performed on 10/4/23 with 3.3 L removed. SAAG consistent with portal HTN. No evidence of SBP. Creatinine elevated on presentation, noted to be 1.41. Urine sodium less than 20. UA negative. She received albumin and midodrine without improvement. After further discussion with patient/family, decision made to proceed with comfort measures. Palliative Care and Hospice following.     This patient's problems and plans were partially entered by my partner and updated as appropriate by me 10/11/23.   Goals of care  -Hospice following, currently on a comfort focused plan of care  -Trying to decide between home hospice vs inpatient hospice  -No further lab checks or fingersticks, no aggressive measures  -Continue pain control with scheduled and PRN Oxycodone and IV Dilauidid PRN  -Per GI, may benefit from paracentesis before discharge, could consider peritoneal drain placement- monitor for need     Left > right pleural effusion  Cirrhosis/ascites - new finding   Hypoalbuminemia  - GI followed during admission  - s/p paracentesis 10/4/23 with 3.3 L removed  - Thoracentesis deferred as SOA improved following paracentesis  - ATBx discontinued (had been started on empirically for PNA, but low suspicion for active infection)  - Now comfort measures    HEIDY, hepatorenal syndrome  - Likely hepatorenal. Urine Na < 20. UA negative. S/p albumin and midodrine with no improvement.   - Nephrology followed during admission  - Cr trending up, no further checks given [facility]  - Vance for comfort     Dyspnea and hypoxia   - [place] [facility] [place] with [place]F, but had Echo showing EF 75%; diuretic was increased   - CTA chest with no PE, large left pleural effusion and moderate right pleural effusion, atelectasis  - Improvement in respiratory status following paracentesis.   - Initially on  Doxycycline/Rocephin, discontinued due to low suspicion for pneumonia     Possible HFpEF  HTN   HLD  - [place] [facility] [place]  - Currently holding Bumex due to HEIDY, decreased intake/ euvolemia  - metoprolol 12.5mg bid, hold parameters  - Continue statin     Acute onset weakness  - Until a couple weeks ago she could walk without a cane or walker  - Now using a wheelchair     Lightheadedness  Borderline hypotension  - Holding Bumex  - s/p albumin and midodrine without improvement, remains on midodrine currently TID  - metoprolol restarted- BP stable     COPD, emphysema   - Not on supplemental oxygen at home  - Former smoker, now vapes. Encouraged cessation.  - Continue with inhalers      Hyponatremia, hypervolemic   - Cortisol appropriate, TSH mildly elevated at 4.9, T4 normal  - Stopped lab draws     PVD  - Incidental finding of atherosclerosis of left subclavian artery, suggesting high-grade stenosis, or possibly occlusion  - Defer Vascular Surgery consult given GOC     Generalized weakness  - PT/OT attempted, but patient not participating at this time     Anemia  - No further lab draws     GERD  - PPI     Anxiety/depression  - Celexa, Trazodone       Expected Discharge Location and Transportation: home with hospice vs inpatient hospice  Expected Discharge   Expected Discharge Date: 10/13/2023; Expected Discharge Time:      DVT prophylaxis:  Medical DVT prophylaxis orders are present.     AM-PAC 6 Clicks Score (PT): 11 (10/11/23 0800)    CODE STATUS:   Code Status and Medical Interventions:   Ordered at: 10/09/23 1506     Level Of Support Discussed With:    Patient    Next of Kin (If No Surrogate)     Code Status (Patient has no pulse and is not breathing):    No CPR (Do Not Attempt to Resuscitate)     Medical Interventions (Patient has pulse or is breathing):    Comfort Measures       SHMUEL Cox  10/11/23        Electronically signed by Priti Red APRN at 10/11/23 1223       Cleve  Jolanta ODEN MD at 10/10/23 1152              [facility] Medicine Services  PROGRESS NOTE    Patient Name: Laura Hui  : 1950  MRN: [id number]    Date of Admission: 10/3/2023  Primary Care Physician: Carmella Barboza DO    Subjective   Subjective     CC:  F/U cirrhosis    HPI:  Seen this morning. Complains of pain.       Objective   Objective     Vital Signs:   Temp:  [97.7 øF (36.5 øC)-98.3 øF (36.8 øC)] 97.7 øF (36.5 øC)  Heart Rate:  [] 95  Resp:  [14-20] 20  BP: (116-128)/(55-70) 116/65  Flow (L/min):  [2-3.5] 2     Physical Exam:  Gen-no acute distress, chronically ill appearing  HENT-NCAT, mucous membranes moist  CV-RRR, S1 S2 normal, no m/r/g  Resp-diminished at the bases, no wheezes or rales  Abd-distended but soft  Ext-+BLE edema  Neuro-A&Ox3, no focal deficits  Skin-no rashes  Psych-appropriate mood      Results Reviewed:  LAB RESULTS:      Lab 10/06/23  0346 10/05/23  0339 10/04/23  0453 10/03/23  1804 10/03/23  1720   WBC 9.40 9.13 9.18  --  10.93*   HEMOGLOBIN 10.6* 11.2* 11.7*  --  14.6   HEMATOCRIT 31.3* 32.7* 34.3  --  42.2   PLATELETS 140 127* 143  --  213   NEUTROS ABS  --   --  7.52*  --  9.28*   IMMATURE GRANS (ABS)  --   --  0.04  --  0.05   LYMPHS ABS  --   --  0.69*  --  0.77   MONOS ABS  --   --  0.88  --  0.80   EOS ABS  --   --  0.03  --  0.02   MCV 94.3 95.3 94.5  --  94.4   PROCALCITONIN  --   --   --   --  0.97*   LACTATE  --   --   --  1.9  --    PROTIME  --   --   --   --  15.2*   D DIMER QUANT  --   --   --   --  2.84*         Lab 10/06/23  0346 10/05/23  0339 10/04/23  0453 10/03/23  2222 10/03/23  1720   SODIUM 129* 127* 127*  --  128*   POTASSIUM 4.5 4.6 4.5  --  5.0   CHLORIDE 95* 95* 96*  --  96*   CO2 15.0* 17.0* 16.0*  --  19.0*   ANION GAP 19.0* 15.0 15.0  --  13.0   BUN 30* 24* 20  --  21   CREATININE 2.39* 2.03* 1.59*  --  1.41*   EGFR 21.0* 25.5* 34.2*  --  39.5*   GLUCOSE 74 77 69  --  91   CALCIUM 10.1 9.1 8.7  --  9.1   MAGNESIUM   --   --   --  1.6 2.0   PHOSPHORUS 3.7  --   --   --   --    TSH  --   --   --   --  4.920*         Lab 10/06/23  0346 10/05/23  0339 10/04/23  0453 10/03/23  1720   TOTAL PROTEIN 5.5* 4.6* 4.7* 5.5*   ALBUMIN 3.6 2.7* 2.3* 2.3*   GLOBULIN 1.9 1.9 2.4 3.2   ALT (SGPT) 15 18 19 17   AST (SGOT) 40* 51* 55* 71*   BILIRUBIN 0.9 0.8 0.9 1.1   ALK PHOS 164* 180* 194* 262*         Lab 10/03/23  2222 10/03/23  1950/23  1720   PROBNP  --   --  10,567.0*   HSTROP T 77* 74* 80*   PROTIME  --   --  15.2*   INR  --   --  1.19*             Lab 10/04/23  0453   IRON 50   IRON SATURATION (TSAT) 35   TIBC 143*   TRANSFERRIN 96*         Lab 10/03/23  2328   PH, ARTERIAL 7.454*   PCO2, ARTERIAL 25.7*   PO2 ART 82.6*   FIO2 32   HCO3 ART 18.0*   BASE EXCESS ART -4.4*   CARBOXYHEMOGLOBIN 0.9     Brief Urine Lab Results  (Last result in the past 365 days)        Color   Clarity   Blood   Leuk Est   Nitrite   Protein   CREAT   Urine HCG        10/05/23 1322 Yellow   Clear   Negative   Negative   Negative   Negative                   Microbiology Results Abnormal       Procedure Component Value - Date/Time    Blood Culture - Blood, Hand, Left [[id number]]  (Normal) Collected: 10/03/23 1804    Lab Status: Final result Specimen: Blood from Hand, Left Updated: 10/08/23 1815     Blood Culture No growth at 5 days    Blood Culture - Blood, Arm, Right [[id number]]  (Normal) Collected: 10/03/23 1720    Lab Status: Final result Specimen: Blood from Arm, Right Updated: 10/08/23 1815     Blood Culture No growth at 5 days    MRSA Screen, PCR (Inpatient) - Swab, Nares [[id number]]  (Normal) Collected: 10/03/23 2327    Lab Status: Final result Specimen: Swab from Nares Updated: 10/04/23 0811     MRSA PCR Negative    Narrative:      The negative predictive value of this diagnostic test is high and should only be used to consider de-escalating anti-MRSA therapy. A positive result may indicate colonization with MRSA and must be correlated  clinically.  MRSA Negative    Respiratory Panel PCR w/COVID-19(SARS-CoV-2) JEN/BETSY/ELIEZER/PAD/COR/MAD/URSULA In-House, NP Swab in UTM/VTM, 3-4 HR TAT - Swab, Nasopharynx [[id number]]  (Normal) Collected: 10/03/23 1726    Lab Status: Final result Specimen: Swab from Nasopharynx Updated: 10/03/23 7056     ADENOVIRUS, PCR Not Detected     Coronavirus 229E Not Detected     Coronavirus HKU1 Not Detected     Coronavirus NL63 Not Detected     Coronavirus OC43 Not Detected     COVID19 Not Detected     Human Metapneumovirus Not Detected     Human Rhinovirus/Enterovirus Not Detected     Influenza A PCR Not Detected     Influenza B PCR Not Detected     Parainfluenza Virus 1 Not Detected     Parainfluenza Virus 2 Not Detected     Parainfluenza Virus 3 Not Detected     Parainfluenza Virus 4 Not Detected     RSV, PCR Not Detected     Bordetella pertussis pcr Not Detected     Bordetella parapertussis PCR Not Detected     Chlamydophila pneumoniae PCR Not Detected     Mycoplasma pneumo by PCR Not Detected    Narrative:      In the setting of a positive respiratory panel with a viral infection PLUS a negative procalcitonin without other underlying concern for bacterial infection, consider observing off antibiotics or discontinuation of antibiotics and continue supportive care. If the respiratory panel is positive for atypical bacterial infection (Bordetella pertussis, Chlamydophila pneumoniae, or Mycoplasma pneumoniae), consider antibiotic de-escalation to target atypical bacterial infection.            No radiology results from the last 24 hrs        Current medications:  Scheduled Meds:aspirin, 81 mg, Oral, Daily  atorvastatin, 40 mg, Oral, Nightly  budesonide-formoterol, 2 puff, Inhalation, BID - RT   And  tiotropium bromide monohydrate, 2 puff, Inhalation, Daily - RT  citalopram, 20 mg, Oral, Daily  heparin (porcine), 5,000 Units, Subcutaneous, Q12H  metoprolol tartrate, 12.5 mg, Oral, BID With Meals  midodrine, 10 mg, Oral, TID  AC  oxyCODONE, 5 mg, Oral, Q8H  pantoprazole, 40 mg, Oral, Daily  rifAXIMin, 550 mg, Oral, Q12H  roflumilast, 250 mcg, Oral, Daily  saccharomyces boulardii, 250 mg, Oral, BID  senna-docusate sodium, 2 tablet, Oral, BID  sodium chloride, 10 mL, Intravenous, Q12H  traZODone, 150 mg, Oral, Nightly      Continuous Infusions:   PRN Meds:.  acetaminophen    senna-docusate sodium **AND** polyethylene glycol **AND** bisacodyl **AND** bisacodyl    Calcium Replacement - Follow Nurse / BPA Driven Protocol    HYDROmorphone    hydrOXYzine    Magnesium Low Dose Replacement - Follow Nurse / BPA Driven Protocol    melatonin    ondansetron    oxyCODONE    Phosphorus Replacement - Follow Nurse / BPA Driven Protocol    Potassium Replacement - Follow Nurse / BPA Driven Protocol    sodium chloride    sodium chloride    Assessment & Plan   Assessment & Plan     Active Hospital Problems    Diagnosis  POA    **Acute respiratory failure with hypoxia [J96.01]  Yes    Severe malnutrition [E43]  Yes    CHF (congestive heart failure) [I50.9]  Yes    Anemia [D64.9]  Yes    Anxiety and depression [F41.9, F32.A]  Yes    Chronic back pain [M54.9, G89.29]  Yes    HEIDY (acute kidney injury) [N17.9]  Yes    Hyponatremia [E87.1]  Yes    Cirrhosis of liver [K74.60]  Yes    Generalized weakness [R53.1]  Yes    Cellulitis of right lower extremity [L03.115]  Yes    GERD (gastroesophageal reflux disease) [K21.9]  Yes    Hypertension [I10]  Yes    Atherosclerosis of native arteries of extremities with intermittent claudication, other extremity [I70.218]  Yes    Chronic obstructive pulmonary disease [J44.9]  Yes    Hyperlipidemia [E78.5]  Yes      Resolved Hospital Problems   No resolved problems to display.        Brief Hospital Course to date:  Laura Hui is a 73 y.o. female with hx of HTN, HLD, HFpEF, PVD, COPD, chronic back pain, GERD, anemia, and anxiety/depression who presents due to dyspnea x 3 days and weakness. CTA chest demonstrated moderate left  effusion and smaller right effusion; cirrhotic liver with large ascites noted. Liver US with cirrhosis, ascites and right pleural effusion. Diagnostic and therapeutic paracentesis was performed on 10/4/23 with 3.3 L removed. SAAG consistent with portal HTN. No evidence of SBP. Creatinine elevated on presentation, noted to be 1.41. Urine sodium less than 20. UA negative. She received albumin and midodrine without improvement. After further discussion with patient/family, decision made to proceed with comfort measures. Palliative Care and Hospice following.     This patient's problems and plans were partially entered by my partner and updated as appropriate by me 10/10/23. Copied text in this note has been reviewed and is accurate as of today's date.      Goals of care  -Hospice following, currently on a comfort focused plan of care  -Trying to decide between home hospice vs inpatient hospice  -No further lab checks or fingersticks  -Continue pain control with PRN Oxycodone and IV Dilauidid   -Per GI, may benefit from paracentesis before discharge, could consider peritoneal drain placement      Left > right pleural effusion  Cirrhosis/ascites - new finding   Hypoalbuminemia  - GI followed during admission  - s/p paracentesis 10/4/23 with 3.3 L removed  - Thoracentesis deferred as SOA improved following paracentesis  - ATBx discontinued (had been started on empirically for PNA, but low suspicion for active infection)  - Now comfort measures    HEIDY, hepatorenal syndrome  - Likely hepatorenal. Urine Na < 20. UA negative. S/p albumin and midodrine with no improvement.   - Nephrology followed during admission  - Cr trending up, no further checks given JOSE F Vance for comfort     Dyspnea and hypoxia   - She was recently admitted at [facility] [place] and diagnosed with CHF, but had Echo showing EF 75%; diuretic was increased   - CTA chest with no PE, large left pleural effusion and moderate right pleural effusion, atelectasis  -  Improvement in respiratory status following paracentesis.   - Initially on Doxycycline/Rocephin, discontinued due to low suspicion for pneumonia     Possible HFpEF  HTN   HLD  - [place] [facility] [place]  - Currently holding Bumex due to HEIDY  - Hold Metoprolol due to hypotension   - Continue statin     Acute onset weakness  - Until a couple weeks ago she could walk without a cane or walker  - Now using a wheelchair     Lightheadedness  Borderline hypotension  - Holding metoprolol and Bumex  - s/p albumin and midodrine without improvement, remains on midodrine currently     COPD, emphysema   - Not on supplemental oxygen at home  - Former smoker, now vapes. Encouraged cessation.  - Continue with inhalers      Hyponatremia, hypervolemic   - Cortisol appropriate, TSH mildly elevated at 4.9, T4 normal  - Stopped lab draws     PVD  - Incidental finding of atherosclerosis of left subclavian artery, suggesting high-grade stenosis, or possibly occlusion  - Defer Vascular Surgery consult given GOC     Generalized weakness  - PT/OT     Anemia  - H/H stable and at baseline  - No further lab draws     GERD  - PPI     Anxiety/depression  - Celexa, Trazodone       Expected Discharge Location and Transportation: home with hospice vs inpatient hospice  Expected Discharge   Expected Discharge Date: 10/13/2023; Expected Discharge Time:      DVT prophylaxis:  Medical DVT prophylaxis orders are present.     AM-PAC 6 Clicks Score (PT): 11 (10/10/23 1554)    CODE STATUS:   Code Status and Medical Interventions:   Ordered at: 10/09/23 1506     Level Of Support Discussed With:    Patient    Next of Kin (If No Surrogate)     Code Status (Patient has no pulse and is not breathing):    No CPR (Do Not Attempt to Resuscitate)     Medical Interventions (Patient has pulse or is breathing):    Comfort Measures       Jolanta Poon MD  10/10/23        Electronically signed by Jolanta Poon MD at 10/10/23 120       Consult Notes (last  72 hours)  Notes from 10/09/23 1657 through 10/12/23 1657   No notes of this type exist for this encounter.        3E

## 2023-12-05 NOTE — ED ADULT NURSE REASSESSMENT NOTE - NS ED NURSE REASSESS COMMENT FT1
Pt arrived back from [place]. Medications administered to pt. Pt has no pain/ complaints at this time. Pt on room air at 100%. Pt arrived back from [place]. Medications administered to pt. Pt has no pain/ complaints at this time. Pt on room air at 100%.

## 2023-12-05 NOTE — H&P ADULT - NSHPREVIEWOFSYSTEMS_GEN_ALL_CORE
REVIEW OF SYSTEMS:    CONSTITUTIONAL: No weakness, fevers or chills  EYES/ENT: No visual changes;  No vertigo or throat pain   NECK: No pain or stiffness  RESPIRATORY: +cough and shortness of breath  CARDIOVASCULAR: No chest pain or palpitations  GASTROINTESTINAL: No abdominal or epigastric pain. No nausea, vomiting, or hematemesis; No diarrhea or constipation. No melena or hematochezia.  GENITOURINARY: No dysuria, frequency or hematuria  NEUROLOGICAL: No numbness or weakness  SKIN: No itching, burning, rashes, or lesions   All other review of systems is negative unless indicated above

## 2023-12-05 NOTE — ED PROVIDER NOTE - CARE PLAN
Principal Discharge DX:	Acute CHF   1 Principal Discharge DX:	Acute CHF  Secondary Diagnosis:	Dementia

## 2023-12-05 NOTE — ED ADULT NURSE NOTE - OBJECTIVE STATEMENT
pt is A/Ox2 from home and is ambulatory. pt to the ED s/p fight with her aid. per pt she felt SOB, and weakness and took Lasix at home. No further history able to be obtain due to pt mental status. pt respirations even and unlabored. pt in NAD.

## 2023-12-05 NOTE — ED PROVIDER NOTE - PROGRESS NOTE DETAILS
patient well-appearing throughout the night no acute distress plan was to speak to family obtain collateral about if his home health aide and patient was safe to go home with home health aide however spoke to daughter just recently elia  advises patient story she told me is highly unlikely to be true she is suffering from probably new onset dementia has been having shortness of breath at home patient's daughter would like full workup chest x-ray does show worsening effusions will workup diuresis likely will be admission now patient updated on plan of care follows with Dr. Marrero  cardiology CC:  BNP high, Troponin normal.  CXR + CHF.  Dr. Hernandez aware of tele observation status in hospital.

## 2023-12-05 NOTE — ED PROVIDER NOTE - CLINICAL SUMMARY MEDICAL DECISION MAKING FREE TEXT BOX
patient well-appearing throughout the night no acute distress plan was to speak to family obtain collateral about if his home health aide and patient was safe to go home with home health aide however spoke to daughter just recently elia  advises patient story she told me is highly unlikely to be true she is suffering from probably new onset dementia has been having shortness of breath at home patient's daughter would like full workup chest x-ray does show worsening effusions will workup diuresis likely will be admission now patient updated on plan of care follows with Dr. Marrero  cardiology

## 2023-12-05 NOTE — PATIENT PROFILE ADULT - STATED REASON FOR ADMISSION
near fall, increased weakness, shortness of breath, weakness, and increased aggitation at home per home health aid near fall, increased weakness, shortness of breath, weakness, I was filling up with fluid and the Doctor told me to get here fast

## 2023-12-05 NOTE — ED ADULT NURSE REASSESSMENT NOTE - NS ED NURSE REASSESS COMMENT FT1
Pt resting in stretcher with saftey maintained. Pt VS assessed O2 at 88% room air, pt placed on 2L NC and increased to 94% MD Contino aware. IV placed labs sent to lab.

## 2023-12-05 NOTE — PATIENT PROFILE ADULT - FUNCTIONAL ASSESSMENT - BASIC MOBILITY 6.
Bariatric Surgery Post Operative Check:    Pre-Op Dx: Morbid obesity  Procedure: Gastrectomy, sleeve, robot-assisted, laparoscopic    Surgeon:     Subjective:   Pt seen and examined at the bedside. Pt complaining of mild nausea- given IV Pepcid with good relief- Denies chest pain, shortness of breath, vomiting    Vital Signs Last 24 Hrs  T(C): 36.4 (10 Zhou 2018 14:00), Max: 36.8 (10 Zhou 2018 08:52)  T(F): 97.5 (10 Zhou 2018 14:00), Max: 98.2 (10 Zhou 2018 08:52)  HR: 82 (10 Zhou 2018 15:00) (66 - 84)  BP: 148/82 (10 Zhou 2018 15:00) (116/82 - 160/86)  BP(mean): 107 (10 Zohu 2018 15:00) (79 - 115)  RR: 15 (10 Zhuo 2018 15:00) (15 - 20)  SpO2: 98% (10 Zhou 2018 15:00) (95% - 100%)    Physical Exam:  General: NAD, resting comfortably in bed  Neuro: A/O x 3, no focal deficits  Pulmonary: Nonlabored breathing, no respiratory distress  Cardiovascular: NSR  Abdominal: soft, non tender, non distended, incisional dressings c/d/I  Extremities: WWP        LABS:                        13.1   16.3  )-----------( 326      ( 10 Zhou 2018 13:48 )             39.3     01-10    137  |  100  |  10  ----------------------------<  117<H>  4.0   |  24  |  0.88    Ca    8.9      10 Zhou 2018 13:48  Phos  3.6     01-10  Mg     1.9     01-10        CAPILLARY BLOOD GLUCOSE      POCT Blood Glucose.: 84 mg/dL (10 Zhou 2018 08:49)    ABO Interpretation: B (01-10 @ 09:41) Bariatric Surgery Post Operative Check:    Pre-Op Dx: Morbid obesity  Procedure: Gastrectomy, sleeve, robot-assisted, laparoscopic    Surgeon: Dr. Ayon    Subjective:   Pt seen and examined at the bedside. Pt complaining of mild nausea- given IV Pepcid with good relief- Denies chest pain, shortness of breath, vomiting    Vital Signs Last 24 Hrs  T(C): 36.4 (10 Zhou 2018 14:00), Max: 36.8 (10 Zhou 2018 08:52)  T(F): 97.5 (10 Zhou 2018 14:00), Max: 98.2 (10 Zhou 2018 08:52)  HR: 82 (10 Zhou 2018 15:00) (66 - 84)  BP: 148/82 (10 Zhou 2018 15:00) (116/82 - 160/86)  BP(mean): 107 (10 Zhou 2018 15:00) (79 - 115)  RR: 15 (10 Zhou 2018 15:00) (15 - 20)  SpO2: 98% (10 Zhou 2018 15:00) (95% - 100%)    Physical Exam:  General: NAD, resting comfortably in bed  Neuro: A/O x 3, no focal deficits  Pulmonary: Nonlabored breathing, no respiratory distress  Cardiovascular: NSR  Abdominal: soft, non tender, non distended, incisional dressings c/d/I  Extremities: WWP        LABS:                        13.1   16.3  )-----------( 326      ( 10 Zhou 2018 13:48 )             39.3     01-10    137  |  100  |  10  ----------------------------<  117<H>  4.0   |  24  |  0.88    Ca    8.9      10 Zhou 2018 13:48  Phos  3.6     01-10  Mg     1.9     01-10        CAPILLARY BLOOD GLUCOSE      POCT Blood Glucose.: 84 mg/dL (10 Zhou 2018 08:49)    ABO Interpretation: B (01-10 @ 09:41) 2-calculated by average/Not able to assess (calculate score using [facility] averaging method) 2-calculated by average/Not able to assess (calculate score using [facility] averaging method)

## 2023-12-05 NOTE — H&P ADULT - HISTORY OF PRESENT ILLNESS
87-year-old female with medical history of CHF, chronic atrial fibrillation on warfarin, s/p AICD, CAD, hypothyroidism, celiac disease presents from home after her HHA called 911 after she was agitated and refusing to take her meds. States she has been weak and sob. Also endorsing the weakness. Was found to be hypoxic to 88 % on RA. PT endorses productive cough and this am had scant blood tinged mucous.   Spoke to Dtr Pita on the phone. states her mother suffering from extreme anxiety and was started on xanax 2 weeks ago and since then, her cognition has suffered greatly. She is also having difficulty walking per aides. More forgetful and agitated at time which is not like her per daughter.     She is presently comfortable. 97% on 2L. Alert and oriented x 2  CXR: R>L effusion with e/o pvc, cannot r/o infiltrate  s/p iv lasix in ED    NA : 128              PAST MEDICAL/SURGICAL/FAMILY/SOCIAL HISTORY:    Past Medical, Past Surgical, and Family History:  PAST MEDICAL HISTORY:  AICD (automatic cardioverter/defibrillator) present x 3 . Replaced x 2. Presently right subclavian area    Arthritis     Celiac disease     Chronic atrial fibrillation     Hashimoto's thyroiditis     Hearing loss     History of cataract     History of CHF (congestive heart failure)     History of colon polyps     HTN (hypertension)     Hyperlipidemia, unspecified hyperlipidemia type     Hypothyroid     Iron deficiency anemia due to chronic blood loss     Left inguinal hernia     Mitral valve prolapse     Myocardial infarction 1990    Osteoporosis     Peripheral edema     Varicose veins BLE.     PAST SURGICAL HISTORY:  AICD (automatic cardioverter/defibrillator) present with PPM. Replaced x 2.    Artificial pacemaker     H/O colonoscopy last done 2009    H/O right inguinal hernia repair     History of cataract extraction Bilateral    History of heart surgery Mitral valve surgery for leaky valve 9/2020.     FAMILY HISTORY:  Father  Still living? No  Family history of dementia, Age at diagnosis: Age Unknown    Mother  Still living? No  Family history of diabetes mellitus, Age at diagnosis: Age Unknown  Family history of heart disease, Age at diagnosis: Age Unknown    Sibling  Still living? No  Family history of diabetes mellitus, Age at diagnosis: Age Unknown.     Tobacco Usage:  · Tobacco Usage	Never smoker    ALLERGIES AND HOME MEDICATIONS:   Allergies:        Allergies:  	lidocaine: Drug, Rash, Angioedema  	adhesives: Miscellaneous, Rash  	Ancef: Drug, Unknown       Intolerances:  	Gluten: Food, Diarrhea

## 2023-12-05 NOTE — ED PROVIDER NOTE - OBJECTIVE STATEMENT
87-year-old female with medical history of CHF, atrial fibrillation, on warfarin, s/p AICD, CAD, hypothyroidism, celiac disease Originally presented to ED describing to me that she has no acute symptoms got a fight with her home health aide because she did not want to take her Lasix and the home health aide called the ambulance against her will.  MI evaluation she had no chest pain or shortness of breath no fevers no abdominal pain in no acute distress

## 2023-12-05 NOTE — PHARMACOTHERAPY INTERVENTION NOTE - COMMENTS
Medication history complete, reviewed medication with patients daughter Pita at [phone] and confirmed with DrFirst. Medication history complete, reviewed medication with patients daughter Pita at [phone] and confirmed with DrFirst.

## 2023-12-06 ENCOUNTER — APPOINTMENT (OUTPATIENT)
Dept: INTERNAL MEDICINE | Facility: CLINIC | Age: 88
End: 2023-12-06

## 2023-12-06 DIAGNOSIS — I50.9 HEART FAILURE, UNSPECIFIED: ICD-10-CM

## 2023-12-06 LAB
ANION GAP SERPL CALC-SCNC: 5 MMOL/L — SIGNIFICANT CHANGE UP (ref 5–17)
ANION GAP SERPL CALC-SCNC: 5 MMOL/L — SIGNIFICANT CHANGE UP (ref 5–17)
BUN SERPL-MCNC: 20 MG/DL — SIGNIFICANT CHANGE UP (ref 7–23)
BUN SERPL-MCNC: 20 MG/DL — SIGNIFICANT CHANGE UP (ref 7–23)
CALCIUM SERPL-MCNC: 8.5 MG/DL — SIGNIFICANT CHANGE UP (ref 8.5–10.1)
CALCIUM SERPL-MCNC: 8.5 MG/DL — SIGNIFICANT CHANGE UP (ref 8.5–10.1)
CHLORIDE SERPL-SCNC: 94 MMOL/L — LOW (ref 96–108)
CHLORIDE SERPL-SCNC: 94 MMOL/L — LOW (ref 96–108)
CO2 SERPL-SCNC: 33 MMOL/L — HIGH (ref 22–31)
CO2 SERPL-SCNC: 33 MMOL/L — HIGH (ref 22–31)
CREAT SERPL-MCNC: 0.82 MG/DL — SIGNIFICANT CHANGE UP (ref 0.5–1.3)
CREAT SERPL-MCNC: 0.82 MG/DL — SIGNIFICANT CHANGE UP (ref 0.5–1.3)
DIGOXIN SERPL-MCNC: 0.59 NG/ML — LOW (ref 0.8–2)
DIGOXIN SERPL-MCNC: 0.59 NG/ML — LOW (ref 0.8–2)
EGFR: 69 ML/MIN/1.73M2 — SIGNIFICANT CHANGE UP
EGFR: 69 ML/MIN/1.73M2 — SIGNIFICANT CHANGE UP
GLUCOSE SERPL-MCNC: 90 MG/DL — SIGNIFICANT CHANGE UP (ref 70–99)
GLUCOSE SERPL-MCNC: 90 MG/DL — SIGNIFICANT CHANGE UP (ref 70–99)
INR BLD: 2.21 RATIO — HIGH (ref 0.85–1.18)
INR BLD: 2.21 RATIO — HIGH (ref 0.85–1.18)
MAGNESIUM SERPL-MCNC: 2.2 MG/DL — SIGNIFICANT CHANGE UP (ref 1.6–2.6)
MAGNESIUM SERPL-MCNC: 2.2 MG/DL — SIGNIFICANT CHANGE UP (ref 1.6–2.6)
POTASSIUM SERPL-MCNC: 3.8 MMOL/L — SIGNIFICANT CHANGE UP (ref 3.5–5.3)
POTASSIUM SERPL-MCNC: 3.8 MMOL/L — SIGNIFICANT CHANGE UP (ref 3.5–5.3)
POTASSIUM SERPL-SCNC: 3.8 MMOL/L — SIGNIFICANT CHANGE UP (ref 3.5–5.3)
POTASSIUM SERPL-SCNC: 3.8 MMOL/L — SIGNIFICANT CHANGE UP (ref 3.5–5.3)
PROTHROM AB SERPL-ACNC: 24.4 SEC — HIGH (ref 9.5–13)
PROTHROM AB SERPL-ACNC: 24.4 SEC — HIGH (ref 9.5–13)
SODIUM SERPL-SCNC: 132 MMOL/L — LOW (ref 135–145)
SODIUM SERPL-SCNC: 132 MMOL/L — LOW (ref 135–145)

## 2023-12-06 PROCEDURE — 83735 ASSAY OF MAGNESIUM: CPT

## 2023-12-06 PROCEDURE — 80048 BASIC METABOLIC PNL TOTAL CA: CPT

## 2023-12-06 PROCEDURE — 87102 FUNGUS ISOLATION CULTURE: CPT

## 2023-12-06 PROCEDURE — 85610 PROTHROMBIN TIME: CPT

## 2023-12-06 PROCEDURE — 71045 X-RAY EXAM CHEST 1 VIEW: CPT

## 2023-12-06 PROCEDURE — 80069 RENAL FUNCTION PANEL: CPT

## 2023-12-06 PROCEDURE — 82042 OTHER SOURCE ALBUMIN QUAN EA: CPT

## 2023-12-06 PROCEDURE — 97530 THERAPEUTIC ACTIVITIES: CPT | Mod: GP

## 2023-12-06 PROCEDURE — 88108 CYTOPATH CONCENTRATE TECH: CPT

## 2023-12-06 PROCEDURE — 88305 TISSUE EXAM BY PATHOLOGIST: CPT

## 2023-12-06 PROCEDURE — 87075 CULTR BACTERIA EXCEPT BLOOD: CPT

## 2023-12-06 PROCEDURE — 84157 ASSAY OF PROTEIN OTHER: CPT

## 2023-12-06 PROCEDURE — 87070 CULTURE OTHR SPECIMN AEROBIC: CPT

## 2023-12-06 PROCEDURE — 89051 BODY FLUID CELL COUNT: CPT

## 2023-12-06 PROCEDURE — 85730 THROMBOPLASTIN TIME PARTIAL: CPT

## 2023-12-06 PROCEDURE — 83986 ASSAY PH BODY FLUID NOS: CPT

## 2023-12-06 PROCEDURE — 97535 SELF CARE MNGMENT TRAINING: CPT | Mod: GP

## 2023-12-06 PROCEDURE — 85027 COMPLETE CBC AUTOMATED: CPT

## 2023-12-06 PROCEDURE — 99233 SBSQ HOSP IP/OBS HIGH 50: CPT

## 2023-12-06 PROCEDURE — 83615 LACTATE (LD) (LDH) ENZYME: CPT

## 2023-12-06 PROCEDURE — 36415 COLL VENOUS BLD VENIPUNCTURE: CPT

## 2023-12-06 PROCEDURE — 71250 CT THORAX DX C-: CPT | Mod: MA

## 2023-12-06 PROCEDURE — 97116 GAIT TRAINING THERAPY: CPT | Mod: GP

## 2023-12-06 PROCEDURE — 82945 GLUCOSE OTHER FLUID: CPT

## 2023-12-06 PROCEDURE — 71250 CT THORAX DX C-: CPT | Mod: 26,MA

## 2023-12-06 PROCEDURE — 80053 COMPREHEN METABOLIC PANEL: CPT

## 2023-12-06 RX ADMIN — Medication 40 MILLIGRAM(S): at 09:16

## 2023-12-06 RX ADMIN — ATORVASTATIN CALCIUM 10 MILLIGRAM(S): 80 TABLET, FILM COATED ORAL at 21:29

## 2023-12-06 RX ADMIN — CITALOPRAM 10 MILLIGRAM(S): 10 TABLET, FILM COATED ORAL at 09:15

## 2023-12-06 RX ADMIN — SACUBITRIL AND VALSARTAN 1 TABLET(S): 24; 26 TABLET, FILM COATED ORAL at 09:15

## 2023-12-06 RX ADMIN — Medication 25 MICROGRAM(S): at 05:41

## 2023-12-06 RX ADMIN — Medication 62.5 MICROGRAM(S): at 09:16

## 2023-12-06 RX ADMIN — AMIODARONE HYDROCHLORIDE 100 MILLIGRAM(S): 400 TABLET ORAL at 09:16

## 2023-12-06 RX ADMIN — SACUBITRIL AND VALSARTAN 1 TABLET(S): 24; 26 TABLET, FILM COATED ORAL at 21:29

## 2023-12-06 RX ADMIN — Medication 81 MILLIGRAM(S): at 09:16

## 2023-12-06 RX ADMIN — Medication 100 MICROGRAM(S): at 05:40

## 2023-12-06 NOTE — DIETITIAN INITIAL EVALUATION ADULT - NSFNSPHYEXAMSKINFT_GEN_A_CORE
Pressure Injury 1: coccyx sacrum, Stage II  Pressure Injury 2: medial, thoracic spine, Stage III  Pressure Injury 3: Right:, heel, Stage I  Pressure Injury 4: heel, Left:, Stage I

## 2023-12-06 NOTE — DIETITIAN INITIAL EVALUATION ADULT - NAME AND PHONE
Gissel Mcelroy RDN, CDN, [facility]      [phone]   [email or web address]  Gissel Mcelroy RDN, CDN, [facility]      [phone]   [email or web address]

## 2023-12-06 NOTE — DIETITIAN INITIAL EVALUATION ADULT - PERTINENT MEDS FT
MEDICATIONS  (STANDING):  aMIOdarone    Tablet 100 milliGRAM(s) Oral daily  aspirin enteric coated 81 milliGRAM(s) Oral daily  atorvastatin 10 milliGRAM(s) Oral at bedtime  citalopram 10 milliGRAM(s) Oral daily  digoxin     Tablet 62.5 MICROGram(s) Oral daily  furosemide   Injectable 40 milliGRAM(s) IV Push daily  levothyroxine 25 MICROGram(s) Oral daily  levothyroxine 100 MICROGram(s) Oral daily  sacubitril 24 mG/valsartan 26 mG 1 Tablet(s) Oral two times a day    MEDICATIONS  (PRN):  acetaminophen     Tablet .. 650 milliGRAM(s) Oral once PRN Mild Pain (1 - 3)

## 2023-12-06 NOTE — PHYSICAL THERAPY INITIAL EVALUATION ADULT - ACTIVE RANGE OF MOTION EXAMINATION, REHAB EVAL
Bilateral hip[ flex ~ 90 degrees. Bilateral hip flex ~ 90 degrees and bilateral DF neutral./Left UE Active ROM was WFL (within functional limits)/Right UE Active ROM was WFL (within functional limits)/Left LE Active ROM was WFL (within functional limits)/Right LE Active ROM was WFL (within functional limits)

## 2023-12-06 NOTE — DIETITIAN INITIAL EVALUATION ADULT - ADD RECOMMEND
Record PO intake in EMR after each meal (nursing.)   MVI w/ minerals daily to ensure 100% RDA met   Suggest add Vit C 500 mg BID, add Zinc Sulfate 220 mg x 10 days to promote wound healing   Pt is gluten-free  Consider adding thiamine 100 mg daily 2/2 poor PO intake/ malnutrition  suggest Confirm Goals of Care regarding nutrition support   Consider adding appetite stimulant such as Remeron or Marinol 2/2 chronically poor appetite/ PO intake   Monitor bowel movements, if no BM for >3 days, consider implementing bowel regimen.  Monitor PO intake, tolerance, labs and weight.

## 2023-12-06 NOTE — PROGRESS NOTE ADULT - ASSESSMENT
#Acute decompensated HFrEF/Aortic stenosis  #B/L pleural effusion R>L - continue to monitor  #Hyponatremia  #chronic afib  #Supratherapeutic inr   #Metabolic encephalopathy  - Admit to tele or other monitored bed  - Continue iv diuresis  - Na: 132 this AM ,continue to monitor  - no leukocytosis, may need ct chest if condition fails to improve with diuresis  - 2D echo  - Strict I/Os  - Daily weights  - Cardiology consult  - Awaiting ua, r/o other etiologies for 2 week onset of confusion and agitation  - Suspect ams is related to recent xanax use which coincides with onset of her weakness and behavioral disturbances  - Hold coumadin, daily INR  - No overt heavy bleeding, monitor h/h  -Continue Digoxin Amiodarone Entresto     Depression  Continue Citalopram    Hypothyroid, continue home levothyroxine    Code Status Full Code

## 2023-12-06 NOTE — DIETITIAN INITIAL EVALUATION ADULT - ETIOLOGY
r/t inability to consume sufficient energy/protein 2/2 celiac disease, anxiety, progression of aging

## 2023-12-06 NOTE — DIETITIAN NUTRITION RISK NOTIFICATION - ADDITIONAL COMMENTS/DIETITIAN RECOMMENDATIONS
Record PO intake in EMR after each meal (nursing.)   MVI w/ minerals daily to ensure 100% RDA met   Suggest add Vit C 500 mg BID, add Zinc Sulfate 220 mg x 10 days to promote wound healing   Consider adding thiamine 100 mg daily 2/2 poor PO intake/ malnutrition  Pt is gluten-free  Suggest Confirm Goals of Care regarding nutrition support   Consider adding appetite stimulant such as Remeron or Marinol 2/2 chronically poor appetite/ PO intake   Monitor bowel movements, if no BM for >3 days, consider implementing bowel regimen.  Monitor PO intake, tolerance, labs and weight.

## 2023-12-06 NOTE — PHYSICAL THERAPY INITIAL EVALUATION ADULT - ADDITIONAL COMMENTS
pt is adverse to using assistive device although realizes she needs one tody due to debility. info obtained from eval 9/2022.

## 2023-12-06 NOTE — DIETITIAN INITIAL EVALUATION ADULT - OTHER INFO
87-year-old female with medical history of CHF, chronic atrial fibrillation on warfarin, s/p AICD, CAD, hypothyroidism, celiac disease presents from home after her HHA called 911 after she was agitated and refusing to take her meds. States she has been weak and sob. Also endorsing the weakness. Was found to be hypoxic to 88 % on RA. PT endorses productive cough and this am had scant blood tinged mucous.   Spoke to Dtjuan Lema on the phone. states her mother suffering from extreme anxiety and was started on xanax 2 weeks ago and since then, her cognition has suffered greatly. She is also having difficulty walking per aides. More forgetful and agitated at time which is not like her per daughter.     Admit dx:    Pt has COVID  Pt visited at bedside  RD bedscale wt is 50 kg 110#  Pt on warfarin  Na 132L  Pt is GLUTEN FREE  NFPE reveals muscle wasting, fat wasting   PO intake estimated < 75% ENN > one month   Consider adding appetite stimulant such as Remeron or Marinol 2/2 chronically poor appetite/ PO intake  suggest Confirm Goals of Care regarding nutrition support   Ensure plus tid  Monitor PO intake, tolerance, labs and weight.

## 2023-12-06 NOTE — PHYSICAL THERAPY INITIAL EVALUATION ADULT - PERTINENT HX OF CURRENT PROBLEM, REHAB EVAL
Pt admitted to  secondary to SOB, hypoxia, and generalized weakness. Pt found to be hypoxic on RA 88% in ED. Pt recently suffering from extreme anxiety.

## 2023-12-06 NOTE — DIETITIAN INITIAL EVALUATION ADULT - PERTINENT LABORATORY DATA
12-06    132<L>  |  94<L>  |  20  ----------------------------<  90  3.8   |  33<H>  |  0.82    Ca    8.5      06 Dec 2023 01:00  Mg     2.2     12-06

## 2023-12-06 NOTE — PROGRESS NOTE ADULT - SUBJECTIVE AND OBJECTIVE BOX
HOSPITALIST ATTENDING PROGRESS NOTE    Chart and meds reviewed.      Subjective: Patient seen and examined. Resting comfortably remains on 2 L O2 via nasal cannula.  UA pending.  Sodium improved to 132.  Continue IV diuresis.  Continue to monitor mental status.      Additional results/Imaging, I have personally reviewed:    LABS:                            11.0   9.75  )-----------( 307      ( 05 Dec 2023 08:41 )             32.5     12-06    132<L>  |  94<L>  |  20  ----------------------------<  90  3.8   |  33<H>  |  0.82    Ca    8.5      06 Dec 2023 01:00  Mg     2.2     12-06      CARDIAC MARKERS ( 05 Dec 2023 01:18 )  x     / x     / 73 U/L / x     / x            PT/INR - ( 06 Dec 2023 07:24 )   PT: 24.4 sec;   INR: 2.21 ratio         PTT - ( 05 Dec 2023 08:41 )  PTT:54.8 sec  Urinalysis Basic - ( 06 Dec 2023 01:00 )    Color: x / Appearance: x / SG: x / pH: x  Gluc: 90 mg/dL / Ketone: x  / Bili: x / Urobili: x   Blood: x / Protein: x / Nitrite: x   Leuk Esterase: x / RBC: x / WBC x   Sq Epi: x / Non Sq Epi: x / Bacteria: x              All other systems reviewed and found to be negative with the exception of what has been described above.    MEDICATIONS  (STANDING):  aMIOdarone    Tablet 100 milliGRAM(s) Oral daily  aspirin enteric coated 81 milliGRAM(s) Oral daily  atorvastatin 10 milliGRAM(s) Oral at bedtime  citalopram 10 milliGRAM(s) Oral daily  digoxin     Tablet 62.5 MICROGram(s) Oral daily  furosemide   Injectable 40 milliGRAM(s) IV Push daily  levothyroxine 25 MICROGram(s) Oral daily  levothyroxine 100 MICROGram(s) Oral daily  sacubitril 24 mG/valsartan 26 mG 1 Tablet(s) Oral two times a day    MEDICATIONS  (PRN):  acetaminophen     Tablet .. 650 milliGRAM(s) Oral once PRN Mild Pain (1 - 3)      VITALS:  T(F): 98 (12-06-23 @ 08:01), Max: 98.7 (12-05-23 @ 18:18)  HR: 72 (12-06-23 @ 08:01) (72 - 81)  BP: 118/53 (12-06-23 @ 08:01) (118/53 - 149/58)  RR: 18 (12-06-23 @ 08:01) (16 - 18)  SpO2: 99% (12-06-23 @ 08:01) (91% - 100%)  Wt(kg): --    I&O's Summary      CAPILLARY BLOOD GLUCOSE          PHYSICAL EXAM:  Gen: No acute distress   HEENT:  pupils equal and reactive, EOMI,   NECK:   supple, no carotid bruits, No JVD  CV:  +S1, +S2, regular rate rhythm, no murmurs or rubs  RESP:   lungs clear to auscultation bilaterally, no wheezing, rales, rhonchi, good air entry bilaterally  GI:  abdomen soft, non-tender, non-distended, normal BS, no bruits, no abdominal masses, no palpable masses  MSK:   normal muscle tone, no atrophy, no rigidity, no contractions  EXT:  no clubbing, no cyanosis, no edema, no calf pain, swelling or erythema  VASCULAR:  pulses equal and symmetric in the upper and lower extremities  NEURO:  AAOX3, no focal neurological deficits, follows all commands, able to move extremities spontaneously  SKIN:  no ulcers, lesions or rashes      CULTURES:      Telemetry, personally reviewed

## 2023-12-06 NOTE — PHYSICAL THERAPY INITIAL EVALUATION ADULT - LEVEL OF INDEPENDENCE: SUPINE/SIT, REHAB EVAL
Pt refusing OOB at this time despite much encouragement. Will attempt to increase mobility later if possible, if not on 12/7/2023.

## 2023-12-06 NOTE — PHYSICAL THERAPY INITIAL EVALUATION ADULT - DIAGNOSIS, PT EVAL
Acute decompensated HFrEF, aortic strenosis,  bilateral pleural effusions R>L, hyponatremia, chronic a-fib, supratherapeutic INR,  metabolic encephalopthy.

## 2023-12-06 NOTE — CONSULT NOTE ADULT - SUBJECTIVE AND OBJECTIVE BOX
CHIEF COMPLAINT: Patient is a 88y old  Female who presents with a chief complaint of sob/hypoxia (05 Dec 2023 11:04)    FROM H&P: 87-year-old female with medical history of CHF, chronic atrial fibrillation on warfarin, s/p AICD, CAD, hypothyroidism, celiac disease presents from home after her HHA called 911 after she was agitated and refusing to take her meds. States she has been weak and sob. Also endorsing the weakness. Was found to be hypoxic to 88 % on RA. PT endorses productive cough and this am had scant blood tinged mucous.   Spoke to Dtjuan Lema on the phone. states her mother suffering from extreme anxiety and was started on xanax 2 weeks ago and since then, her cognition has suffered greatly. She is also having difficulty walking per aides. More forgetful and agitated at time which is not like her per daughter.     She is presently comfortable. 97% on 2L. Alert and oriented x 2  CXR: R>L effusion with e/o pvc, cannot r/o infiltrate  s/p iv lasix in ED  NA : 128    12/6/23: Cardiology consulted for HF          PAST MEDICAL/SURGICAL/FAMILY/SOCIAL HISTORY:   AICD (automatic cardioverter/defibrillator) present x 3 . Replaced x 2. Presently right subclavian area  Arthritis   Celiac disease   Chronic atrial fibrillation   Hashimoto's thyroiditis   Hearing loss   History of cataract   History of CHF (congestive heart failure)   History of colon polyps   HTN (hypertension)   Hyperlipidemia, unspecified hyperlipidemia type   Hypothyroid   Iron deficiency anemia due to chronic blood loss   Left inguinal hernia   Mitral valve prolapse   Myocardial infarction 1990  Osteoporosis   Peripheral edema   Varicose veins BLE.     PAST SURGICAL HISTORY:  AICD (automatic cardioverter/defibrillator) present with PPM. Replaced x 2.  Artificial pacemaker   H/O colonoscopy last done 2009  H/O right inguinal hernia repair   History of cataract extraction Bilateral  History of heart surgery Mitral valve surgery for leaky valve 9/2020.     FAMILY HISTORY:  Father  Still living? No  Family history of dementia, Age at diagnosis: Age Unknown    Mother  Still living? No  Family history of diabetes mellitus, Age at diagnosis: Age Unknown  Family history of heart disease, Age at diagnosis: Age Unknown    Sibling  Still living? No  Family history of diabetes mellitus, Age at diagnosis: Age Unknown.    PREVIOUS DIAGNOSTIC TESTING:      ECHO: FINDINGS: 12/5/23: The left ventricle size is at the upper limits of normal, severe regional hypokinesis, and moderately reduced function. Definity was used to better visualize the endocardial border. Estimated left ventricular ejection fraction is 30-35 %. The left atrium is severely dilated. The right atrium appears dilated. A device wire is seen in the RV and RA. The right ventricle is at the upper limits of normal in size. The aortic valve appears mildly calcified. Valve opening seems to be restricted. Transaortic gradients are underestimated due to impaired left ventricle systolic function. RANJAN by continuity equation suggests mild aortic stenosis. Moderate (2+) aortic regurgitation is present. A mitral valve repair is noted. Moderate (2+) mitral regurgitation is present. The mean trans-mitral pressure gradient is 6 mmHg. The tricuspid valve leaflets are thin and pliable; valve motion is normal. Moderate (2+) tricuspid valve regurgitation is present. Moderate pulmonary hypertension. Normal appearing pulmonic valve structure. Mild pulmonic valvular regurgitation (1+) is present. Pleural effusion - massive bilateral pleural effusion. The IVC is dilated with decreased respiratory variation. A PFO is noted with color and spectral Doppler.    MEDICATIONS  (STANDING):  aMIOdarone    Tablet 100 milliGRAM(s) Oral daily  aspirin enteric coated 81 milliGRAM(s) Oral daily  atorvastatin 10 milliGRAM(s) Oral at bedtime  citalopram 10 milliGRAM(s) Oral daily  digoxin     Tablet 62.5 MICROGram(s) Oral daily  furosemide   Injectable 40 milliGRAM(s) IV Push daily  levothyroxine 25 MICROGram(s) Oral daily  levothyroxine 100 MICROGram(s) Oral daily  sacubitril 24 mG/valsartan 26 mG 1 Tablet(s) Oral two times a day    MEDICATIONS  (PRN):  acetaminophen     Tablet .. 650 milliGRAM(s) Oral once PRN Mild Pain (1 - 3)    HOME MEDICATIONS:  ALPRAZolam 0.25 mg oral tablet: 1 tab(s) orally 2 times a day as needed for  anxiety (05 Dec 2023 11:08)  amiodarone 100 mg oral tablet: 1 tab(s) orally once a day (05 Dec 2023 11:04)  aspirin 81 mg oral delayed release tablet: 1 tab(s) orally once a day (05 Dec 2023 11:04)  atorvastatin 10 mg oral tablet: 1 tab(s) orally once a day (at bedtime) (05 Dec 2023 11:04)  Calcium 500+D oral tablet, chewable: 1 tab(s) orally 2 times a day (05 Dec 2023 11:04)  cephalexin 500 mg oral capsule: 1 cap(s) orally every 8 hours x  7 days ***Course Complete*** (05 Dec 2023 11:11)  citalopram 10 mg oral tablet: 1 tab(s) orally once a day (05 Dec 2023 11:11)  digoxin 125 mcg (0.125 mg) oral tablet: 0.5 tab(s) orally once a day (05 Dec 2023 11:04)  Entresto 24 mg-26 mg oral tablet: 1 tab(s) orally 2 times a day (05 Dec 2023 11:04)  furosemide 20 mg oral tablet: 1 tab(s) orally once a day ***pt doesn&#x27;t take consistently*** (05 Dec 2023 11:03)  levothyroxine 100 mcg (0.1 mg) oral tablet: 1 tab(s) orally once a day ***take with 25mcg = 125cg*** (05 Dec 2023 11:11)  levothyroxine 25 mcg (0.025 mg) oral tablet: 1 tab(s) orally once a day ***take with 100mcg = 125mcg*** (05 Dec 2023 11:11)  warfarin 1 mg oral tablet: 1 tab(s) orally every other day ***alternate with 0.5mg*** (05 Dec 2023 11:11)  warfarin 1 mg oral tablet: 0.5 tab(s) orally every other day ***alternate with 1mg*** (05 Dec 2023 11:08)    PHYSICAL EXAM:  T(C): 36.8 (05 Dec 2023 19:03), Max: 37.1 (05 Dec 2023 18:18)  T(F): 98.2 (05 Dec 2023 19:03), Max: 98.7 (05 Dec 2023 18:18)  HR: 78 (05 Dec 2023 22:26) (62 - 81)  BP: 132/58 (05 Dec 2023 22:26) (129/58 - 162/68)  BP(mean): 118 (05 Dec 2023 18:18) (74 - 118)  RR: 18 (05 Dec 2023 19:03) (16 - 18)  SpO2: 91% (05 Dec 2023 19:03) (88% - 100%)    Parameters below as of 05 Dec 2023 19:03  Patient On (Oxygen Delivery Method): room air    Constitutional: NAD, awake and alert  HEENT: PERR, EOMI, Normal Hearing, MMM  Neck: Soft and supple, No LAD, No JVD  Respiratory: Breath sounds are clear bilaterally, No wheezing, rales or rhonchi  Cardiovascular: S1 and S2, regular rate and rhythm, no Murmurs, gallops or rubs  Gastrointestinal: Bowel Sounds present, soft, nontender, nondistended, no guarding, no rebound  Extremities: No peripheral edema  Vascular: 2+ peripheral pulses  Neurological: A/O x 3, no focal deficits  Musculoskeletal: 5/5 strength b/l upper and lower extremities  Skin: No rashes    =======================================    INTERPRETATION OF TELEMETRY:    ECG: < from: 12 Lead ECG (12.05.23 @ 01:29) >  Diagnosis Line Ventricular-paced rhythm  Biventricular pacemaker detected  Abnormal ECG    ========================================    LABS:                        11.0   9.75  )-----------( 307      ( 05 Dec 2023 08:41 )             32.5     12-06    132<L>  |  94<L>  |  20  ----------------------------<  90  3.8   |  33<H>  |  0.82    Ca    8.5      06 Dec 2023 01:00  Mg     2.2     12-06    CARDIAC MARKERS ( 05 Dec 2023 01:18 )  x     / x     / 73 U/L / x     / x        PT/INR - ( 05 Dec 2023 08:41 )   PT: 37.1 sec;   INR: 3.40 ratio       PTT - ( 05 Dec 2023 08:41 )  PTT:54.8 sec    BNP 25788     TroponinI hsT: <-30.60    RADIOLOGY & ADDITIONAL STUDIES:    12/5/23: Xray Chest 1 View AP/PA: Increased perihilar interstitial markings and airspace densities. One should consider congestive changes and some underlying inflammatory infectious process. Right greater than left effusion with compressive atelectatic change. Cardiomegaly. Pacer as before. Findings appear progressive since previous exam regional osseous structures appropriate for age.   CHIEF COMPLAINT: Patient is a 88y old  Female who presents with a chief complaint of sob/hypoxia (05 Dec 2023 11:04)    FROM H&P: 87-year-old female with medical history of CHF, chronic atrial fibrillation on warfarin, s/p AICD, CAD, hypothyroidism, celiac disease presents from home after her HHA called 911 after she was agitated and refusing to take her meds. States she has been weak and sob. Also endorsing the weakness. Was found to be hypoxic to 88 % on RA. PT endorses productive cough and this am had scant blood tinged mucous.   Spoke to Dtjuan Lema on the phone. states her mother suffering from extreme anxiety and was started on xanax 2 weeks ago and since then, her cognition has suffered greatly. She is also having difficulty walking per aides. More forgetful and agitated at time which is not like her per daughter.     She is presently comfortable. 97% on 2L. Alert and oriented x 2  CXR: R>L effusion with e/o pvc, cannot r/o infiltrate  s/p iv lasix in ED  NA : 128    12/6/23: Cardiology consulted for HF          PAST MEDICAL/SURGICAL/FAMILY/SOCIAL HISTORY:   AICD (automatic cardioverter/defibrillator) present x 3 . Replaced x 2. Presently right subclavian area  Arthritis   Celiac disease   Chronic atrial fibrillation   Hashimoto's thyroiditis   Hearing loss   History of cataract   History of CHF (congestive heart failure)   History of colon polyps   HTN (hypertension)   Hyperlipidemia, unspecified hyperlipidemia type   Hypothyroid   Iron deficiency anemia due to chronic blood loss   Left inguinal hernia   Mitral valve prolapse   Myocardial infarction 1990  Osteoporosis   Peripheral edema   Varicose veins BLE.     PAST SURGICAL HISTORY:  AICD (automatic cardioverter/defibrillator) present with PPM. Replaced x 2.  Artificial pacemaker   H/O colonoscopy last done 2009  H/O right inguinal hernia repair   History of cataract extraction Bilateral  History of heart surgery Mitral valve surgery for leaky valve 9/2020.     FAMILY HISTORY:  Father  Still living? No  Family history of dementia, Age at diagnosis: Age Unknown    Mother  Still living? No  Family history of diabetes mellitus, Age at diagnosis: Age Unknown  Family history of heart disease, Age at diagnosis: Age Unknown    Sibling  Still living? No  Family history of diabetes mellitus, Age at diagnosis: Age Unknown.    PREVIOUS DIAGNOSTIC TESTING:      ECHO: FINDINGS: 12/5/23: The left ventricle size is at the upper limits of normal, severe regional hypokinesis, and moderately reduced function. Definity was used to better visualize the endocardial border. Estimated left ventricular ejection fraction is 30-35 %. The left atrium is severely dilated. The right atrium appears dilated. A device wire is seen in the RV and RA. The right ventricle is at the upper limits of normal in size. The aortic valve appears mildly calcified. Valve opening seems to be restricted. Transaortic gradients are underestimated due to impaired left ventricle systolic function. RANJAN by continuity equation suggests mild aortic stenosis. Moderate (2+) aortic regurgitation is present. A mitral valve repair is noted. Moderate (2+) mitral regurgitation is present. The mean trans-mitral pressure gradient is 6 mmHg. The tricuspid valve leaflets are thin and pliable; valve motion is normal. Moderate (2+) tricuspid valve regurgitation is present. Moderate pulmonary hypertension. Normal appearing pulmonic valve structure. Mild pulmonic valvular regurgitation (1+) is present. Pleural effusion - massive bilateral pleural effusion. The IVC is dilated with decreased respiratory variation. A PFO is noted with color and spectral Doppler.    MEDICATIONS  (STANDING):  aMIOdarone    Tablet 100 milliGRAM(s) Oral daily  aspirin enteric coated 81 milliGRAM(s) Oral daily  atorvastatin 10 milliGRAM(s) Oral at bedtime  citalopram 10 milliGRAM(s) Oral daily  digoxin     Tablet 62.5 MICROGram(s) Oral daily  furosemide   Injectable 40 milliGRAM(s) IV Push daily  levothyroxine 25 MICROGram(s) Oral daily  levothyroxine 100 MICROGram(s) Oral daily  sacubitril 24 mG/valsartan 26 mG 1 Tablet(s) Oral two times a day    MEDICATIONS  (PRN):  acetaminophen     Tablet .. 650 milliGRAM(s) Oral once PRN Mild Pain (1 - 3)    HOME MEDICATIONS:  ALPRAZolam 0.25 mg oral tablet: 1 tab(s) orally 2 times a day as needed for  anxiety (05 Dec 2023 11:08)  amiodarone 100 mg oral tablet: 1 tab(s) orally once a day (05 Dec 2023 11:04)  aspirin 81 mg oral delayed release tablet: 1 tab(s) orally once a day (05 Dec 2023 11:04)  atorvastatin 10 mg oral tablet: 1 tab(s) orally once a day (at bedtime) (05 Dec 2023 11:04)  Calcium 500+D oral tablet, chewable: 1 tab(s) orally 2 times a day (05 Dec 2023 11:04)  cephalexin 500 mg oral capsule: 1 cap(s) orally every 8 hours x  7 days ***Course Complete*** (05 Dec 2023 11:11)  citalopram 10 mg oral tablet: 1 tab(s) orally once a day (05 Dec 2023 11:11)  digoxin 125 mcg (0.125 mg) oral tablet: 0.5 tab(s) orally once a day (05 Dec 2023 11:04)  Entresto 24 mg-26 mg oral tablet: 1 tab(s) orally 2 times a day (05 Dec 2023 11:04)  furosemide 20 mg oral tablet: 1 tab(s) orally once a day ***pt doesn&#x27;t take consistently*** (05 Dec 2023 11:03)  levothyroxine 100 mcg (0.1 mg) oral tablet: 1 tab(s) orally once a day ***take with 25mcg = 125cg*** (05 Dec 2023 11:11)  levothyroxine 25 mcg (0.025 mg) oral tablet: 1 tab(s) orally once a day ***take with 100mcg = 125mcg*** (05 Dec 2023 11:11)  warfarin 1 mg oral tablet: 1 tab(s) orally every other day ***alternate with 0.5mg*** (05 Dec 2023 11:11)  warfarin 1 mg oral tablet: 0.5 tab(s) orally every other day ***alternate with 1mg*** (05 Dec 2023 11:08)    PHYSICAL EXAM:  T(C): 36.8 (05 Dec 2023 19:03), Max: 37.1 (05 Dec 2023 18:18)  T(F): 98.2 (05 Dec 2023 19:03), Max: 98.7 (05 Dec 2023 18:18)  HR: 78 (05 Dec 2023 22:26) (62 - 81)  BP: 132/58 (05 Dec 2023 22:26) (129/58 - 162/68)  BP(mean): 118 (05 Dec 2023 18:18) (74 - 118)  RR: 18 (05 Dec 2023 19:03) (16 - 18)  SpO2: 91% (05 Dec 2023 19:03) (88% - 100%)    Parameters below as of 05 Dec 2023 19:03  Patient On (Oxygen Delivery Method): room air    Constitutional: NAD, awake and alert  HEENT: PERR, EOMI, Normal Hearing, MMM  Neck: Soft and supple, No LAD, No JVD  Respiratory: Breath sounds are clear bilaterally, No wheezing, rales or rhonchi  Cardiovascular: S1 and S2, regular rate and rhythm, no Murmurs, gallops or rubs  Gastrointestinal: Bowel Sounds present, soft, nontender, nondistended, no guarding, no rebound  Extremities: No peripheral edema  Vascular: 2+ peripheral pulses  Neurological: A/O x 3, no focal deficits  Musculoskeletal: 5/5 strength b/l upper and lower extremities  Skin: No rashes    =======================================    INTERPRETATION OF TELEMETRY:    ECG: < from: 12 Lead ECG (12.05.23 @ 01:29) >  Diagnosis Line Ventricular-paced rhythm  Biventricular pacemaker detected  Abnormal ECG    ========================================    LABS:                        11.0   9.75  )-----------( 307      ( 05 Dec 2023 08:41 )             32.5     12-06    132<L>  |  94<L>  |  20  ----------------------------<  90  3.8   |  33<H>  |  0.82    Ca    8.5      06 Dec 2023 01:00  Mg     2.2     12-06    CARDIAC MARKERS ( 05 Dec 2023 01:18 )  x     / x     / 73 U/L / x     / x        PT/INR - ( 05 Dec 2023 08:41 )   PT: 37.1 sec;   INR: 3.40 ratio       PTT - ( 05 Dec 2023 08:41 )  PTT:54.8 sec    BNP 26126     TroponinI hsT: <-30.60    RADIOLOGY & ADDITIONAL STUDIES:    12/5/23: Xray Chest 1 View AP/PA: Increased perihilar interstitial markings and airspace densities. One should consider congestive changes and some underlying inflammatory infectious process. Right greater than left effusion with compressive atelectatic change. Cardiomegaly. Pacer as before. Findings appear progressive since previous exam regional osseous structures appropriate for age.   CHIEF COMPLAINT: Patient is a 88y old  Female who presents with a chief complaint of sob/hypoxia (05 Dec 2023 11:04)    FROM H&P: 87-year-old female with medical history of CHF, chronic atrial fibrillation on warfarin, s/p AICD, CAD, hypothyroidism, celiac disease presents from home after her HHA called 911 after she was agitated and refusing to take her meds. States she has been weak and sob. Also endorsing the weakness. Was found to be hypoxic to 88 % on RA. PT endorses productive cough and this am had scant blood tinged mucous.   Spoke to Dtjuan Lema on the phone. states her mother suffering from extreme anxiety and was started on xanax 2 weeks ago and since then, her cognition has suffered greatly. She is also having difficulty walking per aides. More forgetful and agitated at time which is not like her per daughter.     She is presently comfortable. 97% on 2L. Alert and oriented x 2  CXR: R>L effusion with e/o pvc, cannot r/o infiltrate  s/p iv lasix in ED  NA : 128    12/6/23: Cardiology consulted for HF  hypoxia. +sob.   C/w IV Lasix, check echo and CT.     PAST MEDICAL/SURGICAL/FAMILY/SOCIAL HISTORY:   AICD (automatic cardioverter/defibrillator) present x 3 . Replaced x 2. Presently right subclavian area  Arthritis   Celiac disease   Chronic atrial fibrillation   Hashimoto's thyroiditis   Hearing loss   History of cataract   History of CHF (congestive heart failure)   History of colon polyps   HTN (hypertension)   Hyperlipidemia, unspecified hyperlipidemia type   Hypothyroid   Iron deficiency anemia due to chronic blood loss   Left inguinal hernia   Mitral valve prolapse   Myocardial infarction 1990  Osteoporosis   Peripheral edema   Varicose veins BLE.     PAST SURGICAL HISTORY:  AICD (automatic cardioverter/defibrillator) present with PPM. Replaced x 2.  Artificial pacemaker   H/O colonoscopy last done 2009  H/O right inguinal hernia repair   History of cataract extraction Bilateral  History of heart surgery Mitral valve surgery for leaky valve 9/2020.     FAMILY HISTORY:  Father  Still living? No  Family history of dementia, Age at diagnosis: Age Unknown    Mother  Still living? No  Family history of diabetes mellitus, Age at diagnosis: Age Unknown  Family history of heart disease, Age at diagnosis: Age Unknown    Sibling  Still living? No  Family history of diabetes mellitus, Age at diagnosis: Age Unknown.    PREVIOUS DIAGNOSTIC TESTING:      ECHO: FINDINGS: 12/5/23: The left ventricle size is at the upper limits of normal, severe regional hypokinesis, and moderately reduced function. Definity was used to better visualize the endocardial border. Estimated left ventricular ejection fraction is 30-35 %. The left atrium is severely dilated. The right atrium appears dilated. A device wire is seen in the RV and RA. The right ventricle is at the upper limits of normal in size. The aortic valve appears mildly calcified. Valve opening seems to be restricted. Transaortic gradients are underestimated due to impaired left ventricle systolic function. RANJAN by continuity equation suggests mild aortic stenosis. Moderate (2+) aortic regurgitation is present. A mitral valve repair is noted. Moderate (2+) mitral regurgitation is present. The mean trans-mitral pressure gradient is 6 mmHg. The tricuspid valve leaflets are thin and pliable; valve motion is normal. Moderate (2+) tricuspid valve regurgitation is present. Moderate pulmonary hypertension. Normal appearing pulmonic valve structure. Mild pulmonic valvular regurgitation (1+) is present. Pleural effusion - massive bilateral pleural effusion. The IVC is dilated with decreased respiratory variation. A PFO is noted with color and spectral Doppler.    MEDICATIONS  (STANDING):  aMIOdarone    Tablet 100 milliGRAM(s) Oral daily  aspirin enteric coated 81 milliGRAM(s) Oral daily  atorvastatin 10 milliGRAM(s) Oral at bedtime  citalopram 10 milliGRAM(s) Oral daily  digoxin     Tablet 62.5 MICROGram(s) Oral daily  furosemide   Injectable 40 milliGRAM(s) IV Push daily  levothyroxine 25 MICROGram(s) Oral daily  levothyroxine 100 MICROGram(s) Oral daily  sacubitril 24 mG/valsartan 26 mG 1 Tablet(s) Oral two times a day    MEDICATIONS  (PRN):  acetaminophen     Tablet .. 650 milliGRAM(s) Oral once PRN Mild Pain (1 - 3)    HOME MEDICATIONS:  ALPRAZolam 0.25 mg oral tablet: 1 tab(s) orally 2 times a day as needed for  anxiety (05 Dec 2023 11:08)  amiodarone 100 mg oral tablet: 1 tab(s) orally once a day (05 Dec 2023 11:04)  aspirin 81 mg oral delayed release tablet: 1 tab(s) orally once a day (05 Dec 2023 11:04)  atorvastatin 10 mg oral tablet: 1 tab(s) orally once a day (at bedtime) (05 Dec 2023 11:04)  Calcium 500+D oral tablet, chewable: 1 tab(s) orally 2 times a day (05 Dec 2023 11:04)  cephalexin 500 mg oral capsule: 1 cap(s) orally every 8 hours x  7 days ***Course Complete*** (05 Dec 2023 11:11)  citalopram 10 mg oral tablet: 1 tab(s) orally once a day (05 Dec 2023 11:11)  digoxin 125 mcg (0.125 mg) oral tablet: 0.5 tab(s) orally once a day (05 Dec 2023 11:04)  Entresto 24 mg-26 mg oral tablet: 1 tab(s) orally 2 times a day (05 Dec 2023 11:04)  furosemide 20 mg oral tablet: 1 tab(s) orally once a day ***pt doesn&#x27;t take consistently*** (05 Dec 2023 11:03)  levothyroxine 100 mcg (0.1 mg) oral tablet: 1 tab(s) orally once a day ***take with 25mcg = 125cg*** (05 Dec 2023 11:11)  levothyroxine 25 mcg (0.025 mg) oral tablet: 1 tab(s) orally once a day ***take with 100mcg = 125mcg*** (05 Dec 2023 11:11)  warfarin 1 mg oral tablet: 1 tab(s) orally every other day ***alternate with 0.5mg*** (05 Dec 2023 11:11)  warfarin 1 mg oral tablet: 0.5 tab(s) orally every other day ***alternate with 1mg*** (05 Dec 2023 11:08)    PHYSICAL EXAM:  T(C): 36.8 (05 Dec 2023 19:03), Max: 37.1 (05 Dec 2023 18:18)  T(F): 98.2 (05 Dec 2023 19:03), Max: 98.7 (05 Dec 2023 18:18)  HR: 78 (05 Dec 2023 22:26) (62 - 81)  BP: 132/58 (05 Dec 2023 22:26) (129/58 - 162/68)  BP(mean): 118 (05 Dec 2023 18:18) (74 - 118)  RR: 18 (05 Dec 2023 19:03) (16 - 18)  SpO2: 91% (05 Dec 2023 19:03) (88% - 100%)    Parameters below as of 05 Dec 2023 19:03  Patient On (Oxygen Delivery Method): room air    Constitutional: NAD, awake and alert  HEENT: Normal Hearing, MMM  Neck: Soft and supple, No LAD, No JVD  Respiratory: Breath sounds diminished b/l  Cardiovascular: S1 and S2, regular rate and rhythm, no Murmurs, gallops or rubs  Gastrointestinal: Bowel Sounds present, soft, nontender, nondistended, no guarding, no rebound  Extremities: No peripheral edema  Vascular: 2+ peripheral pulses  Neurological: A/O x 2/3, forgetful  Musculoskeletal: 5/5 strength b/l upper and lower extremities  Skin: No rashes    =======================================    INTERPRETATION OF TELEMETRY:    ECG: < from: 12 Lead ECG (12.05.23 @ 01:29) >  Diagnosis Line Ventricular-paced rhythm  Biventricular pacemaker detected  Abnormal ECG    ========================================    LABS:                        11.0   9.75  )-----------( 307      ( 05 Dec 2023 08:41 )             32.5     12-06    132<L>  |  94<L>  |  20  ----------------------------<  90  3.8   |  33<H>  |  0.82    Ca    8.5      06 Dec 2023 01:00  Mg     2.2     12-06    CARDIAC MARKERS ( 05 Dec 2023 01:18 )  x     / x     / 73 U/L / x     / x        PT/INR - ( 05 Dec 2023 08:41 )   PT: 37.1 sec;   INR: 3.40 ratio       PTT - ( 05 Dec 2023 08:41 )  PTT:54.8 sec    BNP 97361     TroponinI hsT: <-30.60    RADIOLOGY & ADDITIONAL STUDIES:    12/5/23: Xray Chest 1 View AP/PA: Increased perihilar interstitial markings and airspace densities. One should consider congestive changes and some underlying inflammatory infectious process. Right greater than left effusion with compressive atelectatic change. Cardiomegaly. Pacer as before. Findings appear progressive since previous exam regional osseous structures appropriate for age.   CHIEF COMPLAINT: Patient is a 88y old  Female who presents with a chief complaint of sob/hypoxia (05 Dec 2023 11:04)    FROM H&P: 87-year-old female with medical history of CHF, chronic atrial fibrillation on warfarin, s/p AICD, CAD, hypothyroidism, celiac disease presents from home after her HHA called 911 after she was agitated and refusing to take her meds. States she has been weak and sob. Also endorsing the weakness. Was found to be hypoxic to 88 % on RA. PT endorses productive cough and this am had scant blood tinged mucous.   Spoke to Dtjuan Lema on the phone. states her mother suffering from extreme anxiety and was started on xanax 2 weeks ago and since then, her cognition has suffered greatly. She is also having difficulty walking per aides. More forgetful and agitated at time which is not like her per daughter.     She is presently comfortable. 97% on 2L. Alert and oriented x 2  CXR: R>L effusion with e/o pvc, cannot r/o infiltrate  s/p iv lasix in ED  NA : 128    12/6/23: Cardiology consulted for HF  hypoxia. +sob.   C/w IV Lasix, check echo and CT.     PAST MEDICAL/SURGICAL/FAMILY/SOCIAL HISTORY:   AICD (automatic cardioverter/defibrillator) present x 3 . Replaced x 2. Presently right subclavian area  Arthritis   Celiac disease   Chronic atrial fibrillation   Hashimoto's thyroiditis   Hearing loss   History of cataract   History of CHF (congestive heart failure)   History of colon polyps   HTN (hypertension)   Hyperlipidemia, unspecified hyperlipidemia type   Hypothyroid   Iron deficiency anemia due to chronic blood loss   Left inguinal hernia   Mitral valve prolapse   Myocardial infarction 1990  Osteoporosis   Peripheral edema   Varicose veins BLE.     PAST SURGICAL HISTORY:  AICD (automatic cardioverter/defibrillator) present with PPM. Replaced x 2.  Artificial pacemaker   H/O colonoscopy last done 2009  H/O right inguinal hernia repair   History of cataract extraction Bilateral  History of heart surgery Mitral valve surgery for leaky valve 9/2020.     FAMILY HISTORY:  Father  Still living? No  Family history of dementia, Age at diagnosis: Age Unknown    Mother  Still living? No  Family history of diabetes mellitus, Age at diagnosis: Age Unknown  Family history of heart disease, Age at diagnosis: Age Unknown    Sibling  Still living? No  Family history of diabetes mellitus, Age at diagnosis: Age Unknown.    PREVIOUS DIAGNOSTIC TESTING:      ECHO: FINDINGS: 12/5/23: The left ventricle size is at the upper limits of normal, severe regional hypokinesis, and moderately reduced function. Definity was used to better visualize the endocardial border. Estimated left ventricular ejection fraction is 30-35 %. The left atrium is severely dilated. The right atrium appears dilated. A device wire is seen in the RV and RA. The right ventricle is at the upper limits of normal in size. The aortic valve appears mildly calcified. Valve opening seems to be restricted. Transaortic gradients are underestimated due to impaired left ventricle systolic function. RANJAN by continuity equation suggests mild aortic stenosis. Moderate (2+) aortic regurgitation is present. A mitral valve repair is noted. Moderate (2+) mitral regurgitation is present. The mean trans-mitral pressure gradient is 6 mmHg. The tricuspid valve leaflets are thin and pliable; valve motion is normal. Moderate (2+) tricuspid valve regurgitation is present. Moderate pulmonary hypertension. Normal appearing pulmonic valve structure. Mild pulmonic valvular regurgitation (1+) is present. Pleural effusion - massive bilateral pleural effusion. The IVC is dilated with decreased respiratory variation. A PFO is noted with color and spectral Doppler.    MEDICATIONS  (STANDING):  aMIOdarone    Tablet 100 milliGRAM(s) Oral daily  aspirin enteric coated 81 milliGRAM(s) Oral daily  atorvastatin 10 milliGRAM(s) Oral at bedtime  citalopram 10 milliGRAM(s) Oral daily  digoxin     Tablet 62.5 MICROGram(s) Oral daily  furosemide   Injectable 40 milliGRAM(s) IV Push daily  levothyroxine 25 MICROGram(s) Oral daily  levothyroxine 100 MICROGram(s) Oral daily  sacubitril 24 mG/valsartan 26 mG 1 Tablet(s) Oral two times a day    MEDICATIONS  (PRN):  acetaminophen     Tablet .. 650 milliGRAM(s) Oral once PRN Mild Pain (1 - 3)    HOME MEDICATIONS:  ALPRAZolam 0.25 mg oral tablet: 1 tab(s) orally 2 times a day as needed for  anxiety (05 Dec 2023 11:08)  amiodarone 100 mg oral tablet: 1 tab(s) orally once a day (05 Dec 2023 11:04)  aspirin 81 mg oral delayed release tablet: 1 tab(s) orally once a day (05 Dec 2023 11:04)  atorvastatin 10 mg oral tablet: 1 tab(s) orally once a day (at bedtime) (05 Dec 2023 11:04)  Calcium 500+D oral tablet, chewable: 1 tab(s) orally 2 times a day (05 Dec 2023 11:04)  cephalexin 500 mg oral capsule: 1 cap(s) orally every 8 hours x  7 days ***Course Complete*** (05 Dec 2023 11:11)  citalopram 10 mg oral tablet: 1 tab(s) orally once a day (05 Dec 2023 11:11)  digoxin 125 mcg (0.125 mg) oral tablet: 0.5 tab(s) orally once a day (05 Dec 2023 11:04)  Entresto 24 mg-26 mg oral tablet: 1 tab(s) orally 2 times a day (05 Dec 2023 11:04)  furosemide 20 mg oral tablet: 1 tab(s) orally once a day ***pt doesn&#x27;t take consistently*** (05 Dec 2023 11:03)  levothyroxine 100 mcg (0.1 mg) oral tablet: 1 tab(s) orally once a day ***take with 25mcg = 125cg*** (05 Dec 2023 11:11)  levothyroxine 25 mcg (0.025 mg) oral tablet: 1 tab(s) orally once a day ***take with 100mcg = 125mcg*** (05 Dec 2023 11:11)  warfarin 1 mg oral tablet: 1 tab(s) orally every other day ***alternate with 0.5mg*** (05 Dec 2023 11:11)  warfarin 1 mg oral tablet: 0.5 tab(s) orally every other day ***alternate with 1mg*** (05 Dec 2023 11:08)    PHYSICAL EXAM:  T(C): 36.8 (05 Dec 2023 19:03), Max: 37.1 (05 Dec 2023 18:18)  T(F): 98.2 (05 Dec 2023 19:03), Max: 98.7 (05 Dec 2023 18:18)  HR: 78 (05 Dec 2023 22:26) (62 - 81)  BP: 132/58 (05 Dec 2023 22:26) (129/58 - 162/68)  BP(mean): 118 (05 Dec 2023 18:18) (74 - 118)  RR: 18 (05 Dec 2023 19:03) (16 - 18)  SpO2: 91% (05 Dec 2023 19:03) (88% - 100%)    Parameters below as of 05 Dec 2023 19:03  Patient On (Oxygen Delivery Method): room air    Constitutional: NAD, awake and alert  HEENT: Normal Hearing, MMM  Neck: Soft and supple, No LAD, No JVD  Respiratory: Breath sounds diminished b/l  Cardiovascular: S1 and S2, regular rate and rhythm, no Murmurs, gallops or rubs  Gastrointestinal: Bowel Sounds present, soft, nontender, nondistended, no guarding, no rebound  Extremities: No peripheral edema  Vascular: 2+ peripheral pulses  Neurological: A/O x 2/3, forgetful  Musculoskeletal: 5/5 strength b/l upper and lower extremities  Skin: No rashes    =======================================    INTERPRETATION OF TELEMETRY:    ECG: < from: 12 Lead ECG (12.05.23 @ 01:29) >  Diagnosis Line Ventricular-paced rhythm  Biventricular pacemaker detected  Abnormal ECG    ========================================    LABS:                        11.0   9.75  )-----------( 307      ( 05 Dec 2023 08:41 )             32.5     12-06    132<L>  |  94<L>  |  20  ----------------------------<  90  3.8   |  33<H>  |  0.82    Ca    8.5      06 Dec 2023 01:00  Mg     2.2     12-06    CARDIAC MARKERS ( 05 Dec 2023 01:18 )  x     / x     / 73 U/L / x     / x        PT/INR - ( 05 Dec 2023 08:41 )   PT: 37.1 sec;   INR: 3.40 ratio       PTT - ( 05 Dec 2023 08:41 )  PTT:54.8 sec    BNP 46639     TroponinI hsT: <-30.60    RADIOLOGY & ADDITIONAL STUDIES:    12/5/23: Xray Chest 1 View AP/PA: Increased perihilar interstitial markings and airspace densities. One should consider congestive changes and some underlying inflammatory infectious process. Right greater than left effusion with compressive atelectatic change. Cardiomegaly. Pacer as before. Findings appear progressive since previous exam regional osseous structures appropriate for age.

## 2023-12-06 NOTE — CONSULT NOTE ADULT - ASSESSMENT
87-year-old female with medical history of CHF, chronic atrial fibrillation on warfarin, s/p AICD, CAD, hypothyroidism, celiac disease presents from home after her HHA called 911 after she was agitated and refusing to take her meds. States she has been weak and sob. Also endorsing the weakness. Was found to be hypoxic to 88 % on RA. PT endorses productive cough and this am had scant blood tinged mucous.   Spoke to Marzena Lema on the phone. states her mother suffering from extreme anxiety and was started on xanax 2 weeks ago and since then, her cognition has suffered greatly. She is also having difficulty walking per aides. More forgetful and agitated at time which is not like her per daughter.     #Acute decompensated HFrEF EF 30-35%  #B/L pleural effusion R>L  #Chronic afib  #Metabolic encephalopathy  #Hyponatremia    -        87-year-old female with medical history of CHF, chronic atrial fibrillation on warfarin, s/p AICD, CAD, hypothyroidism, celiac disease presents from home after her HHA called 911 after she was agitated and refusing to take her meds. States she has been weak and sob. Also endorsing the weakness. Was found to be hypoxic to 88 % on RA. PT endorses productive cough and this am had scant blood tinged mucous.   Spoke to Dtjuan Lema on the phone. states her mother suffering from extreme anxiety and was started on xanax 2 weeks ago and since then, her cognition has suffered greatly. She is also having difficulty walking per aides. More forgetful and agitated at time which is not like her per daughter.     #Acute decompensated HFrEF EF 30-35%  #B/L pleural effusion R>L  #Chronic afib  #S/p ICD  #Metabolic encephalopathy  #Hyponatremia    - Monitor on tele. BNP 31535. Troponin negative x1. CK 73  - Continue Lasix 40mg daily  - Continue amiodarone, statin, digoxin and entresto  - Follow up echocardiogram  - Check CT chest  - Interrogate device    Case d/w Dr. Melendez     87-year-old female with medical history of CHF, chronic atrial fibrillation on warfarin, s/p AICD, CAD, hypothyroidism, celiac disease presents from home after her HHA called 911 after she was agitated and refusing to take her meds. States she has been weak and sob. Also endorsing the weakness. Was found to be hypoxic to 88 % on RA. PT endorses productive cough and this am had scant blood tinged mucous.   Spoke to Dtjuan Lema on the phone. states her mother suffering from extreme anxiety and was started on xanax 2 weeks ago and since then, her cognition has suffered greatly. She is also having difficulty walking per aides. More forgetful and agitated at time which is not like her per daughter.     #Acute decompensated HFrEF EF 30-35%  #B/L pleural effusion R>L  #Chronic afib  #S/p ICD  #Metabolic encephalopathy  #Hyponatremia    - Monitor on tele. BNP 38330. Troponin negative x1. CK 73  - Continue Lasix 40mg daily  - Continue amiodarone, statin, digoxin and entresto  - Follow up echocardiogram  - Check CT chest  - Interrogate device    Case d/w Dr. Melendez

## 2023-12-06 NOTE — DIETITIAN INITIAL EVALUATION ADULT - ORAL INTAKE PTA/DIET HISTORY
Pt has an aide who cooks.  Pt's daughter shops.  Per dietary recall, PO intake estimated < 75% ENN > one month.

## 2023-12-06 NOTE — PHYSICAL THERAPY INITIAL EVALUATION ADULT - PLANNED THERAPY INTERVENTIONS, PT EVAL
Increase mobility when possible. Eval. Pt left in bed with all observation section equipment/material intact and bed alarm activated. Pt with no c/o pain. Will cont to follow./bed mobility training/gait training/ROM/strengthening/transfer training

## 2023-12-06 NOTE — PHYSICAL THERAPY INITIAL EVALUATION ADULT - MARITAL STATUS
Dalton  in past 2 years (was older in his 90s) pt has 2  dtrs, Aubrey Yun in [place] (works @ [employer] in [place]) and a another dtr in [place] who doesn't work ;  and a son Dr Judson Miranda (anesthesiologist in [facility])  ; pt resided with  Dalton in [facility] in [place] , has chair lift up 7+7 steps to bedroom/  Dalton  in past 2 years (was older in his 90s) pt has 2  dtrs, Aubrey Yun in [place] (works @ [employer] in [place]) and a another dtr in [place] who doesn't work ;  and a son Dr Judson Miranda (anesthesiologist in [facility])  ; pt resided with  Dalton in [facility] in [place] , has chair lift up 7+7 steps to bedroom/

## 2023-12-07 LAB
ALBUMIN SERPL ELPH-MCNC: 2.5 G/DL — LOW (ref 3.3–5)
ALBUMIN SERPL ELPH-MCNC: 2.5 G/DL — LOW (ref 3.3–5)
ALP SERPL-CCNC: 60 U/L — SIGNIFICANT CHANGE UP (ref 40–120)
ALP SERPL-CCNC: 60 U/L — SIGNIFICANT CHANGE UP (ref 40–120)
ALT FLD-CCNC: 17 U/L — SIGNIFICANT CHANGE UP (ref 12–78)
ALT FLD-CCNC: 17 U/L — SIGNIFICANT CHANGE UP (ref 12–78)
ANION GAP SERPL CALC-SCNC: 5 MMOL/L — SIGNIFICANT CHANGE UP (ref 5–17)
ANION GAP SERPL CALC-SCNC: 5 MMOL/L — SIGNIFICANT CHANGE UP (ref 5–17)
AST SERPL-CCNC: 26 U/L — SIGNIFICANT CHANGE UP (ref 15–37)
AST SERPL-CCNC: 26 U/L — SIGNIFICANT CHANGE UP (ref 15–37)
BILIRUB SERPL-MCNC: 1 MG/DL — SIGNIFICANT CHANGE UP (ref 0.2–1.2)
BILIRUB SERPL-MCNC: 1 MG/DL — SIGNIFICANT CHANGE UP (ref 0.2–1.2)
BUN SERPL-MCNC: 19 MG/DL — SIGNIFICANT CHANGE UP (ref 7–23)
BUN SERPL-MCNC: 19 MG/DL — SIGNIFICANT CHANGE UP (ref 7–23)
CALCIUM SERPL-MCNC: 8.7 MG/DL — SIGNIFICANT CHANGE UP (ref 8.5–10.1)
CALCIUM SERPL-MCNC: 8.7 MG/DL — SIGNIFICANT CHANGE UP (ref 8.5–10.1)
CHLORIDE SERPL-SCNC: 95 MMOL/L — LOW (ref 96–108)
CHLORIDE SERPL-SCNC: 95 MMOL/L — LOW (ref 96–108)
CO2 SERPL-SCNC: 34 MMOL/L — HIGH (ref 22–31)
CO2 SERPL-SCNC: 34 MMOL/L — HIGH (ref 22–31)
CREAT SERPL-MCNC: 0.76 MG/DL — SIGNIFICANT CHANGE UP (ref 0.5–1.3)
CREAT SERPL-MCNC: 0.76 MG/DL — SIGNIFICANT CHANGE UP (ref 0.5–1.3)
EGFR: 75 ML/MIN/1.73M2 — SIGNIFICANT CHANGE UP
EGFR: 75 ML/MIN/1.73M2 — SIGNIFICANT CHANGE UP
GLUCOSE SERPL-MCNC: 76 MG/DL — SIGNIFICANT CHANGE UP (ref 70–99)
GLUCOSE SERPL-MCNC: 76 MG/DL — SIGNIFICANT CHANGE UP (ref 70–99)
HCT VFR BLD CALC: 32 % — LOW (ref 34.5–45)
HCT VFR BLD CALC: 32 % — LOW (ref 34.5–45)
HGB BLD-MCNC: 10.6 G/DL — LOW (ref 11.5–15.5)
HGB BLD-MCNC: 10.6 G/DL — LOW (ref 11.5–15.5)
INR BLD: 1.59 RATIO — HIGH (ref 0.85–1.18)
INR BLD: 1.59 RATIO — HIGH (ref 0.85–1.18)
MAGNESIUM SERPL-MCNC: 2.1 MG/DL — SIGNIFICANT CHANGE UP (ref 1.6–2.6)
MAGNESIUM SERPL-MCNC: 2.1 MG/DL — SIGNIFICANT CHANGE UP (ref 1.6–2.6)
MCHC RBC-ENTMCNC: 33.1 GM/DL — SIGNIFICANT CHANGE UP (ref 32–36)
MCHC RBC-ENTMCNC: 33.1 GM/DL — SIGNIFICANT CHANGE UP (ref 32–36)
MCHC RBC-ENTMCNC: 33.2 PG — SIGNIFICANT CHANGE UP (ref 27–34)
MCHC RBC-ENTMCNC: 33.2 PG — SIGNIFICANT CHANGE UP (ref 27–34)
MCV RBC AUTO: 100.3 FL — HIGH (ref 80–100)
MCV RBC AUTO: 100.3 FL — HIGH (ref 80–100)
PLATELET # BLD AUTO: 275 K/UL — SIGNIFICANT CHANGE UP (ref 150–400)
PLATELET # BLD AUTO: 275 K/UL — SIGNIFICANT CHANGE UP (ref 150–400)
POTASSIUM SERPL-MCNC: 3.5 MMOL/L — SIGNIFICANT CHANGE UP (ref 3.5–5.3)
POTASSIUM SERPL-MCNC: 3.5 MMOL/L — SIGNIFICANT CHANGE UP (ref 3.5–5.3)
POTASSIUM SERPL-SCNC: 3.5 MMOL/L — SIGNIFICANT CHANGE UP (ref 3.5–5.3)
POTASSIUM SERPL-SCNC: 3.5 MMOL/L — SIGNIFICANT CHANGE UP (ref 3.5–5.3)
PROT SERPL-MCNC: 6.8 GM/DL — SIGNIFICANT CHANGE UP (ref 6–8.3)
PROT SERPL-MCNC: 6.8 GM/DL — SIGNIFICANT CHANGE UP (ref 6–8.3)
PROTHROM AB SERPL-ACNC: 17.7 SEC — HIGH (ref 9.5–13)
PROTHROM AB SERPL-ACNC: 17.7 SEC — HIGH (ref 9.5–13)
RBC # BLD: 3.19 M/UL — LOW (ref 3.8–5.2)
RBC # BLD: 3.19 M/UL — LOW (ref 3.8–5.2)
RBC # FLD: 14.6 % — HIGH (ref 10.3–14.5)
RBC # FLD: 14.6 % — HIGH (ref 10.3–14.5)
SODIUM SERPL-SCNC: 134 MMOL/L — LOW (ref 135–145)
SODIUM SERPL-SCNC: 134 MMOL/L — LOW (ref 135–145)
WBC # BLD: 5.87 K/UL — SIGNIFICANT CHANGE UP (ref 3.8–10.5)
WBC # BLD: 5.87 K/UL — SIGNIFICANT CHANGE UP (ref 3.8–10.5)
WBC # FLD AUTO: 5.87 K/UL — SIGNIFICANT CHANGE UP (ref 3.8–10.5)
WBC # FLD AUTO: 5.87 K/UL — SIGNIFICANT CHANGE UP (ref 3.8–10.5)

## 2023-12-07 PROCEDURE — 99233 SBSQ HOSP IP/OBS HIGH 50: CPT

## 2023-12-07 PROCEDURE — 99223 1ST HOSP IP/OBS HIGH 75: CPT

## 2023-12-07 RX ORDER — CEFTRIAXONE 500 MG/1
1000 INJECTION, POWDER, FOR SOLUTION INTRAMUSCULAR; INTRAVENOUS EVERY 24 HOURS
Refills: 0 | Status: COMPLETED | OUTPATIENT
Start: 2023-12-07 | End: 2023-12-11

## 2023-12-07 RX ORDER — AZITHROMYCIN 500 MG/1
500 TABLET, FILM COATED ORAL EVERY 24 HOURS
Refills: 0 | Status: DISCONTINUED | OUTPATIENT
Start: 2023-12-07 | End: 2023-12-07

## 2023-12-07 RX ORDER — AZITHROMYCIN 500 MG/1
500 TABLET, FILM COATED ORAL EVERY 24 HOURS
Refills: 0 | Status: DISCONTINUED | OUTPATIENT
Start: 2023-12-07 | End: 2023-12-10

## 2023-12-07 RX ORDER — CEFTRIAXONE 500 MG/1
1000 INJECTION, POWDER, FOR SOLUTION INTRAMUSCULAR; INTRAVENOUS EVERY 24 HOURS
Refills: 0 | Status: DISCONTINUED | OUTPATIENT
Start: 2023-12-07 | End: 2023-12-07

## 2023-12-07 RX ORDER — FUROSEMIDE 40 MG
40 TABLET ORAL
Refills: 0 | Status: DISCONTINUED | OUTPATIENT
Start: 2023-12-07 | End: 2023-12-11

## 2023-12-07 RX ADMIN — AZITHROMYCIN 255 MILLIGRAM(S): 500 TABLET, FILM COATED ORAL at 13:09

## 2023-12-07 RX ADMIN — CEFTRIAXONE 1000 MILLIGRAM(S): 500 INJECTION, POWDER, FOR SOLUTION INTRAMUSCULAR; INTRAVENOUS at 11:09

## 2023-12-07 RX ADMIN — Medication 25 MICROGRAM(S): at 05:15

## 2023-12-07 RX ADMIN — Medication 100 MICROGRAM(S): at 05:15

## 2023-12-07 RX ADMIN — Medication 40 MILLIGRAM(S): at 13:09

## 2023-12-07 RX ADMIN — Medication 62.5 MICROGRAM(S): at 11:10

## 2023-12-07 RX ADMIN — ATORVASTATIN CALCIUM 10 MILLIGRAM(S): 80 TABLET, FILM COATED ORAL at 20:46

## 2023-12-07 RX ADMIN — SACUBITRIL AND VALSARTAN 1 TABLET(S): 24; 26 TABLET, FILM COATED ORAL at 11:09

## 2023-12-07 RX ADMIN — Medication 81 MILLIGRAM(S): at 11:10

## 2023-12-07 RX ADMIN — SACUBITRIL AND VALSARTAN 1 TABLET(S): 24; 26 TABLET, FILM COATED ORAL at 20:46

## 2023-12-07 RX ADMIN — CITALOPRAM 10 MILLIGRAM(S): 10 TABLET, FILM COATED ORAL at 11:10

## 2023-12-07 NOTE — CONSULT NOTE ADULT - ASSESSMENT
88 y/o female with h/o CHF, chronic atrial fibrillation on warfarin, s/p AICD, CAD, hypothyroidism, celiac disease, anxiety disorder was admitted on 12/6 for increased confusion and restlessness. She became agitated and refusing to take her meds. States she has been weak, cough with scant blood tinged sputum and SOB. In ER she was noted hypoxic to 88 % on RA. Dtr Pita stated her mother suffering from extreme anxiety and was started on xanax 2 weeks ago and since then, her cognition has suffered greatly. More forgetful and agitated at time which is not like her per daughter. In ER she received ceftriaxone.     1. Multifocal pneumonia. CHF exacerbation. Metabolic encephalopathy. Allergy to Ancef.   -obtain BC x 2, urine c/s  -reported that she tolerated ceftriaxone in the past  -start ceftriaxone 1 gm IV qd and azithromycin 500 mg IV qd  -reason for abx use and side effects reviewed with patient; monitor BMP   -respiratory care  -old chart reviewed to assess prior cultures  -monitor temps  -f/u CBC  -supportive care  2. Other issues:   -care per medicine

## 2023-12-07 NOTE — CONSULT NOTE ADULT - ASSESSMENT
87-year-old female with MHx significant for CHF, A-fib on warfarin, s/p AICD, CAD, hypothyroidism, celiac disease brought in from home for acute agitation, worsening weakness, and SOB, found to be hypoxic with 88 % on RA and CT-chest noted DORIS mass and mediastinal adenopathy, admitted for PNA, pleural effusion, and CHF.    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  - Spoke with daughter Aubrey ([phone]), informed about the CT-chest results, most likely metastases lung CA, but will need tissue biopsy for a definitive diagnosis. Aubrey expressed that patient have lately been too anxious and shown declined in her physical state and Aubrey does not think her mother can endure any cancer related diagnostics/treatments. However, she will discuss it with her siblings (patient's children) and inform us of their decision. Offered our support and help.  -Aubrey requested that staff avoid talking to the patient about cancer diagnosis in absence of a family member because patient have high anxiety. Aubrey and patients other children will come and speak to the patient about the diagnosis.  -d/w Dr. Masters---> defer further staging w/u including CT-A/P, brain MRI until patient's family makes a decision.  -Pulmonary/CT Sx/IR consult       # Multifocal pneumonia/ Metabolic encephalopathy    -CT- chest without constrast-12/6/2023--> Patchy multifocal airspace disease in the right upper lobe, left upper lobe, and left lower lobe compatible with pneumonia. Moderate right pleural effusion and small left pleural effusion  -ID following   -follow BC x 2 UCx  -On Ceftriaxone and Azithromycin     # Acute decompensated HFrEF EF 30-35%/ chronic Afib    - Cardiology following  - BNP 89074. Troponin negative x1. CK 73  - On Lasix 40mg - increased to 40mg BID, trend BMP  - Continue statin, digoxin and Entresto  - Follow up echocardiogram  - amiodarone d/c' d by Cardiology for now---> EP input regarding indication, if for A-fib     87-year-old female with MHx significant for CHF, A-fib on warfarin, s/p AICD, CAD, hypothyroidism, celiac disease brought in from home for acute agitation, worsening weakness, and SOB, found to be hypoxic with 88 % on RA and CT-chest noted DORIS mass and mediastinal adenopathy, admitted for PNA, pleural effusion, and CHF.    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  - Spoke with daughter Aubrey ([phone]), informed about the CT-chest results, most likely metastases lung CA, but will need tissue biopsy for a definitive diagnosis. Aubrey expressed that patient have lately been too anxious and shown declined in her physical state and Aubrey does not think her mother can endure any cancer related diagnostics/treatments. However, she will discuss it with her siblings (patient's children) and inform us of their decision. Offered our support and help.  -Aubrey requested that staff avoid talking to the patient about cancer diagnosis in absence of a family member because patient have high anxiety. Aubrey and patients other children will come and speak to the patient about the diagnosis.  -d/w Dr. Masters---> defer further staging w/u including CT-A/P, brain MRI until patient's family makes a decision.  -Pulmonary/CT Sx/IR consult       # Multifocal pneumonia/ Metabolic encephalopathy    -CT- chest without constrast-12/6/2023--> Patchy multifocal airspace disease in the right upper lobe, left upper lobe, and left lower lobe compatible with pneumonia. Moderate right pleural effusion and small left pleural effusion  -ID following   -follow BC x 2 UCx  -On Ceftriaxone and Azithromycin     # Acute decompensated HFrEF EF 30-35%/ chronic Afib    - Cardiology following  - BNP 74364. Troponin negative x1. CK 73  - On Lasix 40mg - increased to 40mg BID, trend BMP  - Continue statin, digoxin and Entresto  - Follow up echocardiogram  - amiodarone d/c' d by Cardiology for now---> EP input regarding indication, if for A-fib     87-year-old female with MHx significant for CHF, A-fib on warfarin, s/p AICD, CAD, hypothyroidism, celiac disease brought in from home for acute agitation, worsening weakness, and SOB, found to be hypoxic with 88 % on RA. CT-chest noted DORIS mass and mediastinal adenopathy.  Admitted for PNA, pleural effusion, and CHF.    # DORIS mass and mediastinal adenopathy, suspicious for primary lung cancer    -CT-chest 12/6/2023:  3 cm left upper lobe mass and mediastinal lymphadenopathy  - Spoke with daughter Aubrey ([phone]), informed about the CT results, most c/w lung CA, but will require biopsy confirmation for a definitive diagnosis.   -Aubrey expressed that patient has anxiety and has had a recent decline in her physical state. Aubrey does not think her mother can endure cancer related diagnostic testing/treatments. However, she will discuss it with her siblings and inform us of their decision. Offered our support and help.  -Aubrey requested that staff avoid talking to the patient about cancer diagnosis in absence of a family member because patient have high anxiety. Aubrey and siblings will speak to the patient about her diagnosis.  -d/w Dr. Masters---> defer further staging w/u including CT-A/P, brain MRI until patient's family make a decision.  -Pulmonary/CT Sx/IR consulted     # Multifocal pneumonia/ Metabolic encephalopathy    -CT chest 12/6/2023--> Patchy multifocal airspace disease in the right upper lobe, left upper lobe, and left lower lobe compatible with pneumonia. Moderate right pleural effusion and small left pleural effusion  -ID following   -follow BC x 2 UCx  -On Ceftriaxone and Azithromycin     # Acute decompensated HFrEF EF 30-35%/ chronic Afib    - Cardiology following  - BNP 72380. Troponin negative x1. CK 73  - On Lasix 40mg - increased to 40mg BID, trend BMP  - Continue statin, digoxin and Entresto  - Follow up echocardiogram  - amiodarone d/c' d by Cardiology for now---> EP input regarding indication, if for A-fib     87-year-old female with MHx significant for CHF, A-fib on warfarin, s/p AICD, CAD, hypothyroidism, celiac disease brought in from home for acute agitation, worsening weakness, and SOB, found to be hypoxic with 88 % on RA. CT-chest noted DORIS mass and mediastinal adenopathy.  Admitted for PNA, pleural effusion, and CHF.    # DORIS mass and mediastinal adenopathy, suspicious for primary lung cancer    -CT-chest 12/6/2023:  3 cm left upper lobe mass and mediastinal lymphadenopathy  - Spoke with daughter Aubrey ([phone]), informed about the CT results, most c/w lung CA, but will require biopsy confirmation for a definitive diagnosis.   -Aubrey expressed that patient has anxiety and has had a recent decline in her physical state. Aubrey does not think her mother can endure cancer related diagnostic testing/treatments. However, she will discuss it with her siblings and inform us of their decision. Offered our support and help.  -Aubrey requested that staff avoid talking to the patient about cancer diagnosis in absence of a family member because patient have high anxiety. Aubrey and siblings will speak to the patient about her diagnosis.  -d/w Dr. Masters---> defer further staging w/u including CT-A/P, brain MRI until patient's family make a decision.  -Pulmonary/CT Sx/IR consulted     # Multifocal pneumonia/ Metabolic encephalopathy    -CT chest 12/6/2023--> Patchy multifocal airspace disease in the right upper lobe, left upper lobe, and left lower lobe compatible with pneumonia. Moderate right pleural effusion and small left pleural effusion  -ID following   -follow BC x 2 UCx  -On Ceftriaxone and Azithromycin     # Acute decompensated HFrEF EF 30-35%/ chronic Afib    - Cardiology following  - BNP 97478. Troponin negative x1. CK 73  - On Lasix 40mg - increased to 40mg BID, trend BMP  - Continue statin, digoxin and Entresto  - Follow up echocardiogram  - amiodarone d/c' d by Cardiology for now---> EP input regarding indication, if for A-fib

## 2023-12-07 NOTE — PROGRESS NOTE ADULT - SUBJECTIVE AND OBJECTIVE BOX
HOSPITALIST ATTENDING PROGRESS NOTE    Chart and meds reviewed.      Subjective: Patient seen and examined. Resting comfortably in Bed. Daughter at bedside. CT chest showed multifocal pneumonia with moderate right pleural effusion. as well as 3cm left upper lobe mass. Discussed with patient and daughter as well as son over the phone. Will consult pulmonology and Heme Onc. Started on IV rocephin and Azithromycin. Amiodarone discontinued. Ancef allergy but tolerated rocephin in the past on previous admission.     < from: CT Chest No Cont (12.06.23 @ 15:10) >      IMPRESSION:.    Patchy multifocal airspace disease in the right upper lobe, left upper   lobe, and left lower lobe compatible with pneumonia.    Moderate right pleural effusion and small left pleural effusion.    3 cm left upper lobe mass and mediastinal lymphadenopathy; leading   diagnostic consideration is for primary lung malignancy with metastasis.      < end of copied text >        Additional results/Imaging, I have personally reviewed:    LABS:                            10.6   5.87  )-----------( 275      ( 07 Dec 2023 06:53 )             32.0     12-07    134<L>  |  95<L>  |  19  ----------------------------<  76  3.5   |  34<H>  |  0.76    Ca    8.7      07 Dec 2023 06:53  Mg     2.1     12-07    TPro  6.8  /  Alb  2.5<L>  /  TBili  1.0  /  DBili  x   /  AST  26  /  ALT  17  /  AlkPhos  60  12-07        LIVER FUNCTIONS - ( 07 Dec 2023 06:53 )  Alb: 2.5 g/dL / Pro: 6.8 gm/dL / ALK PHOS: 60 U/L / ALT: 17 U/L / AST: 26 U/L / GGT: x           PT/INR - ( 07 Dec 2023 11:24 )   PT: 17.7 sec;   INR: 1.59 ratio           Urinalysis Basic - ( 07 Dec 2023 06:53 )    Color: x / Appearance: x / SG: x / pH: x  Gluc: 76 mg/dL / Ketone: x  / Bili: x / Urobili: x   Blood: x / Protein: x / Nitrite: x   Leuk Esterase: x / RBC: x / WBC x   Sq Epi: x / Non Sq Epi: x / Bacteria: x              All other systems reviewed and found to be negative with the exception of what has been described above.    MEDICATIONS  (STANDING):  aspirin enteric coated 81 milliGRAM(s) Oral daily  atorvastatin 10 milliGRAM(s) Oral at bedtime  azithromycin  IVPB 500 milliGRAM(s) IV Intermittent every 24 hours  cefTRIAXone Injectable. 1000 milliGRAM(s) IV Push every 24 hours  citalopram 10 milliGRAM(s) Oral daily  digoxin     Tablet 62.5 MICROGram(s) Oral daily  furosemide   Injectable 40 milliGRAM(s) IV Push two times a day  levothyroxine 25 MICROGram(s) Oral daily  levothyroxine 100 MICROGram(s) Oral daily  sacubitril 24 mG/valsartan 26 mG 1 Tablet(s) Oral two times a day    MEDICATIONS  (PRN):  acetaminophen     Tablet .. 650 milliGRAM(s) Oral once PRN Mild Pain (1 - 3)      VITALS:  T(F): 97.5 (12-07-23 @ 08:47), Max: 98.2 (12-06-23 @ 20:22)  HR: 77 (12-07-23 @ 13:05) (76 - 81)  BP: 111/50 (12-07-23 @ 13:05) (103/44 - 116/51)  RR: 18 (12-07-23 @ 13:05) (18 - 18)  SpO2: 100% (12-07-23 @ 13:05) (100% - 100%)  Wt(kg): --    I&O's Summary      CAPILLARY BLOOD GLUCOSE          PHYSICAL EXAM:  Constitutional: NAD, awake , frail  HEENT: PERR, EOMI, Normal Hearing, MMM  Neck: Soft and supple, No LAD, No JVD  Respiratory:rales at bases  Cardiovascular: S1 and S2, regular rate and rhythm, no Murmurs, gallops or rubs  Gastrointestinal: Bowel Sounds present, soft, nontender, nondistended, no guarding, no rebound  Extremities: No peripheral edema  Vascular: 2+ peripheral pulses  Neurological: A/O x 2, no focal deficits  Musculoskeletal: 5/5 strength b/l upper and lower extremities  Skin: No rashes      CULTURES:      Telemetry, personally reviewed

## 2023-12-07 NOTE — CONSULT NOTE ADULT - SUBJECTIVE AND OBJECTIVE BOX
Patient is a 88y old  Female who presents with a chief complaint of sob/hypoxia     HPI:  88 y/o female with h/o CHF, chronic atrial fibrillation on warfarin, s/p AICD, CAD, hypothyroidism, celiac disease, anxiety disorder was admitted on  for increased confusion and restlessness. She became agitated and refusing to take her meds. States she has been weak, cough with scant blood tinged sputum and SOB. In ER she was noted hypoxic to 88 % on RA. Dtr Pita stated her mother suffering from extreme anxiety and was started on xanax 2 weeks ago and since then, her cognition has suffered greatly. More forgetful and agitated at time which is not like her per daughter. In ER she received ceftriaxone.     PAST MEDICAL HISTORY:  AICD (automatic cardioverter/defibrillator) present x 3 . Replaced x 2. Presently right subclavian area  Arthritis   Celiac disease   Chronic atrial fibrillation   Hashimoto's thyroiditis   Hearing loss   History of cataract   History of CHF (congestive heart failure)   History of colon polyps   HTN (hypertension)   Hyperlipidemia, unspecified hyperlipidemia type   Hypothyroid   Iron deficiency anemia due to chronic blood loss   Left inguinal hernia   Mitral valve prolapse   Myocardial infarction   Osteoporosis   Varicose veins BLE.     PAST SURGICAL HISTORY:  AICD (automatic cardioverter/defibrillator) present with PPM. Replaced x 2.  Artificial pacemaker   H/O colonoscopy last done   H/O right inguinal hernia repair   History of cataract extraction Bilateral  History of heart surgery Mitral valve surgery for leaky valve 2020.     Meds: per reconciliation sheet, noted below  MEDICATIONS  (STANDING):  aspirin enteric coated 81 milliGRAM(s) Oral daily  atorvastatin 10 milliGRAM(s) Oral at bedtime  cefTRIAXone Injectable. 1000 milliGRAM(s) IV Push every 24 hours  citalopram 10 milliGRAM(s) Oral daily  digoxin     Tablet 62.5 MICROGram(s) Oral daily  furosemide   Injectable 40 milliGRAM(s) IV Push two times a day  levothyroxine 25 MICROGram(s) Oral daily  levothyroxine 100 MICROGram(s) Oral daily  sacubitril 24 mG/valsartan 26 mG 1 Tablet(s) Oral two times a day    MEDICATIONS  (PRN):  acetaminophen     Tablet .. 650 milliGRAM(s) Oral once PRN Mild Pain (1 - 3)    Allergies    Ancef (Unknown)  lidocaine (Rash; Angioedema)  adhesives (Rash)    Intolerances    Gluten (Diarrhea)    Social: no smoking, no alcohol, no illegal drugs; no recent travel, no exposure to TB  FAMILY HISTORY:  Family history of diabetes mellitus (Mother, Sibling)  Family history of heart disease (Mother)  Family history of dementia (Father)    ROS: the patient denies fever, no chills, no HA, no seizures, no dizziness, no sore throat, no nasal congestion, no blurry vision, no CP, no palpitations, has SOB, has cough, no abdominal pain, no diarrhea, no N/V, no dysuria, no leg pain, no claudication, no rash, no joint aches, no rectal pain or bleeding, no night sweats  All other systems reviewed and are negative    Vital Signs Last 24 Hrs  T(C): 36.4 (07 Dec 2023 08:47), Max: 36.8 (06 Dec 2023 20:22)  T(F): 97.5 (07 Dec 2023 08:47), Max: 98.2 (06 Dec 2023 20:22)  HR: 76 (07 Dec 2023 08:47) (76 - 81)  BP: 116/51 (07 Dec 2023 08:47) (103/44 - 116/51)  BP(mean): --  RR: 18 (07 Dec 2023 08:47) (18 - 18)  SpO2: 100% (07 Dec 2023 08:47) (100% - 100%)    Parameters below as of 07 Dec 2023 08:47  Patient On (Oxygen Delivery Method): nasal cannula  O2 Flow (L/min): 4    Daily     Daily Weight in k.5 (07 Dec 2023 06:43)    PE:    Constitutional:  No acute distress  HEENT: NC/AT, EOMI, PERRLA, conjunctivae clear; ears and nose atraumatic; pharynx benign  Neck: supple; thyroid not palpable  Back: no tenderness  Respiratory: respiratory effort normal; crackles at bases  Cardiovascular: S1S2 regular, no murmurs  Abdomen: soft, not tender, not distended, positive BS; no liver or spleen organomegaly  Genitourinary: no suprapubic tenderness  Lymphatic: no LN palpable  Musculoskeletal: no muscle tenderness, no joint swelling or tenderness  Extremities: no pedal edema  Neurological/ Psychiatric: AxOx3, judgement and insight normal; moving all extremities  Skin: no rashes; no palpable lesions    Labs: all available labs reviewed                        10.6   .87  )-----------( 275      ( 07 Dec 2023 06:53 )             32.0     -    134<L>  |  95<L>  |  19  ----------------------------<  76  3.5   |  34<H>  |  0.76    Ca    8.7      07 Dec 2023 06:53  Mg     2.1         TPro  6.8  /  Alb  2.5<L>  /  TBili  1.0  /  DBili  x   /  AST  26  /  ALT  17  /  AlkPhos  60       LIVER FUNCTIONS - ( 07 Dec 2023 06:53 )  Alb: 2.5 g/dL / Pro: 6.8 gm/dL / ALK PHOS: 60 U/L / ALT: 17 U/L / AST: 26 U/L / GGT: x           ( @ 14:39)  [facility]    Radiology: all available radiological tests reviewed    < from: CT Chest No Cont (23 @ 15:10) >  Patchy multifocal airspace disease in the right upper lobe, left upper   lobe, and left lower lobe compatible with pneumonia.  Moderate right pleural effusion and small left pleural effusion.  3 cm left upper lobe mass and mediastinal lymphadenopathy; leading   diagnostic consideration is for primary lung malignancy with metastasis.  < end of copied text >      Advanced directives addressed: full resuscitation Patient is a 88y old  Female who presents with a chief complaint of sob/hypoxia     HPI:  88 y/o female with h/o CHF, chronic atrial fibrillation on warfarin, s/p AICD, CAD, hypothyroidism, celiac disease, anxiety disorder was admitted on  for increased confusion and restlessness. She became agitated and refusing to take her meds. States she has been weak, cough with scant blood tinged sputum and SOB. In ER she was noted hypoxic to 88 % on RA. Dtr Pita stated her mother suffering from extreme anxiety and was started on xanax 2 weeks ago and since then, her cognition has suffered greatly. More forgetful and agitated at time which is not like her per daughter. In ER she received ceftriaxone.     PAST MEDICAL HISTORY:  AICD (automatic cardioverter/defibrillator) present x 3 . Replaced x 2. Presently right subclavian area  Arthritis   Celiac disease   Chronic atrial fibrillation   Hashimoto's thyroiditis   Hearing loss   History of cataract   History of CHF (congestive heart failure)   History of colon polyps   HTN (hypertension)   Hyperlipidemia, unspecified hyperlipidemia type   Hypothyroid   Iron deficiency anemia due to chronic blood loss   Left inguinal hernia   Mitral valve prolapse   Myocardial infarction   Osteoporosis   Varicose veins BLE.     PAST SURGICAL HISTORY:  AICD (automatic cardioverter/defibrillator) present with PPM. Replaced x 2.  Artificial pacemaker   H/O colonoscopy last done   H/O right inguinal hernia repair   History of cataract extraction Bilateral  History of heart surgery Mitral valve surgery for leaky valve 2020.     Meds: per reconciliation sheet, noted below  MEDICATIONS  (STANDING):  aspirin enteric coated 81 milliGRAM(s) Oral daily  atorvastatin 10 milliGRAM(s) Oral at bedtime  cefTRIAXone Injectable. 1000 milliGRAM(s) IV Push every 24 hours  citalopram 10 milliGRAM(s) Oral daily  digoxin     Tablet 62.5 MICROGram(s) Oral daily  furosemide   Injectable 40 milliGRAM(s) IV Push two times a day  levothyroxine 25 MICROGram(s) Oral daily  levothyroxine 100 MICROGram(s) Oral daily  sacubitril 24 mG/valsartan 26 mG 1 Tablet(s) Oral two times a day    MEDICATIONS  (PRN):  acetaminophen     Tablet .. 650 milliGRAM(s) Oral once PRN Mild Pain (1 - 3)    Allergies    Ancef (Unknown)  lidocaine (Rash; Angioedema)  adhesives (Rash)    Intolerances    Gluten (Diarrhea)    Social: no smoking, no alcohol, no illegal drugs; no recent travel, no exposure to TB  FAMILY HISTORY:  Family history of diabetes mellitus (Mother, Sibling)  Family history of heart disease (Mother)  Family history of dementia (Father)    ROS: the patient denies fever, no chills, no HA, no seizures, no dizziness, no sore throat, no nasal congestion, no blurry vision, no CP, no palpitations, has SOB, has cough, no abdominal pain, no diarrhea, no N/V, no dysuria, no leg pain, no claudication, no rash, no joint aches, no rectal pain or bleeding, no night sweats  All other systems reviewed and are negative    Vital Signs Last 24 Hrs  T(C): 36.4 (07 Dec 2023 08:47), Max: 36.8 (06 Dec 2023 20:22)  T(F): 97.5 (07 Dec 2023 08:47), Max: 98.2 (06 Dec 2023 20:22)  HR: 76 (07 Dec 2023 08:47) (76 - 81)  BP: 116/51 (07 Dec 2023 08:47) (103/44 - 116/51)  BP(mean): --  RR: 18 (07 Dec 2023 08:47) (18 - 18)  SpO2: 100% (07 Dec 2023 08:47) (100% - 100%)    Parameters below as of 07 Dec 2023 08:47  Patient On (Oxygen Delivery Method): nasal cannula  O2 Flow (L/min): 4    Daily     Daily Weight in k.5 (07 Dec 2023 06:43)    PE:    Constitutional:  No acute distress  HEENT: NC/AT, EOMI, PERRLA, conjunctivae clear; ears and nose atraumatic; pharynx benign  Neck: supple; thyroid not palpable  Back: no tenderness  Respiratory: respiratory effort normal; crackles at bases  Cardiovascular: S1S2 regular, no murmurs  Abdomen: soft, not tender, not distended, positive BS; no liver or spleen organomegaly  Genitourinary: no suprapubic tenderness  Lymphatic: no LN palpable  Musculoskeletal: no muscle tenderness, no joint swelling or tenderness  Extremities: no pedal edema  Neurological/ Psychiatric: AxOx3, judgement and insight normal; moving all extremities  Skin: no rashes; no palpable lesions    Labs: all available labs reviewed                        10.6   .87  )-----------( 275      ( 07 Dec 2023 06:53 )             32.0     -    134<L>  |  95<L>  |  19  ----------------------------<  76  3.5   |  34<H>  |  0.76    Ca    8.7      07 Dec 2023 06:53  Mg     2.1         TPro  6.8  /  Alb  2.5<L>  /  TBili  1.0  /  DBili  x   /  AST  26  /  ALT  17  /  AlkPhos  60       LIVER FUNCTIONS - ( 07 Dec 2023 06:53 )  Alb: 2.5 g/dL / Pro: 6.8 gm/dL / ALK PHOS: 60 U/L / ALT: 17 U/L / AST: 26 U/L / GGT: x           ( @ 14:39)  [facility]    Radiology: all available radiological tests reviewed    < from: CT Chest No Cont (23 @ 15:10) >  Patchy multifocal airspace disease in the right upper lobe, left upper   lobe, and left lower lobe compatible with pneumonia.  Moderate right pleural effusion and small left pleural effusion.  3 cm left upper lobe mass and mediastinal lymphadenopathy; leading   diagnostic consideration is for primary lung malignancy with metastasis.  < end of copied text >      Advanced directives addressed: full resuscitation

## 2023-12-07 NOTE — CONSULT NOTE ADULT - SUBJECTIVE AND OBJECTIVE BOX
HPI:  87-year-old female with medical history of CHF,chronic atrial fibrillation on warfarin, s/p AICD, CAD, hypothyroidism, celiac disease presents from home after her HHA called 911 after she was agitated and refusing to take her meds. Was found to be hypoxic to 88 % on RA.     PAST MEDICAL & SURGICAL HISTORY:    Chronic atrial fibrillation    Hypothyroid    HTN (hypertension)    History of cataract    Hyperlipidemia, unspecified hyperlipidemia type    History of CHF (congestive heart failure)    Peripheral edema    History of colon polyps    Varicose veins  BLE    Arthritis    Celiac disease    Hashimoto's thyroiditis    Osteoporosis    Iron deficiency anemia due to chronic blood loss    AICD (automatic cardioverter/defibrillator) present  x 3 . Replaced x 2. Presently right subclavian area    Myocardial infarction  1990    Mitral valve prolapse    Left inguinal hernia    AICD (automatic cardioverter/defibrillator) present  with PPM. Replaced x 2.    H/O right inguinal hernia repair    H/O colonoscopy  last done 2009    History of cataract extraction  Bilateral    Artificial pacemaker    History of heart surgery  Mitral valve surgery for leaky valve 9/2020    FAMILY HISTORY:    diabetes mellitus (Mother, Sibling)    heart disease (Mother)    dementia (Father)    MEDICATIONS  (STANDING):    aspirin enteric coated 81 milliGRAM(s) Oral daily  atorvastatin 10 milliGRAM(s) Oral at bedtime  azithromycin  IVPB 500 milliGRAM(s) IV Intermittent every 24 hours  cefTRIAXone Injectable. 1000 milliGRAM(s) IV Push every 24 hours  citalopram 10 milliGRAM(s) Oral daily  digoxin     Tablet 62.5 MICROGram(s) Oral daily  furosemide   Injectable 40 milliGRAM(s) IV Push two times a day  levothyroxine 25 MICROGram(s) Oral daily  levothyroxine 100 MICROGram(s) Oral daily  sacubitril 24 mG/valsartan 26 mG 1 Tablet(s) Oral two times a day    MEDICATIONS  (PRN):    acetaminophen Tablet .. 650 milliGRAM(s) Oral once PRN Mild Pain (1 - 3)    ALLERGIES:    Ancef (Unknown)  lidocaine (Rash; Angioedema)  adhesives (Rash)    Intolerance  Gluten (Diarrhea)    REVIEW OF SYSTEM:    Constitutional, Eyes, ENT, Cardiovascular, Respiratory, Gastrointestinal, Genitourinary, Musculoskeletal, Integumentary, Neurological, Psychiatric, Endocrine, Heme/Lymph and Allergic/Immunologic review of systems are otherwise negative except as noted in HPI.     VITAL SIGNS:    T(C): 36.4 (07 Dec 2023 08:47), Max: 36.8 (06 Dec 2023 20:22)  T(F): 97.5 (07 Dec 2023 08:47), Max: 98.2 (06 Dec 2023 20:22)  HR: 77 (07 Dec 2023 13:05) (76 - 81)  BP: 111/50 (07 Dec 2023 13:05) (103/44 - 116/51)  BP(mean): 63 (07 Dec 2023 13:05) (63 - 63)  RR: 18 (07 Dec 2023 13:05) (18 - 18)  SpO2: 100% (07 Dec 2023 13:05) (100% - 100%)    Parameters below as of 07 Dec 2023 13:05  Patient On (Oxygen Delivery Method): nasal cannula  O2 Flow (L/min): 3    PHYSICAL EXAM:    CONSTITUTIONAL : NAD  NERVOUS SYSTEM:  Alert & Oriented X 2, no focal deficits.   HEAD:  Atraumatic, Normocephalic  EYES: EOMI, PERRLA, conjunctiva and sclera clear  HEENT: Moist mucous membranes, Supple neck , No JVD  CHEST: Decreased BS at the bases, on O2/NC  HEART: Regular rate and rhythm; No murmurs, no rubs or gallops  ABDOMEN: Soft, Non-tender, Non-distended; Bowel sounds present, no guarding , no peritoneal irritation   GENITOURINARY- Voiding, no suprapubic tenderness  EXTREMITIES:  2+ Peripheral Pulses  MUSCULOSKELETAL:- No muscle tenderness, Muscle tone normal, No joint tenderness.   SKIN-no rash, no lesion    LABS:    CBC Full  -  ( 07 Dec 2023 06:53 )  WBC Count : 5.87 K/uL  RBC Count : 3.19 M/uL  Hemoglobin : 10.6 g/dL  Hematocrit : 32.0 %  Platelet Count - Automated : 275 K/uL  Mean Cell Volume : 100.3 fl  Mean Cell Hemoglobin : 33.2 pg  Mean Cell Hemoglobin Concentration : 33.1 gm/dL  Auto Neutrophil # : x  Auto Lymphocyte # : x  Auto Monocyte # : x  Auto Eosinophil # : x  Auto Basophil # : x  Auto Neutrophil % : x  Auto Lymphocyte % : x  Auto Monocyte % : x  Auto Eosinophil % : x  Auto Basophil % : x    12-07    134<L>  |  95<L>  |  19  ----------------------------<  76  3.5   |  34<H>  |  0.76    Ca    8.7      07 Dec 2023 06:53  Mg     2.1     12-07    TPro  6.8  /  Alb  2.5<L>  /  TBili  1.0  /  DBili  x   /  AST  26  /  ALT  17  /  AlkPhos  60  12-07    PT/INR - ( 07 Dec 2023 11:24 )   PT: 17.7 sec;   INR: 1.59 ratio        Urinalysis Basic - ( 07 Dec 2023 06:53 )    Color: x / Appearance: x / SG: x / pH: x  Gluc: 76 mg/dL / Ketone: x  / Bili: x / Urobili: x   Blood: x / Protein: x / Nitrite: x   Leuk Esterase: x / RBC: x / WBC x   Sq Epi: x / Non Sq Epi: x / Bacteria: x    RADIOLOGY & ADDITIONAL STUDIES:    EXAM:  CT CHEST     PROCEDURE DATE:  12/06/2023      INTERPRETATION:  HISTORY: Hypoxia. Shortness of breath. CHF.    EXAMINATION: CT CHEST was performed without IV contrast.    COMPARISON: 5/16/2003.    FINDINGS:    AIRWAYS, LUNGS, PLEURA: Lobulated anterior left upper lobe mass with   suspected mediastinal invasion measuring 3 x 2.6 cm (image 45, series 2).    Patchy consolidation and ground-glass opacification multifocally in the   right upper lobe, left upper lobe, left lower lobe superior segment.    Moderate size right pleural effusion with associated right middle lobe   and right lower lobe multisegmental atelectasis. Small left pleural   effusion and associated subsegmental left basilar atelectasis.    Central airways appear clear.    MEDIASTINUM: Cardiomegaly. No pericardial effusion. Normal caliber   thoracic aorta.    Right paratracheal node measures 3.1 x 2.7 cm (image 38, series 2) and AP   window node measures 2.1 x 1.4 cm (image 39, series 2).    IMAGED ABDOMEN: Unremarkable.    SOFT TISSUES: Unremarkable.    BONES: Unremarkable.      IMPRESSION:.    Patchy multifocal airspace disease in the right upper lobe, left upper   lobe, and left lower lobe compatible with pneumonia.    Moderate right pleural effusion and small left pleural effusion.    3 cm left upper lobe mass and mediastinal lymphadenopathy; leading   diagnostic consideration is for primary lung malignancy with metastasis.

## 2023-12-07 NOTE — ADVANCED PRACTICE NURSE CONSULT - RECOMMEDATIONS
-Continue turning/positioning patient from side-to-side q2h while in bed, q1h when/if OOB chair, or in accordance w/ pt's plan of care. Utilize pillows and/or Spry positioner pillow to assist w/ turning/positioning. When/if OOB chair, utilize pillows or chair cushion to offload pressure.   -Continue to offload heels from bed surface with soft pillow under calfs or by applying offloading boots to BLEs.   -Continue applying Coloplast Moe Protect moisture barrier cream to buttock and perineal area daily and prn after each incontinent episode.    -Continue utilizing one underpad underneath patient to contain incontinence episodes; change pad when saturated/soiled.   -Continue nutrition consult for optimal wound healing & nutritional status.   -Assess skin/wound qshift, report changes to primary provider.   -Maintain Low Air Loss Mattress   -Can place Foam dressing over amarjit prominence. When doing so because of patient fragile skin limit as much adhesive to patient skin. Apply Adaptic to skin first and then place foam.     Plan of Care: Primary RN made aware of above recs.  No further needs/recs from CWON service at this time. Staff RN to perform routine skin/wound assessment and manage wound care. Questions or concerns or if wound worsens and reconsult needed[facility]   -Continue turning/positioning patient from side-to-side q2h while in bed, q1h when/if OOB chair, or in accordance w/ pt's plan of care. Utilize pillows and/or Spry positioner pillow to assist w/ turning/positioning. When/if OOB chair, utilize pillows or chair cushion to offload pressure.   -Continue to offload heels from bed surface with soft pillow under calfs or by applying offloading boots to BLEs.   -Continue applying Coloplast Moe Protect moisture barrier cream to buttock and perineal area daily and prn after each incontinent episode.    -Continue utilizing one underpad underneath patient to contain incontinence episodes; change pad when saturated/soiled.   -Continue nutrition consult for optimal wound healing & nutritional status.   -Assess skin/wound qshift, report changes to primary provider.   -Maintain Low Air Loss Mattress   -Can place Foam dressing over amarjit prominence. When doing so because of patient fragile skin limit as much adhesive to patient skin. Apply Adaptic to skin first and then place foam.     Plan of Care: Primary RN made aware of above recs.  No further needs/recs from CWON service at this time. Staff RN to perform routine skin/wound assessment and manage wound care. Questions or concerns or if wound worsens and reconsult needed, [facility]

## 2023-12-07 NOTE — ADVANCED PRACTICE NURSE CONSULT - ASSESSMENT
This is a 88 year old female admitted on 12/6/2023 and per MD H&P " with medical history of CHF, chronic atrial fibrillation on warfarin, s/p AICD, CAD, hypothyroidism, celiac disease presents from home after her HHA called 911 after she was agitated and refusing to take her meds. States she has been weak and sob. Also endorsing the weakness. Was found to be hypoxic to 88 % on RA. PT endorses productive cough and this am had scant blood tinged mucous.   Spoke to Dtr Pita on the phone. states her mother suffering from extreme anxiety and was started on xanax 2 weeks ago and since then, her cognition has suffered greatly. She is also having difficulty walking per aides. More forgetful and agitated at time which is not like her per daughter. "    Patient assessed on 3 east and laying on a [facility] Low Air loss mattress   Patient with amarjit prominences exposed with BMI 19 and weight 56.7kg.     Per RN patient admitted with stage 2 to coccyx and stage 3 to midthoracic. Upon assessment did not note these pressure injuries at this stage. Assessed with SYBIL Adams and both noted that if wounds were present they have healed. Stage 1 noted to sacrum and a Hypopigmented skin to midthoracic left spinous process. Stage 1 to heels.     Patient is incontinent of stool and urine. Patient at the time of assessment was incontinent of stool. Patient cleansed.          This is a 88 year old female admitted on 12/6/2023 and per MD H&P " with medical history of CHF, chronic atrial fibrillation on warfarin, s/p AICD, CAD, hypothyroidism, celiac disease presents from home after her HHA called 911 after she was agitated and refusing to take her meds. States she has been weak and sob. Also endorsing the weakness. Was found to be hypoxic to 88 % on RA. PT endorses productive cough and this am had scant blood tinged mucous.   Spoke to Dtr Pita on the phone. states her mother suffering from extreme anxiety and was started on xanax 2 weeks ago and since then, her cognition has suffered greatly. She is also having difficulty walking per aides. More forgetful and agitated at time which is not like her per daughter. "    Patient assessed on 3 east and laying on a [facility] Low Air loss mattress   Patient with amarjit prominences exposed with BMI 19 and weight 56.7kg.     Per RN patient admitted with stage 2 to coccyx and stage 3 to midthoracic. Upon assessment did not note these pressure injuries at this stage. Assessed with SYBIL Adams and both noted that if wounds were present they have healed. Stage 1 noted to sacrum and a Hypopigmented skin to midthoracic left spinous process. Stage 1 to heels.     Patient is incontinent of stool and urine. Patient at the time of assessment was incontinent of stool. Patient cleansed.

## 2023-12-07 NOTE — PROGRESS NOTE ADULT - ASSESSMENT
87-year-old female with medical history of CHF, chronic atrial fibrillation on warfarin, s/p AICD, CAD, hypothyroidism, celiac disease presents from home after her HHA called 911 after she was agitated and refusing to take her meds. States she has been weak and sob. Also endorsing the weakness. Was found to be hypoxic to 88 % on RA. PT endorses productive cough and this am had scant blood tinged mucous.   Spoke to Dtr Pita on the phone. states her mother suffering from extreme anxiety and was started on xanax 2 weeks ago and since then, her cognition has suffered greatly. She is also having difficulty walking per aides. More forgetful and agitated at time which is not like her per daughter.     #Acute decompensated HFrEF EF 30-35%  #B/L pleural effusion R>L  #Pneumonia  #Chronic afib  #S/p ICD  #Metabolic encephalopathy  #Hyponatremia  #DORIS Mass 3cm    - Monitor on tele. BNP 87289. Troponin negative x1. CK 73  - Continue Lasix 40mg - increased to 40mg BID, trend BMP  - Continue statin, digoxin and Entresto  - Follow up echocardiogram  - CT Chest > Patchy multifocal airspace disease in the right upper lobe, left upper lobe, and left lower lobe compatible with pneumonia. Moderate right pleural effusion and small left pleural effusion. 3 cm left upper lobe mass and mediastinal lymphadenopathy; leading diagnostic consideration is for primary lung malignancy with metastasis.  - Interrogate device  - DC amiodarone for now, EP input regarding indication, if for afib - chronically in afib and will keep off.    Case d/w Dr. Melendez  Will follow   87-year-old female with medical history of CHF, chronic atrial fibrillation on warfarin, s/p AICD, CAD, hypothyroidism, celiac disease presents from home after her HHA called 911 after she was agitated and refusing to take her meds. States she has been weak and sob. Also endorsing the weakness. Was found to be hypoxic to 88 % on RA. PT endorses productive cough and this am had scant blood tinged mucous.   Spoke to Dtr Pita on the phone. states her mother suffering from extreme anxiety and was started on xanax 2 weeks ago and since then, her cognition has suffered greatly. She is also having difficulty walking per aides. More forgetful and agitated at time which is not like her per daughter.     #Acute decompensated HFrEF EF 30-35%  #B/L pleural effusion R>L  #Pneumonia  #Chronic afib  #S/p ICD  #Metabolic encephalopathy  #Hyponatremia  #DORIS Mass 3cm    - Monitor on tele. BNP 50855. Troponin negative x1. CK 73  - Continue Lasix 40mg - increased to 40mg BID, trend BMP  - Continue statin, digoxin and Entresto  - Follow up echocardiogram  - CT Chest > Patchy multifocal airspace disease in the right upper lobe, left upper lobe, and left lower lobe compatible with pneumonia. Moderate right pleural effusion and small left pleural effusion. 3 cm left upper lobe mass and mediastinal lymphadenopathy; leading diagnostic consideration is for primary lung malignancy with metastasis.  - Interrogate device  - DC amiodarone for now, EP input regarding indication, if for afib - chronically in afib and will keep off.    Case d/w Dr. Melendez  Will follow   87-year-old female with medical history of CHF, chronic atrial fibrillation on warfarin, s/p AICD, CAD, hypothyroidism, celiac disease presents from home after her HHA called 911 after she was agitated and refusing to take her meds. States she has been weak and sob. Also endorsing the weakness. Was found to be hypoxic to 88 % on RA. PT endorses productive cough and this am had scant blood tinged mucous.   Spoke to Dtjuan Lema on the phone. states her mother suffering from extreme anxiety and was started on xanax 2 weeks ago and since then, her cognition has suffered greatly. She is also having difficulty walking per aides. More forgetful and agitated at time which is not like her per daughter.     #Acute decompensated HFrEF EF 30-35%  #B/L pleural effusion R>L  #Pneumonia  #Chronic afib  #S/p ICD  #Metabolic encephalopathy  #Hyponatremia  #DORIS Mass 3cm    - Monitor on tele. BNP 83958. Troponin negative x1. CK 73  - Continue Lasix 40mg - increased to 40mg BID, trend BMP  - Continue statin, digoxin and Entresto  - Follow up echocardiogram  - CT Chest > Patchy multifocal airspace disease in the right upper lobe, left upper lobe, and left lower lobe compatible with pneumonia. Moderate right pleural effusion and small left pleural effusion. 3 cm left upper lobe mass and mediastinal lymphadenopathy; leading diagnostic consideration is for primary lung malignancy with metastasis.  - Interrogate device  - DC amiodarone for now, EP input regarding indication, if for afib - chronically in afib and will keep off.    Case d/w Dr. Villa  Will follow   87-year-old female with medical history of CHF, chronic atrial fibrillation on warfarin, s/p AICD, CAD, hypothyroidism, celiac disease presents from home after her HHA called 911 after she was agitated and refusing to take her meds. States she has been weak and sob. Also endorsing the weakness. Was found to be hypoxic to 88 % on RA. PT endorses productive cough and this am had scant blood tinged mucous.   Spoke to Dtjuan Lema on the phone. states her mother suffering from extreme anxiety and was started on xanax 2 weeks ago and since then, her cognition has suffered greatly. She is also having difficulty walking per aides. More forgetful and agitated at time which is not like her per daughter.     #Acute decompensated HFrEF EF 30-35%  #B/L pleural effusion R>L  #Pneumonia  #Chronic afib  #S/p ICD  #Metabolic encephalopathy  #Hyponatremia  #DORIS Mass 3cm    - Monitor on tele. BNP 53732. Troponin negative x1. CK 73  - Continue Lasix 40mg - increased to 40mg BID, trend BMP  - Continue statin, digoxin and Entresto  - Follow up echocardiogram  - CT Chest > Patchy multifocal airspace disease in the right upper lobe, left upper lobe, and left lower lobe compatible with pneumonia. Moderate right pleural effusion and small left pleural effusion. 3 cm left upper lobe mass and mediastinal lymphadenopathy; leading diagnostic consideration is for primary lung malignancy with metastasis.  - Interrogate device  - DC amiodarone for now, EP input regarding indication, if for afib - chronically in afib and will keep off.    Case d/w Dr. Villa  Will follow

## 2023-12-07 NOTE — PROGRESS NOTE ADULT - SUBJECTIVE AND OBJECTIVE BOX
CHIEF COMPLAINT: Patient is a 88y old  Female who presents with a chief complaint of sob/hypoxia (05 Dec 2023 11:04)    FROM H&P: 87-year-old female with medical history of CHF, chronic atrial fibrillation on warfarin, s/p AICD, CAD, hypothyroidism, celiac disease presents from home after her HHA called 911 after she was agitated and refusing to take her meds. States she has been weak and sob. Also endorsing the weakness. Was found to be hypoxic to 88 % on RA. PT endorses productive cough and this am had scant blood tinged mucous.   Spoke to Dtjuan Lema on the phone. states her mother suffering from extreme anxiety and was started on xanax 2 weeks ago and since then, her cognition has suffered greatly. She is also having difficulty walking per aides. More forgetful and agitated at time which is not like her per daughter.     She is presently comfortable. 97% on 2L. Alert and oriented x 2  CXR: R>L effusion with e/o pvc, cannot r/o infiltrate  s/p iv lasix in ED  NA : 128    12/6/23: Cardiology consulted for HF. Hypoxia. +sob. C/w IV Lasix, check echo and CT.   12/7/23. Breathing somewhat stable. No CP    PAST MEDICAL/SURGICAL/FAMILY/SOCIAL HISTORY:   AICD (automatic cardioverter/defibrillator) present x 3 . Replaced x 2. Presently right subclavian area  Arthritis   Celiac disease   Chronic atrial fibrillation   Hashimoto's thyroiditis   Hearing loss   History of cataract   History of CHF (congestive heart failure)   History of colon polyps   HTN (hypertension)   Hyperlipidemia, unspecified hyperlipidemia type   Hypothyroid   Iron deficiency anemia due to chronic blood loss   Left inguinal hernia   Mitral valve prolapse   Myocardial infarction 1990  Osteoporosis   Peripheral edema   Varicose veins BLE.     PAST SURGICAL HISTORY:  AICD (automatic cardioverter/defibrillator) present with PPM. Replaced x 2.  Artificial pacemaker   H/O colonoscopy last done 2009  H/O right inguinal hernia repair   History of cataract extraction Bilateral  History of heart surgery Mitral valve surgery for leaky valve 9/2020.     FAMILY HISTORY:  Father  Still living? No  Family history of dementia, Age at diagnosis: Age Unknown    Mother  Still living? No  Family history of diabetes mellitus, Age at diagnosis: Age Unknown  Family history of heart disease, Age at diagnosis: Age Unknown    Sibling  Still living? No  Family history of diabetes mellitus, Age at diagnosis: Age Unknown.    PREVIOUS DIAGNOSTIC TESTING:      ECHO: FINDINGS: 12/5/23: The left ventricle size is at the upper limits of normal, severe regional hypokinesis, and moderately reduced function. Definity was used to better visualize the endocardial border. Estimated left ventricular ejection fraction is 30-35 %. The left atrium is severely dilated. The right atrium appears dilated. A device wire is seen in the RV and RA. The right ventricle is at the upper limits of normal in size. The aortic valve appears mildly calcified. Valve opening seems to be restricted. Transaortic gradients are underestimated due to impaired left ventricle systolic function. RANJAN by continuity equation suggests mild aortic stenosis. Moderate (2+) aortic regurgitation is present. A mitral valve repair is noted. Moderate (2+) mitral regurgitation is present. The mean trans-mitral pressure gradient is 6 mmHg. The tricuspid valve leaflets are thin and pliable; valve motion is normal. Moderate (2+) tricuspid valve regurgitation is present. Moderate pulmonary hypertension. Normal appearing pulmonic valve structure. Mild pulmonic valvular regurgitation (1+) is present. Pleural effusion - massive bilateral pleural effusion. The IVC is dilated with decreased respiratory variation. A PFO is noted with color and spectral Doppler.    MEDICATIONS  (STANDING):  aMIOdarone    Tablet 100 milliGRAM(s) Oral daily  aspirin enteric coated 81 milliGRAM(s) Oral daily  atorvastatin 10 milliGRAM(s) Oral at bedtime  citalopram 10 milliGRAM(s) Oral daily  digoxin     Tablet 62.5 MICROGram(s) Oral daily  furosemide   Injectable 40 milliGRAM(s) IV Push daily  levothyroxine 25 MICROGram(s) Oral daily  levothyroxine 100 MICROGram(s) Oral daily  sacubitril 24 mG/valsartan 26 mG 1 Tablet(s) Oral two times a day    MEDICATIONS  (PRN):  acetaminophen     Tablet .. 650 milliGRAM(s) Oral once PRN Mild Pain (1 - 3)    HOME MEDICATIONS:  ALPRAZolam 0.25 mg oral tablet: 1 tab(s) orally 2 times a day as needed for  anxiety (05 Dec 2023 11:08)  amiodarone 100 mg oral tablet: 1 tab(s) orally once a day (05 Dec 2023 11:04)  aspirin 81 mg oral delayed release tablet: 1 tab(s) orally once a day (05 Dec 2023 11:04)  atorvastatin 10 mg oral tablet: 1 tab(s) orally once a day (at bedtime) (05 Dec 2023 11:04)  Calcium 500+D oral tablet, chewable: 1 tab(s) orally 2 times a day (05 Dec 2023 11:04)  cephalexin 500 mg oral capsule: 1 cap(s) orally every 8 hours x  7 days ***Course Complete*** (05 Dec 2023 11:11)  citalopram 10 mg oral tablet: 1 tab(s) orally once a day (05 Dec 2023 11:11)  digoxin 125 mcg (0.125 mg) oral tablet: 0.5 tab(s) orally once a day (05 Dec 2023 11:04)  Entresto 24 mg-26 mg oral tablet: 1 tab(s) orally 2 times a day (05 Dec 2023 11:04)  furosemide 20 mg oral tablet: 1 tab(s) orally once a day ***pt doesn&#x27;t take consistently*** (05 Dec 2023 11:03)  levothyroxine 100 mcg (0.1 mg) oral tablet: 1 tab(s) orally once a day ***take with 25mcg = 125cg*** (05 Dec 2023 11:11)  levothyroxine 25 mcg (0.025 mg) oral tablet: 1 tab(s) orally once a day ***take with 100mcg = 125mcg*** (05 Dec 2023 11:11)  warfarin 1 mg oral tablet: 1 tab(s) orally every other day ***alternate with 0.5mg*** (05 Dec 2023 11:11)  warfarin 1 mg oral tablet: 0.5 tab(s) orally every other day ***alternate with 1mg*** (05 Dec 2023 11:08)    PHYSICAL EXAM:  T(C): 36.8 (05 Dec 2023 19:03), Max: 37.1 (05 Dec 2023 18:18)  T(F): 98.2 (05 Dec 2023 19:03), Max: 98.7 (05 Dec 2023 18:18)  HR: 78 (05 Dec 2023 22:26) (62 - 81)  BP: 132/58 (05 Dec 2023 22:26) (129/58 - 162/68)  BP(mean): 118 (05 Dec 2023 18:18) (74 - 118)  RR: 18 (05 Dec 2023 19:03) (16 - 18)  SpO2: 91% (05 Dec 2023 19:03) (88% - 100%)    Parameters below as of 05 Dec 2023 19:03  Patient On (Oxygen Delivery Method): room air    Constitutional: NAD, awake and alert  HEENT: Normal Hearing, MMM  Neck: Soft and supple, No LAD, No JVD  Respiratory: Breath sounds diminished b/l  Cardiovascular: S1 and S2, regular rate and rhythm, no Murmurs, gallops or rubs  Gastrointestinal: Bowel Sounds present, soft, nontender, nondistended, no guarding, no rebound  Extremities: No peripheral edema  Vascular: 2+ peripheral pulses  Neurological: A/O x 2/3, forgetful  Musculoskeletal: 5/5 strength b/l upper and lower extremities  Skin: No rashes    =======================================    INTERPRETATION OF TELEMETRY:    ECG: < from: 12 Lead ECG (12.05.23 @ 01:29) >  Diagnosis Line Ventricular-paced rhythm  Biventricular pacemaker detected  Abnormal ECG    ========================================    LABS: All Labs Reviewed:                        10.6   5.87  )-----------( 275      ( 07 Dec 2023 06:53 )             32.0     12-07    134<L>  |  95<L>  |  19  ----------------------------<  76  3.5   |  34<H>  |  0.76    Ca    8.7      07 Dec 2023 06:53  Mg     2.1     12-07    TPro  6.8  /  Alb  2.5<L>  /  TBili  1.0  /  DBili  x   /  AST  26  /  ALT  17  /  AlkPhos  60  12-07    PT/INR - ( 06 Dec 2023 07:24 )   PT: 24.4 sec;   INR: 2.21 ratio      Troponin: 30.60 ng/L    BNP 20007     CARDIAC TESTING:    < from: TTE Echo Complete w/ Contrast w/ Doppler (12.05.23 @ 14:29) >   The left ventricle size is at the upper limits of normal, severe regional   hypokinesis, and moderately reduced function.   Definity was used to better visualize the endocardial border.   Estimated left ventricular ejection fraction is 30-35 %.   The left atrium is severely dilated.   The right atrium appears dilated.   A device wire is seen in the RV and RA.   The right ventricle is at the upper limits of normal in size.   The aortic valve appears mildly calcified. Valve opening seems to be   restricted.   Transaortic gradients are underestimated due to impaired left ventricle   systolic function.   RANJAN by continuity equation suggests mild aortic stenosis.   Moderate (2+) aortic regurgitation is present.   A mitral valve repair is noted.   Moderate (2+) mitral regurgitation is present.   The mean trans-mitral pressure gradient is 6 mmHg.   The tricuspid valve leaflets are thin and pliable; valve motion is   normal.   Moderate (2+) tricuspid valve regurgitation is present.   Moderatepulmonary hypertension.   Normal appearing pulmonic valve structure.   Mild pulmonic valvular regurgitation (1+) is present.   Pleural effusion - massive bilateral pleural effusion.   The IVC is dilated with decreased respiratory variation.   A PFO is noted with color and spectral Doppler.    RADIOLOGY & ADDITIONAL STUDIES:    < from: CT Chest No Cont (12.06.23 @ 15:10) >  Patchy multifocal airspace disease in the right upper lobe, left upper   lobe, and left lower lobe compatible with pneumonia.  Moderate right pleural effusion and small left pleural effusion.  3 cm left upper lobe mass and mediastinal lymphadenopathy; leading   diagnostic consideration is for primary lung malignancy with metastasis.      12/5/23: Xray Chest 1 View AP/PA: Increased perihilar interstitial markings and airspace densities. One should consider congestive changes and some underlying inflammatory infectious process. Right greater than left effusion with compressive atelectatic change. Cardiomegaly. Pacer as before. Findings appear progressive since previous exam regional osseous structures appropriate for age.   CHIEF COMPLAINT: Patient is a 88y old  Female who presents with a chief complaint of sob/hypoxia (05 Dec 2023 11:04)    FROM H&P: 87-year-old female with medical history of CHF, chronic atrial fibrillation on warfarin, s/p AICD, CAD, hypothyroidism, celiac disease presents from home after her HHA called 911 after she was agitated and refusing to take her meds. States she has been weak and sob. Also endorsing the weakness. Was found to be hypoxic to 88 % on RA. PT endorses productive cough and this am had scant blood tinged mucous.   Spoke to Dtjuan Lema on the phone. states her mother suffering from extreme anxiety and was started on xanax 2 weeks ago and since then, her cognition has suffered greatly. She is also having difficulty walking per aides. More forgetful and agitated at time which is not like her per daughter.     She is presently comfortable. 97% on 2L. Alert and oriented x 2  CXR: R>L effusion with e/o pvc, cannot r/o infiltrate  s/p iv lasix in ED  NA : 128    12/6/23: Cardiology consulted for HF. Hypoxia. +sob. C/w IV Lasix, check echo and CT.   12/7/23. Breathing somewhat stable. No CP    PAST MEDICAL/SURGICAL/FAMILY/SOCIAL HISTORY:   AICD (automatic cardioverter/defibrillator) present x 3 . Replaced x 2. Presently right subclavian area  Arthritis   Celiac disease   Chronic atrial fibrillation   Hashimoto's thyroiditis   Hearing loss   History of cataract   History of CHF (congestive heart failure)   History of colon polyps   HTN (hypertension)   Hyperlipidemia, unspecified hyperlipidemia type   Hypothyroid   Iron deficiency anemia due to chronic blood loss   Left inguinal hernia   Mitral valve prolapse   Myocardial infarction 1990  Osteoporosis   Peripheral edema   Varicose veins BLE.     PAST SURGICAL HISTORY:  AICD (automatic cardioverter/defibrillator) present with PPM. Replaced x 2.  Artificial pacemaker   H/O colonoscopy last done 2009  H/O right inguinal hernia repair   History of cataract extraction Bilateral  History of heart surgery Mitral valve surgery for leaky valve 9/2020.     FAMILY HISTORY:  Father  Still living? No  Family history of dementia, Age at diagnosis: Age Unknown    Mother  Still living? No  Family history of diabetes mellitus, Age at diagnosis: Age Unknown  Family history of heart disease, Age at diagnosis: Age Unknown    Sibling  Still living? No  Family history of diabetes mellitus, Age at diagnosis: Age Unknown.    PREVIOUS DIAGNOSTIC TESTING:      ECHO: FINDINGS: 12/5/23: The left ventricle size is at the upper limits of normal, severe regional hypokinesis, and moderately reduced function. Definity was used to better visualize the endocardial border. Estimated left ventricular ejection fraction is 30-35 %. The left atrium is severely dilated. The right atrium appears dilated. A device wire is seen in the RV and RA. The right ventricle is at the upper limits of normal in size. The aortic valve appears mildly calcified. Valve opening seems to be restricted. Transaortic gradients are underestimated due to impaired left ventricle systolic function. RANJAN by continuity equation suggests mild aortic stenosis. Moderate (2+) aortic regurgitation is present. A mitral valve repair is noted. Moderate (2+) mitral regurgitation is present. The mean trans-mitral pressure gradient is 6 mmHg. The tricuspid valve leaflets are thin and pliable; valve motion is normal. Moderate (2+) tricuspid valve regurgitation is present. Moderate pulmonary hypertension. Normal appearing pulmonic valve structure. Mild pulmonic valvular regurgitation (1+) is present. Pleural effusion - massive bilateral pleural effusion. The IVC is dilated with decreased respiratory variation. A PFO is noted with color and spectral Doppler.    MEDICATIONS  (STANDING):  aMIOdarone    Tablet 100 milliGRAM(s) Oral daily  aspirin enteric coated 81 milliGRAM(s) Oral daily  atorvastatin 10 milliGRAM(s) Oral at bedtime  citalopram 10 milliGRAM(s) Oral daily  digoxin     Tablet 62.5 MICROGram(s) Oral daily  furosemide   Injectable 40 milliGRAM(s) IV Push daily  levothyroxine 25 MICROGram(s) Oral daily  levothyroxine 100 MICROGram(s) Oral daily  sacubitril 24 mG/valsartan 26 mG 1 Tablet(s) Oral two times a day    MEDICATIONS  (PRN):  acetaminophen     Tablet .. 650 milliGRAM(s) Oral once PRN Mild Pain (1 - 3)    HOME MEDICATIONS:  ALPRAZolam 0.25 mg oral tablet: 1 tab(s) orally 2 times a day as needed for  anxiety (05 Dec 2023 11:08)  amiodarone 100 mg oral tablet: 1 tab(s) orally once a day (05 Dec 2023 11:04)  aspirin 81 mg oral delayed release tablet: 1 tab(s) orally once a day (05 Dec 2023 11:04)  atorvastatin 10 mg oral tablet: 1 tab(s) orally once a day (at bedtime) (05 Dec 2023 11:04)  Calcium 500+D oral tablet, chewable: 1 tab(s) orally 2 times a day (05 Dec 2023 11:04)  cephalexin 500 mg oral capsule: 1 cap(s) orally every 8 hours x  7 days ***Course Complete*** (05 Dec 2023 11:11)  citalopram 10 mg oral tablet: 1 tab(s) orally once a day (05 Dec 2023 11:11)  digoxin 125 mcg (0.125 mg) oral tablet: 0.5 tab(s) orally once a day (05 Dec 2023 11:04)  Entresto 24 mg-26 mg oral tablet: 1 tab(s) orally 2 times a day (05 Dec 2023 11:04)  furosemide 20 mg oral tablet: 1 tab(s) orally once a day ***pt doesn&#x27;t take consistently*** (05 Dec 2023 11:03)  levothyroxine 100 mcg (0.1 mg) oral tablet: 1 tab(s) orally once a day ***take with 25mcg = 125cg*** (05 Dec 2023 11:11)  levothyroxine 25 mcg (0.025 mg) oral tablet: 1 tab(s) orally once a day ***take with 100mcg = 125mcg*** (05 Dec 2023 11:11)  warfarin 1 mg oral tablet: 1 tab(s) orally every other day ***alternate with 0.5mg*** (05 Dec 2023 11:11)  warfarin 1 mg oral tablet: 0.5 tab(s) orally every other day ***alternate with 1mg*** (05 Dec 2023 11:08)    PHYSICAL EXAM:  T(C): 36.8 (05 Dec 2023 19:03), Max: 37.1 (05 Dec 2023 18:18)  T(F): 98.2 (05 Dec 2023 19:03), Max: 98.7 (05 Dec 2023 18:18)  HR: 78 (05 Dec 2023 22:26) (62 - 81)  BP: 132/58 (05 Dec 2023 22:26) (129/58 - 162/68)  BP(mean): 118 (05 Dec 2023 18:18) (74 - 118)  RR: 18 (05 Dec 2023 19:03) (16 - 18)  SpO2: 91% (05 Dec 2023 19:03) (88% - 100%)    Parameters below as of 05 Dec 2023 19:03  Patient On (Oxygen Delivery Method): room air    Constitutional: NAD, awake and alert  HEENT: Normal Hearing, MMM  Neck: Soft and supple, No LAD, No JVD  Respiratory: Breath sounds diminished b/l  Cardiovascular: S1 and S2, regular rate and rhythm, no Murmurs, gallops or rubs  Gastrointestinal: Bowel Sounds present, soft, nontender, nondistended, no guarding, no rebound  Extremities: No peripheral edema  Vascular: 2+ peripheral pulses  Neurological: A/O x 2/3, forgetful  Musculoskeletal: 5/5 strength b/l upper and lower extremities  Skin: No rashes    =======================================    INTERPRETATION OF TELEMETRY:    ECG: < from: 12 Lead ECG (12.05.23 @ 01:29) >  Diagnosis Line Ventricular-paced rhythm  Biventricular pacemaker detected  Abnormal ECG    ========================================    LABS: All Labs Reviewed:                        10.6   5.87  )-----------( 275      ( 07 Dec 2023 06:53 )             32.0     12-07    134<L>  |  95<L>  |  19  ----------------------------<  76  3.5   |  34<H>  |  0.76    Ca    8.7      07 Dec 2023 06:53  Mg     2.1     12-07    TPro  6.8  /  Alb  2.5<L>  /  TBili  1.0  /  DBili  x   /  AST  26  /  ALT  17  /  AlkPhos  60  12-07    PT/INR - ( 06 Dec 2023 07:24 )   PT: 24.4 sec;   INR: 2.21 ratio      Troponin: 30.60 ng/L    BNP 15199     CARDIAC TESTING:    < from: TTE Echo Complete w/ Contrast w/ Doppler (12.05.23 @ 14:29) >   The left ventricle size is at the upper limits of normal, severe regional   hypokinesis, and moderately reduced function.   Definity was used to better visualize the endocardial border.   Estimated left ventricular ejection fraction is 30-35 %.   The left atrium is severely dilated.   The right atrium appears dilated.   A device wire is seen in the RV and RA.   The right ventricle is at the upper limits of normal in size.   The aortic valve appears mildly calcified. Valve opening seems to be   restricted.   Transaortic gradients are underestimated due to impaired left ventricle   systolic function.   RANJAN by continuity equation suggests mild aortic stenosis.   Moderate (2+) aortic regurgitation is present.   A mitral valve repair is noted.   Moderate (2+) mitral regurgitation is present.   The mean trans-mitral pressure gradient is 6 mmHg.   The tricuspid valve leaflets are thin and pliable; valve motion is   normal.   Moderate (2+) tricuspid valve regurgitation is present.   Moderatepulmonary hypertension.   Normal appearing pulmonic valve structure.   Mild pulmonic valvular regurgitation (1+) is present.   Pleural effusion - massive bilateral pleural effusion.   The IVC is dilated with decreased respiratory variation.   A PFO is noted with color and spectral Doppler.    RADIOLOGY & ADDITIONAL STUDIES:    < from: CT Chest No Cont (12.06.23 @ 15:10) >  Patchy multifocal airspace disease in the right upper lobe, left upper   lobe, and left lower lobe compatible with pneumonia.  Moderate right pleural effusion and small left pleural effusion.  3 cm left upper lobe mass and mediastinal lymphadenopathy; leading   diagnostic consideration is for primary lung malignancy with metastasis.      12/5/23: Xray Chest 1 View AP/PA: Increased perihilar interstitial markings and airspace densities. One should consider congestive changes and some underlying inflammatory infectious process. Right greater than left effusion with compressive atelectatic change. Cardiomegaly. Pacer as before. Findings appear progressive since previous exam regional osseous structures appropriate for age.

## 2023-12-07 NOTE — PROGRESS NOTE ADULT - ASSESSMENT
#Acute decompensated HFrEF/Aortic stenosis  #B/L pleural effusion R>L - continue to monitor  #Metabolic encephalopathy  #Multifocal pneumonia   Continue IV diuresis   Cardiology consulted recommendations appreciated   Contineu IV antibiotics Rocephin and azithromycin  ID consulted recommendations appreciated   - Strict I/Os  - Daily weights    Echocardiogram   Estimated left ventricular ejection fraction is 30-35 %.  The IVC is dilated with decreased respiratory variation.  A PFO is noted with color and spectral Doppler.    Lung Mass  3cm left upper lobe  Pulm and Heme Onc consulted recommendations appreciated       Massive Bilateral pleural effusion on echo, moderate R on CT  CTS consult for evaluation     #Hyponatremia  Continue to monitor    #chronic afib  Amiodarone held  Cardiology on board  EP consulted recommendations appreciated   - Admit to tele or other monitored bed  Continue Digoxin and entresto Statin    Supratherapeutic inr   Continue to monitor - Hold coumadin, daily INR  - No overt heavy bleeding, monitor h/h      Depression  Continue Citalopram    Hypothyroid,   continue home levothyroxine    Code Status Full Code

## 2023-12-08 ENCOUNTER — RESULT REVIEW (OUTPATIENT)
Age: 88
End: 2023-12-08

## 2023-12-08 DIAGNOSIS — R91.8 OTHER NONSPECIFIC ABNORMAL FINDING OF LUNG FIELD: ICD-10-CM

## 2023-12-08 LAB
ALBUMIN SERPL ELPH-MCNC: 2.5 G/DL — LOW (ref 3.3–5)
ALBUMIN SERPL ELPH-MCNC: 2.5 G/DL — LOW (ref 3.3–5)
ALP SERPL-CCNC: 59 U/L — SIGNIFICANT CHANGE UP (ref 40–120)
ALP SERPL-CCNC: 59 U/L — SIGNIFICANT CHANGE UP (ref 40–120)
ALT FLD-CCNC: 18 U/L — SIGNIFICANT CHANGE UP (ref 12–78)
ALT FLD-CCNC: 18 U/L — SIGNIFICANT CHANGE UP (ref 12–78)
ANION GAP SERPL CALC-SCNC: 5 MMOL/L — SIGNIFICANT CHANGE UP (ref 5–17)
ANION GAP SERPL CALC-SCNC: 5 MMOL/L — SIGNIFICANT CHANGE UP (ref 5–17)
APTT BLD: 36.4 SEC — HIGH (ref 24.5–35.6)
APTT BLD: 36.4 SEC — HIGH (ref 24.5–35.6)
AST SERPL-CCNC: 26 U/L — SIGNIFICANT CHANGE UP (ref 15–37)
AST SERPL-CCNC: 26 U/L — SIGNIFICANT CHANGE UP (ref 15–37)
BILIRUB SERPL-MCNC: 0.5 MG/DL — SIGNIFICANT CHANGE UP (ref 0.2–1.2)
BILIRUB SERPL-MCNC: 0.5 MG/DL — SIGNIFICANT CHANGE UP (ref 0.2–1.2)
BUN SERPL-MCNC: 22 MG/DL — SIGNIFICANT CHANGE UP (ref 7–23)
BUN SERPL-MCNC: 22 MG/DL — SIGNIFICANT CHANGE UP (ref 7–23)
CALCIUM SERPL-MCNC: 8.7 MG/DL — SIGNIFICANT CHANGE UP (ref 8.5–10.1)
CALCIUM SERPL-MCNC: 8.7 MG/DL — SIGNIFICANT CHANGE UP (ref 8.5–10.1)
CHLORIDE SERPL-SCNC: 92 MMOL/L — LOW (ref 96–108)
CHLORIDE SERPL-SCNC: 92 MMOL/L — LOW (ref 96–108)
CO2 SERPL-SCNC: 35 MMOL/L — HIGH (ref 22–31)
CO2 SERPL-SCNC: 35 MMOL/L — HIGH (ref 22–31)
CREAT SERPL-MCNC: 0.8 MG/DL — SIGNIFICANT CHANGE UP (ref 0.5–1.3)
CREAT SERPL-MCNC: 0.8 MG/DL — SIGNIFICANT CHANGE UP (ref 0.5–1.3)
EGFR: 71 ML/MIN/1.73M2 — SIGNIFICANT CHANGE UP
EGFR: 71 ML/MIN/1.73M2 — SIGNIFICANT CHANGE UP
GLUCOSE SERPL-MCNC: 158 MG/DL — HIGH (ref 70–99)
GLUCOSE SERPL-MCNC: 158 MG/DL — HIGH (ref 70–99)
HCT VFR BLD CALC: 32.9 % — LOW (ref 34.5–45)
HCT VFR BLD CALC: 32.9 % — LOW (ref 34.5–45)
HGB BLD-MCNC: 10.8 G/DL — LOW (ref 11.5–15.5)
HGB BLD-MCNC: 10.8 G/DL — LOW (ref 11.5–15.5)
INR BLD: 1.51 RATIO — HIGH (ref 0.85–1.18)
INR BLD: 1.51 RATIO — HIGH (ref 0.85–1.18)
MAGNESIUM SERPL-MCNC: 2.1 MG/DL — SIGNIFICANT CHANGE UP (ref 1.6–2.6)
MAGNESIUM SERPL-MCNC: 2.1 MG/DL — SIGNIFICANT CHANGE UP (ref 1.6–2.6)
MCHC RBC-ENTMCNC: 32.8 GM/DL — SIGNIFICANT CHANGE UP (ref 32–36)
MCHC RBC-ENTMCNC: 32.8 GM/DL — SIGNIFICANT CHANGE UP (ref 32–36)
MCHC RBC-ENTMCNC: 33.2 PG — SIGNIFICANT CHANGE UP (ref 27–34)
MCHC RBC-ENTMCNC: 33.2 PG — SIGNIFICANT CHANGE UP (ref 27–34)
MCV RBC AUTO: 101.2 FL — HIGH (ref 80–100)
MCV RBC AUTO: 101.2 FL — HIGH (ref 80–100)
PLATELET # BLD AUTO: 293 K/UL — SIGNIFICANT CHANGE UP (ref 150–400)
PLATELET # BLD AUTO: 293 K/UL — SIGNIFICANT CHANGE UP (ref 150–400)
POTASSIUM SERPL-MCNC: 3.5 MMOL/L — SIGNIFICANT CHANGE UP (ref 3.5–5.3)
POTASSIUM SERPL-MCNC: 3.5 MMOL/L — SIGNIFICANT CHANGE UP (ref 3.5–5.3)
POTASSIUM SERPL-SCNC: 3.5 MMOL/L — SIGNIFICANT CHANGE UP (ref 3.5–5.3)
POTASSIUM SERPL-SCNC: 3.5 MMOL/L — SIGNIFICANT CHANGE UP (ref 3.5–5.3)
PROT SERPL-MCNC: 7.1 GM/DL — SIGNIFICANT CHANGE UP (ref 6–8.3)
PROT SERPL-MCNC: 7.1 GM/DL — SIGNIFICANT CHANGE UP (ref 6–8.3)
PROTHROM AB SERPL-ACNC: 16.9 SEC — HIGH (ref 9.5–13)
PROTHROM AB SERPL-ACNC: 16.9 SEC — HIGH (ref 9.5–13)
RBC # BLD: 3.25 M/UL — LOW (ref 3.8–5.2)
RBC # BLD: 3.25 M/UL — LOW (ref 3.8–5.2)
RBC # FLD: 14.6 % — HIGH (ref 10.3–14.5)
RBC # FLD: 14.6 % — HIGH (ref 10.3–14.5)
SODIUM SERPL-SCNC: 132 MMOL/L — LOW (ref 135–145)
SODIUM SERPL-SCNC: 132 MMOL/L — LOW (ref 135–145)
WBC # BLD: 5.92 K/UL — SIGNIFICANT CHANGE UP (ref 3.8–10.5)
WBC # BLD: 5.92 K/UL — SIGNIFICANT CHANGE UP (ref 3.8–10.5)
WBC # FLD AUTO: 5.92 K/UL — SIGNIFICANT CHANGE UP (ref 3.8–10.5)
WBC # FLD AUTO: 5.92 K/UL — SIGNIFICANT CHANGE UP (ref 3.8–10.5)

## 2023-12-08 PROCEDURE — 99221 1ST HOSP IP/OBS SF/LOW 40: CPT | Mod: 25

## 2023-12-08 PROCEDURE — 99223 1ST HOSP IP/OBS HIGH 75: CPT

## 2023-12-08 PROCEDURE — 88108 CYTOPATH CONCENTRATE TECH: CPT | Mod: 26

## 2023-12-08 PROCEDURE — 71045 X-RAY EXAM CHEST 1 VIEW: CPT | Mod: 26,76

## 2023-12-08 PROCEDURE — 32555 ASPIRATE PLEURA W/ IMAGING: CPT | Mod: 50

## 2023-12-08 PROCEDURE — 99232 SBSQ HOSP IP/OBS MODERATE 35: CPT

## 2023-12-08 PROCEDURE — 88305 TISSUE EXAM BY PATHOLOGIST: CPT | Mod: 26

## 2023-12-08 RX ORDER — ZINC SULFATE TAB 220 MG (50 MG ZINC EQUIVALENT) 220 (50 ZN) MG
220 TAB ORAL DAILY
Refills: 0 | Status: DISCONTINUED | OUTPATIENT
Start: 2023-12-08 | End: 2023-12-13

## 2023-12-08 RX ORDER — WARFARIN SODIUM 2.5 MG/1
1 TABLET ORAL ONCE
Refills: 0 | Status: COMPLETED | OUTPATIENT
Start: 2023-12-08 | End: 2023-12-08

## 2023-12-08 RX ORDER — MIRTAZAPINE 45 MG/1
7.5 TABLET, ORALLY DISINTEGRATING ORAL AT BEDTIME
Refills: 0 | Status: DISCONTINUED | OUTPATIENT
Start: 2023-12-08 | End: 2023-12-13

## 2023-12-08 RX ORDER — SODIUM CHLORIDE 9 MG/ML
250 INJECTION, SOLUTION INTRAVENOUS ONCE
Refills: 0 | Status: COMPLETED | OUTPATIENT
Start: 2023-12-08 | End: 2023-12-08

## 2023-12-08 RX ADMIN — CEFTRIAXONE 1000 MILLIGRAM(S): 500 INJECTION, POWDER, FOR SOLUTION INTRAMUSCULAR; INTRAVENOUS at 10:49

## 2023-12-08 RX ADMIN — Medication 40 MILLIGRAM(S): at 04:59

## 2023-12-08 RX ADMIN — MIRTAZAPINE 7.5 MILLIGRAM(S): 45 TABLET, ORALLY DISINTEGRATING ORAL at 19:58

## 2023-12-08 RX ADMIN — SODIUM CHLORIDE 250 MILLILITER(S): 9 INJECTION, SOLUTION INTRAVENOUS at 14:06

## 2023-12-08 RX ADMIN — SACUBITRIL AND VALSARTAN 1 TABLET(S): 24; 26 TABLET, FILM COATED ORAL at 10:50

## 2023-12-08 RX ADMIN — Medication 62.5 MICROGRAM(S): at 10:50

## 2023-12-08 RX ADMIN — Medication 81 MILLIGRAM(S): at 10:50

## 2023-12-08 RX ADMIN — CITALOPRAM 10 MILLIGRAM(S): 10 TABLET, FILM COATED ORAL at 10:50

## 2023-12-08 RX ADMIN — Medication 25 MICROGRAM(S): at 04:59

## 2023-12-08 RX ADMIN — Medication 100 MICROGRAM(S): at 04:59

## 2023-12-08 RX ADMIN — WARFARIN SODIUM 1 MILLIGRAM(S): 2.5 TABLET ORAL at 19:58

## 2023-12-08 RX ADMIN — ATORVASTATIN CALCIUM 10 MILLIGRAM(S): 80 TABLET, FILM COATED ORAL at 19:58

## 2023-12-08 RX ADMIN — SACUBITRIL AND VALSARTAN 1 TABLET(S): 24; 26 TABLET, FILM COATED ORAL at 19:58

## 2023-12-08 RX ADMIN — AZITHROMYCIN 255 MILLIGRAM(S): 500 TABLET, FILM COATED ORAL at 10:49

## 2023-12-08 NOTE — PROCEDURE NOTE - NSPROCNAME_GEN_A_CORE
Thoracentesis
CRT-D (Cardiac Resynchronization Therapy with Defibrillation Capabilities) Interrogation Note

## 2023-12-08 NOTE — PROGRESS NOTE ADULT - SUBJECTIVE AND OBJECTIVE BOX
Date of service: 12-08-23 @ 11:55    OOB to chair  More alert  Mild SOB at rest  Has dr torres    ROS: no fever or chills; denies dizziness, no HA, no abdominal pain, no diarrhea or constipation; no dysuria, no legs pain, no rashes    MEDICATIONS  (STANDING):  aspirin enteric coated 81 milliGRAM(s) Oral daily  atorvastatin 10 milliGRAM(s) Oral at bedtime  azithromycin  IVPB 500 milliGRAM(s) IV Intermittent every 24 hours  cefTRIAXone Injectable. 1000 milliGRAM(s) IV Push every 24 hours  citalopram 10 milliGRAM(s) Oral daily  digoxin     Tablet 62.5 MICROGram(s) Oral daily  furosemide   Injectable 40 milliGRAM(s) IV Push two times a day  levothyroxine 100 MICROGram(s) Oral daily  levothyroxine 25 MICROGram(s) Oral daily  sacubitril 24 mG/valsartan 26 mG 1 Tablet(s) Oral two times a day    Vital Signs Last 24 Hrs  T(C): 36.8 (07 Dec 2023 20:52), Max: 36.8 (07 Dec 2023 20:52)  T(F): 98.3 (07 Dec 2023 20:52), Max: 98.3 (07 Dec 2023 20:52)  HR: 81 (08 Dec 2023 04:45) (75 - 81)  BP: 123/45 (08 Dec 2023 04:45) (105/45 - 123/45)  BP(mean): 63 (07 Dec 2023 13:05) (63 - 63)  RR: 18 (07 Dec 2023 13:05) (18 - 18)  SpO2: 100% (07 Dec 2023 20:52) (100% - 100%)    Parameters below as of 07 Dec 2023 20:52  Patient On (Oxygen Delivery Method): nasal cannula  O2 Flow (L/min): 3     Physical exam:    Constitutional:  No acute distress  HEENT: NC/AT, EOMI, PERRLA, conjunctivae clear; ears and nose atraumatic  Neck: supple; thyroid not palpable  Back: no tenderness  Respiratory: respiratory effort normal; crackles at bases  Cardiovascular: S1S2 regular, no murmurs  Abdomen: soft, not tender, not distended, positive BS  Genitourinary: no suprapubic tenderness  Lymphatic: no LN palpable  Musculoskeletal: no muscle tenderness, no joint swelling or tenderness  Extremities: no pedal edema  Neurological/ Psychiatric: AxOx3, moving all extremities  Skin: no rashes; no palpable lesions    Labs: reviewed                        10.8   5.92  )-----------( 293      ( 08 Dec 2023 09:49 )             32.9     12-08    132<L>  |  92<L>  |  22  ----------------------------<  158<H>  3.5   |  35<H>  |  0.80    Ca    8.7      08 Dec 2023 09:49  Mg     2.1     12-08    TPro  7.1  /  Alb  2.5<L>  /  TBili  0.5  /  DBili  x   /  AST  26  /  ALT  18  /  AlkPhos  59  12-08                        10.6   5.87  )-----------( 275      ( 07 Dec 2023 06:53 )             32.0     12-07    134<L>  |  95<L>  |  19  ----------------------------<  76  3.5   |  34<H>  |  0.76    Ca    8.7      07 Dec 2023 06:53  Mg     2.1     12-07    TPro  6.8  /  Alb  2.5<L>  /  TBili  1.0  /  DBili  x   /  AST  26  /  ALT  17  /  AlkPhos  60  12-07     LIVER FUNCTIONS - ( 07 Dec 2023 06:53 )  Alb: 2.5 g/dL / Pro: 6.8 gm/dL / ALK PHOS: 60 U/L / ALT: 17 U/L / AST: 26 U/L / GGT: x           (12-05 @ 14:39)  [facility]    Radiology: all available radiological tests reviewed    < from: CT Chest No Cont (12.06.23 @ 15:10) >  Patchy multifocal airspace disease in the right upper lobe, left upper   lobe, and left lower lobe compatible with pneumonia.  Moderate right pleural effusion and small left pleural effusion.  3 cm left upper lobe mass and mediastinal lymphadenopathy; leading   diagnostic consideration is for primary lung malignancy with metastasis.  < end of copied text >      Advanced directives addressed: full resuscitation Date of service: 12-08-23 @ 11:55    OOB to chair  More alert  Mild SOB at rest  Has dr torres    ROS: no fever or chills; denies dizziness, no HA, no abdominal pain, no diarrhea or constipation; no dysuria, no legs pain, no rashes    MEDICATIONS  (STANDING):  aspirin enteric coated 81 milliGRAM(s) Oral daily  atorvastatin 10 milliGRAM(s) Oral at bedtime  azithromycin  IVPB 500 milliGRAM(s) IV Intermittent every 24 hours  cefTRIAXone Injectable. 1000 milliGRAM(s) IV Push every 24 hours  citalopram 10 milliGRAM(s) Oral daily  digoxin     Tablet 62.5 MICROGram(s) Oral daily  furosemide   Injectable 40 milliGRAM(s) IV Push two times a day  levothyroxine 100 MICROGram(s) Oral daily  levothyroxine 25 MICROGram(s) Oral daily  sacubitril 24 mG/valsartan 26 mG 1 Tablet(s) Oral two times a day    Vital Signs Last 24 Hrs  T(C): 36.8 (07 Dec 2023 20:52), Max: 36.8 (07 Dec 2023 20:52)  T(F): 98.3 (07 Dec 2023 20:52), Max: 98.3 (07 Dec 2023 20:52)  HR: 81 (08 Dec 2023 04:45) (75 - 81)  BP: 123/45 (08 Dec 2023 04:45) (105/45 - 123/45)  BP(mean): 63 (07 Dec 2023 13:05) (63 - 63)  RR: 18 (07 Dec 2023 13:05) (18 - 18)  SpO2: 100% (07 Dec 2023 20:52) (100% - 100%)    Parameters below as of 07 Dec 2023 20:52  Patient On (Oxygen Delivery Method): nasal cannula  O2 Flow (L/min): 3     Physical exam:    Constitutional:  No acute distress  HEENT: NC/AT, EOMI, PERRLA, conjunctivae clear; ears and nose atraumatic  Neck: supple; thyroid not palpable  Back: no tenderness  Respiratory: respiratory effort normal; crackles at bases  Cardiovascular: S1S2 regular, no murmurs  Abdomen: soft, not tender, not distended, positive BS  Genitourinary: no suprapubic tenderness  Lymphatic: no LN palpable  Musculoskeletal: no muscle tenderness, no joint swelling or tenderness  Extremities: no pedal edema  Neurological/ Psychiatric: AxOx3, moving all extremities  Skin: no rashes; no palpable lesions    Labs: reviewed                        10.8   5.92  )-----------( 293      ( 08 Dec 2023 09:49 )             32.9     12-08    132<L>  |  92<L>  |  22  ----------------------------<  158<H>  3.5   |  35<H>  |  0.80    Ca    8.7      08 Dec 2023 09:49  Mg     2.1     12-08    TPro  7.1  /  Alb  2.5<L>  /  TBili  0.5  /  DBili  x   /  AST  26  /  ALT  18  /  AlkPhos  59  12-08                        10.6   5.87  )-----------( 275      ( 07 Dec 2023 06:53 )             32.0     12-07    134<L>  |  95<L>  |  19  ----------------------------<  76  3.5   |  34<H>  |  0.76    Ca    8.7      07 Dec 2023 06:53  Mg     2.1     12-07    TPro  6.8  /  Alb  2.5<L>  /  TBili  1.0  /  DBili  x   /  AST  26  /  ALT  17  /  AlkPhos  60  12-07     LIVER FUNCTIONS - ( 07 Dec 2023 06:53 )  Alb: 2.5 g/dL / Pro: 6.8 gm/dL / ALK PHOS: 60 U/L / ALT: 17 U/L / AST: 26 U/L / GGT: x           (12-05 @ 14:39)  [facility]    Radiology: all available radiological tests reviewed    < from: CT Chest No Cont (12.06.23 @ 15:10) >  Patchy multifocal airspace disease in the right upper lobe, left upper   lobe, and left lower lobe compatible with pneumonia.  Moderate right pleural effusion and small left pleural effusion.  3 cm left upper lobe mass and mediastinal lymphadenopathy; leading   diagnostic consideration is for primary lung malignancy with metastasis.  < end of copied text >      Advanced directives addressed: full resuscitation

## 2023-12-08 NOTE — PROGRESS NOTE ADULT - ASSESSMENT
#Acute decompensated HFrEF/Aortic stenosis  #B/L pleural effusion R>L - continue to monitor  #Metabolic encephalopathy  #Multifocal pneumonia   Continue IV diuresis - hold for hypotension   Cardiology consulted recommendations appreciated   Continue IV antibiotics Rocephin and azithromycin - Ancef allergy, tolerated on past admission   ID consulted recommendations appreciated   - Strict I/Os  - Daily weights    Echocardiogram   Estimated left ventricular ejection fraction is 30-35 %.  The IVC is dilated with decreased respiratory variation.  A PFO is noted with color and spectral Doppler.    Lung Mass  3cm left upper lobe  Pulm and Heme Onc consulted recommendations appreciated   Family open to palliative consult        Massive Bilateral pleural effusion on echo, moderate R on CT  CTS consult for evaluation   -plan for possible pigtail cath     #Hyponatremia  Continue to monitor  Currently 132     #chronic afib  Amiodarone discontinued   Cardiology on board  EP consulted recommendations appreciated   - Admit to tele or other monitored bed  Continue Digoxin and entresto Statin    Supratherapeutic inr   Continue to monitor - Hold coumadin, daily INR  - No overt heavy bleeding, monitor h/h        Depression  Continue Citalopram    Hypothyroid,   continue home levothyroxine    Code Status Full Code

## 2023-12-08 NOTE — PROGRESS NOTE ADULT - ASSESSMENT
88 y/o female with h/o CHF, chronic atrial fibrillation on warfarin, s/p AICD, CAD, hypothyroidism, celiac disease, anxiety disorder was admitted on 12/6 for increased confusion and restlessness. She became agitated and refusing to take her meds. States she has been weak, cough with scant blood tinged sputum and SOB. In ER she was noted hypoxic to 88 % on RA. Dtr Pita stated her mother suffering from extreme anxiety and was started on xanax 2 weeks ago and since then, her cognition has suffered greatly. More forgetful and agitated at time which is not like her per daughter. In ER she received ceftriaxone.     1. Multifocal pneumonia. CHF exacerbation. Metabolic encephalopathy. Allergy to Ancef.   -mo alert  -respiratory improving  -BC x 2  -reported that she tolerated ceftriaxone in the past  -on ceftriaxone 1 gm IV qd and azithromycin 500 mg IV qd # 2  -tolerating abx well so far; no side effects noted  -monitor closely in armida of Ancef allergy history  -respiratory care  -continue abx coverage   -monitor temps  -f/u CBC  -supportive care  2. Other issues:   -care per medicine       86 y/o female with h/o CHF, chronic atrial fibrillation on warfarin, s/p AICD, CAD, hypothyroidism, celiac disease, anxiety disorder was admitted on 12/6 for increased confusion and restlessness. She became agitated and refusing to take her meds. States she has been weak, cough with scant blood tinged sputum and SOB. In ER she was noted hypoxic to 88 % on RA. Dtr Pita stated her mother suffering from extreme anxiety and was started on xanax 2 weeks ago and since then, her cognition has suffered greatly. More forgetful and agitated at time which is not like her per daughter. In ER she received ceftriaxone.     1. Multifocal pneumonia. CHF exacerbation. Metabolic encephalopathy. Allergy to Ancef.   -mo alert  -respiratory improving  -BC x 2  -reported that she tolerated ceftriaxone in the past  -on ceftriaxone 1 gm IV qd and azithromycin 500 mg IV qd # 2  -tolerating abx well so far; no side effects noted  -monitor closely in armida of Ancef allergy history  -respiratory care  -continue abx coverage   -monitor temps  -f/u CBC  -supportive care  2. Other issues:   -care per medicine

## 2023-12-08 NOTE — CONSULT NOTE ADULT - SUBJECTIVE AND OBJECTIVE BOX
Surgeon: Dr Chand    Consult requesting by:     HISTORY OF PRESENT ILLNESS:  87-year-old female with PMHx/o chronic systolic CHF, s/p mitral valve surgery, HTN, HLD, chronic atrial fibrillation on warfarin, s/p AICD, CAD s/p MI, hypothyroidism, celiac disease presented from home after her HHA called 911. Patient was agitated, refusing to take her meds and c/o sob/productive cough.  Patient was found to be hypoxic to 88 % on RA.   Patient had been more agitated and more forgetful per family.    CXR on admission showed Increased perihilar interstitial markings and airspace densities. Right greater than left effusion with compressive atelectatic change. Cardiomegaly. CT Chest on 12/6 with Patchy multifocal airspace disease in the RUL, DORIS and LLL left upper compatible with pneumonia; Moderate right pleural effusion and small left pleural effusion; 3 cm left upper lobe mass and mediastinal lymphadenopathy; leading diagnostic consideration is for primary lung malignancy with metastasis.  Patient admitted with acute on chronic systolic CHF (BNP 52682), diuresed, and started on IV antibiotics for pneumonia.  Thoracic surgery consulted for pleural effusions.      PAST MEDICAL & SURGICAL HISTORY:  Chronic atrial fibrillation    Hypothyroid    HTN (hypertension)    History of cataract    Hyperlipidemia, unspecified hyperlipidemia type    History of Chronic systolic CHF (congestive heart failure)    Peripheral edema    History of colon polyps    Arthritis    Celiac disease    Hashimoto's thyroiditis    Osteoporosis    Iron deficiency anemia due to chronic blood loss    AICD (automatic cardioverter/defibrillator) present  x 3 . Replaced x 2. Presently right subclavian area    Myocardial infarction  1990    Mitral valve prolapse    Left inguinal hernia    Hearing loss    H/O right inguinal hernia repair    History of cataract extraction  Bilateral    History of heart surgery  Mitral valve surgery for leaky valve 9/2020    MEDICATIONS  (STANDING):  aspirin enteric coated 81 milliGRAM(s) Oral daily  atorvastatin 10 milliGRAM(s) Oral at bedtime  azithromycin  IVPB 500 milliGRAM(s) IV Intermittent every 24 hours  cefTRIAXone Injectable. 1000 milliGRAM(s) IV Push every 24 hours  citalopram 10 milliGRAM(s) Oral daily  digoxin     Tablet 62.5 MICROGram(s) Oral daily  furosemide   Injectable 40 milliGRAM(s) IV Push two times a day  levothyroxine 25 MICROGram(s) Oral daily  levothyroxine 100 MICROGram(s) Oral daily  sacubitril 24 mG/valsartan 26 mG 1 Tablet(s) Oral two times a day    MEDICATIONS  (PRN):  acetaminophen     Tablet .. 650 milliGRAM(s) Oral once PRN Mild Pain (1 - 3)    Antiplatelet therapy:                           Last dose/amt:    Allergies    Ancef (Unknown)  lidocaine (Rash; Angioedema)  adhesives (Rash)    Intolerances    Gluten (Diarrhea)      SOCIAL HISTORY:  Smoker: [ ] Yes  [x] No    ETOH use: [ ] Yes  [x] No       Ilicit Drug use:  [ ] Yes  [x ] No      FAMILY HISTORY:  Family history of diabetes mellitus (Mother, Sibling)    Family history of heart disease (Mother)    Family history of dementia (Father)      Review of Systems  CONSTITUTIONAL:  Fevers[ ] chills[ ] sweats[ ] fatigue[ ] weight loss[ ] weight gain [ ]                                     NEGATIVE [X ]   NEURO:  parathesias[ ] seizures [ ]  syncope [ ]  confusion [ ]                                                                                NEGATIVE[X ]   EYES: glasses[ ]  blurry vision[ ]  discharge[ ] pain[ ] glaucoma [ ]                                                                          NEGATIVE[X ]   ENMT:  difficulty hearing [ ]  vertigo[ ]  dysphagia[ ] epistaxis[ ] recent dental work [ ]                                    NEGATIVE[X ]   CV:  chest pain[ ] palpitations[ ] DUMONT [ ] diaphoresis [ ]                                                                                           NEGATIVE[X ]   RESPIRATORY:  wheezing[ ] SOB[X ] cough [X ] sputum[X ] hemoptysis[ ]                                                                  NEGATIVE[ ]   GI:  nausea[ ]  vommiting [ ]  diarrhea[ ] constipation [ ] melena [ ]                                                                      NEGATIVE[X ]   : hematuria[ ]  dysuria[ ] urgency[ ] incontinence[ ]                                                                                            NEGATIVE[X ]   MUSKULOSKELETAL:  arthritis[ ]  joint swelling [ ] muscle weakness [ ]                                                                NEGATIVE[X ]   SKIN/BREAST:  rash[ ] itching [ ]  hair loss[ ] masses[ ]                                                                                              NEGATIVE[X ]   PSYCH:  dementia [ ] depresion [ ] anxiety[ ]                                                                                                               NEGATIVE[X ]   HEME/LYMPH:  bruises easily[ ] enlarged lymph nodes[ ] tender lymph nodes[ ]                                               NEGATIVE[X ]   ENDOCRINE:  cold intolerance[ ] heat intolerance[ ] polydipsia[ ]                                                                          NEGATIVE[X ]     PHYSICAL EXAM  Vital Signs Last 24 Hrs  T(C): 36.8 (07 Dec 2023 20:52), Max: 36.8 (07 Dec 2023 20:52)  T(F): 98.3 (07 Dec 2023 20:52), Max: 98.3 (07 Dec 2023 20:52)  HR: 81 (08 Dec 2023 04:45) (75 - 81)  BP: 123/45 (08 Dec 2023 04:45) (105/45 - 123/45)  BP(mean): 63 (07 Dec 2023 13:05) (63 - 63)  RR: 18 (07 Dec 2023 13:05) (18 - 18)  SpO2: 100% (07 Dec 2023 20:52) (100% - 100%)    Parameters below as of 07 Dec 2023 20:52  Patient On (Oxygen Delivery Method): nasal cannula  O2 Flow (L/min): 3                                                                         Neuro: A,O x 2 (person, place)                   Eyes: WNL[x ]   Normal exam of conjunctiva & lids, pupils equally reactive. Other     ENT: heard of hearing  Neck: Normal exam of jugular veins, trachea & thyroid. Other  Chest: Diminished BS bilateral bases Right > left                                                                              CV: S1S2                                                                                     Abd: +BS, soft, NT, ND                                                                                                         Extremities: WNL[ ] Normal no evidence of cyanosis or deformity Edema: none[ ]trace[ ]1+[ ]2+[ ]3+[ ]4+[ ]                                                              LABS:                        10.6   5.87  )-----------( 275      ( 07 Dec 2023 06:53 )             32.0     12-07    134<L>  |  95<L>  |  19  ----------------------------<  76  3.5   |  34<H>  |  0.76    Ca    8.7      07 Dec 2023 06:53  Mg     2.1     12-07    TPro  6.8  /  Alb  2.5<L>  /  TBili  1.0  /  DBili  x   /  AST  26  /  ALT  17  /  AlkPhos  60  12-07    PT/INR - ( 07 Dec 2023 11:24 )   PT: 17.7 sec;   INR: 1.59 ratio           Urinalysis Basic - ( 07 Dec 2023 06:53 )    Color: x / Appearance: x / SG: x / pH: x  Gluc: 76 mg/dL / Ketone: x  / Bili: x / Urobili: x   Blood: x / Protein: x / Nitrite: x   Leuk Esterase: x / RBC: x / WBC x   Sq Epi: x / Non Sq Epi: x / Bacteria: x      TTE 12/5/23:  The left ventricle size is at the upper limits of normal, severe regional   hypokinesis, and moderately reduced function.   Definity was used to better visualize the endocardial border.   Estimated left ventricular ejection fraction is 30-35 %.   The left atrium is severely dilated.   The right atrium appears dilated.   A device wire is seen in the RV and RA.   The right ventricle is at the upper limits of normal in size.   The aortic valve appears mildly calcified. Valve opening seems to be   restricted.   Transaortic gradients are underestimated due to impaired left ventricle   systolic function.   RANJAN by continuity equation suggests mild aortic stenosis.   Moderate (2+) aortic regurgitation is present.   A mitral valve repair is noted.   Moderate (2+) mitral regurgitation is present.   The mean trans-mitral pressure gradient is 6 mmHg.   The tricuspid valve leaflets are thin and pliable; valve motion is   normal.   Moderate (2+) tricuspid valve regurgitation is present.   Moderate pulmonary hypertension.   Normal appearing pulmonic valve structure.   Mild pulmonic valvular regurgitation (1+) is present.   Pleural effusion - massive bilateral pleural effusion.   The IVC is dilated with decreased respiratory variation.   A PFO is noted with color and spectral Doppler.      CT Chest 12/6/23:   AIRWAYS, LUNGS, PLEURA: Lobulated anterior left upper lobe mass with   suspected mediastinal invasion measuring 3 x 2.6 cm (image 45, series 2).  Patchy consolidation and ground-glass opacification multifocally in the   right upper lobe, left upper lobe, left lower lobe superior segment.  Moderate size right pleural effusion with associated right middle lobe   and right lower lobe multisegmental atelectasis. Small left pleural   effusion and associated subsegmental left basilar atelectasis.  Central airways appear clear.  MEDIASTINUM: Cardiomegaly. No pericardial effusion. Normal caliber   thoracic aorta.  Right paratracheal node measures 3.1 x 2.7 cm (image 38, series 2) and AP   window node measures 2.1 x 1.4 cm (image 39, series 2).  IMAGED ABDOMEN: Unremarkable.  SOFT TISSUES: Unremarkable.  BONES: Unremarkable.     Surgeon: Dr Chand    Consult requesting by:     HISTORY OF PRESENT ILLNESS:  87-year-old female with PMHx/o chronic systolic CHF, s/p mitral valve surgery, HTN, HLD, chronic atrial fibrillation on warfarin, s/p AICD, CAD s/p MI, hypothyroidism, celiac disease presented from home after her HHA called 911. Patient was agitated, refusing to take her meds and c/o sob/productive cough.  Patient was found to be hypoxic to 88 % on RA.   Patient had been more agitated and more forgetful per family.    CXR on admission showed Increased perihilar interstitial markings and airspace densities. Right greater than left effusion with compressive atelectatic change. Cardiomegaly. CT Chest on 12/6 with Patchy multifocal airspace disease in the RUL, DORIS and LLL left upper compatible with pneumonia; Moderate right pleural effusion and small left pleural effusion; 3 cm left upper lobe mass and mediastinal lymphadenopathy; leading diagnostic consideration is for primary lung malignancy with metastasis.  Patient admitted with acute on chronic systolic CHF (BNP 64129), diuresed, and started on IV antibiotics for pneumonia.  Thoracic surgery consulted for pleural effusions.      PAST MEDICAL & SURGICAL HISTORY:  Chronic atrial fibrillation    Hypothyroid    HTN (hypertension)    History of cataract    Hyperlipidemia, unspecified hyperlipidemia type    History of Chronic systolic CHF (congestive heart failure)    Peripheral edema    History of colon polyps    Arthritis    Celiac disease    Hashimoto's thyroiditis    Osteoporosis    Iron deficiency anemia due to chronic blood loss    AICD (automatic cardioverter/defibrillator) present  x 3 . Replaced x 2. Presently right subclavian area    Myocardial infarction  1990    Mitral valve prolapse    Left inguinal hernia    Hearing loss    H/O right inguinal hernia repair    History of cataract extraction  Bilateral    History of heart surgery  Mitral valve surgery for leaky valve 9/2020    MEDICATIONS  (STANDING):  aspirin enteric coated 81 milliGRAM(s) Oral daily  atorvastatin 10 milliGRAM(s) Oral at bedtime  azithromycin  IVPB 500 milliGRAM(s) IV Intermittent every 24 hours  cefTRIAXone Injectable. 1000 milliGRAM(s) IV Push every 24 hours  citalopram 10 milliGRAM(s) Oral daily  digoxin     Tablet 62.5 MICROGram(s) Oral daily  furosemide   Injectable 40 milliGRAM(s) IV Push two times a day  levothyroxine 25 MICROGram(s) Oral daily  levothyroxine 100 MICROGram(s) Oral daily  sacubitril 24 mG/valsartan 26 mG 1 Tablet(s) Oral two times a day    MEDICATIONS  (PRN):  acetaminophen     Tablet .. 650 milliGRAM(s) Oral once PRN Mild Pain (1 - 3)    Antiplatelet therapy:                           Last dose/amt:    Allergies    Ancef (Unknown)  lidocaine (Rash; Angioedema)  adhesives (Rash)    Intolerances    Gluten (Diarrhea)      SOCIAL HISTORY:  Smoker: [ ] Yes  [x] No    ETOH use: [ ] Yes  [x] No       Ilicit Drug use:  [ ] Yes  [x ] No      FAMILY HISTORY:  Family history of diabetes mellitus (Mother, Sibling)    Family history of heart disease (Mother)    Family history of dementia (Father)      Review of Systems  CONSTITUTIONAL:  Fevers[ ] chills[ ] sweats[ ] fatigue[ ] weight loss[ ] weight gain [ ]                                     NEGATIVE [X ]   NEURO:  parathesias[ ] seizures [ ]  syncope [ ]  confusion [ ]                                                                                NEGATIVE[X ]   EYES: glasses[ ]  blurry vision[ ]  discharge[ ] pain[ ] glaucoma [ ]                                                                          NEGATIVE[X ]   ENMT:  difficulty hearing [ ]  vertigo[ ]  dysphagia[ ] epistaxis[ ] recent dental work [ ]                                    NEGATIVE[X ]   CV:  chest pain[ ] palpitations[ ] DUMONT [ ] diaphoresis [ ]                                                                                           NEGATIVE[X ]   RESPIRATORY:  wheezing[ ] SOB[X ] cough [X ] sputum[X ] hemoptysis[ ]                                                                  NEGATIVE[ ]   GI:  nausea[ ]  vommiting [ ]  diarrhea[ ] constipation [ ] melena [ ]                                                                      NEGATIVE[X ]   : hematuria[ ]  dysuria[ ] urgency[ ] incontinence[ ]                                                                                            NEGATIVE[X ]   MUSKULOSKELETAL:  arthritis[ ]  joint swelling [ ] muscle weakness [ ]                                                                NEGATIVE[X ]   SKIN/BREAST:  rash[ ] itching [ ]  hair loss[ ] masses[ ]                                                                                              NEGATIVE[X ]   PSYCH:  dementia [ ] depresion [ ] anxiety[ ]                                                                                                               NEGATIVE[X ]   HEME/LYMPH:  bruises easily[ ] enlarged lymph nodes[ ] tender lymph nodes[ ]                                               NEGATIVE[X ]   ENDOCRINE:  cold intolerance[ ] heat intolerance[ ] polydipsia[ ]                                                                          NEGATIVE[X ]     PHYSICAL EXAM  Vital Signs Last 24 Hrs  T(C): 36.8 (07 Dec 2023 20:52), Max: 36.8 (07 Dec 2023 20:52)  T(F): 98.3 (07 Dec 2023 20:52), Max: 98.3 (07 Dec 2023 20:52)  HR: 81 (08 Dec 2023 04:45) (75 - 81)  BP: 123/45 (08 Dec 2023 04:45) (105/45 - 123/45)  BP(mean): 63 (07 Dec 2023 13:05) (63 - 63)  RR: 18 (07 Dec 2023 13:05) (18 - 18)  SpO2: 100% (07 Dec 2023 20:52) (100% - 100%)    Parameters below as of 07 Dec 2023 20:52  Patient On (Oxygen Delivery Method): nasal cannula  O2 Flow (L/min): 3                                                                         Neuro: A,O x 2 (person, place)                   Eyes: WNL[x ]   Normal exam of conjunctiva & lids, pupils equally reactive. Other     ENT: heard of hearing  Neck: Normal exam of jugular veins, trachea & thyroid. Other  Chest: Diminished BS bilateral bases Right > left                                                                              CV: S1S2                                                                                     Abd: +BS, soft, NT, ND                                                                                                         Extremities: WNL[ ] Normal no evidence of cyanosis or deformity Edema: none[ ]trace[ ]1+[ ]2+[ ]3+[ ]4+[ ]                                                              LABS:                        10.6   5.87  )-----------( 275      ( 07 Dec 2023 06:53 )             32.0     12-07    134<L>  |  95<L>  |  19  ----------------------------<  76  3.5   |  34<H>  |  0.76    Ca    8.7      07 Dec 2023 06:53  Mg     2.1     12-07    TPro  6.8  /  Alb  2.5<L>  /  TBili  1.0  /  DBili  x   /  AST  26  /  ALT  17  /  AlkPhos  60  12-07    PT/INR - ( 07 Dec 2023 11:24 )   PT: 17.7 sec;   INR: 1.59 ratio           Urinalysis Basic - ( 07 Dec 2023 06:53 )    Color: x / Appearance: x / SG: x / pH: x  Gluc: 76 mg/dL / Ketone: x  / Bili: x / Urobili: x   Blood: x / Protein: x / Nitrite: x   Leuk Esterase: x / RBC: x / WBC x   Sq Epi: x / Non Sq Epi: x / Bacteria: x      TTE 12/5/23:  The left ventricle size is at the upper limits of normal, severe regional   hypokinesis, and moderately reduced function.   Definity was used to better visualize the endocardial border.   Estimated left ventricular ejection fraction is 30-35 %.   The left atrium is severely dilated.   The right atrium appears dilated.   A device wire is seen in the RV and RA.   The right ventricle is at the upper limits of normal in size.   The aortic valve appears mildly calcified. Valve opening seems to be   restricted.   Transaortic gradients are underestimated due to impaired left ventricle   systolic function.   RANJAN by continuity equation suggests mild aortic stenosis.   Moderate (2+) aortic regurgitation is present.   A mitral valve repair is noted.   Moderate (2+) mitral regurgitation is present.   The mean trans-mitral pressure gradient is 6 mmHg.   The tricuspid valve leaflets are thin and pliable; valve motion is   normal.   Moderate (2+) tricuspid valve regurgitation is present.   Moderate pulmonary hypertension.   Normal appearing pulmonic valve structure.   Mild pulmonic valvular regurgitation (1+) is present.   Pleural effusion - massive bilateral pleural effusion.   The IVC is dilated with decreased respiratory variation.   A PFO is noted with color and spectral Doppler.      CT Chest 12/6/23:   AIRWAYS, LUNGS, PLEURA: Lobulated anterior left upper lobe mass with   suspected mediastinal invasion measuring 3 x 2.6 cm (image 45, series 2).  Patchy consolidation and ground-glass opacification multifocally in the   right upper lobe, left upper lobe, left lower lobe superior segment.  Moderate size right pleural effusion with associated right middle lobe   and right lower lobe multisegmental atelectasis. Small left pleural   effusion and associated subsegmental left basilar atelectasis.  Central airways appear clear.  MEDIASTINUM: Cardiomegaly. No pericardial effusion. Normal caliber   thoracic aorta.  Right paratracheal node measures 3.1 x 2.7 cm (image 38, series 2) and AP   window node measures 2.1 x 1.4 cm (image 39, series 2).  IMAGED ABDOMEN: Unremarkable.  SOFT TISSUES: Unremarkable.  BONES: Unremarkable.     Surgeon: Dr Chand    Consult requesting by: Dr Mak    HISTORY OF PRESENT ILLNESS:  87-year-old female with PMHx/o chronic systolic CHF, s/p mitral valve surgery, HTN, HLD, chronic atrial fibrillation on warfarin, s/p AICD, CAD s/p MI, hypothyroidism, celiac disease presented from home after her HHA called 911. Patient was agitated, refusing to take her meds and c/o sob/productive cough.  Patient was found to be hypoxic to 88 % on RA.   Patient had been more agitated and more forgetful per family.    CXR on admission showed Increased perihilar interstitial markings and airspace densities. Right greater than left effusion with compressive atelectatic change. Cardiomegaly. CT Chest on 12/6 with Patchy multifocal airspace disease in the RUL, DORIS and LLL left upper compatible with pneumonia; Moderate right pleural effusion and small left pleural effusion; 3 cm left upper lobe mass and mediastinal lymphadenopathy; leading diagnostic consideration is for primary lung malignancy with metastasis.  Patient admitted with acute on chronic systolic CHF (BNP 21550), diuresed, and started on IV antibiotics for pneumonia.  Thoracic surgery consulted for pleural effusions.      PAST MEDICAL & SURGICAL HISTORY:  Chronic atrial fibrillation    Hypothyroid    HTN (hypertension)    History of cataract    Hyperlipidemia, unspecified hyperlipidemia type    History of Chronic systolic CHF (congestive heart failure)    Peripheral edema    History of colon polyps    Arthritis    Celiac disease    Hashimoto's thyroiditis    Osteoporosis    Iron deficiency anemia due to chronic blood loss    AICD (automatic cardioverter/defibrillator) present  x 3 . Replaced x 2. Presently right subclavian area    Myocardial infarction  1990    Mitral valve prolapse    Left inguinal hernia    Hearing loss    H/O right inguinal hernia repair    History of cataract extraction  Bilateral    History of heart surgery  Mitral valve surgery for leaky valve 9/2020    MEDICATIONS  (STANDING):  aspirin enteric coated 81 milliGRAM(s) Oral daily  atorvastatin 10 milliGRAM(s) Oral at bedtime  azithromycin  IVPB 500 milliGRAM(s) IV Intermittent every 24 hours  cefTRIAXone Injectable. 1000 milliGRAM(s) IV Push every 24 hours  citalopram 10 milliGRAM(s) Oral daily  digoxin     Tablet 62.5 MICROGram(s) Oral daily  furosemide   Injectable 40 milliGRAM(s) IV Push two times a day  levothyroxine 25 MICROGram(s) Oral daily  levothyroxine 100 MICROGram(s) Oral daily  sacubitril 24 mG/valsartan 26 mG 1 Tablet(s) Oral two times a day    MEDICATIONS  (PRN):  acetaminophen     Tablet .. 650 milliGRAM(s) Oral once PRN Mild Pain (1 - 3)    Antiplatelet therapy:                           Last dose/amt:    Allergies    Ancef (Unknown)  lidocaine (Rash; Angioedema)  adhesives (Rash)    Intolerances    Gluten (Diarrhea)      SOCIAL HISTORY:  Smoker: [ ] Yes  [x] No    ETOH use: [ ] Yes  [x] No       Ilicit Drug use:  [ ] Yes  [x ] No      FAMILY HISTORY:  Family history of diabetes mellitus (Mother, Sibling)    Family history of heart disease (Mother)    Family history of dementia (Father)      Review of Systems  CONSTITUTIONAL:  Fevers[ ] chills[ ] sweats[ ] fatigue[ ] weight loss[ ] weight gain [ ]                                     NEGATIVE [X ]   NEURO:  parathesias[ ] seizures [ ]  syncope [ ]  confusion [ ]                                                                                NEGATIVE[X ]   EYES: glasses[ ]  blurry vision[ ]  discharge[ ] pain[ ] glaucoma [ ]                                                                          NEGATIVE[X ]   ENMT:  difficulty hearing [ ]  vertigo[ ]  dysphagia[ ] epistaxis[ ] recent dental work [ ]                                    NEGATIVE[X ]   CV:  chest pain[ ] palpitations[ ] DUMONT [ ] diaphoresis [ ]                                                                                           NEGATIVE[X ]   RESPIRATORY:  wheezing[ ] SOB[X ] cough [X ] sputum[X ] hemoptysis[ ]                                                                  NEGATIVE[ ]   GI:  nausea[ ]  vommiting [ ]  diarrhea[ ] constipation [ ] melena [ ]                                                                      NEGATIVE[X ]   : hematuria[ ]  dysuria[ ] urgency[ ] incontinence[ ]                                                                                            NEGATIVE[X ]   MUSKULOSKELETAL:  arthritis[ ]  joint swelling [ ] muscle weakness [ ]                                                                NEGATIVE[X ]   SKIN/BREAST:  rash[ ] itching [ ]  hair loss[ ] masses[ ]                                                                                              NEGATIVE[X ]   PSYCH:  dementia [ ] depresion [ ] anxiety[ ]                                                                                                               NEGATIVE[X ]   HEME/LYMPH:  bruises easily[ ] enlarged lymph nodes[ ] tender lymph nodes[ ]                                               NEGATIVE[X ]   ENDOCRINE:  cold intolerance[ ] heat intolerance[ ] polydipsia[ ]                                                                          NEGATIVE[X ]     PHYSICAL EXAM  Vital Signs Last 24 Hrs  T(C): 36.8 (07 Dec 2023 20:52), Max: 36.8 (07 Dec 2023 20:52)  T(F): 98.3 (07 Dec 2023 20:52), Max: 98.3 (07 Dec 2023 20:52)  HR: 81 (08 Dec 2023 04:45) (75 - 81)  BP: 123/45 (08 Dec 2023 04:45) (105/45 - 123/45)  BP(mean): 63 (07 Dec 2023 13:05) (63 - 63)  RR: 18 (07 Dec 2023 13:05) (18 - 18)  SpO2: 100% (07 Dec 2023 20:52) (100% - 100%)    Parameters below as of 07 Dec 2023 20:52  Patient On (Oxygen Delivery Method): nasal cannula  O2 Flow (L/min): 3                                                                         Neuro: A,O x 2 (person, place)                   Eyes: WNL[x ]   Normal exam of conjunctiva & lids, pupils equally reactive. Other     ENT: heard of hearing  Neck: Normal exam of jugular veins, trachea & thyroid. Other  Chest: Diminished BS bilateral bases Right > left                                                                              CV: S1S2                                                                                     Abd: +BS, soft, NT, ND                                                                                                         Extremities: WNL[ ] Normal no evidence of cyanosis or deformity Edema: none[ ]trace[ ]1+[ ]2+[ ]3+[ ]4+[ ]                                                              LABS:                        10.6   5.87  )-----------( 275      ( 07 Dec 2023 06:53 )             32.0     12-07    134<L>  |  95<L>  |  19  ----------------------------<  76  3.5   |  34<H>  |  0.76    Ca    8.7      07 Dec 2023 06:53  Mg     2.1     12-07    TPro  6.8  /  Alb  2.5<L>  /  TBili  1.0  /  DBili  x   /  AST  26  /  ALT  17  /  AlkPhos  60  12-07    PT/INR - ( 07 Dec 2023 11:24 )   PT: 17.7 sec;   INR: 1.59 ratio           Urinalysis Basic - ( 07 Dec 2023 06:53 )    Color: x / Appearance: x / SG: x / pH: x  Gluc: 76 mg/dL / Ketone: x  / Bili: x / Urobili: x   Blood: x / Protein: x / Nitrite: x   Leuk Esterase: x / RBC: x / WBC x   Sq Epi: x / Non Sq Epi: x / Bacteria: x      TTE 12/5/23:  The left ventricle size is at the upper limits of normal, severe regional   hypokinesis, and moderately reduced function.   Definity was used to better visualize the endocardial border.   Estimated left ventricular ejection fraction is 30-35 %.   The left atrium is severely dilated.   The right atrium appears dilated.   A device wire is seen in the RV and RA.   The right ventricle is at the upper limits of normal in size.   The aortic valve appears mildly calcified. Valve opening seems to be   restricted.   Transaortic gradients are underestimated due to impaired left ventricle   systolic function.   RANJAN by continuity equation suggests mild aortic stenosis.   Moderate (2+) aortic regurgitation is present.   A mitral valve repair is noted.   Moderate (2+) mitral regurgitation is present.   The mean trans-mitral pressure gradient is 6 mmHg.   The tricuspid valve leaflets are thin and pliable; valve motion is   normal.   Moderate (2+) tricuspid valve regurgitation is present.   Moderate pulmonary hypertension.   Normal appearing pulmonic valve structure.   Mild pulmonic valvular regurgitation (1+) is present.   Pleural effusion - massive bilateral pleural effusion.   The IVC is dilated with decreased respiratory variation.   A PFO is noted with color and spectral Doppler.      CT Chest 12/6/23:   AIRWAYS, LUNGS, PLEURA: Lobulated anterior left upper lobe mass with   suspected mediastinal invasion measuring 3 x 2.6 cm (image 45, series 2).  Patchy consolidation and ground-glass opacification multifocally in the   right upper lobe, left upper lobe, left lower lobe superior segment.  Moderate size right pleural effusion with associated right middle lobe   and right lower lobe multisegmental atelectasis. Small left pleural   effusion and associated subsegmental left basilar atelectasis.  Central airways appear clear.  MEDIASTINUM: Cardiomegaly. No pericardial effusion. Normal caliber   thoracic aorta.  Right paratracheal node measures 3.1 x 2.7 cm (image 38, series 2) and AP   window node measures 2.1 x 1.4 cm (image 39, series 2).  IMAGED ABDOMEN: Unremarkable.  SOFT TISSUES: Unremarkable.  BONES: Unremarkable.     Surgeon: Dr Chand    Consult requesting by: Dr Mak    HISTORY OF PRESENT ILLNESS:  87-year-old female with PMHx/o chronic systolic CHF, s/p mitral valve surgery, HTN, HLD, chronic atrial fibrillation on warfarin, s/p AICD, CAD s/p MI, hypothyroidism, celiac disease presented from home after her HHA called 911. Patient was agitated, refusing to take her meds and c/o sob/productive cough.  Patient was found to be hypoxic to 88 % on RA.   Patient had been more agitated and more forgetful per family.    CXR on admission showed Increased perihilar interstitial markings and airspace densities. Right greater than left effusion with compressive atelectatic change. Cardiomegaly. CT Chest on 12/6 with Patchy multifocal airspace disease in the RUL, DORIS and LLL left upper compatible with pneumonia; Moderate right pleural effusion and small left pleural effusion; 3 cm left upper lobe mass and mediastinal lymphadenopathy; leading diagnostic consideration is for primary lung malignancy with metastasis.  Patient admitted with acute on chronic systolic CHF (BNP [id number]), diuresed, and started on IV antibiotics for pneumonia.  Thoracic surgery consulted for pleural effusions.      PAST MEDICAL & SURGICAL HISTORY:  Chronic atrial fibrillation    Hypothyroid    HTN (hypertension)    History of cataract    Hyperlipidemia, unspecified hyperlipidemia type    History of Chronic systolic CHF (congestive heart failure)    Peripheral edema    History of colon polyps    Arthritis    Celiac disease    Hashimoto's thyroiditis    Osteoporosis    Iron deficiency anemia due to chronic blood loss    AICD (automatic cardioverter/defibrillator) present  x 3 . Replaced x 2. Presently right subclavian area    Myocardial infarction  1990    Mitral valve prolapse    Left inguinal hernia    Hearing loss    H/O right inguinal hernia repair    History of cataract extraction  Bilateral    History of heart surgery  Mitral valve surgery for leaky valve 9/2020    MEDICATIONS  (STANDING):  aspirin enteric coated 81 milliGRAM(s) Oral daily  atorvastatin 10 milliGRAM(s) Oral at bedtime  azithromycin  IVPB 500 milliGRAM(s) IV Intermittent every 24 hours  cefTRIAXone Injectable. 1000 milliGRAM(s) IV Push every 24 hours  citalopram 10 milliGRAM(s) Oral daily  digoxin     Tablet 62.5 MICROGram(s) Oral daily  furosemide   Injectable 40 milliGRAM(s) IV Push two times a day  levothyroxine 25 MICROGram(s) Oral daily  levothyroxine 100 MICROGram(s) Oral daily  sacubitril 24 mG/valsartan 26 mG 1 Tablet(s) Oral two times a day    MEDICATIONS  (PRN):  acetaminophen     Tablet .. 650 milliGRAM(s) Oral once PRN Mild Pain (1 - 3)    Antiplatelet therapy:                           Last dose/amt:    Allergies    Ancef (Unknown)  lidocaine (Rash; Angioedema)  adhesives (Rash)    Intolerances    Gluten (Diarrhea)      SOCIAL HISTORY:  Smoker: [ ] Yes  [x] No    ETOH use: [ ] Yes  [x] No       Ilicit Drug use:  [ ] Yes  [x ] No      FAMILY HISTORY:  Family history of diabetes mellitus (Mother, Sibling)    Family history of heart disease (Mother)    Family history of dementia (Father)      Review of Systems  CONSTITUTIONAL:  Fevers[ ] chills[ ] sweats[ ] fatigue[ ] weight loss[ ] weight gain [ ]                                     NEGATIVE [X ]   NEURO:  parathesias[ ] seizures [ ]  syncope [ ]  confusion [ ]                                                                                NEGATIVE[X ]   EYES: glasses[ ]  blurry vision[ ]  discharge[ ] pain[ ] glaucoma [ ]                                                                          NEGATIVE[X ]   ENMT:  difficulty hearing [ ]  vertigo[ ]  dysphagia[ ] epistaxis[ ] recent dental work [ ]                                    NEGATIVE[X ]   CV:  chest pain[ ] palpitations[ ] DUMONT [ ] diaphoresis [ ]                                                                                           NEGATIVE[X ]   RESPIRATORY:  wheezing[ ] SOB[X ] cough [X ] sputum[X ] hemoptysis[ ]                                                                  NEGATIVE[ ]   GI:  nausea[ ]  vommiting [ ]  diarrhea[ ] constipation [ ] melena [ ]                                                                      NEGATIVE[X ]   : hematuria[ ]  dysuria[ ] urgency[ ] incontinence[ ]                                                                                            NEGATIVE[X ]   MUSKULOSKELETAL:  arthritis[ ]  joint swelling [ ] muscle weakness [ ]                                                                NEGATIVE[X ]   SKIN/BREAST:  rash[ ] itching [ ]  hair loss[ ] masses[ ]                                                                                              NEGATIVE[X ]   PSYCH:  dementia [ ] depresion [ ] anxiety[ ]                                                                                                               NEGATIVE[X ]   HEME/LYMPH:  bruises easily[ ] enlarged lymph nodes[ ] tender lymph nodes[ ]                                               NEGATIVE[X ]   ENDOCRINE:  cold intolerance[ ] heat intolerance[ ] polydipsia[ ]                                                                          NEGATIVE[X ]     PHYSICAL EXAM  Vital Signs Last 24 Hrs  T(C): 36.8 (07 Dec 2023 20:52), Max: 36.8 (07 Dec 2023 20:52)  T(F): 98.3 (07 Dec 2023 20:52), Max: 98.3 (07 Dec 2023 20:52)  HR: 81 (08 Dec 2023 04:45) (75 - 81)  BP: 123/45 (08 Dec 2023 04:45) (105/45 - 123/45)  BP(mean): 63 (07 Dec 2023 13:05) (63 - 63)  RR: 18 (07 Dec 2023 13:05) (18 - 18)  SpO2: 100% (07 Dec 2023 20:52) (100% - 100%)    Parameters below as of 07 Dec 2023 20:52  Patient On (Oxygen Delivery Method): nasal cannula  O2 Flow (L/min): 3                                                                         Neuro: A,O x 2 (person, place)                   Eyes: WNL[x ]   Normal exam of conjunctiva & lids, pupils equally reactive. Other     ENT: heard of hearing  Neck: Normal exam of jugular veins, trachea & thyroid. Other  Chest: Diminished BS bilateral bases Right > left                                                                              CV: S1S2                                                                                     Abd: +BS, soft, NT, ND                                                                                                         Extremities: WNL[ ] Normal no evidence of cyanosis or deformity Edema: none[ ]trace[ ]1+[ ]2+[ ]3+[ ]4+[ ]                                                              LABS:                        10.6   5.87  )-----------( 275      ( 07 Dec 2023 06:53 )             32.0     12-07    134<L>  |  95<L>  |  19  ----------------------------<  76  3.5   |  34<H>  |  0.76    Ca    8.7      07 Dec 2023 06:53  Mg     2.1     12-07    TPro  6.8  /  Alb  2.5<L>  /  TBili  1.0  /  DBili  x   /  AST  26  /  ALT  17  /  AlkPhos  60  12-07    PT/INR - ( 07 Dec 2023 11:24 )   PT: 17.7 sec;   INR: 1.59 ratio           Urinalysis Basic - ( 07 Dec 2023 06:53 )    Color: x / Appearance: x / SG: x / pH: x  Gluc: 76 mg/dL / Ketone: x  / Bili: x / Urobili: x   Blood: x / Protein: x / Nitrite: x   Leuk Esterase: x / RBC: x / WBC x   Sq Epi: x / Non Sq Epi: x / Bacteria: x      TTE 12/5/23:  The left ventricle size is at the upper limits of normal, severe regional   hypokinesis, and moderately reduced function.   Definity was used to better visualize the endocardial border.   Estimated left ventricular ejection fraction is 30-35 %.   The left atrium is severely dilated.   The right atrium appears dilated.   A device wire is seen in the RV and RA.   The right ventricle is at the upper limits of normal in size.   The aortic valve appears mildly calcified. Valve opening seems to be   restricted.   Transaortic gradients are underestimated due to impaired left ventricle   systolic function.   RANJAN by continuity equation suggests mild aortic stenosis.   Moderate (2+) aortic regurgitation is present.   A mitral valve repair is noted.   Moderate (2+) mitral regurgitation is present.   The mean trans-mitral pressure gradient is 6 mmHg.   The tricuspid valve leaflets are thin and pliable; valve motion is   normal.   Moderate (2+) tricuspid valve regurgitation is present.   Moderate pulmonary hypertension.   Normal appearing pulmonic valve structure.   Mild pulmonic valvular regurgitation (1+) is present.   Pleural effusion - massive bilateral pleural effusion.   The IVC is dilated with decreased respiratory variation.   A PFO is noted with color and spectral Doppler.      CT Chest 12/6/23:   AIRWAYS, LUNGS, PLEURA: Lobulated anterior left upper lobe mass with   suspected mediastinal invasion measuring 3 x 2.6 cm (image 45, series 2).  Patchy consolidation and ground-glass opacification multifocally in the   right upper lobe, left upper lobe, left lower lobe superior segment.  Moderate size right pleural effusion with associated right middle lobe   and right lower lobe multisegmental atelectasis. Small left pleural   effusion and associated subsegmental left basilar atelectasis.  Central airways appear clear.  MEDIASTINUM: Cardiomegaly. No pericardial effusion. Normal caliber   thoracic aorta.  Right paratracheal node measures 3.1 x 2.7 cm (image 38, series 2) and AP   window node measures 2.1 x 1.4 cm (image 39, series 2).  IMAGED ABDOMEN: Unremarkable.  SOFT TISSUES: Unremarkable.  BONES: Unremarkable.

## 2023-12-08 NOTE — CONSULT NOTE ADULT - ASSESSMENT
87-year-old female with PMHx/o chronic systolic CHF, s/p mitral valve surgery, HTN, HLD, chronic atrial fibrillation on warfarin, s/p AICD, CAD s/p MI, hypothyroidism, celiac disease presented with SOB, productive cough, worsening forgetfullness and was admitted for acute on chronic systolic CHF, and multifocal pneumonia.  Patient was also found to have bilateral pleural effusions (R>L), 3cm DORIS mass with mediastinal adenopathy suspicious for lung Ca with mets.    PLAN  - Patient would benefit from Right pigtail insertion   - Will send Cytology  - Will discuss with patients daughter   - Continue IV Antibiotics for PNA per ID  - Remains on IV Lasix for acute on chronic systolic CHF    Discussed with Dr Chand

## 2023-12-08 NOTE — PROGRESS NOTE ADULT - SUBJECTIVE AND OBJECTIVE BOX
CHIEF COMPLAINT: Patient is a 88y old  Female who presents with a chief complaint of sob/hypoxia (05 Dec 2023 11:04)    FROM H&P: 87-year-old female with medical history of CHF, chronic atrial fibrillation on warfarin, s/p AICD, CAD, hypothyroidism, celiac disease presents from home after her HHA called 911 after she was agitated and refusing to take her meds. States she has been weak and sob. Also endorsing the weakness. Was found to be hypoxic to 88 % on RA. PT endorses productive cough and this am had scant blood tinged mucous.   Spoke to Dtjuan Lema on the phone. states her mother suffering from extreme anxiety and was started on xanax 2 weeks ago and since then, her cognition has suffered greatly. She is also having difficulty walking per aides. More forgetful and agitated at time which is not like her per daughter.     She is presently comfortable. 97% on 2L. Alert and oriented x 2  CXR: R>L effusion with e/o pvc, cannot r/o infiltrate  s/p iv lasix in ED  NA : 128    12/6/23: Cardiology consulted for HF. Hypoxia. +sob. C/w IV Lasix, check echo and CT.   12/7/23. Breathing somewhat stable. No CP  12/8/23.    PAST MEDICAL/SURGICAL/FAMILY/SOCIAL HISTORY:   AICD (automatic cardioverter/defibrillator) present x 3 . Replaced x 2. Presently right subclavian area  Arthritis   Celiac disease   Chronic atrial fibrillation   Hashimoto's thyroiditis   Hearing loss   History of cataract   History of CHF (congestive heart failure)   History of colon polyps   HTN (hypertension)   Hyperlipidemia, unspecified hyperlipidemia type   Hypothyroid   Iron deficiency anemia due to chronic blood loss   Left inguinal hernia   Mitral valve prolapse   Myocardial infarction 1990  Osteoporosis   Peripheral edema   Varicose veins BLE.     PAST SURGICAL HISTORY:  AICD (automatic cardioverter/defibrillator) present with PPM. Replaced x 2.  Artificial pacemaker   H/O colonoscopy last done 2009  H/O right inguinal hernia repair   History of cataract extraction Bilateral  History of heart surgery Mitral valve surgery for leaky valve 9/2020.     FAMILY HISTORY:  Father  Still living? No  Family history of dementia, Age at diagnosis: Age Unknown    Mother  Still living? No  Family history of diabetes mellitus, Age at diagnosis: Age Unknown  Family history of heart disease, Age at diagnosis: Age Unknown    Sibling  Still living? No  Family history of diabetes mellitus, Age at diagnosis: Age Unknown.    PREVIOUS DIAGNOSTIC TESTING:      ECHO: FINDINGS: 12/5/23: The left ventricle size is at the upper limits of normal, severe regional hypokinesis, and moderately reduced function. Definity was used to better visualize the endocardial border. Estimated left ventricular ejection fraction is 30-35 %. The left atrium is severely dilated. The right atrium appears dilated. A device wire is seen in the RV and RA. The right ventricle is at the upper limits of normal in size. The aortic valve appears mildly calcified. Valve opening seems to be restricted. Transaortic gradients are underestimated due to impaired left ventricle systolic function. RANJAN by continuity equation suggests mild aortic stenosis. Moderate (2+) aortic regurgitation is present. A mitral valve repair is noted. Moderate (2+) mitral regurgitation is present. The mean trans-mitral pressure gradient is 6 mmHg. The tricuspid valve leaflets are thin and pliable; valve motion is normal. Moderate (2+) tricuspid valve regurgitation is present. Moderate pulmonary hypertension. Normal appearing pulmonic valve structure. Mild pulmonic valvular regurgitation (1+) is present. Pleural effusion - massive bilateral pleural effusion. The IVC is dilated with decreased respiratory variation. A PFO is noted with color and spectral Doppler.    MEDICATIONS  (STANDING):  aspirin enteric coated 81 milliGRAM(s) Oral daily  atorvastatin 10 milliGRAM(s) Oral at bedtime  azithromycin  IVPB 500 milliGRAM(s) IV Intermittent every 24 hours  cefTRIAXone Injectable. 1000 milliGRAM(s) IV Push every 24 hours  citalopram 10 milliGRAM(s) Oral daily  digoxin     Tablet 62.5 MICROGram(s) Oral daily  furosemide   Injectable 40 milliGRAM(s) IV Push two times a day  levothyroxine 25 MICROGram(s) Oral daily  levothyroxine 100 MICROGram(s) Oral daily  sacubitril 24 mG/valsartan 26 mG 1 Tablet(s) Oral two times a day    MEDICATIONS  (PRN):  acetaminophen     Tablet .. 650 milliGRAM(s) Oral once PRN Mild Pain (1 - 3)    HOME MEDICATIONS:  ALPRAZolam 0.25 mg oral tablet: 1 tab(s) orally 2 times a day as needed for  anxiety (05 Dec 2023 11:08)  amiodarone 100 mg oral tablet: 1 tab(s) orally once a day (05 Dec 2023 11:04)  aspirin 81 mg oral delayed release tablet: 1 tab(s) orally once a day (05 Dec 2023 11:04)  atorvastatin 10 mg oral tablet: 1 tab(s) orally once a day (at bedtime) (05 Dec 2023 11:04)  Calcium 500+D oral tablet, chewable: 1 tab(s) orally 2 times a day (05 Dec 2023 11:04)  cephalexin 500 mg oral capsule: 1 cap(s) orally every 8 hours x  7 days ***Course Complete*** (05 Dec 2023 11:11)  citalopram 10 mg oral tablet: 1 tab(s) orally once a day (05 Dec 2023 11:11)  digoxin 125 mcg (0.125 mg) oral tablet: 0.5 tab(s) orally once a day (05 Dec 2023 11:04)  Entresto 24 mg-26 mg oral tablet: 1 tab(s) orally 2 times a day (05 Dec 2023 11:04)  furosemide 20 mg oral tablet: 1 tab(s) orally once a day ***pt doesn&#x27;t take consistently*** (05 Dec 2023 11:03)  levothyroxine 100 mcg (0.1 mg) oral tablet: 1 tab(s) orally once a day ***take with 25mcg = 125cg*** (05 Dec 2023 11:11)  levothyroxine 25 mcg (0.025 mg) oral tablet: 1 tab(s) orally once a day ***take with 100mcg = 125mcg*** (05 Dec 2023 11:11)  warfarin 1 mg oral tablet: 1 tab(s) orally every other day ***alternate with 0.5mg*** (05 Dec 2023 11:11)  warfarin 1 mg oral tablet: 0.5 tab(s) orally every other day ***alternate with 1mg*** (05 Dec 2023 11:08)    PHYSICAL EXAM:  T(C): 36.8 (07 Dec 2023 20:52), Max: 36.8 (07 Dec 2023 20:52)  T(F): 98.3 (07 Dec 2023 20:52), Max: 98.3 (07 Dec 2023 20:52)  HR: 81 (08 Dec 2023 04:45) (75 - 81)  BP: 123/45 (08 Dec 2023 04:45) (105/45 - 123/45)  BP(mean): 63 (07 Dec 2023 13:05) (63 - 63)  RR: 18 (07 Dec 2023 13:05) (18 - 18)  SpO2: 100% (07 Dec 2023 20:52) (100% - 100%)    Parameters below as of 07 Dec 2023 20:52  Patient On (Oxygen Delivery Method): nasal cannula  O2 Flow (L/min): 3    Constitutional: NAD, awake and alert  HEENT: Normal Hearing, MMM  Neck: Soft and supple, No LAD, No JVD  Respiratory: Breath sounds diminished b/l  Cardiovascular: S1 and S2, regular rate and rhythm, no Murmurs, gallops or rubs  Gastrointestinal: Bowel Sounds present, soft, nontender, nondistended, no guarding, no rebound  Extremities: No peripheral edema  Vascular: 2+ peripheral pulses  Neurological: A/O x 2/3, forgetful  Musculoskeletal: 5/5 strength b/l upper and lower extremities  Skin: No rashes    =======================================    INTERPRETATION OF TELEMETRY:    ECG: < from: 12 Lead ECG (12.05.23 @ 01:29) >  Diagnosis Line Ventricular-paced rhythm  Biventricular pacemaker detected  Abnormal ECG    ========================================    LABS: All Labs Reviewed:                        10.6   5.87  )-----------( 275      ( 07 Dec 2023 06:53 )             32.0     12-07    134<L>  |  95<L>  |  19  ----------------------------<  76  3.5   |  34<H>  |  0.76    Ca    8.7      07 Dec 2023 06:53  Mg     2.1     12-07    TPro  6.8  /  Alb  2.5<L>  /  TBili  1.0  /  DBili  x   /  AST  26  /  ALT  17  /  AlkPhos  60  12-07    PT/INR - ( 07 Dec 2023 11:24 )   PT: 17.7 sec;   INR: 1.59 ratio      Troponin: 30.60 ng/L    BNP 36089     CARDIAC TESTING:    < from: TTE Echo Complete w/ Contrast w/ Doppler (12.05.23 @ 14:29) >   The left ventricle size is at the upper limits of normal, severe regional   hypokinesis, and moderately reduced function.   Definity was used to better visualize the endocardial border.   Estimated left ventricular ejection fraction is 30-35 %.   The left atrium is severely dilated.   The right atrium appears dilated.   A device wire is seen in the RV and RA.   The right ventricle is at the upper limits of normal in size.   The aortic valve appears mildly calcified. Valve opening seems to be   restricted.   Transaortic gradients are underestimated due to impaired left ventricle   systolic function.   RANJAN by continuity equation suggests mild aortic stenosis.   Moderate (2+) aortic regurgitation is present.   A mitral valve repair is noted.   Moderate (2+) mitral regurgitation is present.   The mean trans-mitral pressure gradient is 6 mmHg.   The tricuspid valve leaflets are thin and pliable; valve motion is   normal.   Moderate (2+) tricuspid valve regurgitation is present.   Moderatepulmonary hypertension.   Normal appearing pulmonic valve structure.   Mild pulmonic valvular regurgitation (1+) is present.   Pleural effusion - massive bilateral pleural effusion.   The IVC is dilated with decreased respiratory variation.   A PFO is noted with color and spectral Doppler.    · Underlying Rhythm	afib  · Comments	no episodes of rapid ventricular rates recorded, BiV pacing 98%  · Additional Procedure Details	normal function Bi-Ventricular ICD   may d/c amiodarone per Dr Alonzo.      RADIOLOGY & ADDITIONAL STUDIES:    < from: CT Chest No Cont (12.06.23 @ 15:10) >  Patchy multifocal airspace disease in the right upper lobe, left upper   lobe, and left lower lobe compatible with pneumonia.  Moderate right pleural effusion and small left pleural effusion.  3 cm left upper lobe mass and mediastinal lymphadenopathy; leading   diagnostic consideration is for primary lung malignancy with metastasis.      12/5/23: Xray Chest 1 View AP/PA: Increased perihilar interstitial markings and airspace densities. One should consider congestive changes and some underlying inflammatory infectious process. Right greater than left effusion with compressive atelectatic change. Cardiomegaly. Pacer as before. Findings appear progressive since previous exam regional osseous structures appropriate for age.   CHIEF COMPLAINT: Patient is a 88y old  Female who presents with a chief complaint of sob/hypoxia (05 Dec 2023 11:04)    FROM H&P: 87-year-old female with medical history of CHF, chronic atrial fibrillation on warfarin, s/p AICD, CAD, hypothyroidism, celiac disease presents from home after her HHA called 911 after she was agitated and refusing to take her meds. States she has been weak and sob. Also endorsing the weakness. Was found to be hypoxic to 88 % on RA. PT endorses productive cough and this am had scant blood tinged mucous.   Spoke to Dtjuan Lema on the phone. states her mother suffering from extreme anxiety and was started on xanax 2 weeks ago and since then, her cognition has suffered greatly. She is also having difficulty walking per aides. More forgetful and agitated at time which is not like her per daughter.     She is presently comfortable. 97% on 2L. Alert and oriented x 2  CXR: R>L effusion with e/o pvc, cannot r/o infiltrate  s/p iv lasix in ED  NA : 128    12/6/23: Cardiology consulted for HF. Hypoxia. +sob. C/w IV Lasix, check echo and CT.   12/7/23. Breathing somewhat stable. No CP  12/8/23.    PAST MEDICAL/SURGICAL/FAMILY/SOCIAL HISTORY:   AICD (automatic cardioverter/defibrillator) present x 3 . Replaced x 2. Presently right subclavian area  Arthritis   Celiac disease   Chronic atrial fibrillation   Hashimoto's thyroiditis   Hearing loss   History of cataract   History of CHF (congestive heart failure)   History of colon polyps   HTN (hypertension)   Hyperlipidemia, unspecified hyperlipidemia type   Hypothyroid   Iron deficiency anemia due to chronic blood loss   Left inguinal hernia   Mitral valve prolapse   Myocardial infarction 1990  Osteoporosis   Peripheral edema   Varicose veins BLE.     PAST SURGICAL HISTORY:  AICD (automatic cardioverter/defibrillator) present with PPM. Replaced x 2.  Artificial pacemaker   H/O colonoscopy last done 2009  H/O right inguinal hernia repair   History of cataract extraction Bilateral  History of heart surgery Mitral valve surgery for leaky valve 9/2020.     FAMILY HISTORY:  Father  Still living? No  Family history of dementia, Age at diagnosis: Age Unknown    Mother  Still living? No  Family history of diabetes mellitus, Age at diagnosis: Age Unknown  Family history of heart disease, Age at diagnosis: Age Unknown    Sibling  Still living? No  Family history of diabetes mellitus, Age at diagnosis: Age Unknown.    PREVIOUS DIAGNOSTIC TESTING:      ECHO: FINDINGS: 12/5/23: The left ventricle size is at the upper limits of normal, severe regional hypokinesis, and moderately reduced function. Definity was used to better visualize the endocardial border. Estimated left ventricular ejection fraction is 30-35 %. The left atrium is severely dilated. The right atrium appears dilated. A device wire is seen in the RV and RA. The right ventricle is at the upper limits of normal in size. The aortic valve appears mildly calcified. Valve opening seems to be restricted. Transaortic gradients are underestimated due to impaired left ventricle systolic function. RANJAN by continuity equation suggests mild aortic stenosis. Moderate (2+) aortic regurgitation is present. A mitral valve repair is noted. Moderate (2+) mitral regurgitation is present. The mean trans-mitral pressure gradient is 6 mmHg. The tricuspid valve leaflets are thin and pliable; valve motion is normal. Moderate (2+) tricuspid valve regurgitation is present. Moderate pulmonary hypertension. Normal appearing pulmonic valve structure. Mild pulmonic valvular regurgitation (1+) is present. Pleural effusion - massive bilateral pleural effusion. The IVC is dilated with decreased respiratory variation. A PFO is noted with color and spectral Doppler.    MEDICATIONS  (STANDING):  aspirin enteric coated 81 milliGRAM(s) Oral daily  atorvastatin 10 milliGRAM(s) Oral at bedtime  azithromycin  IVPB 500 milliGRAM(s) IV Intermittent every 24 hours  cefTRIAXone Injectable. 1000 milliGRAM(s) IV Push every 24 hours  citalopram 10 milliGRAM(s) Oral daily  digoxin     Tablet 62.5 MICROGram(s) Oral daily  furosemide   Injectable 40 milliGRAM(s) IV Push two times a day  levothyroxine 25 MICROGram(s) Oral daily  levothyroxine 100 MICROGram(s) Oral daily  sacubitril 24 mG/valsartan 26 mG 1 Tablet(s) Oral two times a day    MEDICATIONS  (PRN):  acetaminophen     Tablet .. 650 milliGRAM(s) Oral once PRN Mild Pain (1 - 3)    HOME MEDICATIONS:  ALPRAZolam 0.25 mg oral tablet: 1 tab(s) orally 2 times a day as needed for  anxiety (05 Dec 2023 11:08)  amiodarone 100 mg oral tablet: 1 tab(s) orally once a day (05 Dec 2023 11:04)  aspirin 81 mg oral delayed release tablet: 1 tab(s) orally once a day (05 Dec 2023 11:04)  atorvastatin 10 mg oral tablet: 1 tab(s) orally once a day (at bedtime) (05 Dec 2023 11:04)  Calcium 500+D oral tablet, chewable: 1 tab(s) orally 2 times a day (05 Dec 2023 11:04)  cephalexin 500 mg oral capsule: 1 cap(s) orally every 8 hours x  7 days ***Course Complete*** (05 Dec 2023 11:11)  citalopram 10 mg oral tablet: 1 tab(s) orally once a day (05 Dec 2023 11:11)  digoxin 125 mcg (0.125 mg) oral tablet: 0.5 tab(s) orally once a day (05 Dec 2023 11:04)  Entresto 24 mg-26 mg oral tablet: 1 tab(s) orally 2 times a day (05 Dec 2023 11:04)  furosemide 20 mg oral tablet: 1 tab(s) orally once a day ***pt doesn&#x27;t take consistently*** (05 Dec 2023 11:03)  levothyroxine 100 mcg (0.1 mg) oral tablet: 1 tab(s) orally once a day ***take with 25mcg = 125cg*** (05 Dec 2023 11:11)  levothyroxine 25 mcg (0.025 mg) oral tablet: 1 tab(s) orally once a day ***take with 100mcg = 125mcg*** (05 Dec 2023 11:11)  warfarin 1 mg oral tablet: 1 tab(s) orally every other day ***alternate with 0.5mg*** (05 Dec 2023 11:11)  warfarin 1 mg oral tablet: 0.5 tab(s) orally every other day ***alternate with 1mg*** (05 Dec 2023 11:08)    PHYSICAL EXAM:  T(C): 36.8 (07 Dec 2023 20:52), Max: 36.8 (07 Dec 2023 20:52)  T(F): 98.3 (07 Dec 2023 20:52), Max: 98.3 (07 Dec 2023 20:52)  HR: 81 (08 Dec 2023 04:45) (75 - 81)  BP: 123/45 (08 Dec 2023 04:45) (105/45 - 123/45)  BP(mean): 63 (07 Dec 2023 13:05) (63 - 63)  RR: 18 (07 Dec 2023 13:05) (18 - 18)  SpO2: 100% (07 Dec 2023 20:52) (100% - 100%)    Parameters below as of 07 Dec 2023 20:52  Patient On (Oxygen Delivery Method): nasal cannula  O2 Flow (L/min): 3    Constitutional: NAD, awake and alert  HEENT: Normal Hearing, MMM  Neck: Soft and supple, No LAD, No JVD  Respiratory: Breath sounds diminished b/l  Cardiovascular: S1 and S2, regular rate and rhythm, no Murmurs, gallops or rubs  Gastrointestinal: Bowel Sounds present, soft, nontender, nondistended, no guarding, no rebound  Extremities: No peripheral edema  Vascular: 2+ peripheral pulses  Neurological: A/O x 2/3, forgetful  Musculoskeletal: 5/5 strength b/l upper and lower extremities  Skin: No rashes    =======================================    INTERPRETATION OF TELEMETRY:    ECG: < from: 12 Lead ECG (12.05.23 @ 01:29) >  Diagnosis Line Ventricular-paced rhythm  Biventricular pacemaker detected  Abnormal ECG    ========================================    LABS: All Labs Reviewed:                        10.6   5.87  )-----------( 275      ( 07 Dec 2023 06:53 )             32.0     12-07    134<L>  |  95<L>  |  19  ----------------------------<  76  3.5   |  34<H>  |  0.76    Ca    8.7      07 Dec 2023 06:53  Mg     2.1     12-07    TPro  6.8  /  Alb  2.5<L>  /  TBili  1.0  /  DBili  x   /  AST  26  /  ALT  17  /  AlkPhos  60  12-07    PT/INR - ( 07 Dec 2023 11:24 )   PT: 17.7 sec;   INR: 1.59 ratio      Troponin: 30.60 ng/L    BNP 23652     CARDIAC TESTING:    < from: TTE Echo Complete w/ Contrast w/ Doppler (12.05.23 @ 14:29) >   The left ventricle size is at the upper limits of normal, severe regional   hypokinesis, and moderately reduced function.   Definity was used to better visualize the endocardial border.   Estimated left ventricular ejection fraction is 30-35 %.   The left atrium is severely dilated.   The right atrium appears dilated.   A device wire is seen in the RV and RA.   The right ventricle is at the upper limits of normal in size.   The aortic valve appears mildly calcified. Valve opening seems to be   restricted.   Transaortic gradients are underestimated due to impaired left ventricle   systolic function.   RANJAN by continuity equation suggests mild aortic stenosis.   Moderate (2+) aortic regurgitation is present.   A mitral valve repair is noted.   Moderate (2+) mitral regurgitation is present.   The mean trans-mitral pressure gradient is 6 mmHg.   The tricuspid valve leaflets are thin and pliable; valve motion is   normal.   Moderate (2+) tricuspid valve regurgitation is present.   Moderatepulmonary hypertension.   Normal appearing pulmonic valve structure.   Mild pulmonic valvular regurgitation (1+) is present.   Pleural effusion - massive bilateral pleural effusion.   The IVC is dilated with decreased respiratory variation.   A PFO is noted with color and spectral Doppler.    · Underlying Rhythm	afib  · Comments	no episodes of rapid ventricular rates recorded, BiV pacing 98%  · Additional Procedure Details	normal function Bi-Ventricular ICD   may d/c amiodarone per Dr Alonzo.      RADIOLOGY & ADDITIONAL STUDIES:    < from: CT Chest No Cont (12.06.23 @ 15:10) >  Patchy multifocal airspace disease in the right upper lobe, left upper   lobe, and left lower lobe compatible with pneumonia.  Moderate right pleural effusion and small left pleural effusion.  3 cm left upper lobe mass and mediastinal lymphadenopathy; leading   diagnostic consideration is for primary lung malignancy with metastasis.      12/5/23: Xray Chest 1 View AP/PA: Increased perihilar interstitial markings and airspace densities. One should consider congestive changes and some underlying inflammatory infectious process. Right greater than left effusion with compressive atelectatic change. Cardiomegaly. Pacer as before. Findings appear progressive since previous exam regional osseous structures appropriate for age.   CHIEF COMPLAINT: Patient is a 88y old  Female who presents with a chief complaint of sob/hypoxia (05 Dec 2023 11:04)    FROM H&P: 87-year-old female with medical history of CHF, chronic atrial fibrillation on warfarin, s/p AICD, CAD, hypothyroidism, celiac disease presents from home after her HHA called 911 after she was agitated and refusing to take her meds. States she has been weak and sob. Also endorsing the weakness. Was found to be hypoxic to 88 % on RA. PT endorses productive cough and this am had scant blood tinged mucous.   Spoke to Dtjuan Lema on the phone. states her mother suffering from extreme anxiety and was started on xanax 2 weeks ago and since then, her cognition has suffered greatly. She is also having difficulty walking per aides. More forgetful and agitated at time which is not like her per daughter.     She is presently comfortable. 97% on 2L. Alert and oriented x 2  CXR: R>L effusion with e/o pvc, cannot r/o infiltrate  s/p iv lasix in ED  NA : 128    12/6/23: Cardiology consulted for HF. Hypoxia. +sob. C/w IV Lasix, check echo and CT.   12/7/23. Breathing somewhat stable. No CP  12/8/23. CTS eval pending. c/w diuresis    PAST MEDICAL/SURGICAL/FAMILY/SOCIAL HISTORY:   AICD (automatic cardioverter/defibrillator) present x 3 . Replaced x 2. Presently right subclavian area  Arthritis   Celiac disease   Chronic atrial fibrillation   Hashimoto's thyroiditis   Hearing loss   History of cataract   History of CHF (congestive heart failure)   History of colon polyps   HTN (hypertension)   Hyperlipidemia, unspecified hyperlipidemia type   Hypothyroid   Iron deficiency anemia due to chronic blood loss   Left inguinal hernia   Mitral valve prolapse   Myocardial infarction 1990  Osteoporosis   Peripheral edema   Varicose veins BLE.     PAST SURGICAL HISTORY:  AICD (automatic cardioverter/defibrillator) present with PPM. Replaced x 2.  Artificial pacemaker   H/O colonoscopy last done 2009  H/O right inguinal hernia repair   History of cataract extraction Bilateral  History of heart surgery Mitral valve surgery for leaky valve 9/2020.     FAMILY HISTORY:  Father  Still living? No  Family history of dementia, Age at diagnosis: Age Unknown    Mother  Still living? No  Family history of diabetes mellitus, Age at diagnosis: Age Unknown  Family history of heart disease, Age at diagnosis: Age Unknown    Sibling  Still living? No  Family history of diabetes mellitus, Age at diagnosis: Age Unknown.    PREVIOUS DIAGNOSTIC TESTING:      ECHO: FINDINGS: 12/5/23: The left ventricle size is at the upper limits of normal, severe regional hypokinesis, and moderately reduced function. Definity was used to better visualize the endocardial border. Estimated left ventricular ejection fraction is 30-35 %. The left atrium is severely dilated. The right atrium appears dilated. A device wire is seen in the RV and RA. The right ventricle is at the upper limits of normal in size. The aortic valve appears mildly calcified. Valve opening seems to be restricted. Transaortic gradients are underestimated due to impaired left ventricle systolic function. RANJAN by continuity equation suggests mild aortic stenosis. Moderate (2+) aortic regurgitation is present. A mitral valve repair is noted. Moderate (2+) mitral regurgitation is present. The mean trans-mitral pressure gradient is 6 mmHg. The tricuspid valve leaflets are thin and pliable; valve motion is normal. Moderate (2+) tricuspid valve regurgitation is present. Moderate pulmonary hypertension. Normal appearing pulmonic valve structure. Mild pulmonic valvular regurgitation (1+) is present. Pleural effusion - massive bilateral pleural effusion. The IVC is dilated with decreased respiratory variation. A PFO is noted with color and spectral Doppler.    MEDICATIONS  (STANDING):  aspirin enteric coated 81 milliGRAM(s) Oral daily  atorvastatin 10 milliGRAM(s) Oral at bedtime  azithromycin  IVPB 500 milliGRAM(s) IV Intermittent every 24 hours  cefTRIAXone Injectable. 1000 milliGRAM(s) IV Push every 24 hours  citalopram 10 milliGRAM(s) Oral daily  digoxin     Tablet 62.5 MICROGram(s) Oral daily  furosemide   Injectable 40 milliGRAM(s) IV Push two times a day  levothyroxine 25 MICROGram(s) Oral daily  levothyroxine 100 MICROGram(s) Oral daily  sacubitril 24 mG/valsartan 26 mG 1 Tablet(s) Oral two times a day    MEDICATIONS  (PRN):  acetaminophen     Tablet .. 650 milliGRAM(s) Oral once PRN Mild Pain (1 - 3)    HOME MEDICATIONS:  ALPRAZolam 0.25 mg oral tablet: 1 tab(s) orally 2 times a day as needed for  anxiety (05 Dec 2023 11:08)  amiodarone 100 mg oral tablet: 1 tab(s) orally once a day (05 Dec 2023 11:04)  aspirin 81 mg oral delayed release tablet: 1 tab(s) orally once a day (05 Dec 2023 11:04)  atorvastatin 10 mg oral tablet: 1 tab(s) orally once a day (at bedtime) (05 Dec 2023 11:04)  Calcium 500+D oral tablet, chewable: 1 tab(s) orally 2 times a day (05 Dec 2023 11:04)  cephalexin 500 mg oral capsule: 1 cap(s) orally every 8 hours x  7 days ***Course Complete*** (05 Dec 2023 11:11)  citalopram 10 mg oral tablet: 1 tab(s) orally once a day (05 Dec 2023 11:11)  digoxin 125 mcg (0.125 mg) oral tablet: 0.5 tab(s) orally once a day (05 Dec 2023 11:04)  Entresto 24 mg-26 mg oral tablet: 1 tab(s) orally 2 times a day (05 Dec 2023 11:04)  furosemide 20 mg oral tablet: 1 tab(s) orally once a day ***pt doesn&#x27;t take consistently*** (05 Dec 2023 11:03)  levothyroxine 100 mcg (0.1 mg) oral tablet: 1 tab(s) orally once a day ***take with 25mcg = 125cg*** (05 Dec 2023 11:11)  levothyroxine 25 mcg (0.025 mg) oral tablet: 1 tab(s) orally once a day ***take with 100mcg = 125mcg*** (05 Dec 2023 11:11)  warfarin 1 mg oral tablet: 1 tab(s) orally every other day ***alternate with 0.5mg*** (05 Dec 2023 11:11)  warfarin 1 mg oral tablet: 0.5 tab(s) orally every other day ***alternate with 1mg*** (05 Dec 2023 11:08)    PHYSICAL EXAM:  T(C): 36.8 (07 Dec 2023 20:52), Max: 36.8 (07 Dec 2023 20:52)  T(F): 98.3 (07 Dec 2023 20:52), Max: 98.3 (07 Dec 2023 20:52)  HR: 81 (08 Dec 2023 04:45) (75 - 81)  BP: 123/45 (08 Dec 2023 04:45) (105/45 - 123/45)  BP(mean): 63 (07 Dec 2023 13:05) (63 - 63)  RR: 18 (07 Dec 2023 13:05) (18 - 18)  SpO2: 100% (07 Dec 2023 20:52) (100% - 100%)    Parameters below as of 07 Dec 2023 20:52  Patient On (Oxygen Delivery Method): nasal cannula  O2 Flow (L/min): 3    Constitutional: NAD, awake and alert  HEENT: Normal Hearing, MMM  Neck: Soft and supple, No LAD, No JVD  Respiratory: Breath sounds diminished b/l  Cardiovascular: S1 and S2, regular rate and rhythm, no Murmurs, gallops or rubs  Gastrointestinal: Bowel Sounds present, soft, nontender, nondistended, no guarding, no rebound  Extremities: No peripheral edema  Vascular: 2+ peripheral pulses  Neurological: A/O x 2/3, forgetful  Musculoskeletal: 5/5 strength b/l upper and lower extremities  Skin: No rashes    =======================================    INTERPRETATION OF TELEMETRY:    ECG: < from: 12 Lead ECG (12.05.23 @ 01:29) >  Diagnosis Line Ventricular-paced rhythm  Biventricular pacemaker detected  Abnormal ECG    ========================================    LABS: All Labs Reviewed:                        10.8   5.92  )-----------( 293      ( 08 Dec 2023 09:49 )             32.9     12-08    132<L>  |  92<L>  |  22  ----------------------------<  158<H>  3.5   |  35<H>  |  0.80    Ca    8.7      08 Dec 2023 09:49  Mg     2.1     12-08    TPro  7.1  /  Alb  2.5<L>  /  TBili  0.5  /  DBili  x   /  AST  26  /  ALT  18  /  AlkPhos  59  12-08    PT/INR - ( 07 Dec 2023 11:24 )   PT: 17.7 sec;   INR: 1.59 ratio      Troponin: 30.60 ng/L    BNP 08094     CARDIAC TESTING:    < from: TTE Echo Complete w/ Contrast w/ Doppler (12.05.23 @ 14:29) >   The left ventricle size is at the upper limits of normal, severe regional   hypokinesis, and moderately reduced function.   Definity was used to better visualize the endocardial border.   Estimated left ventricular ejection fraction is 30-35 %.   The left atrium is severely dilated.   The right atrium appears dilated.   A device wire is seen in the RV and RA.   The right ventricle is at the upper limits of normal in size.   The aortic valve appears mildly calcified. Valve opening seems to be   restricted.   Transaortic gradients are underestimated due to impaired left ventricle   systolic function.   RANJAN by continuity equation suggests mild aortic stenosis.   Moderate (2+) aortic regurgitation is present.   A mitral valve repair is noted.   Moderate (2+) mitral regurgitation is present.   The mean trans-mitral pressure gradient is 6 mmHg.   The tricuspid valve leaflets are thin and pliable; valve motion is   normal.   Moderate (2+) tricuspid valve regurgitation is present.   Moderatepulmonary hypertension.   Normal appearing pulmonic valve structure.   Mild pulmonic valvular regurgitation (1+) is present.   Pleural effusion - massive bilateral pleural effusion.   The IVC is dilated with decreased respiratory variation.   A PFO is noted with color and spectral Doppler.    · Underlying Rhythm	afib  · Comments	no episodes of rapid ventricular rates recorded, BiV pacing 98%  · Additional Procedure Details	normal function Bi-Ventricular ICD   may d/c amiodarone per Dr Alonzo.      RADIOLOGY & ADDITIONAL STUDIES:    < from: CT Chest No Cont (12.06.23 @ 15:10) >  Patchy multifocal airspace disease in the right upper lobe, left upper   lobe, and left lower lobe compatible with pneumonia.  Moderate right pleural effusion and small left pleural effusion.  3 cm left upper lobe mass and mediastinal lymphadenopathy; leading   diagnostic consideration is for primary lung malignancy with metastasis.      12/5/23: Xray Chest 1 View AP/PA: Increased perihilar interstitial markings and airspace densities. One should consider congestive changes and some underlying inflammatory infectious process. Right greater than left effusion with compressive atelectatic change. Cardiomegaly. Pacer as before. Findings appear progressive since previous exam regional osseous structures appropriate for age.   CHIEF COMPLAINT: Patient is a 88y old  Female who presents with a chief complaint of sob/hypoxia (05 Dec 2023 11:04)    FROM H&P: 87-year-old female with medical history of CHF, chronic atrial fibrillation on warfarin, s/p AICD, CAD, hypothyroidism, celiac disease presents from home after her HHA called 911 after she was agitated and refusing to take her meds. States she has been weak and sob. Also endorsing the weakness. Was found to be hypoxic to 88 % on RA. PT endorses productive cough and this am had scant blood tinged mucous.   Spoke to Dtjuan Lema on the phone. states her mother suffering from extreme anxiety and was started on xanax 2 weeks ago and since then, her cognition has suffered greatly. She is also having difficulty walking per aides. More forgetful and agitated at time which is not like her per daughter.     She is presently comfortable. 97% on 2L. Alert and oriented x 2  CXR: R>L effusion with e/o pvc, cannot r/o infiltrate  s/p iv lasix in ED  NA : 128    12/6/23: Cardiology consulted for HF. Hypoxia. +sob. C/w IV Lasix, check echo and CT.   12/7/23. Breathing somewhat stable. No CP  12/8/23. CTS eval pending. c/w diuresis    PAST MEDICAL/SURGICAL/FAMILY/SOCIAL HISTORY:   AICD (automatic cardioverter/defibrillator) present x 3 . Replaced x 2. Presently right subclavian area  Arthritis   Celiac disease   Chronic atrial fibrillation   Hashimoto's thyroiditis   Hearing loss   History of cataract   History of CHF (congestive heart failure)   History of colon polyps   HTN (hypertension)   Hyperlipidemia, unspecified hyperlipidemia type   Hypothyroid   Iron deficiency anemia due to chronic blood loss   Left inguinal hernia   Mitral valve prolapse   Myocardial infarction 1990  Osteoporosis   Peripheral edema   Varicose veins BLE.     PAST SURGICAL HISTORY:  AICD (automatic cardioverter/defibrillator) present with PPM. Replaced x 2.  Artificial pacemaker   H/O colonoscopy last done 2009  H/O right inguinal hernia repair   History of cataract extraction Bilateral  History of heart surgery Mitral valve surgery for leaky valve 9/2020.     FAMILY HISTORY:  Father  Still living? No  Family history of dementia, Age at diagnosis: Age Unknown    Mother  Still living? No  Family history of diabetes mellitus, Age at diagnosis: Age Unknown  Family history of heart disease, Age at diagnosis: Age Unknown    Sibling  Still living? No  Family history of diabetes mellitus, Age at diagnosis: Age Unknown.    PREVIOUS DIAGNOSTIC TESTING:      ECHO: FINDINGS: 12/5/23: The left ventricle size is at the upper limits of normal, severe regional hypokinesis, and moderately reduced function. Definity was used to better visualize the endocardial border. Estimated left ventricular ejection fraction is 30-35 %. The left atrium is severely dilated. The right atrium appears dilated. A device wire is seen in the RV and RA. The right ventricle is at the upper limits of normal in size. The aortic valve appears mildly calcified. Valve opening seems to be restricted. Transaortic gradients are underestimated due to impaired left ventricle systolic function. RANJAN by continuity equation suggests mild aortic stenosis. Moderate (2+) aortic regurgitation is present. A mitral valve repair is noted. Moderate (2+) mitral regurgitation is present. The mean trans-mitral pressure gradient is 6 mmHg. The tricuspid valve leaflets are thin and pliable; valve motion is normal. Moderate (2+) tricuspid valve regurgitation is present. Moderate pulmonary hypertension. Normal appearing pulmonic valve structure. Mild pulmonic valvular regurgitation (1+) is present. Pleural effusion - massive bilateral pleural effusion. The IVC is dilated with decreased respiratory variation. A PFO is noted with color and spectral Doppler.    MEDICATIONS  (STANDING):  aspirin enteric coated 81 milliGRAM(s) Oral daily  atorvastatin 10 milliGRAM(s) Oral at bedtime  azithromycin  IVPB 500 milliGRAM(s) IV Intermittent every 24 hours  cefTRIAXone Injectable. 1000 milliGRAM(s) IV Push every 24 hours  citalopram 10 milliGRAM(s) Oral daily  digoxin     Tablet 62.5 MICROGram(s) Oral daily  furosemide   Injectable 40 milliGRAM(s) IV Push two times a day  levothyroxine 25 MICROGram(s) Oral daily  levothyroxine 100 MICROGram(s) Oral daily  sacubitril 24 mG/valsartan 26 mG 1 Tablet(s) Oral two times a day    MEDICATIONS  (PRN):  acetaminophen     Tablet .. 650 milliGRAM(s) Oral once PRN Mild Pain (1 - 3)    HOME MEDICATIONS:  ALPRAZolam 0.25 mg oral tablet: 1 tab(s) orally 2 times a day as needed for  anxiety (05 Dec 2023 11:08)  amiodarone 100 mg oral tablet: 1 tab(s) orally once a day (05 Dec 2023 11:04)  aspirin 81 mg oral delayed release tablet: 1 tab(s) orally once a day (05 Dec 2023 11:04)  atorvastatin 10 mg oral tablet: 1 tab(s) orally once a day (at bedtime) (05 Dec 2023 11:04)  Calcium 500+D oral tablet, chewable: 1 tab(s) orally 2 times a day (05 Dec 2023 11:04)  cephalexin 500 mg oral capsule: 1 cap(s) orally every 8 hours x  7 days ***Course Complete*** (05 Dec 2023 11:11)  citalopram 10 mg oral tablet: 1 tab(s) orally once a day (05 Dec 2023 11:11)  digoxin 125 mcg (0.125 mg) oral tablet: 0.5 tab(s) orally once a day (05 Dec 2023 11:04)  Entresto 24 mg-26 mg oral tablet: 1 tab(s) orally 2 times a day (05 Dec 2023 11:04)  furosemide 20 mg oral tablet: 1 tab(s) orally once a day ***pt doesn&#x27;t take consistently*** (05 Dec 2023 11:03)  levothyroxine 100 mcg (0.1 mg) oral tablet: 1 tab(s) orally once a day ***take with 25mcg = 125cg*** (05 Dec 2023 11:11)  levothyroxine 25 mcg (0.025 mg) oral tablet: 1 tab(s) orally once a day ***take with 100mcg = 125mcg*** (05 Dec 2023 11:11)  warfarin 1 mg oral tablet: 1 tab(s) orally every other day ***alternate with 0.5mg*** (05 Dec 2023 11:11)  warfarin 1 mg oral tablet: 0.5 tab(s) orally every other day ***alternate with 1mg*** (05 Dec 2023 11:08)    PHYSICAL EXAM:  T(C): 36.8 (07 Dec 2023 20:52), Max: 36.8 (07 Dec 2023 20:52)  T(F): 98.3 (07 Dec 2023 20:52), Max: 98.3 (07 Dec 2023 20:52)  HR: 81 (08 Dec 2023 04:45) (75 - 81)  BP: 123/45 (08 Dec 2023 04:45) (105/45 - 123/45)  BP(mean): 63 (07 Dec 2023 13:05) (63 - 63)  RR: 18 (07 Dec 2023 13:05) (18 - 18)  SpO2: 100% (07 Dec 2023 20:52) (100% - 100%)    Parameters below as of 07 Dec 2023 20:52  Patient On (Oxygen Delivery Method): nasal cannula  O2 Flow (L/min): 3    Constitutional: NAD, awake and alert  HEENT: Normal Hearing, MMM  Neck: Soft and supple, No LAD, No JVD  Respiratory: Breath sounds diminished b/l  Cardiovascular: S1 and S2, regular rate and rhythm, no Murmurs, gallops or rubs  Gastrointestinal: Bowel Sounds present, soft, nontender, nondistended, no guarding, no rebound  Extremities: No peripheral edema  Vascular: 2+ peripheral pulses  Neurological: A/O x 2/3, forgetful  Musculoskeletal: 5/5 strength b/l upper and lower extremities  Skin: No rashes    =======================================    INTERPRETATION OF TELEMETRY:    ECG: < from: 12 Lead ECG (12.05.23 @ 01:29) >  Diagnosis Line Ventricular-paced rhythm  Biventricular pacemaker detected  Abnormal ECG    ========================================    LABS: All Labs Reviewed:                        10.8   5.92  )-----------( 293      ( 08 Dec 2023 09:49 )             32.9     12-08    132<L>  |  92<L>  |  22  ----------------------------<  158<H>  3.5   |  35<H>  |  0.80    Ca    8.7      08 Dec 2023 09:49  Mg     2.1     12-08    TPro  7.1  /  Alb  2.5<L>  /  TBili  0.5  /  DBili  x   /  AST  26  /  ALT  18  /  AlkPhos  59  12-08    PT/INR - ( 07 Dec 2023 11:24 )   PT: 17.7 sec;   INR: 1.59 ratio      Troponin: 30.60 ng/L    BNP 06520     CARDIAC TESTING:    < from: TTE Echo Complete w/ Contrast w/ Doppler (12.05.23 @ 14:29) >   The left ventricle size is at the upper limits of normal, severe regional   hypokinesis, and moderately reduced function.   Definity was used to better visualize the endocardial border.   Estimated left ventricular ejection fraction is 30-35 %.   The left atrium is severely dilated.   The right atrium appears dilated.   A device wire is seen in the RV and RA.   The right ventricle is at the upper limits of normal in size.   The aortic valve appears mildly calcified. Valve opening seems to be   restricted.   Transaortic gradients are underestimated due to impaired left ventricle   systolic function.   RANJAN by continuity equation suggests mild aortic stenosis.   Moderate (2+) aortic regurgitation is present.   A mitral valve repair is noted.   Moderate (2+) mitral regurgitation is present.   The mean trans-mitral pressure gradient is 6 mmHg.   The tricuspid valve leaflets are thin and pliable; valve motion is   normal.   Moderate (2+) tricuspid valve regurgitation is present.   Moderatepulmonary hypertension.   Normal appearing pulmonic valve structure.   Mild pulmonic valvular regurgitation (1+) is present.   Pleural effusion - massive bilateral pleural effusion.   The IVC is dilated with decreased respiratory variation.   A PFO is noted with color and spectral Doppler.    · Underlying Rhythm	afib  · Comments	no episodes of rapid ventricular rates recorded, BiV pacing 98%  · Additional Procedure Details	normal function Bi-Ventricular ICD   may d/c amiodarone per Dr Alonzo.      RADIOLOGY & ADDITIONAL STUDIES:    < from: CT Chest No Cont (12.06.23 @ 15:10) >  Patchy multifocal airspace disease in the right upper lobe, left upper   lobe, and left lower lobe compatible with pneumonia.  Moderate right pleural effusion and small left pleural effusion.  3 cm left upper lobe mass and mediastinal lymphadenopathy; leading   diagnostic consideration is for primary lung malignancy with metastasis.      12/5/23: Xray Chest 1 View AP/PA: Increased perihilar interstitial markings and airspace densities. One should consider congestive changes and some underlying inflammatory infectious process. Right greater than left effusion with compressive atelectatic change. Cardiomegaly. Pacer as before. Findings appear progressive since previous exam regional osseous structures appropriate for age.

## 2023-12-08 NOTE — PROGRESS NOTE ADULT - SUBJECTIVE AND OBJECTIVE BOX
Date of service  is equal to the date of entry    Patient is a 88y old  Female who presents with a chief complaint of sob/hypoxia (08 Dec 2023 15:18)    PAST MEDICAL & SURGICAL HISTORY:  Chronic atrial fibrillation      Hypothyroid      HTN (hypertension)      History of cataract      Hyperlipidemia, unspecified hyperlipidemia type      History of CHF (congestive heart failure)      Peripheral edema      History of colon polyps      Varicose veins  BLE      Arthritis      Celiac disease      Hashimoto's thyroiditis      Osteoporosis      Iron deficiency anemia due to chronic blood loss      AICD (automatic cardioverter/defibrillator) present  x 3 . Replaced x 2. Presently right subclavian area      Myocardial infarction  1990      Mitral valve prolapse      Left inguinal hernia      Hearing loss      AICD (automatic cardioverter/defibrillator) present  with PPM. Replaced x 2.      H/O right inguinal hernia repair      H/O colonoscopy  last done 2009      History of cataract extraction  Bilateral      Artificial pacemaker      History of heart surgery  Mitral valve surgery for leaky valve 9/2020        ANABEL RIGOTWYLA   88y    Female    BRIEF HOSPITAL COURSE:    Review of Systems:                       All other ROS are negative.    Allergies    Ancef (Unknown)  lidocaine (Rash; Angioedema)  adhesives (Rash)    Intolerances    Gluten (Diarrhea)        ICU Vital Signs Last 24 Hrs  T(C): 36.7 (08 Dec 2023 19:45), Max: 36.7 (08 Dec 2023 19:45)  T(F): 98.1 (08 Dec 2023 19:45), Max: 98.1 (08 Dec 2023 19:45)  HR: 82 (08 Dec 2023 19:45) (71 - 82)  BP: 121/56 (08 Dec 2023 19:45) (83/45 - 123/45)  BP(mean): --  ABP: --  ABP(mean): --  RR: 18 (08 Dec 2023 20:45) (18 - 19)  SpO2: 96% (08 Dec 2023 20:45) (96% - 100%)    O2 Parameters below as of 08 Dec 2023 20:45  Patient On (Oxygen Delivery Method): mask, nonrebreather          Physical Examination:    General:     HEENT:     PULM:     CVS:     ABD:     EXT:     SKIN:     Neuro:          LABS:                        10.8   5.92  )-----------( 293      ( 08 Dec 2023 09:49 )             32.9     12-08    132<L>  |  92<L>  |  22  ----------------------------<  158<H>  3.5   |  35<H>  |  0.80    Ca    8.7      08 Dec 2023 09:49  Mg     2.1     12-08    TPro  7.1  /  Alb  2.5<L>  /  TBili  0.5  /  DBili  x   /  AST  26  /  ALT  18  /  AlkPhos  59  12-08          CAPILLARY BLOOD GLUCOSE        PT/INR - ( 08 Dec 2023 15:02 )   PT: 16.9 sec;   INR: 1.51 ratio         PTT - ( 08 Dec 2023 15:02 )  PTT:36.4 sec  Urinalysis Basic - ( 08 Dec 2023 09:49 )    Color: x / Appearance: x / SG: x / pH: x  Gluc: 158 mg/dL / Ketone: x  / Bili: x / Urobili: x   Blood: x / Protein: x / Nitrite: x   Leuk Esterase: x / RBC: x / WBC x   Sq Epi: x / Non Sq Epi: x / Bacteria: x      CULTURES:  Rapid RVP Result: Glen (12-05 @ 14:39)      Medications:  MEDICATIONS  (STANDING):  aspirin enteric coated 81 milliGRAM(s) Oral daily  atorvastatin 10 milliGRAM(s) Oral at bedtime  azithromycin  IVPB 500 milliGRAM(s) IV Intermittent every 24 hours  cefTRIAXone Injectable. 1000 milliGRAM(s) IV Push every 24 hours  citalopram 10 milliGRAM(s) Oral daily  digoxin     Tablet 62.5 MICROGram(s) Oral daily  furosemide   Injectable 40 milliGRAM(s) IV Push two times a day  levothyroxine 100 MICROGram(s) Oral daily  levothyroxine 25 MICROGram(s) Oral daily  mirtazapine 7.5 milliGRAM(s) Oral at bedtime  sacubitril 24 mG/valsartan 26 mG 1 Tablet(s) Oral two times a day  zinc sulfate 220 milliGRAM(s) Oral daily    MEDICATIONS  (PRN):  acetaminophen     Tablet .. 650 milliGRAM(s) Oral once PRN Mild Pain (1 - 3)          RADIOLOGY/IMAGING/ECHO    Critical care point of care ultrasound:    Assessment/Plan:           Date of service  is equal to the date of entry    Patient is a 88y old  Female who presents with a chief complaint of sob/hypoxia (08 Dec 2023 15:18)    PAST MEDICAL & SURGICAL HISTORY:  Chronic atrial fibrillation      Hypothyroid      HTN (hypertension)      History of cataract      Hyperlipidemia, unspecified hyperlipidemia type      History of CHF (congestive heart failure)      Peripheral edema      History of colon polyps      Varicose veins  BLE      Arthritis      Celiac disease      Hashimoto's thyroiditis      Osteoporosis      Iron deficiency anemia due to chronic blood loss      AICD (automatic cardioverter/defibrillator) present  x 3 . Replaced x 2. Presently right subclavian area      Myocardial infarction  1990      Mitral valve prolapse      Left inguinal hernia      Hearing loss      AICD (automatic cardioverter/defibrillator) present  with PPM. Replaced x 2.      H/O right inguinal hernia repair      H/O colonoscopy  last done 2009      History of cataract extraction  Bilateral      Artificial pacemaker      History of heart surgery  Mitral valve surgery for leaky valve 9/2020        ANABEL RIGOTWYLA   88y    Female    BRIEF HOSPITAL COURSE:    Review of Systems:                       All other ROS are negative.    Allergies    Ancef (Unknown)  lidocaine (Rash; Angioedema)  adhesives (Rash)    Intolerances    Gluten (Diarrhea)        ICU Vital Signs Last 24 Hrs  T(C): 36.7 (08 Dec 2023 19:45), Max: 36.7 (08 Dec 2023 19:45)  T(F): 98.1 (08 Dec 2023 19:45), Max: 98.1 (08 Dec 2023 19:45)  HR: 82 (08 Dec 2023 19:45) (71 - 82)  BP: 121/56 (08 Dec 2023 19:45) (83/45 - 123/45)  BP(mean): --  ABP: --  ABP(mean): --  RR: 18 (08 Dec 2023 20:45) (18 - 19)  SpO2: 96% (08 Dec 2023 20:45) (96% - 100%)    O2 Parameters below as of 08 Dec 2023 20:45  Patient On (Oxygen Delivery Method): mask, nonrebreather          Physical Examination:    General:     HEENT:     PULM:     CVS:     ABD:     EXT:     SKIN:     Neuro:          LABS:                        10.8   5.92  )-----------( 293      ( 08 Dec 2023 09:49 )             32.9     12-08    132<L>  |  92<L>  |  22  ----------------------------<  158<H>  3.5   |  35<H>  |  0.80    Ca    8.7      08 Dec 2023 09:49  Mg     2.1     12-08    TPro  7.1  /  Alb  2.5<L>  /  TBili  0.5  /  DBili  x   /  AST  26  /  ALT  18  /  AlkPhos  59  12-08          CAPILLARY BLOOD GLUCOSE        PT/INR - ( 08 Dec 2023 15:02 )   PT: 16.9 sec;   INR: 1.51 ratio         PTT - ( 08 Dec 2023 15:02 )  PTT:36.4 sec  Urinalysis Basic - ( 08 Dec 2023 09:49 )    Color: x / Appearance: x / SG: x / pH: x  Gluc: 158 mg/dL / Ketone: x  / Bili: x / Urobili: x   Blood: x / Protein: x / Nitrite: x   Leuk Esterase: x / RBC: x / WBC x   Sq Epi: x / Non Sq Epi: x / Bacteria: x      CULTURES:  Rapid RVP Result: Glen (12-05 @ 14:39)      Medications:  MEDICATIONS  (STANDING):  aspirin enteric coated 81 milliGRAM(s) Oral daily  atorvastatin 10 milliGRAM(s) Oral at bedtime  azithromycin  IVPB 500 milliGRAM(s) IV Intermittent every 24 hours  cefTRIAXone Injectable. 1000 milliGRAM(s) IV Push every 24 hours  citalopram 10 milliGRAM(s) Oral daily  digoxin     Tablet 62.5 MICROGram(s) Oral daily  furosemide   Injectable 40 milliGRAM(s) IV Push two times a day  levothyroxine 100 MICROGram(s) Oral daily  levothyroxine 25 MICROGram(s) Oral daily  mirtazapine 7.5 milliGRAM(s) Oral at bedtime  sacubitril 24 mG/valsartan 26 mG 1 Tablet(s) Oral two times a day  zinc sulfate 220 milliGRAM(s) Oral daily    MEDICATIONS  (PRN):  acetaminophen     Tablet .. 650 milliGRAM(s) Oral once PRN Mild Pain (1 - 3)          RADIOLOGY/IMAGING/ECHO    Critical care point of care ultrasound:    Assessment/Plan:        Can do a supracla       Date of service  is equal to the date of entry    Patient is a 88y old  Female who presents with a chief complaint of sob/hypoxia (08 Dec 2023 15:18)    PAST MEDICAL & SURGICAL HISTORY:  Chronic atrial fibrillation      Hypothyroid      HTN (hypertension)      History of cataract      Hyperlipidemia, unspecified hyperlipidemia type      History of CHF (congestive heart failure)      Peripheral edema      History of colon polyps      Varicose veins  BLE      Arthritis      Celiac disease      Hashimoto's thyroiditis      Osteoporosis      Iron deficiency anemia due to chronic blood loss      AICD (automatic cardioverter/defibrillator) present  x 3 . Replaced x 2. Presently right subclavian area      Myocardial infarction  1990      Mitral valve prolapse      Left inguinal hernia      Hearing loss      AICD (automatic cardioverter/defibrillator) present  with PPM. Replaced x 2.      H/O right inguinal hernia repair      H/O colonoscopy  last done 2009      History of cataract extraction  Bilateral      Artificial pacemaker      History of heart surgery  Mitral valve surgery for leaky valve 9/2020        ANABEL DENVER   88y    Female    BRIEF HOSPITAL COURSE:    Review of Systems:                       All other ROS are negative.    Allergies    Ancef (Unknown)  lidocaine (Rash; Angioedema)  adhesives (Rash)    Intolerances    Gluten (Diarrhea)        ICU Vital Signs Last 24 Hrs  T(C): 36.7 (08 Dec 2023 19:45), Max: 36.7 (08 Dec 2023 19:45)  T(F): 98.1 (08 Dec 2023 19:45), Max: 98.1 (08 Dec 2023 19:45)  HR: 82 (08 Dec 2023 19:45) (71 - 82)  BP: 121/56 (08 Dec 2023 19:45) (83/45 - 123/45)  BP(mean): --  ABP: --  ABP(mean): --  RR: 18 (08 Dec 2023 20:45) (18 - 19)  SpO2: 96% (08 Dec 2023 20:45) (96% - 100%)    O2 Parameters below as of 08 Dec 2023 20:45  Patient On (Oxygen Delivery Method): mask, nonrebreather          Physical Examination:    General:  NAD    HEENT:  No JVD     PULM: bilateral BS absent RUL     CVS: s1 s2 reg     ABD: soft NT    EXT: trace edema     SKIN:  No crepitus     Neuro: confused          LABS:                        10.8   5.92  )-----------( 293      ( 08 Dec 2023 09:49 )             32.9     12-08    132<L>  |  92<L>  |  22  ----------------------------<  158<H>  3.5   |  35<H>  |  0.80    Ca    8.7      08 Dec 2023 09:49  Mg     2.1     12-08    TPro  7.1  /  Alb  2.5<L>  /  TBili  0.5  /  DBili  x   /  AST  26  /  ALT  18  /  AlkPhos  59  12-08          CAPILLARY BLOOD GLUCOSE        PT/INR - ( 08 Dec 2023 15:02 )   PT: 16.9 sec;   INR: 1.51 ratio         PTT - ( 08 Dec 2023 15:02 )  PTT:36.4 sec  Urinalysis Basic - ( 08 Dec 2023 09:49 )    Color: x / Appearance: x / SG: x / pH: x  Gluc: 158 mg/dL / Ketone: x  / Bili: x / Urobili: x   Blood: x / Protein: x / Nitrite: x   Leuk Esterase: x / RBC: x / WBC x   Sq Epi: x / Non Sq Epi: x / Bacteria: x      CULTURES:  Rapid RVP Result: Anuragc (12-05 @ 14:39)      Medications:  MEDICATIONS  (STANDING):  aspirin enteric coated 81 milliGRAM(s) Oral daily  atorvastatin 10 milliGRAM(s) Oral at bedtime  azithromycin  IVPB 500 milliGRAM(s) IV Intermittent every 24 hours  cefTRIAXone Injectable. 1000 milliGRAM(s) IV Push every 24 hours  citalopram 10 milliGRAM(s) Oral daily  digoxin     Tablet 62.5 MICROGram(s) Oral daily  furosemide   Injectable 40 milliGRAM(s) IV Push two times a day  levothyroxine 100 MICROGram(s) Oral daily  levothyroxine 25 MICROGram(s) Oral daily  mirtazapine 7.5 milliGRAM(s) Oral at bedtime  sacubitril 24 mG/valsartan 26 mG 1 Tablet(s) Oral two times a day  zinc sulfate 220 milliGRAM(s) Oral daily    MEDICATIONS  (PRN):  acetaminophen     Tablet .. 650 milliGRAM(s) Oral once PRN Mild Pain (1 - 3)          RADIOLOGY/IMAGING/ECHO    CXR R apical PTX   maybe a bit more sizeable.      Assessment/Plan:    87-year-old female with PMHx/o chronic systolic CHF, s/p mitral valve surgery, HTN, HLD, chronic atrial fibrillation on warfarin, s/p AICD, CAD s/p MI, hypothyroidism, celiac disease presented with SOB, productive cough, worsening forgetfullness and was admitted for acute on chronic systolic CHF, and multifocal pneumonia.  Patient was also found to have bilateral pleural effusions (R>L), 3cm DORIS mass with mediastinal adenopathy suspicious for lung Ca with mets.      R apical PTX after thoracentesis and drainage of of a R pleural effusion, 850 cc.      CXR shows persistent R apical PTX.    Asymptomatic    Try 100% fio2 via  NRBM for nitrogen washout.      Can do a supraclavicular aspiration if not resolving, if worsens, she will need pigtail drainage.         Date of service  is equal to the date of entry    Patient is a 88y old  Female who presents with a chief complaint of sob/hypoxia (08 Dec 2023 15:18)    PAST MEDICAL & SURGICAL HISTORY:  Chronic atrial fibrillation      Hypothyroid      HTN (hypertension)      History of cataract      Hyperlipidemia, unspecified hyperlipidemia type      History of CHF (congestive heart failure)      Peripheral edema      History of colon polyps      Varicose veins  BLE      Arthritis      Celiac disease      Hashimoto's thyroiditis      Osteoporosis      Iron deficiency anemia due to chronic blood loss      AICD (automatic cardioverter/defibrillator) present  x 3 . Replaced x 2. Presently right subclavian area      Myocardial infarction  1990      Mitral valve prolapse      Left inguinal hernia      Hearing loss      AICD (automatic cardioverter/defibrillator) present  with PPM. Replaced x 2.      H/O right inguinal hernia repair      H/O colonoscopy  last done 2009      History of cataract extraction  Bilateral      Artificial pacemaker      History of heart surgery  Mitral valve surgery for leaky valve 9/2020        ANABEL GOFF   88y    Female    BRIEF HOSPITAL COURSE:    Review of Systems:  R pleural effusion                      All other ROS are negative.    Allergies    Ancef (Unknown)  lidocaine (Rash; Angioedema)  adhesives (Rash)    Intolerances    Gluten (Diarrhea)        ICU Vital Signs Last 24 Hrs  T(C): 36.7 (08 Dec 2023 19:45), Max: 36.7 (08 Dec 2023 19:45)  T(F): 98.1 (08 Dec 2023 19:45), Max: 98.1 (08 Dec 2023 19:45)  HR: 82 (08 Dec 2023 19:45) (71 - 82)  BP: 121/56 (08 Dec 2023 19:45) (83/45 - 123/45)  BP(mean): --  ABP: --  ABP(mean): --  RR: 18 (08 Dec 2023 20:45) (18 - 19)  SpO2: 96% (08 Dec 2023 20:45) (96% - 100%)    O2 Parameters below as of 08 Dec 2023 20:45  Patient On (Oxygen Delivery Method): mask, nonrebreather          Physical Examination:    General:  NAD    HEENT:  No JVD     PULM: bilateral BS absent RUL     CVS: s1 s2 reg     ABD: soft NT    EXT: trace edema     SKIN:  No crepitus     Neuro: confused          LABS:                        10.8   5.92  )-----------( 293      ( 08 Dec 2023 09:49 )             32.9     12-08    132<L>  |  92<L>  |  22  ----------------------------<  158<H>  3.5   |  35<H>  |  0.80    Ca    8.7      08 Dec 2023 09:49  Mg     2.1     12-08    TPro  7.1  /  Alb  2.5<L>  /  TBili  0.5  /  DBili  x   /  AST  26  /  ALT  18  /  AlkPhos  59  12-08          CAPILLARY BLOOD GLUCOSE        PT/INR - ( 08 Dec 2023 15:02 )   PT: 16.9 sec;   INR: 1.51 ratio         PTT - ( 08 Dec 2023 15:02 )  PTT:36.4 sec  Urinalysis Basic - ( 08 Dec 2023 09:49 )    Color: x / Appearance: x / SG: x / pH: x  Gluc: 158 mg/dL / Ketone: x  / Bili: x / Urobili: x   Blood: x / Protein: x / Nitrite: x   Leuk Esterase: x / RBC: x / WBC x   Sq Epi: x / Non Sq Epi: x / Bacteria: x      CULTURES:  Rapid RVP Result: Anuragc (12-05 @ 14:39)      Medications:  MEDICATIONS  (STANDING):  aspirin enteric coated 81 milliGRAM(s) Oral daily  atorvastatin 10 milliGRAM(s) Oral at bedtime  azithromycin  IVPB 500 milliGRAM(s) IV Intermittent every 24 hours  cefTRIAXone Injectable. 1000 milliGRAM(s) IV Push every 24 hours  citalopram 10 milliGRAM(s) Oral daily  digoxin     Tablet 62.5 MICROGram(s) Oral daily  furosemide   Injectable 40 milliGRAM(s) IV Push two times a day  levothyroxine 100 MICROGram(s) Oral daily  levothyroxine 25 MICROGram(s) Oral daily  mirtazapine 7.5 milliGRAM(s) Oral at bedtime  sacubitril 24 mG/valsartan 26 mG 1 Tablet(s) Oral two times a day  zinc sulfate 220 milliGRAM(s) Oral daily    MEDICATIONS  (PRN):  acetaminophen     Tablet .. 650 milliGRAM(s) Oral once PRN Mild Pain (1 - 3)          RADIOLOGY/IMAGING/ECHO    CXR R apical PTX   maybe a bit more sizeable.      Assessment/Plan:    87-year-old female with PMHx/o chronic systolic CHF, s/p mitral valve surgery, HTN, HLD, chronic atrial fibrillation on warfarin, s/p AICD, CAD s/p MI, hypothyroidism, celiac disease presented with SOB, productive cough, worsening forgetfullness and was admitted for acute on chronic systolic CHF, and multifocal pneumonia.  Patient was also found to have bilateral pleural effusions (R>L), 3cm DORIS mass with mediastinal adenopathy suspicious for lung Ca with mets.      R apical PTX after thoracentesis and drainage of of a R pleural effusion, 850 cc.      CXR shows persistent R apical PTX.    Asymptomatic    Try 100% fio2 via  NRBM for nitrogen washout.      Repeat CXR in AM     Can do a supraclavicular aspiration if not resolving, if worsens, she will need pigtail drainage.

## 2023-12-08 NOTE — PROGRESS NOTE ADULT - SUBJECTIVE AND OBJECTIVE BOX
HOSPITALIST ATTENDING PROGRESS NOTE    Chart and meds reviewed.      Subjective: Patient seen and examined.  Currently resting comfortably.  I had a discussion with daughter.  Family still having discussions on regarding evaluation of lung mass.  Patient denies any chest pain or worsening shortness of breath at this time.  Continue to treat multifocal pneumonia with IV antibiotics ( Ceftriaxone and Azithromycin) .  Patient seen by CT surgery.  Plan for possible pigtail catheter placement.  Hold IV diresis today for hypotension. Will give 250cc IV bolus and continue to monitor closely.       Additional results/Imaging, I have personally reviewed:    LABS:                            10.8   5.92  )-----------( 293      ( 08 Dec 2023 09:49 )             32.9     12-08    132<L>  |  92<L>  |  22  ----------------------------<  158<H>  3.5   |  35<H>  |  0.80    Ca    8.7      08 Dec 2023 09:49  Mg     2.1     12-08    TPro  7.1  /  Alb  2.5<L>  /  TBili  0.5  /  DBili  x   /  AST  26  /  ALT  18  /  AlkPhos  59  12-08        LIVER FUNCTIONS - ( 08 Dec 2023 09:49 )  Alb: 2.5 g/dL / Pro: 7.1 gm/dL / ALK PHOS: 59 U/L / ALT: 18 U/L / AST: 26 U/L / GGT: x           PT/INR - ( 07 Dec 2023 11:24 )   PT: 17.7 sec;   INR: 1.59 ratio           Urinalysis Basic - ( 08 Dec 2023 09:49 )    Color: x / Appearance: x / SG: x / pH: x  Gluc: 158 mg/dL / Ketone: x  / Bili: x / Urobili: x   Blood: x / Protein: x / Nitrite: x   Leuk Esterase: x / RBC: x / WBC x   Sq Epi: x / Non Sq Epi: x / Bacteria: x              All other systems reviewed and found to be negative with the exception of what has been described above.    MEDICATIONS  (STANDING):  aspirin enteric coated 81 milliGRAM(s) Oral daily  atorvastatin 10 milliGRAM(s) Oral at bedtime  azithromycin  IVPB 500 milliGRAM(s) IV Intermittent every 24 hours  cefTRIAXone Injectable. 1000 milliGRAM(s) IV Push every 24 hours  citalopram 10 milliGRAM(s) Oral daily  digoxin     Tablet 62.5 MICROGram(s) Oral daily  furosemide   Injectable 40 milliGRAM(s) IV Push two times a day  levothyroxine 25 MICROGram(s) Oral daily  levothyroxine 100 MICROGram(s) Oral daily  sacubitril 24 mG/valsartan 26 mG 1 Tablet(s) Oral two times a day    MEDICATIONS  (PRN):  acetaminophen     Tablet .. 650 milliGRAM(s) Oral once PRN Mild Pain (1 - 3)      VITALS:  T(F): 97.8 (12-08-23 @ 08:30), Max: 98.3 (12-07-23 @ 20:52)  HR: 76 (12-08-23 @ 13:40) (75 - 81)  BP: 83/45 (12-08-23 @ 13:40) (83/45 - 123/45)  RR: 18 (12-08-23 @ 08:30) (18 - 18)  SpO2: 100% (12-08-23 @ 08:30) (100% - 100%)  Wt(kg): --    I&O's Summary      CAPILLARY BLOOD GLUCOSE          PHYSICAL EXAM:  Constitutional: NAD, awake , frail  HEENT: PERR, EOMI, Normal Hearing, MMM  Neck: Soft and supple, No LAD, No JVD  Respiratory:rales at bases  Cardiovascular: S1 and S2, regular rate and rhythm, no Murmurs, gallops or rubs  Gastrointestinal: Bowel Sounds present, soft, nontender, nondistended, no guarding, no rebound  Extremities: No peripheral edema  Vascular: 2+ peripheral pulses  Neurological: A/O x 2, no focal deficits  Musculoskeletal: 5/5 strength b/l upper and lower extremities  Skin: No rashes    CULTURES:      Telemetry, personally reviewed

## 2023-12-08 NOTE — PROGRESS NOTE ADULT - ASSESSMENT
87-year-old female with medical history of CHF, chronic atrial fibrillation on warfarin, s/p AICD, CAD, hypothyroidism, celiac disease presents from home after her HHA called 911 after she was agitated and refusing to take her meds. States she has been weak and sob. Also endorsing the weakness. Was found to be hypoxic to 88 % on RA. PT endorses productive cough and this am had scant blood tinged mucous.   Spoke to Dtr Pita on the phone. states her mother suffering from extreme anxiety and was started on xanax 2 weeks ago and since then, her cognition has suffered greatly. She is also having difficulty walking per aides. More forgetful and agitated at time which is not like her per daughter.     #Acute decompensated HFrEF EF 30-35%  #B/L pleural effusion R>L  #Pneumonia  #Chronic afib  #S/p ICD - Chronic afib   #Metabolic encephalopathy  #Hyponatremia  #DORIS Mass 3cm    - Monitor on tele. BNP 11476. Troponin negative x1. CK 73  - Continue Lasix 40mg - increased to 40mg BID, trend BMP  - Continue statin, digoxin and Entresto  - 12/5/23: TTE: EF 30-35%, Mod MR/TR/PulmHTN  - CT Chest > Patchy multifocal airspace disease in the right upper lobe, left upper lobe, and left lower lobe compatible with pneumonia. Moderate right pleural effusion and small left pleural effusion. 3 cm left upper lobe mass and mediastinal lymphadenopathy; leading diagnostic consideration is for primary lung malignancy with metastasis.  - Interrogate device > Chronic afib, no RVR. DC amiodarone.     Case d/w Dr. Villa  Will follow   87-year-old female with medical history of CHF, chronic atrial fibrillation on warfarin, s/p AICD, CAD, hypothyroidism, celiac disease presents from home after her HHA called 911 after she was agitated and refusing to take her meds. States she has been weak and sob. Also endorsing the weakness. Was found to be hypoxic to 88 % on RA. PT endorses productive cough and this am had scant blood tinged mucous.   Spoke to Dtr Pita on the phone. states her mother suffering from extreme anxiety and was started on xanax 2 weeks ago and since then, her cognition has suffered greatly. She is also having difficulty walking per aides. More forgetful and agitated at time which is not like her per daughter.     #Acute decompensated HFrEF EF 30-35%  #B/L pleural effusion R>L  #Pneumonia  #Chronic afib  #S/p ICD - Chronic afib   #Metabolic encephalopathy  #Hyponatremia  #DORIS Mass 3cm    - Monitor on tele. BNP 99399. Troponin negative x1. CK 73  - Continue Lasix 40mg - increased to 40mg BID, trend BMP  - Continue statin, digoxin and Entresto  - 12/5/23: TTE: EF 30-35%, Mod MR/TR/PulmHTN  - CT Chest > Patchy multifocal airspace disease in the right upper lobe, left upper lobe, and left lower lobe compatible with pneumonia. Moderate right pleural effusion and small left pleural effusion. 3 cm left upper lobe mass and mediastinal lymphadenopathy; leading diagnostic consideration is for primary lung malignancy with metastasis.  - Interrogate device > Chronic afib, no RVR. DC amiodarone.     Case d/w Dr. Villa  Will follow   87-year-old female with medical history of CHF, chronic atrial fibrillation on warfarin, s/p AICD, CAD, hypothyroidism, celiac disease presents from home after her HHA called 911 after she was agitated and refusing to take her meds. States she has been weak and sob. Also endorsing the weakness. Was found to be hypoxic to 88 % on RA. PT endorses productive cough and this am had scant blood tinged mucous.   Spoke to Dtr Pita on the phone. states her mother suffering from extreme anxiety and was started on xanax 2 weeks ago and since then, her cognition has suffered greatly. She is also having difficulty walking per aides. More forgetful and agitated at time which is not like her per daughter.     #Acute decompensated HFrEF EF 30-35%  #B/L pleural effusion  #Pneumonia  #Chronic afib  #S/p ICD - Chronic afib   #Metabolic encephalopathy  #Hyponatremia  #DORIS Mass 3cm    - Monitor on tele. BNP 64162. Troponin negative x1. CK 73  - Continue Lasix 40mg  BID, trend BMP  - Continue statin, digoxin and Entresto  - 12/5/23: TTE: EF 30-35%, Mod MR/TR/PulmHTN  - CT Chest > Patchy multifocal airspace disease in the right upper lobe, left upper lobe, and left lower lobe compatible with pneumonia. Moderate right pleural effusion and small left pleural effusion. 3 cm left upper lobe mass and mediastinal lymphadenopathy; leading diagnostic consideration is for primary lung malignancy with metastasis.  - CTS eval pending for pleural effusions  - Interrogate device > Chronic afib, no RVR. DC amiodarone.     Case d/w Dr. Castro  Will follow   87-year-old female with medical history of CHF, chronic atrial fibrillation on warfarin, s/p AICD, CAD, hypothyroidism, celiac disease presents from home after her HHA called 911 after she was agitated and refusing to take her meds. States she has been weak and sob. Also endorsing the weakness. Was found to be hypoxic to 88 % on RA. PT endorses productive cough and this am had scant blood tinged mucous.   Spoke to Dtr Pita on the phone. states her mother suffering from extreme anxiety and was started on xanax 2 weeks ago and since then, her cognition has suffered greatly. She is also having difficulty walking per aides. More forgetful and agitated at time which is not like her per daughter.     #Acute decompensated HFrEF EF 30-35%  #B/L pleural effusion  #Pneumonia  #Chronic afib  #S/p ICD - Chronic afib   #Metabolic encephalopathy  #Hyponatremia  #DORIS Mass 3cm    - Monitor on tele. BNP 64635. Troponin negative x1. CK 73  - Continue Lasix 40mg  BID, trend BMP  - Continue statin, digoxin and Entresto  - 12/5/23: TTE: EF 30-35%, Mod MR/TR/PulmHTN  - CT Chest > Patchy multifocal airspace disease in the right upper lobe, left upper lobe, and left lower lobe compatible with pneumonia. Moderate right pleural effusion and small left pleural effusion. 3 cm left upper lobe mass and mediastinal lymphadenopathy; leading diagnostic consideration is for primary lung malignancy with metastasis.  - CTS eval pending for pleural effusions  - Interrogate device > Chronic afib, no RVR. DC amiodarone.     Case d/w Dr. Castro  Will follow

## 2023-12-08 NOTE — CONSULT NOTE ADULT - SUBJECTIVE AND OBJECTIVE BOX
HPI: Pt is a 88y old Female with a PMHx of chronic systolic CHF, s/p mitral valve surgery, HTN, HLD, chronic atrial fibrillation on warfarin, s/p AICD, CAD s/p MI, hypothyroidism, celiac disease presented from home. Patient was agitated, refusing to take her meds and c/o sob/productive cough.  Patient was found to be hypoxic to 88 % on RA.   CXR on admission showed Increased perihilar interstitial markings and airspace densities. Right greater than left effusion with compressive atelectatic change. Cardiomegaly. CT Chest on 12/6 with Patchy multifocal airspace disease in the RUL, DORIS and LLL left upper compatible with pneumonia; Moderate right pleural effusion and small left pleural effusion; 3 cm left upper lobe mass and mediastinal lymphadenopathy; leading diagnostic consideration is for primary lung malignancy with metastasis. Patient admitted with acute on chronic systolic CHF (BNP 19150), diuresed, and started on IV antibiotics for pneumonia.  Thoracic surgery consulted for pleural effusions. Palliative Medicine Consult to further [facility]  12/8/23 Seen and examined at bedside. Awake and alert. Denies pain or dyspnea at rest.     PAIN: ( )Yes   (X )No    DYSPNEA: (X ) Yes  ( ) No  On exertion    PAST MEDICAL & SURGICAL HISTORY:  Chronic atrial fibrillation  Hypothyroid  HTN (hypertension)  History of cataract  Hyperlipidemia, unspecified hyperlipidemia type  History of CHF (congestive heart failure)  Peripheral edema  History of colon polyps  Varicose veins  BLE  Arthritis  Celiac disease  Hashimoto's thyroiditis  Osteoporosis  Iron deficiency anemia due to chronic blood loss  AICD (automatic cardioverter/defibrillator) present  x 3 . Replaced x 2. Presently right subclavian area  Myocardial infarction  1990  Mitral valve prolapse  Left inguinal hernia  Hearing loss  AICD (automatic cardioverter/defibrillator) present  with PPM. Replaced x 2.  H/O right inguinal hernia repair  H/O colonoscopy  last done 2009  History of cataract extraction  Bilateral  Artificial pacemaker  History of heart surgery  Mitral valve surgery for leaky valve 9/2020      SOCIAL HX:  Lives home with aides  Hx opiate tolerance ( )YES  ( X)NO    Baseline ADLs  (Prior to Admission)  ( X) Independent   ( )Dependent    FAMILY HISTORY:  Family history of diabetes mellitus (Mother, Sibling)  Family history of heart disease (Mother)  Family history of dementia (Father)    Review of Systems:  Physical Discomfort-mild  Dyspnea-on exertion  Anorexia-mod-severe  Weight Loss- yes  Cough-mod  Fatigue-mod  Weakness-severe    All other systems reviewed and negative    PHYSICAL EXAM:    Vital Signs Last 24 Hrs  T(C): 36.6 (08 Dec 2023 08:30), Max: 36.8 (07 Dec 2023 20:52)  T(F): 97.8 (08 Dec 2023 08:30), Max: 98.3 (07 Dec 2023 20:52)  HR: 76 (08 Dec 2023 13:40) (75 - 81)  BP: 83/45 (08 Dec 2023 13:40) (83/45 - 123/45)  RR: 18 (08 Dec 2023 08:30) (18 - 18)  SpO2: 100% (08 Dec 2023 08:30) (100% - 100%)  Parameters below as of 08 Dec 2023 08:30  Patient On (Oxygen Delivery Method): nasal cannula  O2 Flow (L/min): 3    PPSV2: 20-30 %  General:  Elderly female in bed in NAD  Mental Status: A&O X3/poor historian  HEENT: MMM  Lungs: clear to auscultation ernie  Cardiac: S1S2+  GI: abd soft NT ND + BS  : voids  Ext: GARCIA=strength  Neuro: no focal def      LABS:                        10.8   5.92  )-----------( 293      ( 08 Dec 2023 09:49 )             32.9     12-08    132<L>  |  92<L>  |  22  ----------------------------<  158<H>  3.5   |  35<H>  |  0.80    Ca    8.7      08 Dec 2023 09:49  Mg     2.1     12-08    TPro  7.1  /  Alb  2.5<L>  /  TBili  0.5  /  DBili  x   /  AST  26  /  ALT  18  /  AlkPhos  59  12-08    PT/INR - ( 07 Dec 2023 11:24 )   PT: 17.7 sec;   INR: 1.59 ratio      Albumin: Albumin: 2.5 g/dL (12-08 @ 09:49)      Allergies    Ancef (Unknown)  lidocaine (Rash; Angioedema)  adhesives (Rash)    Intolerances    Gluten (Diarrhea)    MEDICATIONS  (STANDING):  aspirin enteric coated 81 milliGRAM(s) Oral daily  atorvastatin 10 milliGRAM(s) Oral at bedtime  azithromycin  IVPB 500 milliGRAM(s) IV Intermittent every 24 hours  cefTRIAXone Injectable. 1000 milliGRAM(s) IV Push every 24 hours  citalopram 10 milliGRAM(s) Oral daily  digoxin     Tablet 62.5 MICROGram(s) Oral daily  furosemide   Injectable 40 milliGRAM(s) IV Push two times a day  levothyroxine 25 MICROGram(s) Oral daily  levothyroxine 100 MICROGram(s) Oral daily  sacubitril 24 mG/valsartan 26 mG 1 Tablet(s) Oral two times a day  warfarin 1 milliGRAM(s) Oral once    MEDICATIONS  (PRN):  acetaminophen     Tablet .. 650 milliGRAM(s) Oral once PRN Mild Pain (1 - 3)      RADIOLOGY/ADDITIONAL STUDIES:  < from: CT Chest No Cont (12.06.23 @ 15:10) >    ACC: [id number] EXAM:  CT CHEST   ORDERED BY: LAURYN HANKS     PROCEDURE DATE:  12/06/2023          INTERPRETATION:  HISTORY: Hypoxia. Shortness of breath. CHF.    EXAMINATION: CT CHEST was performed without IV contrast.    COMPARISON: 5/16/2003.    FINDINGS:    AIRWAYS, LUNGS, PLEURA: Lobulated anterior left upper lobe mass with   suspected mediastinal invasion measuring 3 x 2.6 cm (image 45, series 2).    Patchy consolidation and ground-glass opacification multifocally in the   right upper lobe, left upper lobe, left lower lobe superior segment.    Moderate size right pleural effusion with associated right middle lobe   and right lower lobe multisegmental atelectasis. Small left pleural   effusion and associated subsegmental left basilar atelectasis.    Central airways appear clear.    MEDIASTINUM: Cardiomegaly. No pericardial effusion. Normal caliber   thoracic aorta.    Right paratracheal node measures 3.1 x 2.7 cm (image 38, series 2) and AP   window node measures 2.1 x 1.4 cm (image 39, series 2).    IMAGED ABDOMEN: Unremarkable.    SOFT TISSUES: Unremarkable.    BONES: Unremarkable.      IMPRESSION:.    Patchy multifocal airspace disease in the right upper lobe, left upper   lobe, and left lower lobe compatible with pneumonia.    Moderate right pleural effusion and small left pleural effusion.    3 cm left upper lobe mass and mediastinal lymphadenopathy; leading   diagnostic consideration is for primary lung malignancy with metastasis.      < end of copied text >    < from: Xray Chest 1 View AP/PA. (12.05.23 @ 01:59) >    ACC: [id number] EXAM:  XR CHEST 1 VIEW   ORDERED BY: DEEP BENEDICT     PROCEDURE DATE:  12/05/2023          INTERPRETATION:  EXAM: XR CHEST    INDICATION: cough JXR    COMPARISON: September 3, 2022    IMPRESSION: Increased perihilar interstitial markings and airspace   densities. One should consider congestive changes and some underlying   inflammatory infectious process. Right greater than left effusion with   compressive atelectatic change. Cardiomegaly. Pacer as before. Findings   appear progressive since previous exam regional osseous structures   appropriate for age.    < end of copied text >       HPI: Pt is a 88y old Female with a PMHx of chronic systolic CHF, s/p mitral valve surgery, HTN, HLD, chronic atrial fibrillation on warfarin, s/p AICD, CAD s/p MI, hypothyroidism, celiac disease presented from home. Patient was agitated, refusing to take her meds and c/o sob/productive cough.  Patient was found to be hypoxic to 88 % on RA.   CXR on admission showed Increased perihilar interstitial markings and airspace densities. Right greater than left effusion with compressive atelectatic change. Cardiomegaly. CT Chest on 12/6 with Patchy multifocal airspace disease in the RUL, DORIS and LLL left upper compatible with pneumonia; Moderate right pleural effusion and small left pleural effusion; 3 cm left upper lobe mass and mediastinal lymphadenopathy; leading diagnostic consideration is for primary lung malignancy with metastasis. Patient admitted with acute on chronic systolic CHF (BNP 13720), diuresed, and started on IV antibiotics for pneumonia.  Thoracic surgery consulted for pleural effusions. Palliative Medicine Consult to further [facility]  12/8/23 Seen and examined at bedside. Awake and alert. Denies pain or dyspnea at rest.     PAIN: ( )Yes   (X )No    DYSPNEA: (X ) Yes  ( ) No  On exertion    PAST MEDICAL & SURGICAL HISTORY:  Chronic atrial fibrillation  Hypothyroid  HTN (hypertension)  History of cataract  Hyperlipidemia, unspecified hyperlipidemia type  History of CHF (congestive heart failure)  Peripheral edema  History of colon polyps  Varicose veins  BLE  Arthritis  Celiac disease  Hashimoto's thyroiditis  Osteoporosis  Iron deficiency anemia due to chronic blood loss  AICD (automatic cardioverter/defibrillator) present  x 3 . Replaced x 2. Presently right subclavian area  Myocardial infarction  1990  Mitral valve prolapse  Left inguinal hernia  Hearing loss  AICD (automatic cardioverter/defibrillator) present  with PPM. Replaced x 2.  H/O right inguinal hernia repair  H/O colonoscopy  last done 2009  History of cataract extraction  Bilateral  Artificial pacemaker  History of heart surgery  Mitral valve surgery for leaky valve 9/2020      SOCIAL HX:  Lives home with aides  Hx opiate tolerance ( )YES  ( X)NO    Baseline ADLs  (Prior to Admission)  ( X) Independent   ( )Dependent    FAMILY HISTORY:  Family history of diabetes mellitus (Mother, Sibling)  Family history of heart disease (Mother)  Family history of dementia (Father)    Review of Systems:  Physical Discomfort-mild  Dyspnea-on exertion  Anorexia-mod-severe  Weight Loss- yes  Cough-mod  Fatigue-mod  Weakness-severe    All other systems reviewed and negative    PHYSICAL EXAM:    Vital Signs Last 24 Hrs  T(C): 36.6 (08 Dec 2023 08:30), Max: 36.8 (07 Dec 2023 20:52)  T(F): 97.8 (08 Dec 2023 08:30), Max: 98.3 (07 Dec 2023 20:52)  HR: 76 (08 Dec 2023 13:40) (75 - 81)  BP: 83/45 (08 Dec 2023 13:40) (83/45 - 123/45)  RR: 18 (08 Dec 2023 08:30) (18 - 18)  SpO2: 100% (08 Dec 2023 08:30) (100% - 100%)  Parameters below as of 08 Dec 2023 08:30  Patient On (Oxygen Delivery Method): nasal cannula  O2 Flow (L/min): 3    PPSV2: 20-30 %  General:  Elderly female in bed in NAD  Mental Status: A&O X3/poor historian  HEENT: MMM  Lungs: clear to auscultation ernie  Cardiac: S1S2+  GI: abd soft NT ND + BS  : voids  Ext: GARCIA=strength  Neuro: no focal def      LABS:                        10.8   5.92  )-----------( 293      ( 08 Dec 2023 09:49 )             32.9     12-08    132<L>  |  92<L>  |  22  ----------------------------<  158<H>  3.5   |  35<H>  |  0.80    Ca    8.7      08 Dec 2023 09:49  Mg     2.1     12-08    TPro  7.1  /  Alb  2.5<L>  /  TBili  0.5  /  DBili  x   /  AST  26  /  ALT  18  /  AlkPhos  59  12-08    PT/INR - ( 07 Dec 2023 11:24 )   PT: 17.7 sec;   INR: 1.59 ratio      Albumin: Albumin: 2.5 g/dL (12-08 @ 09:49)      Allergies    Ancef (Unknown)  lidocaine (Rash; Angioedema)  adhesives (Rash)    Intolerances    Gluten (Diarrhea)    MEDICATIONS  (STANDING):  aspirin enteric coated 81 milliGRAM(s) Oral daily  atorvastatin 10 milliGRAM(s) Oral at bedtime  azithromycin  IVPB 500 milliGRAM(s) IV Intermittent every 24 hours  cefTRIAXone Injectable. 1000 milliGRAM(s) IV Push every 24 hours  citalopram 10 milliGRAM(s) Oral daily  digoxin     Tablet 62.5 MICROGram(s) Oral daily  furosemide   Injectable 40 milliGRAM(s) IV Push two times a day  levothyroxine 25 MICROGram(s) Oral daily  levothyroxine 100 MICROGram(s) Oral daily  sacubitril 24 mG/valsartan 26 mG 1 Tablet(s) Oral two times a day  warfarin 1 milliGRAM(s) Oral once    MEDICATIONS  (PRN):  acetaminophen     Tablet .. 650 milliGRAM(s) Oral once PRN Mild Pain (1 - 3)      RADIOLOGY/ADDITIONAL STUDIES:  < from: CT Chest No Cont (12.06.23 @ 15:10) >    ACC: [id number] EXAM:  CT CHEST   ORDERED BY: LAURYN HANKS     PROCEDURE DATE:  12/06/2023          INTERPRETATION:  HISTORY: Hypoxia. Shortness of breath. CHF.    EXAMINATION: CT CHEST was performed without IV contrast.    COMPARISON: 5/16/2003.    FINDINGS:    AIRWAYS, LUNGS, PLEURA: Lobulated anterior left upper lobe mass with   suspected mediastinal invasion measuring 3 x 2.6 cm (image 45, series 2).    Patchy consolidation and ground-glass opacification multifocally in the   right upper lobe, left upper lobe, left lower lobe superior segment.    Moderate size right pleural effusion with associated right middle lobe   and right lower lobe multisegmental atelectasis. Small left pleural   effusion and associated subsegmental left basilar atelectasis.    Central airways appear clear.    MEDIASTINUM: Cardiomegaly. No pericardial effusion. Normal caliber   thoracic aorta.    Right paratracheal node measures 3.1 x 2.7 cm (image 38, series 2) and AP   window node measures 2.1 x 1.4 cm (image 39, series 2).    IMAGED ABDOMEN: Unremarkable.    SOFT TISSUES: Unremarkable.    BONES: Unremarkable.      IMPRESSION:.    Patchy multifocal airspace disease in the right upper lobe, left upper   lobe, and left lower lobe compatible with pneumonia.    Moderate right pleural effusion and small left pleural effusion.    3 cm left upper lobe mass and mediastinal lymphadenopathy; leading   diagnostic consideration is for primary lung malignancy with metastasis.      < end of copied text >    < from: Xray Chest 1 View AP/PA. (12.05.23 @ 01:59) >    ACC: [id number] EXAM:  XR CHEST 1 VIEW   ORDERED BY: DEEP BENEDICT     PROCEDURE DATE:  12/05/2023          INTERPRETATION:  EXAM: XR CHEST    INDICATION: cough JXR    COMPARISON: September 3, 2022    IMPRESSION: Increased perihilar interstitial markings and airspace   densities. One should consider congestive changes and some underlying   inflammatory infectious process. Right greater than left effusion with   compressive atelectatic change. Cardiomegaly. Pacer as before. Findings   appear progressive since previous exam regional osseous structures   appropriate for age.    < end of copied text >

## 2023-12-08 NOTE — CONSULT NOTE ADULT - ASSESSMENT
HPI: Pt is a 88y old Female with a PMHx of chronic systolic CHF, s/p mitral valve surgery, HTN, HLD, chronic atrial fibrillation on warfarin, s/p AICD, CAD s/p MI, hypothyroidism, celiac disease presented from home. Patient was agitated, refusing to take her meds and c/o sob/productive cough.  Patient was found to be hypoxic to 88 % on RA.   CXR on admission showed Increased perihilar interstitial markings and airspace densities. Right greater than left effusion with compressive atelectatic change. Cardiomegaly. CT Chest on  with Patchy multifocal airspace disease in the RUL, DORIS and LLL left upper compatible with pneumonia; Moderate right pleural effusion and small left pleural effusion; 3 cm left upper lobe mass and mediastinal lymphadenopathy; leading diagnostic consideration is for primary lung malignancy with metastasis. Patient admitted with acute on chronic systolic CHF (BNP 13388), diuresed, and started on IV antibiotics for pneumonia.  Thoracic surgery consulted for pleural effusions. Palliative Medicine Consult to further eval GOC  23 Seen and examined at bedside. Awake and alert. Denies pain or dyspnea at rest.     Assessment and Plan:    1) DORIS mass  - Mass and mediastinal adenopathy, suspicious for primary lung cancer  -CT-chest 2023:  3 cm left upper lobe mass and mediastinal lymphadenopathy  -Daughter Aubrey made aware and will speak with pt regarding options in treatment  -Onc eval noted  -Cardio thoracic eval    2) Multifocal pneumonia  -Metabolic encephalopathy  -CT chest 2023--> Patchy multifocal airspace disease in the right upper lobe, left upper lobe, and left lower lobe compatible with pneumonia. Moderate right pleural effusion and small left pleural effusion  -ID following   -Follow BC x 2 UCx    3) Acute decompensated HFrEF EF 30-35%/ chronic Afib  - Cardiology following  - BNP 38575. Troponin negative x1. CK 73  - On Lasix 40mg - increased to 40mg BID  - Continue statin, digoxin and Entresto as per medicine  - Follow up echocardiogram    4) Debility  -Frail  -Poor PO intake  -Severe protein remigio malnutrition  -Nutrition   -PT eval    5) Advanced Directives  -Pt with limited capacity  -HCP naming  Dalton ( )   -Daughter Aubrey surrogate  -Will further discuss GOC with daughter    Time Spent: 75 minutes including the care, coordination and counseling of this patient, 50% of which was spent coordinating and counseling.        HPI: Pt is a 88y old Female with a PMHx of chronic systolic CHF, s/p mitral valve surgery, HTN, HLD, chronic atrial fibrillation on warfarin, s/p AICD, CAD s/p MI, hypothyroidism, celiac disease presented from home. Patient was agitated, refusing to take her meds and c/o sob/productive cough.  Patient was found to be hypoxic to 88 % on RA.   CXR on admission showed Increased perihilar interstitial markings and airspace densities. Right greater than left effusion with compressive atelectatic change. Cardiomegaly. CT Chest on  with Patchy multifocal airspace disease in the RUL, DORIS and LLL left upper compatible with pneumonia; Moderate right pleural effusion and small left pleural effusion; 3 cm left upper lobe mass and mediastinal lymphadenopathy; leading diagnostic consideration is for primary lung malignancy with metastasis. Patient admitted with acute on chronic systolic CHF (BNP 63694), diuresed, and started on IV antibiotics for pneumonia.  Thoracic surgery consulted for pleural effusions. Palliative Medicine Consult to further eval GOC  23 Seen and examined at bedside. Awake and alert. Denies pain or dyspnea at rest.     Assessment and Plan:    1) DORIS mass  - Mass and mediastinal adenopathy, suspicious for primary lung cancer  -CT-chest 2023:  3 cm left upper lobe mass and mediastinal lymphadenopathy  -Daughter Aubrey made aware and will speak with pt regarding options in treatment  -Onc eval noted  -Cardio thoracic eval    2) Multifocal pneumonia  -Metabolic encephalopathy  -CT chest 2023--> Patchy multifocal airspace disease in the right upper lobe, left upper lobe, and left lower lobe compatible with pneumonia. Moderate right pleural effusion and small left pleural effusion  -ID following   -Follow BC x 2 UCx    3) Acute decompensated HFrEF EF 30-35%/ chronic Afib  - Cardiology following  - BNP 56609. Troponin negative x1. CK 73  - On Lasix 40mg - increased to 40mg BID  - Continue statin, digoxin and Entresto as per medicine  - Follow up echocardiogram    4) Debility  -Frail  -Poor PO intake  -Severe protein remigio malnutrition  -Nutrition   -PT eval    5) Advanced Directives  -Pt with limited capacity  -HCP naming  Dalton ( )   -Daughter Aubrey surrogate  -Will further discuss GOC with daughter    Time Spent: 75 minutes including the care, coordination and counseling of this patient, 50% of which was spent coordinating and counseling.

## 2023-12-09 LAB
ALBUMIN FLD-MCNC: 1.5 G/DL — SIGNIFICANT CHANGE UP
ALBUMIN FLD-MCNC: 1.5 G/DL — SIGNIFICANT CHANGE UP
ALBUMIN SERPL ELPH-MCNC: 2.3 G/DL — LOW (ref 3.3–5)
ALBUMIN SERPL ELPH-MCNC: 2.3 G/DL — LOW (ref 3.3–5)
ALP SERPL-CCNC: 49 U/L — SIGNIFICANT CHANGE UP (ref 40–120)
ALP SERPL-CCNC: 49 U/L — SIGNIFICANT CHANGE UP (ref 40–120)
ALT FLD-CCNC: 18 U/L — SIGNIFICANT CHANGE UP (ref 12–78)
ALT FLD-CCNC: 18 U/L — SIGNIFICANT CHANGE UP (ref 12–78)
ANION GAP SERPL CALC-SCNC: 3 MMOL/L — LOW (ref 5–17)
ANION GAP SERPL CALC-SCNC: 3 MMOL/L — LOW (ref 5–17)
APTT BLD: 37.6 SEC — HIGH (ref 24.5–35.6)
APTT BLD: 37.6 SEC — HIGH (ref 24.5–35.6)
AST SERPL-CCNC: 27 U/L — SIGNIFICANT CHANGE UP (ref 15–37)
AST SERPL-CCNC: 27 U/L — SIGNIFICANT CHANGE UP (ref 15–37)
B PERT IGG+IGM PNL SER: ABNORMAL
B PERT IGG+IGM PNL SER: ABNORMAL
BILIRUB SERPL-MCNC: 0.5 MG/DL — SIGNIFICANT CHANGE UP (ref 0.2–1.2)
BILIRUB SERPL-MCNC: 0.5 MG/DL — SIGNIFICANT CHANGE UP (ref 0.2–1.2)
BUN SERPL-MCNC: 21 MG/DL — SIGNIFICANT CHANGE UP (ref 7–23)
BUN SERPL-MCNC: 21 MG/DL — SIGNIFICANT CHANGE UP (ref 7–23)
CALCIUM SERPL-MCNC: 8.8 MG/DL — SIGNIFICANT CHANGE UP (ref 8.5–10.1)
CALCIUM SERPL-MCNC: 8.8 MG/DL — SIGNIFICANT CHANGE UP (ref 8.5–10.1)
CHLORIDE SERPL-SCNC: 95 MMOL/L — LOW (ref 96–108)
CHLORIDE SERPL-SCNC: 95 MMOL/L — LOW (ref 96–108)
CO2 SERPL-SCNC: 37 MMOL/L — HIGH (ref 22–31)
CO2 SERPL-SCNC: 37 MMOL/L — HIGH (ref 22–31)
COLOR FLD: YELLOW — SIGNIFICANT CHANGE UP
COLOR FLD: YELLOW — SIGNIFICANT CHANGE UP
CREAT SERPL-MCNC: 0.76 MG/DL — SIGNIFICANT CHANGE UP (ref 0.5–1.3)
CREAT SERPL-MCNC: 0.76 MG/DL — SIGNIFICANT CHANGE UP (ref 0.5–1.3)
EGFR: 75 ML/MIN/1.73M2 — SIGNIFICANT CHANGE UP
EGFR: 75 ML/MIN/1.73M2 — SIGNIFICANT CHANGE UP
EOSINOPHIL # FLD: 1 % — SIGNIFICANT CHANGE UP
EOSINOPHIL # FLD: 1 % — SIGNIFICANT CHANGE UP
FLUID INTAKE SUBSTANCE CLASS: SIGNIFICANT CHANGE UP
FLUID INTAKE SUBSTANCE CLASS: SIGNIFICANT CHANGE UP
FOLATE+VIT B12 SERBLD-IMP: 0 % — SIGNIFICANT CHANGE UP
FOLATE+VIT B12 SERBLD-IMP: 0 % — SIGNIFICANT CHANGE UP
GLUCOSE FLD-MCNC: 119 MG/DL — SIGNIFICANT CHANGE UP
GLUCOSE FLD-MCNC: 119 MG/DL — SIGNIFICANT CHANGE UP
GLUCOSE SERPL-MCNC: 82 MG/DL — SIGNIFICANT CHANGE UP (ref 70–99)
GLUCOSE SERPL-MCNC: 82 MG/DL — SIGNIFICANT CHANGE UP (ref 70–99)
GRAM STN FLD: SIGNIFICANT CHANGE UP
GRAM STN FLD: SIGNIFICANT CHANGE UP
HCT VFR BLD CALC: 31.7 % — LOW (ref 34.5–45)
HCT VFR BLD CALC: 31.7 % — LOW (ref 34.5–45)
HGB BLD-MCNC: 10.5 G/DL — LOW (ref 11.5–15.5)
HGB BLD-MCNC: 10.5 G/DL — LOW (ref 11.5–15.5)
INR BLD: 1.53 RATIO — HIGH (ref 0.85–1.18)
INR BLD: 1.53 RATIO — HIGH (ref 0.85–1.18)
LDH SERPL L TO P-CCNC: 89 U/L — SIGNIFICANT CHANGE UP
LDH SERPL L TO P-CCNC: 89 U/L — SIGNIFICANT CHANGE UP
LYMPHOCYTES # FLD: 49 % — SIGNIFICANT CHANGE UP
LYMPHOCYTES # FLD: 49 % — SIGNIFICANT CHANGE UP
MAGNESIUM SERPL-MCNC: 2 MG/DL — SIGNIFICANT CHANGE UP (ref 1.6–2.6)
MAGNESIUM SERPL-MCNC: 2 MG/DL — SIGNIFICANT CHANGE UP (ref 1.6–2.6)
MCHC RBC-ENTMCNC: 33.1 GM/DL — SIGNIFICANT CHANGE UP (ref 32–36)
MCHC RBC-ENTMCNC: 33.1 GM/DL — SIGNIFICANT CHANGE UP (ref 32–36)
MCHC RBC-ENTMCNC: 33.5 PG — SIGNIFICANT CHANGE UP (ref 27–34)
MCHC RBC-ENTMCNC: 33.5 PG — SIGNIFICANT CHANGE UP (ref 27–34)
MCV RBC AUTO: 101.3 FL — HIGH (ref 80–100)
MCV RBC AUTO: 101.3 FL — HIGH (ref 80–100)
MESOTHL CELL # FLD: 0 % — SIGNIFICANT CHANGE UP
MESOTHL CELL # FLD: 0 % — SIGNIFICANT CHANGE UP
MONOS+MACROS # FLD: 35 % — SIGNIFICANT CHANGE UP
MONOS+MACROS # FLD: 35 % — SIGNIFICANT CHANGE UP
NEUTROPHILS-BODY FLUID: 15 % — SIGNIFICANT CHANGE UP
NEUTROPHILS-BODY FLUID: 15 % — SIGNIFICANT CHANGE UP
NRBC # FLD: 0 % — SIGNIFICANT CHANGE UP
NRBC # FLD: 0 % — SIGNIFICANT CHANGE UP
OTHER CELLS FLD MANUAL: 0 % — SIGNIFICANT CHANGE UP
OTHER CELLS FLD MANUAL: 0 % — SIGNIFICANT CHANGE UP
PH FLD: 7.6 — SIGNIFICANT CHANGE UP
PH FLD: 7.6 — SIGNIFICANT CHANGE UP
PLATELET # BLD AUTO: 278 K/UL — SIGNIFICANT CHANGE UP (ref 150–400)
PLATELET # BLD AUTO: 278 K/UL — SIGNIFICANT CHANGE UP (ref 150–400)
POTASSIUM SERPL-MCNC: 3.5 MMOL/L — SIGNIFICANT CHANGE UP (ref 3.5–5.3)
POTASSIUM SERPL-MCNC: 3.5 MMOL/L — SIGNIFICANT CHANGE UP (ref 3.5–5.3)
POTASSIUM SERPL-SCNC: 3.5 MMOL/L — SIGNIFICANT CHANGE UP (ref 3.5–5.3)
POTASSIUM SERPL-SCNC: 3.5 MMOL/L — SIGNIFICANT CHANGE UP (ref 3.5–5.3)
PROT FLD-MCNC: 3.6 G/DL — SIGNIFICANT CHANGE UP
PROT FLD-MCNC: 3.6 G/DL — SIGNIFICANT CHANGE UP
PROT SERPL-MCNC: 6.4 GM/DL — SIGNIFICANT CHANGE UP (ref 6–8.3)
PROT SERPL-MCNC: 6.4 GM/DL — SIGNIFICANT CHANGE UP (ref 6–8.3)
PROTHROM AB SERPL-ACNC: 17.1 SEC — HIGH (ref 9.5–13)
PROTHROM AB SERPL-ACNC: 17.1 SEC — HIGH (ref 9.5–13)
RBC # BLD: 3.13 M/UL — LOW (ref 3.8–5.2)
RBC # BLD: 3.13 M/UL — LOW (ref 3.8–5.2)
RBC # FLD: 14.6 % — HIGH (ref 10.3–14.5)
RBC # FLD: 14.6 % — HIGH (ref 10.3–14.5)
RCV VOL RI: HIGH /UL (ref 0–0)
RCV VOL RI: HIGH /UL (ref 0–0)
SODIUM SERPL-SCNC: 135 MMOL/L — SIGNIFICANT CHANGE UP (ref 135–145)
SODIUM SERPL-SCNC: 135 MMOL/L — SIGNIFICANT CHANGE UP (ref 135–145)
SPECIMEN SOURCE: SIGNIFICANT CHANGE UP
SPECIMEN SOURCE: SIGNIFICANT CHANGE UP
TOTAL NUCLEATED CELL COUNT, BODY FLUID: 174 /UL — SIGNIFICANT CHANGE UP
TOTAL NUCLEATED CELL COUNT, BODY FLUID: 174 /UL — SIGNIFICANT CHANGE UP
TUBE TYPE: SIGNIFICANT CHANGE UP
TUBE TYPE: SIGNIFICANT CHANGE UP
WBC # BLD: 5.56 K/UL — SIGNIFICANT CHANGE UP (ref 3.8–10.5)
WBC # BLD: 5.56 K/UL — SIGNIFICANT CHANGE UP (ref 3.8–10.5)
WBC # FLD AUTO: 5.56 K/UL — SIGNIFICANT CHANGE UP (ref 3.8–10.5)
WBC # FLD AUTO: 5.56 K/UL — SIGNIFICANT CHANGE UP (ref 3.8–10.5)

## 2023-12-09 PROCEDURE — 71045 X-RAY EXAM CHEST 1 VIEW: CPT | Mod: 26

## 2023-12-09 PROCEDURE — 99233 SBSQ HOSP IP/OBS HIGH 50: CPT

## 2023-12-09 PROCEDURE — 99222 1ST HOSP IP/OBS MODERATE 55: CPT

## 2023-12-09 RX ORDER — WARFARIN SODIUM 2.5 MG/1
1 TABLET ORAL ONCE
Refills: 0 | Status: COMPLETED | OUTPATIENT
Start: 2023-12-09 | End: 2023-12-09

## 2023-12-09 RX ORDER — WARFARIN SODIUM 2.5 MG/1
1 TABLET ORAL ONCE
Refills: 0 | Status: DISCONTINUED | OUTPATIENT
Start: 2023-12-09 | End: 2023-12-09

## 2023-12-09 RX ADMIN — CITALOPRAM 10 MILLIGRAM(S): 10 TABLET, FILM COATED ORAL at 11:46

## 2023-12-09 RX ADMIN — ZINC SULFATE TAB 220 MG (50 MG ZINC EQUIVALENT) 220 MILLIGRAM(S): 220 (50 ZN) TAB at 11:45

## 2023-12-09 RX ADMIN — WARFARIN SODIUM 1 MILLIGRAM(S): 2.5 TABLET ORAL at 23:11

## 2023-12-09 RX ADMIN — CEFTRIAXONE 1000 MILLIGRAM(S): 500 INJECTION, POWDER, FOR SOLUTION INTRAMUSCULAR; INTRAVENOUS at 11:41

## 2023-12-09 RX ADMIN — ATORVASTATIN CALCIUM 10 MILLIGRAM(S): 80 TABLET, FILM COATED ORAL at 21:46

## 2023-12-09 RX ADMIN — AZITHROMYCIN 255 MILLIGRAM(S): 500 TABLET, FILM COATED ORAL at 11:42

## 2023-12-09 RX ADMIN — Medication 81 MILLIGRAM(S): at 15:44

## 2023-12-09 RX ADMIN — Medication 40 MILLIGRAM(S): at 15:41

## 2023-12-09 RX ADMIN — Medication 25 MICROGRAM(S): at 05:08

## 2023-12-09 RX ADMIN — SACUBITRIL AND VALSARTAN 1 TABLET(S): 24; 26 TABLET, FILM COATED ORAL at 11:46

## 2023-12-09 RX ADMIN — Medication 40 MILLIGRAM(S): at 05:09

## 2023-12-09 RX ADMIN — Medication 62.5 MICROGRAM(S): at 11:40

## 2023-12-09 RX ADMIN — MIRTAZAPINE 7.5 MILLIGRAM(S): 45 TABLET, ORALLY DISINTEGRATING ORAL at 21:46

## 2023-12-09 RX ADMIN — Medication 100 MICROGRAM(S): at 05:08

## 2023-12-09 NOTE — PROGRESS NOTE ADULT - SUBJECTIVE AND OBJECTIVE BOX
HOSPITALIST ATTENDING PROGRESS NOTE    Chart and meds reviewed.      Subjective: Patient seen and examined.  Currently resting comfortably.  I had a discussion with daughter.  Family still having discussions on regarding evaluation of lung mass.  Patient denies any chest pain or worsening shortness of breath at this time.  Continue to treat multifocal pneumonia with IV antibiotics ( Ceftriaxone and Azithromycin) .  Patient seen by CT surgery.  Plan for possible pigtail catheter placement.  Hold IV diresis today for hypotension. Will give 250cc IV bolus and continue to monitor closely.     12/9: Seen and evaluated. s/p thoracentesis yesterday. No chest pain and SOB       Additional results/Imaging, I have personally reviewed:    LABS:                        10.5   5.56  )-----------( 278      ( 09 Dec 2023 06:50 )             31.7   12-09    135  |  95<L>  |  21  ----------------------------<  82  3.5   |  37<H>  |  0.76    Ca    8.8      09 Dec 2023 06:50  Mg     2.0     12-09    TPro  6.4  /  Alb  2.3<L>  /  TBili  0.5  /  DBili  x   /  AST  27  /  ALT  18  /  AlkPhos  49  12-09           Urinalysis Basic - ( 08 Dec 2023 09:49 )    Color: x / Appearance: x / SG: x / pH: x  Gluc: 158 mg/dL / Ketone: x  / Bili: x / Urobili: x   Blood: x / Protein: x / Nitrite: x   Leuk Esterase: x / RBC: x / WBC x   Sq Epi: x / Non Sq Epi: x / Bacteria: x              All other systems reviewed and found to be negative with the exception of what has been described above.    MEDICATIONS  (STANDING):  aspirin enteric coated 81 milliGRAM(s) Oral daily  atorvastatin 10 milliGRAM(s) Oral at bedtime  azithromycin  IVPB 500 milliGRAM(s) IV Intermittent every 24 hours  cefTRIAXone Injectable. 1000 milliGRAM(s) IV Push every 24 hours  citalopram 10 milliGRAM(s) Oral daily  digoxin     Tablet 62.5 MICROGram(s) Oral daily  furosemide   Injectable 40 milliGRAM(s) IV Push two times a day  levothyroxine 25 MICROGram(s) Oral daily  levothyroxine 100 MICROGram(s) Oral daily  mirtazapine 7.5 milliGRAM(s) Oral at bedtime  sacubitril 24 mG/valsartan 26 mG 1 Tablet(s) Oral two times a day  warfarin 1 milliGRAM(s) Oral once  zinc sulfate 220 milliGRAM(s) Oral daily    MEDICATIONS  (PRN):  acetaminophen     Tablet .. 650 milliGRAM(s) Oral once PRN Mild Pain (1 - 3)        Vital Signs Last 24 Hrs  T(C): 36.9 (09 Dec 2023 21:11), Max: 36.9 (09 Dec 2023 21:11)  T(F): 98.4 (09 Dec 2023 21:11), Max: 98.4 (09 Dec 2023 21:11)  HR: 76 (09 Dec 2023 21:11) (71 - 76)  BP: 97/47 (09 Dec 2023 21:11) (97/47 - 131/71)  RR: 18 (09 Dec 2023 21:11) (18 - 18)  SpO2: 99% (09 Dec 2023 21:11) (99% - 99%)    Parameters below as of 09 Dec 2023 21:11  Patient On (Oxygen Delivery Method): mask, nonrebreather          CAPILLARY BLOOD GLUCOSE          PHYSICAL EXAM:  Constitutional: NAD, awake , frail  HEENT: PERR, EOMI, Normal Hearing, MMM  Neck: Soft and supple, No LAD, No JVD  Respiratory:rales at bases  Cardiovascular: S1 and S2, regular rate and rhythm, no Murmurs, gallops or rubs  Gastrointestinal: Bowel Sounds present, soft, nontender, nondistended, no guarding, no rebound  Extremities: No peripheral edema  Vascular: 2+ peripheral pulses  Neurological: A/O x 2, no focal deficits  Musculoskeletal: 5/5 strength b/l upper and lower extremities  Skin: No rashes    CULTURES:      Telemetry, personally reviewed

## 2023-12-09 NOTE — PROGRESS NOTE ADULT - ASSESSMENT
A:    88yFemale  HD # 1    Here for:    1.    This patient requires a moderate level of care due to the complexity and severity of their condition which increases the amount and complexity of data to be reviewed and analyzed in addition to the risk of complications, morbidity and mortality of patient management    s/p thoracentesis for pleural effusion yesterday  Post thora concerning for apical PTX  She remains asymptomatic from this findings    Continue oxygen therapy  Serial CXR  If becomes symptomatic will require drainage of air   f/u pleural studies    Dispo: [facility] care.    Time spent on this patient encounter: 35+ minutes    Date of entry of this note is equal to the date of services rendered.    This includes assessment of the presenting problems with associated risks, reviewing the medical record to prepare for the encounter and meeting face to face with the patient to obtain additional history and conduct a focused exmaination. I have also performed an appropiate physical exam, made interventions listed and ordered and interpreted appropiate diagnostic studies as documented. This also included communication and coordination of care with the multidisciplinary team including the bedside nurse, appropriate attending of record and consultants as needed.         A:    88yFemale  HD # 1    Here for:    1.    This patient requires a moderate level of care due to the complexity and severity of their condition which increases the amount and complexity of data to be reviewed and analyzed in addition to the risk of complications, morbidity and mortality of patient management    s/p thoracentesis for pleural effusion yesterday  Post thora concerning for apical PTX  She remains asymptomatic from this findings    Continue oxygen therapy  Serial CXR  If becomes symptomatic will require drainage of air   f/u pleural studies    Dispo: [facility] care.    Time spent on this patient encounter: 35+ minutes    Date of entry of this note is equal to the date of services rendered.    This includes assessment of the presenting problems with associated risks, reviewing the medical record to prepare for the encounter and meeting face to face with the patient to obtain additional history and conduct a focused exmaination. I have also performed an appropiate physical exam, made interventions listed and ordered and interpreted appropiate diagnostic studies as documented. This also included communication and coordination of care with the multidisciplinary team including the bedside nurse, appropriate attending of record and consultants as needed.         A:    88yFemale  HD # 1    Here for:    1. Pleural effusion  2. PTX    This patient requires a moderate level of care due to the complexity and severity of their condition which increases the amount and complexity of data to be reviewed and analyzed in addition to the risk of complications, morbidity and mortality of patient management    s/p thoracentesis for pleural effusion yesterday  Post thora concerning for apical PTX  She remains asymptomatic from this findings    Continue oxygen therapy  Serial CXR  If becomes symptomatic will require drainage of air   f/u pleural studies    Dispo: Cont care.    Time spent on this patient encounter: 35+ minutes    Date of entry of this note is equal to the date of services rendered.    This includes assessment of the presenting problems with associated risks, reviewing the medical record to prepare for the encounter and meeting face to face with the patient to obtain additional history and conduct a focused exmaination. I have also performed an appropiate physical exam, made interventions listed and ordered and interpreted appropiate diagnostic studies as documented. This also included communication and coordination of care with the multidisciplinary team including the bedside nurse, appropriate attending of record and consultants as needed.

## 2023-12-09 NOTE — PROGRESS NOTE ADULT - SUBJECTIVE AND OBJECTIVE BOX
Date of service: 12-09-23 @ 12:27    Lying in bed in NAD  Has dry cough  SOB is improving  On O2 therapy  s/p thoracentesis    ROS: no fever or chills;  no HA, no abdominal pain, no diarrhea or constipation; no dysuria, no legs pain, no rashes    MEDICATIONS  (STANDING):  aspirin enteric coated 81 milliGRAM(s) Oral daily  atorvastatin 10 milliGRAM(s) Oral at bedtime  azithromycin  IVPB 500 milliGRAM(s) IV Intermittent every 24 hours  cefTRIAXone Injectable. 1000 milliGRAM(s) IV Push every 24 hours  citalopram 10 milliGRAM(s) Oral daily  digoxin     Tablet 62.5 MICROGram(s) Oral daily  furosemide   Injectable 40 milliGRAM(s) IV Push two times a day  levothyroxine 25 MICROGram(s) Oral daily  levothyroxine 100 MICROGram(s) Oral daily  mirtazapine 7.5 milliGRAM(s) Oral at bedtime  sacubitril 24 mG/valsartan 26 mG 1 Tablet(s) Oral two times a day  zinc sulfate 220 milliGRAM(s) Oral daily    Vital Signs Last 24 Hrs  T(C): 36.7 (08 Dec 2023 19:45), Max: 36.7 (08 Dec 2023 19:45)  T(F): 98.1 (08 Dec 2023 19:45), Max: 98.1 (08 Dec 2023 19:45)  HR: 71 (09 Dec 2023 05:00) (71 - 82)  BP: 131/71 (09 Dec 2023 05:00) (83/45 - 131/71)  BP(mean): --  RR: 18 (08 Dec 2023 20:45) (18 - 19)  SpO2: 96% (08 Dec 2023 20:45) (96% - 98%)    Parameters below as of 08 Dec 2023 20:45  Patient On (Oxygen Delivery Method): mask, nonrebreather         Physical exam:    Constitutional:  No acute distress  HEENT: NC/AT, EOMI, PERRLA, conjunctivae clear; ears and nose atraumatic  Neck: supple; thyroid not palpable  Back: no tenderness  Respiratory: respiratory effort normal; crackles at bases  Cardiovascular: S1S2 regular, no murmurs  Abdomen: soft, not tender, not distended, positive BS  Genitourinary: no suprapubic tenderness  Lymphatic: no LN palpable  Musculoskeletal: no muscle tenderness, no joint swelling or tenderness  Extremities: no pedal edema  Neurological/ Psychiatric: Alert, moving all extremities  Skin: no rashes; no palpable lesions    Labs: reviewed                        10.5   5.56  )-----------( 278      ( 09 Dec 2023 06:50 )             31.7     12-09    135  |  95<L>  |  21  ----------------------------<  82  3.5   |  37<H>  |  0.76    Ca    8.8      09 Dec 2023 06:50  Mg     2.0     12-09    TPro  6.4  /  Alb  2.3<L>  /  TBili  0.5  /  DBili  x   /  AST  27  /  ALT  18  /  AlkPhos  49  12-09                        10.8   5.92  )-----------( 293      ( 08 Dec 2023 09:49 )             32.9     12-08    132<L>  |  92<L>  |  22  ----------------------------<  158<H>  3.5   |  35<H>  |  0.80    Ca    8.7      08 Dec 2023 09:49  Mg     2.1     12-08    TPro  7.1  /  Alb  2.5<L>  /  TBili  0.5  /  DBili  x   /  AST  26  /  ALT  18  /  AlkPhos  59  12-08                        10.6   5.87  )-----------( 275      ( 07 Dec 2023 06:53 )             32.0     12-07    134<L>  |  95<L>  |  19  ----------------------------<  76  3.5   |  34<H>  |  0.76    Ca    8.7      07 Dec 2023 06:53  Mg     2.1     12-07    TPro  6.8  /  Alb  2.5<L>  /  TBili  1.0  /  DBili  x   /  AST  26  /  ALT  17  /  AlkPhos  60  12-07     LIVER FUNCTIONS - ( 07 Dec 2023 06:53 )  Alb: 2.5 g/dL / Pro: 6.8 gm/dL / ALK PHOS: 60 U/L / ALT: 17 U/L / AST: 26 U/L / GGT: x           (12-05 @ 14:39)  NotDetec    Culture - Fungal, Body Fluid (collected 08 Dec 2023 19:40)  Source: Pleural Fl Pleural Fluid  Preliminary Report (09 Dec 2023 08:19):    Testing in progress    Culture - Body Fluid with Gram Stain (collected 08 Dec 2023 19:40)  Source: Pleural Fl Pleural Fluid  Gram Stain (09 Dec 2023 04:42):    No polymorphonuclear cells seen    No organisms seen    by cytocentrifuge    Radiology: all available radiological tests reviewed    < from: CT Chest No Cont (12.06.23 @ 15:10) >  Patchy multifocal airspace disease in the right upper lobe, left upper   lobe, and left lower lobe compatible with pneumonia.  Moderate right pleural effusion and small left pleural effusion.  3 cm left upper lobe mass and mediastinal lymphadenopathy; leading   diagnostic consideration is for primary lung malignancy with metastasis.  < end of copied text >      Advanced directives addressed: full resuscitation

## 2023-12-09 NOTE — PROGRESS NOTE ADULT - SUBJECTIVE AND OBJECTIVE BOX
Patient is a 88y old  Female who presents with a chief complaint of sob/hypoxia (08 Dec 2023 23:34)  12/9- denies CP or SOB       MEDICATIONS  (STANDING):  aspirin enteric coated 81 milliGRAM(s) Oral daily  atorvastatin 10 milliGRAM(s) Oral at bedtime  azithromycin  IVPB 500 milliGRAM(s) IV Intermittent every 24 hours  cefTRIAXone Injectable. 1000 milliGRAM(s) IV Push every 24 hours  citalopram 10 milliGRAM(s) Oral daily  digoxin     Tablet 62.5 MICROGram(s) Oral daily  furosemide   Injectable 40 milliGRAM(s) IV Push two times a day  levothyroxine 25 MICROGram(s) Oral daily  levothyroxine 100 MICROGram(s) Oral daily  mirtazapine 7.5 milliGRAM(s) Oral at bedtime  sacubitril 24 mG/valsartan 26 mG 1 Tablet(s) Oral two times a day  zinc sulfate 220 milliGRAM(s) Oral daily    MEDICATIONS  (PRN):  acetaminophen     Tablet .. 650 milliGRAM(s) Oral once PRN Mild Pain (1 - 3)            Vital Signs Last 24 Hrs  T(C): 36.7 (08 Dec 2023 19:45), Max: 36.7 (08 Dec 2023 19:45)  T(F): 98.1 (08 Dec 2023 19:45), Max: 98.1 (08 Dec 2023 19:45)  HR: 71 (09 Dec 2023 05:00) (71 - 82)  BP: 131/71 (09 Dec 2023 05:00) (83/45 - 131/71)  BP(mean): --  RR: 18 (08 Dec 2023 20:45) (18 - 19)  SpO2: 96% (08 Dec 2023 20:45) (96% - 98%)    Parameters below as of 08 Dec 2023 20:45  Patient On (Oxygen Delivery Method): mask, nonrebreather                INTERPRETATION OF TELEMETRY:    ECG:        LABS:                        10.5   5.56  )-----------( 278      ( 09 Dec 2023 06:50 )             31.7     12-09    135  |  95<L>  |  21  ----------------------------<  82  3.5   |  37<H>  |  0.76    Ca    8.8      09 Dec 2023 06:50  Mg     2.0     12-09    TPro  6.4  /  Alb  2.3<L>  /  TBili  0.5  /  DBili  x   /  AST  27  /  ALT  18  /  AlkPhos  49  12-09        PT/INR - ( 09 Dec 2023 06:50 )   PT: 17.1 sec;   INR: 1.53 ratio         PTT - ( 09 Dec 2023 06:50 )  PTT:37.6 sec  Urinalysis Basic - ( 09 Dec 2023 06:50 )    Color: x / Appearance: x / SG: x / pH: x  Gluc: 82 mg/dL / Ketone: x  / Bili: x / Urobili: x   Blood: x / Protein: x / Nitrite: x   Leuk Esterase: x / RBC: x / WBC x   Sq Epi: x / Non Sq Epi: x / Bacteria: x      I&O's Summary    BNP  RADIOLOGY & ADDITIONAL STUDIES:

## 2023-12-09 NOTE — PROGRESS NOTE ADULT - ASSESSMENT
88 y/o female with h/o CHF, chronic atrial fibrillation on warfarin, s/p AICD, CAD, hypothyroidism, celiac disease, anxiety disorder was admitted on 12/6 for increased confusion and restlessness. She became agitated and refusing to take her meds. States she has been weak, cough with scant blood tinged sputum and SOB. In ER she was noted hypoxic to 88 % on RA. Dtr Pita stated her mother suffering from extreme anxiety and was started on xanax 2 weeks ago and since then, her cognition has suffered greatly. More forgetful and agitated at time which is not like her per daughter. In ER she received ceftriaxone.     1. Multifocal pneumonia. Right pleural effusion s/p thoracentesis. CHF exacerbation. Metabolic encephalopathy. Allergy to Ancef.   -mo alert  -respiratory improving  -pleural fluid c/s collected  -on ceftriaxone 1 gm IV qd and azithromycin 500 mg IV qd # 3  -tolerating abx well so far; no side effects noted  -monitor closely in armida of Ancef allergy history  -respiratory care  -continue abx coverage   -f/u cultures  -monitor temps  -f/u CBC  -supportive care  2. Other issues:   -care per medicine

## 2023-12-09 NOTE — PROGRESS NOTE ADULT - SUBJECTIVE AND OBJECTIVE BOX
CTS Progress Note    HPI:    S:    Pt seen and examined  HD #  88y  Female  PMHx/o chronic systolic CHF, s/p mitral valve surgery, HTN, HLD, chronic atrial fibrillation on warfarin, s/p AICD, CAD s/p MI, hypothyroidism, celiac disease presented from home after her HHA called 911. Patient was agitated, refusing to take her meds and c/o sob/productive cough.  Patient was found to be hypoxic to 88 % on RA.   Patient had been more agitated and more forgetful per family.    CXR on admission showed Increased perihilar interstitial markings and airspace densities. Right greater than left effusion with compressive atelectatic change. Cardiomegaly. CT Chest on 12/6 with Patchy multifocal airspace disease in the RUL, DORIS and LLL left upper compatible with pneumonia; Moderate right pleural effusion and small left pleural effusion; 3 cm left upper lobe mass and mediastinal lymphadenopathy; leading diagnostic consideration is for primary lung malignancy with metastasis.  Patient admitted with acute on chronic systolic CHF (BNP 91190), diuresed, and started on IV antibiotics for pneumonia.  Thoracic surgery consulted for pleural effusions.    12/9: s/p thoracentesis for effusion, post CXR shows apical PTX, for which she is and remains asymptomatic Remains present on AM CXR.    ROS: Unable to obtain 2/2 Pt condition (confusion)    Allergies    Ancef (Unknown)  lidocaine (Rash; Angioedema)  adhesives (Rash)    Intolerances    Gluten (Diarrhea)      MEDICATIONS  (STANDING):  aspirin enteric coated 81 milliGRAM(s) Oral daily  atorvastatin 10 milliGRAM(s) Oral at bedtime  azithromycin  IVPB 500 milliGRAM(s) IV Intermittent every 24 hours  cefTRIAXone Injectable. 1000 milliGRAM(s) IV Push every 24 hours  citalopram 10 milliGRAM(s) Oral daily  digoxin     Tablet 62.5 MICROGram(s) Oral daily  furosemide   Injectable 40 milliGRAM(s) IV Push two times a day  levothyroxine 25 MICROGram(s) Oral daily  levothyroxine 100 MICROGram(s) Oral daily  mirtazapine 7.5 milliGRAM(s) Oral at bedtime  sacubitril 24 mG/valsartan 26 mG 1 Tablet(s) Oral two times a day  zinc sulfate 220 milliGRAM(s) Oral daily    MEDICATIONS  (PRN):  acetaminophen     Tablet .. 650 milliGRAM(s) Oral once PRN Mild Pain (1 - 3)      Drug Dosing Weight  Height (cm): 170.2 (04 Dec 2023 23:50)  Weight (kg): 56.699 (04 Dec 2023 23:50)  BMI (kg/m2): 19.6 (04 Dec 2023 23:50)  BSA (m2): 1.66 (04 Dec 2023 23:50)    PAST MEDICAL & SURGICAL HISTORY:  Chronic atrial fibrillation      Hypothyroid      HTN (hypertension)      History of cataract      Hyperlipidemia, unspecified hyperlipidemia type      History of CHF (congestive heart failure)      Peripheral edema      History of colon polyps      Varicose veins  BLE      Arthritis      Celiac disease      Hashimoto's thyroiditis      Osteoporosis      Iron deficiency anemia due to chronic blood loss      AICD (automatic cardioverter/defibrillator) present  x 3 . Replaced x 2. Presently right subclavian area      Myocardial infarction  1990      Mitral valve prolapse      Left inguinal hernia      Hearing loss      AICD (automatic cardioverter/defibrillator) present  with PPM. Replaced x 2.      H/O right inguinal hernia repair      H/O colonoscopy  last done 2009      History of cataract extraction  Bilateral      Artificial pacemaker      History of heart surgery  Mitral valve surgery for leaky valve 9/2020          FAMILY HISTORY:  Family history of diabetes mellitus (Mother, Sibling)    Family history of heart disease (Mother)    Family history of dementia (Father)        UNLESS OTHERWISE NOTED IN HPI above:    Constitutional:  No Weight Change, No Fever, No Chills, No Night Sweats, No Fatigue, No Malaise  ENT/Mouth:  No Hearing Changes, No Ear Pain, No Nasal Congestion, No  Sinus Pain, No Hoarseness, No sore throat, No Rhinorrhea, No Swallowing  Difficulty  Eyes:  No Eye Pain, No Swelling, No Redness, No Foreign Body, No Discharge, No Vision Changes  Cardiovascular:  No Chest Pain, No SOB, No PND, No Dyspnea on Exertion,  No Orthopnea, No Claudication, No Edema, No Palpitations  Respiratory:  No Cough, No Sputum, No Wheezing, No Smoke Exposure, No Dyspnea  Gastrointestinal:  No Nausea, No Vomiting, No Diarrhea, No  Constipation, No Pain, No Heartburn, No Anorexia, No Dysphagia, No  Hematochezia, No Melena, No Flatulence, No Jaundice  Genitourinary:  No Dysmenorrhea, No DUB, No Dyspareunia, No Dysuria, No  Urinary Frequency, No Hematuria, No Urinary Incontinence, No Urgency,  No Flank Pain, No Urinary Flow Changes, No Hesitancy  Musculoskeletal:  No Arthralgias, No Myalgias, No Joint Swelling, No  Joint Stiffness, No Back Pain, No Neck Pain, No Injury History  Skin:  No Skin Lesions, No Pruritis, No Hair Changes, No Breast/Skin Changes, No Nipple Discharge  Neuro:  No Weakness, No Numbness, No Paresthesias, No Loss of  Consciousness, No Syncope, No Dizziness, No Headache, No Coordination  Changes, No Recent Falls  Psych:  No Anxiety/Panic, No Depression, No Insomnia, No Personality  Changes, No Delusions, No Rumination, No SI/HI/AH/VH, No Social Issues,  No Memory Changes, No Violence/Abuse Hx., No Eating Concerns  Heme/Lymph:  No Bruising, No Bleeding, No Transfusions History, No Lymphadenopathy  Endocrine:  No Polyuria, No Polydipsia, No Temperature Intolerance    O:    ICU Vital Signs Last 24 Hrs  T(C): 36.7 (08 Dec 2023 19:45), Max: 36.7 (08 Dec 2023 19:45)  T(F): 98.1 (08 Dec 2023 19:45), Max: 98.1 (08 Dec 2023 19:45)  HR: 71 (09 Dec 2023 05:00) (71 - 82)  BP: 131/71 (09 Dec 2023 05:00) (83/45 - 131/71)  BP(mean): --  ABP: --  ABP(mean): --  RR: 18 (08 Dec 2023 20:45) (18 - 19)  SpO2: 96% (08 Dec 2023 20:45) (96% - 98%)    O2 Parameters below as of 08 Dec 2023 20:45  Patient On (Oxygen Delivery Method): mask, nonrebreather                I&O's Detail          PE:    Adult lying in bed  No JVD trachea midline  Normocephalic, atraumatic  S1S2+  Dec BS DORIS  Abd soft NTND  No leg swelling/edema noted  Confused  Skin pink, warm    LABS:    CBC Full  -  ( 09 Dec 2023 06:50 )  WBC Count : 5.56 K/uL  RBC Count : 3.13 M/uL  Hemoglobin : 10.5 g/dL  Hematocrit : 31.7 %  Platelet Count - Automated : 278 K/uL  Mean Cell Volume : 101.3 fl  Mean Cell Hemoglobin : 33.5 pg  Mean Cell Hemoglobin Concentration : 33.1 gm/dL  Auto Neutrophil # : x  Auto Lymphocyte # : x  Auto Monocyte # : x  Auto Eosinophil # : x  Auto Basophil # : x  Auto Neutrophil % : x  Auto Lymphocyte % : x  Auto Monocyte % : x  Auto Eosinophil % : x  Auto Basophil % : x    12-09    135  |  95<L>  |  21  ----------------------------<  82  3.5   |  37<H>  |  0.76    Ca    8.8      09 Dec 2023 06:50  Mg     2.0     12-09    TPro  6.4  /  Alb  2.3<L>  /  TBili  0.5  /  DBili  x   /  AST  27  /  ALT  18  /  AlkPhos  49  12-09    PT/INR - ( 09 Dec 2023 06:50 )   PT: 17.1 sec;   INR: 1.53 ratio         PTT - ( 09 Dec 2023 06:50 )  PTT:37.6 sec  Urinalysis Basic - ( 09 Dec 2023 06:50 )    Color: x / Appearance: x / SG: x / pH: x  Gluc: 82 mg/dL / Ketone: x  / Bili: x / Urobili: x   Blood: x / Protein: x / Nitrite: x   Leuk Esterase: x / RBC: x / WBC x   Sq Epi: x / Non Sq Epi: x / Bacteria: x      CAPILLARY BLOOD GLUCOSE            LIVER FUNCTIONS - ( 09 Dec 2023 06:50 )  Alb: 2.3 g/dL / Pro: 6.4 gm/dL / ALK PHOS: 49 U/L / ALT: 18 U/L / AST: 27 U/L / GGT: x                  CTS Progress Note    HPI:    S:    Pt seen and examined  HD #  88y  Female  PMHx/o chronic systolic CHF, s/p mitral valve surgery, HTN, HLD, chronic atrial fibrillation on warfarin, s/p AICD, CAD s/p MI, hypothyroidism, celiac disease presented from home after her HHA called 911. Patient was agitated, refusing to take her meds and c/o sob/productive cough.  Patient was found to be hypoxic to 88 % on RA.   Patient had been more agitated and more forgetful per family.    CXR on admission showed Increased perihilar interstitial markings and airspace densities. Right greater than left effusion with compressive atelectatic change. Cardiomegaly. CT Chest on 12/6 with Patchy multifocal airspace disease in the RUL, DORIS and LLL left upper compatible with pneumonia; Moderate right pleural effusion and small left pleural effusion; 3 cm left upper lobe mass and mediastinal lymphadenopathy; leading diagnostic consideration is for primary lung malignancy with metastasis.  Patient admitted with acute on chronic systolic CHF (BNP 52870), diuresed, and started on IV antibiotics for pneumonia.  Thoracic surgery consulted for pleural effusions.    12/9: s/p thoracentesis for effusion, post CXR shows apical PTX, for which she is and remains asymptomatic Remains present on AM CXR.    ROS: Unable to obtain 2/2 Pt condition (confusion)    Allergies    Ancef (Unknown)  lidocaine (Rash; Angioedema)  adhesives (Rash)    Intolerances    Gluten (Diarrhea)      MEDICATIONS  (STANDING):  aspirin enteric coated 81 milliGRAM(s) Oral daily  atorvastatin 10 milliGRAM(s) Oral at bedtime  azithromycin  IVPB 500 milliGRAM(s) IV Intermittent every 24 hours  cefTRIAXone Injectable. 1000 milliGRAM(s) IV Push every 24 hours  citalopram 10 milliGRAM(s) Oral daily  digoxin     Tablet 62.5 MICROGram(s) Oral daily  furosemide   Injectable 40 milliGRAM(s) IV Push two times a day  levothyroxine 25 MICROGram(s) Oral daily  levothyroxine 100 MICROGram(s) Oral daily  mirtazapine 7.5 milliGRAM(s) Oral at bedtime  sacubitril 24 mG/valsartan 26 mG 1 Tablet(s) Oral two times a day  zinc sulfate 220 milliGRAM(s) Oral daily    MEDICATIONS  (PRN):  acetaminophen     Tablet .. 650 milliGRAM(s) Oral once PRN Mild Pain (1 - 3)      Drug Dosing Weight  Height (cm): 170.2 (04 Dec 2023 23:50)  Weight (kg): 56.699 (04 Dec 2023 23:50)  BMI (kg/m2): 19.6 (04 Dec 2023 23:50)  BSA (m2): 1.66 (04 Dec 2023 23:50)    PAST MEDICAL & SURGICAL HISTORY:  Chronic atrial fibrillation      Hypothyroid      HTN (hypertension)      History of cataract      Hyperlipidemia, unspecified hyperlipidemia type      History of CHF (congestive heart failure)      Peripheral edema      History of colon polyps      Varicose veins  BLE      Arthritis      Celiac disease      Hashimoto's thyroiditis      Osteoporosis      Iron deficiency anemia due to chronic blood loss      AICD (automatic cardioverter/defibrillator) present  x 3 . Replaced x 2. Presently right subclavian area      Myocardial infarction  1990      Mitral valve prolapse      Left inguinal hernia      Hearing loss      AICD (automatic cardioverter/defibrillator) present  with PPM. Replaced x 2.      H/O right inguinal hernia repair      H/O colonoscopy  last done 2009      History of cataract extraction  Bilateral      Artificial pacemaker      History of heart surgery  Mitral valve surgery for leaky valve 9/2020          FAMILY HISTORY:  Family history of diabetes mellitus (Mother, Sibling)    Family history of heart disease (Mother)    Family history of dementia (Father)        UNLESS OTHERWISE NOTED IN HPI above:    Constitutional:  No Weight Change, No Fever, No Chills, No Night Sweats, No Fatigue, No Malaise  ENT/Mouth:  No Hearing Changes, No Ear Pain, No Nasal Congestion, No  Sinus Pain, No Hoarseness, No sore throat, No Rhinorrhea, No Swallowing  Difficulty  Eyes:  No Eye Pain, No Swelling, No Redness, No Foreign Body, No Discharge, No Vision Changes  Cardiovascular:  No Chest Pain, No SOB, No PND, No Dyspnea on Exertion,  No Orthopnea, No Claudication, No Edema, No Palpitations  Respiratory:  No Cough, No Sputum, No Wheezing, No Smoke Exposure, No Dyspnea  Gastrointestinal:  No Nausea, No Vomiting, No Diarrhea, No  Constipation, No Pain, No Heartburn, No Anorexia, No Dysphagia, No  Hematochezia, No Melena, No Flatulence, No Jaundice  Genitourinary:  No Dysmenorrhea, No DUB, No Dyspareunia, No Dysuria, No  Urinary Frequency, No Hematuria, No Urinary Incontinence, No Urgency,  No Flank Pain, No Urinary Flow Changes, No Hesitancy  Musculoskeletal:  No Arthralgias, No Myalgias, No Joint Swelling, No  Joint Stiffness, No Back Pain, No Neck Pain, No Injury History  Skin:  No Skin Lesions, No Pruritis, No Hair Changes, No Breast/Skin Changes, No Nipple Discharge  Neuro:  No Weakness, No Numbness, No Paresthesias, No Loss of  Consciousness, No Syncope, No Dizziness, No Headache, No Coordination  Changes, No Recent Falls  Psych:  No Anxiety/Panic, No Depression, No Insomnia, No Personality  Changes, No Delusions, No Rumination, No SI/HI/AH/VH, No Social Issues,  No Memory Changes, No Violence/Abuse Hx., No Eating Concerns  Heme/Lymph:  No Bruising, No Bleeding, No Transfusions History, No Lymphadenopathy  Endocrine:  No Polyuria, No Polydipsia, No Temperature Intolerance    O:    ICU Vital Signs Last 24 Hrs  T(C): 36.7 (08 Dec 2023 19:45), Max: 36.7 (08 Dec 2023 19:45)  T(F): 98.1 (08 Dec 2023 19:45), Max: 98.1 (08 Dec 2023 19:45)  HR: 71 (09 Dec 2023 05:00) (71 - 82)  BP: 131/71 (09 Dec 2023 05:00) (83/45 - 131/71)  BP(mean): --  ABP: --  ABP(mean): --  RR: 18 (08 Dec 2023 20:45) (18 - 19)  SpO2: 96% (08 Dec 2023 20:45) (96% - 98%)    O2 Parameters below as of 08 Dec 2023 20:45  Patient On (Oxygen Delivery Method): mask, nonrebreather                I&O's Detail          PE:    Adult lying in bed  No JVD trachea midline  Normocephalic, atraumatic  S1S2+  Dec BS DORIS  Abd soft NTND  No leg swelling/edema noted  Confused  Skin pink, warm    LABS:    CBC Full  -  ( 09 Dec 2023 06:50 )  WBC Count : 5.56 K/uL  RBC Count : 3.13 M/uL  Hemoglobin : 10.5 g/dL  Hematocrit : 31.7 %  Platelet Count - Automated : 278 K/uL  Mean Cell Volume : 101.3 fl  Mean Cell Hemoglobin : 33.5 pg  Mean Cell Hemoglobin Concentration : 33.1 gm/dL  Auto Neutrophil # : x  Auto Lymphocyte # : x  Auto Monocyte # : x  Auto Eosinophil # : x  Auto Basophil # : x  Auto Neutrophil % : x  Auto Lymphocyte % : x  Auto Monocyte % : x  Auto Eosinophil % : x  Auto Basophil % : x    12-09    135  |  95<L>  |  21  ----------------------------<  82  3.5   |  37<H>  |  0.76    Ca    8.8      09 Dec 2023 06:50  Mg     2.0     12-09    TPro  6.4  /  Alb  2.3<L>  /  TBili  0.5  /  DBili  x   /  AST  27  /  ALT  18  /  AlkPhos  49  12-09    PT/INR - ( 09 Dec 2023 06:50 )   PT: 17.1 sec;   INR: 1.53 ratio         PTT - ( 09 Dec 2023 06:50 )  PTT:37.6 sec  Urinalysis Basic - ( 09 Dec 2023 06:50 )    Color: x / Appearance: x / SG: x / pH: x  Gluc: 82 mg/dL / Ketone: x  / Bili: x / Urobili: x   Blood: x / Protein: x / Nitrite: x   Leuk Esterase: x / RBC: x / WBC x   Sq Epi: x / Non Sq Epi: x / Bacteria: x      CAPILLARY BLOOD GLUCOSE            LIVER FUNCTIONS - ( 09 Dec 2023 06:50 )  Alb: 2.3 g/dL / Pro: 6.4 gm/dL / ALK PHOS: 49 U/L / ALT: 18 U/L / AST: 27 U/L / GGT: x                  CTS Progress Note    HPI:    S:    Pt seen and examined  88y  Female  PMHx/o chronic systolic CHF, s/p mitral valve surgery, HTN, HLD, chronic atrial fibrillation on warfarin, s/p AICD, CAD s/p MI, hypothyroidism, celiac disease presented from home after her HHA called 911. Patient was agitated, refusing to take her meds and c/o sob/productive cough.  Patient was found to be hypoxic to 88 % on RA.   Patient had been more agitated and more forgetful per family.    CXR on admission showed Increased perihilar interstitial markings and airspace densities. Right greater than left effusion with compressive atelectatic change. Cardiomegaly. CT Chest on 12/6 with Patchy multifocal airspace disease in the RUL, DORIS and LLL left upper compatible with pneumonia; Moderate right pleural effusion and small left pleural effusion; 3 cm left upper lobe mass and mediastinal lymphadenopathy; leading diagnostic consideration is for primary lung malignancy with metastasis.  Patient admitted with acute on chronic systolic CHF (BNP 77487), diuresed, and started on IV antibiotics for pneumonia.  Thoracic surgery consulted for pleural effusions.    12/9: s/p thoracentesis for effusion, post CXR shows apical PTX, for which she is and remains asymptomatic Remains present on AM CXR.    ROS: Unable to obtain 2/2 Pt condition (confusion)    Allergies    Ancef (Unknown)  lidocaine (Rash; Angioedema)  adhesives (Rash)    Intolerances    Gluten (Diarrhea)      MEDICATIONS  (STANDING):  aspirin enteric coated 81 milliGRAM(s) Oral daily  atorvastatin 10 milliGRAM(s) Oral at bedtime  azithromycin  IVPB 500 milliGRAM(s) IV Intermittent every 24 hours  cefTRIAXone Injectable. 1000 milliGRAM(s) IV Push every 24 hours  citalopram 10 milliGRAM(s) Oral daily  digoxin     Tablet 62.5 MICROGram(s) Oral daily  furosemide   Injectable 40 milliGRAM(s) IV Push two times a day  levothyroxine 25 MICROGram(s) Oral daily  levothyroxine 100 MICROGram(s) Oral daily  mirtazapine 7.5 milliGRAM(s) Oral at bedtime  sacubitril 24 mG/valsartan 26 mG 1 Tablet(s) Oral two times a day  zinc sulfate 220 milliGRAM(s) Oral daily    MEDICATIONS  (PRN):  acetaminophen     Tablet .. 650 milliGRAM(s) Oral once PRN Mild Pain (1 - 3)      Drug Dosing Weight  Height (cm): 170.2 (04 Dec 2023 23:50)  Weight (kg): 56.699 (04 Dec 2023 23:50)  BMI (kg/m2): 19.6 (04 Dec 2023 23:50)  BSA (m2): 1.66 (04 Dec 2023 23:50)    PAST MEDICAL & SURGICAL HISTORY:  Chronic atrial fibrillation      Hypothyroid      HTN (hypertension)      History of cataract      Hyperlipidemia, unspecified hyperlipidemia type      History of CHF (congestive heart failure)      Peripheral edema      History of colon polyps      Varicose veins  BLE      Arthritis      Celiac disease      Hashimoto's thyroiditis      Osteoporosis      Iron deficiency anemia due to chronic blood loss      AICD (automatic cardioverter/defibrillator) present  x 3 . Replaced x 2. Presently right subclavian area      Myocardial infarction  1990      Mitral valve prolapse      Left inguinal hernia      Hearing loss      AICD (automatic cardioverter/defibrillator) present  with PPM. Replaced x 2.      H/O right inguinal hernia repair      H/O colonoscopy  last done 2009      History of cataract extraction  Bilateral      Artificial pacemaker      History of heart surgery  Mitral valve surgery for leaky valve 9/2020          FAMILY HISTORY:  Family history of diabetes mellitus (Mother, Sibling)    Family history of heart disease (Mother)    Family history of dementia (Father)        UNLESS OTHERWISE NOTED IN HPI above:    Constitutional:  No Weight Change, No Fever, No Chills, No Night Sweats, No Fatigue, No Malaise  ENT/Mouth:  No Hearing Changes, No Ear Pain, No Nasal Congestion, No  Sinus Pain, No Hoarseness, No sore throat, No Rhinorrhea, No Swallowing  Difficulty  Eyes:  No Eye Pain, No Swelling, No Redness, No Foreign Body, No Discharge, No Vision Changes  Cardiovascular:  No Chest Pain, No SOB, No PND, No Dyspnea on Exertion,  No Orthopnea, No Claudication, No Edema, No Palpitations  Respiratory:  No Cough, No Sputum, No Wheezing, No Smoke Exposure, No Dyspnea  Gastrointestinal:  No Nausea, No Vomiting, No Diarrhea, No  Constipation, No Pain, No Heartburn, No Anorexia, No Dysphagia, No  Hematochezia, No Melena, No Flatulence, No Jaundice  Genitourinary:  No Dysmenorrhea, No DUB, No Dyspareunia, No Dysuria, No  Urinary Frequency, No Hematuria, No Urinary Incontinence, No Urgency,  No Flank Pain, No Urinary Flow Changes, No Hesitancy  Musculoskeletal:  No Arthralgias, No Myalgias, No Joint Swelling, No  Joint Stiffness, No Back Pain, No Neck Pain, No Injury History  Skin:  No Skin Lesions, No Pruritis, No Hair Changes, No Breast/Skin Changes, No Nipple Discharge  Neuro:  No Weakness, No Numbness, No Paresthesias, No Loss of  Consciousness, No Syncope, No Dizziness, No Headache, No Coordination  Changes, No Recent Falls  Psych:  No Anxiety/Panic, No Depression, No Insomnia, No Personality  Changes, No Delusions, No Rumination, No SI/HI/AH/VH, No Social Issues,  No Memory Changes, No Violence/Abuse Hx., No Eating Concerns  Heme/Lymph:  No Bruising, No Bleeding, No Transfusions History, No Lymphadenopathy  Endocrine:  No Polyuria, No Polydipsia, No Temperature Intolerance    O:    ICU Vital Signs Last 24 Hrs  T(C): 36.7 (08 Dec 2023 19:45), Max: 36.7 (08 Dec 2023 19:45)  T(F): 98.1 (08 Dec 2023 19:45), Max: 98.1 (08 Dec 2023 19:45)  HR: 71 (09 Dec 2023 05:00) (71 - 82)  BP: 131/71 (09 Dec 2023 05:00) (83/45 - 131/71)  BP(mean): --  ABP: --  ABP(mean): --  RR: 18 (08 Dec 2023 20:45) (18 - 19)  SpO2: 96% (08 Dec 2023 20:45) (96% - 98%)    O2 Parameters below as of 08 Dec 2023 20:45  Patient On (Oxygen Delivery Method): mask, nonrebreather                I&O's Detail          PE:    Adult lying in bed  No JVD trachea midline  Normocephalic, atraumatic  S1S2+  Dec BS DORIS  Abd soft NTND  No leg swelling/edema noted  Confused  Skin pink, warm    LABS:    CBC Full  -  ( 09 Dec 2023 06:50 )  WBC Count : 5.56 K/uL  RBC Count : 3.13 M/uL  Hemoglobin : 10.5 g/dL  Hematocrit : 31.7 %  Platelet Count - Automated : 278 K/uL  Mean Cell Volume : 101.3 fl  Mean Cell Hemoglobin : 33.5 pg  Mean Cell Hemoglobin Concentration : 33.1 gm/dL  Auto Neutrophil # : x  Auto Lymphocyte # : x  Auto Monocyte # : x  Auto Eosinophil # : x  Auto Basophil # : x  Auto Neutrophil % : x  Auto Lymphocyte % : x  Auto Monocyte % : x  Auto Eosinophil % : x  Auto Basophil % : x    12-09    135  |  95<L>  |  21  ----------------------------<  82  3.5   |  37<H>  |  0.76    Ca    8.8      09 Dec 2023 06:50  Mg     2.0     12-09    TPro  6.4  /  Alb  2.3<L>  /  TBili  0.5  /  DBili  x   /  AST  27  /  ALT  18  /  AlkPhos  49  12-09    PT/INR - ( 09 Dec 2023 06:50 )   PT: 17.1 sec;   INR: 1.53 ratio         PTT - ( 09 Dec 2023 06:50 )  PTT:37.6 sec  Urinalysis Basic - ( 09 Dec 2023 06:50 )    Color: x / Appearance: x / SG: x / pH: x  Gluc: 82 mg/dL / Ketone: x  / Bili: x / Urobili: x   Blood: x / Protein: x / Nitrite: x   Leuk Esterase: x / RBC: x / WBC x   Sq Epi: x / Non Sq Epi: x / Bacteria: x      CAPILLARY BLOOD GLUCOSE            LIVER FUNCTIONS - ( 09 Dec 2023 06:50 )  Alb: 2.3 g/dL / Pro: 6.4 gm/dL / ALK PHOS: 49 U/L / ALT: 18 U/L / AST: 27 U/L / GGT: x                  CTS Progress Note    HPI:    S:    Pt seen and examined  88y  Female  PMHx/o chronic systolic CHF, s/p mitral valve surgery, HTN, HLD, chronic atrial fibrillation on warfarin, s/p AICD, CAD s/p MI, hypothyroidism, celiac disease presented from home after her HHA called 911. Patient was agitated, refusing to take her meds and c/o sob/productive cough.  Patient was found to be hypoxic to 88 % on RA.   Patient had been more agitated and more forgetful per family.    CXR on admission showed Increased perihilar interstitial markings and airspace densities. Right greater than left effusion with compressive atelectatic change. Cardiomegaly. CT Chest on 12/6 with Patchy multifocal airspace disease in the RUL, DORIS and LLL left upper compatible with pneumonia; Moderate right pleural effusion and small left pleural effusion; 3 cm left upper lobe mass and mediastinal lymphadenopathy; leading diagnostic consideration is for primary lung malignancy with metastasis.  Patient admitted with acute on chronic systolic CHF (BNP 01079), diuresed, and started on IV antibiotics for pneumonia.  Thoracic surgery consulted for pleural effusions.    12/9: s/p thoracentesis for effusion, post CXR shows apical PTX, for which she is and remains asymptomatic Remains present on AM CXR.    ROS: Unable to obtain 2/2 Pt condition (confusion)    Allergies    Ancef (Unknown)  lidocaine (Rash; Angioedema)  adhesives (Rash)    Intolerances    Gluten (Diarrhea)      MEDICATIONS  (STANDING):  aspirin enteric coated 81 milliGRAM(s) Oral daily  atorvastatin 10 milliGRAM(s) Oral at bedtime  azithromycin  IVPB 500 milliGRAM(s) IV Intermittent every 24 hours  cefTRIAXone Injectable. 1000 milliGRAM(s) IV Push every 24 hours  citalopram 10 milliGRAM(s) Oral daily  digoxin     Tablet 62.5 MICROGram(s) Oral daily  furosemide   Injectable 40 milliGRAM(s) IV Push two times a day  levothyroxine 25 MICROGram(s) Oral daily  levothyroxine 100 MICROGram(s) Oral daily  mirtazapine 7.5 milliGRAM(s) Oral at bedtime  sacubitril 24 mG/valsartan 26 mG 1 Tablet(s) Oral two times a day  zinc sulfate 220 milliGRAM(s) Oral daily    MEDICATIONS  (PRN):  acetaminophen     Tablet .. 650 milliGRAM(s) Oral once PRN Mild Pain (1 - 3)      Drug Dosing Weight  Height (cm): 170.2 (04 Dec 2023 23:50)  Weight (kg): 56.699 (04 Dec 2023 23:50)  BMI (kg/m2): 19.6 (04 Dec 2023 23:50)  BSA (m2): 1.66 (04 Dec 2023 23:50)    PAST MEDICAL & SURGICAL HISTORY:  Chronic atrial fibrillation      Hypothyroid      HTN (hypertension)      History of cataract      Hyperlipidemia, unspecified hyperlipidemia type      History of CHF (congestive heart failure)      Peripheral edema      History of colon polyps      Varicose veins  BLE      Arthritis      Celiac disease      Hashimoto's thyroiditis      Osteoporosis      Iron deficiency anemia due to chronic blood loss      AICD (automatic cardioverter/defibrillator) present  x 3 . Replaced x 2. Presently right subclavian area      Myocardial infarction  1990      Mitral valve prolapse      Left inguinal hernia      Hearing loss      AICD (automatic cardioverter/defibrillator) present  with PPM. Replaced x 2.      H/O right inguinal hernia repair      H/O colonoscopy  last done 2009      History of cataract extraction  Bilateral      Artificial pacemaker      History of heart surgery  Mitral valve surgery for leaky valve 9/2020          FAMILY HISTORY:  Family history of diabetes mellitus (Mother, Sibling)    Family history of heart disease (Mother)    Family history of dementia (Father)        UNLESS OTHERWISE NOTED IN HPI above:    Constitutional:  No Weight Change, No Fever, No Chills, No Night Sweats, No Fatigue, No Malaise  ENT/Mouth:  No Hearing Changes, No Ear Pain, No Nasal Congestion, No  Sinus Pain, No Hoarseness, No sore throat, No Rhinorrhea, No Swallowing  Difficulty  Eyes:  No Eye Pain, No Swelling, No Redness, No Foreign Body, No Discharge, No Vision Changes  Cardiovascular:  No Chest Pain, No SOB, No PND, No Dyspnea on Exertion,  No Orthopnea, No Claudication, No Edema, No Palpitations  Respiratory:  No Cough, No Sputum, No Wheezing, No Smoke Exposure, No Dyspnea  Gastrointestinal:  No Nausea, No Vomiting, No Diarrhea, No  Constipation, No Pain, No Heartburn, No Anorexia, No Dysphagia, No  Hematochezia, No Melena, No Flatulence, No Jaundice  Genitourinary:  No Dysmenorrhea, No DUB, No Dyspareunia, No Dysuria, No  Urinary Frequency, No Hematuria, No Urinary Incontinence, No Urgency,  No Flank Pain, No Urinary Flow Changes, No Hesitancy  Musculoskeletal:  No Arthralgias, No Myalgias, No Joint Swelling, No  Joint Stiffness, No Back Pain, No Neck Pain, No Injury History  Skin:  No Skin Lesions, No Pruritis, No Hair Changes, No Breast/Skin Changes, No Nipple Discharge  Neuro:  No Weakness, No Numbness, No Paresthesias, No Loss of  Consciousness, No Syncope, No Dizziness, No Headache, No Coordination  Changes, No Recent Falls  Psych:  No Anxiety/Panic, No Depression, No Insomnia, No Personality  Changes, No Delusions, No Rumination, No SI/HI/AH/VH, No Social Issues,  No Memory Changes, No Violence/Abuse Hx., No Eating Concerns  Heme/Lymph:  No Bruising, No Bleeding, No Transfusions History, No Lymphadenopathy  Endocrine:  No Polyuria, No Polydipsia, No Temperature Intolerance    O:    ICU Vital Signs Last 24 Hrs  T(C): 36.7 (08 Dec 2023 19:45), Max: 36.7 (08 Dec 2023 19:45)  T(F): 98.1 (08 Dec 2023 19:45), Max: 98.1 (08 Dec 2023 19:45)  HR: 71 (09 Dec 2023 05:00) (71 - 82)  BP: 131/71 (09 Dec 2023 05:00) (83/45 - 131/71)  BP(mean): --  ABP: --  ABP(mean): --  RR: 18 (08 Dec 2023 20:45) (18 - 19)  SpO2: 96% (08 Dec 2023 20:45) (96% - 98%)    O2 Parameters below as of 08 Dec 2023 20:45  Patient On (Oxygen Delivery Method): mask, nonrebreather                I&O's Detail          PE:    Adult lying in bed  No JVD trachea midline  Normocephalic, atraumatic  S1S2+  Dec BS DORIS  Abd soft NTND  No leg swelling/edema noted  Confused  Skin pink, warm    LABS:    CBC Full  -  ( 09 Dec 2023 06:50 )  WBC Count : 5.56 K/uL  RBC Count : 3.13 M/uL  Hemoglobin : 10.5 g/dL  Hematocrit : 31.7 %  Platelet Count - Automated : 278 K/uL  Mean Cell Volume : 101.3 fl  Mean Cell Hemoglobin : 33.5 pg  Mean Cell Hemoglobin Concentration : 33.1 gm/dL  Auto Neutrophil # : x  Auto Lymphocyte # : x  Auto Monocyte # : x  Auto Eosinophil # : x  Auto Basophil # : x  Auto Neutrophil % : x  Auto Lymphocyte % : x  Auto Monocyte % : x  Auto Eosinophil % : x  Auto Basophil % : x    12-09    135  |  95<L>  |  21  ----------------------------<  82  3.5   |  37<H>  |  0.76    Ca    8.8      09 Dec 2023 06:50  Mg     2.0     12-09    TPro  6.4  /  Alb  2.3<L>  /  TBili  0.5  /  DBili  x   /  AST  27  /  ALT  18  /  AlkPhos  49  12-09    PT/INR - ( 09 Dec 2023 06:50 )   PT: 17.1 sec;   INR: 1.53 ratio         PTT - ( 09 Dec 2023 06:50 )  PTT:37.6 sec  Urinalysis Basic - ( 09 Dec 2023 06:50 )    Color: x / Appearance: x / SG: x / pH: x  Gluc: 82 mg/dL / Ketone: x  / Bili: x / Urobili: x   Blood: x / Protein: x / Nitrite: x   Leuk Esterase: x / RBC: x / WBC x   Sq Epi: x / Non Sq Epi: x / Bacteria: x      CAPILLARY BLOOD GLUCOSE            LIVER FUNCTIONS - ( 09 Dec 2023 06:50 )  Alb: 2.3 g/dL / Pro: 6.4 gm/dL / ALK PHOS: 49 U/L / ALT: 18 U/L / AST: 27 U/L / GGT: x

## 2023-12-09 NOTE — PROGRESS NOTE ADULT - ASSESSMENT
87-year-old female with medical history of CHF, chronic atrial fibrillation on warfarin, s/p AICD, CAD, hypothyroidism, celiac disease presents from home after her HHA called 911 after she was agitated and refusing to take her meds. States she has been weak and sob. Also endorsing the weakness. Was found to be hypoxic to 88 % on RA. PT endorses productive cough and this am had scant blood tinged mucous.   Spoke to Dtjuan Lema on the phone. states her mother suffering from extreme anxiety and was started on xanax 2 weeks ago and since then, her cognition has suffered greatly. She is also having difficulty walking per aides. More forgetful and agitated at time which is not like her per daughter.     #Acute decompensated HFrEF EF 30-35%  #B/L pleural effusion  #Pneumonia  #Chronic afib  #S/p ICD - Chronic afib   #Metabolic encephalopathy  #Hyponatremia  #DORIS Mass 3cm    - Monitor on tele. BNP 22392. Troponin negative x1. CK 73  - Continue Lasix 40mg  BID, trend BMP  - Continue statin, digoxin and Entresto  - 12/5/23: TTE: EF 30-35%, Mod MR/TR/PulmHTN  - CT Chest > Patchy multifocal airspace disease in the right upper lobe, left upper lobe, and left lower lobe compatible with pneumonia. Moderate right pleural effusion and small left pleural effusion. 3 cm left upper lobe mass and mediastinal lymphadenopathy; leading diagnostic consideration is for primary lung malignancy with metastasis.  - CTS eval pending for pleural effusions  - Interrogate device > Chronic afib, no RVR. DC amiodarone.   cont IV lasix but follow CR closely may need to change to daily tommorrow   cont ABX   awating cytology on pleural fluid  Will follow   87-year-old female with medical history of CHF, chronic atrial fibrillation on warfarin, s/p AICD, CAD, hypothyroidism, celiac disease presents from home after her HHA called 911 after she was agitated and refusing to take her meds. States she has been weak and sob. Also endorsing the weakness. Was found to be hypoxic to 88 % on RA. PT endorses productive cough and this am had scant blood tinged mucous.   Spoke to Dtjuan Lema on the phone. states her mother suffering from extreme anxiety and was started on xanax 2 weeks ago and since then, her cognition has suffered greatly. She is also having difficulty walking per aides. More forgetful and agitated at time which is not like her per daughter.     #Acute decompensated HFrEF EF 30-35%  #B/L pleural effusion  #Pneumonia  #Chronic afib  #S/p ICD - Chronic afib   #Metabolic encephalopathy  #Hyponatremia  #DORIS Mass 3cm    - Monitor on tele. BNP 57134. Troponin negative x1. CK 73  - Continue Lasix 40mg  BID, trend BMP  - Continue statin, digoxin and Entresto  - 12/5/23: TTE: EF 30-35%, Mod MR/TR/PulmHTN  - CT Chest > Patchy multifocal airspace disease in the right upper lobe, left upper lobe, and left lower lobe compatible with pneumonia. Moderate right pleural effusion and small left pleural effusion. 3 cm left upper lobe mass and mediastinal lymphadenopathy; leading diagnostic consideration is for primary lung malignancy with metastasis.  - CTS eval pending for pleural effusions  - Interrogate device > Chronic afib, no RVR. DC amiodarone.   cont IV lasix but follow CR closely may need to change to daily tommorrow   cont ABX   awating cytology on pleural fluid  Will follow

## 2023-12-09 NOTE — PROGRESS NOTE ADULT - ASSESSMENT
87-year-old female with MHx significant for CHF, A-fib on warfarin, s/p AICD, CAD, hypothyroidism, celiac disease brought in from home for acute agitation, worsening weakness, and SOB, found to be hypoxic with 88 % on RA. CT-chest noted DORIS mass and mediastinal adenopathy.  Admitted for PNA, pleural effusion, and CHF.    # DORIS mass and mediastinal adenopathy, suspicious for primary lung cancer    -CT-chest 12/6/2023:  3 cm left upper lobe mass and mediastinal lymphadenopathy  - Spoke with son Dr. Miranda ([phone]) today- he had a conversation yesterday with CTSx, PA and expressed the same- requesting only palliative measures for patient. Dr. Miranda/ family's decision is not to pursue any diagnostics/treatment at this time given patient's advance age and comorbidities. He requested home hospice.   -Family requested that staff avoid talking to the patient about cancer diagnosis in absence of a family member because patient have high anxiety. Aubrey and siblings will speak to the patient about her diagnosis.  -d/w Dr. Read   -Palliative following-appreciate their support. Can call son -Dr. Miranda ( ([phone]).       # Multifocal pneumonia/ Metabolic encephalopathy    -CT chest 12/6/2023--> Patchy multifocal airspace disease in the right upper lobe, left upper lobe, and left lower lobe compatible with pneumonia. Moderate right pleural effusion and small left pleural effusion  -CT Sx following--> s/p R apical PTX after thoracentesis and drainage of a R pleural effusion, 850 cc-12/8/23, Cx and path pending.   -ID following   -follow BC x 2 UCx  -On Ceftriaxone and Azithromycin     # Acute decompensated HFrEF EF 30-35%/ chronic Afib    - Cardiology following  - On Lasix 40mg, Statin, Digoxin and Entresto  - Follow up echocardiogram  - EP consulted-->PPM interrogation--> d/c'd Amiodarone      87-year-old female with MHx significant for CHF, A-fib on warfarin, s/p AICD, CAD, hypothyroidism, celiac disease brought in from home for acute agitation, worsening weakness, and SOB, found to be hypoxic with 88 % on RA. CT-chest noted DORIS mass and mediastinal adenopathy.  Admitted for PNA, pleural effusion, and CHF.    # DORIS mass and mediastinal adenopathy, suspicious for primary lung cancer    -CT-chest 12/6/2023:  3 cm left upper lobe mass and mediastinal lymphadenopathy  - Spoke with son Dr. Miranda ([phone]) today- he had a conversation yesterday with CTSx, PA and expressed the same- requesting only palliative measures for patient. Dr. Miranda/ family's decision is not to pursue any diagnostics/treatment at this time given patient's advance age and comorbidities. He requested home hospice.   -Family requested that staff avoid talking to the patient about cancer diagnosis in absence of a family member because patient have high anxiety. Aubrey and siblings will speak to the patient about her diagnosis.  -d/w Dr. Read   -Palliative following-appreciate their support. Can call son -Dr. Miranda ( ([phone]).       # Multifocal pneumonia/ Metabolic encephalopathy    -CT chest 12/6/2023--> Patchy multifocal airspace disease in the right upper lobe, left upper lobe, and left lower lobe compatible with pneumonia. Moderate right pleural effusion and small left pleural effusion  -CT Sx following--> s/p R apical PTX after thoracentesis and drainage of a R pleural effusion, 850 cc-12/8/23, Cx and path pending.   -ID following   -follow BC x 2 UCx  -On Ceftriaxone and Azithromycin     # Acute decompensated HFrEF EF 30-35%/ chronic Afib    - Cardiology following  - On Lasix 40mg, Statin, Digoxin and Entresto  - Follow up echocardiogram  - EP consulted-->PPM interrogation--> d/c'd Amiodarone      87-year-old female with MHx significant for CHF, A-fib on warfarin, s/p AICD, CAD, hypothyroidism, celiac disease brought in from home for acute agitation, worsening weakness, and SOB, found to be hypoxic with 88 % on RA. CT-chest noted DORIS mass and mediastinal adenopathy.  Admitted for PNA, pleural effusion, and CHF.    # DORIS mass and mediastinal adenopathy, suspicious for primary lung cancer    -CT-chest 12/6/2023:  3 cm left upper lobe mass and mediastinal lymphadenopathy  - Spoke with son Dr. Miranda ([phone]) today- he had a conversation yesterday with CTSx, PA and expressed the same- requesting only palliative measures for patient. Dr. Miranda/ family's decision is not to pursue any diagnostics/treatment at this time given patient's advance age and comorbidities. He requested home hospice.   -Family requested that staff avoid talking to the patient about cancer diagnosis in absence of a family member because patient have high anxiety. Aubrey and siblings will speak to the patient about her diagnosis.  -d/w Dr. Read   -Palliative following-appreciate their support. Can call son -Dr. Miranda ( ([phone]).       # Multifocal pneumonia/ Pleural Effusion/Metabolic encephalopathy    -CT chest 12/6/2023--> Patchy multifocal airspace disease in the right upper lobe, left upper lobe, and left lower lobe compatible with pneumonia. Moderate right pleural effusion and small left pleural effusion  -CT Sx following--> s/p R apical PTX after thoracentesis and drainage of a R pleural effusion, 850 cc-12/8/23, Cx and path pending.   -ID following   -follow BC x 2 UCx  -On Ceftriaxone and Azithromycin     # Acute decompensated HFrEF EF 30-35%/ chronic Afib    - Cardiology following  - On Lasix 40mg, Statin, Digoxin and Entresto  - Follow up echocardiogram  - EP consulted-->PPM interrogation--> d/c'd Amiodarone      87-year-old female with MHx significant for CHF, A-fib on warfarin, s/p AICD, CAD, hypothyroidism, celiac disease brought in from home for acute agitation, worsening weakness, and SOB, found to be hypoxic with 88 % on RA. CT-chest noted DORIS mass and mediastinal adenopathy.  Admitted for PNA, pleural effusion, and CHF.    # DORIS mass and mediastinal adenopathy, suspicious for primary lung cancer    -CT-chest 12/6/2023:  3 cm left upper lobe mass and mediastinal lymphadenopathy  - Spoke with son Dr. Miranda ([phone]) today- he had a conversation yesterday with CTSx, PA and expressed the same- requesting only palliative measures for patient. Dr. Miranda/ family's decision is not to pursue any diagnostics/treatment at this time given patient's advance age and comorbidities. He requested home hospice.   -Family requested that staff avoid talking to the patient about cancer diagnosis in absence of a family member because patient have high anxiety. Aubrey and siblings will speak to the patient about her diagnosis.  -d/w Dr. Read   -Palliative following-appreciate their support. Can call son -Dr. Miranda ( ([phone]).       # Multifocal pneumonia/ Pleural Effusion/Metabolic encephalopathy    -CT chest 12/6/2023--> Patchy multifocal airspace disease in the right upper lobe, left upper lobe, and left lower lobe compatible with pneumonia. Moderate right pleural effusion and small left pleural effusion  -CT Sx following--> s/p R apical PTX after thoracentesis and drainage of a R pleural effusion, 850 cc-12/8/23, Cx and path pending.   -ID following   -follow BC x 2 UCx  -On Ceftriaxone and Azithromycin     # Acute decompensated HFrEF EF 30-35%/ chronic Afib    - Cardiology following  - On Lasix 40mg, Statin, Digoxin and Entresto  - Follow up echocardiogram  - EP consulted-->PPM interrogation--> d/c'd Amiodarone

## 2023-12-09 NOTE — PROGRESS NOTE ADULT - SUBJECTIVE AND OBJECTIVE BOX
HPI:  87-year-old female with medical history of CHF, chronic atrial fibrillation on warfarin, s/p AICD, CAD, hypothyroidism, celiac disease presents from home after her HHA called 911 after she was agitated and refusing to take her meds. Was found to be hypoxic to 88 % on RA.     12/9/2023- Seen at bedside, alert, oriented x 2, in no acute distress.     PAST MEDICAL & SURGICAL HISTORY:    Chronic atrial fibrillation    Hypothyroid    HTN (hypertension)    History of cataract    Hyperlipidemia, unspecified hyperlipidemia type    History of CHF (congestive heart failure)    Peripheral edema    History of colon polyps    Varicose veins  BLE    Arthritis    Celiac disease    Hashimoto's thyroiditis    Osteoporosis    Iron deficiency anemia due to chronic blood loss    AICD (automatic cardioverter/defibrillator) present  x 3 . Replaced x 2. Presently right subclavian area    Myocardial infarction  1990    Mitral valve prolapse    Left inguinal hernia    AICD (automatic cardioverter/defibrillator) present  with PPM. Replaced x 2.    H/O right inguinal hernia repair    H/O colonoscopy  last done 2009    History of cataract extraction  Bilateral    Artificial pacemaker    History of heart surgery  Mitral valve surgery for leaky valve 9/2020    FAMILY HISTORY:    diabetes mellitus (Mother, Sibling)    heart disease (Mother)    dementia (Father)    MEDICATIONS  (STANDING):    aspirin enteric coated 81 milliGRAM(s) Oral daily  atorvastatin 10 milliGRAM(s) Oral at bedtime  azithromycin  IVPB 500 milliGRAM(s) IV Intermittent every 24 hours  cefTRIAXone Injectable. 1000 milliGRAM(s) IV Push every 24 hours  citalopram 10 milliGRAM(s) Oral daily  digoxin     Tablet 62.5 MICROGram(s) Oral daily  furosemide   Injectable 40 milliGRAM(s) IV Push two times a day  levothyroxine 25 MICROGram(s) Oral daily  levothyroxine 100 MICROGram(s) Oral daily  mirtazapine 7.5 milliGRAM(s) Oral at bedtime  sacubitril 24 mG/valsartan 26 mG 1 Tablet(s) Oral two times a day  zinc sulfate 220 milliGRAM(s) Oral daily    MEDICATIONS  (PRN):    acetaminophen     Tablet .. 650 milliGRAM(s) Oral once PRN Mild Pain (1 - 3)      ALLERGIES:    Ancef (Unknown)  lidocaine (Rash; Angioedema)  adhesives (Rash)    Intolerance  Gluten (Diarrhea)    REVIEW OF SYSTEM:    Constitutional, Eyes, ENT, Cardiovascular, Respiratory, Gastrointestinal, Genitourinary, Musculoskeletal, Integumentary, Neurological, Psychiatric, Endocrine, Heme/Lymph and Allergic/Immunologic review of systems are otherwise negative except as noted in HPI.     VITAL SIGNS:    T(C): 36.7 (08 Dec 2023 19:45), Max: 36.7 (08 Dec 2023 19:45)  T(F): 98.1 (08 Dec 2023 19:45), Max: 98.1 (08 Dec 2023 19:45)  HR: 71 (09 Dec 2023 05:00) (71 - 82)  BP: 131/71 (09 Dec 2023 05:00) (83/45 - 131/71)  BP(mean): --  RR: 18 (08 Dec 2023 20:45) (18 - 19)  SpO2: 96% (08 Dec 2023 20:45) (96% - 98%)    Parameters below as of 08 Dec 2023 20:45  Patient On (Oxygen Delivery Method): mask, nonrebreather    PHYSICAL EXAM:    CONSTITUTIONAL : NAD  NERVOUS SYSTEM:  Alert & Oriented X 2, no focal deficits.   HEAD:  Atraumatic, Normocephalic  EYES: EOMI, PERRLA, conjunctiva and sclera clear  HEENT: Moist mucous membranes, Supple neck , No JVD  CHEST: Decreased BS at the bases, on O2/NC  HEART: Regular rate and rhythm; No murmurs, no rubs or gallops  ABDOMEN: Soft, Non-tender, Non-distended; Bowel sounds present, no guarding , no peritoneal irritation   GENITOURINARY- Voiding, no suprapubic tenderness  EXTREMITIES:  2+ Peripheral Pulses  MUSCULOSKELETAL:- No muscle tenderness, Muscle tone normal, No joint tenderness.   SKIN-no rash, no lesion    LABS:                          10.5   5.56  )-----------( 278      ( 09 Dec 2023 06:50 )             31.7   12-09    135  |  95<L>  |  21  ----------------------------<  82  3.5   |  37<H>  |  0.76    Ca    8.8      09 Dec 2023 06:50  Mg     2.0     12-09    TPro  6.4  /  Alb  2.3<L>  /  TBili  0.5  /  DBili  x   /  AST  27  /  ALT  18  /  AlkPhos  49  12-09        Urinalysis Basic - ( 07 Dec 2023 06:53 )    Color: x / Appearance: x / SG: x / pH: x  Gluc: 76 mg/dL / Ketone: x  / Bili: x / Urobili: x   Blood: x / Protein: x / Nitrite: x   Leuk Esterase: x / RBC: x / WBC x   Sq Epi: x / Non Sq Epi: x / Bacteria: x    RADIOLOGY & ADDITIONAL STUDIES:    EXAM:  CT CHEST     PROCEDURE DATE:  12/06/2023      INTERPRETATION:  HISTORY: Hypoxia. Shortness of breath. CHF.    EXAMINATION: CT CHEST was performed without IV contrast.    COMPARISON: 5/16/2003.    FINDINGS:    AIRWAYS, LUNGS, PLEURA: Lobulated anterior left upper lobe mass with   suspected mediastinal invasion measuring 3 x 2.6 cm (image 45, series 2).    Patchy consolidation and ground-glass opacification multifocally in the   right upper lobe, left upper lobe, left lower lobe superior segment.    Moderate size right pleural effusion with associated right middle lobe   and right lower lobe multisegmental atelectasis. Small left pleural   effusion and associated subsegmental left basilar atelectasis.    Central airways appear clear.    MEDIASTINUM: Cardiomegaly. No pericardial effusion. Normal caliber   thoracic aorta.    Right paratracheal node measures 3.1 x 2.7 cm (image 38, series 2) and AP   window node measures 2.1 x 1.4 cm (image 39, series 2).    IMAGED ABDOMEN: Unremarkable.    SOFT TISSUES: Unremarkable.    BONES: Unremarkable.      IMPRESSION:.    Patchy multifocal airspace disease in the right upper lobe, left upper   lobe, and left lower lobe compatible with pneumonia.    Moderate right pleural effusion and small left pleural effusion.    3 cm left upper lobe mass and mediastinal lymphadenopathy; leading   diagnostic consideration is for primary lung malignancy with metastasis.

## 2023-12-10 LAB
ANION GAP SERPL CALC-SCNC: 5 MMOL/L — SIGNIFICANT CHANGE UP (ref 5–17)
ANION GAP SERPL CALC-SCNC: 5 MMOL/L — SIGNIFICANT CHANGE UP (ref 5–17)
BUN SERPL-MCNC: 22 MG/DL — SIGNIFICANT CHANGE UP (ref 7–23)
BUN SERPL-MCNC: 22 MG/DL — SIGNIFICANT CHANGE UP (ref 7–23)
CALCIUM SERPL-MCNC: 8.6 MG/DL — SIGNIFICANT CHANGE UP (ref 8.5–10.1)
CALCIUM SERPL-MCNC: 8.6 MG/DL — SIGNIFICANT CHANGE UP (ref 8.5–10.1)
CHLORIDE SERPL-SCNC: 93 MMOL/L — LOW (ref 96–108)
CHLORIDE SERPL-SCNC: 93 MMOL/L — LOW (ref 96–108)
CO2 SERPL-SCNC: 36 MMOL/L — HIGH (ref 22–31)
CO2 SERPL-SCNC: 36 MMOL/L — HIGH (ref 22–31)
CREAT SERPL-MCNC: 0.85 MG/DL — SIGNIFICANT CHANGE UP (ref 0.5–1.3)
CREAT SERPL-MCNC: 0.85 MG/DL — SIGNIFICANT CHANGE UP (ref 0.5–1.3)
EGFR: 66 ML/MIN/1.73M2 — SIGNIFICANT CHANGE UP
EGFR: 66 ML/MIN/1.73M2 — SIGNIFICANT CHANGE UP
GLUCOSE SERPL-MCNC: 76 MG/DL — SIGNIFICANT CHANGE UP (ref 70–99)
GLUCOSE SERPL-MCNC: 76 MG/DL — SIGNIFICANT CHANGE UP (ref 70–99)
MAGNESIUM SERPL-MCNC: 2 MG/DL — SIGNIFICANT CHANGE UP (ref 1.6–2.6)
MAGNESIUM SERPL-MCNC: 2 MG/DL — SIGNIFICANT CHANGE UP (ref 1.6–2.6)
POTASSIUM SERPL-MCNC: 3.3 MMOL/L — LOW (ref 3.5–5.3)
POTASSIUM SERPL-MCNC: 3.3 MMOL/L — LOW (ref 3.5–5.3)
POTASSIUM SERPL-SCNC: 3.3 MMOL/L — LOW (ref 3.5–5.3)
POTASSIUM SERPL-SCNC: 3.3 MMOL/L — LOW (ref 3.5–5.3)
SODIUM SERPL-SCNC: 134 MMOL/L — LOW (ref 135–145)
SODIUM SERPL-SCNC: 134 MMOL/L — LOW (ref 135–145)

## 2023-12-10 PROCEDURE — 99233 SBSQ HOSP IP/OBS HIGH 50: CPT

## 2023-12-10 PROCEDURE — 71045 X-RAY EXAM CHEST 1 VIEW: CPT | Mod: 26

## 2023-12-10 RX ORDER — POTASSIUM CHLORIDE 20 MEQ
40 PACKET (EA) ORAL ONCE
Refills: 0 | Status: COMPLETED | OUTPATIENT
Start: 2023-12-10 | End: 2023-12-10

## 2023-12-10 RX ORDER — SACUBITRIL AND VALSARTAN 24; 26 MG/1; MG/1
1 TABLET, FILM COATED ORAL
Refills: 0 | Status: DISCONTINUED | OUTPATIENT
Start: 2023-12-10 | End: 2023-12-13

## 2023-12-10 RX ADMIN — Medication 100 MICROGRAM(S): at 05:30

## 2023-12-10 RX ADMIN — ATORVASTATIN CALCIUM 10 MILLIGRAM(S): 80 TABLET, FILM COATED ORAL at 22:48

## 2023-12-10 RX ADMIN — Medication 25 MICROGRAM(S): at 05:30

## 2023-12-10 RX ADMIN — ZINC SULFATE TAB 220 MG (50 MG ZINC EQUIVALENT) 220 MILLIGRAM(S): 220 (50 ZN) TAB at 11:08

## 2023-12-10 RX ADMIN — Medication 40 MILLIEQUIVALENT(S): at 22:48

## 2023-12-10 RX ADMIN — Medication 81 MILLIGRAM(S): at 11:08

## 2023-12-10 RX ADMIN — Medication 40 MILLIGRAM(S): at 05:30

## 2023-12-10 RX ADMIN — AZITHROMYCIN 255 MILLIGRAM(S): 500 TABLET, FILM COATED ORAL at 11:07

## 2023-12-10 RX ADMIN — CEFTRIAXONE 1000 MILLIGRAM(S): 500 INJECTION, POWDER, FOR SOLUTION INTRAMUSCULAR; INTRAVENOUS at 11:07

## 2023-12-10 RX ADMIN — MIRTAZAPINE 7.5 MILLIGRAM(S): 45 TABLET, ORALLY DISINTEGRATING ORAL at 22:48

## 2023-12-10 NOTE — PROGRESS NOTE ADULT - SUBJECTIVE AND OBJECTIVE BOX
Patient is a 88y old  Female who presents with a chief complaint of sob/hypoxia (10 Dec 2023 08:23)        MEDICATIONS  (STANDING):  aspirin enteric coated 81 milliGRAM(s) Oral daily  atorvastatin 10 milliGRAM(s) Oral at bedtime  azithromycin  IVPB 500 milliGRAM(s) IV Intermittent every 24 hours  cefTRIAXone Injectable. 1000 milliGRAM(s) IV Push every 24 hours  citalopram 10 milliGRAM(s) Oral daily  digoxin     Tablet 62.5 MICROGram(s) Oral daily  furosemide   Injectable 40 milliGRAM(s) IV Push two times a day  levothyroxine 25 MICROGram(s) Oral daily  levothyroxine 100 MICROGram(s) Oral daily  mirtazapine 7.5 milliGRAM(s) Oral at bedtime  sacubitril 24 mG/valsartan 26 mG 1 Tablet(s) Oral two times a day  zinc sulfate 220 milliGRAM(s) Oral daily    MEDICATIONS  (PRN):  acetaminophen     Tablet .. 650 milliGRAM(s) Oral once PRN Mild Pain (1 - 3)            Vital Signs Last 24 Hrs  T(C): 36.9 (09 Dec 2023 21:11), Max: 36.9 (09 Dec 2023 21:11)  T(F): 98.4 (09 Dec 2023 21:11), Max: 98.4 (09 Dec 2023 21:11)  HR: 79 (10 Dec 2023 05:27) (76 - 79)  BP: 126/52 (10 Dec 2023 05:27) (97/47 - 126/52)  BP(mean): --  RR: 18 (10 Dec 2023 05:27) (18 - 18)  SpO2: 99% (10 Dec 2023 05:27) (99% - 99%)    Parameters below as of 10 Dec 2023 05:27  Patient On (Oxygen Delivery Method): nasal cannula  O2 Flow (L/min): 1              INTERPRETATION OF TELEMETRY:    ECG:        LABS:                        10.5   5.56  )-----------( 278      ( 09 Dec 2023 06:50 )             31.7     12-09    135  |  95<L>  |  21  ----------------------------<  82  3.5   |  37<H>  |  0.76    Ca    8.8      09 Dec 2023 06:50  Mg     2.0     12-09    TPro  6.4  /  Alb  2.3<L>  /  TBili  0.5  /  DBili  x   /  AST  27  /  ALT  18  /  AlkPhos  49  12-09        PT/INR - ( 09 Dec 2023 06:50 )   PT: 17.1 sec;   INR: 1.53 ratio         PTT - ( 09 Dec 2023 06:50 )  PTT:37.6 sec  Urinalysis Basic - ( 09 Dec 2023 06:50 )    Color: x / Appearance: x / SG: x / pH: x  Gluc: 82 mg/dL / Ketone: x  / Bili: x / Urobili: x   Blood: x / Protein: x / Nitrite: x   Leuk Esterase: x / RBC: x / WBC x   Sq Epi: x / Non Sq Epi: x / Bacteria: x      I&O's Summary    BNP  RADIOLOGY & ADDITIONAL STUDIES:

## 2023-12-10 NOTE — PROGRESS NOTE ADULT - ASSESSMENT
87-year-old female with medical history of CHF, chronic atrial fibrillation on warfarin, s/p AICD, CAD, hypothyroidism, celiac disease presents from home after her HHA called 911 after she was agitated and refusing to take her meds. States she has been weak and sob. Also endorsing the weakness. Was found to be hypoxic to 88 % on RA. PT endorses productive cough and this am had scant blood tinged mucous.   Spoke to Dtjuan Lema on the phone. states her mother suffering from extreme anxiety and was started on xanax 2 weeks ago and since then, her cognition has suffered greatly. She is also having difficulty walking per aides. More forgetful and agitated at time which is not like her per daughter.     #Acute decompensated HFrEF EF 30-35%  #B/L pleural effusion  #Pneumonia  #Chronic afib  #S/p ICD - Chronic afib   #Metabolic encephalopathy  #Hyponatremia  #DORIS Mass 3cm    - Monitor on tele. BNP 97795. Troponin negative x1. CK 73  - Continue Lasix 40mg  BID, trend BMP  - Continue statin, digoxin and Entresto  - 12/5/23: TTE: EF 30-35%, Mod MR/TR/PulmHTN  - CT Chest > Patchy multifocal airspace disease in the right upper lobe, left upper lobe, and left lower lobe compatible with pneumonia. Moderate right pleural effusion and small left pleural effusion. 3 cm left upper lobe mass and mediastinal lymphadenopathy; leading diagnostic consideration is for primary lung malignancy with metastasis.    - Interrogate device > Chronic afib, no RVR. DC amiodarone.   cont IV lasix but follow CR closely may need to change to daily tommorrow   cont ABX   awating cytology on pleural fluid but HEM ONC suspicious for lung CA , palliative consult   Will follow   87-year-old female with medical history of CHF, chronic atrial fibrillation on warfarin, s/p AICD, CAD, hypothyroidism, celiac disease presents from home after her HHA called 911 after she was agitated and refusing to take her meds. States she has been weak and sob. Also endorsing the weakness. Was found to be hypoxic to 88 % on RA. PT endorses productive cough and this am had scant blood tinged mucous.   Spoke to Dtjuan Lema on the phone. states her mother suffering from extreme anxiety and was started on xanax 2 weeks ago and since then, her cognition has suffered greatly. She is also having difficulty walking per aides. More forgetful and agitated at time which is not like her per daughter.     #Acute decompensated HFrEF EF 30-35%  #B/L pleural effusion  #Pneumonia  #Chronic afib  #S/p ICD - Chronic afib   #Metabolic encephalopathy  #Hyponatremia  #DORIS Mass 3cm    - Monitor on tele. BNP 68435. Troponin negative x1. CK 73  - Continue Lasix 40mg  BID, trend BMP  - Continue statin, digoxin and Entresto  - 12/5/23: TTE: EF 30-35%, Mod MR/TR/PulmHTN  - CT Chest > Patchy multifocal airspace disease in the right upper lobe, left upper lobe, and left lower lobe compatible with pneumonia. Moderate right pleural effusion and small left pleural effusion. 3 cm left upper lobe mass and mediastinal lymphadenopathy; leading diagnostic consideration is for primary lung malignancy with metastasis.    - Interrogate device > Chronic afib, no RVR. DC amiodarone.   cont IV lasix but follow CR closely may need to change to daily tommorrow   cont ABX   awating cytology on pleural fluid but HEM ONC suspicious for lung CA , palliative consult   Will follow

## 2023-12-10 NOTE — PROGRESS NOTE ADULT - ASSESSMENT
86 y/o female with h/o CHF, chronic atrial fibrillation on warfarin, s/p AICD, CAD, hypothyroidism, celiac disease, anxiety disorder was admitted on 12/6 for increased confusion and restlessness. She became agitated and refusing to take her meds. States she has been weak, cough with scant blood tinged sputum and SOB. In ER she was noted hypoxic to 88 % on RA. Dtr Pita stated her mother suffering from extreme anxiety and was started on xanax 2 weeks ago and since then, her cognition has suffered greatly. More forgetful and agitated at time which is not like her per daughter. In ER she received ceftriaxone.     1. Multifocal pneumonia. Right pleural effusion s/p thoracentesis. CHF exacerbation. Metabolic encephalopathy. Allergy to Ancef.   -more alert  -respiratory improving  -pleural fluid c/s noted  -on ceftriaxone 1 gm IV qd and azithromycin 500 mg IV qd #   -tolerating abx well so far; no side effects noted  -monitor closely in [facility] of Ancef allergy history  -respiratory care  -complete abx with azithromycin  -continue abx coverage with ceftriaxone for now  -f/u cultures  -monitor temps  -f/u CBC  -supportive care  2. Other issues:   -care per medicine       88 y/o female with h/o CHF, chronic atrial fibrillation on warfarin, s/p AICD, CAD, hypothyroidism, celiac disease, anxiety disorder was admitted on 12/6 for increased confusion and restlessness. She became agitated and refusing to take her meds. States she has been weak, cough with scant blood tinged sputum and SOB. In ER she was noted hypoxic to 88 % on RA. Dtr Pita stated her mother suffering from extreme anxiety and was started on xanax 2 weeks ago and since then, her cognition has suffered greatly. More forgetful and agitated at time which is not like her per daughter. In ER she received ceftriaxone.     1. Multifocal pneumonia. Right pleural effusion s/p thoracentesis. CHF exacerbation. Metabolic encephalopathy. Allergy to Ancef.   -more alert  -respiratory improving  -pleural fluid c/s noted  -on ceftriaxone 1 gm IV qd and azithromycin 500 mg IV qd #   -tolerating abx well so far; no side effects noted  -monitor closely in [place] of Ancef allergy history  -respiratory care  -complete abx with azithromycin  -continue abx coverage with ceftriaxone for now  -f/u cultures  -monitor temps  -f/u CBC  -supportive care  2. Other issues:   -care per medicine

## 2023-12-10 NOTE — PROGRESS NOTE ADULT - ASSESSMENT
89 y/o female here for:     1. Pleural Effusion   2. Apical PTX    -She is s/p thoracentesis for pleural effusion on 12/8 with drainage of 850mL serosanguineous fluid   -Post thora imaging with right apical PTX   -Currently hemodynamically stable with adequate oxygenation on nasal cannula. Asymptomatic.   -No urgent need for pigtail catheter at this time. Will continue to monitor with serial CXRs. Titrate supplemental oxygen for spo2> 90%.   -Pleural fluid testing in progress   -Remainder of management by primary team.     Time spent on this patient encounter: 35+ minutes    Date of entry of this note is equal to the date of services rendered.    This includes assessment of the presenting problems with associated risks, reviewing the medical record to prepare for the encounter and meeting face to face with the patient to obtain additional history and conduct a focused exmaination. I have also performed an appropiate physical exam, made interventions listed and ordered and interpreted appropiate diagnostic studies as documented. This also included communication and coordination of care with the multidisciplinary team including the bedside nurse, appropriate attending of record and consultants as needed.

## 2023-12-10 NOTE — PROGRESS NOTE ADULT - SUBJECTIVE AND OBJECTIVE BOX
Patient is a 88y old  Female who presents with a chief complaint of sob/hypoxia (09 Dec 2023 22:18)      BRIEF HOSPITAL COURSE: Patient is an 87 y/o female with pmhx of chronic systolic CHF, s/p mitral valve surgery, HTN, HLD, chronic atrial fibrillation on warfarin, s/p AICD, CAD s/p MI, hypothyroidism, celiac disease presented from home after her HHA called 911. Patient was agitated, refusing to take her meds and c/o sob/productive cough.  Patient was found to be hypoxic to 88 % on RA. Patient had been more agitated and more forgetful per family. CXR on admission showed increased perihilar interstitial markings and airspace densities. Right greater than left effusion with compressive atelectatic change and cardiomegaly. CT Chest on 12/6 with patchy multifocal airspace disease in the RUL, DORIS and LLL left upper compatible with pneumonia; Moderate right pleural effusion and small left pleural effusion; 3 cm left upper lobe mass and mediastinal lymphadenopathy; leading diagnostic consideration is for primary lung malignancy with metastasis. Patient admitted with acute on chronic systolic CHF (BNP 69140), diuresed, and started on IV antibiotics for pneumonia.Thoracic surgery consulted for pleural effusions.    12/9: s/p thoracentesis for effusion, post CXR shows apical PTX, for which she is and remains asymptomatic.  12/10: Patient seen and evaluated at bedside. Resting comfortably on NC. No acute events overnight. Right apical PTX still present on this mornings CXR.     PAST MEDICAL & SURGICAL HISTORY:  Chronic atrial fibrillation      Hypothyroid      HTN (hypertension)      History of cataract      Hyperlipidemia, unspecified hyperlipidemia type      History of CHF (congestive heart failure)      Peripheral edema      History of colon polyps      Varicose veins  BLE      Arthritis      Celiac disease      Hashimoto's thyroiditis      Osteoporosis      Iron deficiency anemia due to chronic blood loss      AICD (automatic cardioverter/defibrillator) present  x 3 . Replaced x 2. Presently right subclavian area      Myocardial infarction  1990      Mitral valve prolapse      Left inguinal hernia      Hearing loss      AICD (automatic cardioverter/defibrillator) present  with PPM. Replaced x 2.      H/O right inguinal hernia repair      H/O colonoscopy  last done 2009      History of cataract extraction  Bilateral      Artificial pacemaker      History of heart surgery  Mitral valve surgery for leaky valve 9/2020        Allergies    Ancef (Unknown)  lidocaine (Rash; Angioedema)  adhesives (Rash)    Intolerances    Gluten (Diarrhea)    FAMILY HISTORY:  Family history of diabetes mellitus (Mother, Sibling)    Family history of heart disease (Mother)    Family history of dementia (Father)        Review of Systems:  CONSTITUTIONAL: No fever, chills, or fatigue  EYES: No eye pain, visual disturbances, or discharge  ENMT:  No difficulty hearing, tinnitus, vertigo; No sinus or throat pain  NECK: No pain or stiffness  RESPIRATORY: No cough, wheezing, chills or hemoptysis; No shortness of breath  CARDIOVASCULAR: No chest pain, palpitations, dizziness, or leg swelling  GASTROINTESTINAL: No abdominal or epigastric pain. No nausea, vomiting, or hematemesis; No diarrhea or constipation. No melena or hematochezia.  GENITOURINARY: No dysuria, frequency, hematuria, or incontinence  NEUROLOGICAL: No headaches, memory loss, loss of strength, numbness, or tremors  SKIN: No itching, burning, rashes, or lesions   MUSCULOSKELETAL: No joint pain or swelling; No muscle, back, or extremity pain  PSYCHIATRIC: No depression, anxiety, mood swings, or difficulty sleeping      Medications:  azithromycin  IVPB 500 milliGRAM(s) IV Intermittent every 24 hours  cefTRIAXone Injectable. 1000 milliGRAM(s) IV Push every 24 hours    digoxin     Tablet 62.5 MICROGram(s) Oral daily  furosemide   Injectable 40 milliGRAM(s) IV Push two times a day  sacubitril 24 mG/valsartan 26 mG 1 Tablet(s) Oral two times a day      acetaminophen     Tablet .. 650 milliGRAM(s) Oral once PRN  citalopram 10 milliGRAM(s) Oral daily  mirtazapine 7.5 milliGRAM(s) Oral at bedtime      aspirin enteric coated 81 milliGRAM(s) Oral daily        atorvastatin 10 milliGRAM(s) Oral at bedtime  levothyroxine 25 MICROGram(s) Oral daily  levothyroxine 100 MICROGram(s) Oral daily    zinc sulfate 220 milliGRAM(s) Oral daily                ICU Vital Signs Last 24 Hrs  T(C): 36.9 (09 Dec 2023 21:11), Max: 36.9 (09 Dec 2023 21:11)  T(F): 98.4 (09 Dec 2023 21:11), Max: 98.4 (09 Dec 2023 21:11)  HR: 79 (10 Dec 2023 05:27) (76 - 79)  BP: 126/52 (10 Dec 2023 05:27) (97/47 - 126/52)  BP(mean): --  ABP: --  ABP(mean): --  RR: 18 (10 Dec 2023 05:27) (18 - 18)  SpO2: 99% (10 Dec 2023 05:27) (99% - 99%)    O2 Parameters below as of 10 Dec 2023 05:27  Patient On (Oxygen Delivery Method): nasal cannula  O2 Flow (L/min): 1        Vital Signs Last 24 Hrs  T(C): 36.9 (09 Dec 2023 21:11), Max: 36.9 (09 Dec 2023 21:11)  T(F): 98.4 (09 Dec 2023 21:11), Max: 98.4 (09 Dec 2023 21:11)  HR: 79 (10 Dec 2023 05:27) (76 - 79)  BP: 126/52 (10 Dec 2023 05:27) (97/47 - 126/52)  BP(mean): --  RR: 18 (10 Dec 2023 05:27) (18 - 18)  SpO2: 99% (10 Dec 2023 05:27) (99% - 99%)    Parameters below as of 10 Dec 2023 05:27  Patient On (Oxygen Delivery Method): nasal cannula  O2 Flow (L/min): 1          I&O's Detail        LABS:                        10.5   5.56  )-----------( 278      ( 09 Dec 2023 06:50 )             31.7     12-09    135  |  95<L>  |  21  ----------------------------<  82  3.5   |  37<H>  |  0.76    Ca    8.8      09 Dec 2023 06:50  Mg     2.0     12-09    TPro  6.4  /  Alb  2.3<L>  /  TBili  0.5  /  DBili  x   /  AST  27  /  ALT  18  /  AlkPhos  49  12-09          CAPILLARY BLOOD GLUCOSE        PT/INR - ( 09 Dec 2023 06:50 )   PT: 17.1 sec;   INR: 1.53 ratio         PTT - ( 09 Dec 2023 06:50 )  PTT:37.6 sec  Urinalysis Basic - ( 09 Dec 2023 06:50 )    Color: x / Appearance: x / SG: x / pH: x  Gluc: 82 mg/dL / Ketone: x  / Bili: x / Urobili: x   Blood: x / Protein: x / Nitrite: x   Leuk Esterase: x / RBC: x / WBC x   Sq Epi: x / Non Sq Epi: x / Bacteria: x      CULTURES:  Culture Results:   No growth (12-08 @ 19:40)  Culture Results:   Testing in progress (12-08 @ 19:40)      Physical Examination:    General: Fragile, elderly female resting comfortably in bed.     HEENT: NC/AT     PULM: Clear to auscultation bilaterally, no significant sputum production    CVS: Regular rate and rhythm, no murmurs, rubs, or gallops    ABD: Soft, nondistended, nontender, normoactive bowel sounds, no masses    EXT: No edema, nontender    SKIN: Warm and well perfused, no rashes noted.    RADIOLOGY: < from: CT Chest No Cont (12.06.23 @ 15:10) >    IMPRESSION:.    Patchy multifocal airspace disease in the right upper lobe, left upper   lobe, and left lower lobe compatible with pneumonia.    Moderate right pleural effusion and small left pleural effusion.    3 cm left upper lobe mass and mediastinal lymphadenopathy; leading   diagnostic consideration is for primary lung malignancy with metastasis.    < from: Xray Chest 1 View AP/PA. (12.05.23 @ 01:59) >  IMPRESSION: Increased perihilar interstitial markings and airspace   densities. One should consider congestive changes and some underlying   inflammatory infectious process. Right greater than left effusion with   compressive atelectatic change. Cardiomegaly. Pacer as before. Findings   appear progressive since previous exam regional osseous structures   appropriate for age.           Patient is a 88y old  Female who presents with a chief complaint of sob/hypoxia (09 Dec 2023 22:18)      BRIEF HOSPITAL COURSE: Patient is an 87 y/o female with pmhx of chronic systolic CHF, s/p mitral valve surgery, HTN, HLD, chronic atrial fibrillation on warfarin, s/p AICD, CAD s/p MI, hypothyroidism, celiac disease presented from home after her HHA called 911. Patient was agitated, refusing to take her meds and c/o sob/productive cough.  Patient was found to be hypoxic to 88 % on RA. Patient had been more agitated and more forgetful per family. CXR on admission showed increased perihilar interstitial markings and airspace densities. Right greater than left effusion with compressive atelectatic change and cardiomegaly. CT Chest on 12/6 with patchy multifocal airspace disease in the RUL, DORIS and LLL left upper compatible with pneumonia; Moderate right pleural effusion and small left pleural effusion; 3 cm left upper lobe mass and mediastinal lymphadenopathy; leading diagnostic consideration is for primary lung malignancy with metastasis. Patient admitted with acute on chronic systolic CHF (BNP 76189), diuresed, and started on IV antibiotics for pneumonia.Thoracic surgery consulted for pleural effusions.    12/9: s/p thoracentesis for effusion, post CXR shows apical PTX, for which she is and remains asymptomatic.  12/10: Patient seen and evaluated at bedside. Resting comfortably on NC. No acute events overnight. Right apical PTX still present on this mornings CXR.     PAST MEDICAL & SURGICAL HISTORY:  Chronic atrial fibrillation      Hypothyroid      HTN (hypertension)      History of cataract      Hyperlipidemia, unspecified hyperlipidemia type      History of CHF (congestive heart failure)      Peripheral edema      History of colon polyps      Varicose veins  BLE      Arthritis      Celiac disease      Hashimoto's thyroiditis      Osteoporosis      Iron deficiency anemia due to chronic blood loss      AICD (automatic cardioverter/defibrillator) present  x 3 . Replaced x 2. Presently right subclavian area      Myocardial infarction  1990      Mitral valve prolapse      Left inguinal hernia      Hearing loss      AICD (automatic cardioverter/defibrillator) present  with PPM. Replaced x 2.      H/O right inguinal hernia repair      H/O colonoscopy  last done 2009      History of cataract extraction  Bilateral      Artificial pacemaker      History of heart surgery  Mitral valve surgery for leaky valve 9/2020        Allergies    Ancef (Unknown)  lidocaine (Rash; Angioedema)  adhesives (Rash)    Intolerances    Gluten (Diarrhea)    FAMILY HISTORY:  Family history of diabetes mellitus (Mother, Sibling)    Family history of heart disease (Mother)    Family history of dementia (Father)        Review of Systems:  CONSTITUTIONAL: No fever, chills, or fatigue  EYES: No eye pain, visual disturbances, or discharge  ENMT:  No difficulty hearing, tinnitus, vertigo; No sinus or throat pain  NECK: No pain or stiffness  RESPIRATORY: No cough, wheezing, chills or hemoptysis; No shortness of breath  CARDIOVASCULAR: No chest pain, palpitations, dizziness, or leg swelling  GASTROINTESTINAL: No abdominal or epigastric pain. No nausea, vomiting, or hematemesis; No diarrhea or constipation. No melena or hematochezia.  GENITOURINARY: No dysuria, frequency, hematuria, or incontinence  NEUROLOGICAL: No headaches, memory loss, loss of strength, numbness, or tremors  SKIN: No itching, burning, rashes, or lesions   MUSCULOSKELETAL: No joint pain or swelling; No muscle, back, or extremity pain  PSYCHIATRIC: No depression, anxiety, mood swings, or difficulty sleeping      Medications:  azithromycin  IVPB 500 milliGRAM(s) IV Intermittent every 24 hours  cefTRIAXone Injectable. 1000 milliGRAM(s) IV Push every 24 hours    digoxin     Tablet 62.5 MICROGram(s) Oral daily  furosemide   Injectable 40 milliGRAM(s) IV Push two times a day  sacubitril 24 mG/valsartan 26 mG 1 Tablet(s) Oral two times a day      acetaminophen     Tablet .. 650 milliGRAM(s) Oral once PRN  citalopram 10 milliGRAM(s) Oral daily  mirtazapine 7.5 milliGRAM(s) Oral at bedtime      aspirin enteric coated 81 milliGRAM(s) Oral daily        atorvastatin 10 milliGRAM(s) Oral at bedtime  levothyroxine 25 MICROGram(s) Oral daily  levothyroxine 100 MICROGram(s) Oral daily    zinc sulfate 220 milliGRAM(s) Oral daily                ICU Vital Signs Last 24 Hrs  T(C): 36.9 (09 Dec 2023 21:11), Max: 36.9 (09 Dec 2023 21:11)  T(F): 98.4 (09 Dec 2023 21:11), Max: 98.4 (09 Dec 2023 21:11)  HR: 79 (10 Dec 2023 05:27) (76 - 79)  BP: 126/52 (10 Dec 2023 05:27) (97/47 - 126/52)  BP(mean): --  ABP: --  ABP(mean): --  RR: 18 (10 Dec 2023 05:27) (18 - 18)  SpO2: 99% (10 Dec 2023 05:27) (99% - 99%)    O2 Parameters below as of 10 Dec 2023 05:27  Patient On (Oxygen Delivery Method): nasal cannula  O2 Flow (L/min): 1        Vital Signs Last 24 Hrs  T(C): 36.9 (09 Dec 2023 21:11), Max: 36.9 (09 Dec 2023 21:11)  T(F): 98.4 (09 Dec 2023 21:11), Max: 98.4 (09 Dec 2023 21:11)  HR: 79 (10 Dec 2023 05:27) (76 - 79)  BP: 126/52 (10 Dec 2023 05:27) (97/47 - 126/52)  BP(mean): --  RR: 18 (10 Dec 2023 05:27) (18 - 18)  SpO2: 99% (10 Dec 2023 05:27) (99% - 99%)    Parameters below as of 10 Dec 2023 05:27  Patient On (Oxygen Delivery Method): nasal cannula  O2 Flow (L/min): 1          I&O's Detail        LABS:                        10.5   5.56  )-----------( 278      ( 09 Dec 2023 06:50 )             31.7     12-09    135  |  95<L>  |  21  ----------------------------<  82  3.5   |  37<H>  |  0.76    Ca    8.8      09 Dec 2023 06:50  Mg     2.0     12-09    TPro  6.4  /  Alb  2.3<L>  /  TBili  0.5  /  DBili  x   /  AST  27  /  ALT  18  /  AlkPhos  49  12-09          CAPILLARY BLOOD GLUCOSE        PT/INR - ( 09 Dec 2023 06:50 )   PT: 17.1 sec;   INR: 1.53 ratio         PTT - ( 09 Dec 2023 06:50 )  PTT:37.6 sec  Urinalysis Basic - ( 09 Dec 2023 06:50 )    Color: x / Appearance: x / SG: x / pH: x  Gluc: 82 mg/dL / Ketone: x  / Bili: x / Urobili: x   Blood: x / Protein: x / Nitrite: x   Leuk Esterase: x / RBC: x / WBC x   Sq Epi: x / Non Sq Epi: x / Bacteria: x      CULTURES:  Culture Results:   No growth (12-08 @ 19:40)  Culture Results:   Testing in progress (12-08 @ 19:40)      Physical Examination:    General: Fragile, elderly female resting comfortably in bed.     HEENT: NC/AT     PULM: Clear to auscultation bilaterally, no significant sputum production    CVS: Regular rate and rhythm, no murmurs, rubs, or gallops    ABD: Soft, nondistended, nontender, normoactive bowel sounds, no masses    EXT: No edema, nontender    SKIN: Warm and well perfused, no rashes noted.    RADIOLOGY: < from: CT Chest No Cont (12.06.23 @ 15:10) >    IMPRESSION:.    Patchy multifocal airspace disease in the right upper lobe, left upper   lobe, and left lower lobe compatible with pneumonia.    Moderate right pleural effusion and small left pleural effusion.    3 cm left upper lobe mass and mediastinal lymphadenopathy; leading   diagnostic consideration is for primary lung malignancy with metastasis.    < from: Xray Chest 1 View AP/PA. (12.05.23 @ 01:59) >  IMPRESSION: Increased perihilar interstitial markings and airspace   densities. One should consider congestive changes and some underlying   inflammatory infectious process. Right greater than left effusion with   compressive atelectatic change. Cardiomegaly. Pacer as before. Findings   appear progressive since previous exam regional osseous structures   appropriate for age.

## 2023-12-10 NOTE — PROGRESS NOTE ADULT - SUBJECTIVE AND OBJECTIVE BOX
Date of service: 12-10-23 @ 12:44    Lying in bed in NAD  Weak looking  Has dry cough  No SOB at bed    ROS: no fever or chills; denies dizziness, no HA, no abdominal pain, no diarrhea or constipation; no dysuria, no legs pain, no rashes    MEDICATIONS  (STANDING):  aspirin enteric coated 81 milliGRAM(s) Oral daily  atorvastatin 10 milliGRAM(s) Oral at bedtime  azithromycin  IVPB 500 milliGRAM(s) IV Intermittent every 24 hours  cefTRIAXone Injectable. 1000 milliGRAM(s) IV Push every 24 hours  citalopram 10 milliGRAM(s) Oral daily  digoxin     Tablet 62.5 MICROGram(s) Oral daily  furosemide   Injectable 40 milliGRAM(s) IV Push two times a day  levothyroxine 25 MICROGram(s) Oral daily  levothyroxine 100 MICROGram(s) Oral daily  mirtazapine 7.5 milliGRAM(s) Oral at bedtime  sacubitril 24 mG/valsartan 26 mG 1 Tablet(s) Oral two times a day  zinc sulfate 220 milliGRAM(s) Oral daily    Vital Signs Last 24 Hrs  T(C): 36.6 (10 Dec 2023 11:31), Max: 36.9 (09 Dec 2023 21:11)  T(F): 97.9 (10 Dec 2023 11:31), Max: 98.4 (09 Dec 2023 21:11)  HR: 77 (10 Dec 2023 11:31) (76 - 79)  BP: 89/42 (10 Dec 2023 11:31) (89/42 - 126/52)  BP(mean): --  RR: 18 (10 Dec 2023 11:31) (18 - 18)  SpO2: 96% (10 Dec 2023 11:31) (96% - 99%)    Parameters below as of 10 Dec 2023 11:31  Patient On (Oxygen Delivery Method): room air     Physical exam:    Constitutional:  No acute distress  HEENT: NC/AT, EOMI, PERRLA, conjunctivae clear; ears and nose atraumatic  Neck: supple; thyroid not palpable  Back: no tenderness  Respiratory: respiratory effort normal; crackles at bases  Cardiovascular: S1S2 regular, no murmurs  Abdomen: soft, not tender, not distended, positive BS  Genitourinary: no suprapubic tenderness  Lymphatic: no LN palpable  Musculoskeletal: no muscle tenderness, no joint swelling or tenderness  Extremities: no pedal edema  Neurological/ Psychiatric: Alert, moving all extremities  Skin: no rashes; no palpable lesions    Labs: reviewed                        10.5   5.56  )-----------( 278      ( 09 Dec 2023 06:50 )             31.7     12-10    134<L>  |  93<L>  |  22  ----------------------------<  76  3.3<L>   |  36<H>  |  0.85    Ca    8.6      10 Dec 2023 08:12  Mg     2.0     12-10    TPro  6.4  /  Alb  2.3<L>  /  TBili  0.5  /  DBili  x   /  AST  27  /  ALT  18  /  AlkPhos  49  12-09                        10.8   5.92  )-----------( 293      ( 08 Dec 2023 09:49 )             32.9     12-08    132<L>  |  92<L>  |  22  ----------------------------<  158<H>  3.5   |  35<H>  |  0.80    Ca    8.7      08 Dec 2023 09:49  Mg     2.1     12-08    TPro  7.1  /  Alb  2.5<L>  /  TBili  0.5  /  DBili  x   /  AST  26  /  ALT  18  /  AlkPhos  59  12-08                        10.6   5.87  )-----------( 275      ( 07 Dec 2023 06:53 )             32.0     12-07    134<L>  |  95<L>  |  19  ----------------------------<  76  3.5   |  34<H>  |  0.76    Ca    8.7      07 Dec 2023 06:53  Mg     2.1     12-07    TPro  6.8  /  Alb  2.5<L>  /  TBili  1.0  /  DBili  x   /  AST  26  /  ALT  17  /  AlkPhos  60  12-07     LIVER FUNCTIONS - ( 07 Dec 2023 06:53 )  Alb: 2.5 g/dL / Pro: 6.8 gm/dL / ALK PHOS: 60 U/L / ALT: 17 U/L / AST: 26 U/L / GGT: x           (12-05 @ 14:39)  NotDetec    Culture - Fungal, Body Fluid (collected 08 Dec 2023 19:40)  Source: Pleural Fl Pleural Fluid  Preliminary Report (09 Dec 2023 08:19):    Testing in progress    Culture - Body Fluid with Gram Stain (collected 08 Dec 2023 19:40)  Source: Pleural Fl Pleural Fluid  Gram Stain (09 Dec 2023 04:42):    No polymorphonuclear cells seen    No organisms seen    by cytocentrifuge  Preliminary Report (09 Dec 2023 16:07):    No growth    Radiology: all available radiological tests reviewed    < from: CT Chest No Cont (12.06.23 @ 15:10) >  Patchy multifocal airspace disease in the right upper lobe, left upper   lobe, and left lower lobe compatible with pneumonia.  Moderate right pleural effusion and small left pleural effusion.  3 cm left upper lobe mass and mediastinal lymphadenopathy; leading   diagnostic consideration is for primary lung malignancy with metastasis.  < end of copied text >      Advanced directives addressed: full resuscitation

## 2023-12-11 ENCOUNTER — TRANSCRIPTION ENCOUNTER (OUTPATIENT)
Age: 88
End: 2023-12-11

## 2023-12-11 LAB
ADD ON TEST-SPECIMEN IN LAB: SIGNIFICANT CHANGE UP
ADD ON TEST-SPECIMEN IN LAB: SIGNIFICANT CHANGE UP
ALBUMIN SERPL ELPH-MCNC: 2.4 G/DL — LOW (ref 3.3–5)
ALBUMIN SERPL ELPH-MCNC: 2.4 G/DL — LOW (ref 3.3–5)
ANION GAP SERPL CALC-SCNC: 4 MMOL/L — LOW (ref 5–17)
ANION GAP SERPL CALC-SCNC: 4 MMOL/L — LOW (ref 5–17)
APTT BLD: 36 SEC — HIGH (ref 24.5–35.6)
APTT BLD: 36 SEC — HIGH (ref 24.5–35.6)
BUN SERPL-MCNC: 23 MG/DL — SIGNIFICANT CHANGE UP (ref 7–23)
BUN SERPL-MCNC: 23 MG/DL — SIGNIFICANT CHANGE UP (ref 7–23)
CALCIUM SERPL-MCNC: 9 MG/DL — SIGNIFICANT CHANGE UP (ref 8.5–10.1)
CALCIUM SERPL-MCNC: 9 MG/DL — SIGNIFICANT CHANGE UP (ref 8.5–10.1)
CHLORIDE SERPL-SCNC: 96 MMOL/L — SIGNIFICANT CHANGE UP (ref 96–108)
CHLORIDE SERPL-SCNC: 96 MMOL/L — SIGNIFICANT CHANGE UP (ref 96–108)
CO2 SERPL-SCNC: 37 MMOL/L — HIGH (ref 22–31)
CO2 SERPL-SCNC: 37 MMOL/L — HIGH (ref 22–31)
CREAT SERPL-MCNC: 0.82 MG/DL — SIGNIFICANT CHANGE UP (ref 0.5–1.3)
CREAT SERPL-MCNC: 0.82 MG/DL — SIGNIFICANT CHANGE UP (ref 0.5–1.3)
EGFR: 69 ML/MIN/1.73M2 — SIGNIFICANT CHANGE UP
EGFR: 69 ML/MIN/1.73M2 — SIGNIFICANT CHANGE UP
GLUCOSE SERPL-MCNC: 76 MG/DL — SIGNIFICANT CHANGE UP (ref 70–99)
GLUCOSE SERPL-MCNC: 76 MG/DL — SIGNIFICANT CHANGE UP (ref 70–99)
INR BLD: 1.38 RATIO — HIGH (ref 0.85–1.18)
INR BLD: 1.38 RATIO — HIGH (ref 0.85–1.18)
LDH SERPL L TO P-CCNC: 232 U/L — SIGNIFICANT CHANGE UP (ref 84–241)
LDH SERPL L TO P-CCNC: 232 U/L — SIGNIFICANT CHANGE UP (ref 84–241)
MAGNESIUM SERPL-MCNC: 2.1 MG/DL — SIGNIFICANT CHANGE UP (ref 1.6–2.6)
MAGNESIUM SERPL-MCNC: 2.1 MG/DL — SIGNIFICANT CHANGE UP (ref 1.6–2.6)
PHOSPHATE SERPL-MCNC: 2.3 MG/DL — LOW (ref 2.5–4.5)
PHOSPHATE SERPL-MCNC: 2.3 MG/DL — LOW (ref 2.5–4.5)
POTASSIUM SERPL-MCNC: 4.1 MMOL/L — SIGNIFICANT CHANGE UP (ref 3.5–5.3)
POTASSIUM SERPL-MCNC: 4.1 MMOL/L — SIGNIFICANT CHANGE UP (ref 3.5–5.3)
POTASSIUM SERPL-SCNC: 4.1 MMOL/L — SIGNIFICANT CHANGE UP (ref 3.5–5.3)
POTASSIUM SERPL-SCNC: 4.1 MMOL/L — SIGNIFICANT CHANGE UP (ref 3.5–5.3)
PROTHROM AB SERPL-ACNC: 15.4 SEC — HIGH (ref 9.5–13)
PROTHROM AB SERPL-ACNC: 15.4 SEC — HIGH (ref 9.5–13)
SODIUM SERPL-SCNC: 137 MMOL/L — SIGNIFICANT CHANGE UP (ref 135–145)
SODIUM SERPL-SCNC: 137 MMOL/L — SIGNIFICANT CHANGE UP (ref 135–145)

## 2023-12-11 PROCEDURE — 99231 SBSQ HOSP IP/OBS SF/LOW 25: CPT

## 2023-12-11 PROCEDURE — 99232 SBSQ HOSP IP/OBS MODERATE 35: CPT

## 2023-12-11 RX ORDER — WARFARIN SODIUM 2.5 MG/1
2 TABLET ORAL ONCE
Refills: 0 | Status: COMPLETED | OUTPATIENT
Start: 2023-12-11 | End: 2023-12-11

## 2023-12-11 RX ORDER — FUROSEMIDE 40 MG
40 TABLET ORAL DAILY
Refills: 0 | Status: COMPLETED | OUTPATIENT
Start: 2023-12-12 | End: 2023-12-12

## 2023-12-11 RX ORDER — APIXABAN 2.5 MG/1
1 TABLET, FILM COATED ORAL
Qty: 60 | Refills: 0
Start: 2023-12-11

## 2023-12-11 RX ADMIN — CEFTRIAXONE 1000 MILLIGRAM(S): 500 INJECTION, POWDER, FOR SOLUTION INTRAMUSCULAR; INTRAVENOUS at 10:46

## 2023-12-11 RX ADMIN — SACUBITRIL AND VALSARTAN 1 TABLET(S): 24; 26 TABLET, FILM COATED ORAL at 17:27

## 2023-12-11 RX ADMIN — SACUBITRIL AND VALSARTAN 1 TABLET(S): 24; 26 TABLET, FILM COATED ORAL at 06:47

## 2023-12-11 RX ADMIN — Medication 25 MICROGRAM(S): at 06:03

## 2023-12-11 RX ADMIN — CITALOPRAM 10 MILLIGRAM(S): 10 TABLET, FILM COATED ORAL at 14:04

## 2023-12-11 RX ADMIN — Medication 40 MILLIGRAM(S): at 06:03

## 2023-12-11 RX ADMIN — MIRTAZAPINE 7.5 MILLIGRAM(S): 45 TABLET, ORALLY DISINTEGRATING ORAL at 21:46

## 2023-12-11 RX ADMIN — Medication 62.5 MICROGRAM(S): at 10:47

## 2023-12-11 RX ADMIN — WARFARIN SODIUM 2 MILLIGRAM(S): 2.5 TABLET ORAL at 23:04

## 2023-12-11 RX ADMIN — Medication 81 MILLIGRAM(S): at 10:47

## 2023-12-11 RX ADMIN — ATORVASTATIN CALCIUM 10 MILLIGRAM(S): 80 TABLET, FILM COATED ORAL at 21:46

## 2023-12-11 RX ADMIN — ZINC SULFATE TAB 220 MG (50 MG ZINC EQUIVALENT) 220 MILLIGRAM(S): 220 (50 ZN) TAB at 10:47

## 2023-12-11 RX ADMIN — Medication 100 MICROGRAM(S): at 06:04

## 2023-12-11 NOTE — DISCHARGE NOTE NURSING/CASE MANAGEMENT/SOCIAL WORK - PATIENT PORTAL LINK FT
You can access the FollowMyHealth Patient Portal offered by [facility] by registering at the following website: [email or web address]. By joining [facility]’s FollowMyHealth portal, you will also be able to view your health information using other applications (apps) compatible with our system. You can access the FollowMyHealth Patient Portal offered by [facility] by registering at the following website: [email or web address]. By joining [facility]’s FollowMyHealth portal, you will also be able to view your health information using other applications (apps) compatible with our system.

## 2023-12-11 NOTE — PROGRESS NOTE ADULT - NS ATTEND AMEND GEN_ALL_CORE FT
Patient seen and examined. Agree with plan above.

## 2023-12-11 NOTE — PROGRESS NOTE ADULT - SUBJECTIVE AND OBJECTIVE BOX
CHIEF COMPLAINT: Patient is a 88y old  Female who presents with a chief complaint of sob/hypoxia (05 Dec 2023 11:04)    FROM H&P: 87-year-old female with medical history of CHF, chronic atrial fibrillation on warfarin, s/p AICD, CAD, hypothyroidism, celiac disease presents from home after her HHA called 911 after she was agitated and refusing to take her meds. States she has been weak and sob. Also endorsing the weakness. Was found to be hypoxic to 88 % on RA. PT endorses productive cough and this am had scant blood tinged mucous.   Spoke to Dtjuan Lema on the phone. states her mother suffering from extreme anxiety and was started on xanax 2 weeks ago and since then, her cognition has suffered greatly. She is also having difficulty walking per aides. More forgetful and agitated at time which is not like her per daughter.     She is presently comfortable. 97% on 2L. Alert and oriented x 2  CXR: R>L effusion with e/o pvc, cannot r/o infiltrate  s/p iv lasix in ED  NA : 128    12/6/23: Cardiology consulted for HF. Hypoxia. +sob. C/w IV Lasix, check echo and CT.   12/7/23. Breathing somewhat stable. No CP  12/8/23. CTS eval pending. c/w diuresis  12/9- denies CP or SOB     PAST MEDICAL/SURGICAL/FAMILY/SOCIAL HISTORY:   AICD (automatic cardioverter/defibrillator) present x 3 . Replaced x 2. Presently right subclavian area  Arthritis   Celiac disease   Chronic atrial fibrillation   Hashimoto's thyroiditis   Hearing loss   History of cataract   History of CHF (congestive heart failure)   History of colon polyps   HTN (hypertension)   Hyperlipidemia, unspecified hyperlipidemia type   Hypothyroid   Iron deficiency anemia due to chronic blood loss   Left inguinal hernia   Mitral valve prolapse   Myocardial infarction 1990  Osteoporosis   Peripheral edema   Varicose veins BLE.     PAST SURGICAL HISTORY:  AICD (automatic cardioverter/defibrillator) present with PPM. Replaced x 2.  Artificial pacemaker   H/O colonoscopy last done 2009  H/O right inguinal hernia repair   History of cataract extraction Bilateral  History of heart surgery Mitral valve surgery for leaky valve 9/2020.     FAMILY HISTORY:  Father  Still living? No  Family history of dementia, Age at diagnosis: Age Unknown    Mother  Still living? No  Family history of diabetes mellitus, Age at diagnosis: Age Unknown  Family history of heart disease, Age at diagnosis: Age Unknown    Sibling  Still living? No  Family history of diabetes mellitus, Age at diagnosis: Age Unknown.    PREVIOUS DIAGNOSTIC TESTING:      ECHO: FINDINGS: 12/5/23: The left ventricle size is at the upper limits of normal, severe regional hypokinesis, and moderately reduced function. Definity was used to better visualize the endocardial border. Estimated left ventricular ejection fraction is 30-35 %. The left atrium is severely dilated. The right atrium appears dilated. A device wire is seen in the RV and RA. The right ventricle is at the upper limits of normal in size. The aortic valve appears mildly calcified. Valve opening seems to be restricted. Transaortic gradients are underestimated due to impaired left ventricle systolic function. RANJAN by continuity equation suggests mild aortic stenosis. Moderate (2+) aortic regurgitation is present. A mitral valve repair is noted. Moderate (2+) mitral regurgitation is present. The mean trans-mitral pressure gradient is 6 mmHg. The tricuspid valve leaflets are thin and pliable; valve motion is normal. Moderate (2+) tricuspid valve regurgitation is present. Moderate pulmonary hypertension. Normal appearing pulmonic valve structure. Mild pulmonic valvular regurgitation (1+) is present. Pleural effusion - massive bilateral pleural effusion. The IVC is dilated with decreased respiratory variation. A PFO is noted with color and spectral Doppler.    MEDICATIONS  (STANDING):  aspirin enteric coated 81 milliGRAM(s) Oral daily  atorvastatin 10 milliGRAM(s) Oral at bedtime  cefTRIAXone Injectable. 1000 milliGRAM(s) IV Push every 24 hours  citalopram 10 milliGRAM(s) Oral daily  digoxin     Tablet 62.5 MICROGram(s) Oral daily  levothyroxine 25 MICROGram(s) Oral daily  levothyroxine 100 MICROGram(s) Oral daily  mirtazapine 7.5 milliGRAM(s) Oral at bedtime  sacubitril 24 mG/valsartan 26 mG 1 Tablet(s) Oral two times a day  zinc sulfate 220 milliGRAM(s) Oral daily    MEDICATIONS  (PRN):  acetaminophen     Tablet .. 650 milliGRAM(s) Oral once PRN Mild Pain (1 - 3)    HOME MEDICATIONS:  ALPRAZolam 0.25 mg oral tablet: 1 tab(s) orally 2 times a day as needed for  anxiety (05 Dec 2023 11:08)  amiodarone 100 mg oral tablet: 1 tab(s) orally once a day (05 Dec 2023 11:04)  aspirin 81 mg oral delayed release tablet: 1 tab(s) orally once a day (05 Dec 2023 11:04)  atorvastatin 10 mg oral tablet: 1 tab(s) orally once a day (at bedtime) (05 Dec 2023 11:04)  Calcium 500+D oral tablet, chewable: 1 tab(s) orally 2 times a day (05 Dec 2023 11:04)  cephalexin 500 mg oral capsule: 1 cap(s) orally every 8 hours x  7 days ***Course Complete*** (05 Dec 2023 11:11)  citalopram 10 mg oral tablet: 1 tab(s) orally once a day (05 Dec 2023 11:11)  digoxin 125 mcg (0.125 mg) oral tablet: 0.5 tab(s) orally once a day (05 Dec 2023 11:04)  Entresto 24 mg-26 mg oral tablet: 1 tab(s) orally 2 times a day (05 Dec 2023 11:04)  furosemide 20 mg oral tablet: 1 tab(s) orally once a day ***pt doesn&#x27;t take consistently*** (05 Dec 2023 11:03)  levothyroxine 100 mcg (0.1 mg) oral tablet: 1 tab(s) orally once a day ***take with 25mcg = 125cg*** (05 Dec 2023 11:11)  levothyroxine 25 mcg (0.025 mg) oral tablet: 1 tab(s) orally once a day ***take with 100mcg = 125mcg*** (05 Dec 2023 11:11)  warfarin 1 mg oral tablet: 1 tab(s) orally every other day ***alternate with 0.5mg*** (05 Dec 2023 11:11)  warfarin 1 mg oral tablet: 0.5 tab(s) orally every other day ***alternate with 1mg*** (05 Dec 2023 11:08)    PHYSICAL EXAM:  Vital Signs Last 24 Hrs  T(C): 36.4 (11 Dec 2023 09:09), Max: 36.8 (10 Dec 2023 20:45)  T(F): 97.5 (11 Dec 2023 09:09), Max: 98.3 (10 Dec 2023 20:45)  HR: 76 (11 Dec 2023 09:09) (72 - 77)  BP: 106/48 (11 Dec 2023 09:09) (89/42 - 116/50)  RR: 20 (11 Dec 2023 09:09) (18 - 20)  SpO2: 90% (11 Dec 2023 09:09) (90% - 100%)    Parameters below as of 11 Dec 2023 09:09  Patient On (Oxygen Delivery Method): room air    Constitutional: NAD, awake and alert  HEENT: Normal Hearing, MMM  Neck: Soft and supple, No LAD, No JVD  Respiratory: Breath sounds diminished b/l  Cardiovascular: S1 and S2, regular rate and rhythm, no Murmurs, gallops or rubs  Gastrointestinal: Bowel Sounds present, soft, nontender, nondistended, no guarding, no rebound  Extremities: No peripheral edema  Vascular: 2+ peripheral pulses  Neurological: A/O x 2/3, forgetful  Musculoskeletal: 5/5 strength b/l upper and lower extremities  Skin: No rashes    =======================================    INTERPRETATION OF TELEMETRY:    ECG: < from: 12 Lead ECG (12.05.23 @ 01:29) >  Diagnosis Line Ventricular-paced rhythm  Biventricular pacemaker detected  Abnormal ECG    ========================================    LABS: All Labs Reviewed:    12-11    137  |  96  |  23  ----------------------------<  76  4.1   |  37<H>  |  0.82    Ca    9.0      11 Dec 2023 07:08  Phos  2.3     12-11  Mg     2.1     12-11    TPro  x   /  Alb  2.4<L>  /  TBili  x   /  DBili  x   /  AST  x   /  ALT  x   /  AlkPhos  x   12-11    PT/INR - ( 11 Dec 2023 07:08 )   PT: 15.4 sec;   INR: 1.38 ratio       PTT - ( 11 Dec 2023 07:08 )  PTT:36.0 sec    Troponin: 30.60 ng/L    BNP 30188     CARDIAC TESTING:    < from: TTE Echo Complete w/ Contrast w/ Doppler (12.05.23 @ 14:29) >   The left ventricle size is at the upper limits of normal, severe regional   hypokinesis, and moderately reduced function.   Definity was used to better visualize the endocardial border.   Estimated left ventricular ejection fraction is 30-35 %.   The left atrium is severely dilated.   The right atrium appears dilated.   A device wire is seen in the RV and RA.   The right ventricle is at the upper limits of normal in size.   The aortic valve appears mildly calcified. Valve opening seems to be   restricted.   Transaortic gradients are underestimated due to impaired left ventricle   systolic function.   RANJAN by continuity equation suggests mild aortic stenosis.   Moderate (2+) aortic regurgitation is present.   A mitral valve repair is noted.   Moderate (2+) mitral regurgitation is present.   The mean trans-mitral pressure gradient is 6 mmHg.   The tricuspid valve leaflets are thin and pliable; valve motion is   normal.   Moderate (2+) tricuspid valve regurgitation is present.   Moderatepulmonary hypertension.   Normal appearing pulmonic valve structure.   Mild pulmonic valvular regurgitation (1+) is present.   Pleural effusion - massive bilateral pleural effusion.   The IVC is dilated with decreased respiratory variation.   A PFO is noted with color and spectral Doppler.    · Underlying Rhythm	afib  · Comments	no episodes of rapid ventricular rates recorded, BiV pacing 98%  · Additional Procedure Details	normal function Bi-Ventricular ICD   may d/c amiodarone per Dr Alonzo.      RADIOLOGY & ADDITIONAL STUDIES:    < from: CT Chest No Cont (12.06.23 @ 15:10) >  Patchy multifocal airspace disease in the right upper lobe, left upper   lobe, and left lower lobe compatible with pneumonia.  Moderate right pleural effusion and small left pleural effusion.  3 cm left upper lobe mass and mediastinal lymphadenopathy; leading   diagnostic consideration is for primary lung malignancy with metastasis.      12/5/23: Xray Chest 1 View AP/PA: Increased perihilar interstitial markings and airspace densities. One should consider congestive changes and some underlying inflammatory infectious process. Right greater than left effusion with compressive atelectatic change. Cardiomegaly. Pacer as before. Findings appear progressive since previous exam regional osseous structures appropriate for age.   CHIEF COMPLAINT: Patient is a 88y old  Female who presents with a chief complaint of sob/hypoxia (05 Dec 2023 11:04)    FROM H&P: 87-year-old female with medical history of CHF, chronic atrial fibrillation on warfarin, s/p AICD, CAD, hypothyroidism, celiac disease presents from home after her HHA called 911 after she was agitated and refusing to take her meds. States she has been weak and sob. Also endorsing the weakness. Was found to be hypoxic to 88 % on RA. PT endorses productive cough and this am had scant blood tinged mucous.   Spoke to Dtjuan Lema on the phone. states her mother suffering from extreme anxiety and was started on xanax 2 weeks ago and since then, her cognition has suffered greatly. She is also having difficulty walking per aides. More forgetful and agitated at time which is not like her per daughter.     She is presently comfortable. 97% on 2L. Alert and oriented x 2  CXR: R>L effusion with e/o pvc, cannot r/o infiltrate  s/p iv lasix in ED  NA : 128    12/6/23: Cardiology consulted for HF. Hypoxia. +sob. C/w IV Lasix, check echo and CT.   12/7/23. Breathing somewhat stable. No CP  12/8/23. CTS eval pending. c/w diuresis  12/9- denies CP or SOB     PAST MEDICAL/SURGICAL/FAMILY/SOCIAL HISTORY:   AICD (automatic cardioverter/defibrillator) present x 3 . Replaced x 2. Presently right subclavian area  Arthritis   Celiac disease   Chronic atrial fibrillation   Hashimoto's thyroiditis   Hearing loss   History of cataract   History of CHF (congestive heart failure)   History of colon polyps   HTN (hypertension)   Hyperlipidemia, unspecified hyperlipidemia type   Hypothyroid   Iron deficiency anemia due to chronic blood loss   Left inguinal hernia   Mitral valve prolapse   Myocardial infarction 1990  Osteoporosis   Peripheral edema   Varicose veins BLE.     PAST SURGICAL HISTORY:  AICD (automatic cardioverter/defibrillator) present with PPM. Replaced x 2.  Artificial pacemaker   H/O colonoscopy last done 2009  H/O right inguinal hernia repair   History of cataract extraction Bilateral  History of heart surgery Mitral valve surgery for leaky valve 9/2020.     FAMILY HISTORY:  Father  Still living? No  Family history of dementia, Age at diagnosis: Age Unknown    Mother  Still living? No  Family history of diabetes mellitus, Age at diagnosis: Age Unknown  Family history of heart disease, Age at diagnosis: Age Unknown    Sibling  Still living? No  Family history of diabetes mellitus, Age at diagnosis: Age Unknown.    PREVIOUS DIAGNOSTIC TESTING:      ECHO: FINDINGS: 12/5/23: The left ventricle size is at the upper limits of normal, severe regional hypokinesis, and moderately reduced function. Definity was used to better visualize the endocardial border. Estimated left ventricular ejection fraction is 30-35 %. The left atrium is severely dilated. The right atrium appears dilated. A device wire is seen in the RV and RA. The right ventricle is at the upper limits of normal in size. The aortic valve appears mildly calcified. Valve opening seems to be restricted. Transaortic gradients are underestimated due to impaired left ventricle systolic function. RANJAN by continuity equation suggests mild aortic stenosis. Moderate (2+) aortic regurgitation is present. A mitral valve repair is noted. Moderate (2+) mitral regurgitation is present. The mean trans-mitral pressure gradient is 6 mmHg. The tricuspid valve leaflets are thin and pliable; valve motion is normal. Moderate (2+) tricuspid valve regurgitation is present. Moderate pulmonary hypertension. Normal appearing pulmonic valve structure. Mild pulmonic valvular regurgitation (1+) is present. Pleural effusion - massive bilateral pleural effusion. The IVC is dilated with decreased respiratory variation. A PFO is noted with color and spectral Doppler.    MEDICATIONS  (STANDING):  aspirin enteric coated 81 milliGRAM(s) Oral daily  atorvastatin 10 milliGRAM(s) Oral at bedtime  cefTRIAXone Injectable. 1000 milliGRAM(s) IV Push every 24 hours  citalopram 10 milliGRAM(s) Oral daily  digoxin     Tablet 62.5 MICROGram(s) Oral daily  levothyroxine 25 MICROGram(s) Oral daily  levothyroxine 100 MICROGram(s) Oral daily  mirtazapine 7.5 milliGRAM(s) Oral at bedtime  sacubitril 24 mG/valsartan 26 mG 1 Tablet(s) Oral two times a day  zinc sulfate 220 milliGRAM(s) Oral daily    MEDICATIONS  (PRN):  acetaminophen     Tablet .. 650 milliGRAM(s) Oral once PRN Mild Pain (1 - 3)    HOME MEDICATIONS:  ALPRAZolam 0.25 mg oral tablet: 1 tab(s) orally 2 times a day as needed for  anxiety (05 Dec 2023 11:08)  amiodarone 100 mg oral tablet: 1 tab(s) orally once a day (05 Dec 2023 11:04)  aspirin 81 mg oral delayed release tablet: 1 tab(s) orally once a day (05 Dec 2023 11:04)  atorvastatin 10 mg oral tablet: 1 tab(s) orally once a day (at bedtime) (05 Dec 2023 11:04)  Calcium 500+D oral tablet, chewable: 1 tab(s) orally 2 times a day (05 Dec 2023 11:04)  cephalexin 500 mg oral capsule: 1 cap(s) orally every 8 hours x  7 days ***Course Complete*** (05 Dec 2023 11:11)  citalopram 10 mg oral tablet: 1 tab(s) orally once a day (05 Dec 2023 11:11)  digoxin 125 mcg (0.125 mg) oral tablet: 0.5 tab(s) orally once a day (05 Dec 2023 11:04)  Entresto 24 mg-26 mg oral tablet: 1 tab(s) orally 2 times a day (05 Dec 2023 11:04)  furosemide 20 mg oral tablet: 1 tab(s) orally once a day ***pt doesn&#x27;t take consistently*** (05 Dec 2023 11:03)  levothyroxine 100 mcg (0.1 mg) oral tablet: 1 tab(s) orally once a day ***take with 25mcg = 125cg*** (05 Dec 2023 11:11)  levothyroxine 25 mcg (0.025 mg) oral tablet: 1 tab(s) orally once a day ***take with 100mcg = 125mcg*** (05 Dec 2023 11:11)  warfarin 1 mg oral tablet: 1 tab(s) orally every other day ***alternate with 0.5mg*** (05 Dec 2023 11:11)  warfarin 1 mg oral tablet: 0.5 tab(s) orally every other day ***alternate with 1mg*** (05 Dec 2023 11:08)    PHYSICAL EXAM:  Vital Signs Last 24 Hrs  T(C): 36.4 (11 Dec 2023 09:09), Max: 36.8 (10 Dec 2023 20:45)  T(F): 97.5 (11 Dec 2023 09:09), Max: 98.3 (10 Dec 2023 20:45)  HR: 76 (11 Dec 2023 09:09) (72 - 77)  BP: 106/48 (11 Dec 2023 09:09) (89/42 - 116/50)  RR: 20 (11 Dec 2023 09:09) (18 - 20)  SpO2: 90% (11 Dec 2023 09:09) (90% - 100%)    Parameters below as of 11 Dec 2023 09:09  Patient On (Oxygen Delivery Method): room air    Constitutional: NAD, awake and alert  HEENT: Normal Hearing, MMM  Neck: Soft and supple, No LAD, No JVD  Respiratory: Breath sounds diminished b/l  Cardiovascular: S1 and S2, regular rate and rhythm, no Murmurs, gallops or rubs  Gastrointestinal: Bowel Sounds present, soft, nontender, nondistended, no guarding, no rebound  Extremities: No peripheral edema  Vascular: 2+ peripheral pulses  Neurological: A/O x 2/3, forgetful  Musculoskeletal: 5/5 strength b/l upper and lower extremities  Skin: No rashes    =======================================    INTERPRETATION OF TELEMETRY:    ECG: < from: 12 Lead ECG (12.05.23 @ 01:29) >  Diagnosis Line Ventricular-paced rhythm  Biventricular pacemaker detected  Abnormal ECG    ========================================    LABS: All Labs Reviewed:    12-11    137  |  96  |  23  ----------------------------<  76  4.1   |  37<H>  |  0.82    Ca    9.0      11 Dec 2023 07:08  Phos  2.3     12-11  Mg     2.1     12-11    TPro  x   /  Alb  2.4<L>  /  TBili  x   /  DBili  x   /  AST  x   /  ALT  x   /  AlkPhos  x   12-11    PT/INR - ( 11 Dec 2023 07:08 )   PT: 15.4 sec;   INR: 1.38 ratio       PTT - ( 11 Dec 2023 07:08 )  PTT:36.0 sec    Troponin: 30.60 ng/L    BNP 38575     CARDIAC TESTING:    < from: TTE Echo Complete w/ Contrast w/ Doppler (12.05.23 @ 14:29) >   The left ventricle size is at the upper limits of normal, severe regional   hypokinesis, and moderately reduced function.   Definity was used to better visualize the endocardial border.   Estimated left ventricular ejection fraction is 30-35 %.   The left atrium is severely dilated.   The right atrium appears dilated.   A device wire is seen in the RV and RA.   The right ventricle is at the upper limits of normal in size.   The aortic valve appears mildly calcified. Valve opening seems to be   restricted.   Transaortic gradients are underestimated due to impaired left ventricle   systolic function.   RANJAN by continuity equation suggests mild aortic stenosis.   Moderate (2+) aortic regurgitation is present.   A mitral valve repair is noted.   Moderate (2+) mitral regurgitation is present.   The mean trans-mitral pressure gradient is 6 mmHg.   The tricuspid valve leaflets are thin and pliable; valve motion is   normal.   Moderate (2+) tricuspid valve regurgitation is present.   Moderatepulmonary hypertension.   Normal appearing pulmonic valve structure.   Mild pulmonic valvular regurgitation (1+) is present.   Pleural effusion - massive bilateral pleural effusion.   The IVC is dilated with decreased respiratory variation.   A PFO is noted with color and spectral Doppler.    · Underlying Rhythm	afib  · Comments	no episodes of rapid ventricular rates recorded, BiV pacing 98%  · Additional Procedure Details	normal function Bi-Ventricular ICD   may d/c amiodarone per Dr Alonzo.      RADIOLOGY & ADDITIONAL STUDIES:    < from: CT Chest No Cont (12.06.23 @ 15:10) >  Patchy multifocal airspace disease in the right upper lobe, left upper   lobe, and left lower lobe compatible with pneumonia.  Moderate right pleural effusion and small left pleural effusion.  3 cm left upper lobe mass and mediastinal lymphadenopathy; leading   diagnostic consideration is for primary lung malignancy with metastasis.      12/5/23: Xray Chest 1 View AP/PA: Increased perihilar interstitial markings and airspace densities. One should consider congestive changes and some underlying inflammatory infectious process. Right greater than left effusion with compressive atelectatic change. Cardiomegaly. Pacer as before. Findings appear progressive since previous exam regional osseous structures appropriate for age.   CHIEF COMPLAINT: Patient is a 88y old  Female who presents with a chief complaint of sob/hypoxia (05 Dec 2023 11:04)    FROM H&P: 87-year-old female with medical history of CHF, chronic atrial fibrillation on warfarin, s/p AICD, CAD, hypothyroidism, celiac disease presents from home after her HHA called 911 after she was agitated and refusing to take her meds. States she has been weak and sob. Also endorsing the weakness. Was found to be hypoxic to 88 % on RA. PT endorses productive cough and this am had scant blood tinged mucous.   Spoke to Dtr Pita on the phone. states her mother suffering from extreme anxiety and was started on xanax 2 weeks ago and since then, her cognition has suffered greatly. She is also having difficulty walking per aides. More forgetful and agitated at time which is not like her per daughter.     She is presently comfortable. 97% on 2L. Alert and oriented x 2  CXR: R>L effusion with e/o pvc, cannot r/o infiltrate  s/p iv lasix in ED  NA : 128    12/6/23: Cardiology consulted for HF. Hypoxia. +sob. C/w IV Lasix, check echo and CT.   12/7/23. Breathing somewhat stable. No CP  12/8/23. CTS eval pending. c/w diuresis  12/9- denies CP or SOB   12/11: breathing/02 use improved.    PAST MEDICAL/SURGICAL/FAMILY/SOCIAL HISTORY:   AICD (automatic cardioverter/defibrillator) present x 3 . Replaced x 2. Presently right subclavian area  Arthritis   Celiac disease   Chronic atrial fibrillation   Hashimoto's thyroiditis   Hearing loss   History of cataract   History of CHF (congestive heart failure)   History of colon polyps   HTN (hypertension)   Hyperlipidemia, unspecified hyperlipidemia type   Hypothyroid   Iron deficiency anemia due to chronic blood loss   Left inguinal hernia   Mitral valve prolapse   Myocardial infarction 1990  Osteoporosis   Peripheral edema   Varicose veins BLE.     PAST SURGICAL HISTORY:  AICD (automatic cardioverter/defibrillator) present with PPM. Replaced x 2.  Artificial pacemaker   H/O colonoscopy last done 2009  H/O right inguinal hernia repair   History of cataract extraction Bilateral  History of heart surgery Mitral valve surgery for leaky valve 9/2020.     FAMILY HISTORY:  Father  Still living? No  Family history of dementia, Age at diagnosis: Age Unknown    Mother  Still living? No  Family history of diabetes mellitus, Age at diagnosis: Age Unknown  Family history of heart disease, Age at diagnosis: Age Unknown    Sibling  Still living? No  Family history of diabetes mellitus, Age at diagnosis: Age Unknown.    PREVIOUS DIAGNOSTIC TESTING:      ECHO: FINDINGS: 12/5/23: The left ventricle size is at the upper limits of normal, severe regional hypokinesis, and moderately reduced function. Definity was used to better visualize the endocardial border. Estimated left ventricular ejection fraction is 30-35 %. The left atrium is severely dilated. The right atrium appears dilated. A device wire is seen in the RV and RA. The right ventricle is at the upper limits of normal in size. The aortic valve appears mildly calcified. Valve opening seems to be restricted. Transaortic gradients are underestimated due to impaired left ventricle systolic function. RANJAN by continuity equation suggests mild aortic stenosis. Moderate (2+) aortic regurgitation is present. A mitral valve repair is noted. Moderate (2+) mitral regurgitation is present. The mean trans-mitral pressure gradient is 6 mmHg. The tricuspid valve leaflets are thin and pliable; valve motion is normal. Moderate (2+) tricuspid valve regurgitation is present. Moderate pulmonary hypertension. Normal appearing pulmonic valve structure. Mild pulmonic valvular regurgitation (1+) is present. Pleural effusion - massive bilateral pleural effusion. The IVC is dilated with decreased respiratory variation. A PFO is noted with color and spectral Doppler.    MEDICATIONS  (STANDING):  aspirin enteric coated 81 milliGRAM(s) Oral daily  atorvastatin 10 milliGRAM(s) Oral at bedtime  cefTRIAXone Injectable. 1000 milliGRAM(s) IV Push every 24 hours  citalopram 10 milliGRAM(s) Oral daily  digoxin     Tablet 62.5 MICROGram(s) Oral daily  levothyroxine 25 MICROGram(s) Oral daily  levothyroxine 100 MICROGram(s) Oral daily  mirtazapine 7.5 milliGRAM(s) Oral at bedtime  sacubitril 24 mG/valsartan 26 mG 1 Tablet(s) Oral two times a day  zinc sulfate 220 milliGRAM(s) Oral daily    MEDICATIONS  (PRN):  acetaminophen     Tablet .. 650 milliGRAM(s) Oral once PRN Mild Pain (1 - 3)    HOME MEDICATIONS:  ALPRAZolam 0.25 mg oral tablet: 1 tab(s) orally 2 times a day as needed for  anxiety (05 Dec 2023 11:08)  amiodarone 100 mg oral tablet: 1 tab(s) orally once a day (05 Dec 2023 11:04)  aspirin 81 mg oral delayed release tablet: 1 tab(s) orally once a day (05 Dec 2023 11:04)  atorvastatin 10 mg oral tablet: 1 tab(s) orally once a day (at bedtime) (05 Dec 2023 11:04)  Calcium 500+D oral tablet, chewable: 1 tab(s) orally 2 times a day (05 Dec 2023 11:04)  cephalexin 500 mg oral capsule: 1 cap(s) orally every 8 hours x  7 days ***Course Complete*** (05 Dec 2023 11:11)  citalopram 10 mg oral tablet: 1 tab(s) orally once a day (05 Dec 2023 11:11)  digoxin 125 mcg (0.125 mg) oral tablet: 0.5 tab(s) orally once a day (05 Dec 2023 11:04)  Entresto 24 mg-26 mg oral tablet: 1 tab(s) orally 2 times a day (05 Dec 2023 11:04)  furosemide 20 mg oral tablet: 1 tab(s) orally once a day ***pt doesn&#x27;t take consistently*** (05 Dec 2023 11:03)  levothyroxine 100 mcg (0.1 mg) oral tablet: 1 tab(s) orally once a day ***take with 25mcg = 125cg*** (05 Dec 2023 11:11)  levothyroxine 25 mcg (0.025 mg) oral tablet: 1 tab(s) orally once a day ***take with 100mcg = 125mcg*** (05 Dec 2023 11:11)  warfarin 1 mg oral tablet: 1 tab(s) orally every other day ***alternate with 0.5mg*** (05 Dec 2023 11:11)  warfarin 1 mg oral tablet: 0.5 tab(s) orally every other day ***alternate with 1mg*** (05 Dec 2023 11:08)    PHYSICAL EXAM:  Vital Signs Last 24 Hrs  T(C): 36.4 (11 Dec 2023 09:09), Max: 36.8 (10 Dec 2023 20:45)  T(F): 97.5 (11 Dec 2023 09:09), Max: 98.3 (10 Dec 2023 20:45)  HR: 76 (11 Dec 2023 09:09) (72 - 77)  BP: 106/48 (11 Dec 2023 09:09) (89/42 - 116/50)  RR: 20 (11 Dec 2023 09:09) (18 - 20)  SpO2: 90% (11 Dec 2023 09:09) (90% - 100%)    Parameters below as of 11 Dec 2023 09:09  Patient On (Oxygen Delivery Method): room air    Constitutional: NAD, awake and alert  HEENT: Normal Hearing, MMM  Neck: Soft and supple, No LAD, No JVD  Respiratory: Breath sounds diminished RT, slight crackles LT  Cardiovascular: S1 and S2, regular rate and rhythm, no Murmurs, gallops or rubs  Gastrointestinal: Bowel Sounds present, soft, nontender, nondistended, no guarding, no rebound  Extremities: No peripheral edema  Vascular: 2+ peripheral pulses  Neurological: A/O x 2/3, forgetful  Musculoskeletal: 5/5 strength b/l upper and lower extremities  Skin: No rashes    =======================================    INTERPRETATION OF TELEMETRY: afib, v paced    ECG: < from: 12 Lead ECG (12.05.23 @ 01:29) >  Diagnosis Line Ventricular-paced rhythm  Biventricular pacemaker detected  Abnormal ECG    ========================================    LABS: All Labs Reviewed:    12-11    137  |  96  |  23  ----------------------------<  76  4.1   |  37<H>  |  0.82    Ca    9.0      11 Dec 2023 07:08  Phos  2.3     12-11  Mg     2.1     12-11    TPro  x   /  Alb  2.4<L>  /  TBili  x   /  DBili  x   /  AST  x   /  ALT  x   /  AlkPhos  x   12-11    PT/INR - ( 11 Dec 2023 07:08 )   PT: 15.4 sec;   INR: 1.38 ratio       PTT - ( 11 Dec 2023 07:08 )  PTT:36.0 sec    Troponin: 30.60 ng/L    BNP 85951     CARDIAC TESTING:    < from: TTE Echo Complete w/ Contrast w/ Doppler (12.05.23 @ 14:29) >   The left ventricle size is at the upper limits of normal, severe regional   hypokinesis, and moderately reduced function.   Definity was used to better visualize the endocardial border.   Estimated left ventricular ejection fraction is 30-35 %.   The left atrium is severely dilated.   The right atrium appears dilated.   A device wire is seen in the RV and RA.   The right ventricle is at the upper limits of normal in size.   The aortic valve appears mildly calcified. Valve opening seems to be   restricted.   Transaortic gradients are underestimated due to impaired left ventricle   systolic function.   RANJAN by continuity equation suggests mild aortic stenosis.   Moderate (2+) aortic regurgitation is present.   A mitral valve repair is noted.   Moderate (2+) mitral regurgitation is present.   The mean trans-mitral pressure gradient is 6 mmHg.   The tricuspid valve leaflets are thin and pliable; valve motion is   normal.   Moderate (2+) tricuspid valve regurgitation is present.   Moderatepulmonary hypertension.   Normal appearing pulmonic valve structure.   Mild pulmonic valvular regurgitation (1+) is present.   Pleural effusion - massive bilateral pleural effusion.   The IVC is dilated with decreased respiratory variation.   A PFO is noted with color and spectral Doppler.    · Underlying Rhythm	afib  · Comments	no episodes of rapid ventricular rates recorded, BiV pacing 98%  · Additional Procedure Details	normal function Bi-Ventricular ICD   may d/c amiodarone per Dr Alonzo.      RADIOLOGY & ADDITIONAL STUDIES:    < from: CT Chest No Cont (12.06.23 @ 15:10) >  Patchy multifocal airspace disease in the right upper lobe, left upper   lobe, and left lower lobe compatible with pneumonia.  Moderate right pleural effusion and small left pleural effusion.  3 cm left upper lobe mass and mediastinal lymphadenopathy; leading   diagnostic consideration is for primary lung malignancy with metastasis.      12/5/23: Xray Chest 1 View AP/PA: Increased perihilar interstitial markings and airspace densities. One should consider congestive changes and some underlying inflammatory infectious process. Right greater than left effusion with compressive atelectatic change. Cardiomegaly. Pacer as before. Findings appear progressive since previous exam regional osseous structures appropriate for age.   CHIEF COMPLAINT: Patient is a 88y old  Female who presents with a chief complaint of sob/hypoxia (05 Dec 2023 11:04)    FROM H&P: 87-year-old female with medical history of CHF, chronic atrial fibrillation on warfarin, s/p AICD, CAD, hypothyroidism, celiac disease presents from home after her HHA called 911 after she was agitated and refusing to take her meds. States she has been weak and sob. Also endorsing the weakness. Was found to be hypoxic to 88 % on RA. PT endorses productive cough and this am had scant blood tinged mucous.   Spoke to Dtr Pita on the phone. states her mother suffering from extreme anxiety and was started on xanax 2 weeks ago and since then, her cognition has suffered greatly. She is also having difficulty walking per aides. More forgetful and agitated at time which is not like her per daughter.     She is presently comfortable. 97% on 2L. Alert and oriented x 2  CXR: R>L effusion with e/o pvc, cannot r/o infiltrate  s/p iv lasix in ED  NA : 128    12/6/23: Cardiology consulted for HF. Hypoxia. +sob. C/w IV Lasix, check echo and CT.   12/7/23. Breathing somewhat stable. No CP  12/8/23. CTS eval pending. c/w diuresis  12/9- denies CP or SOB   12/11: breathing/02 use improved.    PAST MEDICAL/SURGICAL/FAMILY/SOCIAL HISTORY:   AICD (automatic cardioverter/defibrillator) present x 3 . Replaced x 2. Presently right subclavian area  Arthritis   Celiac disease   Chronic atrial fibrillation   Hashimoto's thyroiditis   Hearing loss   History of cataract   History of CHF (congestive heart failure)   History of colon polyps   HTN (hypertension)   Hyperlipidemia, unspecified hyperlipidemia type   Hypothyroid   Iron deficiency anemia due to chronic blood loss   Left inguinal hernia   Mitral valve prolapse   Myocardial infarction 1990  Osteoporosis   Peripheral edema   Varicose veins BLE.     PAST SURGICAL HISTORY:  AICD (automatic cardioverter/defibrillator) present with PPM. Replaced x 2.  Artificial pacemaker   H/O colonoscopy last done 2009  H/O right inguinal hernia repair   History of cataract extraction Bilateral  History of heart surgery Mitral valve surgery for leaky valve 9/2020.     FAMILY HISTORY:  Father  Still living? No  Family history of dementia, Age at diagnosis: Age Unknown    Mother  Still living? No  Family history of diabetes mellitus, Age at diagnosis: Age Unknown  Family history of heart disease, Age at diagnosis: Age Unknown    Sibling  Still living? No  Family history of diabetes mellitus, Age at diagnosis: Age Unknown.    PREVIOUS DIAGNOSTIC TESTING:      ECHO: FINDINGS: 12/5/23: The left ventricle size is at the upper limits of normal, severe regional hypokinesis, and moderately reduced function. Definity was used to better visualize the endocardial border. Estimated left ventricular ejection fraction is 30-35 %. The left atrium is severely dilated. The right atrium appears dilated. A device wire is seen in the RV and RA. The right ventricle is at the upper limits of normal in size. The aortic valve appears mildly calcified. Valve opening seems to be restricted. Transaortic gradients are underestimated due to impaired left ventricle systolic function. RANJAN by continuity equation suggests mild aortic stenosis. Moderate (2+) aortic regurgitation is present. A mitral valve repair is noted. Moderate (2+) mitral regurgitation is present. The mean trans-mitral pressure gradient is 6 mmHg. The tricuspid valve leaflets are thin and pliable; valve motion is normal. Moderate (2+) tricuspid valve regurgitation is present. Moderate pulmonary hypertension. Normal appearing pulmonic valve structure. Mild pulmonic valvular regurgitation (1+) is present. Pleural effusion - massive bilateral pleural effusion. The IVC is dilated with decreased respiratory variation. A PFO is noted with color and spectral Doppler.    MEDICATIONS  (STANDING):  aspirin enteric coated 81 milliGRAM(s) Oral daily  atorvastatin 10 milliGRAM(s) Oral at bedtime  cefTRIAXone Injectable. 1000 milliGRAM(s) IV Push every 24 hours  citalopram 10 milliGRAM(s) Oral daily  digoxin     Tablet 62.5 MICROGram(s) Oral daily  levothyroxine 25 MICROGram(s) Oral daily  levothyroxine 100 MICROGram(s) Oral daily  mirtazapine 7.5 milliGRAM(s) Oral at bedtime  sacubitril 24 mG/valsartan 26 mG 1 Tablet(s) Oral two times a day  zinc sulfate 220 milliGRAM(s) Oral daily    MEDICATIONS  (PRN):  acetaminophen     Tablet .. 650 milliGRAM(s) Oral once PRN Mild Pain (1 - 3)    HOME MEDICATIONS:  ALPRAZolam 0.25 mg oral tablet: 1 tab(s) orally 2 times a day as needed for  anxiety (05 Dec 2023 11:08)  amiodarone 100 mg oral tablet: 1 tab(s) orally once a day (05 Dec 2023 11:04)  aspirin 81 mg oral delayed release tablet: 1 tab(s) orally once a day (05 Dec 2023 11:04)  atorvastatin 10 mg oral tablet: 1 tab(s) orally once a day (at bedtime) (05 Dec 2023 11:04)  Calcium 500+D oral tablet, chewable: 1 tab(s) orally 2 times a day (05 Dec 2023 11:04)  cephalexin 500 mg oral capsule: 1 cap(s) orally every 8 hours x  7 days ***Course Complete*** (05 Dec 2023 11:11)  citalopram 10 mg oral tablet: 1 tab(s) orally once a day (05 Dec 2023 11:11)  digoxin 125 mcg (0.125 mg) oral tablet: 0.5 tab(s) orally once a day (05 Dec 2023 11:04)  Entresto 24 mg-26 mg oral tablet: 1 tab(s) orally 2 times a day (05 Dec 2023 11:04)  furosemide 20 mg oral tablet: 1 tab(s) orally once a day ***pt doesn&#x27;t take consistently*** (05 Dec 2023 11:03)  levothyroxine 100 mcg (0.1 mg) oral tablet: 1 tab(s) orally once a day ***take with 25mcg = 125cg*** (05 Dec 2023 11:11)  levothyroxine 25 mcg (0.025 mg) oral tablet: 1 tab(s) orally once a day ***take with 100mcg = 125mcg*** (05 Dec 2023 11:11)  warfarin 1 mg oral tablet: 1 tab(s) orally every other day ***alternate with 0.5mg*** (05 Dec 2023 11:11)  warfarin 1 mg oral tablet: 0.5 tab(s) orally every other day ***alternate with 1mg*** (05 Dec 2023 11:08)    PHYSICAL EXAM:  Vital Signs Last 24 Hrs  T(C): 36.4 (11 Dec 2023 09:09), Max: 36.8 (10 Dec 2023 20:45)  T(F): 97.5 (11 Dec 2023 09:09), Max: 98.3 (10 Dec 2023 20:45)  HR: 76 (11 Dec 2023 09:09) (72 - 77)  BP: 106/48 (11 Dec 2023 09:09) (89/42 - 116/50)  RR: 20 (11 Dec 2023 09:09) (18 - 20)  SpO2: 90% (11 Dec 2023 09:09) (90% - 100%)    Parameters below as of 11 Dec 2023 09:09  Patient On (Oxygen Delivery Method): room air    Constitutional: NAD, awake and alert  HEENT: Normal Hearing, MMM  Neck: Soft and supple, No LAD, No JVD  Respiratory: Breath sounds diminished RT, slight crackles LT  Cardiovascular: S1 and S2, regular rate and rhythm, no Murmurs, gallops or rubs  Gastrointestinal: Bowel Sounds present, soft, nontender, nondistended, no guarding, no rebound  Extremities: No peripheral edema  Vascular: 2+ peripheral pulses  Neurological: A/O x 2/3, forgetful  Musculoskeletal: 5/5 strength b/l upper and lower extremities  Skin: No rashes    =======================================    INTERPRETATION OF TELEMETRY: afib, v paced    ECG: < from: 12 Lead ECG (12.05.23 @ 01:29) >  Diagnosis Line Ventricular-paced rhythm  Biventricular pacemaker detected  Abnormal ECG    ========================================    LABS: All Labs Reviewed:    12-11    137  |  96  |  23  ----------------------------<  76  4.1   |  37<H>  |  0.82    Ca    9.0      11 Dec 2023 07:08  Phos  2.3     12-11  Mg     2.1     12-11    TPro  x   /  Alb  2.4<L>  /  TBili  x   /  DBili  x   /  AST  x   /  ALT  x   /  AlkPhos  x   12-11    PT/INR - ( 11 Dec 2023 07:08 )   PT: 15.4 sec;   INR: 1.38 ratio       PTT - ( 11 Dec 2023 07:08 )  PTT:36.0 sec    Troponin: 30.60 ng/L    BNP 97157     CARDIAC TESTING:    < from: TTE Echo Complete w/ Contrast w/ Doppler (12.05.23 @ 14:29) >   The left ventricle size is at the upper limits of normal, severe regional   hypokinesis, and moderately reduced function.   Definity was used to better visualize the endocardial border.   Estimated left ventricular ejection fraction is 30-35 %.   The left atrium is severely dilated.   The right atrium appears dilated.   A device wire is seen in the RV and RA.   The right ventricle is at the upper limits of normal in size.   The aortic valve appears mildly calcified. Valve opening seems to be   restricted.   Transaortic gradients are underestimated due to impaired left ventricle   systolic function.   RANJAN by continuity equation suggests mild aortic stenosis.   Moderate (2+) aortic regurgitation is present.   A mitral valve repair is noted.   Moderate (2+) mitral regurgitation is present.   The mean trans-mitral pressure gradient is 6 mmHg.   The tricuspid valve leaflets are thin and pliable; valve motion is   normal.   Moderate (2+) tricuspid valve regurgitation is present.   Moderatepulmonary hypertension.   Normal appearing pulmonic valve structure.   Mild pulmonic valvular regurgitation (1+) is present.   Pleural effusion - massive bilateral pleural effusion.   The IVC is dilated with decreased respiratory variation.   A PFO is noted with color and spectral Doppler.    · Underlying Rhythm	afib  · Comments	no episodes of rapid ventricular rates recorded, BiV pacing 98%  · Additional Procedure Details	normal function Bi-Ventricular ICD   may d/c amiodarone per Dr Alonzo.      RADIOLOGY & ADDITIONAL STUDIES:    < from: CT Chest No Cont (12.06.23 @ 15:10) >  Patchy multifocal airspace disease in the right upper lobe, left upper   lobe, and left lower lobe compatible with pneumonia.  Moderate right pleural effusion and small left pleural effusion.  3 cm left upper lobe mass and mediastinal lymphadenopathy; leading   diagnostic consideration is for primary lung malignancy with metastasis.      12/5/23: Xray Chest 1 View AP/PA: Increased perihilar interstitial markings and airspace densities. One should consider congestive changes and some underlying inflammatory infectious process. Right greater than left effusion with compressive atelectatic change. Cardiomegaly. Pacer as before. Findings appear progressive since previous exam regional osseous structures appropriate for age.

## 2023-12-11 NOTE — DISCHARGE NOTE NURSING/CASE MANAGEMENT/SOCIAL WORK - NSDCVIVACCINE_GEN_ALL_CORE_FT
Tdap; 09-Nov-2017 22:13; Edward Grajeda); [employer]; [id number]; IntraMuscular; Deltoid Right.; 0.5 milliLiter(s); VIS (VIS Published: 09-May-2013, VIS Presented: 09-Nov-2017);    Tdap; 09-Nov-2017 22:13; Edward Grajeda); [employer]; [id number]; IntraMuscular; Deltoid Right.; 0.5 milliLiter(s); VIS (VIS Published: 09-May-2013, VIS Presented: 09-Nov-2017);

## 2023-12-11 NOTE — CHART NOTE - NSCHARTNOTEFT_GEN_A_CORE
HPI:  87-year-old female with medical history of CHF, chronic atrial fibrillation on warfarin, s/p AICD, CAD, hypothyroidism, celiac disease presents from home after her HHA called 911 after she was agitated and refusing to take her meds. States she has been weak and sob. Also endorsing the weakness. Was found to be hypoxic to 88 % on RA. PT endorses productive cough and this am had scant blood tinged mucous.   Spoke to Dtr Pita on the phone. states her mother suffering from extreme anxiety and was started on xanax 2 weeks ago and since then, her cognition has suffered greatly. She is also having difficulty walking per aides. More forgetful and agitated at time which is not like her per daughter.    (05 Dec 2023 11:04)      PERTINENT PMH REVIEWED:  [ x ] YES [ ] NO           Primary Contact:     son, Dr. Gary Miranda, health care proxy, phone #[phone]    HCP [ x ] Surrogate [   ] Guardian [   ]    Mental Status: Pt simple capacity  Concerns of Depression [  ] -none reported  Anxiety [   ] -appears anxious at times  Baseline ADLs (prior to admission):  Independent [ ] moderately [ ] fully   Dependent   [ ] moderately [ x ]fully    Family Meeting attendees: GOC to be discussed    Anticipated Grief: Patient[ x ] Family [ x ]    Caregiver Madelia Assessed: Yes [ x ] No [  ]    Jainism: Judaism    Spiritual Concerns: Not identified,  available for support    Goals of Care: To be further discussed    Previous Services: 24/7 aide    ADVANCE DIRECTIVES:    -Pt has simple capacity  -HCP names son Gary as primary & Aubrey as alternate  -Full Code    Anticipated D/C Plan: to be further determined.                     Summary:  Palliative SW met with daughter Pita and Pt at the bedside to introduce our team and offer support.  Palliative SW role explained.  Emotional support provided.  Pt. has simple capacity.  Pt appears anxious at times.  Prior to hospitalization, Pt. resided at home with 24/7 aide assistance.  Daughter shared Pt was contact guard to 1 assist with ADLs.  When this writer first met with daughter at the bedside Pt was sleeping, daughter shared family are not sharing possible cancer dx with Pt and are still awaiting some results at this time.  Daughter noted Pt can become very anxious.  Daughter notes Pt. is afraid of death and most medical information causes much anxiety for Pt.  We discussed the option to return home with [facility] services and briefly discussed home hospice.  Daughter expressed concern with Pts weakness and debility sharing family need to discuss SHAMIKA if recommended with Pt in hopes Pt can ambulate at home with her aide.  Message left via telephone for son, awaiting call back.  Families feelings explored.  Support provided.    Son called this writer back, introduced palliative team and offer support.  Son shared he is awaiting results at this time but has been discussing Pts current medical condition with the medical team.  We discussed SHAMIKA vs home with [facility] services vs hospice at home.  The philosophy of hospice reviewed as well as the services provided at home.  Son to discuss sisters concerns regarding ambulation and SHAMIKA as he feels Pt would not want SHAMIKA.  This writer to follow up with son tomorrow to further determine plans and discuss GOC further.  Sons feelings explored.  Support provided.  Our team to continue to follow. HPI:  87-year-old female with medical history of CHF, chronic atrial fibrillation on warfarin, s/p AICD, CAD, hypothyroidism, celiac disease presents from home after her HHA called 911 after she was agitated and refusing to take her meds. States she has been weak and sob. Also endorsing the weakness. Was found to be hypoxic to 88 % on RA. PT endorses productive cough and this am had scant blood tinged mucous.   Spoke to Dtr Pita on the phone. states her mother suffering from extreme anxiety and was started on xanax 2 weeks ago and since then, her cognition has suffered greatly. She is also having difficulty walking per aides. More forgetful and agitated at time which is not like her per daughter.    (05 Dec 2023 11:04)      PERTINENT PMH REVIEWED:  [ x ] YES [ ] NO           Primary Contact:     son, Dr. Gary Miranda, health care proxy, phone #[phone]    HCP [ x ] Surrogate [   ] Guardian [   ]    Mental Status: Pt simple capacity  Concerns of Depression [  ] -none reported  Anxiety [   ] -appears anxious at times  Baseline ADLs (prior to admission):  Independent [ ] moderately [ ] fully   Dependent   [ ] moderately [ x ]fully    Family Meeting attendees: GOC to be discussed    Anticipated Grief: Patient[ x ] Family [ x ]    Caregiver [place] Assessed: Yes [ x ] No [  ]    Latter-day: Rastafarian    Spiritual Concerns: Not identified,  available for support    Goals of Care: To be further discussed    Previous Services: 24/7 aide    ADVANCE DIRECTIVES:    -Pt has simple capacity  -HCP names son Gary as primary & Aubrey as alternate  -Full Code    Anticipated D/C Plan: to be further determined.                     Summary:  Palliative SW met with daughter Pita and Pt at the bedside to introduce our team and offer support.  Palliative SW role explained.  Emotional support provided.  Pt. has simple capacity.  Pt appears anxious at times.  Prior to hospitalization, Pt. resided at home with 24/7 aide assistance.  Daughter shared Pt was contact guard to 1 assist with ADLs.  When this writer first met with daughter at the bedside Pt was sleeping, daughter shared family are not sharing possible cancer dx with Pt and are still awaiting some results at this time.  Daughter noted Pt can become very anxious.  Daughter notes Pt. is afraid of death and most medical information causes much anxiety for Pt.  We discussed the option to return home with [facility] services and briefly discussed home hospice.  Daughter expressed concern with Pts weakness and debility sharing family need to discuss SHAMIKA if recommended with Pt in hopes Pt can ambulate at home with her aide.  Message left via telephone for son, awaiting call back.  Families feelings explored.  Support provided.    Son called this writer back, introduced palliative team and offer support.  Son shared he is awaiting results at this time but has been discussing Pts current medical condition with the medical team.  We discussed SHAMIKA vs home with [facility] services vs hospice at home.  The philosophy of hospice reviewed as well as the services provided at home.  Son to discuss sisters concerns regarding ambulation and SHAMIKA as he feels Pt would not want SHAMIKA.  This writer to follow up with son tomorrow to further determine plans and discuss GOC further.  Sons feelings explored.  Support provided.  Our team to continue to follow.

## 2023-12-11 NOTE — PROGRESS NOTE ADULT - SUBJECTIVE AND OBJECTIVE BOX
HOSPITALIST ATTENDING PROGRESS NOTE     Chart and meds reviewed. Patient seen and examined     CC: SOB    Subjective: Seen and evaluated. Has minimal cough, SOB is improving. No other acute complaints.       All other systems and founds to be negative with exception of what has been described above.         PHYSICAL EXAM:  Vital Signs Last 24 Hrs  T(C): 36.6 (11 Dec 2023 20:18), Max: 36.6 (11 Dec 2023 20:18)  T(F): 97.9 (11 Dec 2023 20:18), Max: 97.9 (11 Dec 2023 20:18)  HR: 76 (11 Dec 2023 20:18) (76 - 76)  BP: 107/44 (11 Dec 2023 20:18) (106/48 - 116/50)  RR: 18 (11 Dec 2023 20:18) (18 - 20)  SpO2: 99% (11 Dec 2023 20:18) (90% - 100%)    Parameters below as of 11 Dec 2023 20:18  Patient On (Oxygen Delivery Method): nasal cannula  O2 Flow (L/min): 2      GEN: NAD, +cachetic, +frail   HEENT: EOMI,  +[place], moist mucous membranes  NECK : Soft and supple, no JVD  LUNG: CTABL, No wheezing, rales or rhonchi  CVS: S1S2+, RRR, no M/G/R  GI: BS+, soft, NT/ND, no guarding, no rebound  EXTREMITIES: No peripheral edema  VASCULAR: 2+ peripheral pulses  NEURO: AAOx2-3, grossly non-focal   MSK: 5/5 strength b/l upper and lower extremities  SKIN: No rashes    HOME MEDICATIONS:  Home Medications:  ALPRAZolam 0.25 mg oral tablet: 1 tab(s) orally 2 times a day as needed for  anxiety (06 Dec 2023 15:19)  amiodarone 100 mg oral tablet: 1 tab(s) orally once a day (05 Dec 2023 11:04)  aspirin 81 mg oral delayed release tablet: 1 tab(s) orally once a day (05 Dec 2023 11:04)  atorvastatin 10 mg oral tablet: 1 tab(s) orally once a day (at bedtime) (05 Dec 2023 11:04)  Calcium 500+D oral tablet, chewable: 1 tab(s) orally 2 times a day (05 Dec 2023 11:04)  cephalexin 500 mg oral capsule: 1 cap(s) orally every 8 hours x  7 days ***Course Complete*** (06 Dec 2023 15:19)  citalopram 10 mg oral tablet: 1 tab(s) orally once a day (06 Dec 2023 15:19)  digoxin 125 mcg (0.125 mg) oral tablet: 0.5 tab(s) orally once a day (05 Dec 2023 11:04)  Entresto 24 mg-26 mg oral tablet: 1 tab(s) orally 2 times a day (05 Dec 2023 11:04)  furosemide 20 mg oral tablet: 1 tab(s) orally once a day ***pt doesn&#x27;t take consistently*** (06 Dec 2023 15:19)  levothyroxine 100 mcg (0.1 mg) oral tablet: 1 tab(s) orally once a day ***take with 25mcg = 125cg*** (06 Dec 2023 15:19)  levothyroxine 25 mcg (0.025 mg) oral tablet: 1 tab(s) orally once a day ***take with 100mcg = 125mcg*** (06 Dec 2023 15:19)  warfarin 1 mg oral tablet: 1 tab(s) orally every other day ***alternate with 0.5mg*** (06 Dec 2023 15:19)  warfarin 1 mg oral tablet: 0.5 tab(s) orally every other day ***alternate with 1mg*** (06 Dec 2023 15:19)      MEDICATIONS  MEDICATIONS  (STANDING):  aspirin enteric coated 81 milliGRAM(s) Oral daily  atorvastatin 10 milliGRAM(s) Oral at bedtime  citalopram 10 milliGRAM(s) Oral daily  digoxin     Tablet 62.5 MICROGram(s) Oral daily  levothyroxine 100 MICROGram(s) Oral daily  levothyroxine 25 MICROGram(s) Oral daily  mirtazapine 7.5 milliGRAM(s) Oral at bedtime  sacubitril 24 mG/valsartan 26 mG 1 Tablet(s) Oral two times a day  zinc sulfate 220 milliGRAM(s) Oral daily      LABS: All Labs Reviewed:    12-11    137  |  96  |  23  ----------------------------<  76  4.1   |  37<H>  |  0.82    Ca    9.0      11 Dec 2023 07:08  Phos  2.3     12-11  Mg     2.1     12-11    TPro  x   /  Alb  2.4<L>  /  TBili  x   /  DBili  x   /  AST  x   /  ALT  x   /  AlkPhos  x   12-11    PT/INR - ( 11 Dec 2023 07:08 )   PT: 15.4 sec;   INR: 1.38 ratio         PTT - ( 11 Dec 2023 07:08 )  PTT:36.0 sec    Urinalysis Basic - ( 11 Dec 2023 07:08 )    Color: x / Appearance: x / SG: x / pH: x  Gluc: 76 mg/dL / Ketone: x  / Bili: x / Urobili: x   Blood: x / Protein: x / Nitrite: x   Leuk Esterase: x / RBC: x / WBC x   Sq Epi: x / Non Sq Epi: x / Bacteria: x        Blood Culture: 12-08 @ 19:40  Organism --  Gram Stain Blood -- Gram Stain   No polymorphonuclear cells seen  No organisms seen  by cytocentrifuge  Specimen Source Pleural Fl Pleural Fluid  Culture-Blood --      I&O's Detail  CAPILLARY BLOOD GLUCOSE  CARDIOLOGY TESTING   EKG   ECHO   < from: TTE Echo Complete w/ Contrast w/ Doppler (12.05.23 @ 14:29) >   The left ventricle size is at the upper limits of normal, severe regional   hypokinesis, and moderately reduced function.   Definity was used to better visualize the endocardial border.   Estimated left ventricular ejection fraction is 30-35 %.   The left atrium is severely dilated.   The right atrium appears dilated.   A device wire is seen in the RV and RA.   The right ventricle is at the upper limits of normal in size.   The aortic valve appears mildly calcified. Valve opening seems to be   restricted.   Transaortic gradients are underestimated due to impaired left ventricle   systolic function.   RANJAN by continuity equation suggests mild aortic stenosis.   Moderate (2+) aortic regurgitation is present.   A mitral valve repair is noted.   Moderate (2+) mitral regurgitation is present.   The mean trans-mitral pressure gradient is 6 mmHg.   The tricuspid valve leaflets are thin and pliable; valve motion is   normal.   Moderate (2+) tricuspid valve regurgitation is present.   Moderatepulmonary hypertension.   Normal appearing pulmonic valve structure.   Mild pulmonic valvular regurgitation (1+) is present.   Pleural effusion - massive bilateral pleural effusion.   The IVC is dilated with decreased respiratory variation.   A PFO is noted with color and spectral Doppler.    < end of copied text >      RADIOLOGY HOSPITALIST ATTENDING PROGRESS NOTE     Chart and meds reviewed. Patient seen and examined     CC: SOB    Subjective: Seen and evaluated. Has minimal cough, SOB is improving. No other acute complaints.       All other systems and founds to be negative with exception of what has been described above.         PHYSICAL EXAM:  Vital Signs Last 24 Hrs  T(C): 36.6 (11 Dec 2023 20:18), Max: 36.6 (11 Dec 2023 20:18)  T(F): 97.9 (11 Dec 2023 20:18), Max: 97.9 (11 Dec 2023 20:18)  HR: 76 (11 Dec 2023 20:18) (76 - 76)  BP: 107/44 (11 Dec 2023 20:18) (106/48 - 116/50)  RR: 18 (11 Dec 2023 20:18) (18 - 20)  SpO2: 99% (11 Dec 2023 20:18) (90% - 100%)    Parameters below as of 11 Dec 2023 20:18  Patient On (Oxygen Delivery Method): nasal cannula  O2 Flow (L/min): 2      GEN: NAD, +cachetic, +frail   HEENT: EOMI,  +[place], moist mucous membranes  NECK : Soft and supple, no JVD  LUNG: CTABL, No wheezing, rales or rhonchi  CVS: S1S2+, RRR, no M/G/R  GI: BS+, soft, NT/ND, no guarding, no rebound  EXTREMITIES: No peripheral edema  VASCULAR: 2+ peripheral pulses  NEURO: AAOx2-3, grossly non-focal   MSK: 5/5 strength b/l upper and lower extremities  SKIN: No rashes    HOME MEDICATIONS:  Home Medications:  ALPRAZolam 0.25 mg oral tablet: 1 tab(s) orally 2 times a day as needed for  anxiety (06 Dec 2023 15:19)  amiodarone 100 mg oral tablet: 1 tab(s) orally once a day (05 Dec 2023 11:04)  aspirin 81 mg oral delayed release tablet: 1 tab(s) orally once a day (05 Dec 2023 11:04)  atorvastatin 10 mg oral tablet: 1 tab(s) orally once a day (at bedtime) (05 Dec 2023 11:04)  Calcium 500+D oral tablet, chewable: 1 tab(s) orally 2 times a day (05 Dec 2023 11:04)  cephalexin 500 mg oral capsule: 1 cap(s) orally every 8 hours x  7 days ***Course Complete*** (06 Dec 2023 15:19)  citalopram 10 mg oral tablet: 1 tab(s) orally once a day (06 Dec 2023 15:19)  digoxin 125 mcg (0.125 mg) oral tablet: 0.5 tab(s) orally once a day (05 Dec 2023 11:04)  Entresto 24 mg-26 mg oral tablet: 1 tab(s) orally 2 times a day (05 Dec 2023 11:04)  furosemide 20 mg oral tablet: 1 tab(s) orally once a day ***pt doesn&#x27;t take consistently*** (06 Dec 2023 15:19)  levothyroxine 100 mcg (0.1 mg) oral tablet: 1 tab(s) orally once a day ***take with 25mcg = 125cg*** (06 Dec 2023 15:19)  levothyroxine 25 mcg (0.025 mg) oral tablet: 1 tab(s) orally once a day ***take with 100mcg = 125mcg*** (06 Dec 2023 15:19)  warfarin 1 mg oral tablet: 1 tab(s) orally every other day ***alternate with 0.5mg*** (06 Dec 2023 15:19)  warfarin 1 mg oral tablet: 0.5 tab(s) orally every other day ***alternate with 1mg*** (06 Dec 2023 15:19)      MEDICATIONS  MEDICATIONS  (STANDING):  aspirin enteric coated 81 milliGRAM(s) Oral daily  atorvastatin 10 milliGRAM(s) Oral at bedtime  citalopram 10 milliGRAM(s) Oral daily  digoxin     Tablet 62.5 MICROGram(s) Oral daily  levothyroxine 100 MICROGram(s) Oral daily  levothyroxine 25 MICROGram(s) Oral daily  mirtazapine 7.5 milliGRAM(s) Oral at bedtime  sacubitril 24 mG/valsartan 26 mG 1 Tablet(s) Oral two times a day  zinc sulfate 220 milliGRAM(s) Oral daily      LABS: All Labs Reviewed:    12-11    137  |  96  |  23  ----------------------------<  76  4.1   |  37<H>  |  0.82    Ca    9.0      11 Dec 2023 07:08  Phos  2.3     12-11  Mg     2.1     12-11    TPro  x   /  Alb  2.4<L>  /  TBili  x   /  DBili  x   /  AST  x   /  ALT  x   /  AlkPhos  x   12-11    PT/INR - ( 11 Dec 2023 07:08 )   PT: 15.4 sec;   INR: 1.38 ratio         PTT - ( 11 Dec 2023 07:08 )  PTT:36.0 sec    Urinalysis Basic - ( 11 Dec 2023 07:08 )    Color: x / Appearance: x / SG: x / pH: x  Gluc: 76 mg/dL / Ketone: x  / Bili: x / Urobili: x   Blood: x / Protein: x / Nitrite: x   Leuk Esterase: x / RBC: x / WBC x   Sq Epi: x / Non Sq Epi: x / Bacteria: x        Blood Culture: 12-08 @ 19:40  Organism --  Gram Stain Blood -- Gram Stain   No polymorphonuclear cells seen  No organisms seen  by cytocentrifuge  Specimen Source Pleural Fl Pleural Fluid  Culture-Blood --      I&O's Detail  CAPILLARY BLOOD GLUCOSE  CARDIOLOGY TESTING   EKG   ECHO   < from: TTE Echo Complete w/ Contrast w/ Doppler (12.05.23 @ 14:29) >   The left ventricle size is at the upper limits of normal, severe regional   hypokinesis, and moderately reduced function.   Definity was used to better visualize the endocardial border.   Estimated left ventricular ejection fraction is 30-35 %.   The left atrium is severely dilated.   The right atrium appears dilated.   A device wire is seen in the RV and RA.   The right ventricle is at the upper limits of normal in size.   The aortic valve appears mildly calcified. Valve opening seems to be   restricted.   Transaortic gradients are underestimated due to impaired left ventricle   systolic function.   RANJAN by continuity equation suggests mild aortic stenosis.   Moderate (2+) aortic regurgitation is present.   A mitral valve repair is noted.   Moderate (2+) mitral regurgitation is present.   The mean trans-mitral pressure gradient is 6 mmHg.   The tricuspid valve leaflets are thin and pliable; valve motion is   normal.   Moderate (2+) tricuspid valve regurgitation is present.   Moderatepulmonary hypertension.   Normal appearing pulmonic valve structure.   Mild pulmonic valvular regurgitation (1+) is present.   Pleural effusion - massive bilateral pleural effusion.   The IVC is dilated with decreased respiratory variation.   A PFO is noted with color and spectral Doppler.    < end of copied text >      RADIOLOGY

## 2023-12-11 NOTE — DISCHARGE NOTE NURSING/CASE MANAGEMENT/SOCIAL WORK - NSDCFUADDAPPT_GEN_ALL_CORE_FT
CARDIOLOGY FOLLOW UP APPOINTMENT: 1/5/2024 @2:30PM at [facility] [place] with ADIEL Leija CARDIOLOGY FOLLOW UP APPOINTMENT: 1/5/2024 @2:30PM at [facility] [place] with ADIEL Leija

## 2023-12-11 NOTE — PROGRESS NOTE ADULT - ASSESSMENT
87-year-old female with medical history of CHF, chronic atrial fibrillation on warfarin, s/p AICD, CAD, hypothyroidism, celiac disease presents from home after her HHA called 911 after she was agitated and refusing to take her meds. States she has been weak and sob. Also endorsing the weakness. Was found to be hypoxic to 88 % on RA. PT endorses productive cough and this am had scant blood tinged mucous.   Spoke to Dtr Pita on the phone. states her mother suffering from extreme anxiety and was started on xanax 2 weeks ago and since then, her cognition has suffered greatly. She is also having difficulty walking per aides. More forgetful and agitated at time which is not like her per daughter.     #Acute decompensated HFrEF EF 30-35%  #B/L pleural effusion - exudative effusion on lights criteria   #Pneumonia  #Chronic afib  #S/p ICD - Chronic afib   #Metabolic encephalopathy  #Hyponatremia  #DORIS Mass 3cm    - Monitor on tele. BNP 02053. Troponin negative x1. CK 73  - Continue Lasix - reduced to IV 40mg daily   - Continue statin, digoxin and Entresto  - 12/5/23: TTE: EF 30-35%, Mod MR/TR/PulmHTN  - CT Chest > Patchy multifocal airspace disease in the right upper lobe, left upper lobe, and left lower lobe compatible with pneumonia. Moderate right pleural effusion and small left pleural effusion. 3 cm left upper lobe mass and mediastinal lymphadenopathy; leading diagnostic consideration is for primary lung malignancy with metastasis.  - S/p RT thoracentesis for pleural effusion on 12/8 with drainage of 850mL - pleural fluid studies appear exudative, awaiting cytology  - Interrogate device > Chronic afib, no RVR. DC amiodarone.     Case d/w Dr. Perez   87-year-old female with medical history of CHF, chronic atrial fibrillation on warfarin, s/p AICD, CAD, hypothyroidism, celiac disease presents from home after her HHA called 911 after she was agitated and refusing to take her meds. States she has been weak and sob. Also endorsing the weakness. Was found to be hypoxic to 88 % on RA. PT endorses productive cough and this am had scant blood tinged mucous.   Spoke to Dtr Pita on the phone. states her mother suffering from extreme anxiety and was started on xanax 2 weeks ago and since then, her cognition has suffered greatly. She is also having difficulty walking per aides. More forgetful and agitated at time which is not like her per daughter.     #Acute decompensated HFrEF EF 30-35%  #B/L pleural effusion - exudative effusion on lights criteria   #Pneumonia  #Chronic afib  #S/p ICD - Chronic afib   #Metabolic encephalopathy  #Hyponatremia  #DORIS Mass 3cm    - Monitor on tele. BNP 64122. Troponin negative x1. CK 73  - Continue Lasix - reduced to IV 40mg daily   - Continue statin, digoxin and Entresto  - 12/5/23: TTE: EF 30-35%, Mod MR/TR/PulmHTN  - CT Chest > Patchy multifocal airspace disease in the right upper lobe, left upper lobe, and left lower lobe compatible with pneumonia. Moderate right pleural effusion and small left pleural effusion. 3 cm left upper lobe mass and mediastinal lymphadenopathy; leading diagnostic consideration is for primary lung malignancy with metastasis.  - S/p RT thoracentesis for pleural effusion on 12/8 with drainage of 850mL - pleural fluid studies appear exudative, awaiting cytology  - Interrogate device > Chronic afib, no RVR. DC amiodarone.     Case d/w Dr. Perez   87-year-old female with medical history of CHF, chronic atrial fibrillation on warfarin, s/p AICD, CAD, hypothyroidism, celiac disease presents from home after her HHA called 911 after she was agitated and refusing to take her meds. States she has been weak and sob. Also endorsing the weakness. Was found to be hypoxic to 88 % on RA. PT endorses productive cough and this am had scant blood tinged mucous.   Spoke to Dtr Pita on the phone. states her mother suffering from extreme anxiety and was started on xanax 2 weeks ago and since then, her cognition has suffered greatly. She is also having difficulty walking per aides. More forgetful and agitated at time which is not like her per daughter.     #Acute decompensated HFrEF EF 30-35%  #B/L pleural effusion - exudative effusion on lights criteria   #Pneumonia  #Chronic afib  #S/p ICD - Chronic afib   #Metabolic encephalopathy  #Hyponatremia  #DORIS Mass 3cm    - Monitor on tele. BNP 91038. Troponin negative x1. CK 73  - Continue Lasix - reduced to IV 40mg daily   - Continue statin, digoxin and Entresto  - 12/5/23: TTE: EF 30-35%, Mod MR/TR/PulmHTN  - CT Chest > Patchy multifocal airspace disease in the right upper lobe, left upper lobe, and left lower lobe compatible with pneumonia. Moderate right pleural effusion and small left pleural effusion. 3 cm left upper lobe mass and mediastinal lymphadenopathy; leading diagnostic consideration is for primary lung malignancy with metastasis.  - S/p RT thoracentesis for pleural effusion on 12/8 with drainage of 850mL - pleural fluid studies appear exudative, awaiting cytology  - Interrogate device > Chronic afib, no RVR. DC amiodarone.     Case d/w Dr. Crews  Will follow 87-year-old female with medical history of CHF, chronic atrial fibrillation on warfarin, s/p AICD, CAD, hypothyroidism, celiac disease presents from home after her HHA called 911 after she was agitated and refusing to take her meds. States she has been weak and sob. Also endorsing the weakness. Was found to be hypoxic to 88 % on RA. PT endorses productive cough and this am had scant blood tinged mucous.   Spoke to Dtr Pita on the phone. states her mother suffering from extreme anxiety and was started on xanax 2 weeks ago and since then, her cognition has suffered greatly. She is also having difficulty walking per aides. More forgetful and agitated at time which is not like her per daughter.     #Acute decompensated HFrEF EF 30-35%  #B/L pleural effusion - exudative effusion on lights criteria   #Pneumonia  #Chronic afib  #S/p ICD - Chronic afib   #Metabolic encephalopathy  #Hyponatremia  #DORIS Mass 3cm    - Monitor on tele. BNP 59348. Troponin negative x1. CK 73  - Continue Lasix - reduced to IV 40mg daily   - Continue statin, digoxin and Entresto  - 12/5/23: TTE: EF 30-35%, Mod MR/TR/PulmHTN  - CT Chest > Patchy multifocal airspace disease in the right upper lobe, left upper lobe, and left lower lobe compatible with pneumonia. Moderate right pleural effusion and small left pleural effusion. 3 cm left upper lobe mass and mediastinal lymphadenopathy; leading diagnostic consideration is for primary lung malignancy with metastasis.  - S/p RT thoracentesis for pleural effusion on 12/8 with drainage of 850mL - pleural fluid studies appear exudative, awaiting cytology  - Interrogate device > Chronic afib, no RVR. DC amiodarone.     Case d/w Dr. Crews  Will follow 87-year-old female with medical history of CHF, chronic atrial fibrillation on warfarin, s/p AICD, CAD, hypothyroidism, celiac disease presents from home after her HHA called 911 after she was agitated and refusing to take her meds. States she has been weak and sob. Also endorsing the weakness. Was found to be hypoxic to 88 % on RA. PT endorses productive cough and this am had scant blood tinged mucous.   Spoke to Dtr Pita on the phone. states her mother suffering from extreme anxiety and was started on xanax 2 weeks ago and since then, her cognition has suffered greatly. She is also having difficulty walking per aides. More forgetful and agitated at time which is not like her per daughter.     #Acute decompensated HFrEF EF 30-35%  #B/L pleural effusion - exudative effusion on lights criteria   #Pneumonia  #Chronic afib  #S/p ICD - Chronic afib   #Metabolic encephalopathy  #Hyponatremia  #DORIS Mass 3cm    - Monitor on tele. BNP 67978. Troponin negative x1. CK 73  - Continue Lasix - reduced to IV 40mg daily   - Continue statin, digoxin and Entresto  - 12/5/23: TTE: EF 30-35%, Mod MR/TR/PulmHTN  - CT Chest > Patchy multifocal airspace disease in the right upper lobe, left upper lobe, and left lower lobe compatible with pneumonia. Moderate right pleural effusion and small left pleural effusion. 3 cm left upper lobe mass and mediastinal lymphadenopathy; leading diagnostic consideration is for primary lung malignancy with metastasis.  - S/p RT thoracentesis for pleural effusion on 12/8 with drainage of 850mL - pleural fluid studies appear exudative, awaiting cytology  - Interrogate device > Chronic afib, no RVR. DC amiodarone.   - Resume coumadin or DOAC if covered.    Case d/w Dr. Crews  Will follow 87-year-old female with medical history of CHF, chronic atrial fibrillation on warfarin, s/p AICD, CAD, hypothyroidism, celiac disease presents from home after her HHA called 911 after she was agitated and refusing to take her meds. States she has been weak and sob. Also endorsing the weakness. Was found to be hypoxic to 88 % on RA. PT endorses productive cough and this am had scant blood tinged mucous.   Spoke to Dtr Pita on the phone. states her mother suffering from extreme anxiety and was started on xanax 2 weeks ago and since then, her cognition has suffered greatly. She is also having difficulty walking per aides. More forgetful and agitated at time which is not like her per daughter.     #Acute decompensated HFrEF EF 30-35%  #B/L pleural effusion - exudative effusion on lights criteria   #Pneumonia  #Chronic afib  #S/p ICD - Chronic afib   #Metabolic encephalopathy  #Hyponatremia  #DORIS Mass 3cm    - Monitor on tele. BNP 38069. Troponin negative x1. CK 73  - Continue Lasix - reduced to IV 40mg daily   - Continue statin, digoxin and Entresto  - 12/5/23: TTE: EF 30-35%, Mod MR/TR/PulmHTN  - CT Chest > Patchy multifocal airspace disease in the right upper lobe, left upper lobe, and left lower lobe compatible with pneumonia. Moderate right pleural effusion and small left pleural effusion. 3 cm left upper lobe mass and mediastinal lymphadenopathy; leading diagnostic consideration is for primary lung malignancy with metastasis.  - S/p RT thoracentesis for pleural effusion on 12/8 with drainage of 850mL - pleural fluid studies appear exudative, awaiting cytology  - Interrogate device > Chronic afib, no RVR. DC amiodarone.   - Resume coumadin or DOAC if covered.    Case d/w Dr. Crews  Will follow

## 2023-12-11 NOTE — PROGRESS NOTE ADULT - ASSESSMENT
#Acute hypoxic respiratory failure, likely multifactorial in etiology, multifocal pneumonia, bilateral pleural effusion (R >L), DORIS mass, pHTN  -CT chest: Patchy multifocal airspace disease RUL, DORIS, LLL, 3 cm DORIS mass and mediastinal lymphadenopathy concerning for primary lung malignancy with metastasis  -TTE: Superficial hypokinesis of the left ventricle, EF 30 to 35%, 2+ AR, 2+ MR, 2+ TR, 1+ ME, PFO, p HTN  -S/P thoracentesis: Drainage from R pleural effusion, 850 cc, exudative in nature, per lights  -Postprocedural right PTX  -IV diuresis  -S/p azithromycin  -C/W Rocephin  -Strict I/Os  -Daily weights  -F/U cytopathology  -ID following  -Cardiology following  -Heme-onc following  -CT surgery following  -Palliative following    #Chronic A-fib  -S/p PPM interrogation: Chronic A-fib, no RVR  -DC amiodarone  -C/W digoxin, Entresto    #Supratherapeutic inr  -Resolved  -?Reason for Coumadin versus DOAC  -Discussed with cardiology    #Depression/anxiety    -C/W Xanax  -C/W citalopram  -C/W Remeron    #Hypothyroid,   continue home levothyroxine    #Ongoing GOC's    Anticipating discharge soon     #Acute hypoxic respiratory failure, likely multifactorial in etiology, multifocal pneumonia, bilateral pleural effusion (R >L), DORIS mass, pHTN  -CT chest: Patchy multifocal airspace disease RUL, DORIS, LLL, 3 cm DORIS mass and mediastinal lymphadenopathy concerning for primary lung malignancy with metastasis  -TTE: Superficial hypokinesis of the left ventricle, EF 30 to 35%, 2+ AR, 2+ MR, 2+ TR, 1+ PA, PFO, p HTN  -S/P thoracentesis: Drainage from R pleural effusion, 850 cc, exudative in nature, per lights  -Postprocedural right PTX  -IV diuresis  -S/p azithromycin  -C/W Rocephin  -Strict I/Os  -Daily weights  -F/U cytopathology  -ID following  -Cardiology following  -Heme-onc following  -CT surgery following  -Palliative following    #Chronic A-fib  -S/p PPM interrogation: Chronic A-fib, no RVR  -DC amiodarone  -C/W digoxin, Entresto    #Supratherapeutic inr  -Resolved  -?Reason for Coumadin versus DOAC  -Discussed with cardiology    #Depression/anxiety    -C/W Xanax  -C/W citalopram  -C/W Remeron    #Hypothyroid,   continue home levothyroxine    #Ongoing GOC's    Anticipating discharge soon

## 2023-12-11 NOTE — DISCHARGE NOTE NURSING/CASE MANAGEMENT/SOCIAL WORK - NSDCPEFALRISK_GEN_ALL_CORE
For information on Fall & Injury Prevention, visit: [email or web address] OR  [email or web address] OR  https://www.cdc.gov/steadi/patient.html For information on Fall & Injury Prevention, visit: [email or web address] OR  [email or web address] OR  https://www.cdc.gov/steadi/patient.html

## 2023-12-11 NOTE — PROGRESS NOTE ADULT - SUBJECTIVE AND OBJECTIVE BOX
Subjective:  Pt seen, no new complaints, on 2L. States breathing feels same as prior to drainage.    Vital Signs:  Vital Signs Last 24 Hrs  T(C): 36.4 (12-11-23 @ 09:09), Max: 36.8 (12-10-23 @ 20:45)  T(F): 97.5 (12-11-23 @ 09:09), Max: 98.3 (12-10-23 @ 20:45)  HR: 76 (12-11-23 @ 09:09) (72 - 77)  BP: 106/48 (12-11-23 @ 09:09) (89/42 - 116/50)  RR: 20 (12-11-23 @ 09:09) (18 - 20)  SpO2: 90% (12-11-23 @ 09:09) (90% - 100%) on (O2)    Telemetry/Alarms:    Relevant labs, radiology and Medications reviewed    12-11    137  |  96  |  23  ----------------------------<  76  4.1   |  37<H>  |  0.82    Ca    9.0      11 Dec 2023 07:08  Phos  2.3     12-11  Mg     2.1     12-11    TPro  x   /  Alb  2.4<L>  /  TBili  x   /  DBili  x   /  AST  x   /  ALT  x   /  AlkPhos  x   12-11    PT/INR - ( 11 Dec 2023 07:08 )   PT: 15.4 sec;   INR: 1.38 ratio         PTT - ( 11 Dec 2023 07:08 )  PTT:36.0 sec  MEDICATIONS  (STANDING):  aspirin enteric coated 81 milliGRAM(s) Oral daily  atorvastatin 10 milliGRAM(s) Oral at bedtime  citalopram 10 milliGRAM(s) Oral daily  digoxin     Tablet 62.5 MICROGram(s) Oral daily  levothyroxine 25 MICROGram(s) Oral daily  levothyroxine 100 MICROGram(s) Oral daily  mirtazapine 7.5 milliGRAM(s) Oral at bedtime  sacubitril 24 mG/valsartan 26 mG 1 Tablet(s) Oral two times a day  zinc sulfate 220 milliGRAM(s) Oral daily    MEDICATIONS  (PRN):  acetaminophen     Tablet .. 650 milliGRAM(s) Oral once PRN Mild Pain (1 - 3)      Physical exam  Gen NAD  Neuro AAOx3, Mechoopda  Card RRR  Pulm equal expansion  Abd soft  Ext warm      I&O's Summary      Assessment  88y Female  w/ PAST MEDICAL & SURGICAL HISTORY:  Chronic atrial fibrillation      Hypothyroid      HTN (hypertension)      History of cataract      Hyperlipidemia, unspecified hyperlipidemia type      History of CHF (congestive heart failure)      Peripheral edema      History of colon polyps      Varicose veins  BLE      Arthritis      Celiac disease      Hashimoto's thyroiditis      Osteoporosis      Iron deficiency anemia due to chronic blood loss      AICD (automatic cardioverter/defibrillator) present  x 3 . Replaced x 2. Presently right subclavian area      Myocardial infarction  1990      Mitral valve prolapse      Left inguinal hernia      Hearing loss      AICD (automatic cardioverter/defibrillator) present  with PPM. Replaced x 2.      H/O right inguinal hernia repair      H/O colonoscopy  last done 2009      History of cataract extraction  Bilateral      Artificial pacemaker      History of heart surgery  Mitral valve surgery for leaky valve 9/2020      admitted with complaints of Patient is a 88y old  Female who presents with a chief complaint of sob/hypoxia (11 Dec 2023 10:02)  .  87-year-old female with PMHx/o chronic systolic CHF, s/p mitral valve surgery, HTN, HLD, chronic atrial fibrillation on warfarin, s/p AICD, CAD s/p MI, hypothyroidism, celiac disease presented with SOB, productive cough, worsening forgetfullness and was admitted for acute on chronic systolic CHF, and multifocal pneumonia.  Patient was also found to have bilateral pleural effusions (R>L), 3cm DORIS mass with mediastinal adenopathy suspicious for lung Ca with mets.  R apical PTX after thoracentesis and drainage of of a R pleural effusion, 850 cc.  Exudative effusion.  CXR post procedure w R apical PTX.    f/u cyto  persistent right apical PTX, no acute intervention, not worsened, cont to follow  wean O2 as able      Discussed with Cardiothoracic Team at AM rounds.      Subjective:  Pt seen, no new complaints, on 2L. States breathing feels same as prior to drainage.    Vital Signs:  Vital Signs Last 24 Hrs  T(C): 36.4 (12-11-23 @ 09:09), Max: 36.8 (12-10-23 @ 20:45)  T(F): 97.5 (12-11-23 @ 09:09), Max: 98.3 (12-10-23 @ 20:45)  HR: 76 (12-11-23 @ 09:09) (72 - 77)  BP: 106/48 (12-11-23 @ 09:09) (89/42 - 116/50)  RR: 20 (12-11-23 @ 09:09) (18 - 20)  SpO2: 90% (12-11-23 @ 09:09) (90% - 100%) on (O2)    Telemetry/Alarms:    Relevant labs, radiology and Medications reviewed    12-11    137  |  96  |  23  ----------------------------<  76  4.1   |  37<H>  |  0.82    Ca    9.0      11 Dec 2023 07:08  Phos  2.3     12-11  Mg     2.1     12-11    TPro  x   /  Alb  2.4<L>  /  TBili  x   /  DBili  x   /  AST  x   /  ALT  x   /  AlkPhos  x   12-11    PT/INR - ( 11 Dec 2023 07:08 )   PT: 15.4 sec;   INR: 1.38 ratio         PTT - ( 11 Dec 2023 07:08 )  PTT:36.0 sec  MEDICATIONS  (STANDING):  aspirin enteric coated 81 milliGRAM(s) Oral daily  atorvastatin 10 milliGRAM(s) Oral at bedtime  citalopram 10 milliGRAM(s) Oral daily  digoxin     Tablet 62.5 MICROGram(s) Oral daily  levothyroxine 25 MICROGram(s) Oral daily  levothyroxine 100 MICROGram(s) Oral daily  mirtazapine 7.5 milliGRAM(s) Oral at bedtime  sacubitril 24 mG/valsartan 26 mG 1 Tablet(s) Oral two times a day  zinc sulfate 220 milliGRAM(s) Oral daily    MEDICATIONS  (PRN):  acetaminophen     Tablet .. 650 milliGRAM(s) Oral once PRN Mild Pain (1 - 3)      Physical exam  Gen NAD  Neuro AAOx3, Nikolai  Card RRR  Pulm equal expansion  Abd soft  Ext warm      I&O's Summary      Assessment  88y Female  w/ PAST MEDICAL & SURGICAL HISTORY:  Chronic atrial fibrillation      Hypothyroid      HTN (hypertension)      History of cataract      Hyperlipidemia, unspecified hyperlipidemia type      History of CHF (congestive heart failure)      Peripheral edema      History of colon polyps      Varicose veins  BLE      Arthritis      Celiac disease      Hashimoto's thyroiditis      Osteoporosis      Iron deficiency anemia due to chronic blood loss      AICD (automatic cardioverter/defibrillator) present  x 3 . Replaced x 2. Presently right subclavian area      Myocardial infarction  1990      Mitral valve prolapse      Left inguinal hernia      Hearing loss      AICD (automatic cardioverter/defibrillator) present  with PPM. Replaced x 2.      H/O right inguinal hernia repair      H/O colonoscopy  last done 2009      History of cataract extraction  Bilateral      Artificial pacemaker      History of heart surgery  Mitral valve surgery for leaky valve 9/2020      admitted with complaints of Patient is a 88y old  Female who presents with a chief complaint of sob/hypoxia (11 Dec 2023 10:02)  .  87-year-old female with PMHx/o chronic systolic CHF, s/p mitral valve surgery, HTN, HLD, chronic atrial fibrillation on warfarin, s/p AICD, CAD s/p MI, hypothyroidism, celiac disease presented with SOB, productive cough, worsening forgetfullness and was admitted for acute on chronic systolic CHF, and multifocal pneumonia.  Patient was also found to have bilateral pleural effusions (R>L), 3cm DORIS mass with mediastinal adenopathy suspicious for lung Ca with mets.  R apical PTX after thoracentesis and drainage of of a R pleural effusion, 850 cc.  Exudative effusion.  CXR post procedure w R apical PTX.    f/u cyto  persistent right apical PTX, no acute intervention, not worsened, cont to follow  wean O2 as able      Discussed with Cardiothoracic Team at AM rounds.

## 2023-12-11 NOTE — PROGRESS NOTE ADULT - SUBJECTIVE AND OBJECTIVE BOX
Date of service: 12-11-23 @ 14:45    Lying in bed in NAD  SOB is improving  Has dry cough    ROS: no fever or chills; denies pain    MEDICATIONS  (STANDING):  aspirin enteric coated 81 milliGRAM(s) Oral daily  atorvastatin 10 milliGRAM(s) Oral at bedtime  citalopram 10 milliGRAM(s) Oral daily  digoxin     Tablet 62.5 MICROGram(s) Oral daily  levothyroxine 25 MICROGram(s) Oral daily  levothyroxine 100 MICROGram(s) Oral daily  mirtazapine 7.5 milliGRAM(s) Oral at bedtime  sacubitril 24 mG/valsartan 26 mG 1 Tablet(s) Oral two times a day  zinc sulfate 220 milliGRAM(s) Oral daily    Vital Signs Last 24 Hrs  T(C): 36.4 (11 Dec 2023 09:09), Max: 36.8 (10 Dec 2023 20:45)  T(F): 97.5 (11 Dec 2023 09:09), Max: 98.3 (10 Dec 2023 20:45)  HR: 76 (11 Dec 2023 09:09) (72 - 76)  BP: 106/48 (11 Dec 2023 09:09) (97/62 - 116/50)  BP(mean): --  RR: 20 (11 Dec 2023 09:09) (18 - 20)  SpO2: 90% (11 Dec 2023 09:09) (90% - 100%)    Parameters below as of 11 Dec 2023 09:09  Patient On (Oxygen Delivery Method): room air     Physical exam:    Constitutional:  No acute distress  HEENT: NC/AT, EOMI, PERRLA, conjunctivae clear; ears and nose atraumatic  Neck: supple; thyroid not palpable  Back: no tenderness  Respiratory: respiratory effort normal; crackles at bases  Cardiovascular: S1S2 regular, no murmurs  Abdomen: soft, not tender, not distended, positive BS  Genitourinary: no suprapubic tenderness  Lymphatic: no LN palpable  Musculoskeletal: no muscle tenderness, no joint swelling or tenderness  Extremities: no pedal edema  Neurological/ Psychiatric: Alert, moving all extremities  Skin: no rashes; no palpable lesions    Labs: reviewed    12-11    137  |  96  |  23  ----------------------------<  76  4.1   |  37<H>  |  0.82    Ca    9.0      11 Dec 2023 07:08  Phos  2.3     12-11  Mg     2.1     12-11    TPro  x   /  Alb  2.4<L>  /  TBili  x   /  DBili  x   /  AST  x   /  ALT  x   /  AlkPhos  x   12-11                          10.5   5.56  )-----------( 278      ( 09 Dec 2023 06:50 )             31.7     12-10    134<L>  |  93<L>  |  22  ----------------------------<  76  3.3<L>   |  36<H>  |  0.85    Ca    8.6      10 Dec 2023 08:12  Mg     2.0     12-10    TPro  6.4  /  Alb  2.3<L>  /  TBili  0.5  /  DBili  x   /  AST  27  /  ALT  18  /  AlkPhos  49  12-09                        10.8   5.92  )-----------( 293      ( 08 Dec 2023 09:49 )             32.9     12-08    132<L>  |  92<L>  |  22  ----------------------------<  158<H>  3.5   |  35<H>  |  0.80    Ca    8.7      08 Dec 2023 09:49  Mg     2.1     12-08    TPro  7.1  /  Alb  2.5<L>  /  TBili  0.5  /  DBili  x   /  AST  26  /  ALT  18  /  AlkPhos  59  12-08                        10.6   5.87  )-----------( 275      ( 07 Dec 2023 06:53 )             32.0     12-07    134<L>  |  95<L>  |  19  ----------------------------<  76  3.5   |  34<H>  |  0.76    Ca    8.7      07 Dec 2023 06:53  Mg     2.1     12-07    TPro  6.8  /  Alb  2.5<L>  /  TBili  1.0  /  DBili  x   /  AST  26  /  ALT  17  /  AlkPhos  60  12-07     LIVER FUNCTIONS - ( 07 Dec 2023 06:53 )  Alb: 2.5 g/dL / Pro: 6.8 gm/dL / ALK PHOS: 60 U/L / ALT: 17 U/L / AST: 26 U/L / GGT: x           (12-05 @ 14:39)  NotDete    Culture - Fungal, Body Fluid (collected 08 Dec 2023 19:40)  Source: Pleural Fl Pleural Fluid  Preliminary Report (09 Dec 2023 08:19):    Testing in progress    Culture - Body Fluid with Gram Stain (collected 08 Dec 2023 19:40)  Source: Pleural Fl Pleural Fluid  Gram Stain (09 Dec 2023 04:42):    No polymorphonuclear cells seen    No organisms seen    by cytocentrifuge  Preliminary Report (09 Dec 2023 16:07):    No growth    Radiology: all available radiological tests reviewed    < from: CT Chest No Cont (12.06.23 @ 15:10) >  Patchy multifocal airspace disease in the right upper lobe, left upper   lobe, and left lower lobe compatible with pneumonia.  Moderate right pleural effusion and small left pleural effusion.  3 cm left upper lobe mass and mediastinal lymphadenopathy; leading   diagnostic consideration is for primary lung malignancy with metastasis.  < end of copied text >      Advanced directives addressed: full resuscitation

## 2023-12-11 NOTE — PROGRESS NOTE ADULT - ASSESSMENT
86 y/o female with h/o CHF, chronic atrial fibrillation on warfarin, s/p AICD, CAD, hypothyroidism, celiac disease, anxiety disorder was admitted on 12/6 for increased confusion and restlessness. She became agitated and refusing to take her meds. States she has been weak, cough with scant blood tinged sputum and SOB. In ER she was noted hypoxic to 88 % on RA. Dtr Pita stated her mother suffering from extreme anxiety and was started on xanax 2 weeks ago and since then, her cognition has suffered greatly. More forgetful and agitated at time which is not like her per daughter. In ER she received ceftriaxone.     1. Multifocal pneumonia. Right pleural effusion s/p thoracentesis. CHF exacerbation. Metabolic encephalopathy. Allergy to Ancef.   -more alert  -respiratory is frail, but improving  -pleural fluid c/s noted  -s/p azithromycin 500 mg IV qd # 4  -on ceftriaxone 1 gm IV qd # 5  -tolerating abx well so far; no side effects noted  -monitor closely in armida of Ancef allergy history  -respiratory care  -complete abx with ceftriaxone  -f/u cultures  -monitor temps  -f/u CBC  -supportive care  2. Other issues:   -care per medicine       88 y/o female with h/o CHF, chronic atrial fibrillation on warfarin, s/p AICD, CAD, hypothyroidism, celiac disease, anxiety disorder was admitted on 12/6 for increased confusion and restlessness. She became agitated and refusing to take her meds. States she has been weak, cough with scant blood tinged sputum and SOB. In ER she was noted hypoxic to 88 % on RA. Dtr Pita stated her mother suffering from extreme anxiety and was started on xanax 2 weeks ago and since then, her cognition has suffered greatly. More forgetful and agitated at time which is not like her per daughter. In ER she received ceftriaxone.     1. Multifocal pneumonia. Right pleural effusion s/p thoracentesis. CHF exacerbation. Metabolic encephalopathy. Allergy to Ancef.   -more alert  -respiratory is frail, but improving  -pleural fluid c/s noted  -s/p azithromycin 500 mg IV qd # 4  -on ceftriaxone 1 gm IV qd # 5  -tolerating abx well so far; no side effects noted  -monitor closely in arm[place] of Ancef allergy history  -respiratory care  -complete abx with ceftriaxone  -f/u cultures  -monitor temps  -f/u CBC  -supportive care  2. Other issues:   -care per medicine

## 2023-12-12 LAB
ALBUMIN SERPL ELPH-MCNC: 2.4 G/DL — LOW (ref 3.3–5)
ALBUMIN SERPL ELPH-MCNC: 2.4 G/DL — LOW (ref 3.3–5)
ANION GAP SERPL CALC-SCNC: 6 MMOL/L — SIGNIFICANT CHANGE UP (ref 5–17)
ANION GAP SERPL CALC-SCNC: 6 MMOL/L — SIGNIFICANT CHANGE UP (ref 5–17)
APTT BLD: 34.1 SEC — SIGNIFICANT CHANGE UP (ref 24.5–35.6)
APTT BLD: 34.1 SEC — SIGNIFICANT CHANGE UP (ref 24.5–35.6)
BUN SERPL-MCNC: 29 MG/DL — HIGH (ref 7–23)
BUN SERPL-MCNC: 29 MG/DL — HIGH (ref 7–23)
CALCIUM SERPL-MCNC: 8.9 MG/DL — SIGNIFICANT CHANGE UP (ref 8.5–10.1)
CALCIUM SERPL-MCNC: 8.9 MG/DL — SIGNIFICANT CHANGE UP (ref 8.5–10.1)
CHLORIDE SERPL-SCNC: 94 MMOL/L — LOW (ref 96–108)
CHLORIDE SERPL-SCNC: 94 MMOL/L — LOW (ref 96–108)
CO2 SERPL-SCNC: 37 MMOL/L — HIGH (ref 22–31)
CO2 SERPL-SCNC: 37 MMOL/L — HIGH (ref 22–31)
CREAT SERPL-MCNC: 0.9 MG/DL — SIGNIFICANT CHANGE UP (ref 0.5–1.3)
CREAT SERPL-MCNC: 0.9 MG/DL — SIGNIFICANT CHANGE UP (ref 0.5–1.3)
EGFR: 61 ML/MIN/1.73M2 — SIGNIFICANT CHANGE UP
EGFR: 61 ML/MIN/1.73M2 — SIGNIFICANT CHANGE UP
GLUCOSE SERPL-MCNC: 67 MG/DL — LOW (ref 70–99)
GLUCOSE SERPL-MCNC: 67 MG/DL — LOW (ref 70–99)
INR BLD: 1.39 RATIO — HIGH (ref 0.85–1.18)
INR BLD: 1.39 RATIO — HIGH (ref 0.85–1.18)
MAGNESIUM SERPL-MCNC: 2 MG/DL — SIGNIFICANT CHANGE UP (ref 1.6–2.6)
MAGNESIUM SERPL-MCNC: 2 MG/DL — SIGNIFICANT CHANGE UP (ref 1.6–2.6)
PHOSPHATE SERPL-MCNC: 2.6 MG/DL — SIGNIFICANT CHANGE UP (ref 2.5–4.5)
PHOSPHATE SERPL-MCNC: 2.6 MG/DL — SIGNIFICANT CHANGE UP (ref 2.5–4.5)
POTASSIUM SERPL-MCNC: 4 MMOL/L — SIGNIFICANT CHANGE UP (ref 3.5–5.3)
POTASSIUM SERPL-MCNC: 4 MMOL/L — SIGNIFICANT CHANGE UP (ref 3.5–5.3)
POTASSIUM SERPL-SCNC: 4 MMOL/L — SIGNIFICANT CHANGE UP (ref 3.5–5.3)
POTASSIUM SERPL-SCNC: 4 MMOL/L — SIGNIFICANT CHANGE UP (ref 3.5–5.3)
PROTHROM AB SERPL-ACNC: 15.6 SEC — HIGH (ref 9.5–13)
PROTHROM AB SERPL-ACNC: 15.6 SEC — HIGH (ref 9.5–13)
SODIUM SERPL-SCNC: 137 MMOL/L — SIGNIFICANT CHANGE UP (ref 135–145)
SODIUM SERPL-SCNC: 137 MMOL/L — SIGNIFICANT CHANGE UP (ref 135–145)

## 2023-12-12 PROCEDURE — 99232 SBSQ HOSP IP/OBS MODERATE 35: CPT

## 2023-12-12 PROCEDURE — 71045 X-RAY EXAM CHEST 1 VIEW: CPT | Mod: 26

## 2023-12-12 PROCEDURE — 99231 SBSQ HOSP IP/OBS SF/LOW 25: CPT

## 2023-12-12 RX ORDER — LANOLIN ALCOHOL/MO/W.PET/CERES
3 CREAM (GRAM) TOPICAL AT BEDTIME
Refills: 0 | Status: DISCONTINUED | OUTPATIENT
Start: 2023-12-12 | End: 2023-12-13

## 2023-12-12 RX ORDER — FUROSEMIDE 40 MG
20 TABLET ORAL DAILY
Refills: 0 | Status: DISCONTINUED | OUTPATIENT
Start: 2023-12-13 | End: 2023-12-13

## 2023-12-12 RX ORDER — APIXABAN 2.5 MG/1
2.5 TABLET, FILM COATED ORAL EVERY 12 HOURS
Refills: 0 | Status: DISCONTINUED | OUTPATIENT
Start: 2023-12-12 | End: 2023-12-13

## 2023-12-12 RX ADMIN — Medication 62.5 MICROGRAM(S): at 09:50

## 2023-12-12 RX ADMIN — APIXABAN 2.5 MILLIGRAM(S): 2.5 TABLET, FILM COATED ORAL at 21:25

## 2023-12-12 RX ADMIN — Medication 25 MICROGRAM(S): at 06:09

## 2023-12-12 RX ADMIN — APIXABAN 2.5 MILLIGRAM(S): 2.5 TABLET, FILM COATED ORAL at 13:55

## 2023-12-12 RX ADMIN — SACUBITRIL AND VALSARTAN 1 TABLET(S): 24; 26 TABLET, FILM COATED ORAL at 18:41

## 2023-12-12 RX ADMIN — ZINC SULFATE TAB 220 MG (50 MG ZINC EQUIVALENT) 220 MILLIGRAM(S): 220 (50 ZN) TAB at 18:41

## 2023-12-12 RX ADMIN — Medication 100 MICROGRAM(S): at 05:58

## 2023-12-12 RX ADMIN — CITALOPRAM 10 MILLIGRAM(S): 10 TABLET, FILM COATED ORAL at 09:50

## 2023-12-12 RX ADMIN — MIRTAZAPINE 7.5 MILLIGRAM(S): 45 TABLET, ORALLY DISINTEGRATING ORAL at 21:25

## 2023-12-12 RX ADMIN — Medication 40 MILLIGRAM(S): at 09:50

## 2023-12-12 RX ADMIN — ATORVASTATIN CALCIUM 10 MILLIGRAM(S): 80 TABLET, FILM COATED ORAL at 21:27

## 2023-12-12 RX ADMIN — Medication 81 MILLIGRAM(S): at 09:50

## 2023-12-12 NOTE — PROGRESS NOTE ADULT - ASSESSMENT
HPI: Pt is a 88y old Female with a PMHx of chronic systolic CHF, s/p mitral valve surgery, HTN, HLD, chronic atrial fibrillation on warfarin, s/p AICD, CAD s/p MI, hypothyroidism, celiac disease presented from home. Patient was agitated, refusing to take her meds and c/o sob/productive cough.  Patient was found to be hypoxic to 88 % on RA.   CXR on admission showed Increased perihilar interstitial markings and airspace densities. Right greater than left effusion with compressive atelectatic change. Cardiomegaly. CT Chest on 12/6 with Patchy multifocal airspace disease in the RUL, DORIS and LLL left upper compatible with pneumonia; Moderate right pleural effusion and small left pleural effusion; 3 cm left upper lobe mass and mediastinal lymphadenopathy; leading diagnostic consideration is for primary lung malignancy with metastasis. Patient admitted with acute on chronic systolic CHF (BNP 20529), diuresed, and started on IV antibiotics for pneumonia.  Thoracic surgery consulted for pleural effusions. Palliative Medicine Consult to further eval GOC  12/8/23 Seen and examined at bedside. Awake and alert. Denies pain or dyspnea at rest.     Assessment and Plan:    1) DORIS mass  - Mass and mediastinal adenopathy, suspicious for primary lung cancer  -CT-chest 12/6/2023:  3 cm left upper lobe mass and mediastinal lymphadenopathy  -Daughter Aubrey made aware and will speak with pt regarding options in treatment  -Onc eval noted  -Cardio thoracic eval    2) Multifocal pneumonia  -Metabolic encephalopathy  -CT chest 12/6/2023--> Patchy multifocal airspace disease in the right upper lobe, left upper lobe, and left lower lobe compatible with pneumonia. Moderate right pleural effusion and small left pleural effusion  -ID following   -Follow BC x 2 UCx    3) Acute decompensated HFrEF EF 30-35%/ chronic Afib  - Cardiology following  - BNP 09627. Troponin negative x1. CK 73  - On Lasix 40mg - increased to 40mg BID  - Continue statin, digoxin and Entresto as per medicine  - Follow up echocardiogram    4) Debility  -Frail  -Poor PO intake  -Severe protein remigio malnutrition  -Nutrition   -PT eval    5) Advanced Directives  -Pt with limited capacity  -HCP naming son Judson   -GOC done with son  -Will follow    Time Spent: 35 minutes including the care, coordination and counseling of this patient, 50% of which was spent coordinating and counseling.        HPI: Pt is a 88y old Female with a PMHx of chronic systolic CHF, s/p mitral valve surgery, HTN, HLD, chronic atrial fibrillation on warfarin, s/p AICD, CAD s/p MI, hypothyroidism, celiac disease presented from home. Patient was agitated, refusing to take her meds and c/o sob/productive cough.  Patient was found to be hypoxic to 88 % on RA.   CXR on admission showed Increased perihilar interstitial markings and airspace densities. Right greater than left effusion with compressive atelectatic change. Cardiomegaly. CT Chest on 12/6 with Patchy multifocal airspace disease in the RUL, DORIS and LLL left upper compatible with pneumonia; Moderate right pleural effusion and small left pleural effusion; 3 cm left upper lobe mass and mediastinal lymphadenopathy; leading diagnostic consideration is for primary lung malignancy with metastasis. Patient admitted with acute on chronic systolic CHF (BNP 86280), diuresed, and started on IV antibiotics for pneumonia.  Thoracic surgery consulted for pleural effusions. Palliative Medicine Consult to further eval GOC  12/8/23 Seen and examined at bedside. Awake and alert. Denies pain or dyspnea at rest.     Assessment and Plan:    1) DORIS mass  - Mass and mediastinal adenopathy, suspicious for primary lung cancer  -CT-chest 12/6/2023:  3 cm left upper lobe mass and mediastinal lymphadenopathy  -Daughter Aurbey made aware and will speak with pt regarding options in treatment  -Onc eval noted  -Cardio thoracic eval    2) Multifocal pneumonia  -Metabolic encephalopathy  -CT chest 12/6/2023--> Patchy multifocal airspace disease in the right upper lobe, left upper lobe, and left lower lobe compatible with pneumonia. Moderate right pleural effusion and small left pleural effusion  -ID following   -Follow BC x 2 UCx    3) Acute decompensated HFrEF EF 30-35%/ chronic Afib  - Cardiology following  - BNP 58180. Troponin negative x1. CK 73  - On Lasix 40mg - increased to 40mg BID  - Continue statin, digoxin and Entresto as per medicine  - Follow up echocardiogram    4) Debility  -Frail  -Poor PO intake  -Severe protein remigio malnutrition  -Nutrition   -PT eval    5) Advanced Directives  -Pt with limited capacity  -HCP naming son Judson   -GOC done with son  -Will follow    Time Spent: 35 minutes including the care, coordination and counseling of this patient, 50% of which was spent coordinating and counseling.        HPI: Pt is a 88y old Female with a PMHx of chronic systolic CHF, s/p mitral valve surgery, HTN, HLD, chronic atrial fibrillation on warfarin, s/p AICD, CAD s/p MI, hypothyroidism, celiac disease presented from home. Patient was agitated, refusing to take her meds and c/o sob/productive cough.  Patient was found to be hypoxic to 88 % on RA.   CXR on admission showed Increased perihilar interstitial markings and airspace densities. Right greater than left effusion with compressive atelectatic change. Cardiomegaly. CT Chest on 12/6 with Patchy multifocal airspace disease in the RUL, DORIS and LLL left upper compatible with pneumonia; Moderate right pleural effusion and small left pleural effusion; 3 cm left upper lobe mass and mediastinal lymphadenopathy; leading diagnostic consideration is for primary lung malignancy with metastasis. Patient admitted with acute on chronic systolic CHF (BNP 31829), diuresed, and started on IV antibiotics for pneumonia.  Thoracic surgery consulted for pleural effusions. Palliative Medicine Consult to further eval GOC  12/8/23 Seen and examined at bedside. Awake and alert. Denies pain or dyspnea at rest.     Assessment and Plan:    1) DORIS mass  - Mass and mediastinal adenopathy, suspicious for primary lung cancer  -CT-chest 12/6/2023:  3 cm left upper lobe mass and mediastinal lymphadenopathy  -Onc eval noted  -Cardio thoracic eval    2) Multifocal pneumonia  -Metabolic encephalopathy  -CT chest 12/6/2023--> Patchy multifocal airspace disease in the right upper lobe, left upper lobe, and left lower lobe compatible with pneumonia. Moderate right pleural effusion and small left pleural effusion  -ID following       3) Acute decompensated HFrEF EF 30-35%/ chronic Afib  - Cardiology following  - BNP 98195. Troponin negative x1. CK 73  - Diuresis as tolerated  - Continue statin, digoxin and Entresto as per medicine  - Follow up echocardiogram    4) Debility  -Frail  -Poor PO intake  -Severe protein remigio malnutrition  -Nutrition   -PT eval    5) Advanced Directives  -Pt with limited capacity  -HCP naming son Judson   -GOC done with son  -Will follow    Time Spent: 35 minutes including the care, coordination and counseling of this patient, 50% of which was spent coordinating and counseling.        HPI: Pt is a 88y old Female with a PMHx of chronic systolic CHF, s/p mitral valve surgery, HTN, HLD, chronic atrial fibrillation on warfarin, s/p AICD, CAD s/p MI, hypothyroidism, celiac disease presented from home. Patient was agitated, refusing to take her meds and c/o sob/productive cough.  Patient was found to be hypoxic to 88 % on RA.   CXR on admission showed Increased perihilar interstitial markings and airspace densities. Right greater than left effusion with compressive atelectatic change. Cardiomegaly. CT Chest on 12/6 with Patchy multifocal airspace disease in the RUL, DORIS and LLL left upper compatible with pneumonia; Moderate right pleural effusion and small left pleural effusion; 3 cm left upper lobe mass and mediastinal lymphadenopathy; leading diagnostic consideration is for primary lung malignancy with metastasis. Patient admitted with acute on chronic systolic CHF (BNP 72902), diuresed, and started on IV antibiotics for pneumonia.  Thoracic surgery consulted for pleural effusions. Palliative Medicine Consult to further eval GOC  12/8/23 Seen and examined at bedside. Awake and alert. Denies pain or dyspnea at rest.     Assessment and Plan:    1) DORIS mass  - Mass and mediastinal adenopathy, suspicious for primary lung cancer  -CT-chest 12/6/2023:  3 cm left upper lobe mass and mediastinal lymphadenopathy  -Onc eval noted  -Cardio thoracic eval    2) Multifocal pneumonia  -Metabolic encephalopathy  -CT chest 12/6/2023--> Patchy multifocal airspace disease in the right upper lobe, left upper lobe, and left lower lobe compatible with pneumonia. Moderate right pleural effusion and small left pleural effusion  -ID following       3) Acute decompensated HFrEF EF 30-35%/ chronic Afib  - Cardiology following  - BNP 74115. Troponin negative x1. CK 73  - Diuresis as tolerated  - Continue statin, digoxin and Entresto as per medicine  - Follow up echocardiogram    4) Debility  -Frail  -Poor PO intake  -Severe protein remigio malnutrition  -Nutrition   -PT eval    5) Advanced Directives  -Pt with limited capacity  -HCP naming son Judson   -GOC done with son  -Will follow    Time Spent: 35 minutes including the care, coordination and counseling of this patient, 50% of which was spent coordinating and counseling.

## 2023-12-12 NOTE — PROGRESS NOTE ADULT - NUTRITIONAL ASSESSMENT
This patient has been assessed with a concern for Malnutrition and has been determined to have a diagnosis/diagnoses of Severe protein-calorie malnutrition.    This patient is being managed with:   Diet DASH/TLC-  Sodium & Cholesterol Restricted  Entered: Dec  5 2023 11:31AM  

## 2023-12-12 NOTE — PROGRESS NOTE ADULT - ASSESSMENT
87-year-old female with medical history of CHF, chronic atrial fibrillation on warfarin, s/p AICD, CAD, hypothyroidism, celiac disease presents from home after her HHA called 911 after she was agitated and refusing to take her meds. States she has been weak and sob. Also endorsing the weakness. Was found to be hypoxic to 88 % on RA. PT endorses productive cough and this am had scant blood tinged mucous.   Spoke to Dtr Pita on the phone. states her mother suffering from extreme anxiety and was started on xanax 2 weeks ago and since then, her cognition has suffered greatly. She is also having difficulty walking per aides. More forgetful and agitated at time which is not like her per daughter.     #Acute decompensated HFrEF EF 30-35%  #B/L pleural effusion - exudative effusion on lights criteria   #Pneumonia  #Chronic afib  #S/p ICD - Chronic afib   #Metabolic encephalopathy - resolved  #Hyponatremia  #DORIS Mass 3cm    - Monitor on tele. BNP 04149. Troponin negative x1. CK 73  - Continue Lasix - reduced to IV 40mg daily - transition to PO in AM  - Continue statin, digoxin and Entresto  - 12/5/23: TTE: EF 30-35%, Mod MR/TR/PulmHTN  - CT Chest > Patchy multifocal airspace disease in the right upper lobe, left upper lobe, and left lower lobe compatible with pneumonia. Moderate right pleural effusion and small left pleural effusion. 3 cm left upper lobe mass and mediastinal lymphadenopathy; leading diagnostic consideration is for primary lung malignancy with metastasis.  - S/p RT thoracentesis for pleural effusion on 12/8 with drainage of 850mL - pleural fluid studies appear exudative, awaiting cytology  - Interrogate device > Chronic afib, no RVR. DC amiodarone.   - Eliquis started - need to check cost.    Case d/w Dr. Marrero  Will sign off, recommend close follow up outpatient.  87-year-old female with medical history of CHF, chronic atrial fibrillation on warfarin, s/p AICD, CAD, hypothyroidism, celiac disease presents from home after her HHA called 911 after she was agitated and refusing to take her meds. States she has been weak and sob. Also endorsing the weakness. Was found to be hypoxic to 88 % on RA. PT endorses productive cough and this am had scant blood tinged mucous.   Spoke to Dtr Pita on the phone. states her mother suffering from extreme anxiety and was started on xanax 2 weeks ago and since then, her cognition has suffered greatly. She is also having difficulty walking per aides. More forgetful and agitated at time which is not like her per daughter.     #Acute decompensated HFrEF EF 30-35%  #B/L pleural effusion - exudative effusion on lights criteria   #Pneumonia  #Chronic afib  #S/p ICD - Chronic afib   #Metabolic encephalopathy - resolved  #Hyponatremia  #DOIRS Mass 3cm    - Monitor on tele. BNP 15371. Troponin negative x1. CK 73  - Continue Lasix - reduced to IV 40mg daily - transition to PO in AM  - Continue statin, digoxin and Entresto  - 12/5/23: TTE: EF 30-35%, Mod MR/TR/PulmHTN  - CT Chest > Patchy multifocal airspace disease in the right upper lobe, left upper lobe, and left lower lobe compatible with pneumonia. Moderate right pleural effusion and small left pleural effusion. 3 cm left upper lobe mass and mediastinal lymphadenopathy; leading diagnostic consideration is for primary lung malignancy with metastasis.  - S/p RT thoracentesis for pleural effusion on 12/8 with drainage of 850mL - pleural fluid studies appear exudative, awaiting cytology  - Interrogate device > Chronic afib, no RVR. DC amiodarone.   - Eliquis started - need to check cost.    Case d/w Dr. Marrero  Will sign off, recommend close follow up outpatient.

## 2023-12-12 NOTE — PROGRESS NOTE ADULT - SUBJECTIVE AND OBJECTIVE BOX
Subjective:  Patient seen and examined.  Denies CP, SOB, remains on 2L NC.    Vital Signs:  Vital Signs Last 24 Hrs  T(C): 36.8 (12-12-23 @ 08:20), Max: 36.8 (12-12-23 @ 08:20)  T(F): 98.3 (12-12-23 @ 08:20), Max: 98.3 (12-12-23 @ 08:20)  HR: 78 (12-12-23 @ 08:20) (76 - 78)  BP: 103/48 (12-12-23 @ 08:20) (101/46 - 107/44)  RR: 18 (12-12-23 @ 08:20) (18 - 18)  SpO2: 100% (12-12-23 @ 08:20) (99% - 100%) on (O2)      Relevant labs, radiology and Medications reviewed    12-12    137  |  94<L>  |  29<H>  ----------------------------<  67<L>  4.0   |  37<H>  |  0.90    Ca    8.9      12 Dec 2023 08:19  Phos  2.6     12-12  Mg     2.0     12-12    TPro  x   /  Alb  2.4<L>  /  TBili  x   /  DBili  x   /  AST  x   /  ALT  x   /  AlkPhos  x   12-12    PT/INR - ( 12 Dec 2023 08:19 )   PT: 15.6 sec;   INR: 1.39 ratio         PTT - ( 12 Dec 2023 08:19 )  PTT:34.1 sec  MEDICATIONS  (STANDING):  aspirin enteric coated 81 milliGRAM(s) Oral daily  atorvastatin 10 milliGRAM(s) Oral at bedtime  citalopram 10 milliGRAM(s) Oral daily  digoxin     Tablet 62.5 MICROGram(s) Oral daily  furosemide   Injectable 40 milliGRAM(s) IV Push daily  levothyroxine 25 MICROGram(s) Oral daily  levothyroxine 100 MICROGram(s) Oral daily  mirtazapine 7.5 milliGRAM(s) Oral at bedtime  sacubitril 24 mG/valsartan 26 mG 1 Tablet(s) Oral two times a day  zinc sulfate 220 milliGRAM(s) Oral daily    MEDICATIONS  (PRN):  acetaminophen     Tablet .. 650 milliGRAM(s) Oral once PRN Mild Pain (1 - 3)      Physical exam  Gen: Pleasant, sitting comfortably in bed  Neuro: A, O x 2  Card: S1S2  Pulm: clear bilat  Abd: benign  Ext: no edema      Assessment  87-year-old female with PMHx/o chronic systolic CHF, s/p mitral valve surgery, HTN, HLD, chronic atrial fibrillation on warfarin, s/p AICD, CAD s/p MI, hypothyroidism, celiac disease presented with SOB, productive cough, worsening forgetfullness and was admitted for acute on chronic systolic CHF, and multifocal pneumonia.  Patient was also found to have bilateral pleural effusions (R>L), 3cm DORIS mass with mediastinal adenopathy suspicious for lung Ca with mets.  Right apical PTX after thoracentesis and drainage of a Right pleural effusion, 850 cc.  Exudative effusion.      PLAN   - Patient remains asymptomatic   - Stable Right apical pneumothorax on am CXR, no intervention planned   - f/u Cytology   - remaining plan per primary team    Discussed with Cardiothoracic Team at AM rounds.

## 2023-12-12 NOTE — PROGRESS NOTE ADULT - SUBJECTIVE AND OBJECTIVE BOX
HOSPITALIST ATTENDING PROGRESS NOTE     Chart and meds reviewed. Patient seen and examined     CC: SOB    Subjective: Seen and evaluated. Has minimal cough, SOB is improving. No other acute complaints.     12/13: Pt seen and evaluated. Sitting in chair       All other systems and founds to be negative with exception of what has been described above.         PHYSICAL EXAM:  T(C): 36.8 (12 Dec 2023 08:20), Max: 36.8 (12 Dec 2023 08:20)  T(F): 98.3 (12 Dec 2023 08:20), Max: 98.3 (12 Dec 2023 08:20)  HR: 78 (12 Dec 2023 08:20) (76 - 78)  BP: 103/48 (12 Dec 2023 08:20) (101/46 - 107/44)  BP(mean): 59 (12 Dec 2023 05:55) (59 - 59)  RR: 18 (12 Dec 2023 08:20) (18 - 18)  SpO2: 100% (12 Dec 2023 08:20) (99% - 100%)    Parameters below as of 12 Dec 2023 08:20  Patient On (Oxygen Delivery Method): nasal cannula  O2 Flow (L/min): 2        GEN: NAD, +cachetic, +frail   HEENT: EOMI,  +[place], moist mucous membranes  NECK : Soft and supple, no JVD  LUNG: CTABL, No wheezing, rales or rhonchi  CVS: S1S2+, RRR, no M/G/R  GI: BS+, soft, NT/ND, no guarding, no rebound  EXTREMITIES: No peripheral edema  VASCULAR: 2+ peripheral pulses  NEURO: AAOx2-3, grossly non-focal   MSK: 5/5 strength b/l upper and lower extremities  SKIN: No rashes    HOME MEDICATIONS:  Home Medications:  ALPRAZolam 0.25 mg oral tablet: 1 tab(s) orally 2 times a day as needed for  anxiety (06 Dec 2023 15:19)  amiodarone 100 mg oral tablet: 1 tab(s) orally once a day (05 Dec 2023 11:04)  aspirin 81 mg oral delayed release tablet: 1 tab(s) orally once a day (05 Dec 2023 11:04)  atorvastatin 10 mg oral tablet: 1 tab(s) orally once a day (at bedtime) (05 Dec 2023 11:04)  Calcium 500+D oral tablet, chewable: 1 tab(s) orally 2 times a day (05 Dec 2023 11:04)  cephalexin 500 mg oral capsule: 1 cap(s) orally every 8 hours x  7 days ***Course Complete*** (06 Dec 2023 15:19)  citalopram 10 mg oral tablet: 1 tab(s) orally once a day (06 Dec 2023 15:19)  digoxin 125 mcg (0.125 mg) oral tablet: 0.5 tab(s) orally once a day (05 Dec 2023 11:04)  Entresto 24 mg-26 mg oral tablet: 1 tab(s) orally 2 times a day (05 Dec 2023 11:04)  furosemide 20 mg oral tablet: 1 tab(s) orally once a day ***pt doesn&#x27;t take consistently*** (06 Dec 2023 15:19)  levothyroxine 100 mcg (0.1 mg) oral tablet: 1 tab(s) orally once a day ***take with 25mcg = 125cg*** (06 Dec 2023 15:19)  levothyroxine 25 mcg (0.025 mg) oral tablet: 1 tab(s) orally once a day ***take with 100mcg = 125mcg*** (06 Dec 2023 15:19)  warfarin 1 mg oral tablet: 1 tab(s) orally every other day ***alternate with 0.5mg*** (06 Dec 2023 15:19)  warfarin 1 mg oral tablet: 0.5 tab(s) orally every other day ***alternate with 1mg*** (06 Dec 2023 15:19)      MEDICATIONS  MEDICATIONS  (STANDING):  aspirin enteric coated 81 milliGRAM(s) Oral daily  atorvastatin 10 milliGRAM(s) Oral at bedtime  citalopram 10 milliGRAM(s) Oral daily  digoxin     Tablet 62.5 MICROGram(s) Oral daily  levothyroxine 100 MICROGram(s) Oral daily  levothyroxine 25 MICROGram(s) Oral daily  mirtazapine 7.5 milliGRAM(s) Oral at bedtime  sacubitril 24 mG/valsartan 26 mG 1 Tablet(s) Oral two times a day  zinc sulfate 220 milliGRAM(s) Oral daily      LABS: All Labs Reviewed:    12-11    137  |  96  |  23  ----------------------------<  76  4.1   |  37<H>  |  0.82    Ca    9.0      11 Dec 2023 07:08  Phos  2.3     12-11  Mg     2.1     12-11    TPro  x   /  Alb  2.4<L>  /  TBili  x   /  DBili  x   /  AST  x   /  ALT  x   /  AlkPhos  x   12-11    PT/INR - ( 11 Dec 2023 07:08 )   PT: 15.4 sec;   INR: 1.38 ratio         PTT - ( 11 Dec 2023 07:08 )  PTT:36.0 sec    Urinalysis Basic - ( 11 Dec 2023 07:08 )    Color: x / Appearance: x / SG: x / pH: x  Gluc: 76 mg/dL / Ketone: x  / Bili: x / Urobili: x   Blood: x / Protein: x / Nitrite: x   Leuk Esterase: x / RBC: x / WBC x   Sq Epi: x / Non Sq Epi: x / Bacteria: x        Blood Culture: 12-08 @ 19:40  Organism --  Gram Stain Blood -- Gram Stain   No polymorphonuclear cells seen  No organisms seen  by cytocentrifuge  Specimen Source Pleural Fl Pleural Fluid  Culture-Blood --      I&O's Detail  CAPILLARY BLOOD GLUCOSE  CARDIOLOGY TESTING   EKG   ECHO   < from: TTE Echo Complete w/ Contrast w/ Doppler (12.05.23 @ 14:29) >   The left ventricle size is at the upper limits of normal, severe regional   hypokinesis, and moderately reduced function.   Definity was used to better visualize the endocardial border.   Estimated left ventricular ejection fraction is 30-35 %.   The left atrium is severely dilated.   The right atrium appears dilated.   A device wire is seen in the RV and RA.   The right ventricle is at the upper limits of normal in size.   The aortic valve appears mildly calcified. Valve opening seems to be   restricted.   Transaortic gradients are underestimated due to impaired left ventricle   systolic function.   RANJAN by continuity equation suggests mild aortic stenosis.   Moderate (2+) aortic regurgitation is present.   A mitral valve repair is noted.   Moderate (2+) mitral regurgitation is present.   The mean trans-mitral pressure gradient is 6 mmHg.   The tricuspid valve leaflets are thin and pliable; valve motion is   normal.   Moderate (2+) tricuspid valve regurgitation is present.   Moderatepulmonary hypertension.   Normal appearing pulmonic valve structure.   Mild pulmonic valvular regurgitation (1+) is present.   Pleural effusion - massive bilateral pleural effusion.   The IVC is dilated with decreased respiratory variation.   A PFO is noted with color and spectral Doppler.    < end of copied text >      RADIOLOGY HOSPITALIST ATTENDING PROGRESS NOTE     Chart and meds reviewed. Patient seen and examined     CC: SOB    Subjective: Seen and evaluated. Has minimal cough, SOB is improving. No other acute complaints.     12/13: Pt seen and evaluated. Sitting in chair       All other systems and founds to be negative with exception of what has been described above.         PHYSICAL EXAM:  T(C): 36.8 (12 Dec 2023 08:20), Max: 36.8 (12 Dec 2023 08:20)  T(F): 98.3 (12 Dec 2023 08:20), Max: 98.3 (12 Dec 2023 08:20)  HR: 78 (12 Dec 2023 08:20) (76 - 78)  BP: 103/48 (12 Dec 2023 08:20) (101/46 - 107/44)  BP(mean): 59 (12 Dec 2023 05:55) (59 - 59)  RR: 18 (12 Dec 2023 08:20) (18 - 18)  SpO2: 100% (12 Dec 2023 08:20) (99% - 100%)    Parameters below as of 12 Dec 2023 08:20  Patient On (Oxygen Delivery Method): nasal cannula  O2 Flow (L/min): 2        GEN: NAD, +cachetic, +frail   HEENT: EOMI,  +[place], moist mucous membranes  NECK : Soft and supple, no JVD  LUNG: CTABL, No wheezing, rales or rhonchi  CVS: S1S2+, RRR, no M/G/R  GI: BS+, soft, NT/ND, no guarding, no rebound  EXTREMITIES: No peripheral edema  VASCULAR: 2+ peripheral pulses  NEURO: AAOx2-3, grossly non-focal   MSK: 5/5 strength b/l upper and lower extremities  SKIN: No rashes    HOME MEDICATIONS:  Home Medications:  ALPRAZolam 0.25 mg oral tablet: 1 tab(s) orally 2 times a day as needed for  anxiety (06 Dec 2023 15:19)  amiodarone 100 mg oral tablet: 1 tab(s) orally once a day (05 Dec 2023 11:04)  aspirin 81 mg oral delayed release tablet: 1 tab(s) orally once a day (05 Dec 2023 11:04)  atorvastatin 10 mg oral tablet: 1 tab(s) orally once a day (at bedtime) (05 Dec 2023 11:04)  Calcium 500+D oral tablet, chewable: 1 tab(s) orally 2 times a day (05 Dec 2023 11:04)  cephalexin 500 mg oral capsule: 1 cap(s) orally every 8 hours x  7 days ***Course Complete*** (06 Dec 2023 15:19)  citalopram 10 mg oral tablet: 1 tab(s) orally once a day (06 Dec 2023 15:19)  digoxin 125 mcg (0.125 mg) oral tablet: 0.5 tab(s) orally once a day (05 Dec 2023 11:04)  Entresto 24 mg-26 mg oral tablet: 1 tab(s) orally 2 times a day (05 Dec 2023 11:04)  furosemide 20 mg oral tablet: 1 tab(s) orally once a day ***pt doesn&#x27;t take consistently*** (06 Dec 2023 15:19)  levothyroxine 100 mcg (0.1 mg) oral tablet: 1 tab(s) orally once a day ***take with 25mcg = 125cg*** (06 Dec 2023 15:19)  levothyroxine 25 mcg (0.025 mg) oral tablet: 1 tab(s) orally once a day ***take with 100mcg = 125mcg*** (06 Dec 2023 15:19)  warfarin 1 mg oral tablet: 1 tab(s) orally every other day ***alternate with 0.5mg*** (06 Dec 2023 15:19)  warfarin 1 mg oral tablet: 0.5 tab(s) orally every other day ***alternate with 1mg*** (06 Dec 2023 15:19)      MEDICATIONS  MEDICATIONS  (STANDING):  aspirin enteric coated 81 milliGRAM(s) Oral daily  atorvastatin 10 milliGRAM(s) Oral at bedtime  citalopram 10 milliGRAM(s) Oral daily  digoxin     Tablet 62.5 MICROGram(s) Oral daily  levothyroxine 100 MICROGram(s) Oral daily  levothyroxine 25 MICROGram(s) Oral daily  mirtazapine 7.5 milliGRAM(s) Oral at bedtime  sacubitril 24 mG/valsartan 26 mG 1 Tablet(s) Oral two times a day  zinc sulfate 220 milliGRAM(s) Oral daily      LABS: All Labs Reviewed:    12-11    137  |  96  |  23  ----------------------------<  76  4.1   |  37<H>  |  0.82    Ca    9.0      11 Dec 2023 07:08  Phos  2.3     12-11  Mg     2.1     12-11    TPro  x   /  Alb  2.4<L>  /  TBili  x   /  DBili  x   /  AST  x   /  ALT  x   /  AlkPhos  x   12-11    PT/INR - ( 11 Dec 2023 07:08 )   PT: 15.4 sec;   INR: 1.38 ratio         PTT - ( 11 Dec 2023 07:08 )  PTT:36.0 sec    Urinalysis Basic - ( 11 Dec 2023 07:08 )    Color: x / Appearance: x / SG: x / pH: x  Gluc: 76 mg/dL / Ketone: x  / Bili: x / Urobili: x   Blood: x / Protein: x / Nitrite: x   Leuk Esterase: x / RBC: x / WBC x   Sq Epi: x / Non Sq Epi: x / Bacteria: x        Blood Culture: 12-08 @ 19:40  Organism --  Gram Stain Blood -- Gram Stain   No polymorphonuclear cells seen  No organisms seen  by cytocentrifuge  Specimen Source Pleural Fl Pleural Fluid  Culture-Blood --      I&O's Detail  CAPILLARY BLOOD GLUCOSE  CARDIOLOGY TESTING   EKG   ECHO   < from: TTE Echo Complete w/ Contrast w/ Doppler (12.05.23 @ 14:29) >   The left ventricle size is at the upper limits of normal, severe regional   hypokinesis, and moderately reduced function.   Definity was used to better visualize the endocardial border.   Estimated left ventricular ejection fraction is 30-35 %.   The left atrium is severely dilated.   The right atrium appears dilated.   A device wire is seen in the RV and RA.   The right ventricle is at the upper limits of normal in size.   The aortic valve appears mildly calcified. Valve opening seems to be   restricted.   Transaortic gradients are underestimated due to impaired left ventricle   systolic function.   RANJAN by continuity equation suggests mild aortic stenosis.   Moderate (2+) aortic regurgitation is present.   A mitral valve repair is noted.   Moderate (2+) mitral regurgitation is present.   The mean trans-mitral pressure gradient is 6 mmHg.   The tricuspid valve leaflets are thin and pliable; valve motion is   normal.   Moderate (2+) tricuspid valve regurgitation is present.   Moderatepulmonary hypertension.   Normal appearing pulmonic valve structure.   Mild pulmonic valvular regurgitation (1+) is present.   Pleural effusion - massive bilateral pleural effusion.   The IVC is dilated with decreased respiratory variation.   A PFO is noted with color and spectral Doppler.    < end of copied text >      RADIOLOGY

## 2023-12-12 NOTE — PROGRESS NOTE ADULT - ASSESSMENT
#Acute hypoxic respiratory failure, likely multifactorial in etiology, multifocal pneumonia, bilateral pleural effusion (R >L), DORIS mass, pHTN  -CT chest: Patchy multifocal airspace disease RUL, DORIS, LLL, 3 cm DORIS mass and mediastinal lymphadenopathy concerning for primary lung malignancy with metastasis  -TTE: Superficial hypokinesis of the left ventricle, EF 30 to 35%, 2+ AR, 2+ MR, 2+ TR, 1+ TX, PFO, p HTN  -S/P thoracentesis: Drainage from R pleural effusion, 850 cc, exudative in nature, per lights  -Postprocedural right PTX  -IV diuresis  -S/p azithromycin  -C/W Rocephin  -Strict I/Os  -Daily weights  -F/U cytopathology  -ID following  -Cardiology following  -Heme-onc following  -CT surgery following  -Palliative following    #Chronic A-fib  -S/p PPM interrogation: Chronic A-fib, no RVR  -DC amiodarone  -C/W digoxin, Entresto  -Start Eliquis     #Supratherapeutic inr  -Resolved      #Depression/anxiety    -C/W Xanax  -C/W citalopram  -C/W Remeron    #Hypothyroid,   continue home levothyroxine    #Ongoing GOC's    Anticipating discharge soon     #Acute hypoxic respiratory failure, likely multifactorial in etiology, multifocal pneumonia, bilateral pleural effusion (R >L), DORIS mass, pHTN  -CT chest: Patchy multifocal airspace disease RUL, DORIS, LLL, 3 cm DORIS mass and mediastinal lymphadenopathy concerning for primary lung malignancy with metastasis  -TTE: Superficial hypokinesis of the left ventricle, EF 30 to 35%, 2+ AR, 2+ MR, 2+ TR, 1+ NH, PFO, p HTN  -S/P thoracentesis: Drainage from R pleural effusion, 850 cc, exudative in nature, per lights  -Postprocedural right PTX  -IV diuresis  -S/p azithromycin  -C/W Rocephin  -Strict I/Os  -Daily weights  -F/U cytopathology  -ID following  -Cardiology following  -Heme-onc following  -CT surgery following  -Palliative following    #Chronic A-fib  -S/p PPM interrogation: Chronic A-fib, no RVR  -DC amiodarone  -C/W digoxin, Entresto  -Start Eliquis     #Supratherapeutic inr  -Resolved      #Depression/anxiety    -C/W Xanax  -C/W citalopram  -C/W Remeron    #Hypothyroid,   continue home levothyroxine    #Ongoing GOC's    Anticipating discharge soon

## 2023-12-12 NOTE — PROGRESS NOTE ADULT - CONVERSATION DETAILS
Palliative team spoke with son, Dr. Gary Miranda to discuss GOC, assist with planning and provide supportive counseling.  Palliative role explained. Advance directives reviewed.  Son aware HCP on medical record names himself as primary and Aubrey as alternate.  We discussed CPR vs DNR/DNI in details.  MOLST complete DNR, trial of NIV, DNI, limited interventions,  no feeding tube, determine use of antibiotics, determine use of IV fluids, no dialysis.  We discussed turning off the AICD, to which son replied he will discuss with his sisters before making a decision.      Plan to be further determined.  We discussed SHAMIKA vs home with [facility] vs home with hospice.  Son expressed the desire for PT to be able to participate in ADLS therefore family would desire some PT however they will be discussing plans going forward tonight as a family.  DNR, trial of NIV, DNI, limited interventions,  no feeding tube, determine use of antibiotics, determine use of IV fluids, no dialysis. Will pursue hospice care after SHAMIKA or home PT.

## 2023-12-12 NOTE — PROGRESS NOTE ADULT - SUBJECTIVE AND OBJECTIVE BOX
CHIEF COMPLAINT: Patient is a 88y old  Female who presents with a chief complaint of sob/hypoxia (05 Dec 2023 11:04)    FROM H&P: 87-year-old female with medical history of CHF, chronic atrial fibrillation on warfarin, s/p AICD, CAD, hypothyroidism, celiac disease presents from home after her HHA called 911 after she was agitated and refusing to take her meds. States she has been weak and sob. Also endorsing the weakness. Was found to be hypoxic to 88 % on RA. PT endorses productive cough and this am had scant blood tinged mucous.   Spoke to Dtr Pita on the phone. states her mother suffering from extreme anxiety and was started on xanax 2 weeks ago and since then, her cognition has suffered greatly. She is also having difficulty walking per aides. More forgetful and agitated at time which is not like her per daughter.     She is presently comfortable. 97% on 2L. Alert and oriented x 2  CXR: R>L effusion with e/o pvc, cannot r/o infiltrate  s/p iv lasix in ED  NA : 128    12/6/23: Cardiology consulted for HF. Hypoxia. +sob. C/w IV Lasix, check echo and CT.   12/7/23. Breathing somewhat stable. No CP  12/8/23. CTS eval pending. c/w diuresis  12/9/23: denies CP or SOB   12/11/23: breathing/02 use improved.  12/12/23: resume DOAC, still on 02, no acute complaints.    PAST MEDICAL/SURGICAL/FAMILY/SOCIAL HISTORY:   AICD (automatic cardioverter/defibrillator) present x 3 . Replaced x 2. Presently right subclavian area  Arthritis   Celiac disease   Chronic atrial fibrillation   Hashimoto's thyroiditis   Hearing loss   History of cataract   History of CHF (congestive heart failure)   History of colon polyps   HTN (hypertension)   Hyperlipidemia, unspecified hyperlipidemia type   Hypothyroid   Iron deficiency anemia due to chronic blood loss   Left inguinal hernia   Mitral valve prolapse   Myocardial infarction 1990  Osteoporosis   Peripheral edema   Varicose veins BLE.     PAST SURGICAL HISTORY:  AICD (automatic cardioverter/defibrillator) present with PPM. Replaced x 2.  Artificial pacemaker   H/O colonoscopy last done 2009  H/O right inguinal hernia repair   History of cataract extraction Bilateral  History of heart surgery Mitral valve surgery for leaky valve 9/2020.     FAMILY HISTORY:  Father  Still living? No  Family history of dementia, Age at diagnosis: Age Unknown    Mother  Still living? No  Family history of diabetes mellitus, Age at diagnosis: Age Unknown  Family history of heart disease, Age at diagnosis: Age Unknown    Sibling  Still living? No  Family history of diabetes mellitus, Age at diagnosis: Age Unknown.    PREVIOUS DIAGNOSTIC TESTING:      ECHO: FINDINGS: 12/5/23: The left ventricle size is at the upper limits of normal, severe regional hypokinesis, and moderately reduced function. Definity was used to better visualize the endocardial border. Estimated left ventricular ejection fraction is 30-35 %. The left atrium is severely dilated. The right atrium appears dilated. A device wire is seen in the RV and RA. The right ventricle is at the upper limits of normal in size. The aortic valve appears mildly calcified. Valve opening seems to be restricted. Transaortic gradients are underestimated due to impaired left ventricle systolic function. RANJAN by continuity equation suggests mild aortic stenosis. Moderate (2+) aortic regurgitation is present. A mitral valve repair is noted. Moderate (2+) mitral regurgitation is present. The mean trans-mitral pressure gradient is 6 mmHg. The tricuspid valve leaflets are thin and pliable; valve motion is normal. Moderate (2+) tricuspid valve regurgitation is present. Moderate pulmonary hypertension. Normal appearing pulmonic valve structure. Mild pulmonic valvular regurgitation (1+) is present. Pleural effusion - massive bilateral pleural effusion. The IVC is dilated with decreased respiratory variation. A PFO is noted with color and spectral Doppler.    MEDICATIONS  (STANDING):  apixaban 2.5 milliGRAM(s) Oral every 12 hours  aspirin enteric coated 81 milliGRAM(s) Oral daily  atorvastatin 10 milliGRAM(s) Oral at bedtime  citalopram 10 milliGRAM(s) Oral daily  digoxin     Tablet 62.5 MICROGram(s) Oral daily  furosemide   Injectable 40 milliGRAM(s) IV Push daily  levothyroxine 25 MICROGram(s) Oral daily  levothyroxine 100 MICROGram(s) Oral daily  mirtazapine 7.5 milliGRAM(s) Oral at bedtime  sacubitril 24 mG/valsartan 26 mG 1 Tablet(s) Oral two times a day  zinc sulfate 220 milliGRAM(s) Oral daily    MEDICATIONS  (PRN):  acetaminophen     Tablet .. 650 milliGRAM(s) Oral once PRN Mild Pain (1 - 3)    HOME MEDICATIONS:  ALPRAZolam 0.25 mg oral tablet: 1 tab(s) orally 2 times a day as needed for  anxiety (05 Dec 2023 11:08)  amiodarone 100 mg oral tablet: 1 tab(s) orally once a day (05 Dec 2023 11:04)  aspirin 81 mg oral delayed release tablet: 1 tab(s) orally once a day (05 Dec 2023 11:04)  atorvastatin 10 mg oral tablet: 1 tab(s) orally once a day (at bedtime) (05 Dec 2023 11:04)  Calcium 500+D oral tablet, chewable: 1 tab(s) orally 2 times a day (05 Dec 2023 11:04)  cephalexin 500 mg oral capsule: 1 cap(s) orally every 8 hours x  7 days ***Course Complete*** (05 Dec 2023 11:11)  citalopram 10 mg oral tablet: 1 tab(s) orally once a day (05 Dec 2023 11:11)  digoxin 125 mcg (0.125 mg) oral tablet: 0.5 tab(s) orally once a day (05 Dec 2023 11:04)  Entresto 24 mg-26 mg oral tablet: 1 tab(s) orally 2 times a day (05 Dec 2023 11:04)  furosemide 20 mg oral tablet: 1 tab(s) orally once a day ***pt doesn&#x27;t take consistently*** (05 Dec 2023 11:03)  levothyroxine 100 mcg (0.1 mg) oral tablet: 1 tab(s) orally once a day ***take with 25mcg = 125cg*** (05 Dec 2023 11:11)  levothyroxine 25 mcg (0.025 mg) oral tablet: 1 tab(s) orally once a day ***take with 100mcg = 125mcg*** (05 Dec 2023 11:11)  warfarin 1 mg oral tablet: 1 tab(s) orally every other day ***alternate with 0.5mg*** (05 Dec 2023 11:11)  warfarin 1 mg oral tablet: 0.5 tab(s) orally every other day ***alternate with 1mg*** (05 Dec 2023 11:08)    PHYSICAL EXAM:  T(C): 36.8 (12 Dec 2023 08:20), Max: 36.8 (12 Dec 2023 08:20)  T(F): 98.3 (12 Dec 2023 08:20), Max: 98.3 (12 Dec 2023 08:20)  HR: 78 (12 Dec 2023 08:20) (76 - 78)  BP: 103/48 (12 Dec 2023 08:20) (101/46 - 107/44)  BP(mean): 59 (12 Dec 2023 05:55) (59 - 59)  RR: 18 (12 Dec 2023 08:20) (18 - 18)  SpO2: 100% (12 Dec 2023 08:20) (99% - 100%)    Parameters below as of 12 Dec 2023 08:20  Patient On (Oxygen Delivery Method): nasal cannula  O2 Flow (L/min): 2    Constitutional: NAD, awake and alert  HEENT: Normal Hearing, MMM  Neck: Soft and supple, No LAD, No JVD  Respiratory: Breath sounds diminished RT, slight crackles LT  Cardiovascular: S1 and S2, regular rate and rhythm, no Murmurs, gallops or rubs  Gastrointestinal: Bowel Sounds present, soft, nontender, nondistended, no guarding, no rebound  Extremities: No peripheral edema  Vascular: 2+ peripheral pulses  Neurological: A/O x 2/3, forgetful  Musculoskeletal: 5/5 strength b/l upper and lower extremities  Skin: No rashes    =======================================    INTERPRETATION OF TELEMETRY: afib, v paced    ECG: < from: 12 Lead ECG (12.05.23 @ 01:29) >  Diagnosis Line Ventricular-paced rhythm  Biventricular pacemaker detected  Abnormal ECG    ========================================    LABS: All Labs Reviewed:    12-12    137  |  94<L>  |  29<H>  ----------------------------<  67<L>  4.0   |  37<H>  |  0.90    Ca    8.9      12 Dec 2023 08:19  Phos  2.6     12-12  Mg     2.0     12-12    TPro  x   /  Alb  2.4<L>  /  TBili  x   /  DBili  x   /  AST  x   /  ALT  x   /  AlkPhos  x   12-12    PT/INR - ( 12 Dec 2023 08:19 )   PT: 15.6 sec;   INR: 1.39 ratio       PTT - ( 12 Dec 2023 08:19 )  PTT:34.1 sec    Troponin: 30.60 ng/L    BNP [id number]     CARDIAC TESTING:    < from: TTE Echo Complete w/ Contrast w/ Doppler (12.05.23 @ 14:29) >   The left ventricle size is at the upper limits of normal, severe regional   hypokinesis, and moderately reduced function.   Definity was used to better visualize the endocardial border.   Estimated left ventricular ejection fraction is 30-35 %.   The left atrium is severely dilated.   The right atrium appears dilated.   A device wire is seen in the RV and RA.   The right ventricle is at the upper limits of normal in size.   The aortic valve appears mildly calcified. Valve opening seems to be   restricted.   Transaortic gradients are underestimated due to impaired left ventricle   systolic function.   RANJAN by continuity equation suggests mild aortic stenosis.   Moderate (2+) aortic regurgitation is present.   A mitral valve repair is noted.   Moderate (2+) mitral regurgitation is present.   The mean trans-mitral pressure gradient is 6 mmHg.   The tricuspid valve leaflets are thin and pliable; valve motion is   normal.   Moderate (2+) tricuspid valve regurgitation is present.   Moderatepulmonary hypertension.   Normal appearing pulmonic valve structure.   Mild pulmonic valvular regurgitation (1+) is present.   Pleural effusion - massive bilateral pleural effusion.   The IVC is dilated with decreased respiratory variation.   A PFO is noted with color and spectral Doppler.    · Underlying Rhythm	afib  · Comments	no episodes of rapid ventricular rates recorded, BiV pacing 98%  · Additional Procedure Details	normal function Bi-Ventricular ICD   may d/c amiodarone per Dr Alonzo.      RADIOLOGY & ADDITIONAL STUDIES:    < from: CT Chest No Cont (12.06.23 @ 15:10) >  Patchy multifocal airspace disease in the right upper lobe, left upper   lobe, and left lower lobe compatible with pneumonia.  Moderate right pleural effusion and small left pleural effusion.  3 cm left upper lobe mass and mediastinal lymphadenopathy; leading   diagnostic consideration is for primary lung malignancy with metastasis.      12/5/23: Xray Chest 1 View AP/PA: Increased perihilar interstitial markings and airspace densities. One should consider congestive changes and some underlying inflammatory infectious process. Right greater than left effusion with compressive atelectatic change. Cardiomegaly. Pacer as before. Findings appear progressive since previous exam regional osseous structures appropriate for age.   CHIEF COMPLAINT: Patient is a 88y old  Female who presents with a chief complaint of sob/hypoxia (05 Dec 2023 11:04)    FROM H&P: 87-year-old female with medical history of CHF, chronic atrial fibrillation on warfarin, s/p AICD, CAD, hypothyroidism, celiac disease presents from home after her HHA called 911 after she was agitated and refusing to take her meds. States she has been weak and sob. Also endorsing the weakness. Was found to be hypoxic to 88 % on RA. PT endorses productive cough and this am had scant blood tinged mucous.   Spoke to Dtr Pita on the phone. states her mother suffering from extreme anxiety and was started on xanax 2 weeks ago and since then, her cognition has suffered greatly. She is also having difficulty walking per aides. More forgetful and agitated at time which is not like her per daughter.     She is presently comfortable. 97% on 2L. Alert and oriented x 2  CXR: R>L effusion with e/o pvc, cannot r/o infiltrate  s/p iv lasix in ED  NA : 128    12/6/23: Cardiology consulted for HF. Hypoxia. +sob. C/w IV Lasix, check echo and CT.   12/7/23. Breathing somewhat stable. No CP  12/8/23. CTS eval pending. c/w diuresis  12/9/23: denies CP or SOB   12/11/23: breathing/02 use improved.  12/12/23: resume DOAC, still on 02, no acute complaints.    PAST MEDICAL/SURGICAL/FAMILY/SOCIAL HISTORY:   AICD (automatic cardioverter/defibrillator) present x 3 . Replaced x 2. Presently right subclavian area  Arthritis   Celiac disease   Chronic atrial fibrillation   Hashimoto's thyroiditis   Hearing loss   History of cataract   History of CHF (congestive heart failure)   History of colon polyps   HTN (hypertension)   Hyperlipidemia, unspecified hyperlipidemia type   Hypothyroid   Iron deficiency anemia due to chronic blood loss   Left inguinal hernia   Mitral valve prolapse   Myocardial infarction 1990  Osteoporosis   Peripheral edema   Varicose veins BLE.     PAST SURGICAL HISTORY:  AICD (automatic cardioverter/defibrillator) present with PPM. Replaced x 2.  Artificial pacemaker   H/O colonoscopy last done 2009  H/O right inguinal hernia repair   History of cataract extraction Bilateral  History of heart surgery Mitral valve surgery for leaky valve 9/2020.     FAMILY HISTORY:  Father  Still living? No  Family history of dementia, Age at diagnosis: Age Unknown    Mother  Still living? No  Family history of diabetes mellitus, Age at diagnosis: Age Unknown  Family history of heart disease, Age at diagnosis: Age Unknown    Sibling  Still living? No  Family history of diabetes mellitus, Age at diagnosis: Age Unknown.    PREVIOUS DIAGNOSTIC TESTING:      ECHO: FINDINGS: 12/5/23: The left ventricle size is at the upper limits of normal, severe regional hypokinesis, and moderately reduced function. Definity was used to better visualize the endocardial border. Estimated left ventricular ejection fraction is 30-35 %. The left atrium is severely dilated. The right atrium appears dilated. A device wire is seen in the RV and RA. The right ventricle is at the upper limits of normal in size. The aortic valve appears mildly calcified. Valve opening seems to be restricted. Transaortic gradients are underestimated due to impaired left ventricle systolic function. RANJAN by continuity equation suggests mild aortic stenosis. Moderate (2+) aortic regurgitation is present. A mitral valve repair is noted. Moderate (2+) mitral regurgitation is present. The mean trans-mitral pressure gradient is 6 mmHg. The tricuspid valve leaflets are thin and pliable; valve motion is normal. Moderate (2+) tricuspid valve regurgitation is present. Moderate pulmonary hypertension. Normal appearing pulmonic valve structure. Mild pulmonic valvular regurgitation (1+) is present. Pleural effusion - massive bilateral pleural effusion. The IVC is dilated with decreased respiratory variation. A PFO is noted with color and spectral Doppler.    MEDICATIONS  (STANDING):  apixaban 2.5 milliGRAM(s) Oral every 12 hours  aspirin enteric coated 81 milliGRAM(s) Oral daily  atorvastatin 10 milliGRAM(s) Oral at bedtime  citalopram 10 milliGRAM(s) Oral daily  digoxin     Tablet 62.5 MICROGram(s) Oral daily  furosemide   Injectable 40 milliGRAM(s) IV Push daily  levothyroxine 25 MICROGram(s) Oral daily  levothyroxine 100 MICROGram(s) Oral daily  mirtazapine 7.5 milliGRAM(s) Oral at bedtime  sacubitril 24 mG/valsartan 26 mG 1 Tablet(s) Oral two times a day  zinc sulfate 220 milliGRAM(s) Oral daily    MEDICATIONS  (PRN):  acetaminophen     Tablet .. 650 milliGRAM(s) Oral once PRN Mild Pain (1 - 3)    HOME MEDICATIONS:  ALPRAZolam 0.25 mg oral tablet: 1 tab(s) orally 2 times a day as needed for  anxiety (05 Dec 2023 11:08)  amiodarone 100 mg oral tablet: 1 tab(s) orally once a day (05 Dec 2023 11:04)  aspirin 81 mg oral delayed release tablet: 1 tab(s) orally once a day (05 Dec 2023 11:04)  atorvastatin 10 mg oral tablet: 1 tab(s) orally once a day (at bedtime) (05 Dec 2023 11:04)  Calcium 500+D oral tablet, chewable: 1 tab(s) orally 2 times a day (05 Dec 2023 11:04)  cephalexin 500 mg oral capsule: 1 cap(s) orally every 8 hours x  7 days ***Course Complete*** (05 Dec 2023 11:11)  citalopram 10 mg oral tablet: 1 tab(s) orally once a day (05 Dec 2023 11:11)  digoxin 125 mcg (0.125 mg) oral tablet: 0.5 tab(s) orally once a day (05 Dec 2023 11:04)  Entresto 24 mg-26 mg oral tablet: 1 tab(s) orally 2 times a day (05 Dec 2023 11:04)  furosemide 20 mg oral tablet: 1 tab(s) orally once a day ***pt doesn&#x27;t take consistently*** (05 Dec 2023 11:03)  levothyroxine 100 mcg (0.1 mg) oral tablet: 1 tab(s) orally once a day ***take with 25mcg = 125cg*** (05 Dec 2023 11:11)  levothyroxine 25 mcg (0.025 mg) oral tablet: 1 tab(s) orally once a day ***take with 100mcg = 125mcg*** (05 Dec 2023 11:11)  warfarin 1 mg oral tablet: 1 tab(s) orally every other day ***alternate with 0.5mg*** (05 Dec 2023 11:11)  warfarin 1 mg oral tablet: 0.5 tab(s) orally every other day ***alternate with 1mg*** (05 Dec 2023 11:08)    PHYSICAL EXAM:  T(C): 36.8 (12 Dec 2023 08:20), Max: 36.8 (12 Dec 2023 08:20)  T(F): 98.3 (12 Dec 2023 08:20), Max: 98.3 (12 Dec 2023 08:20)  HR: 78 (12 Dec 2023 08:20) (76 - 78)  BP: 103/48 (12 Dec 2023 08:20) (101/46 - 107/44)  BP(mean): 59 (12 Dec 2023 05:55) (59 - 59)  RR: 18 (12 Dec 2023 08:20) (18 - 18)  SpO2: 100% (12 Dec 2023 08:20) (99% - 100%)    Parameters below as of 12 Dec 2023 08:20  Patient On (Oxygen Delivery Method): nasal cannula  O2 Flow (L/min): 2    Constitutional: NAD, awake and alert  HEENT: Normal Hearing, MMM  Neck: Soft and supple, No LAD, No JVD  Respiratory: Breath sounds diminished RT, slight crackles LT  Cardiovascular: S1 and S2, regular rate and rhythm, no Murmurs, gallops or rubs  Gastrointestinal: Bowel Sounds present, soft, nontender, nondistended, no guarding, no rebound  Extremities: No peripheral edema  Vascular: 2+ peripheral pulses  Neurological: A/O x 2/3, forgetful  Musculoskeletal: 5/5 strength b/l upper and lower extremities  Skin: No rashes    =======================================    INTERPRETATION OF TELEMETRY: afib, v paced    ECG: < from: 12 Lead ECG (12.05.23 @ 01:29) >  Diagnosis Line Ventricular-paced rhythm  Biventricular pacemaker detected  Abnormal ECG    ========================================    LABS: All Labs Reviewed:    12-12    137  |  94<L>  |  29<H>  ----------------------------<  67<L>  4.0   |  37<H>  |  0.90    Ca    8.9      12 Dec 2023 08:19  Phos  2.6     12-12  Mg     2.0     12-12    TPro  x   /  Alb  2.4<L>  /  TBili  x   /  DBili  x   /  AST  x   /  ALT  x   /  AlkPhos  x   12-12    PT/INR - ( 12 Dec 2023 08:19 )   PT: 15.6 sec;   INR: 1.39 ratio       PTT - ( 12 Dec 2023 08:19 )  PTT:34.1 sec    Troponin: 30.60 ng/L    BNP [id number]     CARDIAC TESTING:    < from: TTE Echo Complete w/ Contrast w/ Doppler (12.05.23 @ 14:29) >   The left ventricle size is at the upper limits of normal, severe regional   hypokinesis, and moderately reduced function.   Definity was used to better visualize the endocardial border.   Estimated left ventricular ejection fraction is 30-35 %.   The left atrium is severely dilated.   The right atrium appears dilated.   A device wire is seen in the RV and RA.   The right ventricle is at the upper limits of normal in size.   The aortic valve appears mildly calcified. Valve opening seems to be   restricted.   Transaortic gradients are underestimated due to impaired left ventricle   systolic function.   RANJAN by continuity equation suggests mild aortic stenosis.   Moderate (2+) aortic regurgitation is present.   A mitral valve repair is noted.   Moderate (2+) mitral regurgitation is present.   The mean trans-mitral pressure gradient is 6 mmHg.   The tricuspid valve leaflets are thin and pliable; valve motion is   normal.   Moderate (2+) tricuspid valve regurgitation is present.   Moderatepulmonary hypertension.   Normal appearing pulmonic valve structure.   Mild pulmonic valvular regurgitation (1+) is present.   Pleural effusion - massive bilateral pleural effusion.   The IVC is dilated with decreased respiratory variation.   A PFO is noted with color and spectral Doppler.    · Underlying Rhythm	afib  · Comments	no episodes of rapid ventricular rates recorded, BiV pacing 98%  · Additional Procedure Details	normal function Bi-Ventricular ICD   may d/c amiodarone per Dr Alonzo.      RADIOLOGY & ADDITIONAL STUDIES:    < from: CT Chest No Cont (12.06.23 @ 15:10) >  Patchy multifocal airspace disease in the right upper lobe, left upper   lobe, and left lower lobe compatible with pneumonia.  Moderate right pleural effusion and small left pleural effusion.  3 cm left upper lobe mass and mediastinal lymphadenopathy; leading   diagnostic consideration is for primary lung malignancy with metastasis.      12/5/23: Xray Chest 1 View AP/PA: Increased perihilar interstitial markings and airspace densities. One should consider congestive changes and some underlying inflammatory infectious process. Right greater than left effusion with compressive atelectatic change. Cardiomegaly. Pacer as before. Findings appear progressive since previous exam regional osseous structures appropriate for age.

## 2023-12-12 NOTE — GOALS OF CARE CONVERSATION - ADVANCED CARE PLANNING - CONVERSATION DETAILS
Palliative team spoke with son, Dr. Gary Miranda to discuss GOC, assist with planning and provide supportive counseling.  Palliative role explained.  Emotional support provided.  Advance directives reviewed.  Son aware HCP on medical record names himself as primary and Aubrey as alternate.  We discussed CPR vs DNR/DNI in details.  MOLST complete DNR, trial of NIV, DNI, limited interventions,  no feeding tube, determine use of antibiotics, determine use of IV fluids, no dialysis.  We discussed turning off the AICD, to which son replied he will discuss with his sisters before making a decision.      Plan to be further determined.  We discussed SHAMIKA vs home with CH[facility] vs home with hospice.  Son expressed the desire for Pt to be able to participate in ADLS therefore family would desire some PT however they will be discussing plans going forward tonight as a family.  DNR, trial of NIV, DNI, limited interventions,  no feeding tube, determine use of antibiotics, determine use of IV fluids, no dialysis.  Emotional support provided.  Son aware of palliative team availability.  Our team to continue to follow.

## 2023-12-12 NOTE — PROGRESS NOTE ADULT - SUBJECTIVE AND OBJECTIVE BOX
HPI: Pt is a 88y old Female with a PMHx of chronic systolic CHF, s/p mitral valve surgery, HTN, HLD, chronic atrial fibrillation on warfarin, s/p AICD, CAD s/p MI, hypothyroidism, celiac disease presented from home. Patient was agitated, refusing to take her meds and c/o sob/productive cough.  Patient was found to be hypoxic to 88 % on RA.   CXR on admission showed Increased perihilar interstitial markings and airspace densities. Right greater than left effusion with compressive atelectatic change. Cardiomegaly. CT Chest on 12/6 with Patchy multifocal airspace disease in the RUL, DORIS and LLL left upper compatible with pneumonia; Moderate right pleural effusion and small left pleural effusion; 3 cm left upper lobe mass and mediastinal lymphadenopathy; leading diagnostic consideration is for primary lung malignancy with metastasis. Patient admitted with acute on chronic systolic CHF (BNP 44413), diuresed, and started on IV antibiotics for pneumonia.  Thoracic surgery consulted for pleural effusions. Palliative Medicine Consult to further eval [facility]  12/8/23 Seen and examined at bedside. Awake and alert. Denies pain or dyspnea at rest.   12/12/23Seen and examined at bedside with no family present. Aware that she is in the hospital however unable to provide medical hx.    PAIN: ( )Yes   (X )No    DYSPNEA: (X ) Yes  ( ) No  On exertion    PAST MEDICAL & SURGICAL HISTORY:  Chronic atrial fibrillation  Hypothyroid  HTN (hypertension)  History of cataract  Hyperlipidemia, unspecified hyperlipidemia type  History of CHF (congestive heart failure)  Peripheral edema  History of colon polyps  Varicose veins  BLE  Arthritis  Celiac disease  Hashimoto's thyroiditis  Osteoporosis  Iron deficiency anemia due to chronic blood loss  AICD (automatic cardioverter/defibrillator) present  x 3 . Replaced x 2. Presently right subclavian area  Myocardial infarction  1990  Mitral valve prolapse  Left inguinal hernia  Hearing loss  AICD (automatic cardioverter/defibrillator) present  with PPM. Replaced x 2.  H/O right inguinal hernia repair  H/O colonoscopy  last done 2009  History of cataract extraction  Bilateral  Artificial pacemaker  History of heart surgery  Mitral valve surgery for leaky valve 9/2020      SOCIAL HX:  Lives home with aides  Hx opiate tolerance ( )YES  ( X)NO    Baseline ADLs  (Prior to Admission)  ( X) Independent   ( )Dependent    FAMILY HISTORY:  Family history of diabetes mellitus (Mother, Sibling)  Family history of heart disease (Mother)  Family history of dementia (Father)    Review of Systems:  Physical Discomfort-mild  Dyspnea-on exertion  Anorexia-mod-severe  Weight Loss- yes  Cough-mod  Fatigue-mod  Weakness-severe    All other systems reviewed and negative    PHYSICAL EXAM:  ICU Vital Signs Last 24 Hrs  T(C): 36.8 (12 Dec 2023 08:20), Max: 36.8 (12 Dec 2023 08:20)  T(F): 98.3 (12 Dec 2023 08:20), Max: 98.3 (12 Dec 2023 08:20)  HR: 78 (12 Dec 2023 08:20) (76 - 78)  BP: 103/48 (12 Dec 2023 08:20) (101/46 - 107/44)  BP(mean): 59 (12 Dec 2023 05:55) (59 - 59)  RR: 18 (12 Dec 2023 08:20) (18 - 18)  SpO2: 100% (12 Dec 2023 08:20) (99% - 100%)    O2 Parameters below as of 12 Dec 2023 08:20  Patient On (Oxygen Delivery Method): nasal cannula  O2 Flow (L/min): 2      PPSV2: 20-30 %  General:  Elderly female in bed in NAD  Mental Status: A&O X3/poor historian  HEENT: MMM  Lungs: clear to auscultation ernie  Cardiac: S1S2+  GI: abd soft NT ND + BS  : voids  Ext: GARCIA=strength  Neuro: no focal def      LABS:                        10.8   5.92  )-----------( 293      ( 08 Dec 2023 09:49 )             32.9   12-12    137  |  94<L>  |  29<H>  ----------------------------<  67<L>  4.0   |  37<H>  |  0.90    Ca    8.9      12 Dec 2023 08:19  Phos  2.6     12-12  Mg     2.0     12-12    TPro  x   /  Alb  2.4<L>  /  TBili  x   /  DBili  x   /  AST  x   /  ALT  x   /  AlkPhos  x   12-12      Allergies    Ancef (Unknown)  lidocaine (Rash; Angioedema)  adhesives (Rash)    Intolerances  MEDICATIONS  (STANDING):  aspirin enteric coated 81 milliGRAM(s) Oral daily  atorvastatin 10 milliGRAM(s) Oral at bedtime  citalopram 10 milliGRAM(s) Oral daily  digoxin     Tablet 62.5 MICROGram(s) Oral daily  furosemide   Injectable 40 milliGRAM(s) IV Push daily  levothyroxine 25 MICROGram(s) Oral daily  levothyroxine 100 MICROGram(s) Oral daily  mirtazapine 7.5 milliGRAM(s) Oral at bedtime  sacubitril 24 mG/valsartan 26 mG 1 Tablet(s) Oral two times a day  zinc sulfate 220 milliGRAM(s) Oral daily    MEDICATIONS  (PRN):  acetaminophen     Tablet .. 650 milliGRAM(s) Oral once PRN Mild Pain (1 - 3)    Gluten (Diarrhea)    RADIOLOGY/ADDITIONAL STUDIES:  < from: Xray Chest 1 View- PORTABLE-Routine (Xray Chest 1 View- PORTABLE-Routine in AM.) (12.10.23 @ 08:25) >    ACC: [id number] EXAM:  XR CHEST PORTABLE IMMED 1V   ORDERED BY: FARIDA WAITE     ACC: [id number] EXAM:  XR CHEST PORTABLE ROUTINE 1V   ORDERED BY: FARIDA WAITE     ACC: [id number] EXAM:  XR CHEST PORTABLE IMMED 1V   ORDERED BY: STIVEN RIVERA     ACC: [id number] EXAM:  XR CHEST PORTABLE ROUTINE 1V   ORDERED BY: LISA CROWELL     PROCEDURE DATE:  12/08/2023          INTERPRETATION:  AP chest on December 8, 2023 at 5:35 PM. Patient had   right thoracentesis.    Heart enlargement, mitral clips, and right-sided defibrillator are noted.   Abandoned right-sided defibrillator wires are also seen.    Moderate to large right effusion and December 6 CAT scan is markedly   diminished after thoracentesis. There is a small 10% right apical   pneumothorax.    There is a persistent mild infiltrate mainly in the left perihilar area.    Follow-up AP chest on December 8, 2023 at 11:46 PM.    Right apical pneumothorax is roughly 15%.    Follow-up AP chest on December 9, 2023 at 8:23 AM. Right apical   pneumothorax is stable at 10-15%.    Follow-up AP chest on December 10, 2023 at 7:50 AM. Right apical   pneumothorax is somewhat diminished and is now 7%.    IMPRESSION: Markedly diminished right effusion after thoracentesis. There   was a postprocedurepneumothorax is slowly improved on the latest film.   Persistent left perihilar infiltrate. Right-sided defibrillator and   abandoned right-sided defibrillator wires are noted.       HPI: Pt is a 88y old Female with a PMHx of chronic systolic CHF, s/p mitral valve surgery, HTN, HLD, chronic atrial fibrillation on warfarin, s/p AICD, CAD s/p MI, hypothyroidism, celiac disease presented from home. Patient was agitated, refusing to take her meds and c/o sob/productive cough.  Patient was found to be hypoxic to 88 % on RA.   CXR on admission showed Increased perihilar interstitial markings and airspace densities. Right greater than left effusion with compressive atelectatic change. Cardiomegaly. CT Chest on 12/6 with Patchy multifocal airspace disease in the RUL, DORIS and LLL left upper compatible with pneumonia; Moderate right pleural effusion and small left pleural effusion; 3 cm left upper lobe mass and mediastinal lymphadenopathy; leading diagnostic consideration is for primary lung malignancy with metastasis. Patient admitted with acute on chronic systolic CHF (BNP 72472), diuresed, and started on IV antibiotics for pneumonia.  Thoracic surgery consulted for pleural effusions. Palliative Medicine Consult to further eval [facility]  12/8/23 Seen and examined at bedside. Awake and alert. Denies pain or dyspnea at rest.   12/12/23Seen and examined at bedside with no family present. Aware that she is in the hospital however unable to provide medical hx.    PAIN: ( )Yes   (X )No    DYSPNEA: (X ) Yes  ( ) No  On exertion    PAST MEDICAL & SURGICAL HISTORY:  Chronic atrial fibrillation  Hypothyroid  HTN (hypertension)  History of cataract  Hyperlipidemia, unspecified hyperlipidemia type  History of CHF (congestive heart failure)  Peripheral edema  History of colon polyps  Varicose veins  BLE  Arthritis  Celiac disease  Hashimoto's thyroiditis  Osteoporosis  Iron deficiency anemia due to chronic blood loss  AICD (automatic cardioverter/defibrillator) present  x 3 . Replaced x 2. Presently right subclavian area  Myocardial infarction  1990  Mitral valve prolapse  Left inguinal hernia  Hearing loss  AICD (automatic cardioverter/defibrillator) present  with PPM. Replaced x 2.  H/O right inguinal hernia repair  H/O colonoscopy  last done 2009  History of cataract extraction  Bilateral  Artificial pacemaker  History of heart surgery  Mitral valve surgery for leaky valve 9/2020      SOCIAL HX:  Lives home with aides  Hx opiate tolerance ( )YES  ( X)NO    Baseline ADLs  (Prior to Admission)  ( X) Independent   ( )Dependent    FAMILY HISTORY:  Family history of diabetes mellitus (Mother, Sibling)  Family history of heart disease (Mother)  Family history of dementia (Father)    Review of Systems:  Physical Discomfort-mild  Dyspnea-on exertion  Anorexia-mod-severe  Weight Loss- yes  Cough-mod  Fatigue-mod  Weakness-severe    All other systems reviewed and negative    PHYSICAL EXAM:  ICU Vital Signs Last 24 Hrs  T(C): 36.8 (12 Dec 2023 08:20), Max: 36.8 (12 Dec 2023 08:20)  T(F): 98.3 (12 Dec 2023 08:20), Max: 98.3 (12 Dec 2023 08:20)  HR: 78 (12 Dec 2023 08:20) (76 - 78)  BP: 103/48 (12 Dec 2023 08:20) (101/46 - 107/44)  BP(mean): 59 (12 Dec 2023 05:55) (59 - 59)  RR: 18 (12 Dec 2023 08:20) (18 - 18)  SpO2: 100% (12 Dec 2023 08:20) (99% - 100%)    O2 Parameters below as of 12 Dec 2023 08:20  Patient On (Oxygen Delivery Method): nasal cannula  O2 Flow (L/min): 2      PPSV2: 20-30 %  General:  Elderly female in bed in NAD  Mental Status: A&O X3/poor historian  HEENT: MMM  Lungs: clear to auscultation ernie  Cardiac: S1S2+  GI: abd soft NT ND + BS  : voids  Ext: GARCIA=strength  Neuro: no focal def      LABS:                        10.8   5.92  )-----------( 293      ( 08 Dec 2023 09:49 )             32.9   12-12    137  |  94<L>  |  29<H>  ----------------------------<  67<L>  4.0   |  37<H>  |  0.90    Ca    8.9      12 Dec 2023 08:19  Phos  2.6     12-12  Mg     2.0     12-12    TPro  x   /  Alb  2.4<L>  /  TBili  x   /  DBili  x   /  AST  x   /  ALT  x   /  AlkPhos  x   12-12      Allergies    Ancef (Unknown)  lidocaine (Rash; Angioedema)  adhesives (Rash)    Intolerances  MEDICATIONS  (STANDING):  aspirin enteric coated 81 milliGRAM(s) Oral daily  atorvastatin 10 milliGRAM(s) Oral at bedtime  citalopram 10 milliGRAM(s) Oral daily  digoxin     Tablet 62.5 MICROGram(s) Oral daily  furosemide   Injectable 40 milliGRAM(s) IV Push daily  levothyroxine 25 MICROGram(s) Oral daily  levothyroxine 100 MICROGram(s) Oral daily  mirtazapine 7.5 milliGRAM(s) Oral at bedtime  sacubitril 24 mG/valsartan 26 mG 1 Tablet(s) Oral two times a day  zinc sulfate 220 milliGRAM(s) Oral daily    MEDICATIONS  (PRN):  acetaminophen     Tablet .. 650 milliGRAM(s) Oral once PRN Mild Pain (1 - 3)    Gluten (Diarrhea)    RADIOLOGY/ADDITIONAL STUDIES:  < from: Xray Chest 1 View- PORTABLE-Routine (Xray Chest 1 View- PORTABLE-Routine in AM.) (12.10.23 @ 08:25) >    ACC: [id number] EXAM:  XR CHEST PORTABLE IMMED 1V   ORDERED BY: FARIDA WAITE     ACC: [id number] EXAM:  XR CHEST PORTABLE ROUTINE 1V   ORDERED BY: FARIDA WAITE     ACC: [id number] EXAM:  XR CHEST PORTABLE IMMED 1V   ORDERED BY: STIVEN RIVERA     ACC: [id number] EXAM:  XR CHEST PORTABLE ROUTINE 1V   ORDERED BY: LISA CROWELL     PROCEDURE DATE:  12/08/2023          INTERPRETATION:  AP chest on December 8, 2023 at 5:35 PM. Patient had   right thoracentesis.    Heart enlargement, mitral clips, and right-sided defibrillator are noted.   Abandoned right-sided defibrillator wires are also seen.    Moderate to large right effusion and December 6 CAT scan is markedly   diminished after thoracentesis. There is a small 10% right apical   pneumothorax.    There is a persistent mild infiltrate mainly in the left perihilar area.    Follow-up AP chest on December 8, 2023 at 11:46 PM.    Right apical pneumothorax is roughly 15%.    Follow-up AP chest on December 9, 2023 at 8:23 AM. Right apical   pneumothorax is stable at 10-15%.    Follow-up AP chest on December 10, 2023 at 7:50 AM. Right apical   pneumothorax is somewhat diminished and is now 7%.    IMPRESSION: Markedly diminished right effusion after thoracentesis. There   was a postprocedurepneumothorax is slowly improved on the latest film.   Persistent left perihilar infiltrate. Right-sided defibrillator and   abandoned right-sided defibrillator wires are noted.

## 2023-12-12 NOTE — PROGRESS NOTE ADULT - NS ATTEND BILL GEN_ALL_CORE
Attending to bill
PA/NP to bill
Attending to bill

## 2023-12-13 ENCOUNTER — TRANSCRIPTION ENCOUNTER (OUTPATIENT)
Age: 88
End: 2023-12-13

## 2023-12-13 VITALS
SYSTOLIC BLOOD PRESSURE: 113 MMHG | TEMPERATURE: 97 F | OXYGEN SATURATION: 99 % | RESPIRATION RATE: 18 BRPM | HEART RATE: 76 BPM | DIASTOLIC BLOOD PRESSURE: 52 MMHG

## 2023-12-13 LAB
ALBUMIN SERPL ELPH-MCNC: 2.2 G/DL — LOW (ref 3.3–5)
ALBUMIN SERPL ELPH-MCNC: 2.2 G/DL — LOW (ref 3.3–5)
ANION GAP SERPL CALC-SCNC: 4 MMOL/L — LOW (ref 5–17)
ANION GAP SERPL CALC-SCNC: 4 MMOL/L — LOW (ref 5–17)
APTT BLD: 39.5 SEC — HIGH (ref 24.5–35.6)
APTT BLD: 39.5 SEC — HIGH (ref 24.5–35.6)
BUN SERPL-MCNC: 33 MG/DL — HIGH (ref 7–23)
BUN SERPL-MCNC: 33 MG/DL — HIGH (ref 7–23)
CALCIUM SERPL-MCNC: 9.1 MG/DL — SIGNIFICANT CHANGE UP (ref 8.5–10.1)
CALCIUM SERPL-MCNC: 9.1 MG/DL — SIGNIFICANT CHANGE UP (ref 8.5–10.1)
CHLORIDE SERPL-SCNC: 96 MMOL/L — SIGNIFICANT CHANGE UP (ref 96–108)
CHLORIDE SERPL-SCNC: 96 MMOL/L — SIGNIFICANT CHANGE UP (ref 96–108)
CO2 SERPL-SCNC: 39 MMOL/L — HIGH (ref 22–31)
CO2 SERPL-SCNC: 39 MMOL/L — HIGH (ref 22–31)
CREAT SERPL-MCNC: 0.98 MG/DL — SIGNIFICANT CHANGE UP (ref 0.5–1.3)
CREAT SERPL-MCNC: 0.98 MG/DL — SIGNIFICANT CHANGE UP (ref 0.5–1.3)
CULTURE RESULTS: SIGNIFICANT CHANGE UP
CULTURE RESULTS: SIGNIFICANT CHANGE UP
EGFR: 56 ML/MIN/1.73M2 — LOW
EGFR: 56 ML/MIN/1.73M2 — LOW
GLUCOSE SERPL-MCNC: 83 MG/DL — SIGNIFICANT CHANGE UP (ref 70–99)
GLUCOSE SERPL-MCNC: 83 MG/DL — SIGNIFICANT CHANGE UP (ref 70–99)
INR BLD: 3.09 RATIO — HIGH (ref 0.85–1.18)
INR BLD: 3.09 RATIO — HIGH (ref 0.85–1.18)
PHOSPHATE SERPL-MCNC: 3 MG/DL — SIGNIFICANT CHANGE UP (ref 2.5–4.5)
PHOSPHATE SERPL-MCNC: 3 MG/DL — SIGNIFICANT CHANGE UP (ref 2.5–4.5)
POTASSIUM SERPL-MCNC: 3.9 MMOL/L — SIGNIFICANT CHANGE UP (ref 3.5–5.3)
POTASSIUM SERPL-MCNC: 3.9 MMOL/L — SIGNIFICANT CHANGE UP (ref 3.5–5.3)
POTASSIUM SERPL-SCNC: 3.9 MMOL/L — SIGNIFICANT CHANGE UP (ref 3.5–5.3)
POTASSIUM SERPL-SCNC: 3.9 MMOL/L — SIGNIFICANT CHANGE UP (ref 3.5–5.3)
PROTHROM AB SERPL-ACNC: 33.8 SEC — HIGH (ref 9.5–13)
PROTHROM AB SERPL-ACNC: 33.8 SEC — HIGH (ref 9.5–13)
SODIUM SERPL-SCNC: 139 MMOL/L — SIGNIFICANT CHANGE UP (ref 135–145)
SODIUM SERPL-SCNC: 139 MMOL/L — SIGNIFICANT CHANGE UP (ref 135–145)
SPECIMEN SOURCE: SIGNIFICANT CHANGE UP
SPECIMEN SOURCE: SIGNIFICANT CHANGE UP

## 2023-12-13 PROCEDURE — 99231 SBSQ HOSP IP/OBS SF/LOW 25: CPT

## 2023-12-13 PROCEDURE — 99239 HOSP IP/OBS DSCHRG MGMT >30: CPT

## 2023-12-13 RX ORDER — MIRTAZAPINE 45 MG/1
1 TABLET, ORALLY DISINTEGRATING ORAL
Qty: 30 | Refills: 0
Start: 2023-12-13 | End: 2024-01-11

## 2023-12-13 RX ORDER — CEPHALEXIN 500 MG
1 CAPSULE ORAL
Refills: 0 | DISCHARGE

## 2023-12-13 RX ORDER — AMIODARONE HYDROCHLORIDE 400 MG/1
1 TABLET ORAL
Qty: 0 | Refills: 0 | DISCHARGE

## 2023-12-13 RX ORDER — WARFARIN SODIUM 2.5 MG/1
0.5 TABLET ORAL
Refills: 0 | DISCHARGE

## 2023-12-13 RX ORDER — WARFARIN SODIUM 2.5 MG/1
1 TABLET ORAL
Refills: 0 | DISCHARGE

## 2023-12-13 RX ADMIN — Medication 25 MICROGRAM(S): at 06:30

## 2023-12-13 RX ADMIN — Medication 81 MILLIGRAM(S): at 08:48

## 2023-12-13 RX ADMIN — CITALOPRAM 10 MILLIGRAM(S): 10 TABLET, FILM COATED ORAL at 08:49

## 2023-12-13 RX ADMIN — Medication 62.5 MICROGRAM(S): at 08:48

## 2023-12-13 RX ADMIN — APIXABAN 2.5 MILLIGRAM(S): 2.5 TABLET, FILM COATED ORAL at 08:48

## 2023-12-13 RX ADMIN — SACUBITRIL AND VALSARTAN 1 TABLET(S): 24; 26 TABLET, FILM COATED ORAL at 18:51

## 2023-12-13 RX ADMIN — Medication 100 MICROGRAM(S): at 06:30

## 2023-12-13 RX ADMIN — Medication 20 MILLIGRAM(S): at 08:48

## 2023-12-13 RX ADMIN — ZINC SULFATE TAB 220 MG (50 MG ZINC EQUIVALENT) 220 MILLIGRAM(S): 220 (50 ZN) TAB at 08:49

## 2023-12-13 NOTE — PROGRESS NOTE ADULT - SUBJECTIVE AND OBJECTIVE BOX
Subjective:  Patient seen, remains comfortable on 2L NC.  Right pleural cytology negative for malignancy.    Vital Signs:  Vital Signs Last 24 Hrs  T(C): 36.3 (12-13-23 @ 08:55), Max: 36.5 (12-12-23 @ 20:30)  T(F): 97.3 (12-13-23 @ 08:55), Max: 97.7 (12-12-23 @ 20:30)  HR: 76 (12-13-23 @ 08:55) (76 - 78)  BP: 113/52 (12-13-23 @ 08:55) (90/42 - 113/52)  RR: 18 (12-13-23 @ 08:55) (18 - 18)  SpO2: 99% (12-13-23 @ 08:55) (99% - 100%) on (O2)    Telemetry/Alarms:    Relevant labs, radiology and Medications reviewed    12-13    139  |  96  |  33<H>  ----------------------------<  83  3.9   |  39<H>  |  0.98    Ca    9.1      13 Dec 2023 07:15  Phos  3.0     12-13  Mg     2.0     12-12    TPro  x   /  Alb  2.2<L>  /  TBili  x   /  DBili  x   /  AST  x   /  ALT  x   /  AlkPhos  x   12-13    PT/INR - ( 13 Dec 2023 07:15 )   PT: 33.8 sec;   INR: 3.09 ratio         PTT - ( 13 Dec 2023 07:15 )  PTT:39.5 sec  MEDICATIONS  (STANDING):  apixaban 2.5 milliGRAM(s) Oral every 12 hours  aspirin enteric coated 81 milliGRAM(s) Oral daily  atorvastatin 10 milliGRAM(s) Oral at bedtime  citalopram 10 milliGRAM(s) Oral daily  digoxin     Tablet 62.5 MICROGram(s) Oral daily  furosemide    Tablet 20 milliGRAM(s) Oral daily  levothyroxine 100 MICROGram(s) Oral daily  levothyroxine 25 MICROGram(s) Oral daily  mirtazapine 7.5 milliGRAM(s) Oral at bedtime  sacubitril 24 mG/valsartan 26 mG 1 Tablet(s) Oral two times a day  zinc sulfate 220 milliGRAM(s) Oral daily    MEDICATIONS  (PRN):  acetaminophen     Tablet .. 650 milliGRAM(s) Oral once PRN Mild Pain (1 - 3)  melatonin 3 milliGRAM(s) Oral at bedtime PRN Insomnia      Physical exam  Gen: NAD  Neuro: A, O x 2  Card: S1S2  Pulm: dim bases, otherwise clear   Abd: soft  Ext: no edema    Assessment  87-year-old female with PMHx/o chronic systolic CHF, s/p mitral valve surgery, HTN, HLD, chronic atrial fibrillation on warfarin, s/p AICD, CAD s/p MI, hypothyroidism, celiac disease presented with SOB, productive cough, worsening forgetfullness and was admitted for acute on chronic systolic CHF, and multifocal pneumonia.  Patient was also found to have bilateral pleural effusions (R>L), 3cm DORIS mass with mediastinal adenopathy suspicious for lung Ca with mets.  Right apical PTX after thoracentesis and drainage of a Right pleural effusion, 850 cc.  Exudative effusion.  Cytology negative.    PLAN   - Patient remains HD stable, on 2L NC   - stable small right apical pneumothorax   - No further thoracic surgery intervention planned   - D/C planning per primary team  Will sign off, please reconsult as needed      Discussed with Cardiothoracic Team at AM rounds.

## 2023-12-13 NOTE — DISCHARGE NOTE PROVIDER - NSDCMRMEDTOKEN_GEN_ALL_CORE_FT
ALPRAZolam 0.25 mg oral tablet: 1 tab(s) orally 2 times a day as needed for  anxiety  aspirin 81 mg oral delayed release tablet: 1 tab(s) orally once a day  atorvastatin 10 mg oral tablet: 1 tab(s) orally once a day (at bedtime)  Calcium 500+D oral tablet, chewable: 1 tab(s) orally 2 times a day  citalopram 10 mg oral tablet: 1 tab(s) orally once a day  digoxin 125 mcg (0.125 mg) oral tablet: 0.5 tab(s) orally once a day  Eliquis 2.5 mg oral tablet: 1 tab(s) orally 2 times a day  Entresto 24 mg-26 mg oral tablet: 1 tab(s) orally 2 times a day  furosemide 20 mg oral tablet: 1 tab(s) orally once a day ***pt doesn&#x27;t take consistently***  levothyroxine 100 mcg (0.1 mg) oral tablet: 1 tab(s) orally once a day ***take with 25mcg = 125cg***  levothyroxine 25 mcg (0.025 mg) oral tablet: 1 tab(s) orally once a day ***take with 100mcg = 125mcg***  mirtazapine 7.5 mg oral tablet: 1 tab(s) orally once a day (at bedtime)

## 2023-12-13 NOTE — CHART NOTE - NSCHARTNOTEFT_GEN_A_CORE
HOME O2 EVALUATION    Pulse Ox Room Air Rest:87    Pulse Ox Room Air Ambulatin    Pulse Ox on O2      3    L Ambulatin    Pulse Ox Post Ambulation: 95 on 2l n/c

## 2023-12-13 NOTE — PROGRESS NOTE ADULT - PROVIDER SPECIALTY LIST ADULT
Cardiology
Hospitalist
Thoracic Surgery
Thoracic Surgery
Hospitalist
Hospitalist
Thoracic Surgery
Infectious Disease
Heme/Onc
Hospitalist
Hospitalist
Infectious Disease
Infectious Disease
Thoracic Surgery
Thoracic Surgery
Infectious Disease
Cardiology
Thoracic Surgery
Cardiology
Hospitalist
Palliative Care
Cardiology

## 2023-12-13 NOTE — DISCHARGE NOTE PROVIDER - NSDCFUSCHEDAPPT_GEN_ALL_CORE_FT
[facility] Physician [facility]  INTMED [address]  Scheduled Appointment: 03/08/2024    Lenny Dickey  [facility] Physician [facility]  INTMED [address]  Scheduled Appointment: 03/08/2024     [facility] Physician [facility]  INTMED [address]  Scheduled Appointment: 03/08/2024    Lenny Dickey  [facility] Physician [facility]  INTMED [address]  Scheduled Appointment: 03/08/2024

## 2023-12-13 NOTE — DISCHARGE NOTE PROVIDER - HOSPITAL COURSE
87-year-old female with medical history of CHF, chronic atrial fibrillation on warfarin, s/p AICD, CAD, hypothyroidism, celiac disease presents from home after her HHA called 911 after she was agitated and refusing to take her meds. States she has been weak and sob. Also endorsing the weakness. Was found to be hypoxic to 88 % on RA. PT endorses productive cough and this am had scant blood tinged mucous.   Spoke to Dtjuan Lema on the phone. states her mother suffering from extreme anxiety and was started on xanax 2 weeks ago and since then, her cognition has suffered greatly. She is also having difficulty walking per aides. More forgetful and agitated at time which is not like her per daughter.     She is presently comfortable. 97% on 2L. Alert and oriented x 2  CXR: R>L effusion with e/o pvc, cannot r/o infiltrate  s/p iv lasix in ED    NA : 128    Sub: Pt seen and evaluated. Has no new complaints. Feels better     T(C): 36.8 (12 Dec 2023 08:20), Max: 36.8 (12 Dec 2023 08:20)  T(F): 98.3 (12 Dec 2023 08:20), Max: 98.3 (12 Dec 2023 08:20)  HR: 78 (12 Dec 2023 08:20) (76 - 78)  BP: 103/48 (12 Dec 2023 08:20) (101/46 - 107/44)  BP(mean): 59 (12 Dec 2023 05:55) (59 - 59)  RR: 18 (12 Dec 2023 08:20) (18 - 18)  SpO2: 100% (12 Dec 2023 08:20) (99% - 100%)    Parameters below as of 12 Dec 2023 08:20  Patient On (Oxygen Delivery Method): nasal cannula  O2 Flow (L/min): 2        GEN: NAD, +cachetic, +frail   HEENT: EOMI,  +[place], moist mucous membranes  NECK : Soft and supple, no JVD  LUNG: CTABL, No wheezing, rales or rhonchi  CVS: S1S2+, RRR, no M/G/R  GI: BS+, soft, NT/ND, no guarding, no rebound  EXTREMITIES: No peripheral edema  VASCULAR: 2+ peripheral pulses  NEURO: AAOx2-3, grossly non-focal   MSK: 5/5 strength b/l upper and lower extremities  SKIN: No rashes          #Acute hypoxic respiratory failure, likely multifactorial in etiology, multifocal pneumonia, bilateral pleural effusion (R >L), DORIS mass, pHTN  -CT chest: Patchy multifocal airspace disease RUL, DORIS, LLL, 3 cm DORIS mass and mediastinal lymphadenopathy concerning for primary lung malignancy with metastasis  -TTE: Superficial hypokinesis of the left ventricle, EF 30 to 35%, 2+ AR, 2+ MR, 2+ TR, 1+ [place], PFO, p HTN  -S/P thoracentesis: Drainage from R pleural effusion, 850 cc, exudative in nature, per lights  -Postprocedural right PTX  -PO Lasix   -S/p azithromycin  -C/W Rocephin  -Strict I/Os  -Daily weights  -Family decided against biopsy and other invasive procedures at this time. Discussed with son  Malissaallie ([phone])/pediatric anesthesiologist.   -pt was seen by ID, Cardiology, CTSx, Heme/Onc and Palliative       #Chronic A-fib  -S/p PPM interrogation: Chronic A-fib, no RVR  -DC amiodarone  -C/W digoxin, Entresto  -c/w Eliquis     #Supratherapeutic inr  -Resolved      #Depression/anxiety    -C/W Xanax  -C/W citalopram  -C/W Remeron    #Hypothyroid,   continue home levothyroxine 87-year-old female with medical history of CHF, chronic atrial fibrillation on warfarin, s/p AICD, CAD, hypothyroidism, celiac disease presents from home after her HHA called 911 after she was agitated and refusing to take her meds. States she has been weak and sob. Also endorsing the weakness. Was found to be hypoxic to 88 % on RA. PT endorses productive cough and this am had scant blood tinged mucous.   Spoke to Dtjuan Lema on the phone. states her mother suffering from extreme anxiety and was started on xanax 2 weeks ago and since then, her cognition has suffered greatly. She is also having difficulty walking per aides. More forgetful and agitated at time which is not like her per daughter.     She is presently comfortable. 97% on 2L. Alert and oriented x 2  CXR: R>L effusion with e/o pvc, cannot r/o infiltrate  s/p iv lasix in ED    NA : 128    Sub: Pt seen and evaluated. Has no new complaints. Feels better     T(C): 36.8 (12 Dec 2023 08:20), Max: 36.8 (12 Dec 2023 08:20)  T(F): 98.3 (12 Dec 2023 08:20), Max: 98.3 (12 Dec 2023 08:20)  HR: 78 (12 Dec 2023 08:20) (76 - 78)  BP: 103/48 (12 Dec 2023 08:20) (101/46 - 107/44)  BP(mean): 59 (12 Dec 2023 05:55) (59 - 59)  RR: 18 (12 Dec 2023 08:20) (18 - 18)  SpO2: 100% (12 Dec 2023 08:20) (99% - 100%)    Parameters below as of 12 Dec 2023 08:20  Patient On (Oxygen Delivery Method): nasal cannula  O2 Flow (L/min): 2        GEN: NAD, +cachetic, +frail   HEENT: EOMI,  +[place], moist mucous membranes  NECK : Soft and supple, no JVD  LUNG: CTABL, No wheezing, rales or rhonchi  CVS: S1S2+, RRR, no M/G/R  GI: BS+, soft, NT/ND, no guarding, no rebound  EXTREMITIES: No peripheral edema  VASCULAR: 2+ peripheral pulses  NEURO: AAOx2-3, grossly non-focal   MSK: 5/5 strength b/l upper and lower extremities  SKIN: No rashes          #Acute hypoxic respiratory failure, likely multifactorial in etiology, multifocal pneumonia, bilateral pleural effusion (R >L), DORIS mass, pHTN  -CT chest: Patchy multifocal airspace disease RUL, DORIS, LLL, 3 cm DORIS mass and mediastinal lymphadenopathy concerning for primary lung malignancy with metastasis  -TTE: Superficial hypokinesis of the left ventricle, EF 30 to 35%, 2+ AR, 2+ MR, 2+ TR, 1+ [place], PFO, p HTN  -S/P thoracentesis: Drainage from R pleural effusion, 850 cc, exudative in nature, per lights  -Postprocedural right PTX  -PO Lasix   -S/p azithromycin  -C/W Rocephin  -Strict I/Os  -Daily weights  -Family decided against biopsy and other invasive procedures at this time. Discussed with son  Malissaallie ([phone])/pediatric anesthesiologist.   -pt was seen by ID, Cardiology, CTSx, Heme/Onc and Palliative       #Chronic A-fib  -S/p PPM interrogation: Chronic A-fib, no RVR  -DC amiodarone  -C/W digoxin, Entresto  -c/w Eliquis     #Supratherapeutic inr  -Resolved      #Depression/anxiety    -C/W Xanax  -C/W citalopram  -C/W Remeron    #Hypothyroid,   continue home levothyroxine

## 2023-12-13 NOTE — PROGRESS NOTE ADULT - REASON FOR ADMISSION
sob/hypoxia

## 2023-12-13 NOTE — DISCHARGE NOTE PROVIDER - CARE PROVIDER_API CALL
Lenny Dickey  Pulmonary Disease  [address] [facility], [address] [place] [id number]  Phone: [phone]  Fax: [phone]  Established Patient  Follow Up Time:    Lenny Dickey  Pulmonary Disease  [address] [facility], [address] [place] [id number]  Phone: [phone]  Fax: [phone]  Established Patient  Follow Up Time:

## 2023-12-13 NOTE — DISCHARGE NOTE PROVIDER - PROVIDER TOKENS
PROVIDER:[TOKEN:[[id number]],ESTABLISHEDPATIENT:[T]] PROVIDER:[TOKEN:[[id number]],ESTABLISHEDPATIENT:[T]]

## 2023-12-13 NOTE — DISCHARGE NOTE PROVIDER - NSDCCPCAREPLAN_GEN_ALL_CORE_FT
PRINCIPAL DISCHARGE DIAGNOSIS  Diagnosis: Acute hypoxic respiratory failure  Assessment and Plan of Treatment:       SECONDARY DISCHARGE DIAGNOSES  Diagnosis: Mass of lung  Assessment and Plan of Treatment:     Diagnosis: Chronic atrial fibrillation  Assessment and Plan of Treatment:

## 2023-12-13 NOTE — DISCHARGE NOTE PROVIDER - CARE PROVIDERS DIRECT ADDRESSES
[email or web address]

## 2023-12-14 ENCOUNTER — NON-APPOINTMENT (OUTPATIENT)
Age: 88
End: 2023-12-14

## 2023-12-14 ENCOUNTER — TRANSCRIPTION ENCOUNTER (OUTPATIENT)
Age: 88
End: 2023-12-14

## 2023-12-14 RX ORDER — APIXABAN 2.5 MG/1
1 TABLET, FILM COATED ORAL
Qty: 60 | Refills: 0
Start: 2023-12-14

## 2023-12-14 RX ORDER — MIRTAZAPINE 45 MG/1
1 TABLET, ORALLY DISINTEGRATING ORAL
Qty: 30 | Refills: 0
Start: 2023-12-14 | End: 2024-01-12

## 2023-12-19 DIAGNOSIS — Z79.01 LONG TERM (CURRENT) USE OF ANTICOAGULANTS: ICD-10-CM

## 2023-12-19 DIAGNOSIS — I11.0 HYPERTENSIVE HEART DISEASE WITH HEART FAILURE: ICD-10-CM

## 2023-12-19 DIAGNOSIS — Z98.3 POST THERAPEUTIC COLLAPSE OF LUNG STATUS: ICD-10-CM

## 2023-12-19 DIAGNOSIS — J96.01 ACUTE RESPIRATORY FAILURE WITH HYPOXIA: ICD-10-CM

## 2023-12-19 DIAGNOSIS — I50.23 ACUTE ON CHRONIC SYSTOLIC (CONGESTIVE) HEART FAILURE: ICD-10-CM

## 2023-12-19 DIAGNOSIS — Z95.810 PRESENCE OF AUTOMATIC (IMPLANTABLE) CARDIAC DEFIBRILLATOR: ICD-10-CM

## 2023-12-19 DIAGNOSIS — E03.9 HYPOTHYROIDISM, UNSPECIFIED: ICD-10-CM

## 2023-12-19 DIAGNOSIS — D38.1 NEOPLASM OF UNCERTAIN BEHAVIOR OF TRACHEA, BRONCHUS AND LUNG: ICD-10-CM

## 2023-12-19 DIAGNOSIS — G93.41 METABOLIC ENCEPHALOPATHY: ICD-10-CM

## 2023-12-19 DIAGNOSIS — J18.9 PNEUMONIA, UNSPECIFIED ORGANISM: ICD-10-CM

## 2023-12-19 DIAGNOSIS — D68.69 OTHER THROMBOPHILIA: ICD-10-CM

## 2023-12-19 DIAGNOSIS — E87.1 HYPO-OSMOLALITY AND HYPONATREMIA: ICD-10-CM

## 2023-12-19 DIAGNOSIS — I27.20 PULMONARY HYPERTENSION, UNSPECIFIED: ICD-10-CM

## 2023-12-19 DIAGNOSIS — F41.8 OTHER SPECIFIED ANXIETY DISORDERS: ICD-10-CM

## 2023-12-19 DIAGNOSIS — F03.911 UNSPECIFIED DEMENTIA, UNSPECIFIED SEVERITY, WITH AGITATION: ICD-10-CM

## 2023-12-19 DIAGNOSIS — J90 PLEURAL EFFUSION, NOT ELSEWHERE CLASSIFIED: ICD-10-CM

## 2023-12-19 DIAGNOSIS — I48.20 CHRONIC ATRIAL FIBRILLATION, UNSPECIFIED: ICD-10-CM

## 2023-12-19 DIAGNOSIS — E43 UNSPECIFIED SEVERE PROTEIN-CALORIE MALNUTRITION: ICD-10-CM

## 2023-12-21 ENCOUNTER — APPOINTMENT (OUTPATIENT)
Dept: INTERNAL MEDICINE | Facility: CLINIC | Age: 88
End: 2023-12-21
Payer: MEDICARE

## 2023-12-21 ENCOUNTER — TRANSCRIPTION ENCOUNTER (OUTPATIENT)
Age: 88
End: 2023-12-21

## 2023-12-21 VITALS
RESPIRATION RATE: 16 BRPM | OXYGEN SATURATION: 95 % | SYSTOLIC BLOOD PRESSURE: 90 MMHG | HEART RATE: 71 BPM | DIASTOLIC BLOOD PRESSURE: 50 MMHG | WEIGHT: 91 LBS | BODY MASS INDEX: 17.87 KG/M2 | TEMPERATURE: 99.1 F | HEIGHT: 60 IN

## 2023-12-21 DIAGNOSIS — R41.89 OTHER SYMPTOMS AND SIGNS INVOLVING COGNITIVE FUNCTIONS AND AWARENESS: ICD-10-CM

## 2023-12-21 DIAGNOSIS — Z87.898 PERSONAL HISTORY OF OTHER SPECIFIED CONDITIONS: ICD-10-CM

## 2023-12-21 DIAGNOSIS — R63.4 ABNORMAL WEIGHT LOSS: ICD-10-CM

## 2023-12-21 DIAGNOSIS — R53.1 WEAKNESS: ICD-10-CM

## 2023-12-21 DIAGNOSIS — J44.9 CHRONIC OBSTRUCTIVE PULMONARY DISEASE, UNSPECIFIED: ICD-10-CM

## 2023-12-21 DIAGNOSIS — I38 ENDOCARDITIS, VALVE UNSPECIFIED: ICD-10-CM

## 2023-12-21 DIAGNOSIS — R26.81 UNSTEADINESS ON FEET: ICD-10-CM

## 2023-12-21 DIAGNOSIS — E78.5 HYPERLIPIDEMIA, UNSPECIFIED: ICD-10-CM

## 2023-12-21 PROCEDURE — 99214 OFFICE O/P EST MOD 30 MIN: CPT

## 2023-12-21 RX ORDER — WARFARIN 1 MG/1
1 TABLET ORAL
Qty: 90 | Refills: 0 | Status: DISCONTINUED | COMMUNITY
Start: 2017-01-11 | End: 2023-12-21

## 2023-12-21 RX ORDER — FUROSEMIDE 20 MG/1
20 TABLET ORAL
Qty: 30 | Refills: 5 | Status: ACTIVE | COMMUNITY

## 2023-12-26 PROBLEM — E78.5 HYPERLIPIDEMIA: Status: ACTIVE | Noted: 2017-05-19

## 2023-12-26 PROBLEM — R41.89 COGNITIVE IMPAIRMENT: Status: ACTIVE | Noted: 2023-12-26

## 2023-12-26 PROBLEM — R63.4 WEIGHT LOSS: Status: ACTIVE | Noted: 2017-05-19

## 2023-12-26 PROBLEM — R53.1 FUNCTIONAL WEAKNESS: Status: ACTIVE | Noted: 2023-02-22

## 2023-12-26 PROBLEM — I38 VALVULAR HEART DISEASE: Status: ACTIVE | Noted: 2017-05-19

## 2023-12-26 PROBLEM — R26.81 GAIT INSTABILITY: Status: ACTIVE | Noted: 2023-12-26

## 2023-12-26 PROBLEM — Z87.898 HISTORY OF UNSTEADY GAIT: Status: RESOLVED | Noted: 2017-06-05 | Resolved: 2023-12-26

## 2023-12-27 NOTE — PLAN
[FreeTextEntry1] : Mrs. Miranda presents for a hospital follow-up evaluation accompanied by her daughter.  1.  Patient will continue on current medication regimen which has been reviewed and revised. 2.  Lab work and diagnostic testing from [facility] has been reviewed. 3.  Patient requires a fully electric hospital bed with full side rails and gel mattress with lift.  Patient has generalized weakness, gait instability and is unable to transfer to a regular bed.  She is also at high risk for falling. 4.  Follow-up as scheduled.

## 2023-12-27 NOTE — HISTORY OF PRESENT ILLNESS
[Post-hospitalization from ___ Hospital] : Post-hospitalization from [unfilled] Hospital [Admitted on: ___] : The patient was admitted on [unfilled] [Discharged on ___] : discharged on [unfilled] [Discharge Summary] : discharge summary [Discharge Med List] : discharge medication list [Med Reconciliation] : medication reconciliation has been completed [Patient Contacted By: ____] : and contacted by [unfilled] [FreeTextEntry2] : [place] 87-year-old female with medical history of CHF, chronic atrial fibrillation on warfarin, s/p AICD, CAD, hypothyroidism, celiac disease presents from home after her HHA called 911 after she was agitated and refusing to take her meds. States she has been weak and sob. Also endorsing the weakness. Was found to be hypoxic to 88 % on RA. PT endorses productive cough and this am had scant blood tinged mucous.  Spoke to Dtjuan Lema on the phone. states her mother suffering from extreme anxiety and was started on xanax 2 weeks ago and since then, her cognition has suffered greatly. She is also having difficulty walking per aides. More forgetful and agitated at time which is not like her per daughter.   #Acute decompensated HFrEF EF 30-35% #B/L pleural effusion - exudative effusion on lights criteria  #Pneumonia #Chronic afib #S/p ICD - Chronic afib  #Metabolic encephalopathy - resolved #Hyponatremia #DORIS Mass 3cm

## 2023-12-28 ENCOUNTER — TRANSCRIPTION ENCOUNTER (OUTPATIENT)
Age: 88
End: 2023-12-28

## 2023-12-28 ENCOUNTER — APPOINTMENT (OUTPATIENT)
Dept: CARE COORDINATION | Facility: HOME HEALTH | Age: 88
End: 2023-12-28
Payer: MEDICARE

## 2023-12-28 VITALS
SYSTOLIC BLOOD PRESSURE: 94 MMHG | OXYGEN SATURATION: 97 % | DIASTOLIC BLOOD PRESSURE: 50 MMHG | HEART RATE: 62 BPM | RESPIRATION RATE: 16 BRPM

## 2023-12-28 DIAGNOSIS — F41.9 ANXIETY DISORDER, UNSPECIFIED: ICD-10-CM

## 2023-12-28 DIAGNOSIS — I50.20 UNSPECIFIED SYSTOLIC (CONGESTIVE) HEART FAILURE: ICD-10-CM

## 2023-12-28 DIAGNOSIS — F32.A ANXIETY DISORDER, UNSPECIFIED: ICD-10-CM

## 2023-12-28 DIAGNOSIS — I25.10 ATHEROSCLEROTIC HEART DISEASE OF NATIVE CORONARY ARTERY W/OUT ANGINA PECTORIS: ICD-10-CM

## 2023-12-28 DIAGNOSIS — I48.91 UNSPECIFIED ATRIAL FIBRILLATION: ICD-10-CM

## 2023-12-28 PROCEDURE — 99349 HOME/RES VST EST MOD MDM 40: CPT

## 2023-12-28 RX ORDER — APIXABAN 2.5 MG/1
2.5 TABLET, FILM COATED ORAL
Qty: 60 | Refills: 0 | Status: ACTIVE | COMMUNITY

## 2023-12-28 RX ORDER — MIRTAZAPINE 7.5 MG/1
7.5 TABLET, FILM COATED ORAL
Refills: 0 | Status: ACTIVE | COMMUNITY

## 2023-12-28 RX ORDER — AMIODARONE HYDROCHLORIDE 100 MG/1
100 TABLET ORAL
Refills: 0 | Status: DISCONTINUED | COMMUNITY
Start: 2018-03-20 | End: 2023-12-28

## 2023-12-28 RX ORDER — LEVOTHYROXINE SODIUM 0.03 MG/1
25 TABLET ORAL DAILY
Refills: 0 | Status: DISCONTINUED | COMMUNITY
End: 2023-12-28

## 2023-12-28 NOTE — HISTORY OF PRESENT ILLNESS
[Post-hospitalization from ___ Hospital] : Post-hospitalization from [unfilled] Hospital [Admitted on: ___] : The patient was admitted on [unfilled] [Discharged on ___] : discharged on [unfilled] [FreeTextEntry2] : FROM [facility] NOTE PROVIDER: 87-year-old female with medical history of CHF, chronic atrial fibrillation on warfarin, s/p AICD, CAD, hypothyroidism, celiac disease presents from home after her HHA called 911 after she was agitated and refusing to take her meds. States she has been weak and sob. Also endorsing the weakness. Was found to be hypoxic to 88 % on RA. PT endorses productive cough and this am had scant blood tinged mucous.  Spoke to Dtjuan Lema on the phone. states her mother suffering from extreme anxiety and was started on xanax 2 weeks ago and since then, her cognition has suffered greatly. She is also having difficulty walking per aides. More forgetful and agitated at time which is not like her per daughter.   #Acute hypoxic respiratory failure, likely multifactorial in etiology, multifocal pneumonia, bilateral pleural effusion (R >L), DORIS mass, pHTN -CT chest: Patchy multifocal airspace disease RUL, DORIS, LLL, 3 cm DORIS mass and mediastinal lymphadenopathy concerning for primary lung malignancy with metastasis -TTE: Superficial hypokinesis of the left ventricle, EF 30 to 35%, 2+ AR, 2+ MR, 2+ TR, 1+ OK, PFO, p HTN -S/P thoracentesis: Drainage from R pleural effusion, 850 cc, exudative in nature, per lights -Postprocedural right PTX -PO Lasix  -S/p azithromycin -C/W Rocephin -Strict I/Os -Daily weights -Family decided against biopsy and other invasive procedures at this time. Discussed with son Dr. Miranda ([phone])/pediatric anesthesiologist.  -pt was seen by ID, Cardiology, CTSx, Heme/Onc and Palliative   Upon exam A&O x 3 NAD. Overall feels better than she did prior to hospital, but feels tired, has not really ambulated since discharge. CHF: no signs overload, taking lasix daily. Unable to safely weigh at this time, good appetite. New home O2.  Afib: RRR on exam, rate controlled. CAD: no reports of CP. [facility] services in home. Email sent to [facility] f/u as she has not heard from PT as of yet. Dr Acuña's note appreciated from 12/21

## 2023-12-28 NOTE — PHYSICAL EXAM
[No Acute Distress] : no acute distress [Well Developed] : well developed [Normal Sclera/Conjunctiva] : normal sclera/conjunctiva [Normal Outer Ear/Nose] : the outer ears and nose were normal in appearance [Normal Oropharynx] : the oropharynx was normal [Supple] : supple [No Respiratory Distress] : no respiratory distress  [No Accessory Muscle Use] : no accessory muscle use [Normal Rate] : normal rate  [Regular Rhythm] : with a regular rhythm [Normal S1, S2] : normal S1 and S2 [Pedal Pulses Present] : the pedal pulses are present [No Edema] : there was no peripheral edema [No Extremity Clubbing/Cyanosis] : no extremity clubbing/cyanosis [Soft] : abdomen soft [Non Tender] : non-tender [Non-distended] : non-distended [Normal Bowel Sounds] : normal bowel sounds [No Spinal Tenderness] : no spinal tenderness [Kyphosis] : kyphosis [Grossly Normal Strength/Tone] : grossly normal strength/tone [No Rash] : no rash [Coordination Grossly Intact] : coordination grossly intact [No Focal Deficits] : no focal deficits [Normal Affect] : the affect was normal [Alert and Oriented x3] : oriented to person, place, and time [de-identified] : no thrush [de-identified] : diminished RLL, slight crackles LLL

## 2023-12-28 NOTE — PLAN
[FreeTextEntry1] : A/P: s/p hospitalization for PNA, volume overload, pleural effusion, R>L. s/p R thoracentesis for 850cc. CT noted with 3cm DORIS mass with adenopathy. Family refused biopsy or other invasive procedures. Was seen by Palliative.  # CHF: - no signs volume overload, EF 30% - con't entresto,  lasix/KCL daily, low Na diet - unable to safely wt at this time - O2 2-3L NC to maintain O2 sat >92%  # AFib: - RRR on exam, rate controlled - amiodarone discontinued. Con't eliquis, digoxin - AICD insitu  # CAD: - no reports of CP - con't ASA, statin  # Anxiety and depression: - con't remeron with xanax prn

## 2024-01-05 ENCOUNTER — TRANSCRIPTION ENCOUNTER (OUTPATIENT)
Age: 89
End: 2024-01-05

## 2024-01-16 ENCOUNTER — TRANSCRIPTION ENCOUNTER (OUTPATIENT)
Age: 89
End: 2024-01-16

## 2024-02-20 RX ORDER — LEVOTHYROXINE SODIUM 100 UG/1
100 CAPSULE ORAL DAILY
Qty: 90 | Refills: 0 | Status: ACTIVE | COMMUNITY
Start: 1900-01-01 | End: 1900-01-01

## 2024-03-14 NOTE — ASU PREOP CHECKLIST - WEIGHT IN LBS
"Subjective: \"We have tried to walk more.\" per spouse    no new med changes  no recent falls    Skill/education provided: see interventions for details    Patient/caregiver response: see interventions for details    Assessment: patient with increased amb distance today, improved compliance with walking program    Plan for next visit: gait training, fall prevention, HEP instruction"
100

## 2024-03-18 ENCOUNTER — RX RENEWAL (OUTPATIENT)
Age: 89
End: 2024-03-18

## 2024-03-24 ENCOUNTER — INPATIENT (INPATIENT)
Facility: HOSPITAL | Age: 89
LOS: 3 days | Discharge: HOME HEALTH | DRG: 293 | End: 2024-03-28
Attending: STUDENT IN AN ORGANIZED HEALTH CARE EDUCATION/TRAINING PROGRAM | Admitting: EMERGENCY MEDICINE
Payer: MEDICARE

## 2024-03-24 VITALS
SYSTOLIC BLOOD PRESSURE: 116 MMHG | RESPIRATION RATE: 18 BRPM | TEMPERATURE: 98 F | HEIGHT: 63 IN | WEIGHT: 89.95 LBS | DIASTOLIC BLOOD PRESSURE: 54 MMHG | OXYGEN SATURATION: 96 % | HEART RATE: 86 BPM

## 2024-03-24 DIAGNOSIS — Z98.890 OTHER SPECIFIED POSTPROCEDURAL STATES: Chronic | ICD-10-CM

## 2024-03-24 DIAGNOSIS — Z95.0 PRESENCE OF CARDIAC PACEMAKER: Chronic | ICD-10-CM

## 2024-03-24 DIAGNOSIS — Z95.810 PRESENCE OF AUTOMATIC (IMPLANTABLE) CARDIAC DEFIBRILLATOR: Chronic | ICD-10-CM

## 2024-03-24 DIAGNOSIS — Z98.49 CATARACT EXTRACTION STATUS, UNSPECIFIED EYE: Chronic | ICD-10-CM

## 2024-03-24 DIAGNOSIS — I50.9 HEART FAILURE, UNSPECIFIED: ICD-10-CM

## 2024-03-24 LAB
ALBUMIN SERPL ELPH-MCNC: 3 G/DL — LOW (ref 3.3–5)
ALP SERPL-CCNC: 92 U/L — SIGNIFICANT CHANGE UP (ref 40–120)
ALT FLD-CCNC: 17 U/L — SIGNIFICANT CHANGE UP (ref 12–78)
ANION GAP SERPL CALC-SCNC: 4 MMOL/L — LOW (ref 5–17)
APTT BLD: 45.7 SEC — HIGH (ref 24.5–35.6)
AST SERPL-CCNC: 26 U/L — SIGNIFICANT CHANGE UP (ref 15–37)
BASOPHILS # BLD AUTO: 0.07 K/UL — SIGNIFICANT CHANGE UP (ref 0–0.2)
BASOPHILS NFR BLD AUTO: 1 % — SIGNIFICANT CHANGE UP (ref 0–2)
BILIRUB SERPL-MCNC: 0.5 MG/DL — SIGNIFICANT CHANGE UP (ref 0.2–1.2)
BUN SERPL-MCNC: 28 MG/DL — HIGH (ref 7–23)
CALCIUM SERPL-MCNC: 9.2 MG/DL — SIGNIFICANT CHANGE UP (ref 8.5–10.1)
CHLORIDE SERPL-SCNC: 99 MMOL/L — SIGNIFICANT CHANGE UP (ref 96–108)
CO2 SERPL-SCNC: 34 MMOL/L — HIGH (ref 22–31)
CREAT SERPL-MCNC: 1.04 MG/DL — SIGNIFICANT CHANGE UP (ref 0.5–1.3)
EGFR: 51 ML/MIN/1.73M2 — LOW
EOSINOPHIL # BLD AUTO: 0.1 K/UL — SIGNIFICANT CHANGE UP (ref 0–0.5)
EOSINOPHIL NFR BLD AUTO: 1.5 % — SIGNIFICANT CHANGE UP (ref 0–6)
GLUCOSE SERPL-MCNC: 75 MG/DL — SIGNIFICANT CHANGE UP (ref 70–99)
HCT VFR BLD CALC: 34.3 % — LOW (ref 34.5–45)
HGB BLD-MCNC: 10.8 G/DL — LOW (ref 11.5–15.5)
IMM GRANULOCYTES NFR BLD AUTO: 0.1 % — SIGNIFICANT CHANGE UP (ref 0–0.9)
INR BLD: 1.55 RATIO — HIGH (ref 0.85–1.18)
LYMPHOCYTES # BLD AUTO: 2.6 K/UL — SIGNIFICANT CHANGE UP (ref 1–3.3)
LYMPHOCYTES # BLD AUTO: 38.5 % — SIGNIFICANT CHANGE UP (ref 13–44)
MCHC RBC-ENTMCNC: 31.5 GM/DL — LOW (ref 32–36)
MCHC RBC-ENTMCNC: 32.4 PG — SIGNIFICANT CHANGE UP (ref 27–34)
MCV RBC AUTO: 103 FL — HIGH (ref 80–100)
MONOCYTES # BLD AUTO: 0.85 K/UL — SIGNIFICANT CHANGE UP (ref 0–0.9)
MONOCYTES NFR BLD AUTO: 12.6 % — SIGNIFICANT CHANGE UP (ref 2–14)
NEUTROPHILS # BLD AUTO: 3.13 K/UL — SIGNIFICANT CHANGE UP (ref 1.8–7.4)
NEUTROPHILS NFR BLD AUTO: 46.3 % — SIGNIFICANT CHANGE UP (ref 43–77)
NT-PROBNP SERPL-SCNC: 3020 PG/ML — HIGH (ref 0–450)
PLATELET # BLD AUTO: 244 K/UL — SIGNIFICANT CHANGE UP (ref 150–400)
POTASSIUM SERPL-MCNC: 4.7 MMOL/L — SIGNIFICANT CHANGE UP (ref 3.5–5.3)
POTASSIUM SERPL-SCNC: 4.7 MMOL/L — SIGNIFICANT CHANGE UP (ref 3.5–5.3)
PROT SERPL-MCNC: 7.9 GM/DL — SIGNIFICANT CHANGE UP (ref 6–8.3)
PROTHROM AB SERPL-ACNC: 17.3 SEC — HIGH (ref 9.5–13)
RBC # BLD: 3.33 M/UL — LOW (ref 3.8–5.2)
RBC # FLD: 13.8 % — SIGNIFICANT CHANGE UP (ref 10.3–14.5)
SODIUM SERPL-SCNC: 137 MMOL/L — SIGNIFICANT CHANGE UP (ref 135–145)
TROPONIN I, HIGH SENSITIVITY RESULT: 25.1 NG/L — SIGNIFICANT CHANGE UP
WBC # BLD: 6.76 K/UL — SIGNIFICANT CHANGE UP (ref 3.8–10.5)
WBC # FLD AUTO: 6.76 K/UL — SIGNIFICANT CHANGE UP (ref 3.8–10.5)

## 2024-03-24 PROCEDURE — 99223 1ST HOSP IP/OBS HIGH 75: CPT

## 2024-03-24 PROCEDURE — 71045 X-RAY EXAM CHEST 1 VIEW: CPT | Mod: 26

## 2024-03-24 PROCEDURE — 80162 ASSAY OF DIGOXIN TOTAL: CPT

## 2024-03-24 PROCEDURE — 97530 THERAPEUTIC ACTIVITIES: CPT | Mod: GP

## 2024-03-24 PROCEDURE — 80069 RENAL FUNCTION PANEL: CPT

## 2024-03-24 PROCEDURE — 80048 BASIC METABOLIC PNL TOTAL CA: CPT

## 2024-03-24 PROCEDURE — 85025 COMPLETE CBC W/AUTO DIFF WBC: CPT

## 2024-03-24 PROCEDURE — 36415 COLL VENOUS BLD VENIPUNCTURE: CPT

## 2024-03-24 PROCEDURE — 97162 PT EVAL MOD COMPLEX 30 MIN: CPT | Mod: GP

## 2024-03-24 PROCEDURE — 85027 COMPLETE CBC AUTOMATED: CPT

## 2024-03-24 PROCEDURE — 93010 ELECTROCARDIOGRAM REPORT: CPT

## 2024-03-24 PROCEDURE — 83036 HEMOGLOBIN GLYCOSYLATED A1C: CPT

## 2024-03-24 PROCEDURE — 97116 GAIT TRAINING THERAPY: CPT | Mod: GP

## 2024-03-24 PROCEDURE — 83735 ASSAY OF MAGNESIUM: CPT

## 2024-03-24 PROCEDURE — 93306 TTE W/DOPPLER COMPLETE: CPT

## 2024-03-24 PROCEDURE — 99285 EMERGENCY DEPT VISIT HI MDM: CPT

## 2024-03-24 RX ORDER — APIXABAN 2.5 MG/1
2.5 TABLET, FILM COATED ORAL EVERY 12 HOURS
Refills: 0 | Status: DISCONTINUED | OUTPATIENT
Start: 2024-03-24 | End: 2024-03-28

## 2024-03-24 RX ORDER — ASPIRIN/CALCIUM CARB/MAGNESIUM 324 MG
81 TABLET ORAL DAILY
Refills: 0 | Status: DISCONTINUED | OUTPATIENT
Start: 2024-03-24 | End: 2024-03-28

## 2024-03-24 RX ORDER — LANOLIN ALCOHOL/MO/W.PET/CERES
3 CREAM (GRAM) TOPICAL AT BEDTIME
Refills: 0 | Status: DISCONTINUED | OUTPATIENT
Start: 2024-03-24 | End: 2024-03-28

## 2024-03-24 RX ORDER — TEMAZEPAM 15 MG/1
7.5 CAPSULE ORAL AT BEDTIME
Refills: 0 | Status: DISCONTINUED | OUTPATIENT
Start: 2024-03-24 | End: 2024-03-28

## 2024-03-24 RX ORDER — CITALOPRAM 10 MG/1
10 TABLET, FILM COATED ORAL DAILY
Refills: 0 | Status: DISCONTINUED | OUTPATIENT
Start: 2024-03-24 | End: 2024-03-28

## 2024-03-24 RX ORDER — LEVOTHYROXINE SODIUM 125 MCG
25 TABLET ORAL DAILY
Refills: 0 | Status: DISCONTINUED | OUTPATIENT
Start: 2024-03-24 | End: 2024-03-28

## 2024-03-24 RX ORDER — ONDANSETRON 8 MG/1
4 TABLET, FILM COATED ORAL EVERY 8 HOURS
Refills: 0 | Status: DISCONTINUED | OUTPATIENT
Start: 2024-03-24 | End: 2024-03-28

## 2024-03-24 RX ORDER — ACETAMINOPHEN 500 MG
650 TABLET ORAL EVERY 6 HOURS
Refills: 0 | Status: DISCONTINUED | OUTPATIENT
Start: 2024-03-24 | End: 2024-03-28

## 2024-03-24 RX ORDER — LEVOTHYROXINE SODIUM 125 MCG
100 TABLET ORAL DAILY
Refills: 0 | Status: DISCONTINUED | OUTPATIENT
Start: 2024-03-24 | End: 2024-03-28

## 2024-03-24 RX ORDER — FUROSEMIDE 40 MG
40 TABLET ORAL ONCE
Refills: 0 | Status: COMPLETED | OUTPATIENT
Start: 2024-03-24 | End: 2024-03-24

## 2024-03-24 RX ORDER — FUROSEMIDE 40 MG
20 TABLET ORAL
Refills: 0 | Status: DISCONTINUED | OUTPATIENT
Start: 2024-03-24 | End: 2024-03-27

## 2024-03-24 RX ORDER — SACUBITRIL AND VALSARTAN 24; 26 MG/1; MG/1
1 TABLET, FILM COATED ORAL
Refills: 0 | Status: DISCONTINUED | OUTPATIENT
Start: 2024-03-24 | End: 2024-03-28

## 2024-03-24 RX ORDER — ALPRAZOLAM 0.25 MG
1 TABLET ORAL
Refills: 0 | DISCHARGE

## 2024-03-24 RX ORDER — ATORVASTATIN CALCIUM 80 MG/1
10 TABLET, FILM COATED ORAL AT BEDTIME
Refills: 0 | Status: DISCONTINUED | OUTPATIENT
Start: 2024-03-24 | End: 2024-03-28

## 2024-03-24 RX ORDER — MIRTAZAPINE 45 MG/1
7.5 TABLET, ORALLY DISINTEGRATING ORAL AT BEDTIME
Refills: 0 | Status: DISCONTINUED | OUTPATIENT
Start: 2024-03-24 | End: 2024-03-28

## 2024-03-24 RX ORDER — DIGOXIN 250 MCG
62.5 TABLET ORAL DAILY
Refills: 0 | Status: DISCONTINUED | OUTPATIENT
Start: 2024-03-24 | End: 2024-03-28

## 2024-03-24 RX ADMIN — Medication 1 TABLET(S): at 21:27

## 2024-03-24 RX ADMIN — SACUBITRIL AND VALSARTAN 1 TABLET(S): 24; 26 TABLET, FILM COATED ORAL at 21:27

## 2024-03-24 RX ADMIN — APIXABAN 2.5 MILLIGRAM(S): 2.5 TABLET, FILM COATED ORAL at 21:27

## 2024-03-24 RX ADMIN — TEMAZEPAM 7.5 MILLIGRAM(S): 15 CAPSULE ORAL at 22:18

## 2024-03-24 RX ADMIN — Medication 40 MILLIGRAM(S): at 14:36

## 2024-03-24 RX ADMIN — MIRTAZAPINE 7.5 MILLIGRAM(S): 45 TABLET, ORALLY DISINTEGRATING ORAL at 21:27

## 2024-03-24 RX ADMIN — ATORVASTATIN CALCIUM 10 MILLIGRAM(S): 80 TABLET, FILM COATED ORAL at 21:27

## 2024-03-24 NOTE — ED ADULT NURSE NOTE - OBJECTIVE STATEMENT
Pt c/o SOB and swelling of bilateral legs. Pt hx of CHF and takes lasix at home with no improvement. Pt denies CP at this time. Pt is Aox4. Pt PIV placed with no issues. Pt EKG done at bedside. Pt daughter at bedside. Pt safety maintained.

## 2024-03-24 NOTE — H&P ADULT - NSHPPHYSICALEXAM_GEN_ALL_CORE
Vital Signs Last 24 Hrs  T(C): 36.8 (24 Mar 2024 12:55), Max: 36.8 (24 Mar 2024 12:55)  T(F): 98.3 (24 Mar 2024 12:55), Max: 98.3 (24 Mar 2024 12:55)  HR: 84 (24 Mar 2024 16:00) (82 - 91)  BP: 122/70 (24 Mar 2024 16:00) (103/62 - 122/70)  BP(mean): 82 (24 Mar 2024 16:00) (64 - 82)  RR: 16 (24 Mar 2024 16:00) (16 - 18)  SpO2: 95% (24 Mar 2024 16:00) (95% - 99%)    Parameters below as of 24 Mar 2024 16:00  Patient On (Oxygen Delivery Method): room air

## 2024-03-24 NOTE — ED PROVIDER NOTE - PRO INTERPRETER NEED 2
Schedule sleep study test.  [phone]    Continue all your same medications    Call if having any dizziness, lightheadedness, heart racing, palpitations, chest pain, shortness of breath, coughing, swelling, weight gain, fever, chills or worsening sympto
English

## 2024-03-24 NOTE — H&P ADULT - HISTORY OF PRESENT ILLNESS
89 year old female w  hx lung mass (not being pursued/treated), CHF here with several days of increased lower extremity swelling, orthopnea, dyspnea.        Usually on Lasix 20mg qD, increased to 40mg two days ago, and then 60mg yesterday with no improvement.    No chest pain.    No fever/chills. No other c/o          In ED /54   HR 86   RR 18    T 98.3   96% sat RA            PAST MEDICAL HX  AICD (automatic cardioverter/defibrillator) present x 3 .       Replaced x 2. Presently right subclavian area  Arthritis   Celiac disease   Chronic atrial fibrillation   Hashimoto's thyroiditis   Hearing loss   History of cataract   History of CHF (congestive heart failure)   History of colon polyps   HTN (hypertension)   Hyperlipidemia, unspecified hyperlipidemia type   Hypothyroid   Iron deficiency anemia due to chronic blood loss   Left inguinal hernia   Mitral valve prolapse   Myocardial infarction 1990  Osteoporosis   Peripheral edema   Varicose veins BLE         PAST SURGICAL HX  AICD (automatic cardioverter/defibrillator) present with PPM. Replaced x 2.  Artificial pacemaker   H/O colonoscopy last done 2009  H/O right inguinal hernia repair   History of cataract extraction Bilateral  History of heart surgery Mitral valve surgery for leaky valve 9/2020.     FAMILY HX  Father  Still living? No  Family history of dementia, Age at diagnosis: Age Unknown    Mother  Still living? No  Family history of diabetes mellitus, Age at diagnosis: Age Unknown  Family history of heart disease, Age at diagnosis: Age Unknown    Sibling  Still living? No  Family history of diabetes mellitus, Age at diagnosis: Age Unknown.   89 year old female w  hx lung mass (not being pursued/treated), HFrEF here c/o SOB and leg swelling    several days of increased lower extremity swelling, orthopnea, dyspnea    Usually on Lasix 20mg qD, increased to 40mg two days ago, and 60mg yesterday with no improvement.    No chest pain.    No fever/chills    Does not weigh herself daily and does not take diuretic daily    In ED /54   HR 86   RR 18    T 98.3   96% sat RA  lasix 40  mg IV          PAST MEDICAL HX  AICD (automatic cardioverter/defibrillator) present x 3 .       Replaced x 2. Presently right subclavian area  Arthritis   Celiac disease   Chronic atrial fibrillation   Hashimoto's thyroiditis   Hearing loss   History of cataract   History of CHF (congestive heart failure)   History of colon polyps   HTN (hypertension)   Hyperlipidemia, unspecified hyperlipidemia type   Hypothyroid   Iron deficiency anemia due to chronic blood loss   Left inguinal hernia   Mitral valve prolapse   Myocardial infarction 1990  Osteoporosis   Peripheral edema   Varicose veins BLE         PAST SURGICAL HX  AICD (automatic cardioverter/defibrillator) present with PPM. Replaced x 2.  Artificial pacemaker   H/O colonoscopy last done 2009  H/O right inguinal hernia repair   History of cataract extraction Bilateral  History of heart surgery Mitral valve surgery for leaky valve 9/2020.     FAMILY HX  Father  Still living? No  Family history of dementia, Age at diagnosis: Age Unknown    Mother  Still living? No  Family history of diabetes mellitus, Age at diagnosis: Age Unknown  Family history of heart disease, Age at diagnosis: Age Unknown    Sibling  Still living? No  Family history of diabetes mellitus, Age at diagnosis: Age Unknown.      SOCIAL HX  pt lives at home w an aide  ambulates w walker at baseline  has PT come to her home   89 year old female w celiac disease, hx lung mass (not being pursued/treated), AF on eliquis, HFrEF, hypothyroid, MV clipping here c/o SOB and leg swelling    several days of increased lower extremity swelling, orthopnea, dyspnea    Usually on Lasix 20mg qD, increased to 40mg two days ago, and 60mg yesterday with no improvement  increased fatigue on ambulation   No chest pain    No fever/chills    Does not weigh herself daily and does not take diuretic daily    In ED /54   HR 86   RR 18    T 98.3   96% sat RA  lasix 40  mg IV      PAST MEDICAL HX  AICD (automatic cardioverter/defibrillator) present x 3 .       Replaced x 2. Presently right subclavian area  Arthritis   Celiac disease   Chronic atrial fibrillation   Hashimoto's thyroiditis   Hearing loss   History of cataract   History of CHF (congestive heart failure)   History of colon polyps   HTN (hypertension)   Hyperlipidemia, unspecified hyperlipidemia type   Hypothyroid   Iron deficiency anemia due to chronic blood loss   Left inguinal hernia   Mitral valve prolapse   Myocardial infarction 1990  Osteoporosis   Peripheral edema   Varicose veins BLE         PAST SURGICAL HX  AICD (automatic cardioverter/defibrillator) present with PPM. Replaced x 2.  Artificial pacemaker   H/O colonoscopy last done 2009  H/O right inguinal hernia repair   History of cataract extraction Bilateral  History of heart surgery Mitral valve surgery for leaky valve 9/2020.     FAMILY HX  Father  Still living? No  Family history of dementia, Age at diagnosis: Age Unknown    Mother  Still living? No  Family history of diabetes mellitus, Age at diagnosis: Age Unknown  Family history of heart disease, Age at diagnosis: Age Unknown    Sibling  Still living? No  Family history of diabetes mellitus, Age at diagnosis: Age Unknown.      SOCIAL HX  pt lives at home w an aide  ambulates w walker at baseline  has PT come to her home

## 2024-03-24 NOTE — ED PROVIDER NOTE - CLINICAL SUMMARY MEDICAL DECISION MAKING FREE TEXT BOX
Increased lower ext swelling, dyspnea in patient with hx CHF, despite escalating doses of lasix  Plan: XR, labs, IV lasix, admit

## 2024-03-24 NOTE — ED PROVIDER NOTE - OBJECTIVE STATEMENT
89F hx lung mass (not being pursued/treated), CHF here with several days of increased lower extremity swelling, orthopnea, dyspnea.  Usually on Lasix 20mg qD, increased to 40mg two days ago, and then 60mg yesterday with no improvement.  No chest pain.  No fever/chills. No other c/o

## 2024-03-24 NOTE — PATIENT PROFILE ADULT - FALL HARM RISK - HARM RISK INTERVENTIONS

## 2024-03-24 NOTE — ED ADULT TRIAGE NOTE - CHIEF COMPLAINT QUOTE
pt presents to Ed with complaints of shortness of breath and leg swelling x 3 days. pt endorses hx of CHF. follows with Md Jacome for cardiology and MD Ghosh for PMD. EKG in progress.

## 2024-03-24 NOTE — H&P ADULT - ASSESSMENT
89 year old female w celiac disease, hx lung mass (not being pursued/treated), AF on eliquis, HFrEF, hypothyroid, MV clipping here c/o SOB and leg swelling    #Acute on chronic HFrEF  elevated BNP  fluid in fissure on CXR  #Large ASD  #MV clipping  1. admit to telemetry  2. weigh daily  3. I/O  4. supplemental O2 at night  5. lasix 40 mg IV in ED  6. lasix 20 mg BID IV  7. Entresto BID  8. Cardiology  9. ECHO      #AF  #AICD/PPM  1. continue eliquis  2. dig  3. check level      #CAD  1. asa  2. statin        #Hypothryoid  1. check TSH  2. levothyroxine 125 mcg      #MISC  1. escitalopram  2. mirtazapine        #VTE  on AC        #GOC  prior MOLST DNR/trial NIV   unable to print prior MOLST        #80 minutes

## 2024-03-24 NOTE — PHARMACOTHERAPY INTERVENTION NOTE - COMMENTS
Medication history complete. Medications and allergies reviewed with patient's daughter, Aubrey at bedside and confirmed with .

## 2024-03-24 NOTE — H&P ADULT - NSHPLABSRESULTS_GEN_ALL_CORE
ECHO  Summary:   1. Left ventricular ejection fraction, by visual estimation, is 30 to 35%.   2. Severely decreased global left ventricular systolic function.   3. Multiple left ventricular regional wall motion abnormalities exist. See wall motion findings.   4. Severely enlarged left atrium.   5. Severely enlarged right atrium.   6. The mitral in-flow pattern reveals no discernable A-wave, therefore no comment on diastolic function can be made.   7. Mildly enlarged right ventricle.   8. Moderately reduced RV systolic function.   9. S/p Mitral Clip.  10. Mild to moderate mitral valve regurgitation.  11. Moderate tricuspid regurgitation.  12. Moderate aortic regurgitation.  13. Sclerotic aortic valve with decreased opening.  14. There is no evidence of pericardial effusion.  15. Color Doppler demonstrates the presence of left to right shunting, consistent with large iatrogenic ASD.  16. Mitral valve mean gradient is 3.3 mmHg consistent with mild mitral stenosis.  17. Peak transaortic gradient equals 7.6 mmHg, mean transaortic gradient equals 3.7 mmHg, the calculated aortic valve area equals 1.87 cm? by the continuity equation consistent with mild aortic stenosis.  18. Endocardial visualization was enhanced with intravenous echo contrast.  19. Compared to TTE done on 7/10/2017, patient is s/p Mitral Clip.    Yane Jang D.O., Electronically signed on 10/15/2020 at 12:53:25 PM

## 2024-03-25 ENCOUNTER — TRANSCRIPTION ENCOUNTER (OUTPATIENT)
Age: 89
End: 2024-03-25

## 2024-03-25 LAB
A1C WITH ESTIMATED AVERAGE GLUCOSE RESULT: 5.4 % — SIGNIFICANT CHANGE UP (ref 4–5.6)
ANION GAP SERPL CALC-SCNC: 4 MMOL/L — LOW (ref 5–17)
BASOPHILS # BLD AUTO: 0.06 K/UL — SIGNIFICANT CHANGE UP (ref 0–0.2)
BASOPHILS NFR BLD AUTO: 1.2 % — SIGNIFICANT CHANGE UP (ref 0–2)
BUN SERPL-MCNC: 28 MG/DL — HIGH (ref 7–23)
CALCIUM SERPL-MCNC: 9.2 MG/DL — SIGNIFICANT CHANGE UP (ref 8.5–10.1)
CHLORIDE SERPL-SCNC: 101 MMOL/L — SIGNIFICANT CHANGE UP (ref 96–108)
CO2 SERPL-SCNC: 33 MMOL/L — HIGH (ref 22–31)
CREAT SERPL-MCNC: 0.98 MG/DL — SIGNIFICANT CHANGE UP (ref 0.5–1.3)
DIGOXIN SERPL-MCNC: 0.71 NG/ML — LOW (ref 0.8–2)
EGFR: 55 ML/MIN/1.73M2 — LOW
EOSINOPHIL # BLD AUTO: 0.15 K/UL — SIGNIFICANT CHANGE UP (ref 0–0.5)
EOSINOPHIL NFR BLD AUTO: 3.1 % — SIGNIFICANT CHANGE UP (ref 0–6)
ESTIMATED AVERAGE GLUCOSE: 108 MG/DL — SIGNIFICANT CHANGE UP (ref 68–114)
GLUCOSE SERPL-MCNC: 92 MG/DL — SIGNIFICANT CHANGE UP (ref 70–99)
HCT VFR BLD CALC: 29.5 % — LOW (ref 34.5–45)
HGB BLD-MCNC: 9.5 G/DL — LOW (ref 11.5–15.5)
IMM GRANULOCYTES NFR BLD AUTO: 0.2 % — SIGNIFICANT CHANGE UP (ref 0–0.9)
LYMPHOCYTES # BLD AUTO: 1.5 K/UL — SIGNIFICANT CHANGE UP (ref 1–3.3)
LYMPHOCYTES # BLD AUTO: 30.5 % — SIGNIFICANT CHANGE UP (ref 13–44)
MAGNESIUM SERPL-MCNC: 2.1 MG/DL — SIGNIFICANT CHANGE UP (ref 1.6–2.6)
MCHC RBC-ENTMCNC: 32.2 GM/DL — SIGNIFICANT CHANGE UP (ref 32–36)
MCHC RBC-ENTMCNC: 32.4 PG — SIGNIFICANT CHANGE UP (ref 27–34)
MCV RBC AUTO: 100.7 FL — HIGH (ref 80–100)
MONOCYTES # BLD AUTO: 0.83 K/UL — SIGNIFICANT CHANGE UP (ref 0–0.9)
MONOCYTES NFR BLD AUTO: 16.9 % — HIGH (ref 2–14)
NEUTROPHILS # BLD AUTO: 2.36 K/UL — SIGNIFICANT CHANGE UP (ref 1.8–7.4)
NEUTROPHILS NFR BLD AUTO: 48.1 % — SIGNIFICANT CHANGE UP (ref 43–77)
PLATELET # BLD AUTO: 210 K/UL — SIGNIFICANT CHANGE UP (ref 150–400)
POTASSIUM SERPL-MCNC: 4 MMOL/L — SIGNIFICANT CHANGE UP (ref 3.5–5.3)
POTASSIUM SERPL-SCNC: 4 MMOL/L — SIGNIFICANT CHANGE UP (ref 3.5–5.3)
RBC # BLD: 2.93 M/UL — LOW (ref 3.8–5.2)
RBC # FLD: 13.8 % — SIGNIFICANT CHANGE UP (ref 10.3–14.5)
SODIUM SERPL-SCNC: 138 MMOL/L — SIGNIFICANT CHANGE UP (ref 135–145)
WBC # BLD: 4.91 K/UL — SIGNIFICANT CHANGE UP (ref 3.8–10.5)
WBC # FLD AUTO: 4.91 K/UL — SIGNIFICANT CHANGE UP (ref 3.8–10.5)

## 2024-03-25 PROCEDURE — 93283 PRGRMG EVAL IMPLANTABLE DFB: CPT | Mod: 26,59

## 2024-03-25 PROCEDURE — 93290 INTERROG DEV EVAL ICPMS IP: CPT | Mod: 26

## 2024-03-25 PROCEDURE — 93306 TTE W/DOPPLER COMPLETE: CPT | Mod: 26

## 2024-03-25 PROCEDURE — 93284 PRGRMG EVAL IMPLANTABLE DFB: CPT | Mod: 26

## 2024-03-25 PROCEDURE — 99233 SBSQ HOSP IP/OBS HIGH 50: CPT

## 2024-03-25 RX ADMIN — ATORVASTATIN CALCIUM 10 MILLIGRAM(S): 80 TABLET, FILM COATED ORAL at 21:51

## 2024-03-25 RX ADMIN — Medication 20 MILLIGRAM(S): at 05:55

## 2024-03-25 RX ADMIN — TEMAZEPAM 7.5 MILLIGRAM(S): 15 CAPSULE ORAL at 21:53

## 2024-03-25 RX ADMIN — Medication 25 MICROGRAM(S): at 05:53

## 2024-03-25 RX ADMIN — MIRTAZAPINE 7.5 MILLIGRAM(S): 45 TABLET, ORALLY DISINTEGRATING ORAL at 21:51

## 2024-03-25 RX ADMIN — Medication 62.5 MICROGRAM(S): at 11:04

## 2024-03-25 RX ADMIN — CITALOPRAM 10 MILLIGRAM(S): 10 TABLET, FILM COATED ORAL at 11:03

## 2024-03-25 RX ADMIN — Medication 100 MICROGRAM(S): at 05:55

## 2024-03-25 RX ADMIN — APIXABAN 2.5 MILLIGRAM(S): 2.5 TABLET, FILM COATED ORAL at 11:03

## 2024-03-25 RX ADMIN — SACUBITRIL AND VALSARTAN 1 TABLET(S): 24; 26 TABLET, FILM COATED ORAL at 11:04

## 2024-03-25 RX ADMIN — Medication 81 MILLIGRAM(S): at 11:03

## 2024-03-25 RX ADMIN — Medication 1 TABLET(S): at 11:03

## 2024-03-25 RX ADMIN — APIXABAN 2.5 MILLIGRAM(S): 2.5 TABLET, FILM COATED ORAL at 21:52

## 2024-03-25 RX ADMIN — Medication 20 MILLIGRAM(S): at 16:10

## 2024-03-25 RX ADMIN — Medication 1 TABLET(S): at 21:49

## 2024-03-25 NOTE — PHYSICAL THERAPY INITIAL EVALUATION ADULT - PERTINENT HX OF CURRENT PROBLEM, REHAB EVAL
89 year old female w celiac disease, hx lung mass (not being pursued/treated), AF on eliquis, HFrEF, hypothyroid, MV clipping here c/o SOB and leg swelling  Several days of increased lower extremity swelling, orthopnea, dyspnea.

## 2024-03-25 NOTE — DIETITIAN INITIAL EVALUATION ADULT - PERTINENT LABORATORY DATA
03-25    138  |  101  |  28<H>  ----------------------------<  92  4.0   |  33<H>  |  0.98    Ca    9.2      25 Mar 2024 07:09  Mg     2.1     03-25    TPro  7.9  /  Alb  3.0<L>  /  TBili  0.5  /  DBili  x   /  AST  26  /  ALT  17  /  AlkPhos  92  03-24

## 2024-03-25 NOTE — DIETITIAN INITIAL EVALUATION ADULT - NSFNSPHYEXAMSKINFT_GEN_A_CORE
Pressure Injury 1: Bilateral:, sacrum, Stage I  Pressure Injury 2: thoracic spine, Stage I  Pressure Injury 3: none, none  Pressure Injury 4: none, none  Pressure Injury 5: none, none  Pressure Injury 6: none, none  Pressure Injury 7: none, none  Pressure Injury 8: none, none  Pressure Injury 9: none, none  Pressure Injury 10: none, none  Pressure Injury 11: none, none Pressure Injury 1: Bilateral:, sacrum, Stage I  Pressure Injury 2: thoracic spine, Stage I  Edmar 14

## 2024-03-25 NOTE — PHYSICAL THERAPY INITIAL EVALUATION ADULT - GENERAL OBSERVATIONS, REHAB EVAL
The Pt was received on 3E in bed supine, calm, cooperative, and willing to participate with PT, +tele. Pt responded well to functional mobility trng and ambulation tx. Required MIN A x 1 for mobility and transfer with RW support. Assisted into bedside chair and adjusted for comfort, +alarm. The Pt was in NAD at end of tx.  Call bell, tray, and phone in place and within reach. NSG made aware.

## 2024-03-25 NOTE — PROGRESS NOTE ADULT - ASSESSMENT
89 year old female w celiac disease, hx lung mass (not being pursued/treated), AF on eliquis, HFrEF, hypothyroid, MV clipping here c/o SOB and leg swelling    #Acute on chronic hypoxic respiratory failure in the setting of HFrEF  -CXR: as above   -BNP: 3k  -ECHO: pending   -IV Lasix 20mg BID   -Entresto  -Strict I&O's  -Daily weights  -Fluid restriction  -Low salt, low cholesterol diet  -Cardiology consult and reccs noted     #Chronic A-fib s/p PPM placement   -Eliquis   -Digoxin     #CAD   -ASA/statin     #Hypothyroid  -Synthroid       #Depression/anxiety  -C/W citalopram  -C/W Remeron    #Hypothyroid,   continue home levothyroxine        prior MOLST DNR/trial NIV   unable to print prior MOL

## 2024-03-25 NOTE — DIETITIAN INITIAL EVALUATION ADULT - OTHER INFO
89 year old female w celiac disease, hx lung mass (not being pursued/treated), AF on eliquis, HFrEF, hypothyroid, MV clipping here c/o SOB and leg swelling    several days of increased lower extremity swelling, orthopnea, dyspnea    Usually on Lasix 20mg qD, increased to 40mg two days ago, and 60mg yesterday with no improvement  increased fatigue on ambulation   No chest pain    No fever/chills    Does not weigh herself daily and does not take diuretic daily 89 year old female w celiac disease, hx lung mass (not being pursued/treated), AF on eliquis, HFrEF, hypothyroid, MV clipping here c/o SOB and leg swelling  several days of increased lower extremity swelling, orthopnea, dyspnea    Usually on Lasix 20mg qD, increased to 40mg two days ago, and 60mg yesterday with no improvement  increased fatigue on ambulation   No chest pain    No fever/chills  Does not weigh herself daily and does not take diuretic daily    Admit dx: Heart Failure  Visited pt at bedside, she is very alert.  Pt appears very thin  RD bedscale wt is 40 kg 90#, she reports her UBW is 92-93#  NFPE reveals severe muscle wasting, fat wasting   PO intake estimated < 75% ENN > one month   Suggest Confirm Goals of Care regarding nutrition support   Consider adding appetite stimulant such as Remeron or Marinol 2/2 chronically poor appetite/ PO intake   Recommendations to follow in Plan/Intervention

## 2024-03-25 NOTE — DIETITIAN NUTRITION RISK NOTIFICATION - PHYSICAL ASSESSMENT CALF
severe Libtayo Counseling- I discussed with the patient the risks of Libtayo including but not limited to nausea, vomiting, diarrhea, and bone or muscle pain.  The patient verbalized understanding of the proper use and possible adverse effects of Libtayo.  All of the patient's questions and concerns were addressed.

## 2024-03-25 NOTE — PROCEDURE NOTE - ADDITIONAL PROCEDURE DETAILS
greater than 90 percent.  This pt follows with [facility] and is aware that battery has left then 2 years battery longevity  One event of NSVT , on March 14 of 12 beats-no thearpy given.

## 2024-03-25 NOTE — DISCHARGE NOTE NURSING/CASE MANAGEMENT/SOCIAL WORK - NSDCVIVACCINE_GEN_ALL_CORE_FT
Tdap; 09-Nov-2017 22:13; Edward Grajeda); [employer]; [id number]; IntraMuscular; Deltoid Right.; 0.5 milliLiter(s); VIS (VIS Published: 09-May-2013, VIS Presented: 09-Nov-2017);

## 2024-03-25 NOTE — DIETITIAN INITIAL EVALUATION ADULT - NAME AND PHONE
Gissel Mcelroy RDN, CDN, [facility]      [phone]   [email or web address]

## 2024-03-25 NOTE — DIETITIAN INITIAL EVALUATION ADULT - ORAL INTAKE PTA/DIET HISTORY
Pt reports she lives in her own home with an Aide, but her daughters shop and cook for her.  She eats 2 meals a day, with fruit at 4 PM.  PO intake estimated < 75% ENN > one month per her recall.

## 2024-03-25 NOTE — PROGRESS NOTE ADULT - SUBJECTIVE AND OBJECTIVE BOX
HOSPITALIST ATTENDING PROGRESS NOTE     Chart and meds reviewed. Patient seen and examined     CC: SOB    Subjective: Seen and evaluated. SOB is improving after diurectics. No other acute complaitns.       All other systems and founds to be negative with exception of what has been described above.         PHYSICAL EXAM:  Vital Signs Last 24 Hrs  T(C): 36.8 (25 Mar 2024 20:15), Max: 36.9 (25 Mar 2024 08:00)  T(F): 98.2 (25 Mar 2024 20:15), Max: 98.5 (25 Mar 2024 08:00)  HR: 76 (25 Mar 2024 20:15) (76 - 79)  BP: 100/41 (25 Mar 2024 20:15) (100/41 - 114/46)  BP(mean): 91 (25 Mar 2024 16:08) (63 - 91)  RR: 18 (25 Mar 2024 20:15) (18 - 18)  SpO2: 100% (25 Mar 2024 20:15) (91% - 100%)    Parameters below as of 25 Mar 2024 20:15  Patient On (Oxygen Delivery Method): nasal cannula  O2 Flow (L/min): 5    Daily     Daily Weight in k.2 (25 Mar 2024 06:40)    GEN: NAD, +frail   HEENT: EOMI,  moist mucous membranes  NECK : Soft and supple, no JVD  LUNG: +decreased breath sounds   CVS: S1S2+, RRR, no M/G/R  GI: BS+, soft, NT/ND, no guarding, no rebound  EXTREMITIES: No peripheral edema  VASCULAR: 2+ peripheral pulses  NEURO: AAOx3, grossly non-focal   SKIN: No rashes    HOME MEDICATIONS:  Home Medications:  aspirin 81 mg oral delayed release tablet: 1 tab(s) orally once a day (24 Mar 2024 15:36)  atorvastatin 10 mg oral tablet: 1 tab(s) orally once a day (at bedtime) (24 Mar 2024 15:36)  Calcium 500+D oral tablet, chewable: 1 tab(s) orally 2 times a day (24 Mar 2024 15:36)  citalopram 10 mg oral tablet: 1 tab(s) orally once a day (24 Mar 2024 15:36)  digoxin 125 mcg (0.125 mg) oral tablet: 0.5 tab(s) orally once a day (24 Mar 2024 15:36)  Entresto 24 mg-26 mg oral tablet: 1 tab(s) orally 2 times a day (24 Mar 2024 15:36)  furosemide 20 mg oral tablet: 1 tab(s) orally once a day ***pt doesn&#x27;t take consistently*** (24 Mar 2024 15:36)  levothyroxine 100 mcg (0.1 mg) oral tablet: 1 tab(s) orally once a day ***take with 25mcg = 125cg*** (24 Mar 2024 15:36)  levothyroxine 25 mcg (0.025 mg) oral tablet: 1 tab(s) orally once a day ***take with 100mcg = 125mcg*** (24 Mar 2024 15:36)      MEDICATIONS  MEDICATIONS  (STANDING):  apixaban 2.5 milliGRAM(s) Oral every 12 hours  aspirin enteric coated 81 milliGRAM(s) Oral daily  atorvastatin 10 milliGRAM(s) Oral at bedtime  calcium carbonate 1250 mG  + Vitamin D (OsCal 500 + D) 1 Tablet(s) Oral two times a day  citalopram 10 milliGRAM(s) Oral daily  digoxin     Tablet 62.5 MICROGram(s) Oral daily  furosemide   Injectable 20 milliGRAM(s) IV Push two times a day  levothyroxine 100 MICROGram(s) Oral daily  levothyroxine 25 MICROGram(s) Oral daily  mirtazapine 7.5 milliGRAM(s) Oral at bedtime  sacubitril 24 mG/valsartan 26 mG 1 Tablet(s) Oral two times a day      LABS: All Labs Reviewed:                        9.5    4.91  )-----------( 210      ( 25 Mar 2024 07:09 )             29.5     03-25    138  |  101  |  28<H>  ----------------------------<  92  4.0   |  33<H>  |  0.98    Ca    9.2      25 Mar 2024 07:09  Mg     2.1     03-25    TPro  7.9  /  Alb  3.0<L>  /  TBili  0.5  /  DBili  x   /  AST  26  /  ALT  17  /  AlkPhos  92  03-24    PT/INR - ( 24 Mar 2024 13:46 )   PT: 17.3 sec;   INR: 1.55 ratio         PTT - ( 24 Mar 2024 13:46 )  PTT:45.7 sec    Urinalysis Basic - ( 25 Mar 2024 07:09 )    Color: x / Appearance: x / SG: x / pH: x  Gluc: 92 mg/dL / Ketone: x  / Bili: x / Urobili: x   Blood: x / Protein: x / Nitrite: x   Leuk Esterase: x / RBC: x / WBC x   Sq Epi: x / Non Sq Epi: x / Bacteria: x        Blood Culture:   I&O's Detail    CAPILLARY BLOOD GLUCOSE            CARDIOLOGY TESTING   ECHO: pending       RADIOLOGY: reviewed

## 2024-03-25 NOTE — PHYSICAL THERAPY INITIAL EVALUATION ADULT - ADDITIONAL COMMENTS
Patient resides at home with 24/7 live in aide. Primarily w/c bound for mobility but ambulates with home health PT short distances. Stair lift to navigate between floors at home.

## 2024-03-25 NOTE — DIETITIAN INITIAL EVALUATION ADULT - PERTINENT MEDS FT
MEDICATIONS  (STANDING):  apixaban 2.5 milliGRAM(s) Oral every 12 hours  aspirin enteric coated 81 milliGRAM(s) Oral daily  atorvastatin 10 milliGRAM(s) Oral at bedtime  calcium carbonate 1250 mG  + Vitamin D (OsCal 500 + D) 1 Tablet(s) Oral two times a day  citalopram 10 milliGRAM(s) Oral daily  digoxin     Tablet 62.5 MICROGram(s) Oral daily  furosemide   Injectable 20 milliGRAM(s) IV Push two times a day  levothyroxine 100 MICROGram(s) Oral daily  levothyroxine 25 MICROGram(s) Oral daily  mirtazapine 7.5 milliGRAM(s) Oral at bedtime  sacubitril 24 mG/valsartan 26 mG 1 Tablet(s) Oral two times a day    MEDICATIONS  (PRN):  acetaminophen     Tablet .. 650 milliGRAM(s) Oral every 6 hours PRN Mild Pain (1 - 3)  aluminum hydroxide/magnesium hydroxide/simethicone Suspension 30 milliLiter(s) Oral every 4 hours PRN Dyspepsia  melatonin 3 milliGRAM(s) Oral at bedtime PRN Insomnia  ondansetron Injectable 4 milliGRAM(s) IV Push every 8 hours PRN Nausea and/or Vomiting  temazepam 7.5 milliGRAM(s) Oral at bedtime PRN Insomnia

## 2024-03-25 NOTE — DISCHARGE NOTE NURSING/CASE MANAGEMENT/SOCIAL WORK - PATIENT PORTAL LINK FT
You can access the FollowMyHealth Patient Portal offered by [facility] by registering at the following website: [email or web address]. By joining [facility]’s FollowMyHealth portal, you will also be able to view your health information using other applications (apps) compatible with our system.

## 2024-03-25 NOTE — DIETITIAN INITIAL EVALUATION ADULT - ADD RECOMMEND
Liberalize diet to Regular to optimize po/nutrient intake.   MVI w/ minerals daily to ensure 100% RDA met   Record PO intake in EMR after each meal (nursing.)   suggest Confirm Goals of Care regarding nutrition support as pt is not meeting ENN  Consider adding appetite stimulant such as Remeron or Marinol 2/2 chronically poor appetite/ PO intake   Consider adding thiamine 100 mg daily 2/2 poor PO intake/ malnutrition   Monitor bowel movements, if no BM for >3 days, consider implementing bowel regimen.   Monitor PO intake, tolerance, labs and weight.

## 2024-03-26 LAB
ANION GAP SERPL CALC-SCNC: 2 MMOL/L — LOW (ref 5–17)
BUN SERPL-MCNC: 34 MG/DL — HIGH (ref 7–23)
CALCIUM SERPL-MCNC: 9.1 MG/DL — SIGNIFICANT CHANGE UP (ref 8.5–10.1)
CHLORIDE SERPL-SCNC: 99 MMOL/L — SIGNIFICANT CHANGE UP (ref 96–108)
CO2 SERPL-SCNC: 36 MMOL/L — HIGH (ref 22–31)
CREAT SERPL-MCNC: 1.06 MG/DL — SIGNIFICANT CHANGE UP (ref 0.5–1.3)
EGFR: 50 ML/MIN/1.73M2 — LOW
GLUCOSE SERPL-MCNC: 85 MG/DL — SIGNIFICANT CHANGE UP (ref 70–99)
HCT VFR BLD CALC: 28.4 % — LOW (ref 34.5–45)
HGB BLD-MCNC: 9.2 G/DL — LOW (ref 11.5–15.5)
MCHC RBC-ENTMCNC: 32.4 GM/DL — SIGNIFICANT CHANGE UP (ref 32–36)
MCHC RBC-ENTMCNC: 33 PG — SIGNIFICANT CHANGE UP (ref 27–34)
MCV RBC AUTO: 101.8 FL — HIGH (ref 80–100)
PLATELET # BLD AUTO: 203 K/UL — SIGNIFICANT CHANGE UP (ref 150–400)
POTASSIUM SERPL-MCNC: 4.1 MMOL/L — SIGNIFICANT CHANGE UP (ref 3.5–5.3)
POTASSIUM SERPL-SCNC: 4.1 MMOL/L — SIGNIFICANT CHANGE UP (ref 3.5–5.3)
RBC # BLD: 2.79 M/UL — LOW (ref 3.8–5.2)
RBC # FLD: 13.9 % — SIGNIFICANT CHANGE UP (ref 10.3–14.5)
SODIUM SERPL-SCNC: 137 MMOL/L — SIGNIFICANT CHANGE UP (ref 135–145)
WBC # BLD: 4.42 K/UL — SIGNIFICANT CHANGE UP (ref 3.8–10.5)
WBC # FLD AUTO: 4.42 K/UL — SIGNIFICANT CHANGE UP (ref 3.8–10.5)

## 2024-03-26 PROCEDURE — 99233 SBSQ HOSP IP/OBS HIGH 50: CPT

## 2024-03-26 PROCEDURE — 99222 1ST HOSP IP/OBS MODERATE 55: CPT

## 2024-03-26 RX ADMIN — ATORVASTATIN CALCIUM 10 MILLIGRAM(S): 80 TABLET, FILM COATED ORAL at 21:16

## 2024-03-26 RX ADMIN — Medication 62.5 MICROGRAM(S): at 11:03

## 2024-03-26 RX ADMIN — Medication 20 MILLIGRAM(S): at 14:30

## 2024-03-26 RX ADMIN — Medication 1 TABLET(S): at 21:16

## 2024-03-26 RX ADMIN — Medication 81 MILLIGRAM(S): at 11:04

## 2024-03-26 RX ADMIN — SACUBITRIL AND VALSARTAN 1 TABLET(S): 24; 26 TABLET, FILM COATED ORAL at 21:16

## 2024-03-26 RX ADMIN — Medication 1 TABLET(S): at 11:03

## 2024-03-26 RX ADMIN — CITALOPRAM 10 MILLIGRAM(S): 10 TABLET, FILM COATED ORAL at 11:03

## 2024-03-26 RX ADMIN — APIXABAN 2.5 MILLIGRAM(S): 2.5 TABLET, FILM COATED ORAL at 11:04

## 2024-03-26 RX ADMIN — Medication 25 MICROGRAM(S): at 06:20

## 2024-03-26 RX ADMIN — Medication 3 MILLIGRAM(S): at 21:16

## 2024-03-26 RX ADMIN — Medication 100 MICROGRAM(S): at 06:20

## 2024-03-26 RX ADMIN — APIXABAN 2.5 MILLIGRAM(S): 2.5 TABLET, FILM COATED ORAL at 21:16

## 2024-03-26 RX ADMIN — MIRTAZAPINE 7.5 MILLIGRAM(S): 45 TABLET, ORALLY DISINTEGRATING ORAL at 21:16

## 2024-03-26 NOTE — PROGRESS NOTE ADULT - ASSESSMENT
89 year old female w celiac disease, hx lung mass (not being pursued/treated), AF on eliquis, HFrEF, hypothyroid, MV clipping here c/o SOB and leg swelling. Several days of increased lower extremity swelling, orthopnea, dyspnea.     #Acute respiratory distress  #Acute on chronic HFrEF w/ ICD.   #Loculated effusions  #Lung Mass - treatment not being pursued  #Hx of MV clipping  #Afib on Eliquis - On digoxin. Dig level WNL.  #CAD    - Monitor on tele   - Daily weights, I&Os, now weaned to room air  - IV Lasix - gentle diuresis - would switch to PO tomorrow. Labs stable.  - Can consider CT Chest - loculated effusions, clinically improving.  - Continue Entresto, aspirin, statin, digoxin   - Echocardiogram reviewed.  - Interrogated ICD for events > 1 run of NSVT  - Recommend palliative evaluation for GOC    Case d/w Dr. Marrero  Will follow

## 2024-03-26 NOTE — PROVIDER CONTACT NOTE (CHANGE IN STATUS NOTIFICATION) - BACKGROUND
Patient hx of COPD, CHF, ILD, recently elevated CR 3.2 2 weeks ago her MD removed her entresto from her medication regimen.

## 2024-03-26 NOTE — PROVIDER CONTACT NOTE (CHANGE IN STATUS NOTIFICATION) - SITUATION
pt medicated in ED with 40mg IVP lasix , 15mg cardizem IVP x2. Cardizem PO 120mg. Metoprolol 100mg PO for Afib with a HR in 120s.

## 2024-03-26 NOTE — CONSULT NOTE ADULT - ASSESSMENT
Pt is a 89y old Female with hx of 89 year old female w celiac disease, hx lung mass (not being pursued/treated), AF on Eliquis HFrEF, hypothyroid, MV clipping here c/o SOB and leg swelling. Palliative medicine consulted to help establish GOC and advance care planning     1) Acute on chronic respiratory failure   - History of heart failure   - c/w Lasix   - cardiology consult appreciated     Process of Care  --Reviewed dx/treatment problems and alignment with Goals of Care    Physical Aspects of Care  --Pain  patient denies at this time  c/w current managment    --Bowel Regimen  denies constipation  risk for constipation d/t immobility  daily dulcolax    --Dyspnea  - c/w supplemental O2 PRN     --Nausea Vomiting  denies    --Weakness  PT as tolerated     Psychological and Psychiatric Aspects of Care:   --Greif/Bereavment: emotional support provided  -Pastoral Care Available PRN     Social Aspects of Care  -SW involved     Cultural Aspects  -Primary Language: English    Goals of Care:     We discussed Palliative Care team being a supportive team when a patient has ongoing illnesses.  We also discussed that it is not an end of life care service, but can help navigate symptoms and emotional support througout their hospital stay here.    Ethical and Legal Aspects:   NA    Capacity- pt is forgetful - states to speak with her son Dr. Judson Miranda     HCP/Surrogate: HCP on file naming Dr. Judson Miranda     Code Status- full code - previous DNR/I on one content  - left message for son to confirm if this is still patients wishes   MOLST-  Dispo Plan- ongoing discussions     Discussed With: Dr. Jacek Johnson coordinated with attending and SW and RN     Time Spent: 90 minutes including the care, coordination and counseling of this patient, 50% of which was spent coordinating and counseling.   
89 year old female w celiac disease, hx lung mass (not being pursued/treated), AF on eliquis, HFrEF, hypothyroid, MV clipping here c/o SOB and leg swelling. Several days of increased lower extremity swelling, orthopnea, dyspnea.     #Acute respiratory distress  #Acute on chronic HFrEF w/ ICD.   #Loculated effusions  #Lung Mass - treatment not being pursued  #Hx of MV clipping  #Afib on Eliquis - On digoxin. Dig level WNL.  #CAD    - Monitor on tele, interrogate device  - Daily weights, I&Os, now weaned to room air  - IV Lasix - gentle diuresis - would switch to PO in the next 24 to 48 hours  - Can consider CT Chest - loculated effusions  - Continue Entresto, aspirin, statin   - Check echocardiogram   - Interrogate ICD for events  - Recommend palliative evaluation for GOC    Case d/w Dr. Perez  Will follow

## 2024-03-26 NOTE — PROGRESS NOTE ADULT - ASSESSMENT
89 year old female w celiac disease, hx lung mass (not being pursued/treated), AF on eliquis, HFrEF, hypothyroid, MV clipping here c/o SOB and leg swelling    #Acute on chronic hypoxic respiratory failure in the setting of HFrEF  -CXR: as above   -BNP: 3k  -ECHO: pending   -IV Lasix 20mg BID   -Entresto  -Strict I&O's  -Daily weights  -Fluid restriction  -Low salt, low cholesterol diet  -Cardiology consult and reccs noted     #Chronic A-fib s/p PPM placement   -Eliquis   -Digoxin     #CAD   -ASA/statin     #Hypothyroid  -Synthroid       #Depression/anxiety  -C/W citalopram  -C/W Remeron    #Hypothyroid,   continue home levothyroxine      #Advanced Care Directives   OFE DNR/trial NIV     discharge plan       updated son     unable to print prior OFE

## 2024-03-26 NOTE — CONSULT NOTE ADULT - SUBJECTIVE AND OBJECTIVE BOX
HPI: Pt is a 89y old Female with hx of 89 year old female w celiac disease, hx lung mass (not being pursued/treated), AF on eliquis, HFrEF, hypothyroid, MV clipping here c/o SOB and leg swelling. Palliative medicine consulted to help establish GOC and advance care planning     3/26/2024 pt seen and examined with no family at bedside, patient states she is hoping to be d/tono states to speak with her son Gary about medical decisions' as she endorses feeling forgetful.     PAIN: ( )Yes   (x )No  pt denies   DYSPNEA: (x ) Yes  ( ) No  Level: at times better now     PAST MEDICAL & SURGICAL HISTORY:  Chronic atrial fibrillation  Hypothyroid  HTN (hypertension)  History of cataract  Hyperlipidemia, unspecified hyperlipidemia type  History of CHF (congestive heart failure)  Peripheral edema  History of colon polyp  Varicose veins BLE  Arthritis  Celiac disease  Hashimoto's thyroiditis  Osteoporosis  Iron deficiency anemia due to chronic blood loss  AICD (automatic cardioverter/defibrillator) present  x 3 . Replaced x 2. Presently right subclavian area  Myocardial infarction   Mitral valve prolapse  Left inguinal hernia  Hearing loss  AICD (automatic cardioverter/defibrillator) present with PPM. Replaced x 2.  H/O right inguinal hernia repair  H/O colonoscopy last done   History of cataract extraction Bilateral  Artificial pacemaker  History of heart surgery Mitral valve surgery for leaky valve 2020    SOCIAL HX: lives at home     Hx opiate tolerance ( )YES  (x )NO    Baseline ADLs  (Prior to Admission)  (x ) Independent   ( )Dependent  with assistance     FAMILY HISTORY:  Family history of diabetes mellitus (Mother, Sibling)  Family history of heart disease (Mother)  Family history of dementia (Father)    Review of Systems:    Physical Discomfort-mild  Dyspnea-on exertion  Anorexia-mod-severe  Weight Loss- yes  Cough-mod  Fatigue-mod  Weakness-severe    All other systems reviewed and negative      PHYSICAL EXAM:    Vital Signs Last 24 Hrs  T(C): 36.5 (26 Mar 2024 08:13), Max: 36.8 (25 Mar 2024 20:15)  T(F): 97.7 (26 Mar 2024 08:13), Max: 98.2 (25 Mar 2024 20:15)  HR: 77 (26 Mar 2024 08:13) (76 - 79)  BP: 109/62 (26 Mar 2024 08:13) (100/41 - 112/86)  BP(mean): 91 (25 Mar 2024 16:08) (91 - 91)  RR: 18 (26 Mar 2024 08:13) (18 - 18)  SpO2: 100% (26 Mar 2024 08:13) (95% - 100%)    Parameters below as of 26 Mar 2024 08:13  Patient On (Oxygen Delivery Method): nasal cannula  O2 Flow (L/min): 5    Daily Weight in k.1 (26 Mar 2024 06:53)    PPSV2: 20-30 %    General:  Elderly female in bed in NAD  Mental Status: A&O X3/poor historian  HEENT: MMM  Lungs: clear to auscultation ernie  Cardiac: S1S2+  GI: abd soft NT ND + BS  : voids  Ext: GARCIA=strength  Neuro: no focal def      LABS:                        9.2    4.42  )-----------( 203      ( 26 Mar 2024 07:26 )             28.4     03-26    137  |  99  |  34<H>  ----------------------------<  85  4.1   |  36<H>  |  1.06    Ca    9.1      26 Mar 2024 07:26  Mg     2.1     03-25    TPro  7.9  /  Alb  3.0<L>  /  TBili  0.5  /  DBili  x   /  AST  26  /  ALT  17  /  AlkPhos  92  03-24    PT/INR - ( 24 Mar 2024 13:46 )   PT: 17.3 sec;   INR: 1.55 ratio    PTT - ( 24 Mar 2024 13:46 )  PTT:45.7 sec    Albumin: Albumin: 3.0 g/dL ( @ 13:46)    Allergies    lidocaine (Rash; Angioedema)  adhesives (Rash)  Ancef (Unknown)    Intolerances    Gluten (Diarrhea)    MEDICATIONS  (STANDING):  apixaban 2.5 milliGRAM(s) Oral every 12 hours  aspirin enteric coated 81 milliGRAM(s) Oral daily  atorvastatin 10 milliGRAM(s) Oral at bedtime  calcium carbonate 1250 mG  + Vitamin D (OsCal 500 + D) 1 Tablet(s) Oral two times a day  citalopram 10 milliGRAM(s) Oral daily  digoxin     Tablet 62.5 MICROGram(s) Oral daily  furosemide   Injectable 20 milliGRAM(s) IV Push two times a day  levothyroxine 100 MICROGram(s) Oral daily  levothyroxine 25 MICROGram(s) Oral daily  mirtazapine 7.5 milliGRAM(s) Oral at bedtime  sacubitril 24 mG/valsartan 26 mG 1 Tablet(s) Oral two times a day    MEDICATIONS  (PRN):  acetaminophen     Tablet .. 650 milliGRAM(s) Oral every 6 hours PRN Mild Pain (1 - 3)  aluminum hydroxide/magnesium hydroxide/simethicone Suspension 30 milliLiter(s) Oral every 4 hours PRN Dyspepsia  melatonin 3 milliGRAM(s) Oral at bedtime PRN Insomnia  ondansetron Injectable 4 milliGRAM(s) IV Push every 8 hours PRN Nausea and/or Vomiting  temazepam 7.5 milliGRAM(s) Oral at bedtime PRN Insomnia      RADIOLOGY/ADDITIONAL STUDIES:    ACC: [id number] EXAM:  XR CHEST PORTABLE URGENT 1V   ORDERED BY: RADHA ALICEA   PROCEDURE DATE:  2024    INTERPRETATION:  AP chest on 2024 at 2:07 PM. Patient is short   of breath.  Heart enlargement, mitral clips, and right-sided defibrillator again   noted. There appear to be abandoned right-sided defibrillator wires.  There is a persistent moderate loculated right base effusion and much   smaller left effusion. Left effusion is new since 2023.   Otherwise stable findings.    IMPRESSION: Stable moderate loculated right effusion and cardiac   findings. There is a new small left base effusion.  Again noted are heart enlargement, defibrillator, and abandoned right   defibrillator wires.    
CHIEF COMPLAINT: Patient is a 89y old  Female who presents with a chief complaint of Heart failure    FROM H&P: 89 year old female w celiac disease, hx lung mass (not being pursued/treated), AF on eliquis, HFrEF, hypothyroid, MV clipping here c/o SOB and leg swelling  Several days of increased lower extremity swelling, orthopnea, dyspnea    Usually on Lasix 20mg qD, increased to 40mg two days ago, and 60mg yesterday with no improvement  increased fatigue on ambulation   No chest pain    No fever/chills  Does not weigh herself daily and does not take diuretic daily  In ED /54   HR 86   RR 18    T 98.3   96% sat RA lasix 40  mg IV    3.25 Cardiology consulted for CHF  Patient seen and examined. +sob, fatigue - not much improvement    PAST MEDICAL HISTORY:  AICD (automatic cardioverter/defibrillator) present x 3 . Replaced x 2. Presently right subclavian area  Arthritis   Celiac disease   Chronic atrial fibrillation   Hashimoto's thyroiditis   Hearing loss   History of cataract   History of CHF (congestive heart failure)   History of colon polyps   HTN (hypertension)   Hyperlipidemia, unspecified hyperlipidemia type   Hypothyroid   Iron deficiency anemia due to chronic blood loss   Left inguinal hernia   Mitral valve prolapse   Myocardial infarction 1990  Osteoporosis   Peripheral edema   Varicose veins BLE    PAST SURGICAL HISTORY:  AICD (automatic cardioverter/defibrillator) present with PPM. Replaced x 2.  Artificial pacemaker   H/O colonoscopy last done 2009  H/O right inguinal hernia repair   History of cataract extraction Bilateral  History of heart surgery Mitral valve surgery for leaky valve 9/2020.     FAMILY HISTORY:  Father: Family history of dementia, Age at diagnosis: Age Unknown  Mother: Family history of diabetes mellitus, Age at diagnosis: Age Unknown. Family history of heart disease, Age at diagnosis: Age Unknown  Sibling: Family history of diabetes mellitus, Age at diagnosis: Age Unknown.    SOCIAL HISTORY:  pt lives at home w an aide  ambulates w walker at baseline  has PT come to her home    MEDICATIONS  (STANDING):  apixaban 2.5 milliGRAM(s) Oral every 12 hours  aspirin enteric coated 81 milliGRAM(s) Oral daily  atorvastatin 10 milliGRAM(s) Oral at bedtime  calcium carbonate 1250 mG  + Vitamin D (OsCal 500 + D) 1 Tablet(s) Oral two times a day  citalopram 10 milliGRAM(s) Oral daily  digoxin     Tablet 62.5 MICROGram(s) Oral daily  furosemide   Injectable 20 milliGRAM(s) IV Push two times a day  levothyroxine 100 MICROGram(s) Oral daily  levothyroxine 25 MICROGram(s) Oral daily  mirtazapine 7.5 milliGRAM(s) Oral at bedtime  sacubitril 24 mG/valsartan 26 mG 1 Tablet(s) Oral two times a day    MEDICATIONS  (PRN):  acetaminophen     Tablet .. 650 milliGRAM(s) Oral every 6 hours PRN Mild Pain (1 - 3)  aluminum hydroxide/magnesium hydroxide/simethicone Suspension 30 milliLiter(s) Oral every 4 hours PRN Dyspepsia  melatonin 3 milliGRAM(s) Oral at bedtime PRN Insomnia  ondansetron Injectable 4 milliGRAM(s) IV Push every 8 hours PRN Nausea and/or Vomiting  temazepam 7.5 milliGRAM(s) Oral at bedtime PRN Insomnia    HOME MEDICATIONS:  aspirin 81 mg oral delayed release tablet: 1 tab(s) orally once a day (24 Mar 2024 15:36)  atorvastatin 10 mg oral tablet: 1 tab(s) orally once a day (at bedtime) (24 Mar 2024 15:36)  Calcium 500+D oral tablet, chewable: 1 tab(s) orally 2 times a day (24 Mar 2024 15:36)  citalopram 10 mg oral tablet: 1 tab(s) orally once a day (24 Mar 2024 15:36)  digoxin 125 mcg (0.125 mg) oral tablet: 0.5 tab(s) orally once a day (24 Mar 2024 15:36)  Entresto 24 mg-26 mg oral tablet: 1 tab(s) orally 2 times a day (24 Mar 2024 15:36)  furosemide 20 mg oral tablet: 1 tab(s) orally once a day ***pt doesn&#x27;t take consistently*** (24 Mar 2024 15:36)  levothyroxine 100 mcg (0.1 mg) oral tablet: 1 tab(s) orally once a day ***take with 25mcg = 125cg*** (24 Mar 2024 15:36)  levothyroxine 25 mcg (0.025 mg) oral tablet: 1 tab(s) orally once a day ***take with 100mcg = 125mcg*** (24 Mar 2024 15:36)    PHYSICAL EXAM:  Vital Signs Last 24 Hrs  T(C): 36.9 (25 Mar 2024 08:00), Max: 36.9 (24 Mar 2024 20:58)  T(F): 98.5 (25 Mar 2024 08:00), Max: 98.5 (24 Mar 2024 20:58)  HR: 77 (25 Mar 2024 08:00) (67 - 91)  BP: 106/52 (25 Mar 2024 08:00) (103/62 - 122/70)  BP(mean): 63 (25 Mar 2024 08:00) (63 - 82)  RR: 18 (25 Mar 2024 08:00) (16 - 18)  SpO2: 94% (25 Mar 2024 08:00) (91% - 99%)    Parameters below as of 25 Mar 2024 08:00  Patient On (Oxygen Delivery Method): room air    Constitutional: NAD, awake and alert  HEENT: PERR, EOMI, Normal Hearing, MMM  Neck: Soft and supple, No LAD, No JVD  Respiratory: Breath sounds diminished  Cardiovascular: S1 and S2, regular rate and rhythm, no Murmurs, gallops or rubs  Gastrointestinal: Bowel Sounds present, soft, nontender, nondistended, no guarding, no rebound  Extremities: + peripheral edema  Vascular: 2+ peripheral pulses  Neurological: A/O x 3, no focal deficits  Musculoskeletal: 5/5 strength b/l upper and lower extremities  Skin: No rashes    =======================================    INTERPRETATION OF TELEMETRY: 5 beats NSVT, ectopy, Paced    ECG: < from: 12 Lead ECG (03.24.24 @ 13:09) >  Diagnosis Line Poor data quality  Ventricular-paced rhythm with premature ventricular or aberrantly conducted complexes  Biventricular pacemaker detected  Abnormal ECG    ========================================    LABS:                        9.5    4.91  )-----------( 210      ( 25 Mar 2024 07:09 )             29.5     03-25    138  |  101  |  28<H>  ----------------------------<  92  4.0   |  33<H>  |  0.98    Ca    9.2      25 Mar 2024 07:09  Mg     2.1     03-25    TPro  7.9  /  Alb  3.0<L>  /  TBili  0.5  /  DBili  x   /  AST  26  /  ALT  17  /  AlkPhos  92  03-24    PT/INR - ( 24 Mar 2024 13:46 )   PT: 17.3 sec;   INR: 1.55 ratio      PTT - ( 24 Mar 2024 13:46 )  PTT:45.7 sec    BNP 3020    TroponinI hsT: <-25.10    RADIOLOGY & ADDITIONAL STUDIES:    < from: Xray Chest 1 View- PORTABLE-Urgent (Xray Chest 1 View- PORTABLE-Urgent .) (03.24.24 @ 14:15) >  table moderate loculated right effusion and cardiac   findings. There is a new small left base effusion.    Again noted are heart enlargement, defibrillator, and abandoned right   defibrillator wires.

## 2024-03-26 NOTE — PROGRESS NOTE ADULT - SUBJECTIVE AND OBJECTIVE BOX
CHIEF COMPLAINT: Patient is a 89y old  Female who presents with a chief complaint of Heart failure    FROM H&P: 89 year old female w celiac disease, hx lung mass (not being pursued/treated), AF on eliquis, HFrEF, hypothyroid, MV clipping here c/o SOB and leg swelling  Several days of increased lower extremity swelling, orthopnea, dyspnea    Usually on Lasix 20mg qD, increased to 40mg two days ago, and 60mg yesterday with no improvement  increased fatigue on ambulation   No chest pain    No fever/chills  Does not weigh herself daily and does not take diuretic daily  In ED /54   HR 86   RR 18    T 98.3   96% sat RA lasix 40  mg IV    3.25 Cardiology consulted for CHF  Patient seen and examined. +sob, fatigue - not much improvement    3/26.     PAST MEDICAL HISTORY:  AICD (automatic cardioverter/defibrillator) present x 3 . Replaced x 2. Presently right subclavian area  Arthritis   Celiac disease   Chronic atrial fibrillation   Hashimoto's thyroiditis   Hearing loss   History of cataract   History of CHF (congestive heart failure)   History of colon polyps   HTN (hypertension)   Hyperlipidemia, unspecified hyperlipidemia type   Hypothyroid   Iron deficiency anemia due to chronic blood loss   Left inguinal hernia   Mitral valve prolapse   Myocardial infarction 1990  Osteoporosis   Peripheral edema   Varicose veins BLE    PAST SURGICAL HISTORY:  AICD (automatic cardioverter/defibrillator) present with PPM. Replaced x 2.  Artificial pacemaker   H/O colonoscopy last done 2009  H/O right inguinal hernia repair   History of cataract extraction Bilateral  History of heart surgery Mitral valve surgery for leaky valve 9/2020.     FAMILY HISTORY:  Father: Family history of dementia, Age at diagnosis: Age Unknown  Mother: Family history of diabetes mellitus, Age at diagnosis: Age Unknown. Family history of heart disease, Age at diagnosis: Age Unknown  Sibling: Family history of diabetes mellitus, Age at diagnosis: Age Unknown.    SOCIAL HISTORY:  pt lives at home w an aide  ambulates w walker at baseline  has PT come to her home    MEDICATIONS  (STANDING):  apixaban 2.5 milliGRAM(s) Oral every 12 hours  aspirin enteric coated 81 milliGRAM(s) Oral daily  atorvastatin 10 milliGRAM(s) Oral at bedtime  calcium carbonate 1250 mG  + Vitamin D (OsCal 500 + D) 1 Tablet(s) Oral two times a day  citalopram 10 milliGRAM(s) Oral daily  digoxin     Tablet 62.5 MICROGram(s) Oral daily  furosemide   Injectable 20 milliGRAM(s) IV Push two times a day  levothyroxine 100 MICROGram(s) Oral daily  levothyroxine 25 MICROGram(s) Oral daily  mirtazapine 7.5 milliGRAM(s) Oral at bedtime  sacubitril 24 mG/valsartan 26 mG 1 Tablet(s) Oral two times a day    MEDICATIONS  (PRN):  acetaminophen     Tablet .. 650 milliGRAM(s) Oral every 6 hours PRN Mild Pain (1 - 3)  aluminum hydroxide/magnesium hydroxide/simethicone Suspension 30 milliLiter(s) Oral every 4 hours PRN Dyspepsia  melatonin 3 milliGRAM(s) Oral at bedtime PRN Insomnia  ondansetron Injectable 4 milliGRAM(s) IV Push every 8 hours PRN Nausea and/or Vomiting  temazepam 7.5 milliGRAM(s) Oral at bedtime PRN Insomnia    HOME MEDICATIONS:  aspirin 81 mg oral delayed release tablet: 1 tab(s) orally once a day (24 Mar 2024 15:36)  atorvastatin 10 mg oral tablet: 1 tab(s) orally once a day (at bedtime) (24 Mar 2024 15:36)  Calcium 500+D oral tablet, chewable: 1 tab(s) orally 2 times a day (24 Mar 2024 15:36)  citalopram 10 mg oral tablet: 1 tab(s) orally once a day (24 Mar 2024 15:36)  digoxin 125 mcg (0.125 mg) oral tablet: 0.5 tab(s) orally once a day (24 Mar 2024 15:36)  Entresto 24 mg-26 mg oral tablet: 1 tab(s) orally 2 times a day (24 Mar 2024 15:36)  furosemide 20 mg oral tablet: 1 tab(s) orally once a day ***pt doesn&#x27;t take consistently*** (24 Mar 2024 15:36)  levothyroxine 100 mcg (0.1 mg) oral tablet: 1 tab(s) orally once a day ***take with 25mcg = 125cg*** (24 Mar 2024 15:36)  levothyroxine 25 mcg (0.025 mg) oral tablet: 1 tab(s) orally once a day ***take with 100mcg = 125mcg*** (24 Mar 2024 15:36)    PHYSICAL EXAM:  Vital Signs Last 24 Hrs  T(C): 36.9 (25 Mar 2024 08:00), Max: 36.9 (24 Mar 2024 20:58)  T(F): 98.5 (25 Mar 2024 08:00), Max: 98.5 (24 Mar 2024 20:58)  HR: 77 (25 Mar 2024 08:00) (67 - 91)  BP: 106/52 (25 Mar 2024 08:00) (103/62 - 122/70)  BP(mean): 63 (25 Mar 2024 08:00) (63 - 82)  RR: 18 (25 Mar 2024 08:00) (16 - 18)  SpO2: 94% (25 Mar 2024 08:00) (91% - 99%)    Parameters below as of 25 Mar 2024 08:00  Patient On (Oxygen Delivery Method): room air    Constitutional: NAD, awake and alert  HEENT: PERR, EOMI, Normal Hearing, MMM  Neck: Soft and supple, No LAD, No JVD  Respiratory: Breath sounds diminished  Cardiovascular: S1 and S2, regular rate and rhythm, no Murmurs, gallops or rubs  Gastrointestinal: Bowel Sounds present, soft, nontender, nondistended, no guarding, no rebound  Extremities: + peripheral edema  Vascular: 2+ peripheral pulses  Neurological: A/O x 3, no focal deficits  Musculoskeletal: 5/5 strength b/l upper and lower extremities  Skin: No rashes    =======================================    INTERPRETATION OF TELEMETRY: 5 beats NSVT, ectopy, Paced    ECG: < from: 12 Lead ECG (03.24.24 @ 13:09) >  Diagnosis Line Poor data quality  Ventricular-paced rhythm with premature ventricular or aberrantly conducted complexes  Biventricular pacemaker detected  Abnormal ECG    ========================================    LABS:                        9.5    4.91  )-----------( 210      ( 25 Mar 2024 07:09 )             29.5     03-25    138  |  101  |  28<H>  ----------------------------<  92  4.0   |  33<H>  |  0.98    Ca    9.2      25 Mar 2024 07:09  Mg     2.1     03-25    TPro  7.9  /  Alb  3.0<L>  /  TBili  0.5  /  DBili  x   /  AST  26  /  ALT  17  /  AlkPhos  92  03-24    PT/INR - ( 24 Mar 2024 13:46 )   PT: 17.3 sec;   INR: 1.55 ratio      PTT - ( 24 Mar 2024 13:46 )  PTT:45.7 sec    BNP 3020    TroponinI hsT: <-25.10    < from: TTE Echo Complete w/o Contrast w/ Doppler (03.25.24 @ 15:54) >   Estimated left ventricular ejection fraction is 35-40 %.   Overall LV function appears globally reduced.   Moderate to Severe, diffuse hypokinesis of the left ventricle is present.   The left ventricle is normal in size and wall thickness.   The left atrium is severely dilated.   There is evidence of an atrial septal defect. This indicated a left to   right shunt atthe level of the interatrial septum.   The right atrium appears moderately dilated.   Normal appearing right ventricle structure and function.   A device wire is seen in the RV and RA.   The aortic valve is well visualized, appears mildly to moderately   calcified. Valve opening seems to be restricted.   Mild aortic stenosis is present.   Calculated RANJAN is 1.5 cm2.   Peak and mean transaortic gradients are 16 and 8 mmHg respectively.   Moderate (2+) aortic regurgitation is present.   Mitral valveclip is present and intact.   Moderate (2+) mitral regurgitation is present.   Normal appearing tricuspid valve structure.   Moderate (2+) tricuspid valve regurgitation is present.   Moderate pulmonary hypertension.   Normal appearing pulmonic valve structure.   Mild pulmonic valvular regurgitation (1+) is present.   No evidence of pericardial effusion.   Pleural effusion - right pleural effusion.   IVC is dilated and not collapsing with inspiration.        RADIOLOGY & ADDITIONAL STUDIES:    < from: Xray Chest 1 View- PORTABLE-Urgent (Xray Chest 1 View- PORTABLE-Urgent .) (03.24.24 @ 14:15) >  table moderate loculated right effusion and cardiac   findings. There is a new small left base effusion.    Again noted are heart enlargement, defibrillator, and abandoned right   defibrillator wires.

## 2024-03-26 NOTE — PROGRESS NOTE ADULT - SUBJECTIVE AND OBJECTIVE BOX
HOSPITALIST ATTENDING PROGRESS NOTE     Chart and meds reviewed. Patient seen and examined     CC: SOB    Subjective: Seen and evaluated. SOB is improving after diuretics No other acute complaints    3/26: Pt seen and evaluated SOB is improving. No other events      All other systems and founds to be negative with exception of what has been described above.         PHYSICAL EXAM:  Vital Signs Last 24 Hrs  T(C): 36.7 (26 Mar 2024 20:08), Max: 36.7 (26 Mar 2024 20:08)  T(F): 98.1 (26 Mar 2024 20:08), Max: 98.1 (26 Mar 2024 20:08)  HR: 77 (26 Mar 2024 20:08) (76 - 92)  BP: 100/39 (26 Mar 2024 20:08) (100/39 - 110/63)  RR: 18 (26 Mar 2024 20:08) (17 - 18)  SpO2: 100% (26 Mar 2024 20:08) (100% - 100%)    Parameters below as of 26 Mar 2024 20:08  Patient On (Oxygen Delivery Method): nasal cannula  O2 Flow (L/min): 3    Daily     Daily Weight in k.1 (26 Mar 2024 06:53)      Daily     Daily Weight in k.2 (25 Mar 2024 06:40)    GEN: NAD, +frail   HEENT: EOMI,  moist mucous membranes  NECK : Soft and supple, no JVD  LUNG: +decreased breath sounds   CVS: S1S2+, RRR, no M/G/R  GI: BS+, soft, NT/ND, no guarding, no rebound  EXTREMITIES: No peripheral edema  VASCULAR: 2+ peripheral pulses  NEURO: AAOx3, grossly non-focal   SKIN: No rashes    HOME MEDICATIONS:  Home Medications:  aspirin 81 mg oral delayed release tablet: 1 tab(s) orally once a day (24 Mar 2024 15:36)  atorvastatin 10 mg oral tablet: 1 tab(s) orally once a day (at bedtime) (24 Mar 2024 15:36)  Calcium 500+D oral tablet, chewable: 1 tab(s) orally 2 times a day (24 Mar 2024 15:36)  citalopram 10 mg oral tablet: 1 tab(s) orally once a day (24 Mar 2024 15:36)  digoxin 125 mcg (0.125 mg) oral tablet: 0.5 tab(s) orally once a day (24 Mar 2024 15:36)  Entresto 24 mg-26 mg oral tablet: 1 tab(s) orally 2 times a day (24 Mar 2024 15:36)  furosemide 20 mg oral tablet: 1 tab(s) orally once a day ***pt doesn&#x27;t take consistently*** (24 Mar 2024 15:36)  levothyroxine 100 mcg (0.1 mg) oral tablet: 1 tab(s) orally once a day ***take with 25mcg = 125cg*** (24 Mar 2024 15:36)  levothyroxine 25 mcg (0.025 mg) oral tablet: 1 tab(s) orally once a day ***take with 100mcg = 125mcg*** (24 Mar 2024 15:36)      MEDICATIONS  MEDICATIONS  (STANDING):  apixaban 2.5 milliGRAM(s) Oral every 12 hours  aspirin enteric coated 81 milliGRAM(s) Oral daily  atorvastatin 10 milliGRAM(s) Oral at bedtime  calcium carbonate 1250 mG  + Vitamin D (OsCal 500 + D) 1 Tablet(s) Oral two times a day  citalopram 10 milliGRAM(s) Oral daily  digoxin     Tablet 62.5 MICROGram(s) Oral daily  furosemide   Injectable 20 milliGRAM(s) IV Push two times a day  levothyroxine 100 MICROGram(s) Oral daily  levothyroxine 25 MICROGram(s) Oral daily  mirtazapine 7.5 milliGRAM(s) Oral at bedtime  sacubitril 24 mG/valsartan 26 mG 1 Tablet(s) Oral two times a day    MEDICATIONS  (PRN):  acetaminophen     Tablet .. 650 milliGRAM(s) Oral every 6 hours PRN Mild Pain (1 - 3)  aluminum hydroxide/magnesium hydroxide/simethicone Suspension 30 milliLiter(s) Oral every 4 hours PRN Dyspepsia  melatonin 3 milliGRAM(s) Oral at bedtime PRN Insomnia  ondansetron Injectable 4 milliGRAM(s) IV Push every 8 hours PRN Nausea and/or Vomiting  temazepam 7.5 milliGRAM(s) Oral at bedtime PRN Insomnia      LABS: All Labs Reviewed:                        9.2    4.42  )-----------( 203      ( 26 Mar 2024 07:26 )             28.4   03-26    137  |  99  |  34<H>  ----------------------------<  85  4.1   |  36<H>  |  1.06    Ca    9.1      26 Mar 2024 07:26  Mg     2.1             Color: x / Appearance: x / SG: x / pH: x  Gluc: 92 mg/dL / Ketone: x  / Bili: x / Urobili: x   Blood: x / Protein: x / Nitrite: x   Leuk Esterase: x / RBC: x / WBC x   Sq Epi: x / Non Sq Epi: x / Bacteria: x        Blood Culture:   I&O's Detail    CAPILLARY BLOOD GLUCOSE            CARDIOLOGY TESTING   ECHO: pending       RADIOLOGY: reviewed

## 2024-03-26 NOTE — CONSULT NOTE ADULT - NS ATTEND AMEND GEN_ALL_CORE FT
Patient seen and examined. Agree with plan above.
case discussed w. above np  agree w/ note  pt dnr dni molst on chart  no changes at this time

## 2024-03-26 NOTE — GOALS OF CARE CONVERSATION - ADVANCED CARE PLANNING - CONVERSATION DETAILS
HPI:  89 year old female w celiac disease, hx lung mass (not being pursued/treated), AF on eliquis, HFrEF, hypothyroid, MV clipping here c/o SOB and leg swelling   (24 Mar 2024 18:24)      PERTINENT PMH REVIEWED:  [ x ] YES [ ] NO           Primary Contact:     son, Dr. Gary Miranda, health care proxy, phone #[phone]    HCP [ x ] Surrogate [   ] Guardian [   ]    Mental Status: Pt simple capacity  Concerns of Depression [  ] -none reported  Anxiety [   ] -none reported  Baseline ADLs (prior to admission):  Independent [ ] moderately [ ] fully   Dependent   [ ] moderately [ x ]fully    Family Meeting attendees: GOC discussed    Anticipated Grief: Patient[ x ] Family [ x ]    Caregiver [place] Assessed: Yes [ x ] No [  ]    Anglican: Yazdanism    Spiritual Concerns: Not identified,  available for support    Goals of Care: To be further discussed    Previous Services: 24/7 aide    ADVANCE DIRECTIVES:    -Pt has simple capacity  -HCP names son Gary as primary & Aubrey as alternate  -MOLST DNR DNI trial NIV No Feeding tube No dialysis    Anticipated D/C Plan: to be further determined.                       Summary:  Palliative SW met with daughter Pita and Pt at the bedside to introduce our team and offer support.  Palliative SW role explained.  Emotional support provided.  Pt. has simple capacity. HPI:  89 year old female w celiac disease, hx lung mass (not being pursued/treated), AF on eliquis, HFrEF, hypothyroid, MV clipping here c/o SOB and leg swelling   (24 Mar 2024 18:24)      PERTINENT PMH REVIEWED:  [ x ] YES [ ] NO           Primary Contact:     son, Dr. Gary Miranda, health care proxy, phone #[phone]    HCP [ x ] Surrogate [   ] Guardian [   ]    Mental Status: Pt simple capacity  Concerns of Depression [  ] -none reported  Anxiety [   ] -none reported  Baseline ADLs (prior to admission):  Independent [ ] moderately [ ] fully   Dependent   [ ] moderately [ x ]fully    Family Meeting attendees: GOC discussed    Anticipated Grief: Patient[ x ] Family [ x ]    Caregiver [place] Assessed: Yes [ x ] No [  ]    Oriental orthodox: Jain    Spiritual Concerns: Not identified,  available for support    Goals of Care: To be further discussed    Previous Services: 24/7 aide    ADVANCE DIRECTIVES:    -Pt has simple capacity  -HCP names elijah Vega as primary & Aubrey as alternate  -MOLST DNR DNI trial NIV No Feeding tube No dialysis    Anticipated D/C Plan: to be further determined.                       Summary:  Palliative team met with Pt to introduce our team and offer support.  Palliative role explained.  Emotional support provided.  Pt. has simple capacity.  Pt. deferred GOC to her son.  Prior to hospitalization, Pt. resided at home with 24/7 aide assistance.  This SW spoke with son, Gary via telephone to discuss GOC, assist with planning and provide supportive counseling.  Pt. known to our team from a previous admission.  Son shared that Pt was doing well at home.  We discussed Pts wishes on her prior MOLST.  MOLST reviewed in details.  Son shared that no changes have been made, Pt does not want any aggressive interventions.  Son provided verbal consent for MOLST to reflect DNR/DNI trial NIV, No Feeding tube, No dialysis.  Emotional support provided.  Son aware of palliative team availability.  Our team to sign off as goals of care are clear.

## 2024-03-26 NOTE — PROVIDER CONTACT NOTE (CHANGE IN STATUS NOTIFICATION) - ACTION/TREATMENT ORDERED:
admitting doctor contacted ordered 500 bolas LR and albumin IVPB. Patient did not improve that is when RRT was called. 250cc remaining in Bol[place] and patient upgraded to CCU.

## 2024-03-26 NOTE — PROVIDER CONTACT NOTE (CHANGE IN STATUS NOTIFICATION) - ASSESSMENT
Patient BP 60s/40s tachypinic with RR in 40s. Patient remained Alert and oriented throughout the entire RR.

## 2024-03-27 DIAGNOSIS — Z86.79 PERSONAL HISTORY OF OTHER DISEASES OF THE CIRCULATORY SYSTEM: ICD-10-CM

## 2024-03-27 LAB
ALBUMIN SERPL ELPH-MCNC: 2.6 G/DL — LOW (ref 3.3–5)
ANION GAP SERPL CALC-SCNC: 4 MMOL/L — LOW (ref 5–17)
BUN SERPL-MCNC: 45 MG/DL — HIGH (ref 7–23)
CALCIUM SERPL-MCNC: 8.9 MG/DL — SIGNIFICANT CHANGE UP (ref 8.5–10.1)
CHLORIDE SERPL-SCNC: 99 MMOL/L — SIGNIFICANT CHANGE UP (ref 96–108)
CO2 SERPL-SCNC: 36 MMOL/L — HIGH (ref 22–31)
CREAT SERPL-MCNC: 1.46 MG/DL — HIGH (ref 0.5–1.3)
EGFR: 34 ML/MIN/1.73M2 — LOW
GLUCOSE SERPL-MCNC: 90 MG/DL — SIGNIFICANT CHANGE UP (ref 70–99)
PHOSPHATE SERPL-MCNC: 4.3 MG/DL — SIGNIFICANT CHANGE UP (ref 2.5–4.5)
POTASSIUM SERPL-MCNC: 4.2 MMOL/L — SIGNIFICANT CHANGE UP (ref 3.5–5.3)
POTASSIUM SERPL-SCNC: 4.2 MMOL/L — SIGNIFICANT CHANGE UP (ref 3.5–5.3)
SODIUM SERPL-SCNC: 139 MMOL/L — SIGNIFICANT CHANGE UP (ref 135–145)

## 2024-03-27 PROCEDURE — 99232 SBSQ HOSP IP/OBS MODERATE 35: CPT

## 2024-03-27 RX ORDER — SODIUM CHLORIDE 9 MG/ML
500 INJECTION INTRAMUSCULAR; INTRAVENOUS; SUBCUTANEOUS ONCE
Refills: 0 | Status: COMPLETED | OUTPATIENT
Start: 2024-03-27 | End: 2024-03-27

## 2024-03-27 RX ORDER — SODIUM CHLORIDE 9 MG/ML
1000 INJECTION INTRAMUSCULAR; INTRAVENOUS; SUBCUTANEOUS
Refills: 0 | Status: DISCONTINUED | OUTPATIENT
Start: 2024-03-27 | End: 2024-03-28

## 2024-03-27 RX ORDER — FUROSEMIDE 40 MG
20 TABLET ORAL DAILY
Refills: 0 | Status: DISCONTINUED | OUTPATIENT
Start: 2024-03-28 | End: 2024-03-28

## 2024-03-27 RX ORDER — SODIUM CHLORIDE 9 MG/ML
1000 INJECTION INTRAMUSCULAR; INTRAVENOUS; SUBCUTANEOUS
Refills: 0 | Status: DISCONTINUED | OUTPATIENT
Start: 2024-03-27 | End: 2024-03-27

## 2024-03-27 RX ADMIN — Medication 25 MICROGRAM(S): at 06:02

## 2024-03-27 RX ADMIN — SODIUM CHLORIDE 50 MILLILITER(S): 9 INJECTION INTRAMUSCULAR; INTRAVENOUS; SUBCUTANEOUS at 17:25

## 2024-03-27 RX ADMIN — TEMAZEPAM 7.5 MILLIGRAM(S): 15 CAPSULE ORAL at 00:40

## 2024-03-27 RX ADMIN — APIXABAN 2.5 MILLIGRAM(S): 2.5 TABLET, FILM COATED ORAL at 21:37

## 2024-03-27 RX ADMIN — ATORVASTATIN CALCIUM 10 MILLIGRAM(S): 80 TABLET, FILM COATED ORAL at 21:37

## 2024-03-27 RX ADMIN — MIRTAZAPINE 7.5 MILLIGRAM(S): 45 TABLET, ORALLY DISINTEGRATING ORAL at 21:36

## 2024-03-27 RX ADMIN — SODIUM CHLORIDE 500 MILLILITER(S): 9 INJECTION INTRAMUSCULAR; INTRAVENOUS; SUBCUTANEOUS at 11:00

## 2024-03-27 RX ADMIN — Medication 81 MILLIGRAM(S): at 09:39

## 2024-03-27 RX ADMIN — Medication 1 TABLET(S): at 21:37

## 2024-03-27 RX ADMIN — APIXABAN 2.5 MILLIGRAM(S): 2.5 TABLET, FILM COATED ORAL at 09:39

## 2024-03-27 RX ADMIN — Medication 62.5 MICROGRAM(S): at 09:39

## 2024-03-27 RX ADMIN — CITALOPRAM 10 MILLIGRAM(S): 10 TABLET, FILM COATED ORAL at 11:02

## 2024-03-27 RX ADMIN — Medication 20 MILLIGRAM(S): at 06:02

## 2024-03-27 RX ADMIN — SODIUM CHLORIDE 500 MILLILITER(S): 9 INJECTION INTRAMUSCULAR; INTRAVENOUS; SUBCUTANEOUS at 09:38

## 2024-03-27 RX ADMIN — Medication 1 TABLET(S): at 09:38

## 2024-03-27 RX ADMIN — Medication 100 MICROGRAM(S): at 06:02

## 2024-03-27 NOTE — PROGRESS NOTE ADULT - SUBJECTIVE AND OBJECTIVE BOX
CHIEF COMPLAINT: Patient is a 89y old  Female who presents with a chief complaint of Heart failure    FROM H&P: 89 year old female w celiac disease, hx lung mass (not being pursued/treated), AF on eliquis, HFrEF, hypothyroid, MV clipping here c/o SOB and leg swelling  Several days of increased lower extremity swelling, orthopnea, dyspnea    Usually on Lasix 20mg qD, increased to 40mg two days ago, and 60mg yesterday with no improvement  increased fatigue on ambulation   No chest pain    No fever/chills  Does not weigh herself daily and does not take diuretic daily  In ED /54   HR 86   RR 18    T 98.3   96% sat RA lasix 40  mg IV    3.25 Cardiology consulted for CHF  Patient seen and examined. +sob, fatigue - not much improvement    3/26. Breathing improved, 02 weaning down    3/27.     PAST MEDICAL HISTORY:  AICD (automatic cardioverter/defibrillator) present x 3 . Replaced x 2. Presently right subclavian area  Arthritis   Celiac disease   Chronic atrial fibrillation   Hashimoto's thyroiditis   Hearing loss   History of cataract   History of CHF (congestive heart failure)   History of colon polyps   HTN (hypertension)   Hyperlipidemia, unspecified hyperlipidemia type   Hypothyroid   Iron deficiency anemia due to chronic blood loss   Left inguinal hernia   Mitral valve prolapse   Myocardial infarction 1990  Osteoporosis   Peripheral edema   Varicose veins BLE    PAST SURGICAL HISTORY:  AICD (automatic cardioverter/defibrillator) present with PPM. Replaced x 2.  Artificial pacemaker   H/O colonoscopy last done 2009  H/O right inguinal hernia repair   History of cataract extraction Bilateral  History of heart surgery Mitral valve surgery for leaky valve 9/2020.     FAMILY HISTORY:  Father: Family history of dementia, Age at diagnosis: Age Unknown  Mother: Family history of diabetes mellitus, Age at diagnosis: Age Unknown. Family history of heart disease, Age at diagnosis: Age Unknown  Sibling: Family history of diabetes mellitus, Age at diagnosis: Age Unknown.    SOCIAL HISTORY:  pt lives at home w an aide  ambulates w walker at baseline  has PT come to her home    MEDICATIONS  (STANDING):  apixaban 2.5 milliGRAM(s) Oral every 12 hours  aspirin enteric coated 81 milliGRAM(s) Oral daily  atorvastatin 10 milliGRAM(s) Oral at bedtime  calcium carbonate 1250 mG  + Vitamin D (OsCal 500 + D) 1 Tablet(s) Oral two times a day  citalopram 10 milliGRAM(s) Oral daily  digoxin     Tablet 62.5 MICROGram(s) Oral daily  furosemide   Injectable 20 milliGRAM(s) IV Push two times a day  levothyroxine 25 MICROGram(s) Oral daily  levothyroxine 100 MICROGram(s) Oral daily  mirtazapine 7.5 milliGRAM(s) Oral at bedtime  sacubitril 24 mG/valsartan 26 mG 1 Tablet(s) Oral two times a day    MEDICATIONS  (PRN):  acetaminophen     Tablet .. 650 milliGRAM(s) Oral every 6 hours PRN Mild Pain (1 - 3)  aluminum hydroxide/magnesium hydroxide/simethicone Suspension 30 milliLiter(s) Oral every 4 hours PRN Dyspepsia  melatonin 3 milliGRAM(s) Oral at bedtime PRN Insomnia  ondansetron Injectable 4 milliGRAM(s) IV Push every 8 hours PRN Nausea and/or Vomiting  temazepam 7.5 milliGRAM(s) Oral at bedtime PRN Insomnia      HOME MEDICATIONS:  aspirin 81 mg oral delayed release tablet: 1 tab(s) orally once a day (24 Mar 2024 15:36)  atorvastatin 10 mg oral tablet: 1 tab(s) orally once a day (at bedtime) (24 Mar 2024 15:36)  Calcium 500+D oral tablet, chewable: 1 tab(s) orally 2 times a day (24 Mar 2024 15:36)  citalopram 10 mg oral tablet: 1 tab(s) orally once a day (24 Mar 2024 15:36)  digoxin 125 mcg (0.125 mg) oral tablet: 0.5 tab(s) orally once a day (24 Mar 2024 15:36)  Entresto 24 mg-26 mg oral tablet: 1 tab(s) orally 2 times a day (24 Mar 2024 15:36)  furosemide 20 mg oral tablet: 1 tab(s) orally once a day ***pt doesn&#x27;t take consistently*** (24 Mar 2024 15:36)  levothyroxine 100 mcg (0.1 mg) oral tablet: 1 tab(s) orally once a day ***take with 25mcg = 125cg*** (24 Mar 2024 15:36)  levothyroxine 25 mcg (0.025 mg) oral tablet: 1 tab(s) orally once a day ***take with 100mcg = 125mcg*** (24 Mar 2024 15:36)    PHYSICAL EXAM:  T(C): 36.7 (26 Mar 2024 20:08), Max: 36.7 (26 Mar 2024 20:08)  T(F): 98.1 (26 Mar 2024 20:08), Max: 98.1 (26 Mar 2024 20:08)  HR: 77 (26 Mar 2024 20:08) (76 - 92)  BP: 100/39 (26 Mar 2024 20:08) (100/39 - 110/63)  RR: 18 (26 Mar 2024 20:08) (17 - 18)  SpO2: 100% (26 Mar 2024 20:08) (100% - 100%)    Parameters below as of 26 Mar 2024 20:08  Patient On (Oxygen Delivery Method): nasal cannula  O2 Flow (L/min): 3    Constitutional: NAD, awake and alert  HEENT: PERR, EOMI, Normal Hearing, MMM  Neck: Soft and supple, No LAD, No JVD  Respiratory: Breath sounds diminished  Cardiovascular: S1 and S2, regular rate and rhythm, no Murmurs, gallops or rubs  Gastrointestinal: Bowel Sounds present, soft, nontender, nondistended, no guarding, no rebound  Extremities: + peripheral edema  Vascular: 2+ peripheral pulses  Neurological: A/O x 3, no focal deficits  Musculoskeletal: 5/5 strength b/l upper and lower extremities  Skin: No rashes    =======================================    INTERPRETATION OF TELEMETRY: 5 beats NSVT, ectopy, Paced    ECG: < from: 12 Lead ECG (03.24.24 @ 13:09) >  Diagnosis Line Poor data quality  Ventricular-paced rhythm with premature ventricular or aberrantly conducted complexes  Biventricular pacemaker detected  Abnormal ECG    ========================================    LABS:                        9.5    4.91  )-----------( 210      ( 25 Mar 2024 07:09 )             29.5     03-25    138  |  101  |  28<H>  ----------------------------<  92  4.0   |  33<H>  |  0.98    Ca    9.2      25 Mar 2024 07:09  Mg     2.1     03-25    TPro  7.9  /  Alb  3.0<L>  /  TBili  0.5  /  DBili  x   /  AST  26  /  ALT  17  /  AlkPhos  92  03-24    PT/INR - ( 24 Mar 2024 13:46 )   PT: 17.3 sec;   INR: 1.55 ratio      PTT - ( 24 Mar 2024 13:46 )  PTT:45.7 sec    BNP 3020    TroponinI hsT: <-25.10    < from: TTE Echo Complete w/o Contrast w/ Doppler (03.25.24 @ 15:54) >   Estimated left ventricular ejection fraction is 35-40 %.   Overall LV function appears globally reduced.   Moderate to Severe, diffuse hypokinesis of the left ventricle is present.   The left ventricle is normal in size and wall thickness.   The left atrium is severely dilated.   There is evidence of an atrial septal defect. This indicated a left to   right shunt atthe level of the interatrial septum.   The right atrium appears moderately dilated.   Normal appearing right ventricle structure and function.   A device wire is seen in the RV and RA.   The aortic valve is well visualized, appears mildly to moderately   calcified. Valve opening seems to be restricted.   Mild aortic stenosis is present.   Calculated RANJAN is 1.5 cm2.   Peak and mean transaortic gradients are 16 and 8 mmHg respectively.   Moderate (2+) aortic regurgitation is present.   Mitral valveclip is present and intact.   Moderate (2+) mitral regurgitation is present.   Normal appearing tricuspid valve structure.   Moderate (2+) tricuspid valve regurgitation is present.   Moderate pulmonary hypertension.   Normal appearing pulmonic valve structure.   Mild pulmonic valvular regurgitation (1+) is present.   No evidence of pericardial effusion.   Pleural effusion - right pleural effusion.   IVC is dilated and not collapsing with inspiration.        RADIOLOGY & ADDITIONAL STUDIES:    < from: Xray Chest 1 View- PORTABLE-Urgent (Xray Chest 1 View- PORTABLE-Urgent .) (03.24.24 @ 14:15) >  table moderate loculated right effusion and cardiac   findings. There is a new small left base effusion.    Again noted are heart enlargement, defibrillator, and abandoned right   defibrillator wires.   CHIEF COMPLAINT: Patient is a 89y old  Female who presents with a chief complaint of Heart failure    FROM H&P: 89 year old female w celiac disease, hx lung mass (not being pursued/treated), AF on eliquis, HFrEF, hypothyroid, MV clipping here c/o SOB and leg swelling  Several days of increased lower extremity swelling, orthopnea, dyspnea    Usually on Lasix 20mg qD, increased to 40mg two days ago, and 60mg yesterday with no improvement  increased fatigue on ambulation   No chest pain    No fever/chills  Does not weigh herself daily and does not take diuretic daily  In ED /54   HR 86   RR 18    T 98.3   96% sat RA lasix 40  mg IV    3.25 Cardiology consulted for CHF  Patient seen and examined. +sob, fatigue - not much improvement    3/26. Breathing improved, 02 weaning down    3/27. Breathing improved.    PAST MEDICAL HISTORY:  AICD (automatic cardioverter/defibrillator) present x 3 . Replaced x 2. Presently right subclavian area  Arthritis   Celiac disease   Chronic atrial fibrillation   Hashimoto's thyroiditis   Hearing loss   History of cataract   History of CHF (congestive heart failure)   History of colon polyps   HTN (hypertension)   Hyperlipidemia, unspecified hyperlipidemia type   Hypothyroid   Iron deficiency anemia due to chronic blood loss   Left inguinal hernia   Mitral valve prolapse   Myocardial infarction 1990  Osteoporosis   Peripheral edema   Varicose veins BLE    PAST SURGICAL HISTORY:  AICD (automatic cardioverter/defibrillator) present with PPM. Replaced x 2.  Artificial pacemaker   H/O colonoscopy last done 2009  H/O right inguinal hernia repair   History of cataract extraction Bilateral  History of heart surgery Mitral valve surgery for leaky valve 9/2020.     FAMILY HISTORY:  Father: Family history of dementia, Age at diagnosis: Age Unknown  Mother: Family history of diabetes mellitus, Age at diagnosis: Age Unknown. Family history of heart disease, Age at diagnosis: Age Unknown  Sibling: Family history of diabetes mellitus, Age at diagnosis: Age Unknown.    SOCIAL HISTORY:  pt lives at home w an aide  ambulates w walker at baseline  has PT come to her home    MEDICATIONS  (STANDING):  apixaban 2.5 milliGRAM(s) Oral every 12 hours  aspirin enteric coated 81 milliGRAM(s) Oral daily  atorvastatin 10 milliGRAM(s) Oral at bedtime  calcium carbonate 1250 mG  + Vitamin D (OsCal 500 + D) 1 Tablet(s) Oral two times a day  citalopram 10 milliGRAM(s) Oral daily  digoxin     Tablet 62.5 MICROGram(s) Oral daily  furosemide   Injectable 20 milliGRAM(s) IV Push two times a day  levothyroxine 25 MICROGram(s) Oral daily  levothyroxine 100 MICROGram(s) Oral daily  mirtazapine 7.5 milliGRAM(s) Oral at bedtime  sacubitril 24 mG/valsartan 26 mG 1 Tablet(s) Oral two times a day    MEDICATIONS  (PRN):  acetaminophen     Tablet .. 650 milliGRAM(s) Oral every 6 hours PRN Mild Pain (1 - 3)  aluminum hydroxide/magnesium hydroxide/simethicone Suspension 30 milliLiter(s) Oral every 4 hours PRN Dyspepsia  melatonin 3 milliGRAM(s) Oral at bedtime PRN Insomnia  ondansetron Injectable 4 milliGRAM(s) IV Push every 8 hours PRN Nausea and/or Vomiting  temazepam 7.5 milliGRAM(s) Oral at bedtime PRN Insomnia      HOME MEDICATIONS:  aspirin 81 mg oral delayed release tablet: 1 tab(s) orally once a day (24 Mar 2024 15:36)  atorvastatin 10 mg oral tablet: 1 tab(s) orally once a day (at bedtime) (24 Mar 2024 15:36)  Calcium 500+D oral tablet, chewable: 1 tab(s) orally 2 times a day (24 Mar 2024 15:36)  citalopram 10 mg oral tablet: 1 tab(s) orally once a day (24 Mar 2024 15:36)  digoxin 125 mcg (0.125 mg) oral tablet: 0.5 tab(s) orally once a day (24 Mar 2024 15:36)  Entresto 24 mg-26 mg oral tablet: 1 tab(s) orally 2 times a day (24 Mar 2024 15:36)  furosemide 20 mg oral tablet: 1 tab(s) orally once a day ***pt doesn&#x27;t take consistently*** (24 Mar 2024 15:36)  levothyroxine 100 mcg (0.1 mg) oral tablet: 1 tab(s) orally once a day ***take with 25mcg = 125cg*** (24 Mar 2024 15:36)  levothyroxine 25 mcg (0.025 mg) oral tablet: 1 tab(s) orally once a day ***take with 100mcg = 125mcg*** (24 Mar 2024 15:36)    PHYSICAL EXAM:  Vital Signs Last 24 Hrs  T(C): 36.7 (26 Mar 2024 20:08), Max: 36.7 (26 Mar 2024 20:08)  T(F): 98.1 (26 Mar 2024 20:08), Max: 98.1 (26 Mar 2024 20:08)  HR: 77 (26 Mar 2024 20:08) (76 - 92)  BP: 100/39 (26 Mar 2024 20:08) (100/39 - 110/63)  RR: 18 (26 Mar 2024 20:08) (17 - 18)  SpO2: 100% (26 Mar 2024 20:08) (100% - 100%)    Parameters below as of 26 Mar 2024 20:08  Patient On (Oxygen Delivery Method): nasal cannula  O2 Flow (L/min): 3      Constitutional: NAD, awake and alert  HEENT: PERR, EOMI, Normal Hearing, MMM  Neck: Soft and supple, No LAD, No JVD  Respiratory: Breath sounds diminished  Cardiovascular: S1 and S2, regular rate and rhythm, no Murmurs, gallops or rubs  Gastrointestinal: Bowel Sounds present, soft, nontender, nondistended, no guarding, no rebound  Extremities: + peripheral edema  Vascular: 2+ peripheral pulses  Neurological: A/O x 3, no focal deficits  Musculoskeletal: 5/5 strength b/l upper and lower extremities  Skin: No rashes    =======================================    INTERPRETATION OF TELEMETRY: 5 beats NSVT, ectopy, Paced    ECG: < from: 12 Lead ECG (03.24.24 @ 13:09) >  Diagnosis Line Poor data quality  Ventricular-paced rhythm with premature ventricular or aberrantly conducted complexes  Biventricular pacemaker detected  Abnormal ECG    ========================================    LABS:                        9.5    4.91  )-----------( 210      ( 25 Mar 2024 07:09 )             29.5     03-25    138  |  101  |  28<H>  ----------------------------<  92  4.0   |  33<H>  |  0.98    Ca    9.2      25 Mar 2024 07:09  Mg     2.1     03-25    TPro  7.9  /  Alb  3.0<L>  /  TBili  0.5  /  DBili  x   /  AST  26  /  ALT  17  /  AlkPhos  92  03-24    PT/INR - ( 24 Mar 2024 13:46 )   PT: 17.3 sec;   INR: 1.55 ratio      PTT - ( 24 Mar 2024 13:46 )  PTT:45.7 sec    BNP 3020    TroponinI hsT: <-25.10    < from: TTE Echo Complete w/o Contrast w/ Doppler (03.25.24 @ 15:54) >   Estimated left ventricular ejection fraction is 35-40 %.   Overall LV function appears globally reduced.   Moderate to Severe, diffuse hypokinesis of the left ventricle is present.   The left ventricle is normal in size and wall thickness.   The left atrium is severely dilated.   There is evidence of an atrial septal defect. This indicated a left to   right shunt atthe level of the interatrial septum.   The right atrium appears moderately dilated.   Normal appearing right ventricle structure and function.   A device wire is seen in the RV and RA.   The aortic valve is well visualized, appears mildly to moderately   calcified. Valve opening seems to be restricted.   Mild aortic stenosis is present.   Calculated RANJAN is 1.5 cm2.   Peak and mean transaortic gradients are 16 and 8 mmHg respectively.   Moderate (2+) aortic regurgitation is present.   Mitral valveclip is present and intact.   Moderate (2+) mitral regurgitation is present.   Normal appearing tricuspid valve structure.   Moderate (2+) tricuspid valve regurgitation is present.   Moderate pulmonary hypertension.   Normal appearing pulmonic valve structure.   Mild pulmonic valvular regurgitation (1+) is present.   No evidence of pericardial effusion.   Pleural effusion - right pleural effusion.   IVC is dilated and not collapsing with inspiration.        RADIOLOGY & ADDITIONAL STUDIES:    < from: Xray Chest 1 View- PORTABLE-Urgent (Xray Chest 1 View- PORTABLE-Urgent .) (03.24.24 @ 14:15) >  table moderate loculated right effusion and cardiac   findings. There is a new small left base effusion.    Again noted are heart enlargement, defibrillator, and abandoned right   defibrillator wires.

## 2024-03-27 NOTE — PROGRESS NOTE ADULT - REASON FOR ADMISSION
Acute on chronic HFrEF

## 2024-03-27 NOTE — PROGRESS NOTE ADULT - ASSESSMENT
89 year old female w celiac disease, hx lung mass (not being pursued/treated), AF on eliquis, HFrEF, hypothyroid, MV clipping here c/o SOB and leg swelling. Several days of increased lower extremity swelling, orthopnea, dyspnea.     #Acute respiratory distress  #Acute on chronic HFrEF w/ ICD.   #Loculated effusions  #Lung Mass - treatment not being pursued  #Hx of MV clipping  #Afib on Eliquis - On digoxin. Dig level WNL.  #CAD    - Monitor on tele   - Daily weights, I&Os, now weaned to room air  - IV Lasix - gentle diuresis - would switch to PO tomorrow. Labs stable.  - Can consider CT Chest - loculated effusions, clinically improving.  - Continue Entresto, aspirin, statin, digoxin   - Echocardiogram reviewed.  - Interrogated ICD for events > 1 run of NSVT  - Recommend palliative evaluation for GOC    Case d/w Dr. Melendez  Will follow 89 year old female w celiac disease, hx lung mass (not being pursued/treated), AF on eliquis, HFrEF, hypothyroid, MV clipping here c/o SOB and leg swelling. Several days of increased lower extremity swelling, orthopnea, dyspnea.     #Acute respiratory distress  #Acute on chronic HFrEF w/ ICD.   #Loculated effusions  #Lung Mass - treatment not being pursued  #Hx of MV clipping  #Afib on Eliquis - On digoxin. Dig level WNL.  #CAD    - Monitored on tele   - Daily weights, I&Os, now weaned to room air  - IV Lasix - gentle diuresis - would switch to PO tomorrow. Labs stable.  - Can consider CT Chest - loculated effusions, clinically improving.  - Continue Entresto, aspirin, statin, digoxin   - Echocardiogram reviewed.  - Interrogated ICD for events > 1 run of NSVT  - Palliative following     Case d/w Dr. Melendez  Will sign off, call w/ questions.

## 2024-03-27 NOTE — PROGRESS NOTE ADULT - NUTRITIONAL ASSESSMENT
This patient has been assessed with a concern for Malnutrition and has been determined to have a diagnosis/diagnoses of Severe protein-calorie malnutrition.    This patient is being managed with:   Diet DASH/TLC-  Sodium & Cholesterol Restricted  Entered: Mar 24 2024  2:01PM  

## 2024-03-27 NOTE — PROGRESS NOTE ADULT - ASSESSMENT
89 year old female w celiac disease, hx lung mass (not being pursued/treated), AF on eliquis, HFrEF, hypothyroid, MV clipping here c/o SOB and leg swelling    #Hypotensive today  Will give 500cc IV Fluid Bolus  Continue to monitor blood pressure    #Acute on chronic hypoxic respiratory failure in the setting of HFrEF  -CXR: as above   -BNP: 3k  -ECHO:  Estimated left ventricular ejection fraction is 35-40 %.  -IV Lasix 20mg BID - hold today   -Entresto  -Strict I&O's  -Daily weights  -Fluid restriction  -Low salt, low cholesterol diet  -Cardiology consult and reccs noted     #Chronic A-fib s/p PPM placement   -Eliquis   -Digoxin     #CAD   -ASA/statin     #Hypothyroid  -Synthroid       #Depression/anxiety  -C/W citalopram  -C/W Remeron    #Hypothyroid,   continue home levothyroxine      #Advanced Care Directives   MOLST DNR/trial NIV

## 2024-03-27 NOTE — PROGRESS NOTE ADULT - SUBJECTIVE AND OBJECTIVE BOX
HOSPITALIST ATTENDING PROGRESS NOTE    Chart and meds reviewed.      Subjective: Patient seen and examined. resting comfortably. Denies chest pain shortness of breath. BP soft this AM. Cr elevated . Will gently give IV fluids rehydration. Repeat BMP in AM. Continue to monitor for shortness of breath.       Additional results/Imaging, I have personally reviewed:    LABS:                            9.2    4.42  )-----------( 203      ( 26 Mar 2024 07:26 )             28.4     03-27    139  |  99  |  45<H>  ----------------------------<  90  4.2   |  36<H>  |  1.46<H>    Ca    8.9      27 Mar 2024 07:02  Phos  4.3     03-27    TPro  x   /  Alb  2.6<L>  /  TBili  x   /  DBili  x   /  AST  x   /  ALT  x   /  AlkPhos  x   03-27        LIVER FUNCTIONS - ( 27 Mar 2024 07:02 )  Alb: 2.6 g/dL / Pro: x     / ALK PHOS: x     / ALT: x     / AST: x     / GGT: x             Urinalysis Basic - ( 27 Mar 2024 07:02 )    Color: x / Appearance: x / SG: x / pH: x  Gluc: 90 mg/dL / Ketone: x  / Bili: x / Urobili: x   Blood: x / Protein: x / Nitrite: x   Leuk Esterase: x / RBC: x / WBC x   Sq Epi: x / Non Sq Epi: x / Bacteria: x              All other systems reviewed and found to be negative with the exception of what has been described above.    MEDICATIONS  (STANDING):  apixaban 2.5 milliGRAM(s) Oral every 12 hours  aspirin enteric coated 81 milliGRAM(s) Oral daily  atorvastatin 10 milliGRAM(s) Oral at bedtime  calcium carbonate 1250 mG  + Vitamin D (OsCal 500 + D) 1 Tablet(s) Oral two times a day  citalopram 10 milliGRAM(s) Oral daily  digoxin     Tablet 62.5 MICROGram(s) Oral daily  levothyroxine 25 MICROGram(s) Oral daily  levothyroxine 100 MICROGram(s) Oral daily  mirtazapine 7.5 milliGRAM(s) Oral at bedtime  sacubitril 24 mG/valsartan 26 mG 1 Tablet(s) Oral two times a day  sodium chloride 0.9% Bolus 500 milliLiter(s) IV Bolus once    MEDICATIONS  (PRN):  acetaminophen     Tablet .. 650 milliGRAM(s) Oral every 6 hours PRN Mild Pain (1 - 3)  aluminum hydroxide/magnesium hydroxide/simethicone Suspension 30 milliLiter(s) Oral every 4 hours PRN Dyspepsia  melatonin 3 milliGRAM(s) Oral at bedtime PRN Insomnia  ondansetron Injectable 4 milliGRAM(s) IV Push every 8 hours PRN Nausea and/or Vomiting  temazepam 7.5 milliGRAM(s) Oral at bedtime PRN Insomnia      VITALS:  T(F): 98.2 (03-27-24 @ 09:24), Max: 98.2 (03-27-24 @ 09:24)  HR: 76 (03-27-24 @ 09:24) (76 - 77)  BP: 83/42 (03-27-24 @ 09:24) (83/42 - 110/63)  RR: 18 (03-27-24 @ 09:24) (18 - 18)  SpO2: 100% (03-27-24 @ 09:24) (100% - 100%)  Wt(kg): --    I&O's Summary    26 Mar 2024 07:01  -  27 Mar 2024 07:00  --------------------------------------------------------  IN: 0 mL / OUT: 700 mL / NET: -700 mL        CAPILLARY BLOOD GLUCOSE          PHYSICAL EXAM:  GEN: NAD, +frail   HEENT: EOMI,  moist mucous membranes  NECK : Soft and supple, no JVD  LUNG: +decreased breath sounds   CVS: S1S2+, RRR, no M/G/R  GI: BS+, soft, NT/ND, no guarding, no rebound  EXTREMITIES: No peripheral edema  VASCULAR: 2+ peripheral pulses  NEURO: AAOx3, grossly non-focal   SKIN: No rashes      CULTURES:      Telemetry, personally reviewed

## 2024-03-28 ENCOUNTER — TRANSCRIPTION ENCOUNTER (OUTPATIENT)
Age: 89
End: 2024-03-28

## 2024-03-28 VITALS
SYSTOLIC BLOOD PRESSURE: 106 MMHG | TEMPERATURE: 97 F | OXYGEN SATURATION: 100 % | DIASTOLIC BLOOD PRESSURE: 41 MMHG | HEART RATE: 76 BPM | RESPIRATION RATE: 18 BRPM

## 2024-03-28 LAB
ANION GAP SERPL CALC-SCNC: 4 MMOL/L — LOW (ref 5–17)
BUN SERPL-MCNC: 36 MG/DL — HIGH (ref 7–23)
CALCIUM SERPL-MCNC: 9.6 MG/DL — SIGNIFICANT CHANGE UP (ref 8.5–10.1)
CHLORIDE SERPL-SCNC: 103 MMOL/L — SIGNIFICANT CHANGE UP (ref 96–108)
CO2 SERPL-SCNC: 32 MMOL/L — HIGH (ref 22–31)
CREAT SERPL-MCNC: 0.98 MG/DL — SIGNIFICANT CHANGE UP (ref 0.5–1.3)
EGFR: 55 ML/MIN/1.73M2 — LOW
GLUCOSE SERPL-MCNC: 88 MG/DL — SIGNIFICANT CHANGE UP (ref 70–99)
HCT VFR BLD CALC: 31.5 % — LOW (ref 34.5–45)
HGB BLD-MCNC: 9.8 G/DL — LOW (ref 11.5–15.5)
MCHC RBC-ENTMCNC: 31.1 GM/DL — LOW (ref 32–36)
MCHC RBC-ENTMCNC: 32.1 PG — SIGNIFICANT CHANGE UP (ref 27–34)
MCV RBC AUTO: 103.3 FL — HIGH (ref 80–100)
PLATELET # BLD AUTO: 220 K/UL — SIGNIFICANT CHANGE UP (ref 150–400)
POTASSIUM SERPL-MCNC: 4.4 MMOL/L — SIGNIFICANT CHANGE UP (ref 3.5–5.3)
POTASSIUM SERPL-SCNC: 4.4 MMOL/L — SIGNIFICANT CHANGE UP (ref 3.5–5.3)
RBC # BLD: 3.05 M/UL — LOW (ref 3.8–5.2)
RBC # FLD: 13.8 % — SIGNIFICANT CHANGE UP (ref 10.3–14.5)
SODIUM SERPL-SCNC: 139 MMOL/L — SIGNIFICANT CHANGE UP (ref 135–145)
WBC # BLD: 4.46 K/UL — SIGNIFICANT CHANGE UP (ref 3.8–10.5)
WBC # FLD AUTO: 4.46 K/UL — SIGNIFICANT CHANGE UP (ref 3.8–10.5)

## 2024-03-28 PROCEDURE — 99239 HOSP IP/OBS DSCHRG MGMT >30: CPT

## 2024-03-28 RX ORDER — DIGOXIN 250 MCG
1 TABLET ORAL
Qty: 30 | Refills: 0
Start: 2024-03-28

## 2024-03-28 RX ADMIN — CITALOPRAM 10 MILLIGRAM(S): 10 TABLET, FILM COATED ORAL at 08:43

## 2024-03-28 RX ADMIN — Medication 62.5 MICROGRAM(S): at 08:43

## 2024-03-28 RX ADMIN — Medication 20 MILLIGRAM(S): at 08:43

## 2024-03-28 RX ADMIN — Medication 1 TABLET(S): at 09:47

## 2024-03-28 RX ADMIN — Medication 81 MILLIGRAM(S): at 08:43

## 2024-03-28 RX ADMIN — APIXABAN 2.5 MILLIGRAM(S): 2.5 TABLET, FILM COATED ORAL at 08:43

## 2024-03-28 RX ADMIN — Medication 25 MICROGRAM(S): at 06:14

## 2024-03-28 RX ADMIN — Medication 100 MICROGRAM(S): at 06:13

## 2024-03-28 NOTE — DISCHARGE NOTE PROVIDER - NSDCCAREPROVSEEN_GEN_ALL_CORE_FT
Liss, Marjorie Logan, Renita Sandoval, Irais Read, Mikey Hernandez, Samantha Masters, Gary Godoy, Franky OWUSU

## 2024-03-28 NOTE — DISCHARGE NOTE PROVIDER - NSDCCPCAREPLAN_GEN_ALL_CORE_FT
PRINCIPAL DISCHARGE DIAGNOSIS  Diagnosis: CHF exacerbation  Assessment and Plan of Treatment: WHAT IS HEART FAILURE? Heart failure (HF) is a condition in which the heart cannot pump enough blood to meet the body’s needs. The weakening of the heart’s pumping ability results in the buildup of fluid in the feet, ankles and legs resulting in edema, tiredness, and shortness of breath.  THINGS TO DO: (1) Weigh yourself daily – keep a log for you cardiologist (2) Maintain a heart healthy diet and limit dietary sodium (3) Drink liquids as directed – you may need to limit the amount of liquid you drink to prevent fluid buildup (4) Maintain a healthy weight (5) Stay active with exercise  MONITOR THESE SIGNS AND SYMPTOMS: (1) Worsening shortness of breath at rest or at night (2) worsening leg swelling (3) Abdominal pain or swelling (4) Chest pain or palpitations (5) Weight gain of 5lbs or more in 1 week or 2-3lbs in 24hours. If you experience any of these, DO alert your primary care provider, or return to the Emergency Department if you feel very sick.

## 2024-03-28 NOTE — DISCHARGE NOTE PROVIDER - ATTENDING DISCHARGE PHYSICAL EXAMINATION:
GEN: NAD, +frail   HEENT: EOMI,  moist mucous membranes  NECK : Soft and supple, no JVD  LUNG: +decreased breath sounds   CVS: S1S2+, RRR, no M/G/R  GI: BS+, soft, NT/ND, no guarding, no rebound  EXTREMITIES: No peripheral edema  VASCULAR: 2+ peripheral pulses  NEURO: AAOx3, grossly non-focal   SKIN: No rashes

## 2024-03-28 NOTE — DISCHARGE NOTE PROVIDER - CARE PROVIDER_API CALL
Lenny Dickey  Pulmonary Disease  [address], [address]  [place] [address]  Phone: [phone]  Fax: [phone]  Follow Up Time: 1 week    Karen Melendez  Cardiovascular Disease  [address] [place] [address]  Phone: [phone]  Fax: [phone]  Follow Up Time: 1 week

## 2024-03-28 NOTE — DISCHARGE NOTE PROVIDER - PROVIDER TOKENS
PROVIDER:[TOKEN:[[id number]],FOLLOWUP:[1 week]],PROVIDER:[TOKEN:[[id number]],FOLLOWUP:[1 week]]

## 2024-03-28 NOTE — DISCHARGE NOTE PROVIDER - NSDCMRMEDTOKEN_GEN_ALL_CORE_FT
aspirin 81 mg oral delayed release tablet: 1 tab(s) orally once a day  atorvastatin 10 mg oral tablet: 1 tab(s) orally once a day (at bedtime)  Calcium 500+D oral tablet, chewable: 1 tab(s) orally 2 times a day  citalopram 10 mg oral tablet: 1 tab(s) orally once a day  Eliquis 2.5 mg oral tablet: 1 tab(s) orally 2 times a day  Entresto 24 mg-26 mg oral tablet: 1 tab(s) orally 2 times a day  furosemide 20 mg oral tablet: 1 tab(s) orally once a day ***pt doesn&#x27;t take consistently***  Lanoxin 62.5 mcg (0.0625 mg) oral tablet: 1 tab(s) orally once a day  levothyroxine 100 mcg (0.1 mg) oral tablet: 1 tab(s) orally once a day ***take with 25mcg = 125cg***  levothyroxine 25 mcg (0.025 mg) oral tablet: 1 tab(s) orally once a day ***take with 100mcg = 125mcg***  mirtazapine 7.5 mg oral tablet: 1 tab(s) orally once a day (at bedtime)

## 2024-03-28 NOTE — DISCHARGE NOTE PROVIDER - HOSPITAL COURSE
89 year old female w celiac disease, hx lung mass (not being pursued/treated), AF on eliquis, HFrEF, hypothyroid, MV clipping here c/o SOB and leg swelling    Came to the ED with several days of increased lower extremity swelling, orthopnea, dyspnea . Usually on Lasix 20mg qD, increased to 40mg two days ago, and 60mg yesterday with no improvement, also reported increased fatigue on ambulation  No chest pain  No fever/chills  She Does not weigh herself daily and does not take diuretic daily Given lasix 40 mg IV in the ED. Patient admitted to the hospitalist service for Acute on chronic hypoxic respiratory failure in the setting of HFrEF, BNP: 3k, ECHO:  Estimated left ventricular ejection fraction is 35-40 %. IV lasix transitioned to oral lasix. Seen by cardiology, Continue aspirin, statin, digoxin entresto. - Interrogated ICD for events > 1 run of NSVT. Patient to follow up with PCP and Cardiology in 1-2 weeks. Advised to return to ED with any worsening chest pain shortness of breath fevers chills nausea vomiting diarrhea.

## 2024-03-28 NOTE — DISCHARGE NOTE PROVIDER - CARE PROVIDERS DIRECT ADDRESSES
[email or web address]

## 2024-03-30 RX ORDER — DIGOXIN 250 MCG
0.5 TABLET ORAL
Qty: 15 | Refills: 0
Start: 2024-03-30 | End: 2024-04-13

## 2024-04-02 DIAGNOSIS — E78.5 HYPERLIPIDEMIA, UNSPECIFIED: ICD-10-CM

## 2024-04-02 DIAGNOSIS — I50.23 ACUTE ON CHRONIC SYSTOLIC (CONGESTIVE) HEART FAILURE: ICD-10-CM

## 2024-04-02 DIAGNOSIS — I11.0 HYPERTENSIVE HEART DISEASE WITH HEART FAILURE: ICD-10-CM

## 2024-04-02 DIAGNOSIS — E06.3 AUTOIMMUNE THYROIDITIS: ICD-10-CM

## 2024-04-02 DIAGNOSIS — I83.93 ASYMPTOMATIC VARICOSE VEINS OF BILATERAL LOWER EXTREMITIES: ICD-10-CM

## 2024-04-02 DIAGNOSIS — M81.0 AGE-RELATED OSTEOPOROSIS WITHOUT CURRENT PATHOLOGICAL FRACTURE: ICD-10-CM

## 2024-04-02 DIAGNOSIS — Z79.890 HORMONE REPLACEMENT THERAPY: ICD-10-CM

## 2024-04-02 DIAGNOSIS — M19.90 UNSPECIFIED OSTEOARTHRITIS, UNSPECIFIED SITE: ICD-10-CM

## 2024-04-02 DIAGNOSIS — Z95.810 PRESENCE OF AUTOMATIC (IMPLANTABLE) CARDIAC DEFIBRILLATOR: ICD-10-CM

## 2024-04-02 DIAGNOSIS — F32.A DEPRESSION, UNSPECIFIED: ICD-10-CM

## 2024-04-02 DIAGNOSIS — Z91.09 OTHER ALLERGY STATUS, OTHER THAN TO DRUGS AND BIOLOGICAL SUBSTANCES: ICD-10-CM

## 2024-04-02 DIAGNOSIS — Z79.01 LONG TERM (CURRENT) USE OF ANTICOAGULANTS: ICD-10-CM

## 2024-04-02 DIAGNOSIS — R06.02 SHORTNESS OF BREATH: ICD-10-CM

## 2024-04-02 DIAGNOSIS — F41.9 ANXIETY DISORDER, UNSPECIFIED: ICD-10-CM

## 2024-04-02 DIAGNOSIS — I48.20 CHRONIC ATRIAL FIBRILLATION, UNSPECIFIED: ICD-10-CM

## 2024-04-02 DIAGNOSIS — Z98.42 CATARACT EXTRACTION STATUS, LEFT EYE: ICD-10-CM

## 2024-04-02 DIAGNOSIS — Z79.82 LONG TERM (CURRENT) USE OF ASPIRIN: ICD-10-CM

## 2024-04-02 DIAGNOSIS — Z88.8 ALLERGY STATUS TO OTHER DRUGS, MEDICAMENTS AND BIOLOGICAL SUBSTANCES: ICD-10-CM

## 2024-04-02 DIAGNOSIS — E43 UNSPECIFIED SEVERE PROTEIN-CALORIE MALNUTRITION: ICD-10-CM

## 2024-04-02 DIAGNOSIS — H91.90 UNSPECIFIED HEARING LOSS, UNSPECIFIED EAR: ICD-10-CM

## 2024-04-02 DIAGNOSIS — I95.9 HYPOTENSION, UNSPECIFIED: ICD-10-CM

## 2024-04-02 DIAGNOSIS — Z98.41 CATARACT EXTRACTION STATUS, RIGHT EYE: ICD-10-CM

## 2024-04-02 DIAGNOSIS — E03.9 HYPOTHYROIDISM, UNSPECIFIED: ICD-10-CM

## 2024-04-02 DIAGNOSIS — J96.21 ACUTE AND CHRONIC RESPIRATORY FAILURE WITH HYPOXIA: ICD-10-CM

## 2024-04-16 ENCOUNTER — RX RENEWAL (OUTPATIENT)
Age: 89
End: 2024-04-16

## 2024-04-16 RX ORDER — ASPIRIN 81 MG/1
81 TABLET, COATED ORAL
Qty: 90 | Refills: 3 | Status: ACTIVE | COMMUNITY
Start: 2024-04-16 | End: 1900-01-01

## 2024-04-18 ENCOUNTER — RX RENEWAL (OUTPATIENT)
Age: 89
End: 2024-04-18

## 2024-04-18 RX ORDER — POTASSIUM CHLORIDE 750 MG/1
10 TABLET, FILM COATED, EXTENDED RELEASE ORAL
Qty: 28 | Refills: 0 | Status: ACTIVE | COMMUNITY
Start: 2022-09-16 | End: 1900-01-01

## 2024-04-18 RX ORDER — LEVOTHYROXINE SODIUM 0.1 MG/1
100 TABLET ORAL
Qty: 90 | Refills: 1 | Status: ACTIVE | COMMUNITY
Start: 2024-04-18 | End: 1900-01-01

## 2024-04-23 ENCOUNTER — EMERGENCY (EMERGENCY)
Facility: HOSPITAL | Age: 89
LOS: 0 days | Discharge: HOME | End: 2024-04-23
Attending: STUDENT IN AN ORGANIZED HEALTH CARE EDUCATION/TRAINING PROGRAM
Payer: MEDICARE

## 2024-04-23 ENCOUNTER — NON-APPOINTMENT (OUTPATIENT)
Age: 89
End: 2024-04-23

## 2024-04-23 VITALS
WEIGHT: 117.95 LBS | HEART RATE: 76 BPM | TEMPERATURE: 99 F | HEIGHT: 63 IN | RESPIRATION RATE: 16 BRPM | OXYGEN SATURATION: 98 % | DIASTOLIC BLOOD PRESSURE: 53 MMHG | SYSTOLIC BLOOD PRESSURE: 115 MMHG

## 2024-04-23 VITALS
DIASTOLIC BLOOD PRESSURE: 50 MMHG | HEART RATE: 70 BPM | RESPIRATION RATE: 17 BRPM | OXYGEN SATURATION: 100 % | SYSTOLIC BLOOD PRESSURE: 112 MMHG | TEMPERATURE: 98 F

## 2024-04-23 DIAGNOSIS — M25.571 PAIN IN RIGHT ANKLE AND JOINTS OF RIGHT FOOT: ICD-10-CM

## 2024-04-23 DIAGNOSIS — Z95.0 PRESENCE OF CARDIAC PACEMAKER: Chronic | ICD-10-CM

## 2024-04-23 DIAGNOSIS — Z98.890 OTHER SPECIFIED POSTPROCEDURAL STATES: Chronic | ICD-10-CM

## 2024-04-23 DIAGNOSIS — Z88.8 ALLERGY STATUS TO OTHER DRUGS, MEDICAMENTS AND BIOLOGICAL SUBSTANCES: ICD-10-CM
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
## 2024-04-23 DIAGNOSIS — S99.912A UNSPECIFIED INJURY OF LEFT ANKLE, INITIAL ENCOUNTER: ICD-10-CM

## 2024-04-23 DIAGNOSIS — R91.8 OTHER NONSPECIFIC ABNORMAL FINDING OF LUNG FIELD: ICD-10-CM

## 2024-04-23 DIAGNOSIS — Z79.01 LONG TERM (CURRENT) USE OF ANTICOAGULANTS: ICD-10-CM

## 2024-04-23 DIAGNOSIS — Z95.810 PRESENCE OF AUTOMATIC (IMPLANTABLE) CARDIAC DEFIBRILLATOR: Chronic | ICD-10-CM

## 2024-04-23 DIAGNOSIS — I50.9 HEART FAILURE, UNSPECIFIED: ICD-10-CM

## 2024-04-23 DIAGNOSIS — Z98.49 CATARACT EXTRACTION STATUS, UNSPECIFIED EYE: Chronic | ICD-10-CM

## 2024-04-23 DIAGNOSIS — E03.9 HYPOTHYROIDISM, UNSPECIFIED: ICD-10-CM

## 2024-04-23 DIAGNOSIS — Z91.09 OTHER ALLERGY STATUS, OTHER THAN TO DRUGS AND BIOLOGICAL SUBSTANCES: ICD-10-CM
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
## 2024-04-23 DIAGNOSIS — I48.91 UNSPECIFIED ATRIAL FIBRILLATION: ICD-10-CM

## 2024-04-23 PROCEDURE — 76376 3D RENDER W/INTRP POSTPROCES: CPT | Mod: 26

## 2024-04-23 PROCEDURE — 70450 CT HEAD/BRAIN W/O DYE: CPT | Mod: MC

## 2024-04-23 PROCEDURE — 72125 CT NECK SPINE W/O DYE: CPT | Mod: MC

## 2024-04-23 PROCEDURE — 73620 X-RAY EXAM OF FOOT: CPT | Mod: LT

## 2024-04-23 PROCEDURE — 70450 CT HEAD/BRAIN W/O DYE: CPT | Mod: 26,MC

## 2024-04-23 PROCEDURE — 73700 CT LOWER EXTREMITY W/O DYE: CPT | Mod: MC,LT

## 2024-04-23 PROCEDURE — 99285 EMERGENCY DEPT VISIT HI MDM: CPT

## 2024-04-23 PROCEDURE — 82962 GLUCOSE BLOOD TEST: CPT

## 2024-04-23 PROCEDURE — 73590 X-RAY EXAM OF LOWER LEG: CPT | Mod: 26,LT

## 2024-04-23 PROCEDURE — 73620 X-RAY EXAM OF FOOT: CPT | Mod: 26,LT

## 2024-04-23 PROCEDURE — 73700 CT LOWER EXTREMITY W/O DYE: CPT | Mod: 26,LT,MC

## 2024-04-23 PROCEDURE — 99284 EMERGENCY DEPT VISIT MOD MDM: CPT | Mod: 25

## 2024-04-23 PROCEDURE — 73610 X-RAY EXAM OF ANKLE: CPT | Mod: 26,LT

## 2024-04-23 PROCEDURE — 73590 X-RAY EXAM OF LOWER LEG: CPT | Mod: LT

## 2024-04-23 PROCEDURE — 76376 3D RENDER W/INTRP POSTPROCES: CPT

## 2024-04-23 PROCEDURE — 73610 X-RAY EXAM OF ANKLE: CPT | Mod: LT

## 2024-04-23 PROCEDURE — 72125 CT NECK SPINE W/O DYE: CPT | Mod: 26,MC

## 2024-04-23 NOTE — ED PROVIDER NOTE - PHYSICAL EXAMINATION
Constitutional: well appearing, NAD AAOx3  Eyes: EOMI, PERRL  Head: Normocephalic atraumatic  Mouth: no airway obstruction, posterior oropharynx clear without erythema or exudate  Neck: supple  Cardiac: regular rate and rhythm, no MRG  Resp: Lungs CTAB  GI: Abd s/nt/nd  Neuro: CN2-12 intact, strength 5/5x4, sensation grossly intact  Skin: No rashes  MSK: no midline tenderness of CTL spine, +right ankle swollen with closed deformity, limited rom 2/2 pain and swelling, 2+ DP and normal capillary regill

## 2024-04-23 NOTE — ED PROVIDER NOTE - CARE PROVIDER_API CALL
Gagan Kenny  Orthopaedic Surgery  [address] [place] [id number]  Phone: [phone]  Fax: [phone]  Follow Up Time: 4-6 Days

## 2024-04-23 NOTE — ED ADULT TRIAGE NOTE - CHIEF COMPLAINT QUOTE
bib ems s/p fall yesterday at 2am. as per ems " pt was on toilet fell off toilet  + head strike hematoma to back of head,-loc, + anticoagulants eliquis. bgm 117 in triage. PMH: CHF, Hyperlipidemia, htn, hypothyroid. neuro alert called at 1145 am pt to ct . md quiñones at triage. npt poor historian.

## 2024-04-23 NOTE — ED PROVIDER NOTE - PROGRESS NOTE DETAILS
CTS NEG FOR FX. WILL DC BACK TO FACILITY. f orthopedics on-call states Cleared patient for discharge  Aircast applied.  Will follow-up with Dr. rodriguez.

## 2024-04-23 NOTE — ED ADULT NURSE NOTE - OBJECTIVE STATEMENT
pt c/o fall earlier today in the bathroom. pt stated "I must have lost my  and fell." +headstrike, denies loc. pt is a&ox4. denies headache, dizziness, cp/n/v/d/sob. ecchymosis noted to left ankle. speaking in full and complete sentences without difficulty. pmh of afib on eliquis.

## 2024-04-23 NOTE — ED PROVIDER NOTE - OBJECTIVE STATEMENT
89yoF PMH CHF, celiac disease, hx lung mass (not being pursued/treated), AF on eliquis, hypothyroid, MV clipping presents with right ankle pain s/p fall. Pt reports she fell early this morning around 2am and hit her head, no LOC. Since then her right ankle has been painful and swollen. Denies headache, vision changes, numbness tingling weakness, chest pain, shortness of breath, nausea vomiting abdominal pain, back pain, urinary symptoms, diarrhea complexly stools, rashes

## 2024-04-23 NOTE — ED ADULT NURSE NOTE - TEMPLATE
Order entered.    
Patient coming for labs on 12/09/2021 .  Visit note states labs for Malloy-lippnl,cmp,tshr. There is no tshr order.  Please enter orders or edit appointment notes, thank you.     
Fall

## 2024-04-23 NOTE — ED PROVIDER NOTE - PATIENT PORTAL LINK FT
You can access the FollowMyHealth Patient Portal offered by [facility] by registering at the following website: [email or web address]. By joining [employer]’s FollowMyHealth portal, you will also be able to view your health information using other applications (apps) compatible with our system.

## 2024-04-25 ENCOUNTER — INPATIENT (INPATIENT)
Facility: HOSPITAL | Age: 89
LOS: 2 days | Discharge: ANOTHER HOSPITAL | DRG: 914 | End: 2024-04-28
Attending: STUDENT IN AN ORGANIZED HEALTH CARE EDUCATION/TRAINING PROGRAM | Admitting: STUDENT IN AN ORGANIZED HEALTH CARE EDUCATION/TRAINING PROGRAM
Payer: MEDICARE

## 2024-04-25 VITALS
DIASTOLIC BLOOD PRESSURE: 55 MMHG | SYSTOLIC BLOOD PRESSURE: 117 MMHG | TEMPERATURE: 98 F | RESPIRATION RATE: 18 BRPM | HEIGHT: 63 IN | HEART RATE: 76 BPM | OXYGEN SATURATION: 100 % | WEIGHT: 138.89 LBS

## 2024-04-25 DIAGNOSIS — Z95.0 PRESENCE OF CARDIAC PACEMAKER: Chronic | ICD-10-CM

## 2024-04-25 DIAGNOSIS — Z98.49 CATARACT EXTRACTION STATUS, UNSPECIFIED EYE: Chronic | ICD-10-CM

## 2024-04-25 DIAGNOSIS — Z95.810 PRESENCE OF AUTOMATIC (IMPLANTABLE) CARDIAC DEFIBRILLATOR: Chronic | ICD-10-CM

## 2024-04-25 DIAGNOSIS — Z98.890 OTHER SPECIFIED POSTPROCEDURAL STATES: Chronic | ICD-10-CM

## 2024-04-25 LAB
ALBUMIN SERPL ELPH-MCNC: 2.9 G/DL — LOW (ref 3.3–5)
ALP SERPL-CCNC: 80 U/L — SIGNIFICANT CHANGE UP (ref 40–120)
ALT FLD-CCNC: 17 U/L — SIGNIFICANT CHANGE UP (ref 12–78)
ANION GAP SERPL CALC-SCNC: 2 MMOL/L — LOW (ref 5–17)
AST SERPL-CCNC: 30 U/L — SIGNIFICANT CHANGE UP (ref 15–37)
BASE EXCESS BLDA CALC-SCNC: 12.5 MMOL/L — HIGH (ref -2–3)
BASOPHILS # BLD AUTO: 0.04 K/UL — SIGNIFICANT CHANGE UP (ref 0–0.2)
BASOPHILS NFR BLD AUTO: 0.6 % — SIGNIFICANT CHANGE UP (ref 0–2)
BILIRUB SERPL-MCNC: 0.6 MG/DL — SIGNIFICANT CHANGE UP (ref 0.2–1.2)
BLOOD GAS COMMENTS ARTERIAL: SIGNIFICANT CHANGE UP
BUN SERPL-MCNC: 32 MG/DL — HIGH (ref 7–23)
CALCIUM SERPL-MCNC: 8.7 MG/DL — SIGNIFICANT CHANGE UP (ref 8.5–10.1)
CHLORIDE SERPL-SCNC: 92 MMOL/L — LOW (ref 96–108)
CO2 SERPL-SCNC: 38 MMOL/L — HIGH (ref 22–31)
CREAT SERPL-MCNC: 1.09 MG/DL — SIGNIFICANT CHANGE UP (ref 0.5–1.3)
EGFR: 49 ML/MIN/1.73M2 — LOW
EOSINOPHIL # BLD AUTO: 0.01 K/UL — SIGNIFICANT CHANGE UP (ref 0–0.5)
EOSINOPHIL NFR BLD AUTO: 0.1 % — SIGNIFICANT CHANGE UP (ref 0–6)
GAS PNL BLDA: SIGNIFICANT CHANGE UP
GLUCOSE SERPL-MCNC: 92 MG/DL — SIGNIFICANT CHANGE UP (ref 70–99)
HCO3 BLDA-SCNC: 40 MMOL/L — HIGH (ref 21–28)
HCT VFR BLD CALC: 32 % — LOW (ref 34.5–45)
HGB BLD-MCNC: 9.8 G/DL — LOW (ref 11.5–15.5)
IMM GRANULOCYTES NFR BLD AUTO: 0.3 % — SIGNIFICANT CHANGE UP (ref 0–0.9)
LYMPHOCYTES # BLD AUTO: 0.53 K/UL — LOW (ref 1–3.3)
LYMPHOCYTES # BLD AUTO: 7.9 % — LOW (ref 13–44)
MCHC RBC-ENTMCNC: 30.6 GM/DL — LOW (ref 32–36)
MCHC RBC-ENTMCNC: 31.7 PG — SIGNIFICANT CHANGE UP (ref 27–34)
MCV RBC AUTO: 103.6 FL — HIGH (ref 80–100)
MONOCYTES # BLD AUTO: 0.8 K/UL — SIGNIFICANT CHANGE UP (ref 0–0.9)
MONOCYTES NFR BLD AUTO: 11.9 % — SIGNIFICANT CHANGE UP (ref 2–14)
NEUTROPHILS # BLD AUTO: 5.34 K/UL — SIGNIFICANT CHANGE UP (ref 1.8–7.4)
NEUTROPHILS NFR BLD AUTO: 79.2 % — HIGH (ref 43–77)
NT-PROBNP SERPL-SCNC: 4582 PG/ML — HIGH (ref 0–450)
PCO2 BLDA: 65 MMHG — HIGH (ref 32–45)
PH BLDA: 7.4 — SIGNIFICANT CHANGE UP (ref 7.35–7.45)
PLATELET # BLD AUTO: 194 K/UL — SIGNIFICANT CHANGE UP (ref 150–400)
PO2 BLDA: 82 MMHG — LOW (ref 83–108)
POTASSIUM SERPL-MCNC: 4.4 MMOL/L — SIGNIFICANT CHANGE UP (ref 3.5–5.3)
POTASSIUM SERPL-SCNC: 4.4 MMOL/L — SIGNIFICANT CHANGE UP (ref 3.5–5.3)
PROT SERPL-MCNC: 7.4 GM/DL — SIGNIFICANT CHANGE UP (ref 6–8.3)
RBC # BLD: 3.09 M/UL — LOW (ref 3.8–5.2)
RBC # FLD: 13.7 % — SIGNIFICANT CHANGE UP (ref 10.3–14.5)
SAO2 % BLDA: 98 % — SIGNIFICANT CHANGE UP (ref 94–98)
SODIUM SERPL-SCNC: 132 MMOL/L — LOW (ref 135–145)
TROPONIN I, HIGH SENSITIVITY RESULT: 41.96 NG/L — SIGNIFICANT CHANGE UP
WBC # BLD: 6.74 K/UL — SIGNIFICANT CHANGE UP (ref 3.8–10.5)
WBC # FLD AUTO: 6.74 K/UL — SIGNIFICANT CHANGE UP (ref 3.8–10.5)

## 2024-04-25 PROCEDURE — 71045 X-RAY EXAM CHEST 1 VIEW: CPT | Mod: 26

## 2024-04-25 RX ORDER — SACUBITRIL AND VALSARTAN 24; 26 MG/1; MG/1
1 TABLET, FILM COATED ORAL
Qty: 0 | Refills: 0 | DISCHARGE

## 2024-04-25 RX ORDER — APIXABAN 2.5 MG/1
2.5 TABLET, FILM COATED ORAL
Refills: 0 | Status: DISCONTINUED | OUTPATIENT
Start: 2024-04-25 | End: 2024-04-28

## 2024-04-25 RX ORDER — CITALOPRAM 10 MG/1
10 TABLET, FILM COATED ORAL DAILY
Refills: 0 | Status: DISCONTINUED | OUTPATIENT
Start: 2024-04-25 | End: 2024-04-28

## 2024-04-25 RX ORDER — ACETAMINOPHEN 500 MG
650 TABLET ORAL EVERY 6 HOURS
Refills: 0 | Status: DISCONTINUED | OUTPATIENT
Start: 2024-04-25 | End: 2024-04-28

## 2024-04-25 RX ORDER — ATORVASTATIN CALCIUM 80 MG/1
1 TABLET, FILM COATED ORAL
Qty: 0 | Refills: 0 | DISCHARGE

## 2024-04-25 RX ORDER — ONDANSETRON 8 MG/1
4 TABLET, FILM COATED ORAL EVERY 8 HOURS
Refills: 0 | Status: DISCONTINUED | OUTPATIENT
Start: 2024-04-25 | End: 2024-04-28

## 2024-04-25 RX ORDER — LEVOTHYROXINE SODIUM 125 MCG
1 TABLET ORAL
Refills: 0 | DISCHARGE

## 2024-04-25 RX ORDER — ATORVASTATIN CALCIUM 80 MG/1
10 TABLET, FILM COATED ORAL AT BEDTIME
Refills: 0 | Status: DISCONTINUED | OUTPATIENT
Start: 2024-04-25 | End: 2024-04-28

## 2024-04-25 RX ORDER — ALPRAZOLAM 0.25 MG
0.25 TABLET ORAL ONCE
Refills: 0 | Status: DISCONTINUED | OUTPATIENT
Start: 2024-04-25 | End: 2024-04-25

## 2024-04-25 RX ORDER — ASPIRIN/CALCIUM CARB/MAGNESIUM 324 MG
81 TABLET ORAL DAILY
Refills: 0 | Status: DISCONTINUED | OUTPATIENT
Start: 2024-04-25 | End: 2024-04-28

## 2024-04-25 RX ORDER — LEVOTHYROXINE SODIUM 125 MCG
125 TABLET ORAL DAILY
Refills: 0 | Status: DISCONTINUED | OUTPATIENT
Start: 2024-04-25 | End: 2024-04-28

## 2024-04-25 RX ORDER — SACUBITRIL AND VALSARTAN 24; 26 MG/1; MG/1
1 TABLET, FILM COATED ORAL
Refills: 0 | Status: DISCONTINUED | OUTPATIENT
Start: 2024-04-25 | End: 2024-04-28

## 2024-04-25 RX ORDER — FUROSEMIDE 40 MG
20 TABLET ORAL ONCE
Refills: 0 | Status: COMPLETED | OUTPATIENT
Start: 2024-04-25 | End: 2024-04-25

## 2024-04-25 RX ORDER — LANOLIN ALCOHOL/MO/W.PET/CERES
3 CREAM (GRAM) TOPICAL AT BEDTIME
Refills: 0 | Status: DISCONTINUED | OUTPATIENT
Start: 2024-04-25 | End: 2024-04-28

## 2024-04-25 RX ORDER — MIRTAZAPINE 45 MG/1
7.5 TABLET, ORALLY DISINTEGRATING ORAL AT BEDTIME
Refills: 0 | Status: DISCONTINUED | OUTPATIENT
Start: 2024-04-25 | End: 2024-04-28

## 2024-04-25 RX ORDER — FUROSEMIDE 40 MG
20 TABLET ORAL
Refills: 0 | Status: DISCONTINUED | OUTPATIENT
Start: 2024-04-25 | End: 2024-04-26

## 2024-04-25 RX ORDER — CITALOPRAM 10 MG/1
1 TABLET, FILM COATED ORAL
Refills: 0 | DISCHARGE

## 2024-04-25 RX ORDER — DIGOXIN 250 MCG
62.5 TABLET ORAL DAILY
Refills: 0 | Status: DISCONTINUED | OUTPATIENT
Start: 2024-04-25 | End: 2024-04-28

## 2024-04-25 RX ADMIN — Medication 20 MILLIGRAM(S): at 23:21

## 2024-04-25 RX ADMIN — Medication 20 MILLIGRAM(S): at 20:50

## 2024-04-25 RX ADMIN — Medication 650 MILLIGRAM(S): at 23:51

## 2024-04-25 RX ADMIN — Medication 0.25 MILLIGRAM(S): at 17:35

## 2024-04-25 RX ADMIN — Medication 650 MILLIGRAM(S): at 23:21

## 2024-04-25 RX ADMIN — Medication 3 MILLIGRAM(S): at 23:21

## 2024-04-25 NOTE — H&P ADULT - ASSESSMENT
Pt is an 89 yo female with a pmh/o HFrEF with EF 35-40% who was recently admitted due to acute on chronic hypoxic respiratory failure due to CHF, discharged on 3/28/24, admitted due to:    #Acute decompensated heart failure  admit to telemetry  cardiology consult  strict i/o's  daily weights  TTE ordered  c/w entresto  c/w digoxin  EKG paced  troponin wnl  Pro-BnP >4500  cbc, cmp, coags, mg, phos for AM  IV diuresis with close monitoring of renal function  Chest x ray with worsening effusion  fall precautions  aspiration precautions  eliquis for dvt ppx  PT consult    #Anemia  at baseline h/h  monitor cbc   pt on eliquis, denies any active bleeding or melena/hematochezia    #Hyponatremia  Na 132  f/u cmp in AM after diuresis  asymptomatic    #Protein calorie malnutrition  nutrition consult  po intake promoted    #HTN/HLD  c/w lipitor  c/w digoxin  c/w entresto with close monitoring of renal fxn in setting of diuresis     Pt is an 88 yo female with a pmh/o HFrEF with EF 35-40% who was recently admitted due to acute on chronic hypoxic respiratory failure due to CHF, discharged on 3/28/24, admitted due to:    #Acute decompensated heart failure  place in observation to telemetry  cardiology consult  strict i/o's  daily weights  TTE ordered  c/w entresto  c/w digoxin  EKG paced  troponin wnl  Pro-BnP >4500  cbc, cmp, coags, mg, phos for AM  IV diuresis with close monitoring of renal function  Chest x ray with worsening effusion  fall precautions  aspiration precautions  eliquis for dvt ppx  PT consult    #Anemia  at baseline h/h  monitor cbc   pt on eliquis, denies any active bleeding or melena/hematochezia    #Hyponatremia  Na 132  f/u cmp in AM after diuresis  asymptomatic    #Protein calorie malnutrition  nutrition consult  po intake promoted    #HTN/HLD  c/w lipitor  c/w digoxin  c/w entresto with close monitoring of renal fxn in setting of diuresis

## 2024-04-25 NOTE — ED PROVIDER NOTE - HOW PATIENT ADDRESSED, PROFILE
From: Manuelito Perez  To: Taiwo Echevarria,   Sent: 9/30/2017 1:02 PM CDT  Subject: Question regarding XR FEMUR 2+ VW RIGHT    I have a question about XR FEMUR 2+ VW RIGHT resulted on 9/15/17 at 4:40 PM.    Did they do an xray of my right hip also? If so, what are the results?   Thank you  ~Manuelito Connell  
[place]

## 2024-04-25 NOTE — PROVIDER CONTACT NOTE (OTHER) - ACTION/TREATMENT ORDERED:
MD House made aware. As per admitting doctor, no appropriate diuresis given for CHF.   20mg IV lasix STAT given.

## 2024-04-25 NOTE — ED ADULT NURSE NOTE - OBJECTIVE STATEMENT
pt. is a 88 y/o female a&o x4 BIBEMS after MD Dickey sent pt. to ED to rule out pneumonia. PTA, pt. had cough and difficulty breathing. pt. uses 3L O2 via nasal cannula at baseline at home. at this time, pt. denies SOB; O2 sat 98%. pt. unable to recall relevant PMHx. 20 gauge IV placed in right forearm with labs drawn and sent. pt. is a 89 y/o female a&o x4 BIBEMS after MD Dickey sent pt. to ED to rule out pneumonia. PTA, pt. had cough and difficulty breathing. pt. uses 3L O2 via nasal cannula at baseline at home. at this time, pt. denies SOB; O2 sat 98%. pt. unable to recall relevant PMHx. pt. requesting Xanax for alleviation of nerves and anxiety. pt. also has bilateral hearing aids.  20 gauge IV placed in right forearm with labs drawn and sent.

## 2024-04-25 NOTE — PATIENT PROFILE ADULT - FOOD INSECURITY
3:30 PM  2022    + COVID and metapnuemovirus on resp panel. Mother aware of results. Discussed sxs relief and supportive care. PCP FU. Reasons to RTED discussed with pt's mother. All questions answered. Mother expressed understanding.      Selwyn Willingham PA-C no

## 2024-04-25 NOTE — ED PROVIDER NOTE - CLINICAL SUMMARY MEDICAL DECISION MAKING FREE TEXT BOX
Patient with lethargy, cough at home. Sent in by home nurse. Unable to reach nurse. Patient satting 100 on baseline 3LNC and having no complaints. Vitals stable, afebrile. Labs, cxr ordered. Worsening Pulmonary edema demonstrated since march. Patient with lethargy, cough at home. Sent in by home nurse. Patient satting 100 on baseline 3LNC and having no complaints. Vitals stable, afebrile. Labs, cxr ordered. Worsening Pulmonary edema demonstrated since march. As per visiting nurse and home health aide patient is more lethargic than baseline. Patient placed in observation in stable condition.

## 2024-04-25 NOTE — ED ADULT NURSE REASSESSMENT NOTE - NS ED NURSE REASSESS COMMENT FT1
Received pt, A/Ox4. O2 3L NC satting 100%, wears 3L at baseline. Bedpan used. Pt anxious, xanax given. Currently eating dinner. Cardiac monitoring, V-paced rhythm. Plan of care discussed with pt, awaiting bed upstairs.

## 2024-04-25 NOTE — ED PROVIDER NOTE - PHYSICAL EXAMINATION
Const: Well appearing, NAD  Eyes: PERRL, EOM intact  CV: RRR, no murmurs, no chest wall tenderness, distal pulses intact  Resp: CTAB, normal resp effort  GI: soft, nondistended, nontender. No CVA tenderness  MSK: Full ROM, no muscle or bony deformity or tenderness  Neuro: AOx3, GCS 15, No focal deficits  Skin: No rash, laceration or abrasion  Psych: calm, cooperative.

## 2024-04-25 NOTE — H&P ADULT - TIME BILLING
A minimum of 75 min was spent completing this admission not including time spent discussing advanced care planning or discussing goals of care with a minimum of 36 min spent face to face with patient and daughter while in ED unit on admission, counseling patient on current acute on chronic conditions and discussing plan and coordination of care including PT consult and evaluation for continued home services, cardiology consult for recurrent CHF and dietary consult for protein calorie malnutrition.

## 2024-04-25 NOTE — ED PROVIDER NOTE - OBJECTIVE STATEMENT
89 y/o female with a PMHx of AICD, arthritis, cataracts, CHF, colon polyps, celiac disease, chronic Afib, Hashimoto's, hearing loss, HTN, HLD, hypothyroid, iron deficiency anemia, left inguinal hernia, MVP, MI, osteoporosis, peripheral edema, varicose veins presents to the ED c/o 89 y/o female with a PMHx of AICD, arthritis, cataracts, CHF, colon polyps, celiac disease, chronic Afib, Hashimoto's, hearing loss, HTN, HLD, hypothyroid, iron deficiency anemia, left inguinal hernia, MVP, MI, osteoporosis, peripheral edema, varicose veins presents to the ED for evaluation. Home nurse reported pt was lethargic and had LE swelling. Pt states she feels well. +cough. No other complaints at this time.

## 2024-04-25 NOTE — H&P ADULT - CONVERSATION DETAILS
16 min spent discussing advanced care planning and pt is a DNR/DNI and accepts trial NIV. MOLST placed in chart. order placed.

## 2024-04-25 NOTE — H&P ADULT - HISTORY OF PRESENT ILLNESS
Pt is a 89 yo female with a pmh/o celiac disease, lung mass for which she is not pursuing treatment by choice, afib on eliquis, HFrEF, hypothyroidism, HTN, HLD, who presents from home due to increased fatigue in setting of worsening leg swelling in setting of cough. Pt states that she uses 3L NC supplemental O2 at home and has not been feeling short of breath while at rest however gets easily fatigued with exertion and has to sit up otherwise begins to cough.   Daughter at bedside states that she is not at her baseline, noting leg swelling and dyspnea.     Pt denies chest pain, palpitations, nausea, vomiting, headache, fever, chills, rash, sick contacts, change in diet, change in medication, dysuria, diarrhea, constipation, paresthesias, change in vision/gait/hearing/speech.

## 2024-04-25 NOTE — PATIENT PROFILE ADULT - FALL HARM RISK - HARM RISK INTERVENTIONS

## 2024-04-25 NOTE — ED ADULT TRIAGE NOTE - CHIEF COMPLAINT QUOTE
patient brought in by EMS from home to r/o pneumonia.  as per EMS, patients visiting nurse stated patients doctor wants patient to come to r/o pneumonia.  uses 3L NC at home.  patient denies SOB.

## 2024-04-25 NOTE — ED ADULT NURSE REASSESSMENT NOTE - NS ED NURSE REASSESS COMMENT FT1
Patient requesting xanax, daughter also requesting xanax for patient at this time, left message with MD Hamm, awaiting call back.

## 2024-04-25 NOTE — ED ADULT NURSE NOTE - NSFALLRISKINTERV_ED_ALL_ED

## 2024-04-25 NOTE — ED ADULT TRIAGE NOTE - O2 FLOW (L/MIN)
Problem: Pressure Injury, Risk for  Goal: # Skin remains intact  Outcome: Outcome Met, Continue evaluating goal progress toward completion  Pt's skin has remained intact with no s/s of new skin breakdown during this shift. Will continue to monitor.       3

## 2024-04-26 ENCOUNTER — TRANSCRIPTION ENCOUNTER (OUTPATIENT)
Age: 89
End: 2024-04-26

## 2024-04-26 LAB
ALBUMIN SERPL ELPH-MCNC: 2.6 G/DL — LOW (ref 3.3–5)
ALP SERPL-CCNC: 64 U/L — SIGNIFICANT CHANGE UP (ref 40–120)
ALT FLD-CCNC: 13 U/L — SIGNIFICANT CHANGE UP (ref 12–78)
ANION GAP SERPL CALC-SCNC: 1 MMOL/L — LOW (ref 5–17)
APTT BLD: 42.1 SEC — HIGH (ref 24.5–35.6)
AST SERPL-CCNC: 26 U/L — SIGNIFICANT CHANGE UP (ref 15–37)
BASOPHILS # BLD AUTO: 0.04 K/UL — SIGNIFICANT CHANGE UP (ref 0–0.2)
BASOPHILS NFR BLD AUTO: 0.8 % — SIGNIFICANT CHANGE UP (ref 0–2)
BILIRUB SERPL-MCNC: 0.7 MG/DL — SIGNIFICANT CHANGE UP (ref 0.2–1.2)
BUN SERPL-MCNC: 31 MG/DL — HIGH (ref 7–23)
CALCIUM SERPL-MCNC: 8.7 MG/DL — SIGNIFICANT CHANGE UP (ref 8.5–10.1)
CHLORIDE SERPL-SCNC: 93 MMOL/L — LOW (ref 96–108)
CO2 SERPL-SCNC: 39 MMOL/L — HIGH (ref 22–31)
CREAT SERPL-MCNC: 1.23 MG/DL — SIGNIFICANT CHANGE UP (ref 0.5–1.3)
EGFR: 42 ML/MIN/1.73M2 — LOW
EOSINOPHIL # BLD AUTO: 0.07 K/UL — SIGNIFICANT CHANGE UP (ref 0–0.5)
EOSINOPHIL NFR BLD AUTO: 1.4 % — SIGNIFICANT CHANGE UP (ref 0–6)
GLUCOSE SERPL-MCNC: 84 MG/DL — SIGNIFICANT CHANGE UP (ref 70–99)
HCT VFR BLD CALC: 23.5 % — LOW (ref 34.5–45)
HGB BLD-MCNC: 7.5 G/DL — LOW (ref 11.5–15.5)
IMM GRANULOCYTES NFR BLD AUTO: 0.4 % — SIGNIFICANT CHANGE UP (ref 0–0.9)
INR BLD: 1.73 RATIO — HIGH (ref 0.85–1.18)
LYMPHOCYTES # BLD AUTO: 1.12 K/UL — SIGNIFICANT CHANGE UP (ref 1–3.3)
LYMPHOCYTES # BLD AUTO: 22 % — SIGNIFICANT CHANGE UP (ref 13–44)
MAGNESIUM SERPL-MCNC: 2.1 MG/DL — SIGNIFICANT CHANGE UP (ref 1.6–2.6)
MCHC RBC-ENTMCNC: 31.9 GM/DL — LOW (ref 32–36)
MCHC RBC-ENTMCNC: 32.3 PG — SIGNIFICANT CHANGE UP (ref 27–34)
MCV RBC AUTO: 101.3 FL — HIGH (ref 80–100)
MONOCYTES # BLD AUTO: 0.72 K/UL — SIGNIFICANT CHANGE UP (ref 0–0.9)
MONOCYTES NFR BLD AUTO: 14.2 % — HIGH (ref 2–14)
NEUTROPHILS # BLD AUTO: 3.11 K/UL — SIGNIFICANT CHANGE UP (ref 1.8–7.4)
NEUTROPHILS NFR BLD AUTO: 61.2 % — SIGNIFICANT CHANGE UP (ref 43–77)
PHOSPHATE SERPL-MCNC: 3 MG/DL — SIGNIFICANT CHANGE UP (ref 2.5–4.5)
PLATELET # BLD AUTO: 192 K/UL — SIGNIFICANT CHANGE UP (ref 150–400)
POTASSIUM SERPL-MCNC: 4.3 MMOL/L — SIGNIFICANT CHANGE UP (ref 3.5–5.3)
POTASSIUM SERPL-SCNC: 4.3 MMOL/L — SIGNIFICANT CHANGE UP (ref 3.5–5.3)
PROT SERPL-MCNC: 6.4 GM/DL — SIGNIFICANT CHANGE UP (ref 6–8.3)
PROTHROM AB SERPL-ACNC: 19.2 SEC — HIGH (ref 9.5–13)
RBC # BLD: 2.32 M/UL — LOW (ref 3.8–5.2)
RBC # FLD: 13.6 % — SIGNIFICANT CHANGE UP (ref 10.3–14.5)
SODIUM SERPL-SCNC: 133 MMOL/L — LOW (ref 135–145)
WBC # BLD: 5.08 K/UL — SIGNIFICANT CHANGE UP (ref 3.8–10.5)
WBC # FLD AUTO: 5.08 K/UL — SIGNIFICANT CHANGE UP (ref 3.8–10.5)

## 2024-04-26 RX ORDER — THIAMINE MONONITRATE (VIT B1) 100 MG
100 TABLET ORAL DAILY
Refills: 0 | Status: DISCONTINUED | OUTPATIENT
Start: 2024-04-26 | End: 2024-04-28

## 2024-04-26 RX ORDER — ZINC SULFATE TAB 220 MG (50 MG ZINC EQUIVALENT) 220 (50 ZN) MG
220 TAB ORAL DAILY
Refills: 0 | Status: DISCONTINUED | OUTPATIENT
Start: 2024-04-26 | End: 2024-04-28

## 2024-04-26 RX ORDER — FUROSEMIDE 40 MG
20 TABLET ORAL DAILY
Refills: 0 | Status: DISCONTINUED | OUTPATIENT
Start: 2024-04-27 | End: 2024-04-28

## 2024-04-26 RX ORDER — ASCORBIC ACID 60 MG
500 TABLET,CHEWABLE ORAL DAILY
Refills: 0 | Status: DISCONTINUED | OUTPATIENT
Start: 2024-04-26 | End: 2024-04-28

## 2024-04-26 RX ORDER — MULTIVIT-MIN/FERROUS GLUCONATE 9 MG/15 ML
1 LIQUID (ML) ORAL DAILY
Refills: 0 | Status: DISCONTINUED | OUTPATIENT
Start: 2024-04-26 | End: 2024-04-28

## 2024-04-26 RX ADMIN — APIXABAN 2.5 MILLIGRAM(S): 2.5 TABLET, FILM COATED ORAL at 09:50

## 2024-04-26 RX ADMIN — Medication 500 MILLIGRAM(S): at 18:58

## 2024-04-26 RX ADMIN — ATORVASTATIN CALCIUM 10 MILLIGRAM(S): 80 TABLET, FILM COATED ORAL at 22:26

## 2024-04-26 RX ADMIN — Medication 20 MILLIGRAM(S): at 13:31

## 2024-04-26 RX ADMIN — APIXABAN 2.5 MILLIGRAM(S): 2.5 TABLET, FILM COATED ORAL at 22:26

## 2024-04-26 RX ADMIN — Medication 125 MICROGRAM(S): at 06:46

## 2024-04-26 RX ADMIN — Medication 3 MILLIGRAM(S): at 22:25

## 2024-04-26 RX ADMIN — SACUBITRIL AND VALSARTAN 1 TABLET(S): 24; 26 TABLET, FILM COATED ORAL at 09:50

## 2024-04-26 RX ADMIN — CITALOPRAM 10 MILLIGRAM(S): 10 TABLET, FILM COATED ORAL at 09:49

## 2024-04-26 RX ADMIN — Medication 100 MILLIGRAM(S): at 13:31

## 2024-04-26 RX ADMIN — Medication 20 MILLIGRAM(S): at 06:45

## 2024-04-26 RX ADMIN — ZINC SULFATE TAB 220 MG (50 MG ZINC EQUIVALENT) 220 MILLIGRAM(S): 220 (50 ZN) TAB at 18:58

## 2024-04-26 RX ADMIN — Medication 81 MILLIGRAM(S): at 09:49

## 2024-04-26 RX ADMIN — SACUBITRIL AND VALSARTAN 1 TABLET(S): 24; 26 TABLET, FILM COATED ORAL at 22:27

## 2024-04-26 RX ADMIN — Medication 62.5 MICROGRAM(S): at 09:49

## 2024-04-26 RX ADMIN — MIRTAZAPINE 7.5 MILLIGRAM(S): 45 TABLET, ORALLY DISINTEGRATING ORAL at 22:27

## 2024-04-26 NOTE — DIETITIAN NUTRITION RISK NOTIFICATION - TREATMENT: THE FOLLOWING DIET HAS BEEN RECOMMENDED
Diet, Regular:   DASH/TLC {Sodium & Cholesterol Restricted} (DASH)  1500mL Fluid Restriction ([id number]) (04-25-24 @ 22:33) [Active]

## 2024-04-26 NOTE — DISCHARGE NOTE NURSING/CASE MANAGEMENT/SOCIAL WORK - NSDCPEFALRISK_GEN_ALL_CORE
For information on Fall & Injury Prevention, visit: [email or web address] OR  [email or web address] OR  https://www.cdc.gov/steadi/patient.html

## 2024-04-26 NOTE — DIETITIAN INITIAL EVALUATION ADULT - OTHER INFO
88 yo F w/ a pmh of celiac disease, lung mass for which she is not pursuing treatment by choice, afib on eliquis, HFrEF, hypothyroidism, HTN, HLD, who presents from home due to increased fatigue in setting of worsening leg swelling in setting of cough. Pt states that she uses 3L NC supplemental O2 at home and has not been feeling short of breath while at rest however gets easily fatigued with exertion and has to sit up otherwise begins to cough. Dtr at bedside states that she is not at her baseline, noting leg swelling and dyspnea.   Adm w/ Acute decompensated heart failure    Pt known to nutr services, dx'd w/ severe PCM on past admissions - continues to meet criteria. Pt endorses fair appetite at present. Endorses UBW of 92-93# - ? time frame. Previous RD doc'd wt of 90# 3/25 and 110# 12/6/23 - ? accuracy of 12/6/2023 wt 2/2 large discrepancy with UBW and past admission wt. RD obtained bed scale wt of 94# on 4/26. NFPE reveals severe muscle/fat wasting - appears thin, frail, bony. Pt currently on DASH/TLC diet w/ 1500mL FR, w/ celiac disease - consumes GF diet. Recommend Liberalize diet to regular to maximize caloric and nutrient intake.  Endorsing constipation - recommend implementing bowel regimen (none in place 4/26). Pt w/ Stage II PI - declining ONS at this time. Diet education not appropriate at this time 2/2 pt malnourished. See below for further recommendations.

## 2024-04-26 NOTE — DIETITIAN INITIAL EVALUATION ADULT - ADD RECOMMEND
1. Recommend Liberalize diet to regular to maximize caloric and nutrient intake.   2. Consider adding thiamine 100 mg daily 2/2 poor PO intake/ malnutrition   3. Recommend to add MVI w/minerals, Vit C 500 mg BID, add Zinc Sulfate 220 mg x 10 days to promote wound healing.   4. Encourage protein-rich foods, maximize food preferences   5. Monitor bowel movements, if no BM for >3 days, consider implementing bowel regimen.   6. Please obtain daily weights 2/2 CHF  7. Consider adding appetite stimulant such as Remeron or Marinol 2/2 chronically poor appetite/ PO intake   8. Obtain vitamin D 25OH level to assess nutriture   9. Confirm goals of care regarding nutrition support   RD will continue to monitor PO intake, labs, hydration, and wt prn.

## 2024-04-26 NOTE — PHYSICAL THERAPY INITIAL EVALUATION ADULT - GENERAL OBSERVATIONS, REHAB EVAL
Pt in bed w/ (+) O2 NC @ 2l/m, monitor in place, has L LE aircast in place from home. Order for eval/tx

## 2024-04-26 NOTE — DIETITIAN INITIAL EVALUATION ADULT - ORAL INTAKE PTA/DIET HISTORY
Pt lives at home with aide who helps cook and dtr grocery shops. Consumes 2 meals/day and snacks on fruit - likely meeting <75% ENN >1 mo. W/ Celiac disease - consumes GF diet. W/ Upper and lower dentures, denies difficulty chewing. Endorses chronic constipation that she takes stool softeners for.

## 2024-04-26 NOTE — PHYSICAL THERAPY INITIAL EVALUATION ADULT - NSACTIVITYREC_GEN_A_PT
pt unable to sit at EOB or amb > chair at this time d/t poor joshua to activity and low tone/mm weakness.

## 2024-04-26 NOTE — DISCHARGE NOTE NURSING/CASE MANAGEMENT/SOCIAL WORK - PATIENT PORTAL LINK FT
Call Center TCM Note      Responses   [facility] patient discharged from? [place]   Does the patient have one of the following disease processes/diagnoses(primary or secondary)? General Surgery   TCM attempt successful? No   Unsuccessful attempts Attempt 2          Ca Bejarano LPN    10/15/2021, 16:18 EDT      
You can access the FollowMyHealth Patient Portal offered by [facility] by registering at the following website: [email or web address]. By joining [employer]’s FollowMyHealth portal, you will also be able to view your health information using other applications (apps) compatible with our system.

## 2024-04-26 NOTE — DIETITIAN INITIAL EVALUATION ADULT - NS FNS DIET ORDER
Diet, Regular:   DASH/TLC {Sodium & Cholesterol Restricted} (DASH)  1500mL Fluid Restriction ([id number]) (04-25-24 @ 22:33)

## 2024-04-26 NOTE — PHYSICAL THERAPY INITIAL EVALUATION ADULT - PERTINENT HX OF CURRENT PROBLEM, REHAB EVAL
Patient is a 89y old  Female who presents with a chief complaint of Acute decompensated heart failure (25 Apr 2024 22:13)    PMH celiac disease, lung mass for which she is not pursuing treatment by choice, afib on eliquis, HFrEF, hypothyroidism, HTN, HLD, who presents from home due to increased fatigue in setting of worsening leg swelling in setting of cough. Pt states that she uses 3L NC supplemental O2 at home and has not been feeling short of breath while at rest however gets easily fatigued with exertion and has to sit up otherwise begins to cough.

## 2024-04-26 NOTE — DIETITIAN INITIAL EVALUATION ADULT - PERTINENT LABORATORY DATA
04-26    133<L>  |  93<L>  |  31<H>  ----------------------------<  84  4.3   |  39<H>  |  1.23    Ca    8.7      26 Apr 2024 07:16  Phos  3.0     04-26  Mg     2.1     04-26    TPro  6.4  /  Alb  2.6<L>  /  TBili  0.7  /  DBili  x   /  AST  26  /  ALT  13  /  AlkPhos  64  04-26  A1C with Estimated Average Glucose Result: 5.4 % (03-25-24 @ 07:09)

## 2024-04-26 NOTE — DIETITIAN NUTRITION RISK NOTIFICATION - PHYSICAL ASSESSMENT TEMPLES
Medication:   Requested Prescriptions     Pending Prescriptions Disp Refills    potassium chloride (KLOR-CON M) 10 MEQ extended release tablet [Pharmacy Med Name: Potassium Chloride Monik ER 10 MEQ Oral Tablet Extended Release] 180 tablet 0     Sig: Take 2 tablets by mouth once daily        Last Filled:  Discontinued by: Candido Bedoya DO on 9/12/2023 13:29   Reason: LIST CLEANUP       Patient Phone Number: [phone] (home)     Last appt: 10/17/2023   Next appt: 3/12/2024    Last OARRS:       1/30/2017     3:24 PM   RX Monitoring   Attestation The Prescription Monitoring Report for this patient was reviewed today. severe

## 2024-04-26 NOTE — PHYSICAL THERAPY INITIAL EVALUATION ADULT - MODALITIES TREATMENT COMMENTS
Pt has poor joshua to activity, sign mm atrophy noted t/o B UE/LE. Pt unable to sit at EOB d/t fatigue

## 2024-04-26 NOTE — DISCHARGE NOTE NURSING/CASE MANAGEMENT/SOCIAL WORK - NSDCVIVACCINE_GEN_ALL_CORE_FT
Tdap; 09-Nov-2017 22:13; Edward Grajeda); [employer]; [id number]; IntraMuscular; Deltoid Right.; 0.5 milliLiter(s); VIS (VIS Published: 09-May-2013, VIS Presented: 09-Nov-2017);

## 2024-04-26 NOTE — CONSULT NOTE ADULT - SUBJECTIVE AND OBJECTIVE BOX
CHIEF COMPLAINT: Patient is a 89y old  Female who presents with a chief complaint of Acute decompensated heart failure (25 Apr 2024 22:13)    FROM H&P; Pt is a 89 yo female with a pmh/o celiac disease, lung mass for which she is not pursuing treatment by choice, afib on eliquis, HFrEF, hypothyroidism, HTN, HLD, who presents from home due to increased fatigue in setting of worsening leg swelling in setting of cough. Pt states that she uses 3L NC supplemental O2 at home and has not been feeling short of breath while at rest however gets easily fatigued with exertion and has to sit up otherwise begins to cough.   Daughter at bedside states that she is not at her baseline, noting leg swelling and dyspnea.     Pt denies chest pain, palpitations, nausea, vomiting, headache, fever, chills, rash, sick contacts, change in diet, change in medication, dysuria, diarrhea, constipation, paresthesias, change in vision/gait/hearing/speech.   (25 Apr 2024 22:13)    Cardiology consulted for CHF  +SOB, fatigue      PAST MEDICAL HISTORY:  AICD (automatic cardioverter/defibrillator) present x 3 . Replaced x 2. Presently right subclavian area  Arthritis   Celiac disease   Chronic atrial fibrillation   Hashimoto's thyroiditis   Hearing loss   History of cataract   History of CHF (congestive heart failure)   History of colon polyps   HTN (hypertension)   Hyperlipidemia, unspecified hyperlipidemia type   Hypothyroid   Iron deficiency anemia due to chronic blood loss   Left inguinal hernia   Mitral valve prolapse   Myocardial infarction 1990  Osteoporosis   Peripheral edema   Varicose veins BLE    PAST SURGICAL HISTORY:  AICD (automatic cardioverter/defibrillator) present with PPM. Replaced x 2.  Artificial pacemaker   H/O colonoscopy last done 2009  H/O right inguinal hernia repair   History of cataract extraction Bilateral  History of heart surgery Mitral valve surgery for leaky valve 9/2020.     FAMILY HISTORY:  Father: Family history of dementia, Age at diagnosis: Age Unknown  Mother: Family history of diabetes mellitus, Age at diagnosis: Age Unknown. Family history of heart disease, Age at diagnosis: Age Unknown  Sibling: Family history of diabetes mellitus, Age at diagnosis: Age Unknown.    SOCIAL HISTORY:  pt lives at home w an aide  ambulates w walker at baseline  has PT come to her home    MEDICATIONS  (STANDING):  apixaban 2.5 milliGRAM(s) Oral two times a day  aspirin enteric coated 81 milliGRAM(s) Oral daily  atorvastatin 10 milliGRAM(s) Oral at bedtime  citalopram 10 milliGRAM(s) Oral daily  digoxin     Tablet 62.5 MICROGram(s) Oral daily  furosemide   Injectable 20 milliGRAM(s) IV Push two times a day  levothyroxine 125 MICROGram(s) Oral daily  mirtazapine 7.5 milliGRAM(s) Oral at bedtime  sacubitril 24 mG/valsartan 26 mG 1 Tablet(s) Oral two times a day    MEDICATIONS  (PRN):  acetaminophen     Tablet .. 650 milliGRAM(s) Oral every 6 hours PRN Temp greater or equal to 38C (100.4F), Mild Pain (1 - 3), Moderate Pain (4 - 6)  melatonin 3 milliGRAM(s) Oral at bedtime PRN Insomnia  ondansetron Injectable 4 milliGRAM(s) IV Push every 8 hours PRN Nausea and/or Vomiting    HOME MEDICATIONS:  aspirin 81 mg oral delayed release tablet: 1 tab(s) orally once a day (25 Apr 2024 18:03)  atorvastatin 10 mg oral tablet: 1 tab(s) orally once a day (at bedtime) (25 Apr 2024 18:03)  Calcium 500+D oral tablet, chewable: 1 tab(s) orally 2 times a day (25 Apr 2024 18:03)  citalopram 10 mg oral tablet: 1 tab(s) orally once a day (25 Apr 2024 18:03)  Entresto 24 mg-26 mg oral tablet: 1 tab(s) orally 2 times a day (25 Apr 2024 18:03)  furosemide 20 mg oral tablet: 1 tab(s) orally once a day ***pt doesn&#x27;t take consistently*** (25 Apr 2024 18:03)  levothyroxine 100 mcg (0.1 mg) oral tablet: 1 tab(s) orally once a day ***take with 25mcg = 125cg*** (25 Apr 2024 18:03)  levothyroxine 25 mcg (0.025 mg) oral tablet: 1 tab(s) orally once a day ***take with 100mcg = 125mcg*** (25 Apr 2024 18:03)  Potassium Chloride (Eqv-Klor-Con M10) 10 mEq oral tablet, extended release: 1 tab(s) orally once a day (25 Apr 2024 18:02)    PHYSICAL EXAM:  T(C): 36.5 (26 Apr 2024 07:40), Max: 36.7 (25 Apr 2024 16:21)  T(F): 97.7 (26 Apr 2024 07:40), Max: 98.1 (25 Apr 2024 20:18)  HR: 76 (26 Apr 2024 07:40) (74 - 78)  BP: 99/41 (26 Apr 2024 07:40) (99/41 - 118/82)  BP(mean): 55 (26 Apr 2024 07:40) (55 - 73)  RR: 18 (26 Apr 2024 07:40) (18 - 20)  SpO2: 99% (26 Apr 2024 07:40) (98% - 100%)    Parameters below as of 26 Apr 2024 07:40  Patient On (Oxygen Delivery Method): nasal cannula  O2 Flow (L/min): 1.5    Constitutional: NAD, awake and alert, ill appearing   HEENT: PERR, EOMI, Normal Hearing, MMM  Neck: Soft and supple, No LAD, No JVD  Respiratory: Breath sounds slight crackles b/l  Cardiovascular: S1 and S2, regular rate and rhythm, no Murmurs, gallops or rubs  Gastrointestinal: Bowel Sounds present, soft, nontender, nondistended, no guarding, no rebound  Extremities: No peripheral edema  Vascular: 2+ peripheral pulses  Neurological: A/O x 3, no focal deficits  Musculoskeletal: 5/5 strength b/l upper and lower extremities  Skin: No rashes    =======================================    INTERPRETATION OF TELEMETRY: Paced    ECG: < from: 12 Lead ECG (04.25.24 @ 12:54) >  Diagnosis Line Ventricular-paced rhythm  Biventricular pacemaker detected  Abnormal ECG      ========================================    LABS:                        7.5    5.08  )-----------( 192      ( 26 Apr 2024 07:16 )             23.5     04-26    133<L>  |  93<L>  |  31<H>  ----------------------------<  84  4.3   |  39<H>  |  1.23    Ca    8.7      26 Apr 2024 07:16  Phos  3.0     04-26  Mg     2.1     04-26    TPro  6.4  /  Alb  2.6<L>  /  TBili  0.7  /  DBili  x   /  AST  26  /  ALT  13  /  AlkPhos  64  04-26    PT/INR - ( 26 Apr 2024 07:16 )   PT: 19.2 sec;   INR: 1.73 ratio      PTT - ( 26 Apr 2024 07:16 )  PTT:42.1 sec    CARDIAC TESTING:    < from: TTE Echo Complete w/o Contrast w/ Doppler (03.25.24 @ 15:54) >   Estimated left ventricular ejection fraction is 35-40 %.   Overall LV function appears globally reduced.   Moderate to Severe, diffuse hypokinesis of the left ventricle is present.   The left ventricle is normal in size and wall thickness.   The left atrium is severely dilated.   There is evidence of an atrial septal defect. This indicated a left to   right shunt atthe level of the interatrial septum.   The right atrium appears moderately dilated.   Normal appearing right ventricle structure and function.   A device wire is seen in the RV and RA.   The aortic valve is well visualized, appears mildly to moderately   calcified. Valve opening seems to be restricted.   Mild aortic stenosis is present.   Calculated RANJAN is 1.5 cm2.   Peak and mean transaortic gradients are 16 and 8 mmHg respectively.   Moderate (2+) aortic regurgitation is present.   Mitral valveclip is present and intact.   Moderate (2+) mitral regurgitation is present.   Normal appearing tricuspid valve structure.   Moderate (2+) tricuspid valve regurgitation is present.   Moderate pulmonary hypertension.   Normal appearing pulmonic valve structure.   Mild pulmonic valvular regurgitation (1+) is present.   No evidence of pericardial effusion.   Pleural effusion - right pleural effusion.   IVC is dilated and not collapsing with inspiration.    RADIOLOGY & ADDITIONAL STUDIES:    < from: Xray Chest 1 View- PORTABLE-Urgent (04.25.24 @ 14:25) >  Stable small left effusion. Moderate right effusion somewhat   increased from March 24.

## 2024-04-26 NOTE — CONSULT NOTE ADULT - ASSESSMENT
#Acute hypoxic respiratory failure  #Acute on chronic HFrEF w/ ICD.   #Pleural effusions  #Lung Mass - treatment not being pursued  #Hx of MV clipping  #Afib on Eliquis - On digoxin.   #CAD    - Monitored on tele   - Daily weights, I&Os   - IV Lasix - gentle diuresis - would switch to PO tomorrow.   - Continue entresto, aspirin, statin, digoxin, eliquis  - Echocardiogram reviewed - recent echo done  - Palliative following, multiple admission, consider home hospice     Case d/w Dr. Perez  Will follow

## 2024-04-27 DIAGNOSIS — S99.912A UNSPECIFIED INJURY OF LEFT ANKLE, INITIAL ENCOUNTER: ICD-10-CM

## 2024-04-27 LAB
ADD ON TEST-SPECIMEN IN LAB: SIGNIFICANT CHANGE UP
ANION GAP SERPL CALC-SCNC: 4 MMOL/L — LOW (ref 5–17)
BUN SERPL-MCNC: 30 MG/DL — HIGH (ref 7–23)
CALCIUM SERPL-MCNC: 9.1 MG/DL — SIGNIFICANT CHANGE UP (ref 8.5–10.1)
CHLORIDE SERPL-SCNC: 94 MMOL/L — LOW (ref 96–108)
CO2 SERPL-SCNC: 38 MMOL/L — HIGH (ref 22–31)
CREAT SERPL-MCNC: 1.12 MG/DL — SIGNIFICANT CHANGE UP (ref 0.5–1.3)
EGFR: 47 ML/MIN/1.73M2 — LOW
GLUCOSE SERPL-MCNC: 82 MG/DL — SIGNIFICANT CHANGE UP (ref 70–99)
HCT VFR BLD CALC: 27.3 % — LOW (ref 34.5–45)
HGB BLD-MCNC: 8.6 G/DL — LOW (ref 11.5–15.5)
MCHC RBC-ENTMCNC: 31.5 GM/DL — LOW (ref 32–36)
MCHC RBC-ENTMCNC: 31.9 PG — SIGNIFICANT CHANGE UP (ref 27–34)
MCV RBC AUTO: 101.1 FL — HIGH (ref 80–100)
NT-PROBNP SERPL-SCNC: 4712 PG/ML — HIGH (ref 0–450)
PLATELET # BLD AUTO: 200 K/UL — SIGNIFICANT CHANGE UP (ref 150–400)
POTASSIUM SERPL-MCNC: 3.8 MMOL/L — SIGNIFICANT CHANGE UP (ref 3.5–5.3)
POTASSIUM SERPL-SCNC: 3.8 MMOL/L — SIGNIFICANT CHANGE UP (ref 3.5–5.3)
RBC # BLD: 2.7 M/UL — LOW (ref 3.8–5.2)
RBC # FLD: 13.8 % — SIGNIFICANT CHANGE UP (ref 10.3–14.5)
SODIUM SERPL-SCNC: 136 MMOL/L — SIGNIFICANT CHANGE UP (ref 135–145)
WBC # BLD: 6.43 K/UL — SIGNIFICANT CHANGE UP (ref 3.8–10.5)
WBC # FLD AUTO: 6.43 K/UL — SIGNIFICANT CHANGE UP (ref 3.8–10.5)

## 2024-04-27 PROCEDURE — 71045 X-RAY EXAM CHEST 1 VIEW: CPT | Mod: 26

## 2024-04-27 PROCEDURE — 99233 SBSQ HOSP IP/OBS HIGH 50: CPT

## 2024-04-27 PROCEDURE — 85027 COMPLETE CBC AUTOMATED: CPT

## 2024-04-27 PROCEDURE — 36415 COLL VENOUS BLD VENIPUNCTURE: CPT

## 2024-04-27 PROCEDURE — 80048 BASIC METABOLIC PNL TOTAL CA: CPT

## 2024-04-27 RX ORDER — POTASSIUM CHLORIDE 20 MEQ
1 PACKET (EA) ORAL
Refills: 0 | DISCHARGE

## 2024-04-27 RX ORDER — ALPRAZOLAM 0.25 MG
0.25 TABLET ORAL ONCE
Refills: 0 | Status: DISCONTINUED | OUTPATIENT
Start: 2024-04-27 | End: 2024-04-27

## 2024-04-27 RX ADMIN — ATORVASTATIN CALCIUM 10 MILLIGRAM(S): 80 TABLET, FILM COATED ORAL at 21:05

## 2024-04-27 RX ADMIN — Medication 20 MILLIGRAM(S): at 09:51

## 2024-04-27 RX ADMIN — SACUBITRIL AND VALSARTAN 1 TABLET(S): 24; 26 TABLET, FILM COATED ORAL at 09:50

## 2024-04-27 RX ADMIN — CITALOPRAM 10 MILLIGRAM(S): 10 TABLET, FILM COATED ORAL at 09:48

## 2024-04-27 RX ADMIN — Medication 3 MILLIGRAM(S): at 23:09

## 2024-04-27 RX ADMIN — Medication 0.25 MILLIGRAM(S): at 01:55

## 2024-04-27 RX ADMIN — ZINC SULFATE TAB 220 MG (50 MG ZINC EQUIVALENT) 220 MILLIGRAM(S): 220 (50 ZN) TAB at 09:49

## 2024-04-27 RX ADMIN — MIRTAZAPINE 7.5 MILLIGRAM(S): 45 TABLET, ORALLY DISINTEGRATING ORAL at 21:05

## 2024-04-27 RX ADMIN — APIXABAN 2.5 MILLIGRAM(S): 2.5 TABLET, FILM COATED ORAL at 09:50

## 2024-04-27 RX ADMIN — Medication 125 MICROGRAM(S): at 05:48

## 2024-04-27 RX ADMIN — Medication 81 MILLIGRAM(S): at 09:50

## 2024-04-27 RX ADMIN — Medication 62.5 MICROGRAM(S): at 09:49

## 2024-04-27 RX ADMIN — APIXABAN 2.5 MILLIGRAM(S): 2.5 TABLET, FILM COATED ORAL at 21:05

## 2024-04-27 RX ADMIN — Medication 500 MILLIGRAM(S): at 09:50

## 2024-04-27 RX ADMIN — Medication 100 MILLIGRAM(S): at 09:51

## 2024-04-27 RX ADMIN — Medication 1 TABLET(S): at 09:50

## 2024-04-27 NOTE — PROGRESS NOTE ADULT - ASSESSMENT
#Acute hypoxic respiratory failure  #Acute on chronic HFrEF w/ ICD.   #Pleural effusions  #Lung Mass - treatment not being pursued  #Hx of MV clipping  #Afib on Eliquis - On digoxin.   #CAD    - Monitored on tele - vpaced  - Daily weights, I&Os   - IV Lasix - gentle diuresis - would switch to PO today  - Continue entresto, aspirin, statin, digoxin, eliquis  - Echocardiogram reviewed - recent echo done  - Palliative following, multiple admission, consider home hospice     
89 year old female w celiac disease, hx lung mass (not being pursued/treated), AF on eliquis, HFrEF, hypothyroid, MV clipping here c/o SOB and leg swelling    #Acute on chronic hypoxic respiratory failure in the setting of HFrEF  -CXR: as above   -BNP: 4k  -ECHO: prior ECHO reviewed  -IV Lasix with plan to transition to PO tomorrow   -Entresto  -Strict I&O's  -Daily weights  -Fluid restriction  -Low salt, low cholesterol diet  -Cardiology consult and reccs noted     #Chronic A-fib s/p PPM placement   -Eliquis   -Digoxin     #CAD   -ASA/statin     #Hypothyroid  -Synthroid       #Depression/anxiety  -C/W citalopram  -C/W Remeron    #Hypothyroid,   -continue home levothyroxine    #Anemia  -stable     #Left Lower Extremity Swelling/tenderness   -s/p fall and injury   -f/u with orthopedics as outpatient  -XR reviewed from 2-3 days ago       #Protein Calorie Malnutrition   -nutrition reccs noted   -supplements orderd     #Advanced Care Directives   MOLST DNR/trial NIV   Guarded prognosis       discharge plan 
89 year old female w celiac disease, hx lung mass (not being pursued/treated), AF on eliquis, HFrEF, hypothyroid, MV clipping here c/o SOB and leg swelling    #Acute on chronic hypoxic respiratory failure in the setting of HFrEF  -CXR: as above   -BNP: 4k  -ECHO: prior ECHO reviewed  -IV Lasix 20mg BID   -Entresto  -Strict I&O's  -Daily weights  -Fluid restriction  -Low salt, low cholesterol diet  -Cardiology consult and reccs noted     #Chronic A-fib s/p PPM placement   -Eliquis   -Digoxin     #CAD   -ASA/statin     #Hypothyroid  -Synthroid       #Depression/anxiety  -C/W citalopram  -C/W Remeron    #Hypothyroid,   -continue home levothyroxine    #Anemia  -H&H noted   -repeat CBC  -denies melena or hematochezia     #Protein Calorie Malnutrition   -nutrition reccs noted   -supplements orderd     #Advanced Care Directives   MOLST DNR/trial NIV   Guarded prognosis         discussed with son at bedside

## 2024-04-27 NOTE — PROGRESS NOTE ADULT - SUBJECTIVE AND OBJECTIVE BOX
HOSPITALIST ATTENDING PROGRESS NOTE     Chart and meds reviewed. Patient seen and examined     CC: SOB    Subjective: Pt seen and evaluated. Breathing is improved with lasix. No other acute complaints H&H noted   -Son/family at bedside. Son is a physician: CXR findings discussed: Son asking aboat thoracentesis Agreeable to use of diuretics and reassess. Family in agreement     : Pt seen and evaluated. Breathing is improved. No new complaints.   Repeat CXR: reviewed   -c/w IV Lasix today      All other systems and founds to be negative with exception of what has been described above.         PHYSICAL EXAM:  Vital Signs Last 24 Hrs  T(C): 36.4 (2024 08:11), Max: 36.7 (2024 20:09)  T(F): 97.6 (2024 08:11), Max: 98 (2024 20:09)  HR: 76 (2024 08:11) (76 - 77)  BP: 116/48 (2024 08:11) (116/48 - 127/56)  RR: 20 (2024 08:11) (20 - 20)  SpO2: 99% (2024 08:11) (97% - 99%)    Parameters below as of 2024 08:11  Patient On (Oxygen Delivery Method): nasal cannula  O2 Flow (L/min): 1.5    Daily     Daily Weight in k.3 (2024 06:00)    Daily     Daily Weight in k.4 (2024 06:49)    GEN: NAD, +frail, +cachetic   HEENT: EOMI,  moist mucous membranes  NECK : Soft and supple, no JVD  LUNG: +decreased breath sound   CVS: S1S2+, RRR, no M/G/R  GI: BS+, soft, NT/ND, no guarding, no rebound  EXTREMITIES: LLE: +swelling and ecchymosis around the LLE   VASCULAR: 2+ peripheral pulses  NEURO: AAOx3, grossly non-focal   SKIN: No rashes    HOME MEDICATIONS:  Home Medications:  aspirin 81 mg oral delayed release tablet: 1 tab(s) orally once a day (2024 18:03)  atorvastatin 10 mg oral tablet: 1 tab(s) orally once a day (at bedtime) (2024 18:03)  Calcium 500+D oral tablet, chewable: 1 tab(s) orally 2 times a day (2024 18:03)  citalopram 10 mg oral tablet: 1 tab(s) orally once a day (2024 18:03)  Entresto 24 mg-26 mg oral tablet: 1 tab(s) orally 2 times a day (2024 18:03)  furosemide 20 mg oral tablet: 1 tab(s) orally once a day ***pt doesn&#x27;t take consistently*** (2024 18:03)  levothyroxine 100 mcg (0.1 mg) oral tablet: 1 tab(s) orally once a day ***take with 25mcg = 125cg*** (2024 18:03)  levothyroxine 25 mcg (0.025 mg) oral tablet: 1 tab(s) orally once a day ***take with 100mcg = 125mcg*** (2024 18:03)  Potassium Chloride (Eqv-Klor-Con M10) 10 mEq oral tablet, extended release: 1 tab(s) orally once a day (2024 18:02)      MEDICATIONS  MEDICATIONS  (STANDING):  apixaban 2.5 milliGRAM(s) Oral two times a day  ascorbic acid 500 milliGRAM(s) Oral daily  aspirin enteric coated 81 milliGRAM(s) Oral daily  atorvastatin 10 milliGRAM(s) Oral at bedtime  citalopram 10 milliGRAM(s) Oral daily  digoxin     Tablet 62.5 MICROGram(s) Oral daily  furosemide   Injectable 20 milliGRAM(s) IV Push daily  levothyroxine 125 MICROGram(s) Oral daily  mirtazapine 7.5 milliGRAM(s) Oral at bedtime  multivitamin/minerals 1 Tablet(s) Oral daily  sacubitril 24 mG/valsartan 26 mG 1 Tablet(s) Oral two times a day  thiamine 100 milliGRAM(s) Oral daily  zinc sulfate 220 milliGRAM(s) Oral daily    MEDICATIONS  (PRN):  acetaminophen     Tablet .. 650 milliGRAM(s) Oral every 6 hours PRN Temp greater or equal to 38C (100.4F), Mild Pain (1 - 3), Moderate Pain (4 - 6)  melatonin 3 milliGRAM(s) Oral at bedtime PRN Insomnia  ondansetron Injectable 4 milliGRAM(s) IV Push every 8 hours PRN Nausea and/or Vomiting      LABS: All Labs Reviewed:                        8.6    6.43  )-----------( 200      ( 2024 06:09 )             27.3   04-    136  |  94<L>  |  30<H>  ----------------------------<  82  3.8   |  38<H>  |  1.12    Ca    9.1      2024 06:09  Phos  3.0     -  Mg     2.1         TPro  6.4  /  Alb  2.6<L>  /  TBili  0.7  /  DBili  x   /  AST  26  /  ALT  13  /  AlkPhos  64            Urinalysis Basic - ( 2024 07:16 )    Color: x / Appearance: x / SG: x / pH: x  Gluc: 84 mg/dL / Ketone: x  / Bili: x / Urobili: x   Blood: x / Protein: x / Nitrite: x   Leuk Esterase: x / RBC: x / WBC x   Sq Epi: x / Non Sq Epi: x / Bacteria: x        Blood Culture:   I&O's Detail    CAPILLARY BLOOD GLUCOSE            CARDIOLOGY TESTING   EKG: reviewed     ECHO   < from: TTE Echo Complete w/o Contrast w/ Doppler (24 @ 15:54) >   Summary     Estimated left ventricular ejection fraction is 35-40 %.   Overall LV function appears globally reduced.   Moderate to Severe, diffuse hypokinesis of the left ventricle is present.   The left ventricle is normal in size and wall thickness.   The left atrium is severely dilated.   There is evidence of an atrial septal defect. This indicated a left to   right shunt atthe level of the interatrial septum.   The right atrium appears moderately dilated.   Normal appearing right ventricle structure and function.   A device wire is seen in the RV and RA.   The aortic valve is well visualized, appears mildly to moderately   calcified. Valve opening seems to be restricted.   Mild aortic stenosis is present.   Calculated RANJAN is 1.5 cm2.   Peak and mean transaortic gradients are 16 and 8 mmHg respectively.   Moderate (2+) aortic regurgitation is present.   Mitral valveclip is present and intact.   Moderate (2+) mitral regurgitation is present.   Normal appearing tricuspid valve structure.   Moderate (2+) tricuspid valve regurgitation is present.   Moderate pulmonary hypertension.   Normal appearing pulmonic valve structure.   Mild pulmonic valvular regurgitation (1+) is present.   No evidence of pericardial effusion.   Pleural effusion - right pleural effusion.   IVC is dilated and not collapsing with inspiration.     Signature      RADIOLOGY  < from: Xray Chest 1 View- PORTABLE-Urgent (24 @ 14:25) >  INTERPRETATION:  AP chest on 2024 at 2:09 PM. Patient has chest   pain.    Heart enlargement and right-sided defibrillator again noted. Mitral clips   again noted.    There is a moderate right base effusion somewhat increased from .    There is a stable small left effusion    IMPRESSION: Stable small left effusion. Moderate right effusion somewhat   increased from .    < end of copied text >  
Patient is a 89y old  Female who presents with a chief complaint of Acute decompensated heart failure (26 Apr 2024 22:28)        HPI:  Pt is a 89 yo female with a pmh/o celiac disease, lung mass for which she is not pursuing treatment by choice, afib on eliquis, HFrEF, hypothyroidism, HTN, HLD, who presents from home due to increased fatigue in setting of worsening leg swelling in setting of cough. Pt states that she uses 3L NC supplemental O2 at home and has not been feeling short of breath while at rest however gets easily fatigued with exertion and has to sit up otherwise begins to cough.     Pt denies chest pain, palpitations, nausea, vomiting, headache, fever, chills, rash, sick contacts, change in diet, change in medication, dysuria, diarrhea, constipation, paresthesias, change in vision/gait/hearing/speech.   (25 Apr 2024 22:13)    3.25 Cardiology consulted for CHF  Patient seen and examined. +sob, fatigue - not much improvement    3/26. Breathing improved, 02 weaning down    3/27. Breathing stable     PAST MEDICAL & SURGICAL HISTORY:  Chronic atrial fibrillation      Hypothyroid      HTN (hypertension)      History of cataract      Hyperlipidemia, unspecified hyperlipidemia type      History of CHF (congestive heart failure)      Peripheral edema      History of colon polyps      Varicose veins  BLE      Arthritis      Celiac disease      Hashimoto's thyroiditis      Osteoporosis      Iron deficiency anemia due to chronic blood loss      AICD (automatic cardioverter/defibrillator) present  x 3 . Replaced x 2. Presently right subclavian area      Myocardial infarction  1990      Mitral valve prolapse      Left inguinal hernia      Hearing loss      AICD (automatic cardioverter/defibrillator) present  with PPM. Replaced x 2.      H/O right inguinal hernia repair      H/O colonoscopy  last done 2009      History of cataract extraction  Bilateral      Artificial pacemaker      History of heart surgery  Mitral valve surgery for leaky valve 9/2020            FAMILY HISTORY:  Family history of diabetes mellitus (Mother, Sibling)    Family history of heart disease (Mother)    Family history of dementia (Father)          Allergies    Ancef (Unknown)  lidocaine (Rash; Angioedema)  adhesives (Rash)    Intolerances    Gluten (Diarrhea)        MEDICATIONS  (STANDING):  apixaban 2.5 milliGRAM(s) Oral two times a day  ascorbic acid 500 milliGRAM(s) Oral daily  aspirin enteric coated 81 milliGRAM(s) Oral daily  atorvastatin 10 milliGRAM(s) Oral at bedtime  citalopram 10 milliGRAM(s) Oral daily  digoxin     Tablet 62.5 MICROGram(s) Oral daily  furosemide   Injectable 20 milliGRAM(s) IV Push daily  levothyroxine 125 MICROGram(s) Oral daily  mirtazapine 7.5 milliGRAM(s) Oral at bedtime  multivitamin/minerals 1 Tablet(s) Oral daily  sacubitril 24 mG/valsartan 26 mG 1 Tablet(s) Oral two times a day  thiamine 100 milliGRAM(s) Oral daily  zinc sulfate 220 milliGRAM(s) Oral daily    MEDICATIONS  (PRN):  acetaminophen     Tablet .. 650 milliGRAM(s) Oral every 6 hours PRN Temp greater or equal to 38C (100.4F), Mild Pain (1 - 3), Moderate Pain (4 - 6)  melatonin 3 milliGRAM(s) Oral at bedtime PRN Insomnia  ondansetron Injectable 4 milliGRAM(s) IV Push every 8 hours PRN Nausea and/or Vomiting        Vital Signs Last 24 Hrs  T(C): 36.4 (27 Apr 2024 08:11), Max: 36.7 (26 Apr 2024 20:09)  T(F): 97.6 (27 Apr 2024 08:11), Max: 98 (26 Apr 2024 20:09)  HR: 76 (27 Apr 2024 08:11) (76 - 77)  BP: 116/48 (27 Apr 2024 08:11) (116/48 - 127/56)  BP(mean): --  RR: 20 (27 Apr 2024 08:11) (20 - 20)  SpO2: 99% (27 Apr 2024 08:11) (97% - 99%)    Parameters below as of 27 Apr 2024 08:11  Patient On (Oxygen Delivery Method): nasal cannula  O2 Flow (L/min): 1.5        I&O's Summary        PHYSICAL EXAM:  GEN: NAD, +frail, +cachetic   HEENT: EOMI,  moist mucous membranes  NECK : Soft and supple, no JVD  LUNG: +decreased breath sound   CVS: S1S2+, RRR, no M/G/R  GI: BS+, soft, NT/ND, no guarding, no rebound  EXTREMITIES: 1+edema  VASCULAR: 2+ peripheral pulses  NEURO: AAOx3, grossly non-focal   SKIN: No rashes                              8.6    6.43  )-----------( 200      ( 27 Apr 2024 06:09 )             27.3     04-27    136  |  94<L>  |  30<H>  ----------------------------<  82  3.8   |  38<H>  |  1.12    Ca    9.1      27 Apr 2024 06:09  Phos  3.0     04-26  Mg     2.1     04-26    TPro  6.4  /  Alb  2.6<L>  /  TBili  0.7  /  DBili  x   /  AST  26  /  ALT  13  /  AlkPhos  64  04-26    LIVER FUNCTIONS - ( 26 Apr 2024 07:16 )  Alb: 2.6 g/dL / Pro: 6.4 gm/dL / ALK PHOS: 64 U/L / ALT: 13 U/L / AST: 26 U/L / GGT: x             PT/INR - ( 26 Apr 2024 07:16 )   PT: 19.2 sec;   INR: 1.73 ratio         PTT - ( 26 Apr 2024 07:16 )  PTT:42.1 sec  Urinalysis Basic - ( 27 Apr 2024 06:09 )    Color: x / Appearance: x / SG: x / pH: x  Gluc: 82 mg/dL / Ketone: x  / Bili: x / Urobili: x   Blood: x / Protein: x / Nitrite: x   Leuk Esterase: x / RBC: x / WBC x   Sq Epi: x / Non Sq Epi: x / Bacteria: x       from: TTE Echo Complete w/o Contrast w/ Doppler (03.25.24 @ 15:54) >   Estimated left ventricular ejection fraction is 35-40 %.   Overall LV function appears globally reduced.   Moderate to Severe, diffuse hypokinesis of the left ventricle is present.   The left ventricle is normal in size and wall thickness.   The left atrium is severely dilated.   There is evidence of an atrial septal defect. This indicated a left to   right shunt atthe level of the interatrial septum.   The right atrium appears moderately dilated.   Normal appearing right ventricle structure and function.   A device wire is seen in the RV and RA.   The aortic valve is well visualized, appears mildly to moderately   calcified. Valve opening seems to be restricted.   Mild aortic stenosis is present.   Calculated RANJAN is 1.5 cm2.   Peak and mean transaortic gradients are 16 and 8 mmHg respectively.   Moderate (2+) aortic regurgitation is present.   Mitral valveclip is present and intact.   Moderate (2+) mitral regurgitation is present.   Normal appearing tricuspid valve structure.   Moderate (2+) tricuspid valve regurgitation is present.   Moderate pulmonary hypertension.   Normal appearing pulmonic valve structure.   Mild pulmonic valvular regurgitation (1+) is present.   No evidence of pericardial effusion.   Pleural effusion - right pleural effusion.   IVC is dilated and not collapsing with inspiration.      
HOSPITALIST ATTENDING PROGRESS NOTE     Chart and meds reviewed. Patient seen and examined     CC: SOB    Subjective: Pt seen and evaluated. Breathing is improved with lasix. No other acute complaints H&H noted   -Son/family at bedside. Son is a physician: CXR findings discussed: Son asking aboat thoracentesis Agreeable to use of diuretics and reassess. Family in agreement       All other systems and founds to be negative with exception of what has been described above.         PHYSICAL EXAM:  Vital Signs Last 24 Hrs  T(C): 36.7 (2024 20:09), Max: 36.7 (2024 20:09)  T(F): 98 (2024 20:09), Max: 98 (2024 20:09)  HR: 77 (2024 20:09) (76 - 77)  BP: 127/56 (2024 20:09) (99/41 - 127/56)  BP(mean): 55 (2024 07:40) (55 - 55)  RR: 20 (2024 20:09) (18 - 20)  SpO2: 97% (2024 20:09) (97% - 99%)    Parameters below as of 2024 20:09  Patient On (Oxygen Delivery Method): nasal cannula  O2 Flow (L/min): 3    Daily     Daily Weight in k.4 (2024 06:49)    GEN: NAD, +frail, +cachetic   HEENT: EOMI,  moist mucous membranes  NECK : Soft and supple, no JVD  LUNG: +decreased breath sound   CVS: S1S2+, RRR, no M/G/R  GI: BS+, soft, NT/ND, no guarding, no rebound  EXTREMITIES: 1+edema  VASCULAR: 2+ peripheral pulses  NEURO: AAOx3, grossly non-focal   SKIN: No rashes    HOME MEDICATIONS:  Home Medications:  aspirin 81 mg oral delayed release tablet: 1 tab(s) orally once a day (2024 18:03)  atorvastatin 10 mg oral tablet: 1 tab(s) orally once a day (at bedtime) (2024 18:03)  Calcium 500+D oral tablet, chewable: 1 tab(s) orally 2 times a day (2024 18:03)  citalopram 10 mg oral tablet: 1 tab(s) orally once a day (2024 18:03)  Entresto 24 mg-26 mg oral tablet: 1 tab(s) orally 2 times a day (2024 18:03)  furosemide 20 mg oral tablet: 1 tab(s) orally once a day ***pt doesn&#x27;t take consistently*** (2024 18:03)  levothyroxine 100 mcg (0.1 mg) oral tablet: 1 tab(s) orally once a day ***take with 25mcg = 125cg*** (2024 18:03)  levothyroxine 25 mcg (0.025 mg) oral tablet: 1 tab(s) orally once a day ***take with 100mcg = 125mcg*** (2024 18:03)  Potassium Chloride (Eqv-Klor-Con M10) 10 mEq oral tablet, extended release: 1 tab(s) orally once a day (2024 18:02)      MEDICATIONS  MEDICATIONS  (STANDING):  apixaban 2.5 milliGRAM(s) Oral two times a day  ascorbic acid 500 milliGRAM(s) Oral daily  aspirin enteric coated 81 milliGRAM(s) Oral daily  atorvastatin 10 milliGRAM(s) Oral at bedtime  citalopram 10 milliGRAM(s) Oral daily  digoxin     Tablet 62.5 MICROGram(s) Oral daily  levothyroxine 125 MICROGram(s) Oral daily  mirtazapine 7.5 milliGRAM(s) Oral at bedtime  multivitamin/minerals 1 Tablet(s) Oral daily  sacubitril 24 mG/valsartan 26 mG 1 Tablet(s) Oral two times a day  thiamine 100 milliGRAM(s) Oral daily  zinc sulfate 220 milliGRAM(s) Oral daily      LABS: All Labs Reviewed:                        7.5    5.08  )-----------( 192      ( 2024 07:16 )             23.5     04-26    133<L>  |  93<L>  |  31<H>  ----------------------------<  84  4.3   |  39<H>  |  1.23    Ca    8.7      2024 07:16  Phos  3.0       Mg     2.1         TPro  6.4  /  Alb  2.6<L>  /  TBili  0.7  /  DBili  x   /  AST  26  /  ALT  13  /  AlkPhos  64      PT/INR - ( 2024 07:16 )   PT: 19.2 sec;   INR: 1.73 ratio         PTT - ( 2024 07:16 )  PTT:42.1 sec  ABG - ( 2024 13:50 )  pH, Arterial: 7.40  pH, Blood: x     /  pCO2: 65    /  pO2: 82    / HCO3: 40    / Base Excess: 12.5  /  SaO2: 98.0              Urinalysis Basic - ( 2024 07:16 )    Color: x / Appearance: x / SG: x / pH: x  Gluc: 84 mg/dL / Ketone: x  / Bili: x / Urobili: x   Blood: x / Protein: x / Nitrite: x   Leuk Esterase: x / RBC: x / WBC x   Sq Epi: x / Non Sq Epi: x / Bacteria: x        Blood Culture:   I&O's Detail    CAPILLARY BLOOD GLUCOSE            CARDIOLOGY TESTING   EKG: reviewed     ECHO   < from: TTE Echo Complete w/o Contrast w/ Doppler (24 @ 15:54) >   Summary     Estimated left ventricular ejection fraction is 35-40 %.   Overall LV function appears globally reduced.   Moderate to Severe, diffuse hypokinesis of the left ventricle is present.   The left ventricle is normal in size and wall thickness.   The left atrium is severely dilated.   There is evidence of an atrial septal defect. This indicated a left to   right shunt atthe level of the interatrial septum.   The right atrium appears moderately dilated.   Normal appearing right ventricle structure and function.   A device wire is seen in the RV and RA.   The aortic valve is well visualized, appears mildly to moderately   calcified. Valve opening seems to be restricted.   Mild aortic stenosis is present.   Calculated RANJAN is 1.5 cm2.   Peak and mean transaortic gradients are 16 and 8 mmHg respectively.   Moderate (2+) aortic regurgitation is present.   Mitral valveclip is present and intact.   Moderate (2+) mitral regurgitation is present.   Normal appearing tricuspid valve structure.   Moderate (2+) tricuspid valve regurgitation is present.   Moderate pulmonary hypertension.   Normal appearing pulmonic valve structure.   Mild pulmonic valvular regurgitation (1+) is present.   No evidence of pericardial effusion.   Pleural effusion - right pleural effusion.   IVC is dilated and not collapsing with inspiration.     Signature      RADIOLOGY  < from: Xray Chest 1 View- PORTABLE-Urgent (24 @ 14:25) >  INTERPRETATION:  AP chest on 2024 at 2:09 PM. Patient has chest   pain.    Heart enlargement and right-sided defibrillator again noted. Mitral clips   again noted.    There is a moderate right base effusion somewhat increased from .    There is a stable small left effusion    IMPRESSION: Stable small left effusion. Moderate right effusion somewhat   increased from .    < end of copied text >

## 2024-04-27 NOTE — PROGRESS NOTE ADULT - NUTRITIONAL ASSESSMENT
This patient has been assessed with a concern for Malnutrition and has been determined to have a diagnosis/diagnoses of Severe protein-calorie malnutrition and Underweight (BMI < 19).    This patient is being managed with:   Diet Regular-  Entered: Apr 26 2024 12:17PM  
This patient has been assessed with a concern for Malnutrition and has been determined to have a diagnosis/diagnoses of Severe protein-calorie malnutrition and Underweight (BMI < 19).    This patient is being managed with:   Diet Regular-  Entered: Apr 26 2024 12:17PM

## 2024-04-28 ENCOUNTER — TRANSCRIPTION ENCOUNTER (OUTPATIENT)
Age: 89
End: 2024-04-28

## 2024-04-28 VITALS
HEART RATE: 76 BPM | SYSTOLIC BLOOD PRESSURE: 103 MMHG | DIASTOLIC BLOOD PRESSURE: 70 MMHG | OXYGEN SATURATION: 100 % | TEMPERATURE: 98 F | RESPIRATION RATE: 18 BRPM

## 2024-04-28 LAB
ANION GAP SERPL CALC-SCNC: 1 MMOL/L — LOW (ref 5–17)
BUN SERPL-MCNC: 24 MG/DL — HIGH (ref 7–23)
CALCIUM SERPL-MCNC: 9.2 MG/DL — SIGNIFICANT CHANGE UP (ref 8.5–10.1)
CHLORIDE SERPL-SCNC: 95 MMOL/L — LOW (ref 96–108)
CO2 SERPL-SCNC: 40 MMOL/L — HIGH (ref 22–31)
CREAT SERPL-MCNC: 1.01 MG/DL — SIGNIFICANT CHANGE UP (ref 0.5–1.3)
EGFR: 53 ML/MIN/1.73M2 — LOW
GLUCOSE SERPL-MCNC: 89 MG/DL — SIGNIFICANT CHANGE UP (ref 70–99)
HCT VFR BLD CALC: 27.2 % — LOW (ref 34.5–45)
HGB BLD-MCNC: 8.5 G/DL — LOW (ref 11.5–15.5)
MCHC RBC-ENTMCNC: 31.3 GM/DL — LOW (ref 32–36)
MCHC RBC-ENTMCNC: 32 PG — SIGNIFICANT CHANGE UP (ref 27–34)
MCV RBC AUTO: 102.3 FL — HIGH (ref 80–100)
PLATELET # BLD AUTO: 206 K/UL — SIGNIFICANT CHANGE UP (ref 150–400)
POTASSIUM SERPL-MCNC: 4.1 MMOL/L — SIGNIFICANT CHANGE UP (ref 3.5–5.3)
POTASSIUM SERPL-SCNC: 4.1 MMOL/L — SIGNIFICANT CHANGE UP (ref 3.5–5.3)
RBC # BLD: 2.66 M/UL — LOW (ref 3.8–5.2)
RBC # FLD: 14 % — SIGNIFICANT CHANGE UP (ref 10.3–14.5)
SODIUM SERPL-SCNC: 136 MMOL/L — SIGNIFICANT CHANGE UP (ref 135–145)
WBC # BLD: 5.66 K/UL — SIGNIFICANT CHANGE UP (ref 3.8–10.5)
WBC # FLD AUTO: 5.66 K/UL — SIGNIFICANT CHANGE UP (ref 3.8–10.5)

## 2024-04-28 PROCEDURE — 99239 HOSP IP/OBS DSCHRG MGMT >30: CPT

## 2024-04-28 RX ORDER — LANOLIN ALCOHOL/MO/W.PET/CERES
3 CREAM (GRAM) TOPICAL ONCE
Refills: 0 | Status: COMPLETED | OUTPATIENT
Start: 2024-04-28 | End: 2024-04-28

## 2024-04-28 RX ADMIN — Medication 1 TABLET(S): at 11:01

## 2024-04-28 RX ADMIN — Medication 125 MICROGRAM(S): at 06:26

## 2024-04-28 RX ADMIN — APIXABAN 2.5 MILLIGRAM(S): 2.5 TABLET, FILM COATED ORAL at 11:02

## 2024-04-28 RX ADMIN — CITALOPRAM 10 MILLIGRAM(S): 10 TABLET, FILM COATED ORAL at 11:02

## 2024-04-28 RX ADMIN — ZINC SULFATE TAB 220 MG (50 MG ZINC EQUIVALENT) 220 MILLIGRAM(S): 220 (50 ZN) TAB at 11:01

## 2024-04-28 RX ADMIN — Medication 3 MILLIGRAM(S): at 02:28

## 2024-04-28 RX ADMIN — Medication 81 MILLIGRAM(S): at 11:02

## 2024-04-28 RX ADMIN — Medication 62.5 MICROGRAM(S): at 11:01

## 2024-04-28 RX ADMIN — SACUBITRIL AND VALSARTAN 1 TABLET(S): 24; 26 TABLET, FILM COATED ORAL at 11:00

## 2024-04-28 RX ADMIN — Medication 20 MILLIGRAM(S): at 11:02

## 2024-04-28 RX ADMIN — Medication 500 MILLIGRAM(S): at 11:03

## 2024-04-28 RX ADMIN — Medication 100 MILLIGRAM(S): at 11:02

## 2024-04-28 NOTE — DISCHARGE NOTE PROVIDER - PROVIDER TOKENS
PROVIDER:[TOKEN:[[id number]],ESTABLISHEDPATIENT:[T]],PROVIDER:[TOKEN:[[id number]],FOLLOWUP:[1 week]]

## 2024-04-28 NOTE — DISCHARGE NOTE PROVIDER - NSDCFUADDAPPT_GEN_ALL_CORE_FT
CARDIOLOGY FOLLOW UP APPOINTMENT: 5/13/24 @2:30PM at [facility] with ADIEL Leija

## 2024-04-28 NOTE — DISCHARGE NOTE PROVIDER - NSDCMRMEDTOKEN_GEN_ALL_CORE_FT
aspirin 81 mg oral delayed release tablet: 1 tab(s) orally once a day  atorvastatin 10 mg oral tablet: 1 tab(s) orally once a day (at bedtime)  Calcium 500+D oral tablet, chewable: 1 tab(s) orally 2 times a day  citalopram 10 mg oral tablet: 1 tab(s) orally once a day  digoxin 125 mcg (0.125 mg) oral tablet: 0.5 tab(s) orally once a day  Eliquis 2.5 mg oral tablet: 1 tab(s) orally 2 times a day  Entresto 24 mg-26 mg oral tablet: 1 tab(s) orally 2 times a day  furosemide 20 mg oral tablet: 1 tab(s) orally once a day ***pt doesn&#x27;t take consistently***  levothyroxine 100 mcg (0.1 mg) oral tablet: 1 tab(s) orally once a day ***take with 25mcg = 125cg***  levothyroxine 25 mcg (0.025 mg) oral tablet: 1 tab(s) orally once a day ***take with 100mcg = 125mcg***  mirtazapine 7.5 mg oral tablet: 1 tab(s) orally once a day (at bedtime)  Potassium Chloride (Eqv-Klor-Con M10) 10 mEq oral tablet, extended release: 1 tab(s) orally once a day

## 2024-04-28 NOTE — DISCHARGE NOTE PROVIDER - CARE PROVIDER_API CALL
Marjorie Leija  NP in Adult Gerontology Acute Care  [address] [place] [address]  Phone: [phone]  Fax: [phone]  Established Patient  Follow Up Time:     Gagan Kenny  Orthopaedic Surgery  [address] [place] [address]  Phone: [phone]  Fax: [phone]  Follow Up Time: 1 week

## 2024-04-28 NOTE — DISCHARGE NOTE PROVIDER - HOSPITAL COURSE
89 yo female with a pmh/o celiac disease, lung mass for which she is not pursuing treatment by choice, afib on eliquis, HFrEF, hypothyroidism, HTN, HLD, who presents from home due to increased fatigue in setting of worsening leg swelling in setting of cough. Pt states that she uses 3L NC supplemental O2 at home and has not been feeling short of breath while at rest however gets easily fatigued with exertion and has to sit up otherwise begins to cough. Daughter at bedside states that she is not at her baseline, noting leg swelling and dyspnea. Pt denies chest pain, palpitations, nausea, vomiting, headache, fever, chills, rash, sick contacts, change in diet, change in medication, dysuria, diarrhea, constipation, paresthesias, change in vision/gait/hearing/speech.      Sub: Pt seen and evaluated. Breathing is improved. No other acute complaints.     Vital Signs Last 24 Hrs  T(C): 36.9 (28 Apr 2024 08:51), Max: 37.1 (27 Apr 2024 20:32)  T(F): 98.5 (28 Apr 2024 08:51), Max: 98.8 (27 Apr 2024 20:32)  HR: 76 (28 Apr 2024 08:51) (76 - 79)  BP: 103/70 (28 Apr 2024 08:51) (103/70 - 107/59)  RR: 18 (28 Apr 2024 08:51) (18 - 19)  SpO2: 100% (28 Apr 2024 08:51) (99% - 100%)    GEN: NAD, +frail, +cachetic   HEENT: EOMI,  moist mucous membranes  NECK : Soft and supple, no JVD  LUNG: +decreased breath sound   CVS: S1S2+, RRR, no M/G/R  GI: BS+, soft, NT/ND, no guarding, no rebound  EXTREMITIES: LLE: +swelling and ecchymosis around the LLE   VASCULAR: 2+ peripheral pulses  NEURO: AAOx3, grossly non-focal   SKIN: No rashes    89 year old female w celiac disease, hx lung mass (not being pursued/treated), AF on eliquis, HFrEF, hypothyroid, MV clipping here c/o SOB and leg swelling    #Acute on chronic hypoxic respiratory failure in the setting of HFrEF  -CXR: as above   -BNP: 4k  -ECHO: prior ECHO reviewed  -PO Lasix   -Entresto  -Strict I&O's  -Daily weights  -Fluid restriction  -Low salt, low cholesterol diet  -f/u with Cardiology as outpatient     #Chronic A-fib s/p PPM placement   -Eliquis   -Digoxin     #CAD   -ASA/statin     #Hypothyroid  -Synthroid       #Depression/anxiety  -C/W citalopram  -C/W Remeron    #Hypothyroid,   -continue home levothyroxine    #Anemia  -stable     #Left Lower Extremity Swelling/tenderness   -s/p fall and injury   -f/u with orthopedics as outpatient  -XR reviewed from 2-3 days ago       #Protein Calorie Malnutrition   -nutrition reccs noted   -supplements orderd     #Advanced Care Directives   MOLST DNR/trial NIV     Guarded prognosis           discussed with son at bedside

## 2024-04-28 NOTE — DISCHARGE NOTE PROVIDER - DETAILS OF MALNUTRITION DIAGNOSIS/DIAGNOSES
This patient has been assessed with a concern for Malnutrition and was treated during this hospitalization for the following Nutrition diagnosis/diagnoses:     -  04/26/2024: Severe protein-calorie malnutrition   -  04/26/2024: Underweight (BMI < 19)

## 2024-04-28 NOTE — DISCHARGE NOTE PROVIDER - NSDCCPCAREPLAN_GEN_ALL_CORE_FT
PRINCIPAL DISCHARGE DIAGNOSIS  Diagnosis: Pleural effusion, right  Assessment and Plan of Treatment: Please continue to take Lasix as prescribed. Please follow up with your cardiologist soon after discharge      SECONDARY DISCHARGE DIAGNOSES  Diagnosis: Left ankle injury  Assessment and Plan of Treatment: You were previously seen in ED. Please follow up with orthopedics Dr. Kenny as outpatient

## 2024-05-01 NOTE — DISCHARGE NOTE PROVIDER - NSDCHHCONTACT_GEN_ALL_CORE_FT
As certified below, I, or a nurse practitioner or physician assistant working with me, had a face-to-face encounter that meets the physician face-to-face encounter requirements.
Initiate Treatment: ketoconazole 2 % shampoo AS DIRECTED\\nQuantity: 120.0 ml\\nSig: Apply and lather onto scalp, leave in 5-8 minutes, and rinse. Use 3 times weekly
Render In Strict Bullet Format?: No
Detail Level: Simple

## 2024-05-08 DIAGNOSIS — Z88.8 ALLERGY STATUS TO OTHER DRUGS, MEDICAMENTS AND BIOLOGICAL SUBSTANCES: ICD-10-CM

## 2024-05-08 DIAGNOSIS — Z95.810 PRESENCE OF AUTOMATIC (IMPLANTABLE) CARDIAC DEFIBRILLATOR: ICD-10-CM

## 2024-05-08 DIAGNOSIS — Z91.048 OTHER NONMEDICINAL SUBSTANCE ALLERGY STATUS: ICD-10-CM

## 2024-05-08 DIAGNOSIS — Z79.01 LONG TERM (CURRENT) USE OF ANTICOAGULANTS: ICD-10-CM

## 2024-05-08 DIAGNOSIS — I25.2 OLD MYOCARDIAL INFARCTION: ICD-10-CM

## 2024-05-08 DIAGNOSIS — E06.3 AUTOIMMUNE THYROIDITIS: ICD-10-CM

## 2024-05-08 DIAGNOSIS — I11.0 HYPERTENSIVE HEART DISEASE WITH HEART FAILURE: ICD-10-CM

## 2024-05-08 DIAGNOSIS — Z79.890 HORMONE REPLACEMENT THERAPY: ICD-10-CM

## 2024-05-08 DIAGNOSIS — D50.9 IRON DEFICIENCY ANEMIA, UNSPECIFIED: ICD-10-CM

## 2024-05-08 DIAGNOSIS — I48.20 CHRONIC ATRIAL FIBRILLATION, UNSPECIFIED: ICD-10-CM

## 2024-05-08 DIAGNOSIS — Z86.010 PERSONAL HISTORY OF COLONIC POLYPS: ICD-10-CM

## 2024-05-08 DIAGNOSIS — I50.23 ACUTE ON CHRONIC SYSTOLIC (CONGESTIVE) HEART FAILURE: ICD-10-CM

## 2024-05-08 DIAGNOSIS — E78.5 HYPERLIPIDEMIA, UNSPECIFIED: ICD-10-CM

## 2024-05-08 DIAGNOSIS — R22.40 LOCALIZED SWELLING, MASS AND LUMP, UNSPECIFIED LOWER LIMB: ICD-10-CM

## 2024-05-08 DIAGNOSIS — Z66 DO NOT RESUSCITATE: ICD-10-CM

## 2024-05-08 DIAGNOSIS — E87.1 HYPO-OSMOLALITY AND HYPONATREMIA: ICD-10-CM

## 2024-05-08 DIAGNOSIS — R91.8 OTHER NONSPECIFIC ABNORMAL FINDING OF LUNG FIELD: ICD-10-CM

## 2024-05-08 DIAGNOSIS — Z88.4 ALLERGY STATUS TO ANESTHETIC AGENT: ICD-10-CM

## 2024-05-08 DIAGNOSIS — M19.90 UNSPECIFIED OSTEOARTHRITIS, UNSPECIFIED SITE: ICD-10-CM

## 2024-05-08 DIAGNOSIS — J96.21 ACUTE AND CHRONIC RESPIRATORY FAILURE WITH HYPOXIA: ICD-10-CM

## 2024-05-08 DIAGNOSIS — I34.1 NONRHEUMATIC MITRAL (VALVE) PROLAPSE: ICD-10-CM

## 2024-05-08 DIAGNOSIS — I25.10 ATHEROSCLEROTIC HEART DISEASE OF NATIVE CORONARY ARTERY WITHOUT ANGINA PECTORIS: ICD-10-CM

## 2024-05-08 DIAGNOSIS — K90.0 CELIAC DISEASE: ICD-10-CM

## 2024-05-08 DIAGNOSIS — Z79.82 LONG TERM (CURRENT) USE OF ASPIRIN: ICD-10-CM

## 2024-05-08 DIAGNOSIS — I83.93 ASYMPTOMATIC VARICOSE VEINS OF BILATERAL LOWER EXTREMITIES: ICD-10-CM

## 2024-05-08 DIAGNOSIS — F32.A DEPRESSION, UNSPECIFIED: ICD-10-CM

## 2024-05-08 DIAGNOSIS — M81.0 AGE-RELATED OSTEOPOROSIS WITHOUT CURRENT PATHOLOGICAL FRACTURE: ICD-10-CM

## 2024-05-08 DIAGNOSIS — R53.83 OTHER FATIGUE: ICD-10-CM

## 2024-07-17 NOTE — DIETITIAN INITIAL EVALUATION ADULT - CALCULATED TO (CAL/KG)
1600 Continue Regimen: Betamethasone Otc Regimen: Emollients BID Detail Level: Zone Plan: Will plan to treat with destruction for the next few visits or until lesions have resolved.

## 2024-07-31 NOTE — DIETITIAN NUTRITION RISK NOTIFICATION - MUSCLE MASS (LOSS OF MUSCLE)
Temples.../Clavicles.../Shoulders.../Thigh.../Calf... Tazorac Pregnancy And Lactation Text: This medication is not safe during pregnancy. It is unknown if this medication is excreted in breast milk.

## 2024-08-09 ENCOUNTER — APPOINTMENT (OUTPATIENT)
Dept: INTERNAL MEDICINE | Facility: CLINIC | Age: 89
End: 2024-08-09

## 2024-10-24 NOTE — ED ADULT TRIAGE NOTE - SOURCE OF INFORMATION
[facility] has been out of meds (cymbalta, seroquel) stressed about the voices. + AH to kill self. Hx: SA.   Triage Assessment (Adult)       Row Name 10/23/24 2037          Triage Assessment    Airway WDL WDL        Respiratory WDL    Respiratory WDL WDL        Skin Circulation/Temperature WDL    Skin Circulation/Temperature WDL WDL        Cardiac WDL    Cardiac WDL WDL        Peripheral/Neurovascular WDL    Peripheral Neurovascular WDL WDL        Cognitive/Neuro/Behavioral WDL    Cognitive/Neuro/Behavioral WDL WDL                     
Patient/EMS

## 2024-11-13 NOTE — H&P PST ADULT - NS PRO ABUSE SCREEN SUSPICION NEGLECT YN
[FreeTextEntry1] : Left patulous eustachian tube dysfunction - s/p left ear tube on 11/6/24, immediate improvement, now with 50% subjective hearing - patient does not return to [place] until this weekend - f/u Monday 11/18/24 at 8AM  Bilateral chronic eczematoid otitis externa - lotrisone to ears at night - The potential side effects of Lotrisone were discussed at length with the patient which include but are not limited to: itching, burning, abnormal hair growth, bruising, pain, skin thinning, steroid addiction, topical steroid withdrawal - I will manage this patient's chronic eczematoid otitis externa longitudinally  Deviated nasal septum - hx of rhinoplasty - septum is deviated to the right on nasal endoscopy - no intervention for now  
no

## 2025-01-10 NOTE — ED ADULT TRIAGE NOTE - CCCP TRG CHIEF CMPLNT
Physical Therapy    Patient not seen in therapy.     On hold due to medical condition       OBJECTIVE                             Therapy procedure time and total treatment time can be found documented on the Time Entry flowsheet   shortness of breath

## 2025-03-04 NOTE — ASU PREOP CHECKLIST - STERILIZATION AFFIRMATION
Pt is returning the call. Pt informed of msg left below. Pt would like to start the 'Tramadol', can this be sent in asa for pt?    [address] [phone], [phone] [[id number]]    n/a

## 2025-04-08 NOTE — PHYSICAL THERAPY INITIAL EVALUATION ADULT - LIVES WITH, PROFILE
Render In Strict Bullet Format?: No Detail Level: Zone Continue Regimen: fluocinonide 0.05 % topical solution \\nSig: Apply to affected areas on the scalp twice a day x2 weeks,1 week off, prn flares\\n\\nRefills sent today. Initiate Treatment: minoxidil 2.5 mg tablet \\nQuantity: 30.0 Tablet  Days Supply: 30\\nSig: Take half a tablet by mouth daily with 24/7 aide
